# Patient Record
Sex: MALE | Race: WHITE | NOT HISPANIC OR LATINO | Employment: OTHER | URBAN - METROPOLITAN AREA
[De-identification: names, ages, dates, MRNs, and addresses within clinical notes are randomized per-mention and may not be internally consistent; named-entity substitution may affect disease eponyms.]

---

## 2019-03-27 ENCOUNTER — OFFICE VISIT CONVERTED (OUTPATIENT)
Dept: SURGERY | Facility: CLINIC | Age: 64
End: 2019-03-27
Attending: SURGERY

## 2019-05-03 ENCOUNTER — HOSPITAL ENCOUNTER (OUTPATIENT)
Dept: GASTROENTEROLOGY | Facility: HOSPITAL | Age: 64
Setting detail: HOSPITAL OUTPATIENT SURGERY
Discharge: HOME | End: 2019-05-03
Attending: SURGERY

## 2019-05-09 ENCOUNTER — OFFICE VISIT CONVERTED (OUTPATIENT)
Dept: SURGERY | Facility: CLINIC | Age: 64
End: 2019-05-09
Attending: SURGERY

## 2019-05-09 ENCOUNTER — CONVERSION ENCOUNTER (OUTPATIENT)
Dept: SURGERY | Facility: CLINIC | Age: 64
End: 2019-05-09

## 2019-05-16 ENCOUNTER — HOSPITAL ENCOUNTER (OUTPATIENT)
Dept: GENERAL RADIOLOGY | Facility: HOSPITAL | Age: 64
Discharge: HOME | End: 2019-05-16
Attending: SURGERY

## 2019-05-16 LAB
CREAT BLD-MCNC: 0.9 MG/DL (ref 0.6–1.4)
GFR SERPLBLD BASED ON 1.73 SQ M-ARVRAT: >60 ML/MIN/{1.73_M2}

## 2019-05-22 ENCOUNTER — OFFICE VISIT CONVERTED (OUTPATIENT)
Dept: SURGERY | Facility: CLINIC | Age: 64
End: 2019-05-22
Attending: SURGERY

## 2019-05-31 ENCOUNTER — HOSPITAL ENCOUNTER (OUTPATIENT)
Dept: MRI IMAGING | Facility: HOSPITAL | Age: 64
Discharge: HOME | End: 2019-05-31
Attending: SURGERY

## 2019-06-12 ENCOUNTER — OFFICE VISIT CONVERTED (OUTPATIENT)
Dept: SURGERY | Facility: CLINIC | Age: 64
End: 2019-06-12
Attending: SURGERY

## 2019-06-17 ENCOUNTER — HOSPITAL ENCOUNTER (OUTPATIENT)
Dept: ONCOLOGY | Facility: HOSPITAL | Age: 64
Setting detail: RECURRING SERIES
Discharge: HOME | End: 2019-06-30
Attending: NURSE PRACTITIONER

## 2019-06-17 ENCOUNTER — HOSPITAL ENCOUNTER (OUTPATIENT)
Dept: ONCOLOGY | Facility: HOSPITAL | Age: 64
Discharge: HOME | End: 2019-06-17
Attending: INTERNAL MEDICINE

## 2019-06-17 ENCOUNTER — OFFICE VISIT CONVERTED (OUTPATIENT)
Dept: ONCOLOGY | Facility: HOSPITAL | Age: 64
End: 2019-06-17
Attending: INTERNAL MEDICINE

## 2019-06-19 LAB
ALBUMIN SERPL-MCNC: 3.9 G/DL (ref 3.5–5)
ALBUMIN/GLOB SERPL: 1.4 {RATIO} (ref 1.4–2.6)
ALP SERPL-CCNC: 77 U/L (ref 56–155)
ALT SERPL-CCNC: 21 U/L (ref 10–40)
ANION GAP SERPL CALC-SCNC: 17 MMOL/L (ref 8–19)
AST SERPL-CCNC: 21 U/L (ref 15–50)
BASOPHILS # BLD AUTO: 0.05 10*3/UL (ref 0–0.2)
BASOPHILS NFR BLD AUTO: 0.6 % (ref 0–3)
BILIRUB SERPL-MCNC: 0.45 MG/DL (ref 0.2–1.3)
BUN SERPL-MCNC: 14 MG/DL (ref 5–25)
BUN/CREAT SERPL: 13 {RATIO} (ref 6–20)
CALCIUM SERPL-MCNC: 9.1 MG/DL (ref 8.7–10.4)
CEA SERPL-MCNC: 3 NG/ML (ref 0–5)
CHLORIDE SERPL-SCNC: 104 MMOL/L (ref 99–111)
CONV ABS IMM GRAN: 0.04 10*3/UL (ref 0–0.2)
CONV CO2: 24 MMOL/L (ref 22–32)
CONV IMMATURE GRAN: 0.5 % (ref 0–1.8)
CONV TOTAL PROTEIN: 6.7 G/DL (ref 6.3–8.2)
CREAT UR-MCNC: 1.04 MG/DL (ref 0.7–1.2)
DEPRECATED RDW RBC AUTO: 40.8 FL (ref 35.1–43.9)
EOSINOPHIL # BLD AUTO: 0.53 10*3/UL (ref 0–0.7)
EOSINOPHIL # BLD AUTO: 6.4 % (ref 0–7)
ERYTHROCYTE [DISTWIDTH] IN BLOOD BY AUTOMATED COUNT: 12.4 % (ref 11.6–14.4)
GFR SERPLBLD BASED ON 1.73 SQ M-ARVRAT: >60 ML/MIN/{1.73_M2}
GLOBULIN UR ELPH-MCNC: 2.8 G/DL (ref 2–3.5)
GLUCOSE SERPL-MCNC: 101 MG/DL (ref 70–99)
HBA1C MFR BLD: 14.2 G/DL (ref 14–18)
HCT VFR BLD AUTO: 42.2 % (ref 42–52)
LYMPHOCYTES # BLD AUTO: 2.29 10*3/UL (ref 1–5)
MCH RBC QN AUTO: 30.3 PG (ref 27–31)
MCHC RBC AUTO-ENTMCNC: 33.6 G/DL (ref 33–37)
MCV RBC AUTO: 90.2 FL (ref 80–96)
MONOCYTES # BLD AUTO: 0.64 10*3/UL (ref 0.2–1.2)
MONOCYTES NFR BLD AUTO: 7.8 % (ref 3–10)
NEUTROPHILS # BLD AUTO: 4.68 10*3/UL (ref 2–8)
NEUTROPHILS NFR BLD AUTO: 56.9 % (ref 30–85)
NRBC CBCN: 0 % (ref 0–0.7)
OSMOLALITY SERPL CALC.SUM OF ELEC: 293 MOSM/KG (ref 273–304)
PLATELET # BLD AUTO: 258 10*3/UL (ref 130–400)
PMV BLD AUTO: 9.7 FL (ref 9.4–12.4)
POTASSIUM SERPL-SCNC: 3.9 MMOL/L (ref 3.5–5.3)
RBC # BLD AUTO: 4.68 10*6/UL (ref 4.7–6.1)
SODIUM SERPL-SCNC: 141 MMOL/L (ref 135–147)
VARIANT LYMPHS NFR BLD MANUAL: 27.8 % (ref 20–45)
WBC # BLD AUTO: 8.23 10*3/UL (ref 4.8–10.8)

## 2019-06-24 ENCOUNTER — HOSPITAL ENCOUNTER (OUTPATIENT)
Dept: RADIATION ONCOLOGY | Facility: HOSPITAL | Age: 64
Setting detail: RECURRING SERIES
Discharge: HOME | End: 2019-06-30
Attending: RADIOLOGY

## 2019-06-28 ENCOUNTER — HOSPITAL ENCOUNTER (OUTPATIENT)
Dept: GASTROENTEROLOGY | Facility: HOSPITAL | Age: 64
Setting detail: HOSPITAL OUTPATIENT SURGERY
Discharge: HOME | End: 2019-06-28
Attending: SURGERY

## 2019-07-01 ENCOUNTER — HOSPITAL ENCOUNTER (OUTPATIENT)
Dept: RADIATION ONCOLOGY | Facility: HOSPITAL | Age: 64
Setting detail: RECURRING SERIES
Discharge: HOME | End: 2019-07-31
Attending: RADIOLOGY

## 2019-08-01 ENCOUNTER — HOSPITAL ENCOUNTER (OUTPATIENT)
Dept: RADIATION ONCOLOGY | Facility: HOSPITAL | Age: 64
Setting detail: RECURRING SERIES
Discharge: HOME | End: 2019-08-31
Attending: RADIOLOGY

## 2019-08-05 ENCOUNTER — HOSPITAL ENCOUNTER (OUTPATIENT)
Dept: ONCOLOGY | Facility: HOSPITAL | Age: 64
Discharge: HOME | End: 2019-08-05
Attending: INTERNAL MEDICINE

## 2019-08-05 ENCOUNTER — OFFICE VISIT CONVERTED (OUTPATIENT)
Dept: ONCOLOGY | Facility: HOSPITAL | Age: 64
End: 2019-08-05
Attending: INTERNAL MEDICINE

## 2019-08-05 LAB
ALBUMIN SERPL-MCNC: 4.1 G/DL (ref 3.5–5)
ALBUMIN/GLOB SERPL: 1.5 {RATIO} (ref 1.4–2.6)
ALP SERPL-CCNC: 99 U/L (ref 56–155)
ALT SERPL-CCNC: 40 U/L (ref 10–40)
ANION GAP SERPL CALC-SCNC: 19 MMOL/L (ref 8–19)
AST SERPL-CCNC: 29 U/L (ref 15–50)
BASOPHILS # BLD AUTO: 0.02 10*3/UL (ref 0–0.2)
BASOPHILS NFR BLD AUTO: 0.3 % (ref 0–3)
BILIRUB SERPL-MCNC: 0.61 MG/DL (ref 0.2–1.3)
BUN SERPL-MCNC: 13 MG/DL (ref 5–25)
BUN/CREAT SERPL: 12 {RATIO} (ref 6–20)
CALCIUM SERPL-MCNC: 9.2 MG/DL (ref 8.7–10.4)
CEA SERPL-MCNC: 3.4 NG/ML (ref 0–5)
CHLORIDE SERPL-SCNC: 101 MMOL/L (ref 99–111)
CONV ABS IMM GRAN: 0.09 10*3/UL (ref 0–0.2)
CONV CO2: 23 MMOL/L (ref 22–32)
CONV IMMATURE GRAN: 1.2 % (ref 0–1.8)
CONV TOTAL PROTEIN: 6.9 G/DL (ref 6.3–8.2)
CREAT UR-MCNC: 1.06 MG/DL (ref 0.7–1.2)
DEPRECATED RDW RBC AUTO: 41 FL (ref 35.1–43.9)
EOSINOPHIL # BLD AUTO: 0.32 10*3/UL (ref 0–0.7)
EOSINOPHIL # BLD AUTO: 4.4 % (ref 0–7)
ERYTHROCYTE [DISTWIDTH] IN BLOOD BY AUTOMATED COUNT: 14 % (ref 11.6–14.4)
GFR SERPLBLD BASED ON 1.73 SQ M-ARVRAT: >60 ML/MIN/{1.73_M2}
GLOBULIN UR ELPH-MCNC: 2.8 G/DL (ref 2–3.5)
GLUCOSE SERPL-MCNC: 111 MG/DL (ref 70–99)
HCT VFR BLD AUTO: 40.2 % (ref 42–52)
HGB BLD-MCNC: 13.7 G/DL (ref 14–18)
LYMPHOCYTES # BLD AUTO: 0.49 10*3/UL (ref 1–5)
LYMPHOCYTES NFR BLD AUTO: 6.7 % (ref 20–45)
MCH RBC QN AUTO: 30.8 PG (ref 27–31)
MCHC RBC AUTO-ENTMCNC: 34.1 G/DL (ref 33–37)
MCV RBC AUTO: 90.3 FL (ref 80–96)
MONOCYTES # BLD AUTO: 0.54 10*3/UL (ref 0.2–1.2)
MONOCYTES NFR BLD AUTO: 7.4 % (ref 3–10)
NEUTROPHILS # BLD AUTO: 5.85 10*3/UL (ref 2–8)
NEUTROPHILS NFR BLD AUTO: 80 % (ref 30–85)
NRBC CBCN: 0 % (ref 0–0.7)
OSMOLALITY SERPL CALC.SUM OF ELEC: 289 MOSM/KG (ref 273–304)
PLATELET # BLD AUTO: 244 10*3/UL (ref 130–400)
PMV BLD AUTO: 9 FL (ref 9.4–12.4)
POTASSIUM SERPL-SCNC: 4.3 MMOL/L (ref 3.5–5.3)
RBC # BLD AUTO: 4.45 10*6/UL (ref 4.7–6.1)
SODIUM SERPL-SCNC: 139 MMOL/L (ref 135–147)
WBC # BLD AUTO: 7.31 10*3/UL (ref 4.8–10.8)

## 2019-09-16 ENCOUNTER — HOSPITAL ENCOUNTER (OUTPATIENT)
Dept: GENERAL RADIOLOGY | Facility: HOSPITAL | Age: 64
Discharge: HOME | End: 2019-09-16
Attending: NURSE PRACTITIONER

## 2019-09-17 ENCOUNTER — OFFICE VISIT CONVERTED (OUTPATIENT)
Dept: ONCOLOGY | Facility: HOSPITAL | Age: 64
End: 2019-09-17
Attending: INTERNAL MEDICINE

## 2019-09-17 ENCOUNTER — HOSPITAL ENCOUNTER (OUTPATIENT)
Dept: ONCOLOGY | Facility: HOSPITAL | Age: 64
Discharge: HOME | End: 2019-09-17
Attending: INTERNAL MEDICINE

## 2019-09-17 LAB
ALBUMIN SERPL-MCNC: 4.1 G/DL (ref 3.5–5)
ALBUMIN/GLOB SERPL: 1.5 {RATIO} (ref 1.4–2.6)
ALP SERPL-CCNC: 79 U/L (ref 56–155)
ALT SERPL-CCNC: 21 U/L (ref 10–40)
ANION GAP SERPL CALC-SCNC: 16 MMOL/L (ref 8–19)
AST SERPL-CCNC: 20 U/L (ref 15–50)
BASOPHILS # BLD AUTO: 0.03 10*3/UL (ref 0–0.2)
BASOPHILS NFR BLD AUTO: 0.6 % (ref 0–3)
BILIRUB SERPL-MCNC: 0.59 MG/DL (ref 0.2–1.3)
BUN SERPL-MCNC: 12 MG/DL (ref 5–25)
BUN/CREAT SERPL: 13 {RATIO} (ref 6–20)
CALCIUM SERPL-MCNC: 9 MG/DL (ref 8.7–10.4)
CHLORIDE SERPL-SCNC: 102 MMOL/L (ref 99–111)
CONV ABS IMM GRAN: 0.03 10*3/UL (ref 0–0.2)
CONV CO2: 24 MMOL/L (ref 22–32)
CONV IMMATURE GRAN: 0.6 % (ref 0–1.8)
CONV TOTAL PROTEIN: 6.9 G/DL (ref 6.3–8.2)
CREAT UR-MCNC: 0.92 MG/DL (ref 0.7–1.2)
DEPRECATED RDW RBC AUTO: 49.6 FL (ref 35.1–43.9)
EOSINOPHIL # BLD AUTO: 0.48 10*3/UL (ref 0–0.7)
EOSINOPHIL # BLD AUTO: 9.2 % (ref 0–7)
ERYTHROCYTE [DISTWIDTH] IN BLOOD BY AUTOMATED COUNT: 14.6 % (ref 11.6–14.4)
GFR SERPLBLD BASED ON 1.73 SQ M-ARVRAT: >60 ML/MIN/{1.73_M2}
GLOBULIN UR ELPH-MCNC: 2.8 G/DL (ref 2–3.5)
GLUCOSE SERPL-MCNC: 103 MG/DL (ref 70–99)
HCT VFR BLD AUTO: 39.7 % (ref 42–52)
HGB BLD-MCNC: 13.5 G/DL (ref 14–18)
LYMPHOCYTES # BLD AUTO: 0.53 10*3/UL (ref 1–5)
LYMPHOCYTES NFR BLD AUTO: 10.1 % (ref 20–45)
MCH RBC QN AUTO: 31.8 PG (ref 27–31)
MCHC RBC AUTO-ENTMCNC: 34 G/DL (ref 33–37)
MCV RBC AUTO: 93.4 FL (ref 80–96)
MONOCYTES # BLD AUTO: 0.55 10*3/UL (ref 0.2–1.2)
MONOCYTES NFR BLD AUTO: 10.5 % (ref 3–10)
NEUTROPHILS # BLD AUTO: 3.62 10*3/UL (ref 2–8)
NEUTROPHILS NFR BLD AUTO: 69 % (ref 30–85)
NRBC CBCN: 0 % (ref 0–0.7)
OSMOLALITY SERPL CALC.SUM OF ELEC: 286 MOSM/KG (ref 273–304)
PLATELET # BLD AUTO: 218 10*3/UL (ref 130–400)
PMV BLD AUTO: 9.2 FL (ref 9.4–12.4)
POTASSIUM SERPL-SCNC: 3.9 MMOL/L (ref 3.5–5.3)
RBC # BLD AUTO: 4.25 10*6/UL (ref 4.7–6.1)
SODIUM SERPL-SCNC: 138 MMOL/L (ref 135–147)
WBC # BLD AUTO: 5.24 10*3/UL (ref 4.8–10.8)

## 2019-09-18 ENCOUNTER — OFFICE VISIT CONVERTED (OUTPATIENT)
Dept: SURGERY | Facility: CLINIC | Age: 64
End: 2019-09-18
Attending: SURGERY

## 2019-10-10 ENCOUNTER — OFFICE VISIT CONVERTED (OUTPATIENT)
Dept: SURGERY | Facility: CLINIC | Age: 64
End: 2019-10-10
Attending: SURGERY

## 2019-10-17 ENCOUNTER — OFFICE VISIT CONVERTED (OUTPATIENT)
Dept: SURGERY | Facility: CLINIC | Age: 64
End: 2019-10-17
Attending: SURGERY

## 2019-10-24 ENCOUNTER — OFFICE VISIT CONVERTED (OUTPATIENT)
Dept: ONCOLOGY | Facility: HOSPITAL | Age: 64
End: 2019-10-24
Attending: INTERNAL MEDICINE

## 2019-10-24 ENCOUNTER — HOSPITAL ENCOUNTER (OUTPATIENT)
Dept: ONCOLOGY | Facility: HOSPITAL | Age: 64
Discharge: HOME | End: 2019-10-24
Attending: INTERNAL MEDICINE

## 2019-10-24 LAB
ALBUMIN SERPL-MCNC: 3.9 G/DL (ref 3.5–5)
ALBUMIN/GLOB SERPL: 1.3 {RATIO} (ref 1.4–2.6)
ALP SERPL-CCNC: 124 U/L (ref 56–155)
ALT SERPL-CCNC: 22 U/L (ref 10–40)
ANION GAP SERPL CALC-SCNC: 16 MMOL/L (ref 8–19)
AST SERPL-CCNC: 19 U/L (ref 15–50)
BASOPHILS # BLD AUTO: 0.04 10*3/UL (ref 0–0.2)
BASOPHILS NFR BLD AUTO: 0.6 % (ref 0–3)
BILIRUB SERPL-MCNC: 0.48 MG/DL (ref 0.2–1.3)
BUN SERPL-MCNC: 10 MG/DL (ref 5–25)
BUN/CREAT SERPL: 13 {RATIO} (ref 6–20)
CALCIUM SERPL-MCNC: 9.6 MG/DL (ref 8.7–10.4)
CHLORIDE SERPL-SCNC: 102 MMOL/L (ref 99–111)
CONV ABS IMM GRAN: 0.04 10*3/UL (ref 0–0.2)
CONV CO2: 25 MMOL/L (ref 22–32)
CONV IMMATURE GRAN: 0.6 % (ref 0–1.8)
CONV TOTAL PROTEIN: 7 G/DL (ref 6.3–8.2)
CREAT UR-MCNC: 0.75 MG/DL (ref 0.7–1.2)
DEPRECATED RDW RBC AUTO: 43 FL (ref 35.1–43.9)
EOSINOPHIL # BLD AUTO: 0.46 10*3/UL (ref 0–0.7)
EOSINOPHIL # BLD AUTO: 6.9 % (ref 0–7)
ERYTHROCYTE [DISTWIDTH] IN BLOOD BY AUTOMATED COUNT: 12.5 % (ref 11.6–14.4)
GFR SERPLBLD BASED ON 1.73 SQ M-ARVRAT: >60 ML/MIN/{1.73_M2}
GLOBULIN UR ELPH-MCNC: 3.1 G/DL (ref 2–3.5)
GLUCOSE SERPL-MCNC: 99 MG/DL (ref 70–99)
HCT VFR BLD AUTO: 37.9 % (ref 42–52)
HGB BLD-MCNC: 12.5 G/DL (ref 14–18)
LYMPHOCYTES # BLD AUTO: 0.51 10*3/UL (ref 1–5)
LYMPHOCYTES NFR BLD AUTO: 7.7 % (ref 20–45)
MCH RBC QN AUTO: 30.6 PG (ref 27–31)
MCHC RBC AUTO-ENTMCNC: 33 G/DL (ref 33–37)
MCV RBC AUTO: 92.9 FL (ref 80–96)
MONOCYTES # BLD AUTO: 0.65 10*3/UL (ref 0.2–1.2)
MONOCYTES NFR BLD AUTO: 9.8 % (ref 3–10)
NEUTROPHILS # BLD AUTO: 4.92 10*3/UL (ref 2–8)
NEUTROPHILS NFR BLD AUTO: 74.4 % (ref 30–85)
NRBC CBCN: 0 % (ref 0–0.7)
OSMOLALITY SERPL CALC.SUM OF ELEC: 287 MOSM/KG (ref 273–304)
PLATELET # BLD AUTO: 231 10*3/UL (ref 130–400)
PMV BLD AUTO: 8.9 FL (ref 9.4–12.4)
POTASSIUM SERPL-SCNC: 4.3 MMOL/L (ref 3.5–5.3)
RBC # BLD AUTO: 4.08 10*6/UL (ref 4.7–6.1)
SODIUM SERPL-SCNC: 139 MMOL/L (ref 135–147)
WBC # BLD AUTO: 6.62 10*3/UL (ref 4.8–10.8)

## 2019-11-15 ENCOUNTER — HOSPITAL ENCOUNTER (OUTPATIENT)
Dept: ONCOLOGY | Facility: HOSPITAL | Age: 64
Discharge: HOME | End: 2019-11-15
Attending: INTERNAL MEDICINE

## 2019-11-15 LAB
ALBUMIN SERPL-MCNC: 3.6 G/DL (ref 3.5–5)
ALBUMIN/GLOB SERPL: 1.2 {RATIO} (ref 1.4–2.6)
ALP SERPL-CCNC: 98 U/L (ref 56–155)
ALT SERPL-CCNC: 26 U/L (ref 10–40)
ANION GAP SERPL CALC-SCNC: 15 MMOL/L (ref 8–19)
AST SERPL-CCNC: 23 U/L (ref 15–50)
BASOPHILS # BLD AUTO: 0.03 10*3/UL (ref 0–0.2)
BASOPHILS NFR BLD AUTO: 0.4 % (ref 0–3)
BILIRUB SERPL-MCNC: 0.37 MG/DL (ref 0.2–1.3)
BUN SERPL-MCNC: 8 MG/DL (ref 5–25)
BUN/CREAT SERPL: 9 {RATIO} (ref 6–20)
CALCIUM SERPL-MCNC: 9.3 MG/DL (ref 8.7–10.4)
CHLORIDE SERPL-SCNC: 104 MMOL/L (ref 99–111)
CONV ABS IMM GRAN: 0.03 10*3/UL (ref 0–0.2)
CONV CO2: 25 MMOL/L (ref 22–32)
CONV IMMATURE GRAN: 0.4 % (ref 0–1.8)
CONV TOTAL PROTEIN: 6.7 G/DL (ref 6.3–8.2)
CREAT UR-MCNC: 0.87 MG/DL (ref 0.7–1.2)
DEPRECATED RDW RBC AUTO: 44.6 FL (ref 35.1–43.9)
EOSINOPHIL # BLD AUTO: 0.4 10*3/UL (ref 0–0.7)
EOSINOPHIL # BLD AUTO: 5.4 % (ref 0–7)
ERYTHROCYTE [DISTWIDTH] IN BLOOD BY AUTOMATED COUNT: 14.7 % (ref 11.6–14.4)
GFR SERPLBLD BASED ON 1.73 SQ M-ARVRAT: >60 ML/MIN/{1.73_M2}
GLOBULIN UR ELPH-MCNC: 3.1 G/DL (ref 2–3.5)
GLUCOSE SERPL-MCNC: 119 MG/DL (ref 70–99)
HCT VFR BLD AUTO: 38.3 % (ref 42–52)
HGB BLD-MCNC: 12.4 G/DL (ref 14–18)
LYMPHOCYTES # BLD AUTO: 0.52 10*3/UL (ref 1–5)
LYMPHOCYTES NFR BLD AUTO: 7 % (ref 20–45)
MCH RBC QN AUTO: 30.4 PG (ref 27–31)
MCHC RBC AUTO-ENTMCNC: 32.4 G/DL (ref 33–37)
MCV RBC AUTO: 93.9 FL (ref 80–96)
MONOCYTES # BLD AUTO: 0.59 10*3/UL (ref 0.2–1.2)
MONOCYTES NFR BLD AUTO: 7.9 % (ref 3–10)
NEUTROPHILS # BLD AUTO: 5.86 10*3/UL (ref 2–8)
NEUTROPHILS NFR BLD AUTO: 78.9 % (ref 30–85)
NRBC CBCN: 0 % (ref 0–0.7)
OSMOLALITY SERPL CALC.SUM OF ELEC: 289 MOSM/KG (ref 273–304)
PLATELET # BLD AUTO: 218 10*3/UL (ref 130–400)
PMV BLD AUTO: 8.9 FL (ref 9.4–12.4)
POTASSIUM SERPL-SCNC: 3.8 MMOL/L (ref 3.5–5.3)
RBC # BLD AUTO: 4.08 10*6/UL (ref 4.7–6.1)
SODIUM SERPL-SCNC: 140 MMOL/L (ref 135–147)
WBC # BLD AUTO: 7.43 10*3/UL (ref 4.8–10.8)

## 2019-11-18 ENCOUNTER — HOSPITAL ENCOUNTER (OUTPATIENT)
Dept: ONCOLOGY | Facility: HOSPITAL | Age: 64
Discharge: HOME | End: 2019-11-18
Attending: INTERNAL MEDICINE

## 2019-11-18 ENCOUNTER — OFFICE VISIT CONVERTED (OUTPATIENT)
Dept: ONCOLOGY | Facility: HOSPITAL | Age: 64
End: 2019-11-18
Attending: INTERNAL MEDICINE

## 2019-12-12 ENCOUNTER — HOSPITAL ENCOUNTER (OUTPATIENT)
Dept: ONCOLOGY | Facility: HOSPITAL | Age: 64
Discharge: HOME | End: 2019-12-12
Attending: INTERNAL MEDICINE

## 2019-12-12 LAB
ALBUMIN SERPL-MCNC: 3.7 G/DL (ref 3.5–5)
ALBUMIN/GLOB SERPL: 1.2 {RATIO} (ref 1.4–2.6)
ALP SERPL-CCNC: 106 U/L (ref 56–155)
ALT SERPL-CCNC: 20 U/L (ref 10–40)
ANION GAP SERPL CALC-SCNC: 15 MMOL/L (ref 8–19)
AST SERPL-CCNC: 19 U/L (ref 15–50)
BASOPHILS # BLD AUTO: 0.02 10*3/UL (ref 0–0.2)
BASOPHILS NFR BLD AUTO: 0.3 % (ref 0–3)
BILIRUB SERPL-MCNC: 0.68 MG/DL (ref 0.2–1.3)
BUN SERPL-MCNC: 12 MG/DL (ref 5–25)
BUN/CREAT SERPL: 11 {RATIO} (ref 6–20)
CALCIUM SERPL-MCNC: 9.3 MG/DL (ref 8.7–10.4)
CHLORIDE SERPL-SCNC: 101 MMOL/L (ref 99–111)
CONV ABS IMM GRAN: 0.03 10*3/UL (ref 0–0.2)
CONV CO2: 25 MMOL/L (ref 22–32)
CONV IMMATURE GRAN: 0.4 % (ref 0–1.8)
CONV TOTAL PROTEIN: 6.8 G/DL (ref 6.3–8.2)
CREAT UR-MCNC: 1.05 MG/DL (ref 0.7–1.2)
DEPRECATED RDW RBC AUTO: 52.9 FL (ref 35.1–43.9)
EOSINOPHIL # BLD AUTO: 0.28 10*3/UL (ref 0–0.7)
EOSINOPHIL # BLD AUTO: 3.7 % (ref 0–7)
ERYTHROCYTE [DISTWIDTH] IN BLOOD BY AUTOMATED COUNT: 15.9 % (ref 11.6–14.4)
GFR SERPLBLD BASED ON 1.73 SQ M-ARVRAT: >60 ML/MIN/{1.73_M2}
GLOBULIN UR ELPH-MCNC: 3.1 G/DL (ref 2–3.5)
GLUCOSE SERPL-MCNC: 132 MG/DL (ref 70–99)
HCT VFR BLD AUTO: 38.3 % (ref 42–52)
HGB BLD-MCNC: 13.1 G/DL (ref 14–18)
LYMPHOCYTES # BLD AUTO: 0.38 10*3/UL (ref 1–5)
LYMPHOCYTES NFR BLD AUTO: 5.1 % (ref 20–45)
MCH RBC QN AUTO: 31.5 PG (ref 27–31)
MCHC RBC AUTO-ENTMCNC: 34.2 G/DL (ref 33–37)
MCV RBC AUTO: 92.1 FL (ref 80–96)
MONOCYTES # BLD AUTO: 0.47 10*3/UL (ref 0.2–1.2)
MONOCYTES NFR BLD AUTO: 6.3 % (ref 3–10)
NEUTROPHILS # BLD AUTO: 6.32 10*3/UL (ref 2–8)
NEUTROPHILS NFR BLD AUTO: 84.2 % (ref 30–85)
NRBC CBCN: 0 % (ref 0–0.7)
OSMOLALITY SERPL CALC.SUM OF ELEC: 286 MOSM/KG (ref 273–304)
PLATELET # BLD AUTO: 193 10*3/UL (ref 130–400)
PMV BLD AUTO: 9.1 FL (ref 9.4–12.4)
POTASSIUM SERPL-SCNC: 3.9 MMOL/L (ref 3.5–5.3)
RBC # BLD AUTO: 4.16 10*6/UL (ref 4.7–6.1)
SODIUM SERPL-SCNC: 137 MMOL/L (ref 135–147)
WBC # BLD AUTO: 7.5 10*3/UL (ref 4.8–10.8)

## 2019-12-19 ENCOUNTER — OFFICE VISIT CONVERTED (OUTPATIENT)
Dept: ONCOLOGY | Facility: HOSPITAL | Age: 64
End: 2019-12-19
Attending: INTERNAL MEDICINE

## 2019-12-19 ENCOUNTER — HOSPITAL ENCOUNTER (OUTPATIENT)
Dept: ONCOLOGY | Facility: HOSPITAL | Age: 64
Discharge: HOME | End: 2019-12-19
Attending: INTERNAL MEDICINE

## 2020-01-07 ENCOUNTER — HOSPITAL ENCOUNTER (OUTPATIENT)
Dept: ONCOLOGY | Facility: HOSPITAL | Age: 65
Discharge: HOME | End: 2020-01-07
Attending: INTERNAL MEDICINE

## 2020-01-07 LAB
ALBUMIN SERPL-MCNC: 3.7 G/DL (ref 3.5–5)
ALBUMIN/GLOB SERPL: 1.3 {RATIO} (ref 1.4–2.6)
ALP SERPL-CCNC: 113 U/L (ref 56–155)
ALT SERPL-CCNC: 73 U/L (ref 10–40)
ANION GAP SERPL CALC-SCNC: 13 MMOL/L (ref 8–19)
AST SERPL-CCNC: 131 U/L (ref 15–50)
BASOPHILS # BLD AUTO: 0.02 10*3/UL (ref 0–0.2)
BASOPHILS NFR BLD AUTO: 0.3 % (ref 0–3)
BILIRUB SERPL-MCNC: 0.74 MG/DL (ref 0.2–1.3)
BUN SERPL-MCNC: 16 MG/DL (ref 5–25)
BUN/CREAT SERPL: 21 {RATIO} (ref 6–20)
CALCIUM SERPL-MCNC: 8.6 MG/DL (ref 8.7–10.4)
CALCIUM SPEC-SCNC: 8.4 MG/DL (ref 8.7–10.4)
CHLORIDE SERPL-SCNC: 105 MMOL/L (ref 99–111)
CONV ABS IMM GRAN: 0.01 10*3/UL (ref 0–0.54)
CONV CO2: 22 MMOL/L (ref 22–32)
CONV EOSINOPHILS PERCENT BY MANUAL COUNT: 3.2 % (ref 0–7)
CONV IMMATURE GRAN: 0.1 % (ref 0–0.4)
CONV TOTAL PROTEIN: 6.5 G/DL (ref 6.3–8.2)
CREAT UR-MCNC: 0.75 MG/DL (ref 0.7–1.2)
EOSINOPHIL # BLD MANUAL: 0.22 10*3/UL (ref 0–0.7)
ERYTHROCYTE [DISTWIDTH] IN BLOOD BY AUTOMATED COUNT: 17.3 % (ref 11.5–14.5)
ERYTHROCYTE [DISTWIDTH] IN BLOOD BY AUTOMATED COUNT: 58 FL
GFR SERPLBLD BASED ON 1.73 SQ M-ARVRAT: >60 ML/MIN/{1.73_M2}
GLOBULIN UR ELPH-MCNC: 2.8 G/DL (ref 2–3.5)
GLUCOSE SERPL-MCNC: 88 MG/DL (ref 70–99)
HBA1C MFR BLD: 12.8 G/DL (ref 14–18)
HCT VFR BLD AUTO: 38.5 % (ref 42–52)
LYMPHOCYTES # BLD AUTO: 0.4 10*3/UL (ref 1–5)
LYMPHOCYTES NFR BLD AUTO: 5.9 % (ref 20–45)
MCH RBC QN AUTO: 31.4 PG (ref 27–31)
MCHC RBC AUTO-ENTMCNC: 33.2 G/DL (ref 33–37)
MCV RBC AUTO: 94.6 FL (ref 80–96)
MONOCYTES # BLD AUTO: 0.59 10*3/UL (ref 0.2–1.2)
MONOCYTES NFR BLD MANUAL: 8.7 % (ref 3–10)
NEUTROPHILS # BLD AUTO: 5.54 10*3/UL (ref 2–8)
NEUTROPHILS NFR BLD MANUAL: 81.8 % (ref 30–85)
OSMOLALITY SERPL CALC.SUM OF ELEC: 283 MOSM/KG (ref 273–304)
PLATELET # BLD AUTO: 143 10*3/UL (ref 130–400)
PMV BLD AUTO: 9 FL (ref 7.4–10.4)
POTASSIUM SERPL-SCNC: 3.9 MMOL/L (ref 3.5–5.3)
RBC MORPH BLD: 4.07 10*6/UL (ref 4.7–6.1)
SODIUM SERPL-SCNC: 136 MMOL/L (ref 135–147)
WBC # BLD AUTO: 6.78 10*3/UL (ref 4.8–10.8)

## 2020-01-09 ENCOUNTER — OFFICE VISIT CONVERTED (OUTPATIENT)
Dept: ONCOLOGY | Facility: HOSPITAL | Age: 65
End: 2020-01-09
Attending: INTERNAL MEDICINE

## 2020-01-09 ENCOUNTER — HOSPITAL ENCOUNTER (OUTPATIENT)
Dept: ONCOLOGY | Facility: HOSPITAL | Age: 65
Discharge: HOME | End: 2020-01-09
Attending: INTERNAL MEDICINE

## 2020-01-15 ENCOUNTER — HOSPITAL ENCOUNTER (OUTPATIENT)
Dept: ONCOLOGY | Facility: HOSPITAL | Age: 65
Discharge: HOME | End: 2020-01-15
Attending: INTERNAL MEDICINE

## 2020-01-15 LAB
ALBUMIN SERPL-MCNC: 3.7 G/DL (ref 3.5–5)
ALBUMIN/GLOB SERPL: 1.4 {RATIO} (ref 1.4–2.6)
ALP SERPL-CCNC: 94 U/L (ref 56–155)
ALT SERPL-CCNC: 17 U/L (ref 10–40)
ANION GAP SERPL CALC-SCNC: 11 MMOL/L (ref 8–19)
AST SERPL-CCNC: 20 U/L (ref 15–50)
BASOPHILS # BLD AUTO: 0.02 10*3/UL (ref 0–0.2)
BASOPHILS NFR BLD AUTO: 0.3 % (ref 0–3)
BILIRUB SERPL-MCNC: 0.45 MG/DL (ref 0.2–1.3)
BUN SERPL-MCNC: 12 MG/DL (ref 5–25)
BUN/CREAT SERPL: 12 {RATIO} (ref 6–20)
CALCIUM SERPL-MCNC: 9.2 MG/DL (ref 8.7–10.4)
CALCIUM SPEC-SCNC: 9 MG/DL (ref 8.7–10.4)
CHLORIDE SERPL-SCNC: 104 MMOL/L (ref 99–111)
CONV ABS IMM GRAN: 0.07 10*3/UL (ref 0–0.54)
CONV CO2: 26 MMOL/L (ref 22–32)
CONV EOSINOPHILS PERCENT BY MANUAL COUNT: 6.1 % (ref 0–7)
CONV IMMATURE GRAN: 1.2 % (ref 0–0.4)
CONV TOTAL PROTEIN: 6.4 G/DL (ref 6.3–8.2)
CREAT UR-MCNC: 0.97 MG/DL (ref 0.7–1.2)
EOSINOPHIL # BLD MANUAL: 0.37 10*3/UL (ref 0–0.7)
ERYTHROCYTE [DISTWIDTH] IN BLOOD BY AUTOMATED COUNT: 18.1 % (ref 11.5–14.5)
ERYTHROCYTE [DISTWIDTH] IN BLOOD BY AUTOMATED COUNT: 62.6 FL
GFR SERPLBLD BASED ON 1.73 SQ M-ARVRAT: >60 ML/MIN/{1.73_M2}
GLOBULIN UR ELPH-MCNC: 2.7 G/DL (ref 2–3.5)
GLUCOSE SERPL-MCNC: 131 MG/DL (ref 70–99)
HBA1C MFR BLD: 12.6 G/DL (ref 14–18)
HCT VFR BLD AUTO: 37.9 % (ref 42–52)
LYMPHOCYTES # BLD AUTO: 0.49 10*3/UL (ref 1–5)
LYMPHOCYTES NFR BLD AUTO: 8.1 % (ref 20–45)
MCH RBC QN AUTO: 31.8 PG (ref 27–31)
MCHC RBC AUTO-ENTMCNC: 33.2 G/DL (ref 33–37)
MCV RBC AUTO: 95.7 FL (ref 80–96)
MONOCYTES # BLD AUTO: 0.47 10*3/UL (ref 0.2–1.2)
MONOCYTES NFR BLD MANUAL: 7.7 % (ref 3–10)
NEUTROPHILS # BLD AUTO: 4.66 10*3/UL (ref 2–8)
NEUTROPHILS NFR BLD MANUAL: 76.6 % (ref 30–85)
OSMOLALITY SERPL CALC.SUM OF ELEC: 286 MOSM/KG (ref 273–304)
PLATELET # BLD AUTO: 213 10*3/UL (ref 130–400)
PMV BLD AUTO: 8.2 FL (ref 7.4–10.4)
POTASSIUM SERPL-SCNC: 4 MMOL/L (ref 3.5–5.3)
RBC MORPH BLD: 3.96 10*6/UL (ref 4.7–6.1)
SODIUM SERPL-SCNC: 137 MMOL/L (ref 135–147)
WBC # BLD AUTO: 6.08 10*3/UL (ref 4.8–10.8)

## 2020-01-16 ENCOUNTER — OFFICE VISIT CONVERTED (OUTPATIENT)
Dept: ONCOLOGY | Facility: HOSPITAL | Age: 65
End: 2020-01-16
Attending: INTERNAL MEDICINE

## 2020-01-16 ENCOUNTER — HOSPITAL ENCOUNTER (OUTPATIENT)
Dept: ONCOLOGY | Facility: HOSPITAL | Age: 65
Discharge: HOME | End: 2020-01-16
Attending: INTERNAL MEDICINE

## 2020-02-05 ENCOUNTER — HOSPITAL ENCOUNTER (OUTPATIENT)
Dept: ONCOLOGY | Facility: HOSPITAL | Age: 65
Discharge: HOME | End: 2020-02-05
Attending: INTERNAL MEDICINE

## 2020-02-05 LAB
ALBUMIN SERPL-MCNC: 3.8 G/DL (ref 3.5–5)
ALBUMIN/GLOB SERPL: 1.4 {RATIO} (ref 1.4–2.6)
ALP SERPL-CCNC: 86 U/L (ref 56–155)
ALT SERPL-CCNC: 25 U/L (ref 10–40)
ANION GAP SERPL CALC-SCNC: 14 MMOL/L (ref 8–19)
AST SERPL-CCNC: 23 U/L (ref 15–50)
BASOPHILS # BLD AUTO: 0.03 10*3/UL (ref 0–0.2)
BASOPHILS NFR BLD AUTO: 0.5 % (ref 0–3)
BILIRUB SERPL-MCNC: 0.47 MG/DL (ref 0.2–1.3)
BUN SERPL-MCNC: 12 MG/DL (ref 5–25)
BUN/CREAT SERPL: 14 {RATIO} (ref 6–20)
CALCIUM SERPL-MCNC: 9.5 MG/DL (ref 8.7–10.4)
CALCIUM SPEC-SCNC: 9.3 MG/DL (ref 8.7–10.4)
CHLORIDE SERPL-SCNC: 105 MMOL/L (ref 99–111)
CONV ABS IMM GRAN: 0.04 10*3/UL (ref 0–0.54)
CONV CO2: 23 MMOL/L (ref 22–32)
CONV EOSINOPHILS PERCENT BY MANUAL COUNT: 6.4 % (ref 0–7)
CONV IMMATURE GRAN: 0.7 % (ref 0–0.4)
CONV TOTAL PROTEIN: 6.5 G/DL (ref 6.3–8.2)
CREAT UR-MCNC: 0.84 MG/DL (ref 0.7–1.2)
EOSINOPHIL # BLD MANUAL: 0.36 10*3/UL (ref 0–0.7)
ERYTHROCYTE [DISTWIDTH] IN BLOOD BY AUTOMATED COUNT: 17.1 % (ref 11.5–14.5)
ERYTHROCYTE [DISTWIDTH] IN BLOOD BY AUTOMATED COUNT: 59.1 FL
GFR SERPLBLD BASED ON 1.73 SQ M-ARVRAT: >60 ML/MIN/{1.73_M2}
GLOBULIN UR ELPH-MCNC: 2.7 G/DL (ref 2–3.5)
GLUCOSE SERPL-MCNC: 116 MG/DL (ref 70–99)
HBA1C MFR BLD: 13.2 G/DL (ref 14–18)
HCT VFR BLD AUTO: 38.3 % (ref 42–52)
LYMPHOCYTES # BLD AUTO: 0.51 10*3/UL (ref 1–5)
LYMPHOCYTES NFR BLD AUTO: 9.1 % (ref 20–45)
MCH RBC QN AUTO: 32.6 PG (ref 27–31)
MCHC RBC AUTO-ENTMCNC: 34.5 G/DL (ref 33–37)
MCV RBC AUTO: 94.6 FL (ref 80–96)
MONOCYTES # BLD AUTO: 0.43 10*3/UL (ref 0.2–1.2)
MONOCYTES NFR BLD MANUAL: 7.6 % (ref 3–10)
NEUTROPHILS # BLD AUTO: 4.26 10*3/UL (ref 2–8)
NEUTROPHILS NFR BLD MANUAL: 75.7 % (ref 30–85)
OSMOLALITY SERPL CALC.SUM OF ELEC: 287 MOSM/KG (ref 273–304)
PLATELET # BLD AUTO: 171 10*3/UL (ref 130–400)
PMV BLD AUTO: 8.3 FL (ref 7.4–10.4)
POTASSIUM SERPL-SCNC: 4 MMOL/L (ref 3.5–5.3)
RBC MORPH BLD: 4.05 10*6/UL (ref 4.7–6.1)
SODIUM SERPL-SCNC: 138 MMOL/L (ref 135–147)
WBC # BLD AUTO: 5.63 10*3/UL (ref 4.8–10.8)

## 2020-02-06 ENCOUNTER — OFFICE VISIT CONVERTED (OUTPATIENT)
Dept: ONCOLOGY | Facility: HOSPITAL | Age: 65
End: 2020-02-06
Attending: INTERNAL MEDICINE

## 2020-02-06 ENCOUNTER — HOSPITAL ENCOUNTER (OUTPATIENT)
Dept: ONCOLOGY | Facility: HOSPITAL | Age: 65
Discharge: HOME | End: 2020-02-06
Attending: INTERNAL MEDICINE

## 2020-02-26 ENCOUNTER — OFFICE VISIT CONVERTED (OUTPATIENT)
Dept: SURGERY | Facility: CLINIC | Age: 65
End: 2020-02-26
Attending: SURGERY

## 2020-02-26 ENCOUNTER — CONVERSION ENCOUNTER (OUTPATIENT)
Dept: SURGERY | Facility: CLINIC | Age: 65
End: 2020-02-26

## 2020-03-02 ENCOUNTER — HOSPITAL ENCOUNTER (OUTPATIENT)
Dept: ONCOLOGY | Facility: HOSPITAL | Age: 65
Discharge: HOME | End: 2020-03-02
Attending: NURSE PRACTITIONER

## 2020-03-02 ENCOUNTER — OFFICE VISIT CONVERTED (OUTPATIENT)
Dept: ONCOLOGY | Facility: HOSPITAL | Age: 65
End: 2020-03-02
Attending: NURSE PRACTITIONER

## 2020-03-02 ENCOUNTER — HOSPITAL ENCOUNTER (OUTPATIENT)
Dept: CARDIOLOGY | Facility: HOSPITAL | Age: 65
Discharge: HOME | End: 2020-03-02
Attending: NURSE PRACTITIONER

## 2020-03-19 ENCOUNTER — HOSPITAL ENCOUNTER (OUTPATIENT)
Dept: CT IMAGING | Facility: HOSPITAL | Age: 65
Discharge: HOME | End: 2020-03-19
Attending: INTERNAL MEDICINE

## 2020-03-19 LAB
ALBUMIN SERPL-MCNC: 3.5 G/DL (ref 3.5–5)
ALBUMIN/GLOB SERPL: 1.2 {RATIO} (ref 1.4–2.6)
ALP SERPL-CCNC: 93 U/L (ref 56–155)
ALT SERPL-CCNC: 32 U/L (ref 10–40)
ANION GAP SERPL CALC-SCNC: 16 MMOL/L (ref 8–19)
AST SERPL-CCNC: 24 U/L (ref 15–50)
BASOPHILS # BLD AUTO: 0.03 10*3/UL (ref 0–0.2)
BASOPHILS NFR BLD AUTO: 0.5 % (ref 0–3)
BILIRUB SERPL-MCNC: 0.46 MG/DL (ref 0.2–1.3)
BUN SERPL-MCNC: 10 MG/DL (ref 5–25)
BUN/CREAT SERPL: 11 {RATIO} (ref 6–20)
CALCIUM SERPL-MCNC: 9.4 MG/DL (ref 8.7–10.4)
CEA SERPL-MCNC: 0.9 NG/ML (ref 0–5)
CHLORIDE SERPL-SCNC: 102 MMOL/L (ref 99–111)
CONV ABS IMM GRAN: 0.03 10*3/UL (ref 0–0.2)
CONV CO2: 23 MMOL/L (ref 22–32)
CONV IMMATURE GRAN: 0.5 % (ref 0–1.8)
CONV TOTAL PROTEIN: 6.5 G/DL (ref 6.3–8.2)
CREAT UR-MCNC: 0.88 MG/DL (ref 0.7–1.2)
DEPRECATED RDW RBC AUTO: 48.9 FL (ref 35.1–43.9)
EOSINOPHIL # BLD AUTO: 0.25 10*3/UL (ref 0–0.7)
EOSINOPHIL # BLD AUTO: 4.1 % (ref 0–7)
ERYTHROCYTE [DISTWIDTH] IN BLOOD BY AUTOMATED COUNT: 13.8 % (ref 11.6–14.4)
GFR SERPLBLD BASED ON 1.73 SQ M-ARVRAT: >60 ML/MIN/{1.73_M2}
GLOBULIN UR ELPH-MCNC: 3 G/DL (ref 2–3.5)
GLUCOSE SERPL-MCNC: 97 MG/DL (ref 70–99)
HCT VFR BLD AUTO: 42.1 % (ref 42–52)
HGB BLD-MCNC: 14 G/DL (ref 14–18)
LYMPHOCYTES # BLD AUTO: 0.55 10*3/UL (ref 1–5)
LYMPHOCYTES NFR BLD AUTO: 9 % (ref 20–45)
MCH RBC QN AUTO: 31.7 PG (ref 27–31)
MCHC RBC AUTO-ENTMCNC: 33.3 G/DL (ref 33–37)
MCV RBC AUTO: 95.5 FL (ref 80–96)
MONOCYTES # BLD AUTO: 0.37 10*3/UL (ref 0.2–1.2)
MONOCYTES NFR BLD AUTO: 6.1 % (ref 3–10)
NEUTROPHILS # BLD AUTO: 4.88 10*3/UL (ref 2–8)
NEUTROPHILS NFR BLD AUTO: 79.8 % (ref 30–85)
NRBC CBCN: 0 % (ref 0–0.7)
OSMOLALITY SERPL CALC.SUM OF ELEC: 283 MOSM/KG (ref 273–304)
PLATELET # BLD AUTO: 290 10*3/UL (ref 130–400)
PMV BLD AUTO: 8.7 FL (ref 9.4–12.4)
POTASSIUM SERPL-SCNC: 4.1 MMOL/L (ref 3.5–5.3)
RBC # BLD AUTO: 4.41 10*6/UL (ref 4.7–6.1)
SODIUM SERPL-SCNC: 137 MMOL/L (ref 135–147)
WBC # BLD AUTO: 6.11 10*3/UL (ref 4.8–10.8)

## 2020-03-23 ENCOUNTER — OFFICE VISIT CONVERTED (OUTPATIENT)
Dept: ONCOLOGY | Facility: HOSPITAL | Age: 65
End: 2020-03-23
Attending: INTERNAL MEDICINE

## 2020-03-23 ENCOUNTER — HOSPITAL ENCOUNTER (OUTPATIENT)
Dept: ONCOLOGY | Facility: HOSPITAL | Age: 65
Discharge: HOME | End: 2020-03-23
Attending: INTERNAL MEDICINE

## 2020-04-29 ENCOUNTER — OFFICE VISIT CONVERTED (OUTPATIENT)
Dept: SURGERY | Facility: CLINIC | Age: 65
End: 2020-04-29
Attending: SURGERY

## 2020-06-03 ENCOUNTER — OFFICE VISIT CONVERTED (OUTPATIENT)
Dept: SURGERY | Facility: CLINIC | Age: 65
End: 2020-06-03
Attending: SURGERY

## 2020-06-24 ENCOUNTER — HOSPITAL ENCOUNTER (OUTPATIENT)
Dept: GENERAL RADIOLOGY | Facility: HOSPITAL | Age: 65
Discharge: HOME | End: 2020-06-24
Attending: INTERNAL MEDICINE

## 2020-06-24 ENCOUNTER — HOSPITAL ENCOUNTER (OUTPATIENT)
Dept: LAB | Facility: HOSPITAL | Age: 65
Discharge: HOME | End: 2020-06-24
Attending: INTERNAL MEDICINE

## 2020-06-24 LAB
ALBUMIN SERPL-MCNC: 3.8 G/DL (ref 3.5–5)
ALBUMIN/GLOB SERPL: 1.5 {RATIO} (ref 1.4–2.6)
ALP SERPL-CCNC: 68 U/L (ref 56–155)
ALT SERPL-CCNC: 29 U/L (ref 10–40)
ANION GAP SERPL CALC-SCNC: 16 MMOL/L (ref 8–19)
AST SERPL-CCNC: 22 U/L (ref 15–50)
BASOPHILS # BLD AUTO: 0.02 10*3/UL (ref 0–0.2)
BASOPHILS NFR BLD AUTO: 0.5 % (ref 0–3)
BILIRUB SERPL-MCNC: 0.58 MG/DL (ref 0.2–1.3)
BUN SERPL-MCNC: 9 MG/DL (ref 5–25)
BUN/CREAT SERPL: 11 {RATIO} (ref 6–20)
CALCIUM SERPL-MCNC: 9.2 MG/DL (ref 8.7–10.4)
CEA SERPL-MCNC: 1.6 NG/ML (ref 0–5)
CHLORIDE SERPL-SCNC: 104 MMOL/L (ref 99–111)
CONV ABS IMM GRAN: 0.02 10*3/UL (ref 0–0.2)
CONV CO2: 22 MMOL/L (ref 22–32)
CONV IMMATURE GRAN: 0.5 % (ref 0–1.8)
CONV TOTAL PROTEIN: 6.3 G/DL (ref 6.3–8.2)
CREAT UR-MCNC: 0.8 MG/DL (ref 0.7–1.2)
DEPRECATED RDW RBC AUTO: 42.7 FL (ref 35.1–43.9)
EOSINOPHIL # BLD AUTO: 0.31 10*3/UL (ref 0–0.7)
EOSINOPHIL # BLD AUTO: 7 % (ref 0–7)
ERYTHROCYTE [DISTWIDTH] IN BLOOD BY AUTOMATED COUNT: 13 % (ref 11.6–14.4)
GFR SERPLBLD BASED ON 1.73 SQ M-ARVRAT: >60 ML/MIN/{1.73_M2}
GLOBULIN UR ELPH-MCNC: 2.5 G/DL (ref 2–3.5)
GLUCOSE SERPL-MCNC: 104 MG/DL (ref 70–99)
HCT VFR BLD AUTO: 42.1 % (ref 42–52)
HGB BLD-MCNC: 13.7 G/DL (ref 14–18)
LYMPHOCYTES # BLD AUTO: 0.65 10*3/UL (ref 1–5)
LYMPHOCYTES NFR BLD AUTO: 14.7 % (ref 20–45)
MCH RBC QN AUTO: 29.4 PG (ref 27–31)
MCHC RBC AUTO-ENTMCNC: 32.5 G/DL (ref 33–37)
MCV RBC AUTO: 90.3 FL (ref 80–96)
MONOCYTES # BLD AUTO: 0.34 10*3/UL (ref 0.2–1.2)
MONOCYTES NFR BLD AUTO: 7.7 % (ref 3–10)
NEUTROPHILS # BLD AUTO: 3.07 10*3/UL (ref 2–8)
NEUTROPHILS NFR BLD AUTO: 69.6 % (ref 30–85)
NRBC CBCN: 0 % (ref 0–0.7)
OSMOLALITY SERPL CALC.SUM OF ELEC: 285 MOSM/KG (ref 273–304)
PLATELET # BLD AUTO: 199 10*3/UL (ref 130–400)
PMV BLD AUTO: 9.4 FL (ref 9.4–12.4)
POTASSIUM SERPL-SCNC: 4.1 MMOL/L (ref 3.5–5.3)
RBC # BLD AUTO: 4.66 10*6/UL (ref 4.7–6.1)
SODIUM SERPL-SCNC: 138 MMOL/L (ref 135–147)
WBC # BLD AUTO: 4.41 10*3/UL (ref 4.8–10.8)

## 2020-07-01 ENCOUNTER — OFFICE VISIT CONVERTED (OUTPATIENT)
Dept: SURGERY | Facility: CLINIC | Age: 65
End: 2020-07-01
Attending: SURGERY

## 2020-07-02 ENCOUNTER — OFFICE VISIT CONVERTED (OUTPATIENT)
Dept: ONCOLOGY | Facility: HOSPITAL | Age: 65
End: 2020-07-02
Attending: INTERNAL MEDICINE

## 2020-07-02 ENCOUNTER — HOSPITAL ENCOUNTER (OUTPATIENT)
Dept: ONCOLOGY | Facility: HOSPITAL | Age: 65
Discharge: HOME | End: 2020-07-02
Attending: INTERNAL MEDICINE

## 2020-09-30 ENCOUNTER — HOSPITAL ENCOUNTER (OUTPATIENT)
Dept: ONCOLOGY | Facility: HOSPITAL | Age: 65
Discharge: HOME | End: 2020-09-30
Attending: INTERNAL MEDICINE

## 2020-09-30 LAB
ALBUMIN SERPL-MCNC: 4.1 G/DL (ref 3.5–5)
ALBUMIN/GLOB SERPL: 1.7 {RATIO} (ref 1.4–2.6)
ALP SERPL-CCNC: 77 U/L (ref 56–155)
ALT SERPL-CCNC: 19 U/L (ref 10–40)
ANION GAP SERPL CALC-SCNC: 12 MMOL/L (ref 8–19)
AST SERPL-CCNC: 16 U/L (ref 15–50)
BASOPHILS # BLD AUTO: 0.02 10*3/UL (ref 0–0.2)
BASOPHILS NFR BLD AUTO: 0.3 % (ref 0–3)
BILIRUB SERPL-MCNC: 0.65 MG/DL (ref 0.2–1.3)
BUN SERPL-MCNC: 11 MG/DL (ref 5–25)
BUN/CREAT SERPL: 12 {RATIO} (ref 6–20)
CALCIUM SERPL-MCNC: 9.4 MG/DL (ref 8.7–10.4)
CEA SERPL-MCNC: 3.1 NG/ML (ref 0–5)
CHLORIDE SERPL-SCNC: 104 MMOL/L (ref 99–111)
CONV ABS IMM GRAN: 0.02 10*3/UL (ref 0–0.54)
CONV CO2: 26 MMOL/L (ref 22–32)
CONV EOSINOPHILS PERCENT BY MANUAL COUNT: 4.1 % (ref 0–7)
CONV IMMATURE GRAN: 0.3 % (ref 0–0.4)
CONV TOTAL PROTEIN: 6.5 G/DL (ref 6.3–8.2)
CREAT UR-MCNC: 0.93 MG/DL (ref 0.7–1.2)
EOSINOPHIL # BLD MANUAL: 0.26 10*3/UL (ref 0–0.7)
ERYTHROCYTE [DISTWIDTH] IN BLOOD BY AUTOMATED COUNT: 12.9 % (ref 11.5–14.5)
ERYTHROCYTE [DISTWIDTH] IN BLOOD BY AUTOMATED COUNT: 42.6 FL
GFR SERPLBLD BASED ON 1.73 SQ M-ARVRAT: >60 ML/MIN/{1.73_M2}
GLOBULIN UR ELPH-MCNC: 2.4 G/DL (ref 2–3.5)
GLUCOSE SERPL-MCNC: 86 MG/DL (ref 70–99)
HBA1C MFR BLD: 14.8 G/DL (ref 14–18)
HCT VFR BLD AUTO: 44.5 % (ref 42–52)
LYMPHOCYTES # BLD AUTO: 0.88 10*3/UL (ref 1–5)
LYMPHOCYTES NFR BLD AUTO: 14 % (ref 20–45)
MCH RBC QN AUTO: 30 PG (ref 27–31)
MCHC RBC AUTO-ENTMCNC: 33.3 G/DL (ref 33–37)
MCV RBC AUTO: 90.3 FL (ref 80–96)
MONOCYTES # BLD AUTO: 0.48 10*3/UL (ref 0.2–1.2)
MONOCYTES NFR BLD MANUAL: 7.7 % (ref 3–10)
NEUTROPHILS # BLD AUTO: 4.61 10*3/UL (ref 2–8)
NEUTROPHILS NFR BLD MANUAL: 73.6 % (ref 30–85)
OSMOLALITY SERPL CALC.SUM OF ELEC: 285 MOSM/KG (ref 273–304)
PLATELET # BLD AUTO: 224 10*3/UL (ref 130–400)
PMV BLD AUTO: 8.7 FL (ref 7.4–10.4)
POTASSIUM SERPL-SCNC: 4 MMOL/L (ref 3.5–5.3)
RBC MORPH BLD: 4.93 10*6/UL (ref 4.7–6.1)
SODIUM SERPL-SCNC: 138 MMOL/L (ref 135–147)
WBC # BLD AUTO: 6.27 10*3/UL (ref 4.8–10.8)

## 2020-10-01 ENCOUNTER — OFFICE VISIT CONVERTED (OUTPATIENT)
Dept: ONCOLOGY | Facility: HOSPITAL | Age: 65
End: 2020-10-01
Attending: INTERNAL MEDICINE

## 2020-10-01 ENCOUNTER — HOSPITAL ENCOUNTER (OUTPATIENT)
Dept: ONCOLOGY | Facility: HOSPITAL | Age: 65
Discharge: HOME | End: 2020-10-01
Attending: INTERNAL MEDICINE

## 2020-11-11 ENCOUNTER — OFFICE VISIT CONVERTED (OUTPATIENT)
Dept: SURGERY | Facility: CLINIC | Age: 65
End: 2020-11-11
Attending: SURGERY

## 2021-01-06 ENCOUNTER — HOSPITAL ENCOUNTER (OUTPATIENT)
Dept: GENERAL RADIOLOGY | Facility: HOSPITAL | Age: 66
Discharge: HOME | End: 2021-01-06
Attending: INTERNAL MEDICINE

## 2021-01-12 ENCOUNTER — OFFICE VISIT CONVERTED (OUTPATIENT)
Dept: ONCOLOGY | Facility: HOSPITAL | Age: 66
End: 2021-01-12
Attending: INTERNAL MEDICINE

## 2021-01-12 ENCOUNTER — HOSPITAL ENCOUNTER (OUTPATIENT)
Dept: ONCOLOGY | Facility: HOSPITAL | Age: 66
Discharge: HOME | End: 2021-01-12
Attending: INTERNAL MEDICINE

## 2021-01-12 LAB
ALBUMIN SERPL-MCNC: 3.9 G/DL (ref 3.5–5)
ALBUMIN/GLOB SERPL: 1.4 {RATIO} (ref 1.4–2.6)
ALP SERPL-CCNC: 85 U/L (ref 56–155)
ALT SERPL-CCNC: 23 U/L (ref 10–40)
ANION GAP SERPL CALC-SCNC: 14 MMOL/L (ref 8–19)
AST SERPL-CCNC: 20 U/L (ref 15–50)
BASOPHILS # BLD AUTO: 0.03 10*3/UL (ref 0–0.2)
BASOPHILS NFR BLD AUTO: 0.4 % (ref 0–3)
BILIRUB SERPL-MCNC: 0.57 MG/DL (ref 0.2–1.3)
BUN SERPL-MCNC: 14 MG/DL (ref 5–25)
BUN/CREAT SERPL: 15 {RATIO} (ref 6–20)
CALCIUM SERPL-MCNC: 9.2 MG/DL (ref 8.7–10.4)
CHLORIDE SERPL-SCNC: 104 MMOL/L (ref 99–111)
CONV ABS IMM GRAN: 0.03 10*3/UL (ref 0–0.2)
CONV CO2: 24 MMOL/L (ref 22–32)
CONV IMMATURE GRAN: 0.4 % (ref 0–1.8)
CONV TOTAL PROTEIN: 6.7 G/DL (ref 6.3–8.2)
CREAT UR-MCNC: 0.92 MG/DL (ref 0.7–1.2)
DEPRECATED RDW RBC AUTO: 41.7 FL (ref 35.1–43.9)
EOSINOPHIL # BLD AUTO: 0.28 10*3/UL (ref 0–0.7)
EOSINOPHIL # BLD AUTO: 3.7 % (ref 0–7)
ERYTHROCYTE [DISTWIDTH] IN BLOOD BY AUTOMATED COUNT: 12.9 % (ref 11.6–14.4)
GFR SERPLBLD BASED ON 1.73 SQ M-ARVRAT: >60 ML/MIN/{1.73_M2}
GLOBULIN UR ELPH-MCNC: 2.8 G/DL (ref 2–3.5)
GLUCOSE SERPL-MCNC: 102 MG/DL (ref 70–99)
HCT VFR BLD AUTO: 43.9 % (ref 42–52)
HGB BLD-MCNC: 14.7 G/DL (ref 14–18)
LYMPHOCYTES # BLD AUTO: 0.89 10*3/UL (ref 1–5)
LYMPHOCYTES NFR BLD AUTO: 11.8 % (ref 20–45)
MCH RBC QN AUTO: 29.6 PG (ref 27–31)
MCHC RBC AUTO-ENTMCNC: 33.5 G/DL (ref 33–37)
MCV RBC AUTO: 88.3 FL (ref 80–96)
MONOCYTES # BLD AUTO: 0.51 10*3/UL (ref 0.2–1.2)
MONOCYTES NFR BLD AUTO: 6.7 % (ref 3–10)
NEUTROPHILS # BLD AUTO: 5.83 10*3/UL (ref 2–8)
NEUTROPHILS NFR BLD AUTO: 77 % (ref 30–85)
NRBC CBCN: 0 % (ref 0–0.7)
OSMOLALITY SERPL CALC.SUM OF ELEC: 287 MOSM/KG (ref 273–304)
PLATELET # BLD AUTO: 209 10*3/UL (ref 130–400)
PMV BLD AUTO: 9.3 FL (ref 9.4–12.4)
POTASSIUM SERPL-SCNC: 4 MMOL/L (ref 3.5–5.3)
RBC # BLD AUTO: 4.97 10*6/UL (ref 4.7–6.1)
SODIUM SERPL-SCNC: 138 MMOL/L (ref 135–147)
WBC # BLD AUTO: 7.57 10*3/UL (ref 4.8–10.8)

## 2021-01-15 ENCOUNTER — HOSPITAL ENCOUNTER (OUTPATIENT)
Dept: GASTROENTEROLOGY | Facility: HOSPITAL | Age: 66
Setting detail: HOSPITAL OUTPATIENT SURGERY
Discharge: HOME | End: 2021-01-15
Attending: SURGERY

## 2021-01-18 ENCOUNTER — HOSPITAL ENCOUNTER (OUTPATIENT)
Dept: LAB | Facility: HOSPITAL | Age: 66
Discharge: HOME | End: 2021-01-18
Attending: SPECIALIST

## 2021-01-18 LAB
ALBUMIN SERPL-MCNC: 4.1 G/DL (ref 3.5–5)
ALBUMIN/GLOB SERPL: 1.5 {RATIO} (ref 1.4–2.6)
ALP SERPL-CCNC: 82 U/L (ref 56–155)
ALT SERPL-CCNC: 24 U/L (ref 10–40)
ANION GAP SERPL CALC-SCNC: 14 MMOL/L (ref 8–19)
AST SERPL-CCNC: 20 U/L (ref 15–50)
BILIRUB SERPL-MCNC: 0.9 MG/DL (ref 0.2–1.3)
BUN SERPL-MCNC: 13 MG/DL (ref 5–25)
BUN/CREAT SERPL: 13 {RATIO} (ref 6–20)
CALCIUM SERPL-MCNC: 9 MG/DL (ref 8.7–10.4)
CHLORIDE SERPL-SCNC: 106 MMOL/L (ref 99–111)
CHOLEST SERPL-MCNC: 142 MG/DL (ref 107–200)
CHOLEST/HDLC SERPL: 3.2 {RATIO} (ref 3–6)
CONV CO2: 24 MMOL/L (ref 22–32)
CONV TOTAL PROTEIN: 6.9 G/DL (ref 6.3–8.2)
CREAT UR-MCNC: 1.01 MG/DL (ref 0.7–1.2)
DIGOXIN SERPL-MCNC: 0.5 NG/ML (ref 0.5–2)
GFR SERPLBLD BASED ON 1.73 SQ M-ARVRAT: >60 ML/MIN/{1.73_M2}
GLOBULIN UR ELPH-MCNC: 2.8 G/DL (ref 2–3.5)
GLUCOSE SERPL-MCNC: 118 MG/DL (ref 70–99)
HDLC SERPL-MCNC: 45 MG/DL (ref 40–60)
LDLC SERPL CALC-MCNC: 71 MG/DL (ref 70–100)
OSMOLALITY SERPL CALC.SUM OF ELEC: 291 MOSM/KG (ref 273–304)
POTASSIUM SERPL-SCNC: 4 MMOL/L (ref 3.5–5.3)
SODIUM SERPL-SCNC: 140 MMOL/L (ref 135–147)
T4 FREE SERPL-MCNC: 1.3 NG/DL (ref 0.9–1.8)
TRIGL SERPL-MCNC: 132 MG/DL (ref 40–150)
TSH SERPL-ACNC: 4.89 M[IU]/L (ref 0.27–4.2)
VLDLC SERPL-MCNC: 26 MG/DL (ref 5–37)

## 2021-01-19 ENCOUNTER — HOSPITAL ENCOUNTER (OUTPATIENT)
Dept: CARDIOLOGY | Facility: HOSPITAL | Age: 66
Discharge: HOME | End: 2021-01-19
Attending: INTERNAL MEDICINE

## 2021-02-03 ENCOUNTER — TELEPHONE CONVERTED (OUTPATIENT)
Dept: SURGERY | Facility: CLINIC | Age: 66
End: 2021-02-03
Attending: SURGERY

## 2021-03-03 ENCOUNTER — OFFICE VISIT CONVERTED (OUTPATIENT)
Dept: CARDIOLOGY | Facility: CLINIC | Age: 66
End: 2021-03-03
Attending: INTERNAL MEDICINE

## 2021-03-08 ENCOUNTER — HOSPITAL ENCOUNTER (OUTPATIENT)
Dept: LAB | Facility: HOSPITAL | Age: 66
Discharge: HOME | End: 2021-03-08
Attending: INTERNAL MEDICINE

## 2021-03-08 LAB — BNP SERPL-MCNC: 701 PG/ML (ref 0–900)

## 2021-04-02 ENCOUNTER — HOSPITAL ENCOUNTER (OUTPATIENT)
Dept: VACCINE CLINIC | Facility: HOSPITAL | Age: 66
Discharge: HOME | End: 2021-04-02
Attending: INTERNAL MEDICINE

## 2021-04-12 ENCOUNTER — HOSPITAL ENCOUNTER (OUTPATIENT)
Dept: ONCOLOGY | Facility: HOSPITAL | Age: 66
Discharge: HOME | End: 2021-04-12
Attending: INTERNAL MEDICINE

## 2021-04-12 ENCOUNTER — HOSPITAL ENCOUNTER (OUTPATIENT)
Dept: GENERAL RADIOLOGY | Facility: HOSPITAL | Age: 66
Discharge: HOME | End: 2021-04-12
Attending: INTERNAL MEDICINE

## 2021-04-12 LAB
ALBUMIN SERPL-MCNC: 4 G/DL (ref 3.5–5)
ALBUMIN/GLOB SERPL: 1.6 {RATIO} (ref 1.4–2.6)
ALP SERPL-CCNC: 85 U/L (ref 56–155)
ALT SERPL-CCNC: 24 U/L (ref 10–40)
ANION GAP SERPL CALC-SCNC: 11 MMOL/L (ref 8–19)
AST SERPL-CCNC: 21 U/L (ref 15–50)
BASOPHILS # BLD AUTO: 0.03 10*3/UL (ref 0–0.2)
BASOPHILS NFR BLD AUTO: 0.5 % (ref 0–3)
BILIRUB SERPL-MCNC: 0.85 MG/DL (ref 0.2–1.3)
BUN SERPL-MCNC: 14 MG/DL (ref 5–25)
BUN/CREAT SERPL: 15 {RATIO} (ref 6–20)
CALCIUM SERPL-MCNC: 9.4 MG/DL (ref 8.7–10.4)
CHLORIDE SERPL-SCNC: 101 MMOL/L (ref 99–111)
CONV ABS IMM GRAN: 0.02 10*3/UL (ref 0–0.54)
CONV CO2: 25 MMOL/L (ref 22–32)
CONV EOSINOPHILS PERCENT BY MANUAL COUNT: 3.1 % (ref 0–7)
CONV IMMATURE GRAN: 0.3 % (ref 0–0.4)
CONV TOTAL PROTEIN: 6.5 G/DL (ref 6.3–8.2)
CREAT BLD-MCNC: 0.9 MG/DL (ref 0.6–1.4)
CREAT UR-MCNC: 0.94 MG/DL (ref 0.7–1.2)
EOSINOPHIL # BLD MANUAL: 0.2 10*3/UL (ref 0–0.7)
ERYTHROCYTE [DISTWIDTH] IN BLOOD BY AUTOMATED COUNT: 12.8 % (ref 11.5–14.5)
ERYTHROCYTE [DISTWIDTH] IN BLOOD BY AUTOMATED COUNT: 42 FL
GFR SERPLBLD BASED ON 1.73 SQ M-ARVRAT: >60 ML/MIN/{1.73_M2}
GFR SERPLBLD BASED ON 1.73 SQ M-ARVRAT: >60 ML/MIN/{1.73_M2}
GLOBULIN UR ELPH-MCNC: 2.5 G/DL (ref 2–3.5)
GLUCOSE SERPL-MCNC: 134 MG/DL (ref 70–99)
HBA1C MFR BLD: 14.6 G/DL (ref 14–18)
HCT VFR BLD AUTO: 42.1 % (ref 42–52)
LYMPHOCYTES # BLD AUTO: 0.74 10*3/UL (ref 1–5)
LYMPHOCYTES NFR BLD AUTO: 11.3 % (ref 20–45)
MCH RBC QN AUTO: 31.1 PG (ref 27–31)
MCHC RBC AUTO-ENTMCNC: 34.7 G/DL (ref 33–37)
MCV RBC AUTO: 89.6 FL (ref 80–96)
MONOCYTES # BLD AUTO: 0.44 10*3/UL (ref 0.2–1.2)
MONOCYTES NFR BLD MANUAL: 6.7 % (ref 3–10)
NEUTROPHILS # BLD AUTO: 5.11 10*3/UL (ref 2–8)
NEUTROPHILS NFR BLD MANUAL: 78.1 % (ref 30–85)
OSMOLALITY SERPL CALC.SUM OF ELEC: 278 MOSM/KG (ref 273–304)
PLATELET # BLD AUTO: 227 10*3/UL (ref 130–400)
PMV BLD AUTO: 8.9 FL (ref 7.4–10.4)
POTASSIUM SERPL-SCNC: 4.4 MMOL/L (ref 3.5–5.3)
RBC MORPH BLD: 4.7 10*6/UL (ref 4.7–6.1)
SODIUM SERPL-SCNC: 133 MMOL/L (ref 135–147)
WBC # BLD AUTO: 6.54 10*3/UL (ref 4.8–10.8)

## 2021-04-13 ENCOUNTER — HOSPITAL ENCOUNTER (OUTPATIENT)
Dept: ONCOLOGY | Facility: HOSPITAL | Age: 66
Discharge: HOME | End: 2021-04-13
Attending: INTERNAL MEDICINE

## 2021-04-13 ENCOUNTER — OFFICE VISIT CONVERTED (OUTPATIENT)
Dept: ONCOLOGY | Facility: HOSPITAL | Age: 66
End: 2021-04-13
Attending: INTERNAL MEDICINE

## 2021-04-22 ENCOUNTER — HOSPITAL ENCOUNTER (OUTPATIENT)
Dept: PREADMISSION TESTING | Facility: HOSPITAL | Age: 66
Discharge: HOME | End: 2021-04-22
Attending: INTERNAL MEDICINE

## 2021-04-23 LAB — SARS-COV-2 RNA SPEC QL NAA+PROBE: NOT DETECTED

## 2021-04-26 ENCOUNTER — HOSPITAL ENCOUNTER (OUTPATIENT)
Dept: VACCINE CLINIC | Facility: HOSPITAL | Age: 66
Discharge: HOME | End: 2021-04-26
Attending: INTERNAL MEDICINE

## 2021-04-27 ENCOUNTER — HOSPITAL ENCOUNTER (OUTPATIENT)
Dept: CT IMAGING | Facility: HOSPITAL | Age: 66
Discharge: HOME | End: 2021-04-27
Attending: INTERNAL MEDICINE

## 2021-04-27 LAB
APTT BLD: 21.5 S (ref 22.2–34.2)
BASOPHILS # BLD AUTO: 0.04 10*3/UL (ref 0–0.2)
BASOPHILS NFR BLD AUTO: 0.5 % (ref 0–3)
CONV ABS IMM GRAN: 0.03 10*3/UL (ref 0–0.2)
CONV IMMATURE GRAN: 0.4 % (ref 0–1.8)
DEPRECATED RDW RBC AUTO: 43.8 FL (ref 35.1–43.9)
EOSINOPHIL # BLD AUTO: 0.33 10*3/UL (ref 0–0.7)
EOSINOPHIL # BLD AUTO: 4.1 % (ref 0–7)
ERYTHROCYTE [DISTWIDTH] IN BLOOD BY AUTOMATED COUNT: 13.2 % (ref 11.6–14.4)
HCT VFR BLD AUTO: 44.3 % (ref 42–52)
HGB BLD-MCNC: 15 G/DL (ref 14–18)
INR PPP: 0.99 (ref 2–3)
LYMPHOCYTES # BLD AUTO: 0.77 10*3/UL (ref 1–5)
LYMPHOCYTES NFR BLD AUTO: 9.5 % (ref 20–45)
MCH RBC QN AUTO: 30.5 PG (ref 27–31)
MCHC RBC AUTO-ENTMCNC: 33.9 G/DL (ref 33–37)
MCV RBC AUTO: 90.2 FL (ref 80–96)
MONOCYTES # BLD AUTO: 0.7 10*3/UL (ref 0.2–1.2)
MONOCYTES NFR BLD AUTO: 8.6 % (ref 3–10)
NEUTROPHILS # BLD AUTO: 6.27 10*3/UL (ref 2–8)
NEUTROPHILS NFR BLD AUTO: 76.9 % (ref 30–85)
NRBC CBCN: 0 % (ref 0–0.7)
PLATELET # BLD AUTO: 245 10*3/UL (ref 130–400)
PMV BLD AUTO: 8.9 FL (ref 9.4–12.4)
PROTHROMBIN TIME: 10.8 S (ref 9.4–12)
RBC # BLD AUTO: 4.91 10*6/UL (ref 4.7–6.1)
WBC # BLD AUTO: 8.14 10*3/UL (ref 4.8–10.8)

## 2021-05-03 ENCOUNTER — HOSPITAL ENCOUNTER (OUTPATIENT)
Dept: ONCOLOGY | Facility: HOSPITAL | Age: 66
Discharge: HOME | End: 2021-05-03
Attending: INTERNAL MEDICINE

## 2021-05-03 ENCOUNTER — OFFICE VISIT CONVERTED (OUTPATIENT)
Dept: ONCOLOGY | Facility: HOSPITAL | Age: 66
End: 2021-05-03
Attending: INTERNAL MEDICINE

## 2021-05-10 NOTE — H&P
History and Physical      Patient Name: Robbi Kennedy   Patient ID: [id number]   Sex: Male   Birthdate: Nola 3, 1955    Primary Care Provider: Lino Shaw MD   Referring Provider: Erik Garay MD    Visit Date: March 3, 2021    Provider: Aroldo Madden MD   Location: [facility]   Location Address: [address] [place] [address], [address]   [place]  [id number]   Location Phone: [phone]          Chief Complaint     Atrial fibrillation.       History Of Present Illness  Consult requested by: Erik Garay MD   Robbi Kennedy is a 65 year old /White male with a history of hypertension and atrial fibrillation diagnosed about a year ago who has had some episodes of night fluttering but no heart symptoms at night. He also reports dyspnea on exertion, chronic mild lower extremity edema. Blood pressure typically runs in the 140s/90s. He has not had any syncope or presyncopal spells. No chest pain problems.   PAST MEDICAL HISTORY: Significant for atrial fibrillation, hyperlipidemia, hypertension, history of colon cancer treated with chemo and radiation therapy, history of DVT 2 years ago.   FAMILY MEDICAL HISTORY: Positive for hypertension.   PSYCHOSOCIAL HISTORY: Moderate use of alcohol. Denies tobacco use.   CURRENT MEDICATIONS: Diltiazem 120mg daily; Metoprolol  mg daily; Synthroid 50 mg daily; Atorvastatin 40 mg daily; Eliquis 5 mg daily.   ALLERGIES: No known drug allergies.       Review of Systems  · Constitutional  o Admits  o : fatigue, good general health lately  o Denies  o : recent weight changes   · Eyes  o Denies  o : double vision  · HENT  o Denies  o : hearing loss or ringing, chronic sinus problem, swollen glands in neck  · Cardiovascular  o Admits  o : palpitations (fast, fluttering, or skipping beats), swelling (feet, ankles, hands), shortness of breath while walking or lying flat  o Denies  o : chest pain  · Respiratory  o Denies  o : asthma or wheezing,  "COPD  · Gastrointestinal  o Denies  o : ulcers, nausea or vomiting  · Neurologic  o Admits  o : lightheaded or dizzy  o Denies  o : stroke, headaches  · Musculoskeletal  o Admits  o : joint pain, back pain  · Endocrine  o Denies  o : thyroid disease, diabetes, heat or cold intolerance, excessive thirst or urination  · Heme-Lymph  o Denies  o : bleeding or bruising tendency, anemia      Vitals  Date Time BP Position Site L\R Cuff Size HR RR TEMP (F) WT  HT  BMI kg/m2 BSA m2 O2 Sat FR L/min FiO2 HC       03/03/2021 01:42 /98 Sitting    84 - R   273lbs 0oz 6'  4\" 33.23 2.58             Physical Examination  · Constitutional  o Appearance  o : Awake, alert, in no acute distress.   · Head and Face  o HEENT  o : PERRLA.  · Eyes  o Conjunctivae  o : Normal.  · Ears, Nose, Mouth and Throat  o Oral Cavity  o :   § Oral Mucosa  § : Normal.  · Neck  o Inspection/Palpation  o : No JVD.  · Respiratory  o Respiratory  o : Chest is symmetrical. Lung fields are clear. No rhonchi or wheezes heard.  · Cardiovascular  o Heart  o :   § Auscultation of Heart  § : S1, S2 are normal. Regular rate and rhythm without murmurs, gallops, or rubs.  o Peripheral Vascular System  o :   § Extremities  § : Nails normal. No clubbing or cyanosis. Femoral pulses adequate. Pedal pulses adequate. No peripheral edema.  · Gastrointestinal  o Abdominal Examination  o : Abdomen soft. No masses. No guarding or rigidity. No hepatosplenomegaly. Bowel sounds normal.  · Musculoskeletal  o General  o :   § General Musculoskeletal  § : Muscle tone and strength were normal.  · Skin and Subcutaneous Tissue  o General Inspection  o : Unremarkable.  · EKG  o EKG  o : Reviewed.  o Results  o : Shows atrial fibrillation with RSR prime, borderline prolonged QT interval.  · Labs  o Labs  o : Hgb is 14.7, creatinine is 1.01, potassium is 4.0. His NT-proBNP was 74.  · Echocardiogram  o Echocardiogram  o : From 09/17 showed ejection fraction of 60% wtih borderline left " ventricular hypertrophy.          Assessment     1. Atrial fibrillation, the patient has some increased fluttering sensations at night, heart rate in the 80s to 90s currently. We will increase his diltiazem to 240 mg.   2.  Hypertension, controlled.   3.  History of previous DVT. The patient is on anticoagulation with Eliquis.         Aroldo Madden MD  /               Electronically Signed by: Shawna You-, OT -Author on March 12, 2021 11:49:30 AM  Electronically Co-signed by: Aroldo Madden MD -Reviewer on March 18, 2021 04:36:05 PM

## 2021-05-10 NOTE — H&P
"   History and Physical      Patient Name: Robbi Kennedy   Patient ID: [id number]   Sex: Male   Birthdate: Nola 3, 1955    Primary Care Provider: Lino Shaw MD   Referring Provider: Erik Garay MD    Visit Date: April 29, 2020    Provider: Aroldo Garay MD   Location: [facility]   Location Address: [address] [place] [address]  [id number]   Location Phone: [phone]          Chief Complaint  · Outpatient History & Physical / Surgical Orders      History Of Present Illness     See previous notes-ileostomy placed for LAR- healed appropriately and planned ileostomy reversal and associated hernia repair.       Past Medical History  Allergic rhinitis, chronic; Arthritis; Asthma; Colon cancer; High blood pressure; High cholesterol         Past Surgical History  Colonoscopy; Hernia         Medication List  atorvastatin 40 mg oral tablet; Cardizem 120 mg oral tablet; doxazosin 4 mg oral tablet; Eliquis 5 mg oral tablet; metoprolol succinate 50 mg oral tablet extended release 24 hr; metronidazole 500 mg oral tablet; neomycin 500 mg oral tablet; Synthroid 25 mcg oral tablet         Allergy List  NO KNOWN DRUG ALLERGIES         Family Medical History  Prostate cancer         Social History  Alcohol (Current some day); Caffeine (Current - status unknown); Second hand smoke exposure (Never); Tobacco (Never)         Review of Systems  · Gastrointestinal  o Denies  o : nausea, vomiting, diarrhea, constipation      Vitals  Date Time BP Position Site L\R Cuff Size HR RR TEMP (F) WT  HT  BMI kg/m2 BSA m2 O2 Sat HC       04/29/2020 01:31 PM       16  267lbs 0oz 6'  4\" 32.5 2.55               Assessment  · Pre-Surgical Orders     V72.84  · Rectal cancer     154.1/C20      Plan  · Orders  o CMP (25936) - - 05/13/2020  o General Surgery Order (GENOR) - V72.84, 154.1/C20 - 05/13/2020  · Medications  o Medications have been Reconciled  o Transition of Care or Provider Policy  · Instructions  o PLAN:  o Handouts " Provided-Pre-Procedure Instructions including date and time and location of procedure.  o Surgical Facility: [facility]  o ****Surgical Orders****  o ****Patient Status****  o Admit for INPATIENT services; Anticipated length of stay greater than two midnights  o RISK AND BENEFITS:  o Consent for surgery: Given these options, the patient has verbally expressed an understanding of the risks of surgery and finds these risks acceptable. We will proceed with surgery as soon as possible.  o Consult Anesthesia for any post-operative block, or any pain management procedure deemed necessary by the anestesiologist for adequate post-operative pain control.   o O.R. PREP: Per protocol  o IV: Per Anesthesia  o IV: LR@ 75ml/hr  o PLEASE SIGN PERMIT FOR: Ileostomy takedown  o **COLORECTAL SURGERY PRE-OP ANTIBIOTICS** Metronidazole 500mg po x 6 and Neomycin 500mg po x 6 (day before surgery).   o PRE- OP MEDICATION ORDER:  o Entereg 12mg PO 30 min prior to PRE-OP.   o Heparin 5,000 units subcutaneous after placement of epidural.  o If no epidural, please administer PRE-OPERATIVELY.  o Cefazolin 2000mg IV x1 PRE-OP; Repeat Q4H during procedure AND Metronidazole 500 mg IV x 1 PRE-OP.   o *___The above History and Physical Examination has been completed within 30 days of admission.  o Electronically Identified Patient Education Materials Provided Electronically            Electronically Signed by: Pam Madrid RN -Author on May 19, 2020 12:09:33 PM  Electronically Co-signed by: Alecia Mcdowell MD -Reviewer on May 26, 2020 07:17:06 AM

## 2021-05-11 LAB
CONV BLOCK IDENTIFICATION: ABNORMAL
CONV BLOCK IDENTIFICATION: NORMAL
CONV KRAS MUTATION DETECTION: NOT DETECTED
CONV NRAS MUTATION DETECTION: DETECTED

## 2021-05-13 NOTE — PROGRESS NOTES
Progress Note      Patient Name: Robbi Kennedy   Patient ID: [id number]   Sex: Male   Birthdate: Nola 3, 1955    Primary Care Provider: Lino Shaw MD   Referring Provider: Erik Garay MD    Visit Date: July 1, 2020    Provider: Aroldo Garay MD   Location: [facility]   Location Address: [address] [place] [address]  [id number]   Location Phone: [phone]          Chief Complaint  · Follow Up Office Visit      History Of Present Illness  Robbi Kennedy is a 65 year old /White male who presents today for a postoperative visit. He follow-up status post ileostomy takedown. He is well known to me for resection of rectal cancer. He required a diverting ileostomy. We were able to finally get his ileostomy taken down after some issues with blood clots. He is doing well after his surgery. He reports he is eating and drinking normally and having regular bowel movements. No nausea or vomiting. His incisions appear to be healing appropriately. He did have a follow-up scan with Dr. Campos. He had a little bit of lymphadenopathy around the helene but doesn't seem to be of concern or it seems to be stable from previous scans.       Past Medical History  Allergic rhinitis, chronic; Arthritis; Asthma; Colon cancer; High blood pressure; High cholesterol         Past Surgical History  Colonoscopy; Hernia         Medication List  atorvastatin 40 mg oral tablet; Cardizem 120 mg oral tablet; doxazosin 4 mg oral tablet; Eliquis 5 mg oral tablet; metoprolol succinate 50 mg oral tablet extended release 24 hr; Synthroid 25 mcg oral tablet         Allergy List  NO KNOWN DRUG ALLERGIES         Family Medical History  Prostate cancer         Social History  Alcohol (Current some day); Caffeine (Current - status unknown); Second hand smoke exposure (Never); Tobacco (Never)         Review of Systems  · Cardiovascular  o Denies  o : chest pain on exertion, shortness of breath, lower extremity  "swelling  · Respiratory  o Denies  o : shortness of breath, coughing up blood  · Gastrointestinal  o Denies  o : chronic abdominal pain      Vitals  Date Time BP Position Site L\R Cuff Size HR RR TEMP (F) WT  HT  BMI kg/m2 BSA m2 O2 Sat        07/01/2020 08:55 AM       16  271lbs 0oz 6'  4\" 32.99 2.57           Physical Examination  · Constitutional  o Appearance  o : well developed, well-nourished, alert and in no acute distress  · Head and Face  o Head  o :   § Inspection  § : no deformities or lesions  · Eyes  o Conjunctivae  o : clear  o Sclerae  o : nonicteric  · Neck  o Inspection/Palpation  o : normal appearance, no masses or tenderness, trachea midline  · Respiratory  o Respiratory Effort  o : breathing unlabored  o Inspection of Chest  o : normal appearance, no retractions  · Cardiovascular  o Heart  o : regular rate and rhythm  · Gastrointestinal  o Abdominal Examination  o :   § Abdomen  § : abdomen is soft  · Lymphatic  o Neck  o : no lymphadenopathy present  o Axilla  o : no lymphadenopathy present  o Groin  o : no lymphadenopathy present  · Skin and Subcutaneous Tissue  o General Inspection  o : no rashes present, no lesions present, no areas of discoloration  · Neurologic  o Cranial Nerves  o : grossly intact  o Sensation  o : grossly intact  o Gait and Station  o :   § Gait Screening  § : normal gait, able to stand without diffculty  o Cerebellar Function  o : no obvious abnormalities  · Psychiatric  o Judgement and Insight  o : judgment and insight intact  o Mood and Affect  o : mood normal, affect appropriate          Assessment  · Follow-up surgery care     V67.00/Z09       Patient status post ileostomy takedown.     Problems Reconciled  Plan  · Medications  o Medications have been Reconciled  o Transition of Care or Provider Policy  · Referrals  o ID: [id number] Date: 04/28/2020 Type: Inbound  Specialty: General Surgery     The patient is doing well and healing as expected. He should get a repeat " colonoscopy within the year for surveillance for his rectal cancer. Otherwise, he can follow-up with me as needed. I am pleased with his overall progress. He does want to return to work and we will allow him to return to work full duty in the coming weeks.             Electronically Signed by: Annamaria Jon-, -Author on July 1, 2020 12:54:47 PM  Electronically Co-signed by: Aroldo Garay MD -Reviewer on July 1, 2020 04:28:13 PM

## 2021-05-13 NOTE — PROGRESS NOTES
Progress Note      Patient Name: Robbi Kennedy   Patient ID: [id number]   Sex: Male   Birthdate: Nola 3, 1955    Primary Care Provider: Lino Shaw MD   Referring Provider: Erik Garay MD    Visit Date: Nola 3, 2020    Provider: Aroldo Garay MD   Location: [facility]   Location Address: [address] [place] [address]  [id number]   Location Phone: [phone]          Chief Complaint  · Status Post Surgery      History Of Present Illness  Robbi Kennedy is a 65 year old /White male who presents today for a postoperative visit. He follows-up after ileostomy reversal. Mr. Kennedy is status post low anterior resection for rectal cancer. He required a diverting ileostomy due to the difficulty of that procedure. His ileostomy has been out almost a year and he was scheduled to have it taken down but developed some blood clots so we had to delay it. It was finally time for us to be able to safely reverse his ostomy. He was in the hospital a couple of days postop. He had return of bowel function. Currently, he is doing well. He reports his appetite is still decreased but he is not reporting any nausea or vomiting. He is still having continued bowel movements. He reports he has loose stools but they are starting to firm up and become more solid. He is not really having any other difficulties. He reports no fevers or chills. He reports no unexpected symptoms.       Past Medical History  Allergic rhinitis, chronic; Arthritis; Asthma; Colon cancer; High blood pressure; High cholesterol         Past Surgical History  Colonoscopy; Hernia         Medication List  atorvastatin 40 mg oral tablet; Cardizem 120 mg oral tablet; doxazosin 4 mg oral tablet; Eliquis 5 mg oral tablet; metoprolol succinate 50 mg oral tablet extended release 24 hr; Synthroid 25 mcg oral tablet         Allergy List  NO KNOWN DRUG ALLERGIES       Allergies Reconciled  Family Medical History  Prostate cancer         Social  "History  Alcohol (Current some day); Caffeine (Current - status unknown); Second hand smoke exposure (Never); Tobacco (Never)         Review of Systems  · Cardiovascular  o Denies  o : chest pain on exertion, shortness of breath, lower extremity swelling  · Respiratory  o Denies  o : shortness of breath, coughing up blood  · Gastrointestinal  o Denies  o : chronic abdominal pain      Vitals  Date Time BP Position Site L\R Cuff Size HR RR TEMP (F) WT  HT  BMI kg/m2 BSA m2 O2 Sat        06/03/2020 02:53 PM       12  264lbs 4oz 6'  4\" 32.17 2.54           Physical Examination  · Constitutional  o Appearance  o : well developed, well-nourished, alert and in no acute distress  · Head and Face  o Head  o :   § Inspection  § : no deformities or lesions  · Eyes  o Conjunctivae  o : clear  o Sclerae  o : nonicteric  · Neck  o Inspection/Palpation  o : normal appearance, no masses or tenderness, trachea midline  · Respiratory  o Respiratory Effort  o : breathing unlabored  o Inspection of Chest  o : normal appearance, no retractions  · Cardiovascular  o Heart  o : regular rate and rhythm  · Gastrointestinal  o Abdominal Examination  o :   § Abdomen  § : abdomen is soft. Incisions appear to be healing well. He still has a lot of swelling in the ostomy site. I think that induration is possibly from his biologic mesh. I don't feel any signs of hernia recurrence or other issue there in the ostomy site.  · Lymphatic  o Neck  o : no lymphadenopathy present  o Axilla  o : no lymphadenopathy present  o Groin  o : no lymphadenopathy present  · Skin and Subcutaneous Tissue  o General Inspection  o : no rashes present, no lesions present, no areas of discoloration  · Neurologic  o Cranial Nerves  o : grossly intact  o Sensation  o : grossly intact  o Gait and Station  o :   § Gait Screening  § : normal gait, able to stand without diffculty  o Cerebellar Function  o : no obvious abnormalities  · Psychiatric  o Judgement and " Insight  o : judgment and insight intact  o Mood and Affect  o : mood normal, affect appropriate          Assessment  · Encounter for examination following surgery     V67.00/Z09       Patient status post ileostomy reversal.  His pathology showed normal small bowel.     Problems Reconciled  Plan  · Medications  o Medications have been Reconciled  o Transition of Care or Provider Policy     The patient is doing well and healing as expected. I am pleased with his progress.  His staples were removed in the office today.  He can follow-up with me in the next few months. He will need a colonoscopy for surveillance of his rectal cancer in the next upcoming months but will allow to recover from his surgery. He does have surveillance PET scheduled in the near future with Dr. Campos.     I discussed all of this with the patient. All questions were answered.  He voiced understanding and agreed to proceed with the above plan.             Electronically Signed by: Annamaria Jon-, -Author on June 4, 2020 12:03:15 PM  Electronically Co-signed by: Aroldo Garay MD -Reviewer on June 4, 2020 12:11:12 PM

## 2021-05-14 VITALS
BODY MASS INDEX: 33.24 KG/M2 | DIASTOLIC BLOOD PRESSURE: 98 MMHG | HEART RATE: 84 BPM | HEIGHT: 76 IN | WEIGHT: 273 LBS | SYSTOLIC BLOOD PRESSURE: 126 MMHG

## 2021-05-14 VITALS — BODY MASS INDEX: 33.73 KG/M2 | RESPIRATION RATE: 14 BRPM | HEIGHT: 76 IN | WEIGHT: 277 LBS

## 2021-05-14 LAB — Lab: NOT DETECTED

## 2021-05-14 NOTE — PROGRESS NOTES
Progress Note      Patient Name: Robbi Kennedy   Patient ID: [id number]   Sex: Male   Birthdate: Nola 3, 1955    Primary Care Provider: Lino Shaw MD   Referring Provider: Erik Garay MD    Visit Date: February 3, 2021    Provider: Aroldo Garay MD   Location: [facility]   Location Address: [address] [place] [address]  [id number]   Location Phone: [phone]          Chief Complaint  · Follow Up Surgery      History Of Present Illness  TELEHEALTH TELEPHONE VISIT  Robbi Kennedy is a 65 year old /White male who is presenting for evaluation via telehealth telephone visit. Verbal consent obtained before beginning visit.   Provider spent 8 minutes with the patient during the telehealth visit.   The following staff were present during this visit: Chinyere Faust MA   Past Medical History/ Overview of Patient Symptoms     Mr. Kennedy follows-up via telephone status post colonoscopy for surveillance for rectal cancer. The patient has done well after his colonoscopy. He is not reporting an unexpected signs or symptoms. He is eating and drinking normally and having regular bowel movements.       Past Medical History  Allergic rhinitis, chronic; Arthritis; Asthma; Bowel Disease; Cancer; Colon cancer; High blood pressure; High cholesterol         Past Surgical History  Colon; Colonoscopy; Hernia         Medication List  atorvastatin 40 mg oral tablet; doxazosin 4 mg oral tablet; Eliquis 5 mg oral tablet; loratadine 10 mg oral tablet; metoprolol succinate 50 mg oral tablet extended release 24 hr; Synthroid 50 mcg oral tablet         Allergy List  NO KNOWN DRUG ALLERGIES         Family Medical History  Prostate cancer; Family history of colon cancer         Social History  Alcohol (Current some day); Caffeine (Current - status unknown); Second hand smoke exposure (Never); Tobacco (Never)         Review of Systems  · Cardiovascular  o Denies  o : chest pain on exertion, shortness of  breath, lower extremity swelling  · Respiratory  o Denies  o : shortness of breath, coughing up blood  · Gastrointestinal  o Denies  o : chronic abdominal pain          Assessment  · Encounter for examination following surgery     V67.00/Z09       Patient status post surveillance colonoscopy. His pathology just showed some acute and chronic inflammation around some granulation tissue. No signs of recurrence at his anastomosis.       Plan  · Medications  o Medications have been Reconciled  o Transition of Care or Provider Policy  · Instructions  o Electronically Identified Patient Education Materials Provided Electronically  · Referrals  o ID: [id number] Date: 04/28/2020 Type: Inbound  Specialty: General Surgery     The patient is doing well and I am pleased with his progress. I think his granulation tissue is just some irritation at the staple line and that should continue to improve. He is not showing any other symptoms or issues with that.    We will repeat a colonoscopy for surveillance in one year. Otherwise, he can call with any questions or concerns. I discussed all of this with the patient. All questions were answered.  He voiced understanding and agreed to proceed with the above plan.             Electronically Signed by: Annamaria Jon-, -Author on February 4, 2021 08:45:27 AM  Electronically Co-signed by: Aroldo Garay MD -Reviewer on February 4, 2021 10:29:56 PM

## 2021-05-15 VITALS — HEIGHT: 76 IN | WEIGHT: 260.19 LBS | BODY MASS INDEX: 31.68 KG/M2 | RESPIRATION RATE: 14 BRPM

## 2021-05-15 VITALS — WEIGHT: 271 LBS | HEIGHT: 76 IN | BODY MASS INDEX: 33 KG/M2 | RESPIRATION RATE: 16 BRPM

## 2021-05-15 VITALS — BODY MASS INDEX: 31.9 KG/M2 | WEIGHT: 262 LBS | HEIGHT: 76 IN | RESPIRATION RATE: 14 BRPM

## 2021-05-15 VITALS — RESPIRATION RATE: 16 BRPM | HEIGHT: 76 IN | BODY MASS INDEX: 31.66 KG/M2 | WEIGHT: 260 LBS

## 2021-05-15 VITALS — BODY MASS INDEX: 31.91 KG/M2 | HEIGHT: 76 IN | WEIGHT: 262.06 LBS | RESPIRATION RATE: 16 BRPM

## 2021-05-15 VITALS — BODY MASS INDEX: 31.66 KG/M2 | RESPIRATION RATE: 14 BRPM | HEIGHT: 76 IN | WEIGHT: 260 LBS

## 2021-05-15 VITALS — HEIGHT: 76 IN | BODY MASS INDEX: 32.51 KG/M2 | RESPIRATION RATE: 16 BRPM | WEIGHT: 267 LBS

## 2021-05-15 VITALS — WEIGHT: 269.25 LBS | HEIGHT: 76 IN | BODY MASS INDEX: 32.79 KG/M2 | RESPIRATION RATE: 12 BRPM

## 2021-05-15 VITALS — WEIGHT: 260 LBS | RESPIRATION RATE: 16 BRPM | HEIGHT: 76 IN | BODY MASS INDEX: 31.66 KG/M2

## 2021-05-15 VITALS — WEIGHT: 264.25 LBS | HEIGHT: 76 IN | BODY MASS INDEX: 32.18 KG/M2 | RESPIRATION RATE: 12 BRPM

## 2021-05-15 VITALS — WEIGHT: 260.37 LBS | HEIGHT: 76 IN | BODY MASS INDEX: 31.71 KG/M2 | RESPIRATION RATE: 15 BRPM

## 2021-05-24 ENCOUNTER — HOSPITAL ENCOUNTER (OUTPATIENT)
Dept: ONCOLOGY | Facility: HOSPITAL | Age: 66
Discharge: HOME | End: 2021-05-24
Attending: INTERNAL MEDICINE

## 2021-05-24 ENCOUNTER — OFFICE VISIT CONVERTED (OUTPATIENT)
Dept: ONCOLOGY | Facility: HOSPITAL | Age: 66
End: 2021-05-24
Attending: INTERNAL MEDICINE

## 2021-05-24 LAB
ALBUMIN SERPL-MCNC: 4.1 G/DL (ref 3.5–5)
ALBUMIN/GLOB SERPL: 1.6 {RATIO} (ref 1.4–2.6)
ALP SERPL-CCNC: 82 U/L (ref 56–155)
ALT SERPL-CCNC: 23 U/L (ref 10–40)
ANION GAP SERPL CALC-SCNC: 11 MMOL/L (ref 8–19)
APPEARANCE UR: CLEAR
AST SERPL-CCNC: 18 U/L (ref 15–50)
BASOPHILS # BLD AUTO: 0.03 10*3/UL (ref 0–0.2)
BASOPHILS NFR BLD AUTO: 0.3 % (ref 0–3)
BILIRUB SERPL-MCNC: 0.6 MG/DL (ref 0.2–1.3)
BILIRUB UR QL: NEGATIVE
BUN SERPL-MCNC: 15 MG/DL (ref 5–25)
BUN/CREAT SERPL: 17 {RATIO} (ref 6–20)
CALCIUM SERPL-MCNC: 8.9 MG/DL (ref 8.7–10.4)
CHLORIDE SERPL-SCNC: 106 MMOL/L (ref 99–111)
COLOR UR: NORMAL
CONV ABS IMM GRAN: 0.01 10*3/UL (ref 0–0.54)
CONV CO2: 24 MMOL/L (ref 22–32)
CONV COLLECTION SOURCE (UA): NORMAL
CONV EOSINOPHILS PERCENT BY MANUAL COUNT: 5.6 % (ref 0–7)
CONV IMMATURE GRAN: 0.1 % (ref 0–0.4)
CONV TOTAL PROTEIN: 6.7 G/DL (ref 6.3–8.2)
CONV UROBILINOGEN IN URINE BY AUTOMATED TEST STRIP: 1 {EHRLICHU}/DL (ref 0.1–1)
CREAT UR-MCNC: 0.89 MG/DL (ref 0.7–1.2)
EOSINOPHIL # BLD MANUAL: 0.49 10*3/UL (ref 0–0.7)
ERYTHROCYTE [DISTWIDTH] IN BLOOD BY AUTOMATED COUNT: 12.8 % (ref 11.5–14.5)
ERYTHROCYTE [DISTWIDTH] IN BLOOD BY AUTOMATED COUNT: 42.6 FL
GFR SERPLBLD BASED ON 1.73 SQ M-ARVRAT: >60 ML/MIN/{1.73_M2}
GLOBULIN UR ELPH-MCNC: 2.6 G/DL (ref 2–3.5)
GLUCOSE SERPL-MCNC: 126 MG/DL (ref 70–99)
GLUCOSE UR QL: NEGATIVE MG/DL
HBA1C MFR BLD: 14.9 G/DL (ref 14–18)
HCT VFR BLD AUTO: 43.7 % (ref 42–52)
HGB UR QL STRIP: NEGATIVE
KETONES UR QL STRIP: NEGATIVE MG/DL
LEUKOCYTE ESTERASE UR QL STRIP: NEGATIVE
LYMPHOCYTES # BLD AUTO: 0.76 10*3/UL (ref 1–5)
LYMPHOCYTES NFR BLD AUTO: 8.7 % (ref 20–45)
MAGNESIUM SERPL-MCNC: 2.21 MG/DL (ref 1.6–2.3)
MCH RBC QN AUTO: 30.8 PG (ref 27–31)
MCHC RBC AUTO-ENTMCNC: 34.1 G/DL (ref 33–37)
MCV RBC AUTO: 90.5 FL (ref 80–96)
MONOCYTES # BLD AUTO: 0.6 10*3/UL (ref 0.2–1.2)
MONOCYTES NFR BLD MANUAL: 6.9 % (ref 3–10)
NEUTROPHILS # BLD AUTO: 6.85 10*3/UL (ref 2–8)
NEUTROPHILS NFR BLD MANUAL: 78.4 % (ref 30–85)
NITRITE UR QL STRIP: NEGATIVE
OSMOLALITY SERPL CALC.SUM OF ELEC: 286 MOSM/KG (ref 273–304)
PH UR STRIP.AUTO: 5.5 [PH] (ref 5–8)
PLATELET # BLD AUTO: 235 10*3/UL (ref 130–400)
PMV BLD AUTO: 9 FL (ref 7.4–10.4)
POTASSIUM SERPL-SCNC: 4.1 MMOL/L (ref 3.5–5.3)
PROT UR QL: NEGATIVE MG/DL
RBC MORPH BLD: 4.83 10*6/UL (ref 4.7–6.1)
SODIUM SERPL-SCNC: 137 MMOL/L (ref 135–147)
SP GR UR: 1.02 (ref 1–1.03)
WBC # BLD AUTO: 8.74 10*3/UL (ref 4.8–10.8)

## 2021-05-25 ENCOUNTER — OFFICE VISIT CONVERTED (OUTPATIENT)
Dept: SURGERY | Facility: CLINIC | Age: 66
End: 2021-05-25
Attending: SURGERY

## 2021-05-25 ENCOUNTER — CONVERSION ENCOUNTER (OUTPATIENT)
Dept: SURGERY | Facility: CLINIC | Age: 66
End: 2021-05-25

## 2021-05-28 ENCOUNTER — HOSPITAL ENCOUNTER (OUTPATIENT)
Dept: PERIOP | Facility: HOSPITAL | Age: 66
Setting detail: HOSPITAL OUTPATIENT SURGERY
Discharge: HOME | End: 2021-05-28
Attending: SURGERY

## 2021-05-28 VITALS
TEMPERATURE: 97.9 F | HEART RATE: 79 BPM | HEART RATE: 111 BPM | RESPIRATION RATE: 16 BRPM | SYSTOLIC BLOOD PRESSURE: 122 MMHG | WEIGHT: 274.25 LBS | RESPIRATION RATE: 16 BRPM | DIASTOLIC BLOOD PRESSURE: 99 MMHG | BODY MASS INDEX: 32.98 KG/M2 | HEART RATE: 88 BPM | OXYGEN SATURATION: 99 % | SYSTOLIC BLOOD PRESSURE: 117 MMHG | DIASTOLIC BLOOD PRESSURE: 73 MMHG | DIASTOLIC BLOOD PRESSURE: 82 MMHG | RESPIRATION RATE: 14 BRPM | SYSTOLIC BLOOD PRESSURE: 125 MMHG | WEIGHT: 277.78 LBS | TEMPERATURE: 96.4 F | OXYGEN SATURATION: 98 % | BODY MASS INDEX: 33.62 KG/M2 | HEART RATE: 78 BPM | SYSTOLIC BLOOD PRESSURE: 129 MMHG | BODY MASS INDEX: 33.38 KG/M2 | TEMPERATURE: 97 F | BODY MASS INDEX: 33.44 KG/M2 | TEMPERATURE: 97 F | DIASTOLIC BLOOD PRESSURE: 93 MMHG | TEMPERATURE: 97.4 F | RESPIRATION RATE: 16 BRPM | SYSTOLIC BLOOD PRESSURE: 135 MMHG | OXYGEN SATURATION: 97 % | WEIGHT: 276.24 LBS | BODY MASS INDEX: 33.81 KG/M2 | RESPIRATION RATE: 16 BRPM | OXYGEN SATURATION: 97 % | DIASTOLIC BLOOD PRESSURE: 89 MMHG | OXYGEN SATURATION: 98 % | WEIGHT: 270.95 LBS | WEIGHT: 274.69 LBS | HEART RATE: 95 BPM

## 2021-05-28 VITALS
WEIGHT: 260.8 LBS | HEART RATE: 75 BPM | BODY MASS INDEX: 33.2 KG/M2 | WEIGHT: 263.23 LBS | DIASTOLIC BLOOD PRESSURE: 92 MMHG | OXYGEN SATURATION: 99 % | TEMPERATURE: 98.4 F | TEMPERATURE: 98 F | BODY MASS INDEX: 33.78 KG/M2 | BODY MASS INDEX: 30.97 KG/M2 | DIASTOLIC BLOOD PRESSURE: 75 MMHG | HEIGHT: 74 IN | DIASTOLIC BLOOD PRESSURE: 87 MMHG | HEART RATE: 92 BPM | OXYGEN SATURATION: 97 % | DIASTOLIC BLOOD PRESSURE: 90 MMHG | DIASTOLIC BLOOD PRESSURE: 80 MMHG | TEMPERATURE: 97.8 F | BODY MASS INDEX: 33.73 KG/M2 | SYSTOLIC BLOOD PRESSURE: 140 MMHG | BODY MASS INDEX: 33.81 KG/M2 | OXYGEN SATURATION: 94 % | OXYGEN SATURATION: 97 % | RESPIRATION RATE: 16 BRPM | WEIGHT: 268.96 LBS | OXYGEN SATURATION: 97 % | BODY MASS INDEX: 33.47 KG/M2 | WEIGHT: 266.76 LBS | OXYGEN SATURATION: 99 % | DIASTOLIC BLOOD PRESSURE: 82 MMHG | OXYGEN SATURATION: 96 % | DIASTOLIC BLOOD PRESSURE: 79 MMHG | BODY MASS INDEX: 34.26 KG/M2 | TEMPERATURE: 98.5 F | TEMPERATURE: 98 F | TEMPERATURE: 98.3 F | SYSTOLIC BLOOD PRESSURE: 148 MMHG | RESPIRATION RATE: 18 BRPM | WEIGHT: 254.41 LBS | WEIGHT: 262.79 LBS | HEART RATE: 73 BPM | HEART RATE: 86 BPM | HEART RATE: 70 BPM | TEMPERATURE: 97.4 F | WEIGHT: 266.98 LBS | OXYGEN SATURATION: 97 % | RESPIRATION RATE: 18 BRPM | RESPIRATION RATE: 16 BRPM | OXYGEN SATURATION: 95 % | SYSTOLIC BLOOD PRESSURE: 138 MMHG | SYSTOLIC BLOOD PRESSURE: 148 MMHG | TEMPERATURE: 97.3 F | OXYGEN SATURATION: 97 % | HEIGHT: 74 IN | SYSTOLIC BLOOD PRESSURE: 151 MMHG | OXYGEN SATURATION: 97 % | DIASTOLIC BLOOD PRESSURE: 86 MMHG | WEIGHT: 263.45 LBS | HEART RATE: 74 BPM | RESPIRATION RATE: 16 BRPM | DIASTOLIC BLOOD PRESSURE: 85 MMHG | SYSTOLIC BLOOD PRESSURE: 122 MMHG | RESPIRATION RATE: 16 BRPM | BODY MASS INDEX: 34.26 KG/M2 | TEMPERATURE: 98.4 F | HEART RATE: 87 BPM | RESPIRATION RATE: 16 BRPM | DIASTOLIC BLOOD PRESSURE: 81 MMHG | WEIGHT: 258.6 LBS | DIASTOLIC BLOOD PRESSURE: 89 MMHG | BODY MASS INDEX: 32.47 KG/M2 | RESPIRATION RATE: 16 BRPM | SYSTOLIC BLOOD PRESSURE: 145 MMHG | TEMPERATURE: 98.1 F | WEIGHT: 272.71 LBS | SYSTOLIC BLOOD PRESSURE: 136 MMHG | HEIGHT: 74 IN | RESPIRATION RATE: 12 BRPM | SYSTOLIC BLOOD PRESSURE: 144 MMHG | HEART RATE: 83 BPM | HEART RATE: 82 BPM | TEMPERATURE: 98.3 F | SYSTOLIC BLOOD PRESSURE: 136 MMHG | BODY MASS INDEX: 33.19 KG/M2 | HEART RATE: 92 BPM | HEART RATE: 87 BPM | WEIGHT: 266.98 LBS | BODY MASS INDEX: 34.52 KG/M2 | RESPIRATION RATE: 18 BRPM | SYSTOLIC BLOOD PRESSURE: 139 MMHG

## 2021-05-28 VITALS
HEIGHT: 74 IN | TEMPERATURE: 97.4 F | SYSTOLIC BLOOD PRESSURE: 113 MMHG | HEART RATE: 85 BPM | DIASTOLIC BLOOD PRESSURE: 75 MMHG | OXYGEN SATURATION: 97 % | RESPIRATION RATE: 16 BRPM | BODY MASS INDEX: 34.32 KG/M2 | WEIGHT: 267.42 LBS

## 2021-05-28 NOTE — PROGRESS NOTES
Patient: CYDNEY KENNEDY     Acct: [id number]     Report: #[id number]  UNIT #: [id number]     : 1955    Encounter Date:2021  PRIMARY CARE: AINSLEY CALABRESE  ***Signed***  --------------------------------------------------------------------------------------------------------------------  NURSE INTAKE      Visit Type      Established Patient Visit            Chief Complaint      rectal ca ct results      Intent of Therapy:  Palliative            Referring Provider/Copies To      Primary Care Provider:  Aroldo Garay      Copies To:   Aroldo Garay            History and Present Illness      Past Oncology Illness History      Mr. Kennedy is a 64yo WM who presents to the clinic for follow up of his rectal     ca. He reports that he noticed blood with defecation; however, not consistently.     This has been ongoing for at least 6 months. 5/3/19  Colonoscopy performed by     Dr. Garay found a fungating bleeding mass 15cm from the anus.  Pathology     showed moderately differentiated adenocarcinoma associated with tubular adenoma.     19 CT scans were completed and showed mild degree of coronary artery     calcification.  Evidence of prior granulomatous disease.  No evidence of metast    atic disease to the chest.  The reported rectal mass is not demonstrated on CT     there is a redundant sigmoid colon with scant sigmoid diverticulosis. 2019 low anterior resection with ileostomy. 10/2019 completed neoadjuvant     Xeloda with radiation. adjuvant xeloda. 2021 CT scans reveal recurrence;     biopsy ordered. 21 Biopsy of liver revealed metastatic adenocarcinoma,     consistent with colorectal primary.            HPI - Oncology Interim      Patient returns for follow-up of recent liver biopsy.  He states he is feeling     well.  He tolerated the procedure without difficulty.  He denies new masses or     adenopathy.  No unusual aches or pains.  He reports normal bowel  habits without     blood per rectum, pain or melena.  He notes good appetite and energy level.            Cancer Details            9/30/2019 High rectal erme-snvpxxcgvh-dlyuanbxcicpbe adenocarcinoma      associated with tubular adenoma. 4/27/21 Biopsy of liver revealed      metastatic adenocarcinoma, consistent with colorectal primary.            Clinical Staging      Stage IIa ([id number])            Treatments      Chemotherapy      neoadjuvant Xeloda with radiation. adjuvant xeloda      Radiation Therapy      7/8/19 thru 8/14/19 completed neoadjuvant 5040 cGy=28 fxs rectum      Surgeries       9/30/2019 low anterior resection with ostomy            Clinical Trial Participant      No            ECOG Performance Status      0            Most Recent Lab Findings      Laboratory Tests      4/27/21 09:40            PAST, FAMILY   Past Medical History      Past Medical History:  Hepatitis, High Cholesterol, Hypertension, Thyroid     Disease      Hematology/Oncology (M):  Colorectal, GI Cancer            Past Surgical History      Biopsy            HERNIA REPAIR, REMOVED CANCER FROM COLON.            Family History      Family History:  Colorectal Cancer, Prostate Cancer            Social History      Lives independently:  Yes      Number of Children:  2      Occupation:  [employer]            Tobacco Use      Tobacco status:  Never smoker            Substance Use      Substance use:  Denies use            REVIEW OF SYSTEMS      General:  Denies: Fatigue      Eye:  Denies Blurred Vision      ENT:  Denies Headache      Cardiovascular:  Denies Chest Pain      Respiratory:  Denies: Shortness of Air      Gastrointestinal:  Denies Diarrhea, Denies Nausea/Vomiting      Musculoskeletal:  Denies Muscle Pain, Denies Painful Joints      Neurologic:  Denies Seizures            VITAL SIGNS AND SCORES      Vitals      Weight 274 lbs 4.036 oz / 124.4 kg      Temperature 96.4 F / 35.78 C - Temporal      Pulse 79      Respirations 16       Blood Pressure 125/73 Sitting, Left Arm      Pulse Oximetry 99%, rm air            Pain Score      Pain Scale Used:  Numerical      Pain Intensity:  0            Fatigue Score      Fatigue (0-10 scale):  0 (none)            EXAM      General Appearance:  Positive for: Alert, Cooperative;          Negative for: Acute Distress      Eye:  Positive for: Anicteric Sclerae, Moist Conjunctiva      Neck:  Positive for: Supple;          Negative for: JVD, Masses      Respiratory:  Positive for: CTAB, Normal Respiratory Effort      Abdomen/Gastro:  Positive for: Normal Active Bowel Sounds, Soft;          Negative for: Hepatosplenomegaly, Tenderness      Cardiovascular:  Positive for: RRR;          Negative for: Gallop, Murmur, Peripheral Edema, Rub      Psychiatric:  Positive for: Appropriate Affect, Intact Judgement      Lymphatic:  Negative for: Cervical, Infraclavicular, Supraclavicular            PREVENTION      Hx Influenza Vaccination:  Yes      Date Influenza Vaccine Given:  Sep 1, 2020      Influenza Vaccine Declined:  No      Hx Pneumococcal Vaccination:  No      Encouraged to follow-up with:  PCP regarding preventative exams.            ALLERGY/MEDS      Allergies      Coded Allergies:             NO KNOWN ALLERGIES (Unverified , 4/13/21)            Medications      Last Reconciled on 5/3/21 15:20 by DENISE ESPINOSA      Vitamin E (Vitamin E*) 400 Units Capsule      400 UNITS PO QDAY, CAP         Reported         1/14/21       Doxazosin Mesylate (Doxazosin Mesylate) 8 Mg Tablet      8 MG PO QDAY, TAB         Reported         1/14/21       Apixaban (Eliquis) 5 Mg Tablet      5 MG PO BID for 30 Days, #60 TAB         Reported         5/4/20       Psyllium Seed (With Sugar) (Metamucil) 575 Gm Powder      1 TBSP PO QDAY, #1 BOTTLE         Reported         9/26/19       Ascorbic Acid (Vitamin C*) 500 Mg Tablet      500 MG PO QDAY, #60 TAB 0 Refills         Reported         9/26/19       Folic Acid/Vit Bcomp,C (Super  B-Complex Folic-Vit C Tb) 400 Mcg Tablet      1 TAB PO QDAY, TAB         Reported         9/26/19       Loratadine (Loratadine) 10 Mg Tablet      10 MG PO QDAY PRN for ALLERGIES, #30 TAB 0 Refills         Reported         6/27/19       Levothyroxine (Levothyroxine) 0.025 Mg Tablet      0.025 MG PO QDAY@07, #30 TAB 0 Refills         Reported         6/27/19       Atorvastatin (Atorvastatin) 40 Mg Tablet      40 MG PO HS, #30 TAB 0 Refills         Reported         6/27/19       dilTIAZem CD (Cardizem CD) 120 Mg Capcr      120 MG PO QDAY, #30 CAP.ER 3 Refills         Prov: Gabriel Leong         9/6/17       Metoprolol Succinate (Metoprolol Succinate) 50 Mg Tab.er.24h      50 MG PO QDAY, #30 TAB.ER 3 Refills         Prov: Gabriel Leong         9/6/17       Multivitamins (Multi-Vitamin) 1 Tab Tablet      1 TAB PO QDAY         Reported         11/3/11            IMPRESSION/PLAN      Diagnosis      Rectal cancer - C20      Patient has biopsy-proven metastatic disease involving the liver.  There are     also several small nodules and lymph nodes in the chest and thoracic vincenzo     stations.  We discussed that metastatic disease is generally not considered     curable although for individuals with oligometastatic disease (meaning only a     few lesions), directed therapies may be able to render him disease-free.  I     suspect that there are too many especially in the chest for that purpose at this    time.  We discussed chemotherapy such as the FOLFOX regimen as initial treatment    moving onto directed therapy based on response and tolerance.  I will request     NexGen ration sequencing on his liver biopsy specimen which may provide     additional treatment options.  We discussed clinical trials which are not     available here locally with may be available at [facility].  Directed therapy     would also require a trip to the [facility].      He is interested in speaking to surgical oncology before making any other      decisions.  I will refer him to [facility] for evaluation including     for clinical trials as well as a second opinion.  If he does move forward with     chemotherapy here locally, he would need a Port-A-Cath for infusion purposes     which can be placed by Dr. Garay his local surgeon.      I will see him back after his [facility] visit to finalize treatment    planning            Notes      New Referrals      * Surgery, As Soon As Possible         DANIEL SAINZ         Reason for Referral:  Dr. Sainz for mets to liver          Dx: Rectal cancer - C20      * Surgery, Routine         Aroldo Garay         Reason for Referral: port placement. patient is established         Dx: Rectal cancer - C20            Pain      Pain Zero Today            Advanced Care Plan Discussion      Declines Discussion [id number]            Patient Education            Coping With Diarrhea Related to Chemotherapy      Coping With Fatigue From Chemotherapy      Coping With Nerve and Muscle Effects Related to Chemotherapy      Coping With Pain Related to Cancer and Chemotherapy      Fluorouracil Injection      Leucovorin      Oxaliplatin Injection            Electronically signed by DENISE ESPINOSA  05/03/2021 15:20       Disclaimer: Converted document may not contain table formatting or lab diagrams. Please see [employer] for the authenticated document.

## 2021-05-28 NOTE — PROGRESS NOTES
Patient: CYDNEY KENNEDY     Acct: [id number]     Report: #[id number]  UNIT #: [id number]     : 1955    Encounter Date:2020  PRIMARY CARE: AINSLEY CALABRESE  ***Signed***  --------------------------------------------------------------------------------------------------------------------  NURSE INTAKE      Visit Type      Established Patient Visit            Chief Complaint      RECTAL CA FOLLOW UP            Referring Provider/Copies To      Referring Provider:  Aroldo Garay            History and Present Illness      Past Oncology Illness History      Mr. Kennedy is a 63yo WM who was recently dx with rectal ca. He reports that he     noticed blood with defecation; however, not consistently.  This has been ongoing    for at least 6 months.  He has had a colonoscopy; however, >10yrs ago.      Colonoscopy performed on 5/3/19 per Dr. Garay found a fungating bleeding     mass 15cm from the anus.  Pathology showed moderately differentiated     adenocarcinoma associated with tubular adenoma.  19 CT scans were completed    and showed mild degree of coronary artery calcification.  Evidence of prior     granulomatous disease.  No evidence of metastatic disease to the chest.  The     reported rectal mass is not demonstrated on CT there is a redundant sigmoid     colon with scant sigmoid diverticulosis.  No evidence of metastatic disease in     the abdomen pelvis or visualized bony structures.  19 pelvic MRI showed     high rectal mass, difficult to fully characterize as described above.  The mass     is suspected to be circumferential, and there is suspicion for involvement or     extension beyond the serosal layer.  No evidence for involvement of adjacent     structures or definitive evidence for metastatic disease elsewhere in the pelvi    s.  5 mm nonspecific lymph node in the right mesial rectal fat below the level     of the tumor.      PMH: htn, hyperlipidemia--no smoking; however, reports  drinking 6pack at least     2-3xs wk.  Family hx: father with prostate ca, maternal uncle with colon ca.       Pt is employed in a warehouse doing highly physical job description.            HPI - Oncology Interim      Patient returns today for his sixth and final cycle of adjuvant Xeloda.  With     his last cycle, he notes that he had some increased fatigue and mild skin     irritation.  He has been using lotion more consistently such that it has not     been as bad.  His fatigue is manageable.  He states he is still able to perform     all his normal daily activities.  He denies nausea or vomiting.  He reports some    loose bowels with Xeloda but his colostomy is functioning well.  He denies any     masses or lymphadenopathy.            Cancer Details            High rectal izlz-ejhlhrlajj-hmvnaepuedaxmd adenocarcinoma associated with      tubular adenoma            Clinical Staging      Stage IIa ([id number])            Treatments      Chemotherapy      neoadjuvant Xeloda with radiation. adjuvant xeloda      Radiation Therapy      7/8/19 thru 8/14/19 completed neoadjuvant 5040 cGy=28 fxs rectum            Clinical Trial Participant      No            ECOG Performance Status      0            Most Recent Lab Findings      Laboratory Tests      2/5/20 09:18            2/5/20 10:28            PAST, FAMILY   Past Medical History      Past Medical History:  Hepatitis, High Cholesterol, Hypertension, Thyroid     Disease      Hematology/Oncology (M):  Colorectal, GI Cancer            Past Surgical History      Biopsy (COLONOSCOPY)            HERNIA REPAIR, REMOVED CANCER FROM COLON.            Family History      Family History:  Colorectal Cancer (UNCLE MAT), Prostate Cancer (FATHER)            Social History      Marital Status:        Lives independently:  Yes      Number of Children:  2      Occupation:  [employer]            Tobacco Use      Tobacco status:  Never smoker            Alcohol Use      Alcohol  intake:  0-2 drinks per day            Substance Use      Substance use:  Denies use            REVIEW OF SYSTEMS      General:  Admits: Fatigue      Eye:  Denies Blurred Vision      ENT:  Denies Hearing Loss      Cardiovascular:  Denies Chest Pain      Respiratory:  Admits: Shortness of Air;          Denies: Productive Cough      Gastrointestinal:  Denies Constipation, Denies Diarrhea      Musculoskeletal:  Denies Muscle Pain, Denies Painful Joints      Integumentary:  Denies Rash            VITAL SIGNS AND SCORES      Vitals      Weight 266 lbs 12.106 oz / 121 kg      Temperature 98.4 F / 36.89 C - Temporal      Pulse 82      Respirations 18      Blood Pressure 138/85 Sitting, Left Arm      Pulse Oximetry 97%, RM AIR            Pain Score      Pain Scale Used:  Numerical      Pain Intensity:  0            Fatigue Score      Fatigue (0-10 scale):  5            EXAM      General Appearance:  Positive for: Alert, Oriented x3, Cooperative;          Negative for: Acute Distress      Eye:  Positive for: Anicteric Sclerae, Moist Conjunctiva      Neck:  Positive for: Supple;          Negative for: JVD, Masses      Respiratory:  Positive for: CTAB, Normal Respiratory Effort      Abdomen/Gastro:  Positive for: Normal Active Bowel Sounds, Soft;          Negative for: Distention, Hepatosplenomegaly, Tenderness      Other      Well-healed midline incision.  Right lower quadrant ostomy.      Cardiovascular:  Positive for: RRR;          Negative for: Gallop, Murmur, Peripheral Edema, Rub      Psychiatric:  Positive for: Appropriate Affect, Intact Judgement      Lymphatic:  Negative for: Cervical, Infraclavicular, Supraclavicular            PREVENTION      Hx Influenza Vaccination:  Yes      Date Influenza Vaccine Given:  Oct 1, 2019      Influenza Vaccine Declined:  No      2 or More Falls Past Year?:  No      Fall Past Year with Injury?:  No      Hx Pneumococcal Vaccination:  No      Encouraged to follow-up with:  PCP regarding  preventative exams.      Chart initiated by      DAVI TATE MA            ALLERGY/MEDS      Allergies      Coded Allergies:             NO KNOWN ALLERGIES (Unverified , 2/6/20)            Medications      Last Reconciled on 2/6/20 16:31 by DENISE ESPINOSA      Capecitabine (Xeloda) 500 Mg Tab      2000 MG PO BID for 14 Days, #112 TAB 2 Refills         Prov: DENISE ESPINOSA         1/9/20       Ondansetron Hcl (ONDANSETRON HCL) 8 Mg Tablet      8 MG PO Q8H PRN for NAUSEA for 14 Days, #42 TAB 3 Refills         Prov: DENISE ESPINOSA         12/19/19       Doxazosin Mesylate (Doxazosin Mesylate) 4 Mg Tablet      4 MG PO QPM, TAB         Reported         9/30/19       Ibuprofen (Ibuprofen) 400 Mg Tablet      400 MG PO Q4H PRN for PAIN, #100 TAB 0 Refills         Reported         9/26/19       Psyllium Seed (With Sugar) (Metamucil) 575 Gm Powder      1 TBSP PO QDAY, #1 BOTTLE         Reported         9/26/19       (Vitamin E)   No Conflict Check      1 TAB PO QDAY         Reported         9/26/19       Ascorbic Acid (Vitamin C*) 500 Mg Tablet      500 MG PO QDAY, #60 TAB 0 Refills         Reported         9/26/19       Folic Acid/Vit Bcomp,C (Super B-Complex Folic-Vit C Tb) 400 Mcg Tablet      1 TAB PO QDAY, TAB         Reported         9/26/19       Loratadine (Loratadine) 10 Mg Tablet      10 MG PO QDAY, #30 TAB 0 Refills         Reported         6/27/19       Levothyroxine (Levothyroxine) 0.025 Mg Tablet      0.025 MG PO QDAY@07, #30 TAB 0 Refills         Reported         6/27/19       Atorvastatin (Atorvastatin) 40 Mg Tablet      40 MG PO HS, #30 TAB 0 Refills         Reported         6/27/19       dilTIAZem CD (Cardizem CD) 120 Mg Capcr      120 MG PO QDAY, #30 CAP.ER 3 Refills         Prov: Gabriel Leong         9/6/17       Metoprolol Succinate (Metoprolol Succinate) 50 Mg Tab.er.24h      50 MG PO QDAY, #30 TAB.ER 3 Refills         Prov: Gabriel Leong         9/6/17       Multivitamins (Multi-Vitamin) 1 Tab Tablet       1 TAB PO QDAY         Reported         11/3/11      Medications Reviewed:  No Changes made to meds            IMPRESSION/PLAN      Diagnosis      Rectal cancer - C20      Patient is ready for his sixth and final cycle of single agent Xeloda.  This     will complete 6 months of perioperative chemotherapy.  Tolerating well.  Lab     work is adequate for treatment.  Proceed with cycle 6 as planned.  I will see     him back in 6 weeks to ensure that all acute toxicities are resolving with labs     and restaging scans prior.  He can follow-up with his surgeon to begin     discussion regarding colostomy reversal.            Notes      New Diagnostics      * CT Abd/Pelvis/Chest W/Contrast, 6 WEEKS         Dx: Rectal cancer - C20      * CBC With Auto Diff, 6 WEEKS         Dx: Rectal cancer - C20      * CMP Comp Metabolic Panel, 6 WEEKS         Dx: Rectal cancer - C20      * Cea/Carcinoembryonic, 6 WEEKS         Dx: Rectal cancer - C20            Patient Education            Rectal Cancer      Patient Education Provided:  Yes            Electronically signed by DENISE ESPINOSA  02/06/2020 16:31       Disclaimer: Converted document may not contain table formatting or lab diagrams. Please see [employer] for the authenticated document.

## 2021-05-28 NOTE — PROGRESS NOTES
Patient: CYDNEY KENNEDY     Acct: [id number]     Report: #[id number]  UNIT #: [id number]     : 1955    Encounter Date:2020  PRIMARY CARE: AINSLEY CALABRESE  ***Signed***  --------------------------------------------------------------------------------------------------------------------  NURSE INTAKE      Visit Type      Established Patient Visit            Chief Complaint      RECTAL CA F/U      Intent of Therapy:  Curative            Referring Provider/Copies To      Referring Provider:  Aroldo Garay      Primary Care Provider:  AINSLEY CALABRESE      Copies To:   AINSLEY CALABRESE            History and Present Illness      Past Oncology Illness History      Mr. Kennedy is a 64yo WM who was recently dx with rectal ca. He reports that he     noticed blood with defecation; however, not consistently.  This has been ongoing    for at least 6 months.  He has had a colonoscopy; however, >10yrs ago.      Colonoscopy performed on 5/3/19 per Dr. Garay found a fungating bleeding     mass 15cm from the anus.  Pathology showed moderately differentiated     adenocarcinoma associated with tubular adenoma.  19 CT scans were completed    and showed mild degree of coronary artery calcification.  Evidence of prior     granulomatous disease.  No evidence of metastatic disease to the chest.  The     reported rectal mass is not demonstrated on CT there is a redundant sigmoid     colon with scant sigmoid diverticulosis.  No evidence of metastatic disease in     the abdomen pelvis or visualized bony structures.  19 pelvic MRI showed     high rectal mass, difficult to fully characterize as described above.  The mass     is suspected to be circumferential, and there is suspicion for involvement or e    xtension beyond the serosal layer.  No evidence for involvement of adjacent     structures or definitive evidence for metastatic disease elsewhere in the     pelvis.  5 mm nonspecific lymph node in the right mesial  rectal fat below the     level of the tumor.            Pt is employed in a warehouse doing highly physical job description.            HPI - Oncology Interim      Patient returns for follow-up of his rectal cancer and pulmonary embolism/DVT.      He is status post treatments as outlined.  He recently underwent reversal of his    colostomy.  He states the surgery went well.  He denies abdominal pain.  His     bowels are working normally.  He is eating and drinking well and his weight is     up.  He notes that his energy level is getting back to normal.  He denies any     masses lymphadenopathy.  No unusual aches or pains.  He is taking his Eliquis     without difficulty-he denies excessive bruising or bleeding.            Cancer Details            High rectal cjpe-ysxnmjelhq-wetfwgaqjtegug adenocarcinoma associated with      tubular adenoma            Clinical Staging      Stage IIa ([id number])            Treatments      Chemotherapy      neoadjuvant Xeloda with radiation. adjuvant xeloda      Radiation Therapy      19 thru 19 completed neoadjuvant 5040 cGy=28 fxs rectum      Surgeries      2020 colostomy reversed.            Clinical Trial Participant      No            ECOG Performance Status      0            Most Recent Lab Findings            Item Value  Date Time             Carcinoembryonic Antigen 1.6 ng/mL 20 0950            Laboratory Tests      20 09:50            Most Recent Imaging Findings      Patient: CYDNEY LEONARD   Acct: #[id number]   Report: #[id number]            UNIT #: [id number]    DOS: 20 0900      INSURANCE:[employer]   ORDER #:[id number]      LOCATION:[facility]     : 1955            PROVIDERS      ADMITTING:     ATTENDING: DENISE ESPINOSA      FAMILY:  Aroldo Garay   ORDERING:  DENISE ESPINOSA         OTHER:    DICTATING:  Hardik Forbes MD            REQ #:[id number]   EXAM:CTACPWC - CT ABD CHEST PEL w CONTR      REASON FOR EXAM:         REASON FOR VISIT:  RECTAL CA            *******Signed******         PROCEDURE:   CT ABDOMEN; CT CHEST; CT PELVIS WITH CONTRAST             COMPARISON:   [facility], CT, CT CHEST ABD PEL W CONTRAST,    9/16/2019, 9:29.        [facility], CT, CT CHEST ABD PEL W CONTRAST, 3/19/2020, 12:17.             INDICATIONS:   RECTAL CANCER DX 2018, RECENT REVERSAL OF COLOSTOMY, F/U, NO     COMPLAINTS, NON SMOKER,       CREAT 0.83  5/28/20, GFR >60             TECHNIQUE:   After obtaining the patient's consent, CT images were obtained with    intravenous contrast       material.             FINDINGS:         Chest:  Mildly prominent subcarinal lymph node measuring 1.2 cm in short axis     appears similar to       the previous exam.  No hilar or axillary adenopathy is seen.  No pericardial or     pleural effusion is       present.  Moderate coronary artery atherosclerotic calcification is present.  No    pulmonary embolism       is identified on today's examination.  Dedicated CTA protocol was not performed.             In the right upper lobe on image 24 is a 0.4 cm nodular density, unchanged.      Mild scarring or       atelectasis is noted in the lower lung fields bilaterally.  The lungs are     otherwise clear       bilaterally.             Abdomen:  There is diffuse fatty infiltration of the liver.  Areas of fatty     sparing are noted,       similar to the previous exam.  The gallbladder, spleen, pancreas and adrenal     glands appear       unremarkable.               Pelvis:  No adenopathy or free fluid is seen in the abdomen or pelvis.  A     rectosigmoid anastomosis       is noted.  Postoperative changes of the small bowel loop are noted in the mid     right abdomen.  No       acute bowel abnormality is seen.  Diastasis recti is noted.  Postoperative     changes are noted in the       subcutaneous fat of the right pelvis related to a previous ileostomy.             No focal osseous  lesion is seen in the chest, abdomen or pelvis.             CONCLUSION:         1. Mild sub carinal adenopathy, unchanged      2. Stable right upper lobe pulmonary nodule      3. Diffuse fatty infiltration of the liver      4. Postoperative bowel changes, as above              Hardik Forbes M.D.             Electronically Signed and Approved By: Hardik Forbes M.D. on 6/24/2020 at 10:11                           Until signed, this is an unconfirmed preliminary report that may contain      errors and is subject to change.                                              VITALYRRO:      D:06/24/20 1012            PAST, FAMILY   Past Medical History      Past Medical History:  Hepatitis, High Cholesterol, Hypertension, Thyroid     Disease      Hematology/Oncology (M):  Colorectal, GI Cancer            Past Surgical History      Biopsy            HERNIA REPAIR, REMOVED CANCER FROM COLON.            Family History      Family History:  Colorectal Cancer, Prostate Cancer            Social History      Marital Status:        Lives independently:  Yes      Number of Children:  2      Occupation:  [employer]            Tobacco Use      Tobacco status:  Never smoker            Alcohol Use      Alcohol intake:  0-2 drinks per day            Substance Use      Substance use:  Denies use            REVIEW OF SYSTEMS      General:  Denies: Fatigue, Fever, Weight Loss      ENT:  Denies Hearing Loss      Cardiovascular:  Denies Chest Pain, Denies Palpitations      Respiratory:  Denies: Shortness of Air, Wheezing      Gastrointestinal:  Denies Bloody Stools, Denies Diarrhea, Denies Problem     Swallowing      Genitourinary:  Denies Painful Urination      Musculoskeletal:  Denies Painful Joints      Neurologic:  Denies Dizziness, Denies Numbness\Tingling      Hematologic/Lymphatic:  Denies Bruising, Denies Bleeding            VITAL SIGNS AND SCORES      Vitals      Weight 272 lbs 11.344 oz / 123.7 kg      Temperature 97.4 F / 36.33 C -  Temporal      Pulse 75      Respirations 16      Blood Pressure 151/92 Sitting, Left Arm      Pulse Oximetry 97%, RM AIR            Pain Score      Pain Scale Used:  Numerical      Pain Intensity:  0            Fatigue Score      Fatigue (0-10 scale):  0 (none)            EXAM      General Appearance:  Positive for: Alert, Cooperative;          Negative for: Acute Distress      Eye:  Positive for: Anicteric Sclerae, Moist Conjunctiva      Neck:  Positive for: Supple;          Negative for: JVD, Masses      Respiratory:  Positive for: CTAB, Normal Respiratory Effort      Abdomen/Gastro:  Positive for: Normal Active Bowel Sounds, Soft;          Negative for: Distention, Hepatosplenomegaly, Tenderness      Other      Well-healed surgical incisions      Cardiovascular:  Positive for: RRR;          Negative for: Gallop, Murmur, Peripheral Edema, Rub      Psychiatric:  Positive for: Appropriate Affect, Intact Judgement      Lymphatic:  Negative for: Cervical, Infraclavicular, Supraclavicular            PREVENTION      Hx Influenza Vaccination:  Yes      Date Influenza Vaccine Given:  Oct 1, 2019      Influenza Vaccine Declined:  No      2 or More Falls Past Year?:  No      Fall Past Year with Injury?:  No      Hx Pneumococcal Vaccination:  No      Encouraged to follow-up with:  PCP regarding preventative exams.      Chart initiated by      DAVI TATE MA            ALLERGY/MEDS      Allergies      Coded Allergies:             NO KNOWN ALLERGIES (Unverified , 7/2/20)            Medications      Last Reconciled on 7/2/20 11:50 by DENISE ESPINOSA      HYDROcodone-Acetaminophen 5-325 Mg (HYDROcodone-Acetaminophen 5-325 Mg) 1 Each     Tablet      1 TAB PO Q4H PRN for PAIN, #18 TAB         Prov: JONHEATHESTEPHANIE         5/29/20       Apixaban (Eliquis) 5 Mg Tablet      5 MG PO BID for 30 Days, #60 TAB         Reported         5/4/20       Doxazosin Mesylate (Doxazosin Mesylate) 8 Mg Tablet      4 MG PO QDAY, TAB          Reported         5/4/20       Psyllium Seed (With Sugar) (Metamucil) 575 Gm Powder      1 TBSP PO QDAY, #1 BOTTLE         Reported         9/26/19       (Vitamin E)   No Conflict Check      1 TAB PO QDAY         Reported         9/26/19       Ascorbic Acid (Vitamin C*) 500 Mg Tablet      500 MG PO QDAY, #60 TAB 0 Refills         Reported         9/26/19       Folic Acid/Vit Bcomp,C (Super B-Complex Folic-Vit C Tb) 400 Mcg Tablet      1 TAB PO QDAY, TAB         Reported         9/26/19       Loratadine (Loratadine) 10 Mg Tablet      10 MG PO QDAY, #30 TAB 0 Refills         Reported         6/27/19       Levothyroxine (Levothyroxine) 0.025 Mg Tablet      0.025 MG PO QDAY@07, #30 TAB 0 Refills         Reported         6/27/19       Atorvastatin (Atorvastatin) 40 Mg Tablet      40 MG PO HS, #30 TAB 0 Refills         Reported         6/27/19       dilTIAZem CD (Cardizem CD) 120 Mg Capcr      120 MG PO QDAY, #30 CAP.ER 3 Refills         Prov: Gabriel Leong         9/6/17       Metoprolol Succinate (Metoprolol Succinate) 50 Mg Tab.er.24h      50 MG PO QDAY, #30 TAB.ER 3 Refills         Prov: Gabriel Leong         9/6/17       Multivitamins (Multi-Vitamin) 1 Tab Tablet      1 TAB PO QDAY         Reported         11/3/11      Medications Reviewed:  No Changes made to meds            IMPRESSION/PLAN      Diagnosis      Rectal cancer - C20      Patient is doing well.  I see no evidence of disease recurrence by his history,     physical examination, lab work or recent scans.  He has undergone colostomy     takedown and his bowels are functioning well.  RTC 3 months for ongoing     surveillance with labs prior.  I would plan repeat scans at the 6-month     interval.            Acute deep vein thrombosis (DVT) of right lower extremity         Acute deep vein thrombosis (DVT) of right lower extremity, unspecified vein         Affected thrombotic vein of extremity: unspecified vein of extremity      Patient is on anticoagulation with  Eliquis.  I would continue for 6 months which    would be September of this year.  Tolerating well.  Continue Eliquis.            Notes      New Diagnostics      * CCC CBC With Auto Diff, 3 Months         Dx: Rectal cancer - C20      * CCC Comp Metabolic Panel, 3 Months         Dx: Rectal cancer - C20      * Cea/Carcinoembryonic, 3 Months         Dx: Rectal cancer - C20            Patient Education      Patient Education Provided:  Yes            Electronically signed by DENISE ESPINOSA  07/02/2020 11:51       Disclaimer: Converted document may not contain table formatting or lab diagrams. Please see [employer] for the authenticated document.

## 2021-05-28 NOTE — PROGRESS NOTES
Patient: CYDNEY KENNEDY     Acct: [id number]     Report: #[id number]  UNIT #: [id number]     : 1955    Encounter Date:2019  PRIMARY CARE: AINSLEY CALABRESE  ***Signed***  --------------------------------------------------------------------------------------------------------------------  NURSE INTAKE      Visit Type      Established Patient Visit            Chief Complaint      RECTAL CANCER            Referring Provider/Copies To      Referring Provider:  Aroldo Garay            History and Present Illness      Past Oncology Illness History      Mr. Kennedy is a 63yo WM who was recently dx with rectal ca. He reports that he     noticed blood with defecation; however, not consistently.  This has been ongoing    for at least 6 months.  He has had a colonoscopy; however, >10yrs ago.      Colonoscopy performed on 5/3/19 per Dr. Garay found a fungating bleeding     mass 15cm from the anus.  Pathology showed moderately differentiated     adenocarcinoma associated with tubular adenoma.  19 CT scans were completed    and showed mild degree of coronary artery calcification.  Evidence of prior     granulomatous disease.  No evidence of metastatic disease to the chest.  The     reported rectal mass is not demonstrated on CT there is a redundant sigmoid     colon with scant sigmoid diverticulosis.  No evidence of metastatic disease in     the abdomen pelvis or visualized bony structures.  19 pelvic MRI showed     high rectal mass, difficult to fully characterize as described above.  The mass     is suspected to be circumferential, and there is suspicion for involvement or     extension beyond the serosal layer.  No evidence for involvement of adjacent     structures or definitive evidence for metastatic disease elsewhere in the     pelvis.  5 mm nonspecific lymph node in the right mesial rectal fat below the     level of the tumor.      PMH: htn, hyperlipidemia--no smoking; however, reports  drinking 6pack at least     2-3xs wk.  Family hx: father with prostate ca, maternal uncle with colon ca.       Pt is employed in a warehouse doing highly physical job description.            HPI - Oncology Interim      64-year-old white male who returns today for ongoing adjuvant therapy for his     rectal cancer.  He completed chemo RT followed by surgical resection.  He is now    on adjuvant Xeloda.  He is completed 1 cycle in the adjuvant setting.  He states    he tolerated well.  He does have some increased fatigue particularly late in the    afternoon but is still able to perform all of his ADLs.  He notes that he has     had a little bit of dry skin and peeling on his feet but they are not painful or    red, no blisters.  He is eating drinking well, his weight is maintained.  His     colostomy is functioning normally.  He denies nausea or vomiting.            Cancer Details            High rectal ijvo-jdmumwcyyg-mldvbthmjmfffb adenocarcinoma associated with      tubular adenoma            Clinical Staging      Stage IIa ([id number])            Treatments      Chemotherapy      neoadjuvant Xeloda      Radiation Therapy      7/8/19 thru 8/14/19 completed neoadjuvant 5040 cGy=28 fxs rectum            Clinical Trial Participant      No            ECOG Performance Status      0            Most Recent Lab Findings      Laboratory Tests      11/15/19 09:02            PAST, FAMILY   Past Medical History      Past Medical History:  Hepatitis, High Cholesterol, Hypertension, Thyroid     Disease      Hematology/Oncology (M):  Colorectal, GI Cancer            Past Surgical History      Biopsy (COLONOSCOPY)            HERNIA REPAIR, REMOVED CANCER FROM COLON.            Family History      Family History:  Colorectal Cancer (UNCLE MAT), Prostate Cancer (FATHER)            Social History      Marital Status:        Lives independently:  Yes      Number of Children:  2      Occupation:  [employer]            Tobacco Use       Tobacco status:  Never smoker            Alcohol Use      Alcohol intake:  0-2 drinks per day            Substance Use      Substance use:  Denies use            REVIEW OF SYSTEMS      General:  Admits: Fatigue      Eye:  Denies Blurred Vision      ENT:  Admits Headache      Cardiovascular:  Denies Chest Pain      Respiratory:  Denies: Coughing Blood      Gastrointestinal:  Denies Bloody Stools      Genitourinary:  Denies Blood in Urine      Musculoskeletal:  Denies Back Pain      Integumentary:  Denies Itching      Neurologic:  Denies Dizziness      Psychiatric:  Denies Anxiety      Endocrine:  Denies Cold Intolerance            VITAL SIGNS AND SCORES      Vitals      Height 6 ft 2.02 in / 188 cm      Weight 258 lbs 9.593 oz / 117.3 kg      BSA 2.42 m2      BMI 33.2 kg/m2      Temperature 98.3 F / 36.83 C - Temporal      Pulse 92      Blood Pressure 136/79 Sitting, Left Arm      Pulse Oximetry 97%, ROOM AIR            Pain Score      Experiencing any pain?:  No      Pain Scale Used:  Numerical      Pain Intensity:  0            Fatigue Score      Experiencing any fatigue?:  Yes      Fatigue (0-10 scale):  5            EXAM      General Appearance:  Positive for: Alert, Oriented x3, Cooperative;          Negative for: Acute Distress      Eye:  Positive for: Anicteric Sclerae, Moist Conjunctiva      Neck:  Positive for: Supple;          Negative for: JVD, Masses      Respiratory:  Positive for: CTAB, Normal Respiratory Effort      Abdomen/Gastro:  Positive for: Normal Active Bowel Sounds, Soft;          Negative for: Distention, Hepatosplenomegaly, Tenderness      Other      Well-healed surgical incision in the midline.  Right lower quadrant ostomy.      Cardiovascular:  Positive for: RRR;          Negative for: Gallop, Murmur, Peripheral Edema, Rub      Psychiatric:  Positive for: Appropriate Affect, Intact Judgement      Lymphatic:  Negative for: Cervical, Infraclavicular, Supraclavicular            PREVENTION       Hx Influenza Vaccination:  Yes      Date Influenza Vaccine Given:  Oct 1, 2019      Influenza Vaccine Declined:  No      2 or More Falls Past Year?:  No      Fall Past Year with Injury?:  No      Hx Pneumococcal Vaccination:  No      Encouraged to follow-up with:  PCP regarding preventative exams.      Chart initiated by      YONIS ALEGRE [facility]            ALLERGY/MEDS      Allergies      Coded Allergies:             NO KNOWN ALLERGIES (Unverified , 11/18/19)            Medications      Last Reconciled on 11/18/19 13:41 by DENISE ESPINOSA      Capecitabine (Xeloda) 500 Mg Tab      2500 MG PO BID for 14 Days, #140 TAB 5 Refills         Prov: DENISE ESPINOSA         10/24/19       Ondansetron Hcl (ONDANSETRON HCL) 8 Mg Tablet      8 MG PO Q8H PRN for NAUSEA for 14 Days, #42 TAB 3 Refills         Prov: DENISE ESPINOSA         10/24/19       Doxazosin Mesylate (Doxazosin Mesylate) 4 Mg Tablet      4 MG PO QPM, TAB         Reported         9/30/19       Ibuprofen (Ibuprofen) 400 Mg Tablet      400 MG PO Q4H PRN for PAIN, #100 TAB 0 Refills         Reported         9/26/19       Psyllium Seed (With Sugar) (Metamucil) 575 Gm Powder      1 TBSP PO QDAY, #1 BOTTLE         Reported         9/26/19       (Vitamin E)   No Conflict Check      1 TAB PO QDAY         Reported         9/26/19       Ascorbic Acid (Vitamin C*) 500 Mg Tablet      500 MG PO QDAY, #60 TAB 0 Refills         Reported         9/26/19       Folic Acid/Vit Bcomp,C (Super B-Complex Folic-Vit C Tb) 400 Mcg Tablet      1 TAB PO QDAY, TAB         Reported         9/26/19       Loratadine (Loratadine) 10 Mg Tablet      10 MG PO QDAY, #30 TAB 0 Refills         Reported         6/27/19       Levothyroxine (Levothyroxine) 0.025 Mg Tablet      0.025 MG PO QDAY@07, #30 TAB 0 Refills         Reported         6/27/19       Atorvastatin (Atorvastatin) 40 Mg Tablet      40 MG PO HS, #30 TAB 0 Refills         Reported         6/27/19       Diltiazem CD (Cardizem CD) 120 Mg  Capcr      120 MG PO QDAY, #30 CAP.ER 3 Refills         Prov: Gabriel Leong         9/6/17       Metoprolol Succinate (Metoprolol Succinate) 50 Mg Tab.er.24h      50 MG PO QDAY, #30 TAB.ER 3 Refills         Prov: Gabriel Leong         9/6/17       Multivitamins (Multi-Vitamin) 1 Tab Tablet      1 TAB PO QDAY         Reported         11/3/11      Medications Reviewed:  No Changes made to meds            IMPRESSION/PLAN      Diagnosis      Rectal cancer - C20      Patient is status post neoadjuvant chemotherapy followed by resection.  He is     now on adjuvant Xeloda.  He is due for cycle 2.  Tolerated his initial cycle     well.  Lab work is adequate for treatment.  Proceed with cycle 2 as planned.  We    discussed lotion to the hands and feet to help mitigate the mild hand-foot     syndrome.  RTC 3 weeks for OV, cycle 3 of adjuvant Xeloda with labs prior            Anemia         Anemia, unspecified type         Anemia type: unspecified type      Secondary to chemotherapy.  Hemoglobin is mildly decreased but reasonable.  No     need for transfusion or dose adjustment.  Recheck next visit            Notes      New Diagnostics      * CBC With Auto Diff, Month         Dx: Rectal cancer - C20      * CMP Comp Metabolic Panel, Month         Dx: Rectal cancer - C20            Patient Education            Aerobic Exercise      Patient Education Provided:  Yes            Electronically signed by DENISE ESPINOSA  11/18/2019 13:42       Disclaimer: Converted document may not contain table formatting or lab diagrams. Please see [employer] for the authenticated document.

## 2021-05-28 NOTE — PROGRESS NOTES
Patient: CYDNEY KENNEDY     Acct: [id number]     Report: #[id number]  UNIT #: [id number]     : 1955    Encounter Date:2019  PRIMARY CARE: AINSLEY CALABRESE  ***Signed***  --------------------------------------------------------------------------------------------------------------------  NURSE INTAKE      Visit Type      Established Patient Visit            Chief Complaint      RECTAL CA      Intent of Therapy:  Curative            Referring Provider/Copies To      Referring Provider:  Aroldo Garay      PCP Not Found in Lookup:  [facility] PRIMARY CARE DR HERNANDEZ            History and Present Illness      Past Oncology Illness History      Mr. Kennedy is a 65yo WM who was recently dx with rectal ca. He reports that he     noticed blood with defecation; however, not consistently.  This has been ongoing    for at least 6 months.  He has had a colonoscopy; however, >10yrs ago.      Colonoscopy performed on 5/3/19 per Dr. aGray found a fungating bleeding     mass 15cm from the anus.  Pathology showed moderately differentiated     adenocarcinoma associated with tubular adenoma.  19 CT scans were completed    and showed mild degree of coronary artery calcification.  Evidence of prior     granulomatous disease.  No evidence of metastatic disease to the chest.  The     reported rectal mass is not demonstrated on CT there is a redundant sigmoid c    olon with scant sigmoid diverticulosis.  No evidence of metastatic disease in     the abdomen pelvis or visualized bony structures.  19 pelvic MRI showed     high rectal mass, difficult to fully characterize as described above.  The mass     is suspected to be circumferential, and there is suspicion for involvement or     extension beyond the serosal layer.  No evidence for involvement of adjacent     structures or definitive evidence for metastatic disease elsewhere in the     pelvis.  5 mm nonspecific lymph node in the right mesial rectal fat below  the     level of the tumor.      PMH: htn, hyperlipidemia--no smoking; however, reports drinking 6pack at least     2-3xs wk.  Family hx: father with prostate ca, maternal uncle with colon ca.       Pt is employed in a warehouse doing highly physical job description.            HPI - Oncology Interim      F/u rectal ca--reports tolerating PO Xeloda with concurrent rectal XRT.  He does    report fatigue; however, he is on short-term disability so tries to rest often.     Dr. Garay is his surgeon.  Reports minimal n/v-uses prescribed med.  Wt is     stable.  Denies rectal bleeding or pain with defecating.  States skin feels like    he is sunburned all over-encouraged use of good lotion with several applications    per day.            Cancer Details            High rectal [id number] adenocarcinoma associated with      tubular adenoma            Clinical Staging      Stage IIa (T3N0M0)            Treatments      Chemotherapy      neoadjuvant Xeloda with concurrent XRT      Radiation Therapy      neoadjuvant concurrent XRT            Clinical Trial Participant      No            ECOG Performance Status      0            PAST, FAMILY   Past Medical History      Past Medical History:  Hepatitis, High Cholesterol, Hypertension, Thyroid     Disease      Hematology/Oncology (M):  GI Cancer            Past Surgical History      Biopsy (COLONOSCOPY)            HERNIA REPAIR            Family History      Family History:  Colorectal Cancer (UNCLE MAT), Prostate Cancer (FATHER)            Social History      Marital Status:        Lives independently:  Yes      Number of Children:  2      Occupation:  [employer]            Tobacco Use      Tobacco status:  Never smoker            Alcohol Use      Alcohol intake:  0-2 drinks per day            Substance Use      Substance use:  Denies use            REVIEW OF SYSTEMS      General:  Admits: Fatigue;          Denies: Night Sweats      Eye:  Denies: Vision  Loss      Respiratory:  Denies: Wheezing      Gastrointestinal:  Admits: Constipation, Nausea      Genitourinary (male):  Denies: Incontinence      Musculoskeletal:  Denies: Swollen Joints      Integumentary:  Denies: Jaundice      Neurologic:  Denies: Paralysis      Hematologic/Lymphatic:  Denies: Transfusions            VITAL SIGNS AND SCORES      Vitals      Height 6 ft 2.02 in / 188 cm      Weight 260 lbs 12.867 oz / 118.3 kg      BSA 2.43 m2      BMI 33.5 kg/m2      Temperature 98.3 F / 36.83 C - Temporal      Pulse 73      Respirations 16      Blood Pressure 122/75 Sitting, Left Arm      Pulse Oximetry 97%, RM AIR            Pain Score      Pain Scale Used:  Numerical      Pain Intensity:  0            Fatigue Score      Fatigue (0-10 scale):  7            EXAM      General Appearance:  Positive for: Alert, Oriented x3, Cooperative;          Negative for: Acute Distress      Eye:  Positive for: Anicteric Sclerae, Moist Conjunctiva      HEENT:  Positive for: Oropharynx clear      Neck:  Positive for: Supple;          Negative for: JVD, Masses      Respiratory:  Positive for: CTAB, Normal Respiratory Effort      Abdomen/Gastro:  Positive for: Normal Active Bowel Sounds, Soft;          Negative for: Distention, Hepatosplenomegaly, Tenderness      Cardiovascular:  Positive for: RRR;          Negative for: Gallop, Murmur, Peripheral Edema, Rub      Psychiatric:  Positive for: Appropriate Affect, Intact Judgement      Lymphatic:  Negative for: Cervical, Infraclavicular, Supraclavicular            PREVENTION      Hx Influenza Vaccination:  Yes      Date Influenza Vaccine Given:  Nov 1, 2018      2 or More Falls Past Year?:  No      Fall Past Year with Injury?:  No      Hx Pneumococcal Vaccination:  No      Encouraged to follow-up with:  PCP regarding preventative exams.      Chart initiated by      DAVI TATE MA            ALLERGY/MEDS      Allergies      Coded Allergies:             *No Known Allergies (Verified   Allergy, Unknown, 8/5/19)            Medications      Last Reconciled on 8/5/19 16:52 by REGINALDO BRANNON      Ondansetron Hcl (ONDANSETRON HCL) 8 Mg Tablet      8 MG PO Q8H PRN for NAUSEA, #60 TAB 3 Refills         Prov: DEVANG MILLS         8/5/19       Loratadine (Loratadine) 10 Mg Tablet      10 MG PO QDAY, #30 TAB 0 Refills         Reported         6/27/19       Levothyroxine (Levothyroxine) 0.025 Mg Tablet      0.025 MG PO QDAY@07, #30 TAB 0 Refills         Reported         6/27/19       Capecitabine (Xeloda) 500 Mg Tab      2000 MG PO BID, TAB         Reported         6/27/19       Atorvastatin (Atorvastatin) 40 Mg Tablet      40 MG PO HS, #30 TAB 0 Refills         Reported         6/27/19       Prochlorperazine Maleate (Prochlorperazine Maleate) 10 Mg Tab      10 MG PO Q6H PRN for NAUSEA AND/OR VOMITING, #60 TAB 3 Refills         Prov: DEVANG MILLS         6/17/19       Doxazosin Mesylate (Cardura) 4 Mg Tablet      4 MG PO QDAY, #60 TAB         Reported         6/17/19       Diltiazem CD (Cardizem CD) 120 Mg Capcr      120 MG PO QDAY, #30 CAP.ER 3 Refills         Prov: Gabriel Leong         9/6/17       Metoprolol Succinate (Metoprolol Succinate) 50 Mg Tab.er.24h      50 MG PO QDAY, #30 TAB.ER 3 Refills         Prov: Gabriel Leong         9/6/17       Multivitamins (Multi-Vitamin) 1 Tab Tablet      1 TAB PO QDAY         Reported         11/3/11      Medications Reviewed:  No Changes made to meds            IMPRESSION/PLAN      Diagnosis      Rectal cancer - C20      Patient is on neoadjuvant concurrent chemo RT with radiation and oral Xeloda.      Tolerating well.  He has mild nausea but easily controlled with Zofran which     will be refilled.  Check lab work today.  He will finish his concurrent therapy     next week.  He will need restaging scans and labs with consideration of surgery     based on results.  We discussed adjuvant chemotherapy as a single modality after    completion of resection.       New Diagnostics      * CMP Comp Metabolic Panel, Routine      * Cea/Carcinoembryonic, Routine      * CBC With Auto Diff, Routine      * CT Abd/Pelvis/Chest W/Contrast, 6 WEEKS      * CMP Comp Metabolic Panel, 6 WEEKS      * CBC With Auto Diff, 6 WEEKS            Notes      Renewed Medications      * ONDANSETRON HCL 8 MG TABLET:         From: 8 MG PO Q8H PRN NAUSEA #30         To: 8 MG PO Q8H PRN NAUSEA #60            Patient Education      Patient Education Provided:  Yes            Topics Patient Counseled on      Adjuvant chemotherapy                 Disclaimer: Converted document may not contain table formatting or lab diagrams. Please see [employer] for the authenticated document.

## 2021-05-28 NOTE — PROGRESS NOTES
Patient: CYDNEY KENNEDY     Acct: [id number]     Report: #[id number]  UNIT #: [id number]     : 1955    Encounter Date:2020  PRIMARY CARE: AINSLEY CALABRESE  ***Signed***  --------------------------------------------------------------------------------------------------------------------  NURSE INTAKE      Visit Type      Established Patient Visit            Chief Complaint      RECTAL CA/CT RESULTS            Referring Provider/Copies To      Referring Provider:  Aroldo Garay      Primary Care Provider:  AINSLEY CALABRESE      Copies To:   AINSLEY CALABRESE            History and Present Illness      Past Oncology Illness History      Mr. Kennedy is a 65yo WM who was recently dx with rectal ca. He reports that he     noticed blood with defecation; however, not consistently.  This has been ongoing    for at least 6 months.  He has had a colonoscopy; however, >10yrs ago.      Colonoscopy performed on 5/3/19 per Dr. Garay found a fungating bleeding     mass 15cm from the anus.  Pathology showed moderately differentiated     adenocarcinoma associated with tubular adenoma.  19 CT scans were completed    and showed mild degree of coronary artery calcification.  Evidence of prior     granulomatous disease.  No evidence of metastatic disease to the chest.  The     reported rectal mass is not demonstrated on CT there is a redundant sigmoid     colon with scant sigmoid diverticulosis.  No evidence of metastatic disease in     the abdomen pelvis or visualized bony structures.  19 pelvic MRI showed hi    gh rectal mass, difficult to fully characterize as described above.  The mass is    suspected to be circumferential, and there is suspicion for involvement or     extension beyond the serosal layer.  No evidence for involvement of adjacent     structures or definitive evidence for metastatic disease elsewhere in the     pelvis.  5 mm nonspecific lymph node in the right mesial rectal fat below the      level of the tumor.      PMH: htn, hyperlipidemia--no smoking; however, reports drinking 6pack at least     2-3xs wk.  Family hx: father with prostate ca, maternal uncle with colon ca.       Pt is employed in a warehouse doing highly physical job description.            [facility] - Oncology Interim      Patient returns for follow-up of his rectal cancer.  Since his last visit, he     was diagnosed with a DVT and was hospitalized earlier this month for     anticoagulation.  He is now on Eliquis.  He is compliant with his anticoagula    tion regimen.  He denies excessive bruising or bleeding.  He notes that the     right leg is still slightly swollen but no longer tender.  He denies chest pain     or shortness of breath.  He is eating drinking well, his weight is maintained.      He notes good energy level.  His colostomy is functioning normally and he is     anxious to proceed with colostomy reversal.            Cancer Details            High rectal hvrk-ckcheuibls-ogqcwlslpuzvpa adenocarcinoma associated with      tubular adenoma            Clinical Staging      Stage IIa ([id number])            Treatments      Chemotherapy      neoadjuvant Xeloda with radiation. adjuvant xeloda      Radiation Therapy      19 thru 19 completed neoadjuvant 5040 cGy=28 fxs rectum            Clinical Trial Participant      No            ECOG Performance Status      0            Most Recent Lab Findings      Laboratory Tests      3/19/20 12:40            Most Recent Imaging Findings      Patient: CYDNEY LEONARD   Acct: #[id number]   Report: #[id number]            UNIT #: [id number]    DOS: 20 1129      INSURANCE:[employer]   ORDER #:[id number]      LOCATION:CT     : 1955            PROVIDERS      ADMITTING:     ATTENDING: DENISE ESPINOSA      FAMILY:  AINSLEY CALABRESE   ORDERING:  DENISE ESPINOSA         OTHER:    DICTATING:  Lino Canela MD            REQ #:[id number]   EXAM:CTACPWC - CT ABD  CHEST PEL w CONTR      REASON FOR EXAM:  RECTUM CA      REASON FOR VISIT:  RECTUM CA            *******Signed with Addenda******                  ADDENDUM            This report includes an Addendum and supersedes previous reports for this exam.             PROCEDURE:   CT ABDOMEN; CT CHEST; CT PELVIS WITH CONTRAST             COMPARISON:   [facility], CT, ABDOMEN/PELVIS WO/W, 10/27/2011,     15:43.  [facility], CT, CT CHEST ABD PEL W CONTRAST, 9/16/2019, 9:29[facility], CT,       ABDOMEN/PELVIS WITH CONTRAST, 10/04/2019, 19:11.             INDICATIONS:   RECTUM CA             TECHNIQUE:   After obtaining the patient's consent, CT images were obtained with    intravenous contrast       material.      PROTOCOL:     Standard imaging protocol performed                RADIATION:     DLP: 2759mGy*cm          Automated exposure control was utilized to minimize radiation dose.       CONTRAST:   100cc Isovue 370 I.V.      LABS:     eGFR: >60ml/min/1.73m2             FINDINGS:         There may be a small hiatal hernia.  There is no mediastinal adenopathy.  A few     small lymph nodes       appear stable.  No hilar adenopathy.  Coronary calcifications are noted.  No     pericardial or pleural       effusion.  Elevation of left hemidiaphragm is stable.  Mild atherosclerosis in     the thoracic aorta.        Pulmonary artery is normal in size.      There appears to be a filling defect in the in the right lower lobe pulmonary     artery near the       bifurcation into segmental branches.  The timing was not performed to evaluate     for pulmonary       embolism.             There is some dependent likely atelectasis in the left lower lobe.  Small     subpleural nodule in the       right upper lobe measures about 5 millimeter stable compared to 9/16/2019.  On     image 56 is a stable       tiny 2 millimeter right middle lobe lung nodule.             Degenerative changes  seen in the shoulders.  No acute lesion is evident.             There are postoperative changes seen in anterior abdominal wall.  There is     diastasis of the rectus       muscles with extension of the abdominal contents anterior and possible mass     along anterior central       abdominal wall.  There is a right lower quadrant ileostomy.  There is some     herniation of bowel and       fat into the peristomal defect.  There is differing perfusion in the liver with     hypodense left lobe       and much of the right lobe.  There is normal density seen to segment 5.  This     could reflect fatty       infiltration or differing perfusion.  Gallbladder is normal.  No pancreatic     lesion.  There is fatty       infiltration of the pancreas.  No adrenal lesion is evident.  There is no     obstructive uropathy.        The colon is narrowed.  There is some oral contrast in the colon.  The small     bowel is not abnormal.        No adenopathy.  Moderate atherosclerosis.  There is a lesion in the left iliac     bone measuring 1       centimeter similar to prior study.  This is also similar to prior study from     2011 could reflect       degenerative changes at the SI joint.  Moderate degenerative changes lower     lumbar spine greatest at       L4-5.             CONCLUSION:         1. There appears to be a filling defect proximal right lower lobe pulmonary     artery near the       bifurcation compatible with a pulmonary embolus.  More distal branches are not     well visualized.      2. Several small pulmonary nodules appear stable compared to 9/16/2019.  No     clearly suspicious       nodules.      3. Postoperative changes anterior abdominal wall.  Diastasis of the rectus     muscles with extension       of bowel anteriorly.  There is a right lower quadrant ileostomy.  There is     increased fat and bowel       extending into the a peristomal hernia compared with 10/4/2019.      4. Colorectal anastomosis.       5.  There is oral contrast in the colon.  Given the right lower quadrant     ileostomy, it is uncertain       the origin of this oral contrast.  The colon is relatively narrow.  Correlate     with symptoms.             6. There is differing density in the liver with hypodensity involving most of     the liver and segment       5 is more hyperdense.  This could reflect geographic fatty infiltration or     differing perfusion.      7. There appears to be a thickened inflamed bladder ; correlate with urinalysis     to exclude       cystitis.              NATHANIEL CANELA MD             Electronically Signed and Approved By: NATHANIEL CANELA MD on 3/19/2020 at     13:27                ADDENDUM:              There also may be a pulmonary embolus in the left lower lobe pulmonary artery     near the bifurcation       compatible with bilateral pulmonary emboli.  Discussed with referring provider.                   NATHANIEL CANELA MD             Electronically Signed and Approved By: NATHANIEL CANELA MD on 3/19/2020 at     13:41                              03/19/20 1344            Nathaniel Canela MD            SCHJO:      D:03/19/20 1341      T:              PROCEDURE:   CT ABDOMEN; CT CHEST; CT PELVIS WITH CONTRAST             COMPARISON:   [facility], CT, ABDOMEN/PELVIS WO/W, 10/27/2011,     15:43.  [facility], CT, CT CHEST ABD PEL W CONTRAST, 9/16/2019, 9:29[facility], CT,       ABDOMEN/PELVIS WITH CONTRAST, 10/04/2019, 19:11.             INDICATIONS:   RECTUM CA             TECHNIQUE:   After obtaining the patient's consent, CT images were obtained with    intravenous contrast       material.      PROTOCOL:     Standard imaging protocol performed                RADIATION:     DLP: 2759mGy*cm          Automated exposure control was utilized to minimize radiation dose.       CONTRAST:   100cc Isovue 370 I.V.      LABS:     eGFR: >60ml/min/1.73m2              FINDINGS:         There may be a small hiatal hernia.  There is no mediastinal adenopathy.  A few     small lymph nodes       appear stable.  No hilar adenopathy.  Coronary calcifications are noted.  No     pericardial or pleural       effusion.  Elevation of left hemidiaphragm is stable.  Mild atherosclerosis in     the thoracic aorta.        Pulmonary artery is normal in size.      There appears to be a filling defect in the in the right lower lobe pulmonary     artery near the       bifurcation into segmental branches.  The timing was not performed to evaluate     for pulmonary       embolism.             There is some dependent likely atelectasis in the left lower lobe.  Small     subpleural nodule in the       right upper lobe measures about 5 millimeter stable compared to 9/16/2019.  On     image 56 is a stable       tiny 2 millimeter right middle lobe lung nodule.             Degenerative changes seen in the shoulders.  No acute lesion is evident.             There are postoperative changes seen in anterior abdominal wall.  There is rowell    tasis of the rectus       muscles with extension of the abdominal contents anterior and possible mass michael    ng anterior central       abdominal wall.  There is a right lower quadrant ileostomy.  There is some mckayla    iation of bowel and       fat into the peristomal defect.  There is differing perfusion in the liver with     hypodense left lobe       and much of the right lobe.  There is normal density seen to segment 5.  This     could reflect fatty       infiltration or differing perfusion.  Gallbladder is normal.  No pancreatic les    ion.  There is fatty       infiltration of the pancreas.  No adrenal lesion is evident.  There is no obst    ructive uropathy.        The colon is narrowed.  There is some oral contrast in the colon.  The small bow    el is not abnormal.        No adenopathy.  Moderate atherosclerosis.  There is a lesion in the left iliac     bone  measuring 1       centimeter similar to prior study.  This is also similar to prior study from     2011 could reflect       degenerative changes at the SI joint.  Moderate degenerative changes lower     lumbar spine greatest at       L4-5.             CONCLUSION:         1. There appears to be a filling defect proximal right lower lobe pulmonary     artery near the       bifurcation compatible with a pulmonary embolus.  More distal branches are not     well visualized.      2. Several small pulmonary nodules appear stable compared to 9/16/2019.  No     clearly suspicious       nodules.      3. Postoperative changes anterior abdominal wall.  Diastasis of the rectus     muscles with extension       of bowel anteriorly.  There is a right lower quadrant ileostomy.  There is     increased fat and bowel       extending into the a peristomal hernia compared with 10/4/2019.      4. Colorectal anastomosis.       5. There is oral contrast in the colon.  Given the right lower quadrant ileos    garfield, it is uncertain       the origin of this oral contrast.  The colon is relatively narrow.  Correlate     with symptoms.             6. There is differing density in the liver with hypodensity involving most of     the liver and segment       5 is more hyperdense.  This could reflect geographic fatty infiltration or     differing perfusion.      7. There appears to be a thickened inflamed bladder ; correlate with urinalysis     to exclude       cystitis.              NATHANIEL MCCAULEY MD             Electronically Signed and Approved By: NATHANIEL MCCAULEY MD on 3/19/2020 at     13:27                        Until signed, this is an unconfirmed preliminary report that may contain      errors and is subject to change.                                              NARINDER:      D:03/19/20 1303            PAST, FAMILY   Past Medical History      Past Medical History:  Hepatitis, High Cholesterol, Hypertension, Thyroid     Disease       Hematology/Oncology (M):  Colorectal, GI Cancer            Past Surgical History      Biopsy (COLONOSCOPY)            HERNIA REPAIR, REMOVED CANCER FROM COLON.            Family History      Family History:  Colorectal Cancer (UNCLE MAT), Prostate Cancer (FATHER)            Social History      Marital Status:        Lives independently:  Yes      Number of Children:  2      Occupation:  [employer]            Tobacco Use      Tobacco status:  Never smoker            Alcohol Use      Alcohol intake:  0-2 drinks per day            Substance Use      Substance use:  Denies use            REVIEW OF SYSTEMS      General:  Admits: Fatigue      Eye:  Denies Blurred Vision      ENT:  Denies Headache      Cardiovascular:  Denies Chest Pain      Respiratory:  Denies: Shortness of Air      Gastrointestinal:  Denies Constipation, Denies Diarrhea      Musculoskeletal:  Denies Back Pain, Denies Muscle Pain      Neurologic:  Denies Numbness\Tingling            VITAL SIGNS AND SCORES      Vitals      Weight 268 lbs 15.379 oz / 122 kg      Temperature 98 F / 36.67 C - Temporal      Pulse 86      Respirations 16      Blood Pressure 145/87 Sitting, Left Arm      Pulse Oximetry 99%, RM AIR            Pain Score      Pain Scale Used:  Numerical      Pain Intensity:  0            Fatigue Score      Fatigue (0-10 scale):  3            EXAM      General Appearance:  Positive for: Alert, Cooperative;          Negative for: Acute Distress      Neck:  Positive for: Supple;          Negative for: JVD, Masses      Respiratory:  Positive for: CTAB, Normal Respiratory Effort      Abdomen/Gastro:  Positive for: Normal Active Bowel Sounds, Soft;          Negative for: Distention, Hepatosplenomegaly, Tenderness      Other      Well-healed surgical incision.  Colostomy intact.      Cardiovascular:  Positive for: Peripheral Edema (2+ pitting edema right lower     extremity, no edema left lower extremity), RRR;          Negative for: Gallop,  Murmur, Rub      Psychiatric:  Positive for: Appropriate Affect, Intact Judgement      Lymphatic:  Negative for: Cervical, Infraclavicular, Supraclavicular            PREVENTION      Hx Influenza Vaccination:  Yes      Date Influenza Vaccine Given:  Oct 1, 2019      Influenza Vaccine Declined:  No      2 or More Falls Past Year?:  No      Fall Past Year with Injury?:  No      Hx Pneumococcal Vaccination:  No      Encouraged to follow-up with:  PCP regarding preventative exams.      Chart initiated by      DAVI TATE MA            ALLERGY/MEDS      Allergies      Coded Allergies:             NO KNOWN ALLERGIES (Unverified , 3/23/20)            Medications      Last Reconciled on 3/23/20 15:44 by DENISE ESPINOSA      Loratadine (Claritin) 5 Mg/5 Ml Solution      10 MG PO QDAY, ML         Reported         3/23/20       Apixaban (Eliquis) 5 Mg Tablet      5 MG PO BID for 90 Days, #180 TAB         Prov: Zoya Dave         3/7/20       Doxazosin Mesylate (Doxazosin Mesylate) 4 Mg Tablet      4 MG PO QPM, TAB         Reported         9/30/19       Ibuprofen (Ibuprofen) 400 Mg Tablet      400 MG PO Q4H PRN for PAIN, #100 TAB 0 Refills         Reported         9/26/19       Psyllium Seed (With Sugar) (Metamucil) 575 Gm Powder      1 TBSP PO QDAY, #1 BOTTLE         Reported         9/26/19       (Vitamin E)   No Conflict Check      1 TAB PO QDAY         Reported         9/26/19       Ascorbic Acid (Vitamin C*) 500 Mg Tablet      500 MG PO QDAY, #60 TAB 0 Refills         Reported         9/26/19       Folic Acid/Vit Bcomp,C (Super B-Complex Folic-Vit C Tb) 400 Mcg Tablet      1 TAB PO QDAY, TAB         Reported         9/26/19       Loratadine (Loratadine) 10 Mg Tablet      10 MG PO QDAY, #30 TAB 0 Refills         Reported         6/27/19       Levothyroxine (Levothyroxine) 0.025 Mg Tablet      0.025 MG PO QDAY@07, #30 TAB 0 Refills         Reported         6/27/19       Atorvastatin (Atorvastatin) 40 Mg Tablet      40  MG PO HS, #30 TAB 0 Refills         Reported         6/27/19       dilTIAZem CD (Cardizem CD) 120 Mg Capcr      120 MG PO QDAY, #30 CAP.ER 3 Refills         Prov: Gabriel Leong         9/6/17       Metoprolol Succinate (Metoprolol Succinate) 50 Mg Tab.er.24h      50 MG PO QDAY, #30 TAB.ER 3 Refills         Prov: Gabriel Leong         9/6/17       Multivitamins (Multi-Vitamin) 1 Tab Tablet      1 TAB PO QDAY         Reported         11/3/11      Medications Reviewed:  Changes made to meds            IMPRESSION/PLAN      Diagnosis      Rectal cancer - C20      Status post treatment as outlined.  I see no evidence of disease recurrence by     his history, physical examination, lab work or recent CT scans.  He will     coordinate with Dr. Garay, surgery regarding colostomy reversal in the     setting of anticoagulation.  I will plan to see him back in 3 months time for     ongoing surveillance with labs and scans prior.            Acute deep vein thrombosis (DVT) of right lower extremity         Acute deep vein thrombosis (DVT) of right lower extremity, unspecified vein         Affected thrombotic vein of extremity: unspecified vein of extremity      Diagnosed earlier this month.  Recent CT of the chest also demonstrates     pulmonary embolism which is new.  This is likely all the same clotting event.      He is now on Eliquis which is appropriate.  I would recommend 6 months of a    nticoagulation given his recent surgery and cancer diagnosis.  He is tolerating     the Eliquis without problem.            Notes      New Medications      * LORATADINE (Claritin) 5 MG/5 ML SOLUTION: 10 MG PO QDAY      New Diagnostics      * CT Abd/Pelvis/Chest W/Contrast, 3 Months         Dx: Rectal cancer - C20      * CBC With Auto Diff, 3 Months         Dx: Rectal cancer - C20      * CMP Comp Metabolic Panel, 3 Months         Dx: Rectal cancer - C20      * Cea/Carcinoembryonic, 3 Months         Dx: Rectal cancer - C20            Patient  Education            DI for Deep Vein Thrombosis      Patient Education Provided:  Yes            Electronically signed by DENISE ESPINOSA  03/23/2020 15:45       Disclaimer: Converted document may not contain table formatting or lab diagrams. Please see [employer] for the authenticated document.

## 2021-05-28 NOTE — PROGRESS NOTES
Patient: CYDNEY KENNEDY     Acct: [id number]     Report: #[id number]  UNIT #: [id number]     : 1955    Encounter Date:10/01/2020  PRIMARY CARE: AINSLEY CALABRESE  ***Signed***  --------------------------------------------------------------------------------------------------------------------  NURSE INTAKE      Visit Type      Established Patient Visit            Chief Complaint      RECTAL CA F/U      Intent of Therapy:  Curative            History and Present Illness      Past Oncology Illness History      Mr. Kennedy is a 66yo WM who was recently dx with rectal ca. He reports that he     noticed blood with defecation; however, not consistently.  This has been ongoing    for at least 6 months.  He has had a colonoscopy; however, >10yrs ago.      Colonoscopy performed on 5/3/19 per Dr. Garay found a fungating bleeding     mass 15cm from the anus.  Pathology showed moderately differentiated     adenocarcinoma associated with tubular adenoma.  19 CT scans were completed    and showed mild degree of coronary artery calcification.  Evidence of prior     granulomatous disease.  No evidence of metastatic disease to the chest.  The     reported rectal mass is not demonstrated on CT there is a redundant sigmoid     colon with scant sigmoid diverticulosis.  No evidence of metastatic disease in     the abdomen pelvis or visualized bony structures.  19 pelvic MRI showed     high rectal mass, difficult to fully characterize as described above.  The mass     is suspected to be circumferential, and there is suspicion for involvement or     extension beyond the serosal layer.  No evidence for involvement of adjacent     structures or definitive evidence for metastatic disease elsewhere in the     pelvis.  5 mm nonspecific lymph node in the right mesial rectal fat below the     level of the tumor.            HPI - Oncology Interim      Patient returns for follow-up of rectal cancer.  He status post treatments  as     outlined.  He has had colostomy reversal.  He notes that the bowels are working     well.  He denies blood, melena or pain with bowel movement.  He denies masses     lymphadenopathy.  He is recently returned to work.  He has good energy level.      He is on Eliquis for prior thromboembolic event.  He is now completed 6 months     of therapy.            Cancer Details            High rectal wlru-pkynjomhix-tkcndcotdwymcj adenocarcinoma associated with      tubular adenoma            Clinical Staging      Stage IIa ([id number])            Treatments      Chemotherapy      neoadjuvant Xeloda with radiation. adjuvant xeloda      Radiation Therapy      7/8/19 thru 8/14/19 completed neoadjuvant 5040 cGy=28 fxs rectum            Clinical Trial Participant      No            ECOG Performance Status      0            Most Recent Lab Findings      Laboratory Tests      9/30/20 10:15            9/30/20 10:28            PAST, FAMILY   Past Medical History      Past Medical History:  Hepatitis, High Cholesterol, Hypertension, Thyroid     Disease      Hematology/Oncology (M):  Colorectal, GI Cancer            Past Surgical History      Biopsy            HERNIA REPAIR, REMOVED CANCER FROM COLON.            Family History      Family History:  Colorectal Cancer, Prostate Cancer            Social History      Lives independently:  Yes      Number of Children:  2      Occupation:  [employer]            Tobacco Use      Tobacco status:  Never smoker            Substance Use      Substance use:  Denies use            REVIEW OF SYSTEMS      General:  Denies: Fatigue, Weight Gain      Eye:  Denies Corrective Lenses, Denies Vision Changes      ENT:  Denies Headache, Denies Sore Throat      Cardiovascular:  Denies Chest Pain      Respiratory:  Denies: Wheezing      Musculoskeletal:  Denies Painful Joints      Hematologic/Lymphatic:  Denies Enlarged Lymph Nodes            VITAL SIGNS AND SCORES      Vitals      Weight 270 lbs 15.125 oz  / 122.9 kg      Temperature 97 F / 36.11 C - Temporal      Pulse 78      Respirations 16      Blood Pressure 122/82 Sitting, Left Arm      Pulse Oximetry 98%, RM AIR            Pain Score      Pain Scale Used:  Numerical      Pain Intensity:  0            Fatigue Score      Fatigue (0-10 scale):  0 (none)            EXAM      General Appearance:  Positive for: Alert, Cooperative;          Negative for: Acute Distress      Eye:  Positive for: Anicteric Sclerae, Moist Conjunctiva      Neck:  Positive for: Supple;          Negative for: JVD, Masses      Respiratory:  Positive for: CTAB, Normal Respiratory Effort      Abdomen/Gastro:  Positive for: Normal Active Bowel Sounds, Soft;          Negative for: Distention, Hepatosplenomegaly, Tenderness      Other      Well-healed surgical incisions.  Ventral hernia.  No incarceration      Cardiovascular:  Positive for: RRR;          Negative for: Gallop, Murmur, Peripheral Edema, Rub      Psychiatric:  Positive for: Appropriate Affect, Intact Judgement      Lymphatic:  Negative for: Cervical, Infraclavicular, Supraclavicular            PREVENTION      Hx Influenza Vaccination:  Yes      Date Influenza Vaccine Given:  Sep 1, 2020      Influenza Vaccine Declined:  No      2 or More Falls in Past Year?:  No      Fall Past Year with Injury?:  No      Hx Pneumococcal Vaccination:  No      Encouraged to follow-up with:  PCP regarding preventative exams.      Chart initiated by      DAVI TATE MA            ALLERGY/MEDS      Allergies      Coded Allergies:             NO KNOWN ALLERGIES (Unverified , 10/1/20)            Medications      Last Reconciled on 10/1/20 17:00 by DENISE ESPINOSA      HYDROcodone-Acetaminophen 5-325 Mg (HYDROcodone-Acetaminophen 5-325 Mg) 1 Each     Tablet      1 TAB PO Q4H PRN for PAIN, #18 TAB         Prov: ESTEPHANIE DAMIAN         5/29/20       Apixaban (Eliquis) 5 Mg Tablet      5 MG PO BID for 30 Days, #60 TAB         Reported         5/4/20        Doxazosin Mesylate (Doxazosin Mesylate) 8 Mg Tablet      4 MG PO QDAY, TAB         Reported         5/4/20       Psyllium Seed (With Sugar) (Metamucil) 575 Gm Powder      1 TBSP PO QDAY, #1 BOTTLE         Reported         9/26/19       (Vitamin E)   No Conflict Check      1 TAB PO QDAY         Reported         9/26/19       Ascorbic Acid (Vitamin C*) 500 Mg Tablet      500 MG PO QDAY, #60 TAB 0 Refills         Reported         9/26/19       Folic Acid/Vit Bcomp,C (Super B-Complex Folic-Vit C Tb) 400 Mcg Tablet      1 TAB PO QDAY, TAB         Reported         9/26/19       Loratadine (Loratadine) 10 Mg Tablet      10 MG PO QDAY, #30 TAB 0 Refills         Reported         6/27/19       Levothyroxine (Levothyroxine) 0.025 Mg Tablet      0.025 MG PO QDAY@07, #30 TAB 0 Refills         Reported         6/27/19       Atorvastatin (Atorvastatin) 40 Mg Tablet      40 MG PO HS, #30 TAB 0 Refills         Reported         6/27/19       dilTIAZem CD (Cardizem CD) 120 Mg Capcr      120 MG PO QDAY, #30 CAP.ER 3 Refills         Prov: Gabriel Leong         9/6/17       Metoprolol Succinate (Metoprolol Succinate) 50 Mg Tab.er.24h      50 MG PO QDAY, #30 TAB.ER 3 Refills         Prov: Gabriel Leong         9/6/17       Multivitamins (Multi-Vitamin) 1 Tab Tablet      1 TAB PO QDAY         Reported         11/3/11      Medications Reviewed:  No Changes made to meds            IMPRESSION/PLAN      Diagnosis      Rectal cancer - C20      Status post treatment as outlined.  I see no evidence of disease recurrence by     history, physical examination or lab work.  RTC 3 months for ongoing     surveillance with labs and CT scans prior.  He is up-to-date on colonoscopy.            Acute deep vein thrombosis (DVT) of right lower extremity         Acute deep vein thrombosis (DVT) of right lower extremity, unspecified vein         Affected thrombotic vein of extremity: unspecified vein of extremity      Patient has completed an adequate course of  anticoagulation.  He can stop     Eliquis.            Notes      New Diagnostics      * CT Abd/Pelvis/Chest W/Contrast, 3 Months         Dx: Rectal cancer - C20      * CBC With Auto Diff, 3 Months         Dx: Rectal cancer - C20      * CMP Comp Metabolic Panel, 3 Months         Dx: Rectal cancer - C20            Patient Education      Patient Education Provided:  Yes            Electronically signed by DENISE ESPINOSA  10/01/2020 17:00       Disclaimer: Converted document may not contain table formatting or lab diagrams. Please see [employer] for the authenticated document.

## 2021-05-28 NOTE — PROGRESS NOTES
Patient: CYDNEY KENNEDY     Acct: [id number]     Report: #[id number]  UNIT #: [id number]     : 1955    Encounter Date:2021  PRIMARY CARE: AINSLEY CALABRESE  ***Signed***  --------------------------------------------------------------------------------------------------------------------  NURSE INTAKE      Visit Type      Established Patient Visit            Chief Complaint      RECTAL CA F/U      Intent of Therapy:  Curative            History and Present Illness      Past Oncology Illness History      Mr. Kennedy is a 64yo WM who presents to the clinic for follow up of his rectal     ca. He reports that he noticed blood with defecation; however, not consistently.     This has been ongoing for at least 6 months.  He has had a colonoscopy; h    reta, >10yrs ago.  Colonoscopy performed on 5/3/19 per Dr. Garay found a     fungating bleeding mass 15cm from the anus.  Pathology showed moderately     differentiated adenocarcinoma associated with tubular adenoma.  19 CT scans    were completed and showed mild degree of coronary artery calcification.      Evidence of prior granulomatous disease.  No evidence of metastatic disease to     the chest.  The reported rectal mass is not demonstrated on CT there is a     redundant sigmoid colon with scant sigmoid diverticulosis.  No evidence of     metastatic disease in the abdomen pelvis or visualized bony structures.  19    pelvic MRI showed high rectal mass, difficult to fully characterize as described    above.  The mass is suspected to be circumferential, and there is suspicion for     involvement or extension beyond the serosal layer.  No evidence for involvement     of adjacent structures or definitive evidence for metastatic disease elsewhere     in the pelvis.  5 mm nonspecific lymph node in the right mesial rectal fat below    the level of the tumor.            HPI - Oncology Interim      Patient returns for ongoing follow-up of his rectal  cancer.  He status post     treatments as outlined.  He also has history of right lower extremity DVT for     which he completed 6 months of anticoagulation with Eliquis.  He states that the    right leg has been swelling off and on.  He uses a compression garment.  His     primary care provider put him back on Eliquis.  He denies excessive bruising or     bleeding.  He reports his bowels are working normally without blood per rectum,     melena or pain.  He denies any masses or lymphadenopathy.  He notes good energy     and appetite.            Cancer Details            High rectal zkaq-ejlboqpita-vrkfmjizkigbyx adenocarcinoma associated with      tubular adenoma            Clinical Staging      Stage IIa ([id number])            Treatments      Chemotherapy      neoadjuvant Xeloda with radiation. adjuvant xeloda      Radiation Therapy      19 thru 19 completed neoadjuvant 5040 cGy=28 fxs rectum            Clinical Trial Participant      No            ECOG Performance Status      0            Most Recent Imaging Findings      Patient: CYDNEY LEONARD   Acct: #[id number]   Report: #[id number]            UNIT #: [id number]    DOS: 21 1045      INSURANCE:[employer]   ORDER #:[id number]      LOCATION:[facility]     : 1955            PROVIDERS      ADMITTING:     ATTENDING: DENISE ESPINOSA      FAMILY:  NONE,MD   ORDERING:  DENISE ESPINOSA         OTHER: Aroldo Garay   DICTATING:  ADAN CARDENAS MD            REQ #:[id number]   EXAM:CTACPWC - CT ABD CHEST PEL w CONTR      REASON FOR EXAM:        REASON FOR VISIT:  MAL RECTUM            *******Signed******         PROCEDURE:   CT ABDOMEN; CT CHEST; CT PELVIS WITH CONTRAST             COMPARISON:   [facility], CT, CT CHEST ABD PEL W CONTRAST,    2020, 8:59.             INDICATIONS:   HX OF RECTAL CANCER, NO COMPLAINTS, CREAT 0.9  21, GFR >60             TECHNIQUE:   After obtaining the patient's consent, CT  images were obtained with    intravenous contrast       material.             FINDINGS:         Chest:  4 mm left lower lobe nodule (series 204, image 57) is not clearly seen     on the prior.  5 mm       left lower lobe nodule (image 53) not clearly seen on the prior.  6-7 mm nodule     right lung base       (image 52) not seen on the prior.  6 mm right middle lobe nodule (image 56).  No    aggressive       appearing bone change.             Abdomen:  No liver or splenic lesion.  Kidneys, adrenal glands, pancreas,     gallbladder unremarkable.        Bowel loops nondilated.  Moderate colonic stool burden.  Density in the     subcutaneous fat in the       mid right abdomen is slightly smaller, and may be related to previous ostomy.      No abdominal       adenopathy.             Pelvis:  No pelvic mass, fluid or adenopathy.  No aggressive appearing bone     change.             CONCLUSION:   No definitive evidence for active malignancy in the abdomen or     pelvis.             New bilateral lower lobe pulmonary nodules.  Recommend attention on a 3-6 month     follow-up chest CT.              ADAN CARDENAS MD             Electronically Signed and Approved By: ADAN CARDENAS MD on 1/06/2021 at 11:30            PAST, FAMILY   Past Medical History      Past Medical History:  Hepatitis, High Cholesterol, Hypertension, Thyroid     Disease      Hematology/Oncology (M):  Colorectal, GI Cancer            Past Surgical History      Biopsy            HERNIA REPAIR, REMOVED CANCER FROM COLON.            Family History      Family History:  Colorectal Cancer, Prostate Cancer            Social History      Lives independently:  Yes      Number of Children:  2      Occupation:  [employer]            Tobacco Use      Tobacco status:  Never smoker            Substance Use      Substance use:  Denies use            REVIEW OF SYSTEMS      General:  Admits: Weight Gain;          Denies: Fatigue      Eye:  Denies Vision Changes      ENT:   Denies Sore Throat      Respiratory:  Denies: Productive Cough, Wheezing      Neurologic:  Denies Numbness\Tingling, Denies Seizures      Psychiatric:  Denies Anxiety      Hematologic/Lymphatic:  Denies Enlarged Lymph Nodes            VITAL SIGNS AND SCORES      Vitals      Weight 277 lbs 12.474 oz / 126 kg      Temperature 97.4 F / 36.33 C - Temporal      Pulse 111      Respirations 16      Blood Pressure 135/99 Sitting, Left Arm      Pulse Oximetry 98%, RM AIR            Pain Score      Pain Scale Used:  Numerical      Pain Intensity:  0            Fatigue Score      Fatigue (0-10 scale):  0 (none)            EXAM      General Appearance:  Positive for: Alert, Cooperative;          Negative for: Acute Distress      Eye:  Positive for: Anicteric Sclerae, Moist Conjunctiva      Neck:  Positive for: Supple;          Negative for: JVD, Masses      Respiratory:  Positive for: CTAB, Normal Respiratory Effort      Abdomen/Gastro:  Positive for: Normal Active Bowel Sounds, Soft;          Negative for: Distention, Hepatosplenomegaly, Tenderness      Cardiovascular:  Positive for: RRR;          Negative for: Gallop, Murmur, Peripheral Edema, Rub      Psychiatric:  Positive for: Appropriate Affect, Intact Judgement      Lymphatic:  Negative for: Cervical, Infraclavicular, Supraclavicular            PREVENTION      Hx Influenza Vaccination:  Yes      Date Influenza Vaccine Given:  Sep 1, 2020      Influenza Vaccine Declined:  No      2 or More Falls in Past Year?:  No      Fall Past Year with Injury?:  No      Hx Pneumococcal Vaccination:  No      Encouraged to follow-up with:  PCP regarding preventative exams.      Chart initiated by      DAVI TATE MA            ALLERGY/MEDS      Allergies      Coded Allergies:             NO KNOWN ALLERGIES (Unverified , 1/12/21)            Medications      Last Reconciled on 1/12/21 15:15 by DENISE ESPINOSA      Apixaban (Eliquis) 5 Mg Tablet      5 MG PO BID for 30 Days, #60 TAB          Reported         5/4/20       Doxazosin Mesylate (Doxazosin Mesylate) 8 Mg Tablet      4 MG PO QDAY, TAB         Reported         5/4/20       Psyllium Seed (With Sugar) (Metamucil) 575 Gm Powder      1 TBSP PO QDAY, #1 BOTTLE         Reported         9/26/19       (Vitamin E)   No Conflict Check      1 TAB PO QDAY         Reported         9/26/19       Ascorbic Acid (Vitamin C*) 500 Mg Tablet      500 MG PO QDAY, #60 TAB 0 Refills         Reported         9/26/19       Folic Acid/Vit Bcomp,C (Super B-Complex Folic-Vit C Tb) 400 Mcg Tablet      1 TAB PO QDAY, TAB         Reported         9/26/19       Loratadine (Loratadine) 10 Mg Tablet      10 MG PO QDAY, #30 TAB 0 Refills         Reported         6/27/19       Levothyroxine (Levothyroxine) 0.025 Mg Tablet      0.025 MG PO QDAY@07, #30 TAB 0 Refills         Reported         6/27/19       Atorvastatin (Atorvastatin) 40 Mg Tablet      40 MG PO HS, #30 TAB 0 Refills         Reported         6/27/19       dilTIAZem CD (Cardizem CD) 120 Mg Capcr      120 MG PO QDAY, #30 CAP.ER 3 Refills         Prov: Gabriel Leong         9/6/17       Metoprolol Succinate (Metoprolol Succinate) 50 Mg Tab.er.24h      50 MG PO QDAY, #30 TAB.ER 3 Refills         Prov: Gabriel Leong         9/6/17       Multivitamins (Multi-Vitamin) 1 Tab Tablet      1 TAB PO QDAY         Reported         11/3/11      Medications Reviewed:  Changes made to meds            IMPRESSION/PLAN      Diagnosis      Rectal cancer - C20      Status post treatments as outlined.  Patient is doing well.  I see no evidence     of disease recurrence by history or physical examination.  He will have lab work    today.  I reviewed his recent scans with him.  The abdomen pelvis look good with    postoperative changes noted.  In the chest, he does have a few small     subcentimeter pulmonary nodules which are of uncertain significance.  I will     plan repeat CT scans and labs in 3 months            Acute deep vein  thrombosis (DVT) of right lower extremity - I82.401      Status post 6 months of anticoagulation.  Patient reports intermittent swelling     in the right leg.  This can be postphlebitic syndrome or could potentially     represent recurrent clot.  Venous Doppler ultrasound of the right lower     extremity will be obtained.            Iron deficiency anemia - D50.9      Repeat CBC today            Notes      Discontinued Medications      * HYDROcodone-Acetaminophen 5-325 Mg 1 EACH TABLET: 1 TAB PO Q4H PRN PAIN #18      New Diagnostics      * CT Abd/Pelvis/Chest W/Contrast, 3 Months         Dx: Rectal cancer - C20      * CBC With Auto Diff, 01/12/21         Dx: Rectal cancer - C20      * CMP Comp Metabolic Panel, 01/12/21         Dx: Rectal cancer - C20      * CBC With Auto Diff, 3 Months         Dx: Rectal cancer - C20      * CMP Comp Metabolic Panel, 3 Months         Dx: Rectal cancer - C20      * Venous Eval-Lower, As Soon As Possible         Dx: Right leg swelling - M79.89            Pain      Pain Zero Today            Advanced Care Plan Discussion      Declines Discussion [id number]            Patient Education            Eat Well, Exercise Well, Be Well: Dietary and Fitness Guidelines      Patient Education Provided:  Yes            Electronically signed by DENISE ESPINOSA  01/12/2021 15:15       Disclaimer: Converted document may not contain table formatting or lab diagrams. Please see [employer] for the authenticated document.

## 2021-05-28 NOTE — PROGRESS NOTES
Patient: CYDNEY KENNEDY     Acct: [id number]     Report: #[id number]  UNIT #: [id number]     : 1955    Encounter Date:2020  PRIMARY CARE: AINSLEY CALABRESE  ***Signed***  --------------------------------------------------------------------------------------------------------------------  NURSE INTAKE      Visit Type      Established Patient Visit            Chief Complaint      RECTAL CA FOLLOW UP            Referring Provider/Copies To      Referring Provider:  Aroldo Garay            History and Present Illness      Past Oncology Illness History      Mr. Kennedy is a 63yo WM who was recently dx with rectal ca. He reports that he     noticed blood with defecation; however, not consistently.  This has been ongoing    for at least 6 months.  He has had a colonoscopy; however, >10yrs ago.      Colonoscopy performed on 5/3/19 per Dr. Garay found a fungating bleeding     mass 15cm from the anus.  Pathology showed moderately differentiated     adenocarcinoma associated with tubular adenoma.  19 CT scans were completed    and showed mild degree of coronary artery calcification.  Evidence of prior     granulomatous disease.  No evidence of metastatic disease to the chest.  The     reported rectal mass is not demonstrated on CT there is a redundant sigmoid     colon with scant sigmoid diverticulosis.  No evidence of metastatic disease in     the abdomen pelvis or visualized bony structures.  19 pelvic MRI showed     high rectal mass, difficult to fully characterize as described above.  The mass     is suspected to be circumferential, and there is suspicion for involvement or     extension beyond the serosal layer.  No evidence for involvement of adjacent     structures or definitive evidence for metastatic disease elsewhere in the pel    vis.  5 mm nonspecific lymph node in the right mesial rectal fat below the level    of the tumor.      PMH: htn, hyperlipidemia--no smoking; however, reports  drinking 6pack at least     2-3xs wk.  Family hx: father with prostate ca, maternal uncle with colon ca.       Pt is employed in a warehouse doing highly physical job description.            HPI - Oncology Interim      Patient returns today for ongoing chemotherapy for his rectal cancer.  He is on     adjuvant Xeloda.  Due for cycle 4.  He states he is tolerating the medication     fairly well.  He reports a little bit of nausea but no vomiting or diarrhea.  He    has had increasing difficulty with irritation of the hands and feet.  He has not    been using lotion.  He has little trouble standing on his feet but is still able    to perform all of his normal daily activities.  He reports good appetite and his    weight is maintained.  His energy level is adequate for his daily needs.  He     denies any masses lymphadenopathy.  His colostomy is functioning normally.            Cancer Details            High rectal bksj-rjzsunlqek-blhwihjlhteumg adenocarcinoma associated with      tubular adenoma            Clinical Staging      Stage IIa ([id number])            Treatments      Chemotherapy      neoadjuvant Xeloda with radiation. adjuvant xeloda      Radiation Therapy      7/8/19 thru 8/14/19 completed neoadjuvant 5040 cGy=28 fxs rectum            Clinical Trial Participant      No            ECOG Performance Status      0            Most Recent Lab Findings      Laboratory Tests      1/7/20 10:33            1/7/20 12:42            PAST, FAMILY   Past Medical History      Past Medical History:  Hepatitis, High Cholesterol, Hypertension, Thyroid     Disease      Hematology/Oncology (M):  Colorectal, GI Cancer            Past Surgical History      Biopsy (COLONOSCOPY)            HERNIA REPAIR, REMOVED CANCER FROM COLON.            Family History      Family History:  Colorectal Cancer (UNCLE MAT), Prostate Cancer (FATHER)            Social History      Marital Status:        Lives independently:  Yes      Number  of Children:  2      Occupation:  [employer]            Tobacco Use      Tobacco status:  Never smoker            Alcohol Use      Alcohol intake:  0-2 drinks per day            Substance Use      Substance use:  Denies use            REVIEW OF SYSTEMS      General:  Admits: Fatigue, Weight Loss      Eye:  Denies Blurred Vision      Cardiovascular:  Admits Chest Pain      Respiratory:  Admits: Shortness of Air      Gastrointestinal:  Admits Nausea/Vomiting; Denies Diarrhea      Musculoskeletal:  Admits Muscle Pain (FEET), Admits Painful Joints      Neurologic:  Admits Numbness\Tingling (FEET)            VITAL SIGNS AND SCORES      Vitals      Weight 266 lbs 15.633 oz / 121.1 kg      Temperature 97.8 F / 36.56 C - Temporal      Pulse 87      Respirations 16      Blood Pressure 139/80 Sitting, Left Arm      Pulse Oximetry 95%, RM AIR            Pain Score      Pain Scale Used:  Numerical      Pain Intensity:  7 (FEET)            Fatigue Score      Fatigue (0-10 scale):  7            EXAM      General Appearance:  Positive for: Alert, Oriented x3, Cooperative;          Negative for: Acute Distress      Eye:  Positive for: Anicteric Sclerae, Moist Conjunctiva      Neck:  Positive for: Supple;          Negative for: JVD, Masses      Respiratory:  Positive for: CTAB, Normal Respiratory Effort      Abdomen/Gastro:  Positive for: Normal Active Bowel Sounds, Soft;          Negative for: Distention, Hepatosplenomegaly, Tenderness      Other      Well-healed midline incision.  Colostomy intact      Cardiovascular:  Positive for: RRR;          Negative for: Gallop, Murmur, Peripheral Edema, Rub      Psychiatric:  Positive for: Appropriate Affect, Intact Judgement      Lymphatic:  Negative for: Cervical, Infraclavicular, Supraclavicular            PREVENTION      Hx Influenza Vaccination:  Yes      Date Influenza Vaccine Given:  Oct 1, 2019      Influenza Vaccine Declined:  No      2 or More Falls Past Year?:  No      Fall Past  Year with Injury?:  No      Hx Pneumococcal Vaccination:  No      Encouraged to follow-up with:  PCP regarding preventative exams.      Chart initiated by      DAVI TATE MA            ALLERGY/MEDS      Allergies      Coded Allergies:             NO KNOWN ALLERGIES (Unverified , 1/9/20)            Medications      Last Reconciled on 11/18/19 13:41 by DENISE ESPINOSA      Capecitabine (Xeloda) 500 Mg Tab      2000 MG PO BID for 14 Days, #112 TAB 2 Refills         Prov: DENISE ESPINOSA         1/9/20       Ondansetron Hcl (ONDANSETRON HCL) 8 Mg Tablet      8 MG PO Q8H PRN for NAUSEA for 14 Days, #42 TAB 3 Refills         Prov: DENISE ESPINOSA         12/19/19       Doxazosin Mesylate (Doxazosin Mesylate) 4 Mg Tablet      4 MG PO QPM, TAB         Reported         9/30/19       Ibuprofen (Ibuprofen) 400 Mg Tablet      400 MG PO Q4H PRN for PAIN, #100 TAB 0 Refills         Reported         9/26/19       Psyllium Seed (With Sugar) (Metamucil) 575 Gm Powder      1 TBSP PO QDAY, #1 BOTTLE         Reported         9/26/19       (Vitamin E)   No Conflict Check      1 TAB PO QDAY         Reported         9/26/19       Ascorbic Acid (Vitamin C*) 500 Mg Tablet      500 MG PO QDAY, #60 TAB 0 Refills         Reported         9/26/19       Folic Acid/Vit Bcomp,C (Super B-Complex Folic-Vit C Tb) 400 Mcg Tablet      1 TAB PO QDAY, TAB         Reported         9/26/19       Loratadine (Loratadine) 10 Mg Tablet      10 MG PO QDAY, #30 TAB 0 Refills         Reported         6/27/19       Levothyroxine (Levothyroxine) 0.025 Mg Tablet      0.025 MG PO QDAY@07, #30 TAB 0 Refills         Reported         6/27/19       Atorvastatin (Atorvastatin) 40 Mg Tablet      40 MG PO HS, #30 TAB 0 Refills         Reported         6/27/19       dilTIAZem CD (Cardizem CD) 120 Mg Capcr      120 MG PO QDAY, #30 CAP.ER 3 Refills         Prov: Gabriel Leong         9/6/17       Metoprolol Succinate (Metoprolol Succinate) 50 Mg Tab.er.24h      50 MG PO QDAY,  #30 TAB.ER 3 Refills         Prov: Gabriel Leong         9/6/17       Multivitamins (Multi-Vitamin) 1 Tab Tablet      1 TAB PO QDAY         Reported         11/3/11      Medications Reviewed:  No Changes made to meds            IMPRESSION/PLAN      Diagnosis      Rectal cancer - C20            Notes      Patient is on adjuvant therapy with single agent Xeloda.  Due for cycle 4.      Given the worsening hand-foot syndrome, I will decrease his dose to 2000 mg     twice daily days 1-14 out of 21 for the remaining cycles.  I encouraged him to     use urea-based moisturizing lotion for the hands and feet frequently.  His liver    functions are also slightly more elevated on today's lab work.  I will plan to     see him back next week as he begins his next cycle with repeat lab work.      Changed Medications      * Capecitabine (Xeloda) 500 MG TAB:         From: 2,500 MG PO BID 14 Days #140         To: 2,000 MG PO BID 14 Days #112         Instructions: 2,00mg twice a day for 14 days out of 21 day cycle x 2 cycles.        start on 1/9/19      New Diagnostics      * CCC Comp Metabolic Panel, 01/15/20         Dx: Rectal cancer - C20      * CCC CBC With Auto Diff, 01/15/20         Dx: Rectal cancer - C20            Patient Education      Patient Education Provided:  Yes            Electronically signed by DENISE ESPINOSA  01/09/2020 16:09       Disclaimer: Converted document may not contain table formatting or lab diagrams. Please see [employer] for the authenticated document.

## 2021-05-28 NOTE — PROGRESS NOTES
Patient: CYDNEY KENNEDY     Acct: [id number]     Report: #[id number]  UNIT #: [id number]     : 1955    Encounter Date:2019  PRIMARY CARE: AINSLEY CALABRESE  ***Signed***  --------------------------------------------------------------------------------------------------------------------  NURSE INTAKE      Visit Type      New Patient Visit            Chief Complaint      RECTAL CA      Intent of Therapy:  Curative            Referring Provider/Copies To      Referring Provider:  Aroldo Garay      PCP Not Found in Lookup:  [facility] PRIMARY CARE DR HERNANDEZ            History and Present Illness      Past Oncology Illness History      Mr. Kennedy is a 65yo WM who was recently dx with rectal ca. He reports that he     noticed blood with defecation; however, not consistently.  This has been ongoing    for at least 6 months.  He has had a colonoscopy; however, >10yrs ago.      Colonoscopy performed on 5/3/19 per Dr. Garay found a fungating bleeding     mass 15cm from the anus.  Pathology showed moderately differentiated     adenocarcinoma associated with tubular adenoma.  19 CT scans were completed    and showed mild degree of coronary artery calcification.  Evidence of prior     granulomatous disease.  No evidence of metastatic disease to the chest.  The     reported rectal mass is not demonstrated on CT there is a redundant sigmoid     colon with scant sigmoid diverticulosis.  No evidence of metastatic disease in     the abdomen pelvis or visualized bony structures.  19 pelvic MRI showed     high rectal mass, difficult to fully characterize as described above.  The mass     is suspected to be circumferential, and there is suspicion for involvement or     extension beyond the serosal layer.  No evidence for involvement of adjacent st    ructures or definitive evidence for metastatic disease elsewhere in the pelvis.     5 mm nonspecific lymph node in the right mesial rectal fat below the level  of     the tumor.      PMH: htn, hyperlipidemia--no smoking; however, reports drinking 6pack at least     2-3xs wk.  Family hx: father with prostate ca, maternal uncle with colon ca.       Pt is employed in a warehouse doing highly physical job description.            HPI - Oncology Interim      Initial visit-rectal ca--pt denies wt loss, reports good appetite.  He is a     drinker.  He denies rectal bleeding at this time.  Denies pain or discomfort at     this time. No fever, SOA or distress noted.            Cancer Details            High rectal fklx-zlukxvokwz-fhlfzeknwcyxcq adenocarcinoma associated with      tubular adenoma            Clinical Staging      Stage IIa (T3, N0, M0)            Clinical Trial Participant      No            ECOG Performance Status      0            PAST, FAMILY   Past Medical History      Past Medical History:  Hepatitis, High Cholesterol, Hypertension, Thyroid     Disease      Hematology/Oncology (M):  GI Cancer            Past Surgical History      HERNIA REPAIR, EAR SURGERY            Family History      Family History:  Colorectal Cancer (UNCLE MAT), Prostate Cancer (FATHER)            Social History      Marital Status:        Lives independently:  Yes      Number of Children:  2      Occupation:  [employer]            Tobacco Use      Tobacco status:  Never smoker            Alcohol Use      Alcohol intake:  0-2 drinks per day            Substance Use      Substance use:  Denies use            REVIEW OF SYSTEMS      General:  Denies: Appetite Change, Fatigue, Fever, Night Sweats, Weight Gain,     Weight Loss      Eye:  Admits: Corrective Lenses;          Denies: Blurred Vision, Diplopia, Eye Irritation, Eye Pain, Eye Redness,     Spots in Vision, Vision Loss      ENT:  Denies: Headache, Hearing Loss, Hoarseness, Seizures, Sinus Congestion,     Sore Throat      Cardiovascular:  Denies: Chest Pain, Edema Ankles, Edema Legs, Irregular Hea    rtbeat, Palpitations       Respiratory:  Denies: Coughing Blood      Gastrointestinal:  Admits: Bloody Stools;          Denies: Constipation, Diarrhea, Frequent Heartburn, Nausea, Problem     Swallowing, Tarry Stools, Unable to Control Bowels, Vomiting      Genitourinary (male):  Denies: Blood in Urine, Decrease Urine Stream, Frequent     Urination, Incontinence, Painful Urination      Musculoskeletal:  Denies: Back Pain, Leg Cramps, Muscle Pain, Muscle Weakness,     Painful Joints, Swollen Joints      Integumentary:  Denies: Bleeds Easily, Bruises Easily, Hair Changes, Jaundice,     Lesions, Mole Changes, Nail Changes, Pigment Changes, Rash, Skin Discoloration      Neurologic:  Denies: Dizziness, Fainting, Numbness\Tingling, Paralysis, Seizures      Psychiatric:  Denies: Anxiety, Confused, Depression, Disoriented, Memory Loss      Endocrine:  Denies: Cold Intolerance, Diabetes, Excessive Sweating, Excessive     Thirst, Excessive Urination, Heat Intolerance, Flushing, Hyperthyroidism,     Hypothyroidism      Hematologic/Lymphatic:  Denies: Bruising, Bleeding, Enlarged Lymph Nodes,     Recurrent Infections, Transfusions            VITAL SIGNS AND SCORES      Vitals      Height 6 ft 2.02 in / 188 cm      Weight 263 lbs 3.668 oz / 119.4 kg      BSA 2.44 m2      BMI 33.8 kg/m2      Temperature 97.3 F / 36.28 C - Temporal      Pulse 74      Respirations 16      Blood Pressure 136/89 Sitting, Left Arm      Pulse Oximetry 97%, RM AIR            Pain Score      Pain Scale Used:  Numerical      Pain Intensity:  0            Fatigue Score      Fatigue (0-10 scale):  0 (none)            EXAM      General Appearance:  Positive for: Alert, Oriented x3, Cooperative;          Negative for: Acute Distress      Eye:  Positive for: Anicteric Sclerae, Moist Conjunctiva      HEENT:  Positive for: Oropharynx clear      Neck:  Positive for: Supple;          Negative for: JVD, Masses      Respiratory:  Positive for: CTAB, Normal Respiratory Effort       Abdomen/Gastro:  Positive for: Normal Active Bowel Sounds, Soft;          Negative for: Distention, Hepatosplenomegaly, Tenderness      Cardiovascular:  Positive for: RRR;          Negative for: Gallop, Murmur, Peripheral Edema, Rub      Psychiatric:  Positive for: Appropriate Affect, Intact Judgement      Upper Extremities:  Negative for: Clubbing, Digital Cyanosis, Digital Ischemia      Lymphatic:  Negative for: Axillary, Cervical, Infraclavicular, Supraclavicular            PREVENTION      Hx Influenza Vaccination:  Yes      Date Influenza Vaccine Given:  Nov 1, 2018      2 or More Falls Past Year?:  No      Fall Past Year with Injury?:  No      Hx Pneumococcal Vaccination:  No      Encouraged to follow-up with:  PCP regarding preventative exams.      Chart initiated by      DAVI TATE MA            ALLERGY/MEDS      Allergies      Coded Allergies:             *No Known Allergies (Verified  Allergy, Unknown, 6/17/19)            Medications      Last Reconciled on 6/17/19 16:16 by REGINALDO BRANNON      Capecitabine (Xeloda) 500 Mg Tab      1000 MG PO BID for 35 Days, #140 TAB         Prov: DEVANG MILLS         6/17/19       Prochlorperazine Maleate (Prochlorperazine Maleate) 10 Mg Tab      10 MG PO Q6H PRN for NAUSEA AND/OR VOMITING, #60 TAB 3 Refills         Prov: DEVANG MILLS         6/17/19       Ondansetron Hcl (ONDANSETRON HCL) 8 Mg Tablet      8 MG PO Q8H PRN for NAUSEA, #30 TAB 3 Refills         Prov: DEVANG MILLS         6/17/19       Levothyroxine (Synthroid) 0.075 Mg      0.075 MG PO QDAY@07, #30 TAB 0 Refills         Reported         6/17/19       Doxazosin Mesylate (Cardura) 4 Mg Tablet      8 MG PO QDAY, #60 TAB         Reported         6/17/19       (Levothyroxine) 0.025 MG TAB No Conflict Check      0.025 MG PO QDAY@07         Prov: Gabriel Leong         9/6/17       Diltiazem CD (Cardizem CD) 120 Mg Capcr      120 MG PO QDAY, #30 CAP.ER 3 Refills         Prov: Gabriel Leong          17       Metoprolol Succinate (Metoprolol Succinate) 50 Mg Tab.er.24h      50 MG PO QDAY, #30 TAB.ER 3 Refills         Prov: Gabriel Leong         17       Atorvastatin Calcium (Lipitor*) 20 Mg Tablet      20 MG PO HS, #30 TAB 0 Refills         Reported         17       Multivitamins (Multi-Vitamin) 1 Tab Tablet      1 TAB PO QDAY         Reported         11/3/11      Medications Reviewed:  Changes made to meds            IMPRESSION/PLAN      Impression      Rectal cancer, stage IIa (T3, N0, M0)            Diagnosis      Rectal cancer - C20            Notes      Biopsy-proven adenocarcinoma the rectum.  MRI demonstrates near circumferential     involvement but no clear lymphadenopathy.  He has an excellent performance     status, ECOG PS 0.  I would recommend treatment with curative intent with     combination therapy including radiation and oral Xeloda, 825 mg/m ² twice daily     throughout the course of radiation.  This would then be followed by resection     with consideration of adjuvant therapy based upon the pathologic results.  Side     effects, risk and benefits of the Xeloda therapy discussed in detail.  Written     teaching information provided.  Patient and wife are agreeable to treatment.  He     was counseled to avoid alcohol throughout the course of his treatment.  He will     be referred to radiation oncology for that portion of his combined therapy.  He     will have lab work to ensure adequate endorgan functions and blood counts.      Prescriptions for Zofran and Compazine will be sent to his local pharmacy.  The     prescription for Xeloda will be forwarded to specialty pharmacy.  This office     will coordinate with radiation oncology regarding start date.  I will see him     back for the second week of therapy with labs prior.      New Medications      * Doxazosin Mesylate (Cardura) 4 MG TABLET: 8 MG PO QDAY #60      * Levothyroxine (Synthroid) 0.075 M.075 MG PO QDAY@07 #30      *  ONDANSETRON HCL 8 MG TABLET: 8 MG PO Q8H PRN NAUSEA #30      * Prochlorperazine Maleate 10 MG TAB: 10 MG PO Q6H PRN NAUSEA AND/OR VOMITING       #60      * Capecitabine (Xeloda) 500 MG TAB: 1,000 MG PO BID 35 Days #140      New Diagnostics      * CMP Comp Metabolic Panel, Routine         Dx: Rectal cancer - C20      * Cea/Carcinoembryonic, Routine         Dx: Rectal cancer - C20      * CBC With Auto Diff, Routine         Dx: Rectal cancer - C20      New Referrals      * Radiation Therapy, As Soon As Possible         Denys Vasquez         Dx: Rectal cancer - C20            Patient Education            Capecitabine      Ondansetron      Prochlorperazine      Radiation Therapy -- External      Rectal Cancer      Patient Education Provided:  Yes                 Disclaimer: Converted document may not contain table formatting or lab diagrams. Please see [email or web address] for the authenticated document.

## 2021-05-28 NOTE — PROGRESS NOTES
Patient: CYDNEY KENNEDY     Acct: [id number]     Report: #[id number]  UNIT #: [id number]     : 1955    Encounter Date:2019  PRIMARY CARE: AINSLEY CALABRESE  ***Signed***  --------------------------------------------------------------------------------------------------------------------  NURSE INTAKE      Visit Type      Established Patient Visit            Chief Complaint      rectal ca f/u            Referring Provider/Copies To      Referring Provider:  Aroldo Garay            History and Present Illness      Past Oncology Illness History      Mr. Kennedy is a 63yo WM who was recently dx with rectal ca. He reports that he     noticed blood with defecation; however, not consistently.  This has been ongoing    for at least 6 months.  He has had a colonoscopy; however, >10yrs ago.      Colonoscopy performed on 5/3/19 per Dr. Garay found a fungating bleeding     mass 15cm from the anus.  Pathology showed moderately differentiated     adenocarcinoma associated with tubular adenoma.  19 CT scans were completed    and showed mild degree of coronary artery calcification.  Evidence of prior     granulomatous disease.  No evidence of metastatic disease to the chest.  The     reported rectal mass is not demonstrated on CT there is a redundant sigmoid     colon with scant sigmoid diverticulosis.  No evidence of metastatic disease in     the abdomen pelvis or visualized bony structures.  19 pelvic MRI showed     high rectal mass, difficult to fully characterize as described above.  The mass     is suspected to be circumferential, and there is suspicion for involvement or     extension beyond the serosal layer.  No evidence for involvement of adjacent     structures or definitive evidence for metastatic disease elsewhere in the     pelvis.  5 mm nonspecific lymph node in the right mesial rectal fat below the     level of the tumor.      PMH: htn, hyperlipidemia--no smoking; however, reports  drinking 6pack at least     2-3xs wk.  Family hx: father with prostate ca, maternal uncle with colon ca.       Pt is employed in a warehouse doing highly physical job description.            HPI - Oncology Interim      Patient returns for ongoing treatment of his rectal cancer.  He is on adjuvant     Xeloda.  He is due for cycle 3.  With his last cycle, he had more pain and     redness on his feet.  This was prickly bothersome while he was on the medication    but did improve on his off week.  He denies any blistering or peeling.  They are    more bothersome to him when he is trying to stand on them.  They do get a little    better when he is off of his feet.  He is taking Tylenol which helps some.  He     has not been trying to keep his feet lotion.  He notes that his colostomy is     functioning well.  He denies bladder pain with bowel movement.  He is eating     drinking well, his weight is maintained.  He denies vomiting but does     occasionally have nausea which is well controlled with Zofran            Cancer Details            High rectal qzhb-prhmvodrbu-ulbslnvmccnrof adenocarcinoma associated with      tubular adenoma            Clinical Staging      Stage IIa ([id number])            Treatments      Chemotherapy      neoadjuvant Xeloda with radiation. adjuvant xeloda      Radiation Therapy      7/8/19 thru 8/14/19 completed neoadjuvant 5040 cGy=28 fxs rectum            Clinical Trial Participant      No            ECOG Performance Status      0            Most Recent Lab Findings      Laboratory Tests      12/12/19 09:26            PAST, FAMILY   Past Medical History      Past Medical History:  Hepatitis, High Cholesterol, Hypertension, Thyroid     Disease      Hematology/Oncology (M):  Colorectal, GI Cancer            Past Surgical History      Biopsy (COLONOSCOPY)            HERNIA REPAIR, REMOVED CANCER FROM COLON.            Family History      Family History:  Colorectal Cancer (UNCLE MAT), Prostate  Cancer (FATHER)            Social History      Marital Status:        Lives independently:  Yes      Number of Children:  2      Occupation:  [employer]            Tobacco Use      Tobacco status:  Never smoker            Alcohol Use      Alcohol intake:  0-2 drinks per day            Substance Use      Substance use:  Denies use            REVIEW OF SYSTEMS      General:  Admits: Fatigue      Eye:  Denies Corrective Lenses      Cardiovascular:  Denies Chest Pain      Respiratory:  Denies: Productive Cough      Gastrointestinal:  Denies Constipation, Denies Diarrhea      Musculoskeletal:  Admits Muscle Pain; Denies Painful Joints      Integumentary:  Denies Itching, Denies Rash            VITAL SIGNS AND SCORES      Vitals      Weight 263 lbs 7.195 oz / 119.5 kg      Temperature 98.4 F / 36.89 C - Temporal      Pulse 87      Respirations 16      Blood Pressure 140/81 Sitting, Left Arm      Pulse Oximetry 99%, RM AIR            Pain Score      Pain Scale Used:  Numerical      Pain Intensity:  7 (BOTTOM OF FEET)            Fatigue Score      Fatigue (0-10 scale):  5            EXAM      General Appearance:  Positive for: Alert, Oriented x3, Cooperative;          Negative for: Acute Distress      Eye:  Positive for: Anicteric Sclerae, Moist Conjunctiva      Neck:  Positive for: Supple;          Negative for: JVD, Masses      Respiratory:  Positive for: CTAB, Normal Respiratory Effort      Abdomen/Gastro:  Positive for: Normal Active Bowel Sounds, Soft;          Negative for: Distention, Hepatosplenomegaly, Tenderness      Other      Well-healed midline surgical incision.  Right lower quadrant ostomy.      Cardiovascular:  Positive for: RRR;          Negative for: Gallop, Murmur, Peripheral Edema, Rub      Psychiatric:  Positive for: Appropriate Affect, Intact Judgement      Lymphatic:  Negative for: Cervical, Infraclavicular, Supraclavicular            PREVENTION      Hx Influenza Vaccination:  Yes      Date  Influenza Vaccine Given:  Oct 1, 2019      Influenza Vaccine Declined:  No      2 or More Falls Past Year?:  No      Fall Past Year with Injury?:  No      Hx Pneumococcal Vaccination:  No      Encouraged to follow-up with:  PCP regarding preventative exams.      Chart initiated by      DAVI TATE MA            ALLERGY/MEDS      Allergies      Coded Allergies:             NO KNOWN ALLERGIES (Unverified , 12/19/19)            Medications      Last Reconciled on 11/18/19 13:41 by DENISE ESPINOSA      Ondansetron Hcl (ONDANSETRON HCL) 8 Mg Tablet      8 MG PO Q8H PRN for NAUSEA for 14 Days, #42 TAB 3 Refills         Prov: DENISE ESPINOSA         12/19/19       Capecitabine (Xeloda) 500 Mg Tab      2500 MG PO BID for 14 Days, #140 TAB 5 Refills         Prov: DENISE ESPINOSA         10/24/19       Doxazosin Mesylate (Doxazosin Mesylate) 4 Mg Tablet      4 MG PO QPM, TAB         Reported         9/30/19       Ibuprofen (Ibuprofen) 400 Mg Tablet      400 MG PO Q4H PRN for PAIN, #100 TAB 0 Refills         Reported         9/26/19       Psyllium Seed (With Sugar) (Metamucil) 575 Gm Powder      1 TBSP PO QDAY, #1 BOTTLE         Reported         9/26/19       (Vitamin E)   No Conflict Check      1 TAB PO QDAY         Reported         9/26/19       Ascorbic Acid (Vitamin C*) 500 Mg Tablet      500 MG PO QDAY, #60 TAB 0 Refills         Reported         9/26/19       Folic Acid/Vit Bcomp,C (Super B-Complex Folic-Vit C Tb) 400 Mcg Tablet      1 TAB PO QDAY, TAB         Reported         9/26/19       Loratadine (Loratadine) 10 Mg Tablet      10 MG PO QDAY, #30 TAB 0 Refills         Reported         6/27/19       Levothyroxine (Levothyroxine) 0.025 Mg Tablet      0.025 MG PO QDAY@07, #30 TAB 0 Refills         Reported         6/27/19       Atorvastatin (Atorvastatin) 40 Mg Tablet      40 MG PO HS, #30 TAB 0 Refills         Reported         6/27/19       dilTIAZem CD (Cardizem CD) 120 Mg Capcr      120 MG PO QDAY, #30 CAP.ER 3 Refills          Prov: Gabriel Leong         9/6/17       Metoprolol Succinate (Metoprolol Succinate) 50 Mg Tab.er.24h      50 MG PO QDAY, #30 TAB.ER 3 Refills         Prov: Gabriel Leong         9/6/17       Multivitamins (Multi-Vitamin) 1 Tab Tablet      1 TAB PO QDAY         Reported         11/3/11      Medications Reviewed:  No Changes made to meds            IMPRESSION/PLAN      Diagnosis      Rectal cancer - C20      Patient is on adjuvant Xeloda.  He is due for cycle 3.  Lab work is adequate for    treatment.  Proceed with cycle 3 as planned.  RTC 3 weeks for OV, cycle 4 with     labs prior.  Zofran refill provided today.            Palmar plantar erythrodysaesthesia - L27.1      Secondary to Xeloda.  We discussed the need to keep the feet well lotion at all     times.  He can try ice pack or Epsom salt baths.  Keep the feet elevated while     not active.  He will let us know if there is any blistering or further worsening    of his symptoms.            Notes      Renewed Medications      * ONDANSETRON HCL 8 MG TABLET: 8 MG PO Q8H PRN NAUSEA 14 Days #42         Dx: Rectal cancer - C20      New Diagnostics      * CBC, Month         Dx: Rectal cancer - C20      * CMP Comp Metabolic Panel, Month         Dx: Rectal cancer - C20            Patient Education            Capecitabine      Hand, Foot, and Mouth Disease      Patient Education Provided:  Yes            Electronically signed by DENISE ESPINOSA  12/19/2019 13:35       Disclaimer: Converted document may not contain table formatting or lab diagrams. Please see [employer] for the authenticated document.

## 2021-05-28 NOTE — PROGRESS NOTES
Patient: CYDNEY KENNEDY     Acct: [id number]     Report: #[id number]  UNIT #: [id number]     : 1955    Encounter Date:2019  PRIMARY CARE: AINSLEY CALABRESE  ***Signed***  --------------------------------------------------------------------------------------------------------------------  NURSE INTAKE      Visit Type      Established Patient Visit            Chief Complaint      F/U FOR CHEMO AND RADIATION AND SCAN YESTERDAY      Intent of Therapy:  Curative            Referring Provider/Copies To      Referring Provider:  Aroldo Garay      PCP Not Found in Lookup:  [facility] PRIMARY CARE DR HERNANDEZ            History and Present Illness      Past Oncology Illness History      Mr. Kennedy is a 63yo WM who was recently dx with rectal ca. He reports that he     noticed blood with defecation; however, not consistently.  This has been ongoing    for at least 6 months.  He has had a colonoscopy; however, >10yrs ago.      Colonoscopy performed on 5/3/19 per Dr. Garay found a fungating bleeding     mass 15cm from the anus.  Pathology showed moderately differentiated     adenocarcinoma associated with tubular adenoma.  19 CT scans were completed    and showed mild degree of coronary artery calcification.  Evidence of prior     granulomatous disease.  No evidence of metastatic disease to the chest.  The     reported rectal mass is not demonstrated on CT there is a redundant sigmoid     colon with scant sigmoid diverticulosis.  No evidence of metastatic disease in     the abdomen pelvis or visualized bony structures.  19 pelvic MRI showed     high rectal mass, difficult to fully characterize as described above.  The mass     is suspected to be circumferential, and there is suspicion for involvement or     extension beyond the serosal layer.  No evidence for involvement of adjacent     structures or definitive evidence for metastatic disease elsewhere in the     pelvis.  5 mm nonspecific lymph node  in the right mesial rectal fat below the     level of the tumor.      PMH: htn, hyperlipidemia--no smoking; however, reports drinking 6pack at least     2-3xs wk.  Family hx: father with prostate ca, maternal uncle with colon ca.       Pt is employed in a warehouse doing highly physical job description.            HPI - Oncology Interim      F/u rectal ca-completion of neoadjuvant therapy--he has appt with Dr. Garay/alia tomorrow.  He denies rectal pain or bleeding.  Denies issues with     defecating at this time. Appetite is good; wt is up.  He reports fatigue;     however, he needs to motivate himself more and get off couch.  No distress     noted.            Cancer Details            High rectal fzxg-fdtphonrkb-pjnhcrrjfrdfpn adenocarcinoma associated with      tubular adenoma            Clinical Staging      Stage IIa (T3N0M0)            Treatments      Chemotherapy      neoadjuvant Xeloda      Radiation Therapy      7/8/19 thru 8/14/19 completed neoadjuvant 5040 cGy=28 fxs rectum            Clinical Trial Participant      No            ECOG Performance Status      0            Most Recent Imaging Findings      9/16/19            PROCEDURE:   CT ABDOMEN; CT CHEST; CT PELVIS WITH CONTRAST             COMPARISON:   [facility], CT, CT ABDWWO PEL CHESTW,     5/16/2019, 8:54.             INDICATIONS:   FOLLOW UP COLON CA. FINISHED TX'S 8-14-19. NO COMPLAINTS. GFR     GREATER THAN 60       CREATININE 0.9  9-16-9.  History of rectal carcinoma.             TECHNIQUE:   After obtaining the patient's consent, CT images were obtained with    intravenous contrast       material.      PROTOCOL:     Standard imaging protocol performed                RADIATION:     DLP: 2294.4mGy*cm          Automated exposure control was utilized to minimize radiation dose.       CONTRAST:   100cc Isovue 370 I.V.      LABS:     eGFR: >60ml/min/1.73m2             FINDINGS:   Chest:  No suspicious pulmonary nodules  or consolidations.  No     interstitial thickening or       pleural or pericardial effusions.  No pneumothorax.  The tracheobronchial tree     is widely patent.        Thyroid gland is normal in size.  The thoracic aorta is normal in course and     caliber.  No thoracic       or axillary adenopathy.  No supraclavicular adenopathy.  Pulmonary arteries are     normal in caliber.             Abdomen and pelvis:  Liver is diffusely fatty infiltrated no suspicious liver     lesions.  The spleen,       adrenal glands and kidneys have a normal CT appearance.  The pancreas is mildly     fatty replaced.  No       focal pancreatic mass.  No biliary dilatation.  The gallbladder is present.      Prostate gland is       normal in size.  The bladder is poorly distended without focal abnormality.             The appendix is normal.  There is scant sigmoid diverticulosis without     diverticulitis.  The sigmoid       colon is redundant.  A discrete rectal mass is not demonstrated.  No focal bowel    wall thickening or       dilation.  Fluid collections inflammatory changes or adenopathy.  No free     intraperitoneal air.  The       abdominal aorta is normal course and caliber with a moderate amount of vascular     calcification       infrarenal abdominal aorta common iliac             Skeletal findings:   There are degenerative changes in the lumbar spine but no     suspicious focal       osseous lesions or acute bony abnormality.               CONCLUSION:   1. No CT evidence of metastatic disease in the chest abdomen and     pelvis.  No CT signs       of malignancy.      2. Incidental ancillary findings are described above.            PAST, FAMILY   Past Medical History      Past Medical History:  Hepatitis, High Cholesterol, Hypertension, Thyroid     Disease      Hematology/Oncology (M):  GI Cancer            Past Surgical History      Biopsy (COLONOSCOPY)            HERNIA REPAIR            Family History      Family History:   Colorectal Cancer (UNCLE MAT), Prostate Cancer (FATHER)            Social History      Marital Status:        Lives independently:  Yes      Number of Children:  2      Occupation:  [employer]            Tobacco Use      Tobacco status:  Never smoker            Alcohol Use      Alcohol intake:  0-2 drinks per day            Substance Use      Substance use:  Denies use            REVIEW OF SYSTEMS      General:  Denies: Fatigue      Eye:  Admits Corrective Lenses; Denies Vision Changes      ENT:  Denies Hearing Loss      Cardiovascular:  Denies Chest Pain      Respiratory:  Denies: Productive Cough, Shortness of Air      Gastrointestinal:  Denies Problem Swallowing      Musculoskeletal:  Denies Muscle Pain      Neurologic:  Denies Numbness\Tingling            VITAL SIGNS AND SCORES      Vitals      Weight 266 lbs 15.633 oz / 121.1 kg      Temperature 98.5 F / 36.94 C - Temporal      Pulse 70      Respirations 12      Blood Pressure 148/82 Sitting, Left Arm      Pulse Oximetry 96%, RM AIR            Pain Score      Pain Scale Used:  Numerical      Pain Intensity:  0            Fatigue Score      Fatigue (0-10 scale):  0 (none)            EXAM      General Appearance:  Positive for: Alert, Oriented x3, Cooperative;          Negative for: Acute Distress      Neck:  Positive for: Supple;          Negative for: JVD, Masses      Respiratory:  Positive for: CTAB, Normal Respiratory Effort      Abdomen/Gastro:  Positive for: Normal Active Bowel Sounds, Soft;          Negative for: Distention, Hepatosplenomegaly, Tenderness      Cardiovascular:  Positive for: RRR;          Negative for: Gallop, Murmur, Peripheral Edema, Rub      Psychiatric:  Positive for: Appropriate Affect, Intact Judgement            PREVENTION      Hx Influenza Vaccination:  Yes      Date Influenza Vaccine Given:  Nov 1, 2018      2 or More Falls Past Year?:  No      Fall Past Year with Injury?:  No      Hx Pneumococcal Vaccination:  No       Encouraged to follow-up with:  PCP regarding preventative exams.      Chart initiated by      DAVI TATE MA            ALLERGY/MEDS      Allergies      Coded Allergies:             *No Known Allergies (Verified  Allergy, Unknown, 9/17/19)            Medications      Last Reconciled on 9/17/19 11:52 by REGINALDO BRANNON      Ondansetron Hcl (ONDANSETRON HCL) 8 Mg Tablet      8 MG PO Q8H PRN for NAUSEA, #60 TAB 3 Refills         Prov: DEVANG MILLS         8/5/19       Loratadine (Loratadine) 10 Mg Tablet      10 MG PO QDAY, #30 TAB 0 Refills         Reported         6/27/19       Levothyroxine (Levothyroxine) 0.025 Mg Tablet      0.025 MG PO QDAY@07, #30 TAB 0 Refills         Reported         6/27/19       Atorvastatin (Atorvastatin) 40 Mg Tablet      40 MG PO HS, #30 TAB 0 Refills         Reported         6/27/19       Prochlorperazine Maleate (Prochlorperazine Maleate) 10 Mg Tab      10 MG PO Q6H PRN for NAUSEA AND/OR VOMITING, #60 TAB 3 Refills         Prov: DEVANG MILLS         6/17/19       Doxazosin Mesylate (Cardura) 4 Mg Tablet      4 MG PO QDAY, #60 TAB         Reported         6/17/19       Diltiazem CD (Cardizem CD) 120 Mg Capcr      120 MG PO QDAY, #30 CAP.ER 3 Refills         Prov: Gabriel Leong         9/6/17       Metoprolol Succinate (Metoprolol Succinate) 50 Mg Tab.er.24h      50 MG PO QDAY, #30 TAB.ER 3 Refills         Prov: Gabriel Leong         9/6/17       Multivitamins (Multi-Vitamin) 1 Tab Tablet      1 TAB PO QDAY         Reported         11/3/11      Medications Reviewed:  No Changes made to meds            IMPRESSION/PLAN      Diagnosis      Rectal cancer - C20      Status post neoadjuvant chemo RT which he tolerated very well.  Lab work today     to ensure all acute toxicities are resolving.  Restaging CT scan does not     demonstrate any evidence of progressive or recurrent disease.  He will follow-up    with surgery as scheduled to discuss resection.  I will need to see him back  in     the postoperative setting to review his pathology and discuss adjuvant therapy      New Diagnostics      * CMP Comp Metabolic Panel, Routine      * CBC With Auto Diff, Routine            Patient Education      Patient Education Provided:  Yes            Topics Patient Counseled on      Ct scan results; adjuvant treatment                 Disclaimer: Converted document may not contain table formatting or lab diagrams. Please see [employer] for the authenticated document.

## 2021-05-28 NOTE — PROGRESS NOTES
Patient: CYDNEY KENNEDY     Acct: [id number]     Report: #[id number]  UNIT #: [id number]     : 1955    Encounter Date:2020  PRIMARY CARE: AINSLEY CALABRESE  ***Signed***  --------------------------------------------------------------------------------------------------------------------  NURSE INTAKE      Visit Type      Established Patient Visit            Chief Complaint      RECTAL CA FOLLOW UP            Referring Provider/Copies To      Referring Provider:  Aroldo Garay            History and Present Illness      Past Oncology Illness History      Mr. Kennedy is a 63yo WM who was recently dx with rectal ca. He reports that he     noticed blood with defecation; however, not consistently.  This has been ongoing    for at least 6 months.  He has had a colonoscopy; however, >10yrs ago.      Colonoscopy performed on 5/3/19 per Dr. Garay found a fungating bleeding     mass 15cm from the anus.  Pathology showed moderately differentiated     adenocarcinoma associated with tubular adenoma.  19 CT scans were completed    and showed mild degree of coronary artery calcification.  Evidence of prior     granulomatous disease.  No evidence of metastatic disease to the chest.  The     reported rectal mass is not demonstrated on CT there is a redundant sigmoid     colon with scant sigmoid diverticulosis.  No evidence of metastatic disease in     the abdomen pelvis or visualized bony structures.  19 pelvic MRI showed     high rectal mass, difficult to fully characterize as described above.  The mass     is suspected to be circumferential, and there is suspicion for involvement or     extension beyond the serosal layer.  No evidence for involvement of adjacent     structures or definitive evidence for metastatic disease elsewhere in the pel    vis.  5 mm nonspecific lymph node in the right mesial rectal fat below the level    of the tumor.      PMH: htn, hyperlipidemia--no smoking; however, reports  drinking 6pack at least     2-3xs wk.  Family hx: father with prostate ca, maternal uncle with colon ca.       Pt is employed in a warehouse doing highly physical job description.            HPI - Oncology Interim      Patient returns today for consideration of cycle 5 of adjuvant Xeloda.  His last    cycle he had increasing hand-foot syndrome as well as worsened LFTs.  For this     reason his dose was decreased.  He states he has been doing better with the     decreased dose.  His hands and feet are not nearly as irritated.  He is still     keeping them lotion frequently.  He reports good appetite and his weight is     maintained.  His energy level is not as good as he would like but adequate for     his daily needs.  He does get winded with heavier exertion.  He denies any     masses lymphadenopathy.            Cancer Details            High rectal ohkh-howdiwoytb-bwoljvucbsloax adenocarcinoma associated with      tubular adenoma            Clinical Staging      Stage IIa ([id number])            Treatments      Chemotherapy      neoadjuvant Xeloda with radiation. adjuvant xeloda      Radiation Therapy      7/8/19 thru 8/14/19 completed neoadjuvant 5040 cGy=28 fxs rectum            Clinical Trial Participant      No            ECOG Performance Status      0            Most Recent Lab Findings      Laboratory Tests      1/15/20 09:41            1/15/20 10:36            PAST, FAMILY   Past Medical History      Past Medical History:  Hepatitis, High Cholesterol, Hypertension, Thyroid     Disease      Hematology/Oncology (M):  Colorectal, GI Cancer            Past Surgical History      Biopsy (COLONOSCOPY)            HERNIA REPAIR, REMOVED CANCER FROM COLON.            Family History      Family History:  Colorectal Cancer (UNCLE MAT), Prostate Cancer (FATHER)            Social History      Marital Status:        Lives independently:  Yes      Number of Children:  2      Occupation:  [employer]            Tobacco  Use      Tobacco status:  Never smoker            Alcohol Use      Alcohol intake:  0-2 drinks per day            Substance Use      Substance use:  Denies use            REVIEW OF SYSTEMS      General:  Denies: Fatigue      Eye:  Denies Corrective Lenses      ENT:  Denies Headache      Respiratory:  Admits: Shortness of Air;          Denies: Productive Cough      Gastrointestinal:  Denies Constipation, Denies Diarrhea      Musculoskeletal:  Denies Muscle Pain      Neurologic:  Admits Numbness\Tingling      Hematologic/Lymphatic:  Denies Bruising            VITAL SIGNS AND SCORES      Vitals      Weight 262 lbs 12.613 oz / 119.2 kg      Temperature 98.1 F / 36.72 C - Temporal      Pulse 92      Respirations 18      Blood Pressure 148/86 Sitting, Left Arm      Pulse Oximetry 94%, RM AIR            Pain Score      Pain Scale Used:  Numerical            Fatigue Score      Fatigue (0-10 scale):  0 (none)            EXAM      General Appearance:  Positive for: Alert, Oriented x3, Cooperative;          Negative for: Acute Distress      Eye:  Positive for: Anicteric Sclerae, Moist Conjunctiva      Neck:  Positive for: Supple;          Negative for: JVD, Masses      Respiratory:  Positive for: CTAB, Normal Respiratory Effort      Abdomen/Gastro:  Positive for: Normal Active Bowel Sounds, Soft;          Negative for: Distention, Hepatosplenomegaly, Tenderness      Other      well healed incision with colostomy      Cardiovascular:  Positive for: RRR;          Negative for: Gallop, Murmur, Peripheral Edema, Rub      Psychiatric:  Positive for: Appropriate Affect, Intact Judgement      Lymphatic:  Negative for: Cervical, Infraclavicular, Supraclavicular            PREVENTION      Hx Influenza Vaccination:  Yes      Date Influenza Vaccine Given:  Oct 1, 2019      Influenza Vaccine Declined:  No      2 or More Falls Past Year?:  No      Fall Past Year with Injury?:  No      Hx Pneumococcal Vaccination:  No      Encouraged to  follow-up with:  PCP regarding preventative exams.      Chart initiated by      DAVI TATE MA            ALLERGY/MEDS      Allergies      Coded Allergies:             NO KNOWN ALLERGIES (Unverified , 1/16/20)            Medications      Last Reconciled on 1/16/20 15:45 by DENISE ESPINOSA      Capecitabine (Xeloda) 500 Mg Tab      2000 MG PO BID for 14 Days, #112 TAB 2 Refills         Prov: DENISE ESPINOSA         1/9/20       Ondansetron Hcl (ONDANSETRON HCL) 8 Mg Tablet      8 MG PO Q8H PRN for NAUSEA for 14 Days, #42 TAB 3 Refills         Prov: DENISE ESPINOSA         12/19/19       Doxazosin Mesylate (Doxazosin Mesylate) 4 Mg Tablet      4 MG PO QPM, TAB         Reported         9/30/19       Ibuprofen (Ibuprofen) 400 Mg Tablet      400 MG PO Q4H PRN for PAIN, #100 TAB 0 Refills         Reported         9/26/19       Psyllium Seed (With Sugar) (Metamucil) 575 Gm Powder      1 TBSP PO QDAY, #1 BOTTLE         Reported         9/26/19       (Vitamin E)   No Conflict Check      1 TAB PO QDAY         Reported         9/26/19       Ascorbic Acid (Vitamin C*) 500 Mg Tablet      500 MG PO QDAY, #60 TAB 0 Refills         Reported         9/26/19       Folic Acid/Vit Bcomp,C (Super B-Complex Folic-Vit C Tb) 400 Mcg Tablet      1 TAB PO QDAY, TAB         Reported         9/26/19       Loratadine (Loratadine) 10 Mg Tablet      10 MG PO QDAY, #30 TAB 0 Refills         Reported         6/27/19       Levothyroxine (Levothyroxine) 0.025 Mg Tablet      0.025 MG PO QDAY@07, #30 TAB 0 Refills         Reported         6/27/19       Atorvastatin (Atorvastatin) 40 Mg Tablet      40 MG PO HS, #30 TAB 0 Refills         Reported         6/27/19       dilTIAZem CD (Cardizem CD) 120 Mg Capcr      120 MG PO QDAY, #30 CAP.ER 3 Refills         Prov: Gabriel Leong         9/6/17       Metoprolol Succinate (Metoprolol Succinate) 50 Mg Tab.er.24h      50 MG PO QDAY, #30 TAB.ER 3 Refills         Prov: Gabriel Leong         9/6/17       Multivitamins  (Multi-Vitamin) 1 Tab Tablet      1 TAB PO QDAY         Reported         11/3/11      Medications Reviewed:  No Changes made to meds            IMPRESSION/PLAN      Diagnosis      Rectal cancer - C20      Patient is due for cycle 5 of adjuvant Xeloda.  He had dose reduction for hand-    foot syndrome and elevated LFTs both of which are now improved.  Proceed with     cycle 5 as planned.  RTC 3 weeks for OV, cycle 6 with labs prior.  This will     complete his adjuvant therapy.            Notes      New Diagnostics      * CCC CBC With Auto Diff, 3 Week         Dx: Rectal cancer - C20      * CMP Comp Metabolic Panel, 3 Week         Dx: Rectal cancer - C20            Patient Education      Patient Education Provided:  Yes            Electronically signed by DENISE ESPINOSA  01/16/2020 15:45       Disclaimer: Converted document may not contain table formatting or lab diagrams. Please see [employer] [facility] for the authenticated document.

## 2021-05-28 NOTE — PROGRESS NOTES
Patient: CYDNEY KENNEDY     Acct: [id number]     Report: #[id number]  UNIT #: [id number]     : 1955    Encounter Date:2021  PRIMARY CARE: AINSLEY CALABRESE  ***Signed***  --------------------------------------------------------------------------------------------------------------------  NURSE INTAKE      Visit Type      Established Patient Visit            Chief Complaint      rectal ca f/u      Intent of Therapy:  Palliative            Referring Provider/Copies To      Primary Care Provider:  Aroldo Garay      Copies To:   Aroldo Garay            History and Present Illness      Past Oncology Illness History      Mr. Kennedy is a 64yo WM who presents to the clinic for follow up of his rectal     ca. He reports that he noticed blood with defecation; however, not consistently.     This has been ongoing for at least 6 months. 5/3/19  Colonoscopy performed by     Dr. Garay found a fungating bleeding mass 15cm from the anus.  Pathology     showed moderately differentiated adenocarcinoma associated with tubular adenoma.     19 CT scans were completed and showed mild degree of coronary artery     calcification.  Evidence of prior granulomatous disease.  No evidence of     metastatic disease to the chest.  The reported rectal mass is not demonstrated     on CT there is a redundant sigmoid colon with scant sigmoid diverticulosis.     2019 low anterior resection with ileostomy. 10/2019 completed     neoadjuvant Xeloda with radiation. adjuvant xeloda. 2021 CT scans reveal     recurrence; biopsy ordered. 21 Biopsy of liver revealed metastatic adenoc    arcinoma, consistent with colorectal primary.            HPI - Oncology Interim      Patient returns for follow-up of his recently diagnosed metastatic rectal     cancer.  Since his last visit, he was evaluated here first to [place] by     surgical oncology.  They did not feel he was resectable at this point.  They      recommended neoadjuvant chemotherapy with consideration of resection based on     response.  He returns today for that discussion.  He states that he is feeling     well.  He denies new masses or adenopathy.  No unusual aches or pains.  He     reports good appetite and energy level.  He has an upcoming appointment with his    local surgeon for Port-A-Cath placement.            Cancer Details            9/30/2019 High rectal yuoe-ydmzsvohlh-umxatefveeafic adenocarcinoma      associated with tubular adenoma. 4/27/21 Biopsy of liver revealed      metastatic adenocarcinoma, consistent with colorectal primary.            Clinical Staging      Stage IIa ([id number])            Treatments      Chemotherapy      neoadjuvant Xeloda with radiation. adjuvant xeloda      Radiation Therapy      7/8/19 thru 8/14/19 completed neoadjuvant 5040 cGy=28 fxs rectum      Surgeries       9/30/2019 low anterior resection with ostomy            Clinical Trial Participant      No            ECOG Performance Status      0            PAST, FAMILY   Past Medical History      Past Medical History:  Hepatitis, High Cholesterol, Hypertension, Thyroid     Disease      Hematology/Oncology (M):  Colorectal, GI Cancer            Past Surgical History      Biopsy            HERNIA REPAIR, REMOVED CANCER FROM COLON.            Family History      Family History:  Colorectal Cancer, Prostate Cancer            Social History      Lives independently:  Yes      Number of Children:  2      Occupation:  [employer]            Tobacco Use      Tobacco status:  Never smoker            Substance Use      Substance use:  Denies use            REVIEW OF SYSTEMS      General:  Admits: Fatigue;          Denies: Fever      ENT:  Denies Sore Throat      Respiratory:  Admits: Shortness of Air      Gastrointestinal:  Denies Diarrhea, Denies Nausea/Vomiting      Musculoskeletal:  Denies Muscle Pain, Denies Painful Joints      Integumentary:  Denies Rash      Neurologic:   Denies Seizures            VITAL SIGNS AND SCORES      Vitals      Weight 274 lbs 11.091 oz / 124.6 kg      Temperature 97 F / 36.11 C - Temporal      Pulse 95      Respirations 16      Blood Pressure 129/89 Sitting, Left Arm      Pulse Oximetry 97%, rm air            Pain Score      Pain Scale Used:  Numerical      Pain Intensity:  0            Fatigue Score      Fatigue (0-10 scale):  0 (none)            EXAM      General Appearance:  Positive for: Alert, Cooperative;          Negative for: Acute Distress      Eye:  Positive for: Anicteric Sclerae, Moist Conjunctiva      Neck:  Positive for: Supple;          Negative for: JVD, Masses      Respiratory:  Positive for: CTAB, Normal Respiratory Effort      Abdomen/Gastro:  Positive for: Normal Active Bowel Sounds, Soft;          Negative for: Hepatosplenomegaly, Tenderness      Cardiovascular:  Positive for: RRR;          Negative for: Gallop, Murmur, Peripheral Edema, Rub      Psychiatric:  Positive for: Appropriate Affect, Intact Judgement      Lymphatic:  Negative for: Cervical, Infraclavicular, Supraclavicular            PREVENTION      Hx Influenza Vaccination:  Yes      Date Influenza Vaccine Given:  Sep 1, 2020      Influenza Vaccine Declined:  No      2 or More Falls in Past Year?:  No      Fall Past Year with Injury?:  No      Hx Pneumococcal Vaccination:  No      Encouraged to follow-up with:  PCP regarding preventative exams.      Chart initiated by      jose angel nathan ma            ALLERGY/MEDS      Allergies      Coded Allergies:             NO KNOWN ALLERGIES (Unverified , 5/24/21)            Medications      Last Reconciled on 5/24/21 12:50 by DENISE ESPINOSA      Prochlorperazine Maleate (Prochlorperazine Maleate) 10 Mg Tab      10 MG PO Q6H PRN for NAUSEA AND/OR VOMITING for 7 Days, #28 TAB 3 Refills         Prov: DENISE ESPINOSA         5/24/21       Ondansetron Hcl (ONDANSETRON HCL) 8 Mg Tablet      8 MG PO Q8H PRN for NAUSEA, #30 TAB 3 Refills          Prov: DENISE ESPINOSA         5/24/21       Vitamin E (Vitamin E*) 400 Units Capsule      400 UNITS PO QDAY, CAP         Reported         1/14/21       Doxazosin Mesylate (Doxazosin Mesylate) 8 Mg Tablet      8 MG PO QDAY, TAB         Reported         1/14/21       Apixaban (Eliquis) 5 Mg Tablet      5 MG PO BID for 30 Days, #60 TAB         Reported         5/4/20       Psyllium Seed (With Sugar) (Metamucil) 575 Gm Powder      1 TBSP PO QDAY, #1 BOTTLE         Reported         9/26/19       Ascorbic Acid (Vitamin C*) 500 Mg Tablet      500 MG PO QDAY, #60 TAB 0 Refills         Reported         9/26/19       Folic Acid/Vit Bcomp,C (Super B-Complex Folic-Vit C Tb) 400 Mcg Tablet      1 TAB PO QDAY, TAB         Reported         9/26/19       Loratadine (Loratadine) 10 Mg Tablet      10 MG PO QDAY PRN for ALLERGIES, #30 TAB 0 Refills         Reported         6/27/19       Levothyroxine (Levothyroxine) 0.025 Mg Tablet      0.025 MG PO QDAY@07, #30 TAB 0 Refills         Reported         6/27/19       Atorvastatin (Atorvastatin) 40 Mg Tablet      40 MG PO HS, #30 TAB 0 Refills         Reported         6/27/19       dilTIAZem CD (Cardizem CD) 120 Mg Capcr      120 MG PO QDAY, #30 CAP.ER 3 Refills         Prov: Gabriel Leong         9/6/17       Metoprolol Succinate (Metoprolol Succinate) 50 Mg Tab.er.24h      50 MG PO QDAY, #30 TAB.ER 3 Refills         Prov: Gabriel Leong         9/6/17       Multivitamins (Multi-Vitamin) 1 Tab Tablet      1 TAB PO QDAY         Reported         11/3/11      Medications Reviewed:  No Changes made to meds            IMPRESSION/PLAN      Diagnosis      Notes      Biopsy-proven metastatic disease.  Recently evaluated at  but not felt to be a    candidate for upfront resection.  Those records reviewed and summarized.  Marva     testing does not demonstrate an actionable mutation but does suggest favorable     response to FOLFOX plus Avastin in the first-line setting.  He has had a     previous  blood clot but completed anticoagulation therapy and is now on chronic     anticoagulation for atrial fibrillation.  We discussed the regimen of FOLFOX     plus Avastin consisting of 5-fluorouracil, leucovorin, oxaliplatin, Avastin     given via Port-A-Cath is a 3-day regimen every 2 weeks until progression or     intolerance.  I will plan restaging scans after every 4 cycles with     consideration of surgery based upon response.  If he does look like he would be     a surgical candidate the Avastin would be omitted from the regimen prior to     proceeding with definitive treatment.  Side effects, risk and benefits discussed    in detail.  Patient is agreeable to treatment.  Written teach information     provided.  He already has an appointment for Port-A-Cath placement in the near     future.  He will have lab work today to ensure adequate endorgan functions and     blood counts.  Prescriptions for Zofran and Compazine will be provided as needed    for nausea.  I will see him back for cycle 2, day 1 with labs prior.      New Medications      * ONDANSETRON HCL 8 MG TABLET: 8 MG PO Q8H PRN NAUSEA #30         Dx: Rectal cancer - C20      * Prochlorperazine Maleate 10 MG TAB: 10 MG PO Q6H PRN NAUSEA AND/OR VOMITING 7       Days #28         Dx: Rectal cancer - C20      New Diagnostics      * CCC CBC With Auto Diff, 05/24/21         Dx: Rectal cancer - C20      * CCC Comp Metabolic Panel, 05/24/21         Dx: Rectal cancer - C20      * CCC Magnesium Serum, 05/24/21         Dx: Rectal cancer - C20      * Urinalyis W/Micro, 05/24/21         Dx: Rectal cancer - C20            Pain      Pain Zero Today            Advanced Care Plan Discussion      Declines Discussion [id number]            Patient Education            Bevacizumab Injection      Coping With Fatigue From Chemotherapy      Coping With Nerve and Muscle Effects Related to Chemotherapy      Coping With the Side Effects of Chemotherapy      Coping with Nausea and  Vomiting From Chemotherapy      Fluorouracil Injection      Leucovorin      Oxaliplatin Injection      Patient Education Provided:  Yes            Electronically signed by DENISE ESPINOSA  05/24/2021 12:50       Disclaimer: Converted document may not contain table formatting or lab diagrams. Please see [employer] [facility] for the authenticated document.

## 2021-05-28 NOTE — PROGRESS NOTES
Patient: CYDNEY KENNEDY     Acct: [id number]     Report: #[id number]  UNIT #: [id number]     : 1955    Encounter Date:2021  PRIMARY CARE: AINSLEY CALABRESE  ***Signed***  --------------------------------------------------------------------------------------------------------------------  NURSE INTAKE      Visit Type      Established Patient Visit            Chief Complaint      RECTAL CA/CT RESULTS      Intent of Therapy:  Curative            Referring Provider/Copies To      Primary Care Provider:  Aroldo Garay      Copies To:   AINSLEY CALABRESE            History and Present Illness      Past Oncology Illness History      Mr. Kennedy is a 66yo WM who presents to the clinic for follow up of his rectal     ca. He reports that he noticed blood with defecation; however, not consistently.     This has been ongoing for at least 6 months. 5/3/10  Colonoscopy performed by     Dr. Garay found a fungating bleeding mass 15cm from the anus.  Pathology     showed moderately differentiated adenocarcinoma associated with tubular adenoma.     19 CT scans were completed and showed mild degree of coronary artery     calcification.  Evidence of prior granulomatous disease.  No evidence of     metastatic disease to the chest.  The reported rectal mass is not demonstrated     on CT there is a redundant sigmoid colon with scant sigmoid diverticulosis.     2019 low anterior resection with ileostomy. 10/2019 completed     neoadjuvant Xeloda with radiation. adjuvant xeloda. 2021 CT scans reveal     recurrence; biopsy of lungs ordered.            HPI - Oncology Interim      Patient returns for follow-up of his rectal cancer.  He status post treatments     as outlined.  On return today, he states he is feeling well.  He has a ventral     hernia in his abdomen but not causing any pain or problems.  He denies abdominal    pain.  He reports good appetite.  His bowels are working normally without  blood     per rectum, pain or melena.  He denies any masses or adenopathy.  No unusual     aches or pains.  He notes good energy level.            Cancer Details            2019 High rectal bnma-gzawfsjppn-sujplzmwrstkit adenocarcinoma      associated with      tubular adenoma            Clinical Staging      Stage IIa ([id number])            Treatments      Chemotherapy      neoadjuvant Xeloda with radiation. adjuvant xeloda      Radiation Therapy      19 thru 19 completed neoadjuvant 5040 cGy=28 fxs rectum      Surgeries       2019 low anterior resection with ostomy            Clinical Trial Participant      No            ECOG Performance Status      0            Most Recent Lab Findings      Laboratory Tests      21 13:09            21 13:14            Most Recent Imaging Findings      Patient: CYDNEY LEONARD   Acct: #[id number]   Report: #[id number]            UNIT #: [id number]    DOS: 21 1200      INSURANCE:[employer]   ORDER #:[id number]      LOCATION:[facility]     : 1955            PROVIDERS      ADMITTING:     ATTENDING: DENISE ESPINOSA      FAMILY:  DARRELL WILSON   ORDERING:  DENISE ESPINOSA         OTHER:    DICTATING:  YASHIRA HORTON MD            REQ #:[id number]   EXAM:CTACPWC - CT ABD CHEST PEL w CONTR      REASON FOR EXAM:        REASON FOR VISIT:  RECTAL CA            *******Signed******         PROCEDURE:   CT ABDOMEN; CT CHEST; CT PELVIS WITH CONTRAST             COMPARISON:   [facility], CT, CT CHEST ABD PEL W CONTRAST,    2021, 10:55.             INDICATIONS:   FOLLOW UP RECTAL CA. NO COMPLAINTS. GFR GREATER THAN 60     CREATININE 0.9 21.             TECHNIQUE:   After obtaining the patient's consent, CT images were obtained with    intravenous contrast       material.             FINDINGS:         Chest:  There is an enlarging 9 mm right middle lobe nodule, previously 7 mm.      There is an       enlarging  5 mm left lower lobe nodule axial image 56. There is a stable 4 mm     nodule left lower lobe       axial image 52.  There is an enlarging 8 mm right basilar nodule axial image 52.    There is a new 2       mm right upper lobe nodule axial image 31. No pneumothorax, pleural effusion or     focal airspace       consolidation.  Central and segmental airways appear patent.             Thyroid, trachea and esophagus appear within normal limits.  The heart size is     normal.  No       mediastinal adenopathy or pericardial effusion.  No evidence of central     pulmonary embolism.  There       are moderate coronary artery calcifications.             Subcutaneous fat and underlying musculature appear within normal limits.  There     are no acute       osseous abnormalities or destructive bone lesions.  There are mild multilevel     thoracolumbar       degenerative changes.             Abdomen and pelvis:  There is a new hypodense ill-defined lesion in in the right    hepatic lobe axial       image 37 measuring 29 mm diameter.  There are geographic regions of fatty     infiltration of the       liver.  Liver parenchyma appears heterogeneous.  The spleen, stomach, adrenal     glands, gallbladder,       bile ducts and small bowel appear within normal limits.  There is a colorectal     anastomosis, which       appears unremarkable.  There is evidence of prior ventral laparotomy.  There is     a moderate fatty       atrophy of the pancreas.  The remainder of the colon is unremarkable.  There is     no ascites,       pneumoperitoneum or lymphadenopathy.             Subcutaneous fat and underlying musculature appear within normal limits.  There     is stable diastasis       rectus.  There are no acute osseous abnormalities or destructive bone lesions.      There are moderate       lumbar degenerative changes with neural foraminal and spinal canal narrowing.               CONCLUSION:         1. New 2 mm right upper lobe nodule and  slight interval increase in the size of     previously       identified bilateral lung nodules, which would suggest progression of metastatic    disease.      2. New likely metastatic lesion in the right hepatic lobe measuring 29 mm.       3. Stable appearance of colorectal anastomosis without local recurrence of     malignancy.      4. Hepatic steatosis and coronary artery disease.  Thoracolumbar degenerative     changes.              YASHIRA HORTON MD             Electronically Signed and Approved By: YASHIRA HORTON MD on 4/12/2021 at 12:42                                  Until signed, this is an unconfirmed preliminary report that may contain      errors and is subject to change.                                              ALTD1:      D:04/12/21 1242            PAST, FAMILY   Past Medical History      Past Medical History:  Hepatitis, High Cholesterol, Hypertension, Thyroid     Disease      Hematology/Oncology (M):  Colorectal, GI Cancer            Past Surgical History      Biopsy            HERNIA REPAIR, REMOVED CANCER FROM COLON.            Family History      Family History:  Colorectal Cancer, Prostate Cancer            Social History      Lives independently:  Yes      Number of Children:  2      Occupation:  [employer]            Tobacco Use      Tobacco status:  Never smoker            Substance Use      Substance use:  Denies use            REVIEW OF SYSTEMS      General:  Denies: Fever      Eye:  Denies Diplopia      ENT:  Denies Hearing Loss      Gastrointestinal:  Denies Constipation, Denies Problem Swallowing      Musculoskeletal:  Denies Back Pain, Denies Painful Joints      Integumentary:  Denies Lesions      Psychiatric:  Denies Anxiety      Hematologic/Lymphatic:  Denies Enlarged Lymph Nodes            VITAL SIGNS AND SCORES      Vitals      Weight 276 lbs 3.782 oz / 125.3 kg      Temperature 97.9 F / 36.61 C - Temporal      Pulse 88      Respirations 14      Blood Pressure 117/93 Sitting, Left  Arm      Pulse Oximetry 97%, RM AIR            Pain Score      Pain Scale Used:  Numerical      Pain Intensity:  0            Fatigue Score      Fatigue (0-10 scale):  0 (none)            EXAM      General Appearance:  Positive for: Alert, Cooperative;          Negative for: Acute Distress      Eye:  Positive for: Anicteric Sclerae, Moist Conjunctiva      Neck:  Positive for: Supple;          Negative for: JVD, Masses      Respiratory:  Positive for: CTAB, Normal Respiratory Effort      Abdomen/Gastro:  Positive for: Normal Active Bowel Sounds, Hernias (Ventral     hernia without incarceration), Soft;          Negative for: Distention, Hepatosplenomegaly, Tenderness      Other      Well-healed surgical incisions      Psychiatric:  Positive for: Appropriate Affect, Intact Judgement      Lymphatic:  Negative for: Cervical, Infraclavicular, Supraclavicular            PREVENTION      Hx Influenza Vaccination:  Yes      Date Influenza Vaccine Given:  Sep 1, 2020      Influenza Vaccine Declined:  No      2 or More Falls in Past Year?:  No      Fall Past Year with Injury?:  No      Hx Pneumococcal Vaccination:  No      Encouraged to follow-up with:  PCP regarding preventative exams.      Chart initiated by      DAVI TATE MA            ALLERGY/MEDS      Allergies      Coded Allergies:             NO KNOWN ALLERGIES (Unverified , 4/13/21)            Medications      Last Reconciled on 4/13/21 13:40 by DENISE ESPINOSA      Vitamin E (Vitamin E*) 400 Units Capsule      400 UNITS PO QDAY, CAP         Reported         1/14/21       Doxazosin Mesylate (Doxazosin Mesylate) 8 Mg Tablet      8 MG PO QDAY, TAB         Reported         1/14/21       Apixaban (Eliquis) 5 Mg Tablet      5 MG PO BID for 30 Days, #60 TAB         Reported         5/4/20       Psyllium Seed (With Sugar) (Metamucil) 575 Gm Powder      1 TBSP PO QDAY, #1 BOTTLE         Reported         9/26/19       Ascorbic Acid (Vitamin C*) 500 Mg Tablet      500 MG PO  QDAY, #60 TAB 0 Refills         Reported         9/26/19       Folic Acid/Vit Bcomp,C (Super B-Complex Folic-Vit C Tb) 400 Mcg Tablet      1 TAB PO QDAY, TAB         Reported         9/26/19       Loratadine (Loratadine) 10 Mg Tablet      10 MG PO QDAY PRN for ALLERGIES, #30 TAB 0 Refills         Reported         6/27/19       Levothyroxine (Levothyroxine) 0.025 Mg Tablet      0.025 MG PO QDAY@07, #30 TAB 0 Refills         Reported         6/27/19       Atorvastatin (Atorvastatin) 40 Mg Tablet      40 MG PO HS, #30 TAB 0 Refills         Reported         6/27/19       dilTIAZem CD (Cardizem CD) 120 Mg Capcr      120 MG PO QDAY, #30 CAP.ER 3 Refills         Prov: Gabriel Leong         9/6/17       Metoprolol Succinate (Metoprolol Succinate) 50 Mg Tab.er.24h      50 MG PO QDAY, #30 TAB.ER 3 Refills         Prov: Gabriel Leong         9/6/17       Multivitamins (Multi-Vitamin) 1 Tab Tablet      1 TAB PO QDAY         Reported         11/3/11      Medications Reviewed:  No Changes made to meds            IMPRESSION/PLAN      Diagnosis      Rectal cancer - C20      Status post treatments as outlined.  Clinically he is feeling well,     unfortunately I reviewed his CT scans and this demonstrates a new lesion in the     right lobe of the liver.  There is also several small subcentimeter pulmonary     nodules some of which are slightly larger and suggestive of pulmonary metastatic    foci.  I discussed the scan with Dr. Canela, interventional radiology.      Patient will have a biopsy of the right lobe of the liver.  I will send the     biopsy specimen for next generation sequencing.  We discussed that this is     likely recurrence of his rectal cancer and he would need to consider resumption     of chemotherapy.  He has a relatively small burden of disease such that it may     be possible to cytoreduce him with chemotherapy followed by subsequent directed     therapies to the residual liver and lung lesions.  Further  discussion based upon    results.            Notes      New Diagnostics      * BIOPSY CT, As Soon As Possible         Dx: Rectal cancer - C20      * [facility] Pre-Op Covid Screening, Routine         Dx: Preop testing - Z01.818      * CBC With Auto Diff, Routine         Dx: Rectal cancer - C20      * PT/PTT, Routine         Dx: Rectal cancer - C20            Advanced Care Plan Discussion      Declines Discussion [id number]            Patient Education            Biopsy      Patient Education Provided:  Yes            Electronically signed by DENISE ESPINOSA  04/13/2021 13:41       Disclaimer: Converted document may not contain table formatting or lab diagrams. Please see [employer] for the authenticated document.

## 2021-05-28 NOTE — PROGRESS NOTES
Patient: CYDNEY KENNEDY     Acct: [id number]     Report: #[id number]  UNIT #: [id number]     : 1955    Encounter Date:2020  PRIMARY CARE: AINSLEY CALABRESE  ***Signed***  --------------------------------------------------------------------------------------------------------------------  NURSE INTAKE      Visit Type      Established Patient Visit            Chief Complaint      RECTAL CANCER (ACUTE VISIT - RIGHT LOWER LEG SWELLING)            Referring Provider/Copies To      Referring Provider:  Aroldo Garay      Primary Care Provider:  AINSLEY CALABRESE      Copies To:   AINSLEY CALABRESE            History and Present Illness      Past Oncology Illness History      Mr. Kennedy is a 63yo WM who was recently dx with rectal ca. He reports that he     noticed blood with defecation; however, not consistently.  This has been ongoing    for at least 6 months.  He has had a colonoscopy; however, >10yrs ago.      Colonoscopy performed on 5/3/19 per Dr. Garay found a fungating bleeding     mass 15cm from the anus.  Pathology showed moderately differentiated     adenocarcinoma associated with tubular adenoma.  19 CT scans were completed    and showed mild degree of coronary artery calcification.  Evidence of prior g    ranulomatous disease.  No evidence of metastatic disease to the chest.  The     reported rectal mass is not demonstrated on CT there is a redundant sigmoid     colon with scant sigmoid diverticulosis.  No evidence of metastatic disease in     the abdomen pelvis or visualized bony structures.  19 pelvic MRI showed     high rectal mass, difficult to fully characterize as described above.  The mass     is suspected to be circumferential, and there is suspicion for involvement or     extension beyond the serosal layer.  No evidence for involvement of adjacent     structures or definitive evidence for metastatic disease elsewhere in the     pelvis.  5 mm nonspecific lymph node in the right  mesial rectal fat below the     level of the tumor.      PMH: htn, hyperlipidemia--no smoking; however, reports drinking 6pack at least     2-3xs wk.  Family hx: father with prostate ca, maternal uncle with colon ca.       Pt is employed in a warehouse doing highly physical job description.            HPI - Oncology Interim      Acute care visit for Right lower extremity leg swelling x 1 day.             1) Right lower extremity leg swelling:             Mr. Robbi Kennedy presents for acute care visit for right lower extremity  leg     swelling and leg pain. He reports started with right ankle swelling 2 weeks ago     and started having right leg swelling from knee down to ankle swelling, redness     and pain for the last one day. Mr. Kennedy reports he was outside yesterday and     enjoyed the pretty day and was quite active.  Reports right leg pain feels like     an aching sensation. This is of moderate intensity with no other exacerbating     factors. He denies any cough, SOA or CP. Denies any hemoptysis. Vitals are     stable.             Denies any prior DVT or PE or abnormal clotting in the past.             2) Colon Cancer: History of Stage IIA colon cancer. Recently finished last cycle    of oral chemotherapy with Xeloda. He is due to have ileostomy reversal on 3/23     with follow up scans on 3/19.            Cancer Details            High rectal ckzl-ioyzflzkfb-vwwsroyrntbhnx adenocarcinoma associated with      tubular adenoma            Clinical Staging      Stage IIa ([id number])            Treatments      Chemotherapy      neoadjuvant Xeloda with radiation. adjuvant xeloda      Radiation Therapy      7/8/19 thru 8/14/19 completed neoadjuvant 5040 cGy=28 fxs rectum            Clinical Trial Participant      No            ECOG Performance Status      0            PAST, FAMILY   Past Medical History      Past Medical History:  Hepatitis, High Cholesterol, Hypertension, Thyroid     Disease       Hematology/Oncology (M):  Colorectal, GI Cancer            Past Surgical History      Biopsy (COLONOSCOPY)            HERNIA REPAIR, REMOVED CANCER FROM COLON.            Family History      Family History:  Colorectal Cancer (UNCLE MAT), Prostate Cancer (FATHER)            Social History      Marital Status:        Lives independently:  Yes      Number of Children:  2      Occupation:  [employer]            Tobacco Use      Tobacco status:  Never smoker            Alcohol Use      Alcohol intake:  0-2 drinks per day            Substance Use      Substance use:  Denies use            REVIEW OF SYSTEMS      General:  Admits: Fatigue;          Denies: Appetite Change, Fever, Night Sweats, Weight Gain, Weight Loss      Eye:  Denies Blurred Vision, Denies Corrective Lenses, Denies Diplopia, Denies     Vision Changes      ENT:  Denies Headache, Denies Hearing Loss, Denies Hoarseness, Denies Sore     Throat      Cardiovascular:  Denies Chest Pain, Denies Palpitations      Other      RIGHT LOWER LEG SWELLING      Respiratory:  Denies: Coughing Blood, Productive Cough, Shortness of Air,     Wheezing      Gastrointestinal:  Denies Bloody Stools, Denies Constipation, Denies Diarrhea,     Denies Nausea/Vomiting, Denies Problem Swallowing, Denies Unable to Control     Bowels      Genitourinary:  Denies Blood in Urine, Denies Incontinence, Denies Painful     Urination      Musculoskeletal:  Denies Back Pain; Admits Muscle Pain (RIGHT LOWER LEG SWELLING    - PAIN FROM KNEE DOWN), Admits Painful Joints (RIGHT LOWER LEG SWELLING - PAIN     FROM KNEE DOWN)      Other      RIGHT LOWER LEG SWELLING - PAIN FROM KNEE DOWN      Integumentary:  Denies Itching, Denies Lesions, Denies Rash      Neurologic:  Denies Dizziness, Denies Numbness\Tingling, Denies Seizures      Psychiatric:  Denies Anxiety, Denies Depression      Endocrine:  Denies Cold Intolerance, Denies Heat Intolerance      Hematologic/Lymphatic:  Denies Bruising, Denies  Bleeding, Denies Enlarged Lymph     Nodes            VITAL SIGNS AND SCORES      Vitals      Height 6 ft 2.02 in / 188 cm      Weight 267 lbs 6.688 oz / 121.3 kg      BSA 2.46 m2      BMI 34.3 kg/m2      Temperature 97.4 F / 36.33 C - Temporal      Pulse 85      Respirations 16      Blood Pressure 113/75 Sitting, Left Arm      Pulse Oximetry 97%, ROOM AIR            Pain Score      Experiencing any pain?:  Yes (RIGHT LOWER LEG SWELLING - PAIN FROM KNEE DOWN)      Pain Scale Used:  Numerical      Pain Intensity:  7            Fatigue Score      Experiencing any fatigue?:  Yes      Fatigue (0-10 scale):  4            EXAM      General appearance:  in no apparent distress, cooperative, appears stated age.      HEENT: No pallor, no icterus, oral mucosa moist      Neck: Supple, trachea central-not deviated      Lymph nodes: none palpable peripherally      Cardiovascular: S1-S2 heard, no murmurs, no rubs, no gallops.      Breast exam:      Respiratory: Clear to auscultation bilaterally, no adventitious sounds      Abdomen/gastro: Soft, nontender, no palpable hepatosplenomegaly, bowel sounds     heard      Skin: No lesions, no rashes, no petechiae.      Extremities: Right lower extremity from knee down to ankle with swelling,     redness to leg with leg pain noted.  Left leg with no swelling noted. Pulses     palpable to bilateral extremities.       Musculoskeletal: Normal tone, no rigidity of muscles; range of motion intact      Spine: No deformities      Psychiatric: Alert and oriented x3, appropriate affect, intact judgment      Neurologic: Cranial nerves II through XII grossly intact, no gross neurological     deficits noted.            PREVENTION      Hx Influenza Vaccination:  Yes      Date Influenza Vaccine Given:  Oct 1, 2019      Influenza Vaccine Declined:  No      2 or More Falls Past Year?:  No      Fall Past Year with Injury?:  No      Hx Pneumococcal Vaccination:  No      Encouraged to follow-up with:  PCP  regarding preventative exams.      Chart initiated by      ABDIRASHID ESPINO MA            ALLERGY/MEDS      Allergies      Coded Allergies:             NO KNOWN ALLERGIES (Unverified , 3/2/20)            Medications      Last Reconciled on 3/2/20 12:46 by CIARAN CARBALLO      Capecitabine (Xeloda) 500 Mg Tab      2000 MG PO BID for 14 Days, #112 TAB 2 Refills         Prov: DENISE ESPINOSA         1/9/20       Ondansetron Hcl (ONDANSETRON HCL) 8 Mg Tablet      8 MG PO Q8H PRN for NAUSEA for 14 Days, #42 TAB 3 Refills         Prov: DENISE ESPINOSA         12/19/19       Doxazosin Mesylate (Doxazosin Mesylate) 4 Mg Tablet      4 MG PO QPM, TAB         Reported         9/30/19       Ibuprofen (Ibuprofen) 400 Mg Tablet      400 MG PO Q4H PRN for PAIN, #100 TAB 0 Refills         Reported         9/26/19       Psyllium Seed (With Sugar) (Metamucil) 575 Gm Powder      1 TBSP PO QDAY, #1 BOTTLE         Reported         9/26/19       (Vitamin E)   No Conflict Check      1 TAB PO QDAY         Reported         9/26/19       Ascorbic Acid (Vitamin C*) 500 Mg Tablet      500 MG PO QDAY, #60 TAB 0 Refills         Reported         9/26/19       Folic Acid/Vit Bcomp,C (Super B-Complex Folic-Vit C Tb) 400 Mcg Tablet      1 TAB PO QDAY, TAB         Reported         9/26/19       Loratadine (Loratadine) 10 Mg Tablet      10 MG PO QDAY, #30 TAB 0 Refills         Reported         6/27/19       Levothyroxine (Levothyroxine) 0.025 Mg Tablet      0.025 MG PO QDAY@07, #30 TAB 0 Refills         Reported         6/27/19       Atorvastatin (Atorvastatin) 40 Mg Tablet      40 MG PO HS, #30 TAB 0 Refills         Reported         6/27/19       dilTIAZem CD (Cardizem CD) 120 Mg Capcr      120 MG PO QDAY, #30 CAP.ER 3 Refills         Prov: Gabriel Leong         9/6/17       Metoprolol Succinate (Metoprolol Succinate) 50 Mg Tab.er.24h      50 MG PO QDAY, #30 TAB.ER 3 Refills         Prov: Gabriel Leong         9/6/17       Multivitamins (Multi-Vitamin) 1  Tab Tablet      1 TAB PO QDAY         Reported         11/3/11      Medications Reviewed:  No Changes made to meds            IMPRESSION/PLAN      Impression      1) Right lower extremity leg swelling:             Mr. Robbi Kennedy presents for acute care visit for right lower extremity  leg     swelling and leg pain. He reports started with right ankle swelling 2 weeks ago     and started having right leg swelling from knee down to ankle swelling, redness     and pain for the last one day. Reports right leg pain feels like an aching     sensation. This is of moderate intensity with no other exacerbating factors. He     denies any cough, SOA or CP. Denies any hemoptysis. Vitals are stable.             2) Colon Cancer: History of Stage IIA colon cancer. Recently finished last cycle    of oral chemotherapy with Xeloda x 6 months of adjuvant therapy. He is due to     have ileostomy reversal on 3/23 with follow up  CT CAP scans on 3/19. Follow up     on 3/23 with Dr. Campos to discuss scan results.            Diagnosis      Swelling of right lower extremity - M79.89            Notes      New Diagnostics      * Duplex Scan LE, Stat         Dx: Swelling of right lower extremity - M79.89            Plan      1) Right lower extremity Doppler study to rule out DVT stat today. Scheduled for    3:45 PM on 3/2/20.             Will call with results.             2) Continue with CT CAP on 3/19 with follow up with Dr. Campos for results.             May need to delay ileostomy reversal  with Dr. Garay depending on results of    the doppler study today.             If positive for DVT, then will initiate Xarelto, 15 mg BID x 21 days, then 20 mg    PO QD for at least 3-6 months of a anti-coagulation.            Pain      Pain Zero Today            Advanced Care Plan Discussion      Declines Discussion [id number]            Patient Education      Patient Education Provided:  Yes            Topics Patient Counseled on      Topics Pt.  Counseled On:  Other (right lower extremity leg swelling)            Electronically signed by CIARAN CARBALLO onc  03/02/2020 12:47       Disclaimer: Converted document may not contain table formatting or lab diagrams. Please see [employer] for the authenticated document.

## 2021-05-28 NOTE — PROGRESS NOTES
Patient: CYDNEY KENNEDY     Acct: [id number]     Report: #[id number]  UNIT #: [id number]     : 1955    Encounter Date:10/24/2019  PRIMARY CARE: AINSLEY CALABRESE  ***Signed***  --------------------------------------------------------------------------------------------------------------------  NURSE INTAKE      Visit Type      Established Patient Visit            Chief Complaint      START ON CHEMO            Referring Provider/Copies To      Referring Provider:  Aroldo Garay      PCP Not Found in Lookup:  [facility] PRIMARY CARE DR HERNANDEZ            History and Present Illness      Past Oncology Illness History      Mr. Kennedy is a 65yo WM who was recently dx with rectal ca. He reports that he     noticed blood with defecation; however, not consistently.  This has been ongoing    for at least 6 months.  He has had a colonoscopy; however, >10yrs ago.  Co    lonoscopy performed on 5/3/19 per Dr. Garay found a fungating bleeding mass     15cm from the anus.  Pathology showed moderately differentiated adenocarcinoma     associated with tubular adenoma.  19 CT scans were completed and showed     mild degree of coronary artery calcification.  Evidence of prior granulomatous     disease.  No evidence of metastatic disease to the chest.  The reported rectal     mass is not demonstrated on CT there is a redundant sigmoid colon with scant     sigmoid diverticulosis.  No evidence of metastatic disease in the abdomen pelvis    or visualized bony structures.  19 pelvic MRI showed high rectal mass,     difficult to fully characterize as described above.  The mass is suspected to be    circumferential, and there is suspicion for involvement or extension beyond the     serosal layer.  No evidence for involvement of adjacent structures or definitive    evidence for metastatic disease elsewhere in the pelvis.  5 mm nonspecific lymph    node in the right mesial rectal fat below the level of the tumor.      PMH:  htn, hyperlipidemia--no smoking; however, reports drinking 6pack at least     2-3xs wk.  Family hx: father with prostate ca, maternal uncle with colon ca.       Pt is employed in a warehouse doing highly physical job description.            HPI - Oncology Interim      64-year-old white male who returns today for follow-up of rectal cancer.  He     completed neoadjuvant chemo RT and then underwent low anterior resection with     ostomy 9/30/2019.  Patient states that he is healing well from the incision.      The ostomy is functioning normally without blood or pain with bowel movement.      He notes that he is eating and drinking well, his weight is maintained.  His     energy level is improving although not back to normal.  He denies any masses     lymphadenopathy.  No unusual aches or pains.  He presents for discussion of     adjuvant treatment.            Cancer Details            High rectal onlf-ojhnhhpizg-loociodjrqceby adenocarcinoma associated with      tubular adenoma            Clinical Staging      Stage IIa ([id number])            Treatments      Chemotherapy      neoadjuvant Xeloda      Radiation Therapy      7/8/19 thru 8/14/19 completed neoadjuvant 5040 cGy=28 fxs rectum            Clinical Trial Participant      No            ECOG Performance Status      0            PAST, FAMILY   Past Medical History      Past Medical History:  Hepatitis, High Cholesterol, Hypertension, Thyroid     Disease      Hematology/Oncology (M):  GI Cancer            Past Surgical History      Biopsy (COLONOSCOPY)            HERNIA REPAIR, REMOVED CANCER FROM COLON.            Family History      Family History:  Colorectal Cancer (UNCLE MAT), Prostate Cancer (FATHER)            Social History      Marital Status:        Lives independently:  Yes      Number of Children:  2      Occupation:  [employer]            Tobacco Use      Tobacco status:  Never smoker            Alcohol Use      Alcohol intake:  0-2 drinks per day             Substance Use      Substance use:  Denies use            REVIEW OF SYSTEMS      General:  Admits: Appetite Change, Weight Loss;          Denies: Fatigue      Eye:  Admits Blurred Vision, Admits Corrective Lenses      ENT:  Denies Headache, Denies Hearing Loss      Cardiovascular:  Denies Chest Pain      Respiratory:  Denies: Shortness of Air      Gastrointestinal:  Denies Diarrhea, Denies Nausea/Vomiting      Musculoskeletal:  Denies Muscle Pain, Denies Painful Joints      Neurologic:  Denies Dizziness, Denies Numbness\Tingling            VITAL SIGNS AND SCORES      Vitals      Weight 254 lbs 6.573 oz / 115.4 kg      Temperature 98 F / 36.67 C - Temporal      Pulse 83      Respirations 18      Blood Pressure 144/90 Sitting, Right Arm      Pulse Oximetry 97%, RM AIR            Pain Score      Pain Scale Used:  Numerical      Pain Intensity:  0            Fatigue Score      Fatigue (0-10 scale):  0 (none)            EXAM      General Appearance:  Positive for: Alert, Oriented x3, Cooperative;          Negative for: Acute Distress      Eye:  Positive for: Anicteric Sclerae, Moist Conjunctiva      Neck:  Positive for: Supple;          Negative for: JVD, Masses      Respiratory:  Positive for: CTAB, Normal Respiratory Effort      Abdomen/Gastro:  Positive for: Normal Active Bowel Sounds, Soft;          Negative for: Distention, Hepatosplenomegaly, Tenderness      Other      Well-healed midline incision.  Right lower quadrant ostomy.      Cardiovascular:  Positive for: RRR;          Negative for: Gallop, Murmur, Peripheral Edema, Rub      Psychiatric:  Positive for: Appropriate Affect, Intact Judgement      Lymphatic:  Negative for: Cervical, Infraclavicular, Supraclavicular            PREVENTION      Hx Influenza Vaccination:  Yes      Date Influenza Vaccine Given:  Oct 1, 2019      2 or More Falls Past Year?:  No      Fall Past Year with Injury?:  No      Hx Pneumococcal Vaccination:  No      Encouraged to  follow-up with:  PCP regarding preventative exams.      Chart initiated by      DAVI TATE MA            ALLERGY/MEDS      Allergies      Coded Allergies:             NO KNOWN ALLERGIES (Unverified , 10/24/19)            Medications      Last Reconciled on 9/17/19 11:52 by REGINALDO BRANNON      Doxazosin Mesylate (Doxazosin Mesylate) 4 Mg Tablet      4 MG PO QPM, TAB         Reported         9/30/19       Ibuprofen (Ibuprofen) 400 Mg Tablet      400 MG PO Q4H PRN for PAIN, #100 TAB 0 Refills         Reported         9/26/19       Psyllium Seed (With Sugar) (Metamucil) 575 Gm Powder      1 TBSP PO QDAY, #1 BOTTLE         Reported         9/26/19       (Vitamin E)   No Conflict Check      1 TAB PO QDAY         Reported         9/26/19       Ascorbic Acid (Vitamin C*) 500 Mg Tablet      500 MG PO QDAY, #60 TAB 0 Refills         Reported         9/26/19       Folic Acid/Vit Bcomp,C (Super B-Complex Folic-Vit C Tb) 400 Mcg Tablet      1 TAB PO QDAY, TAB         Reported         9/26/19       Loratadine (Loratadine) 10 Mg Tablet      10 MG PO QDAY, #30 TAB 0 Refills         Reported         6/27/19       Levothyroxine (Levothyroxine) 0.025 Mg Tablet      0.025 MG PO QDAY@07, #30 TAB 0 Refills         Reported         6/27/19       Atorvastatin (Atorvastatin) 40 Mg Tablet      40 MG PO HS, #30 TAB 0 Refills         Reported         6/27/19       Diltiazem CD (Cardizem CD) 120 Mg Capcr      120 MG PO QDAY, #30 CAP.ER 3 Refills         Prov: Gabriel Leong         9/6/17       Metoprolol Succinate (Metoprolol Succinate) 50 Mg Tab.er.24h      50 MG PO QDAY, #30 TAB.ER 3 Refills         Prov: Gabriel Leong         9/6/17       Multivitamins (Multi-Vitamin) 1 Tab Tablet      1 TAB PO QDAY         Reported         11/3/11      Medications Reviewed:  Changes made to meds            IMPRESSION/PLAN      Diagnosis      Rectal cancer - C20      Status post neoadjuvant chemotherapy followed by low anterior resection with      ostomy formation.  He is recuperating well from his surgery.  We discussed the     NCCN guidelines which would recommend additional adjuvant chemotherapy to     complete 6 months of perioperative treatment.  I would recommend Xeloda 1000     mg/m ² twice daily on days 1-14 out of 21 repeated for 6 cycles which would     complete the 6 months of perioperative treatment.  Patient is agreeable to     treatment.  I will plan for him to start this upcoming Monday.  Prescription for    Zofran provided as needed for nausea.  I recommended having Imodium on hand in     case of diarrhea.  He will have routine lab work today including CBC and CMP.      RTC for cycle 2, day 1 with labs prior            Notes      New Medications      * ONDANSETRON HCL 8 MG TABLET: 8 MG PO Q8H PRN NAUSEA 14 Days #42         Dx: Rectal cancer - C20      * Capecitabine (Xeloda) 500 MG TAB: 2,500 MG PO BID 14 Days #140         Instructions: 2,500mg twice a day for 14 days out of 21 day cycle x 6 cycles.       start on 10/28/19      Discontinued Medications      * AMOXICILLIN/CLAVULANATE K (Augmentin 875/125 Mg) 1 EACH TABLET: 875 MG PO BID       3 Days #6      * Hydrocodone/Acetaminophen 7.5/325 1 TAB TABLET: 1 TAB PO Q4H PRN PAIN #18         Dx: S/P ileostomy - Z93.2      New Diagnostics      * CBC With Auto Diff, Routine         Dx: Rectal cancer - C20      * CMP Comp Metabolic Panel, Routine         Dx: Rectal cancer - C20      * CBC With Auto Diff, 11/15/19         Dx: Rectal cancer - C20      * CMP Comp Metabolic Panel, 11/15/19         Dx: Rectal cancer - C20            Patient Education            Capecitabine      Patient Education Provided:  Yes            Electronically signed by DENISE ESPINOSA  10/24/2019 12:31       Disclaimer: Converted document may not contain table formatting or lab diagrams. Please see [employer] for the authenticated document.

## 2021-06-04 ENCOUNTER — HOSPITAL ENCOUNTER (OUTPATIENT)
Dept: ONCOLOGY | Facility: HOSPITAL | Age: 66
Setting detail: RECURRING SERIES
End: 2021-06-04
Attending: INTERNAL MEDICINE

## 2021-06-05 NOTE — H&P
History and Physical      Patient Name: Robbi Kennedy   Patient ID: [id number]   Sex: Male   Birthdate: Nola 3, 1955    Primary Care Provider: Lino Shaw MD   Referring Provider: Erik Garay MD    Visit Date: May 25, 2021    Provider: Aroldo Garay MD   Location: [facility]   Location Address: [address] [place] [address]  [id number]   Location Phone: [phone]          Chief Complaint  · Outpatient History & Physical / Surgical Orders  · Port Placement `      History Of Present Illness  Robbi Kennedy is a 65 year old /White male who presents to the office today as a consult from Erik Garay MD. He presents today for evaluation for a port. Mr. Kennedy is very well known to me for a previous low rectal cancer. He had undergone adjuvant treatment after his colon resection and recently a colonoscopy, which showed no evidence of recurrent disease. Unfortunately he had a recent CT scan, which showed an increase in size of a liver metastasis and possible lung metastasis. He will now need some more adjuvant chemotherapy. He presents today for port placement for the chemotherapy.       Past Medical History  Allergic rhinitis, chronic; Arthritis; Asthma; Bowel Disease; Cancer; Colon cancer; High blood pressure; High cholesterol         Past Surgical History  Colon; Colonoscopy; Hernia         Medication List  atorvastatin 40 mg oral tablet; doxazosin 4 mg oral tablet; Eliquis 5 mg oral tablet; loratadine 10 mg oral tablet; metoprolol succinate 50 mg oral tablet extended release 24 hr; Synthroid 50 mcg oral tablet         Allergy List  NO KNOWN DRUG ALLERGIES       Allergies Reconciled  Family Medical History  Prostate cancer; Family history of colon cancer         Social History  Alcohol (Current some day); Caffeine (Current - status unknown); Second hand smoke exposure (Never); Tobacco (Never)         Review of Systems  · Gastrointestinal  o Denies  o : nausea, vomiting,  "diarrhea, constipation      Vitals  Date Time BP Position Site L\R Cuff Size HR RR TEMP (F) WT  HT  BMI kg/m2 BSA m2 O2 Sat FR L/min FiO2 HC       05/25/2021 01:10 PM         273lbs 6oz 6'  4\" 33.28 2.58             Physical Examination  · Constitutional  o Appearance  o : well developed, well-nourished, alert and in no acute distress  · Head and Face  o Head  o :   § Inspection  § : no deformities or lesions  · Eyes  o Conjunctivae  o : clear  o Sclerae  o : clear  · Neck  o Inspection/Palpation  o : normal appearance, no masses or tenderness, trachea midline  · Respiratory  o Respiratory Effort  o : breathing unlabored  o Inspection of Chest  o : normal appearance, no retractions  · Cardiovascular  o Heart  o : regular rate and rhythm  · Gastrointestinal  o Abdominal Examination  o : abdomen is soft   · Lymphatic  o Neck  o : no lymphadenopathy present  o Axilla  o : no lymphadenopathy present  o Groin  o : no lymphadenopathy present  · Skin and Subcutaneous Tissue  o General Inspection  o : no rashes present, no lesions present, no areas of discoloration  · Neurologic  o Cranial Nerves  o : grossly intact  o Sensation  o : grossly intact  o Gait and Station  o :   § Gait Screening  § : normal gait, able to stand without diffculty  o Cerebellar Function  o : no obvious abnormalities  · Psychiatric  o Judgement and Insight  o : judgment and insight intact  o Mood and Affect  o : mood normal, affect appropriate          Assessment  · Pre-Surgical Orders     V72.84  · Rectal cancer     154.1/C20       Patient with metastatic rectal cancer.       Plan  · Orders  o General Surgery Order (GENOR) - 154.1/C20 - 05/28/2021  · Medications  o Medications have been Reconciled  o Transition of Care or Provider Policy  · Instructions  o PLAN:   o Handouts Provided-Pre-Procedure Instructions including date and time and location of procedure.  o [facility]  o ****Patient " Status****  o Outpatient  o ********************  o RISK AND BENEFITS:  o Consent for surgery: Given these options, the patient has verbally expressed an understanding of the risks of surgery and finds these risks acceptable. We will proceed with surgery as soon as possible.  o Consult Anesthesia for any post-operative block, or any pain management procedure deemed necessary by the anestesiologist for adequate post-operative pain control.   o O.R. PREP: Per protocol  o IV: Per Anesthesia  o IV: LR@ 75ml/hr  o PLEASE SIGN PERMIT FOR: Port a cath placement  o *__Kefzol 2 gram IV on call to OR.  o *___The above History and Physical Examination has been completed within 30 days of admission.  o Pre-Admission Testing Date: PAT PHONE: 5/26/21 at 9AM  o PT TO HOLD ELIQUIS 2 DAYS PRIOR  o Electronically Identified Patient Education Materials Provided Electronically     This is unfortunate for Mr. Kennedy. We will go ahead and get him a port as soon as possible. Risks, benefits, and alternatives were discussed with the patient extensively. All questions were answered. The patient voiced understanding and agrees to proceed with the above plan.             Electronically Signed by: Annamaria Jon-, -Author on May 26, 2021 10:18:33 AM  Electronically Co-signed by: Aroldo Garay MD -Reviewer on May 26, 2021 10:48:57 PM

## 2021-06-05 NOTE — PROCEDURES
Patient: CYDNEY KENNEDY     Acct: [id number]     Report: #[id number]  MR #:  [id number]     DOS: 2021     : 1955  DICTATING: Rl Garay  ***Signed***  --------------------------------------------------------------------------------------------------------------------                              [employer] [facility]                          [place], [place]  [address]           __________________________________________________________________________         Patient Name:                   Attending Physician:    Cydney Kennedy M.D.         Patient Visit # MR #            Admit Date  Disch Date     Location    [id number]    [id number]      2021                 SURG- -         Date of Birth    1955    __________________________________________________________________________    0820 - OPERATIVE / PROCEDURE NOTE         DATE OF OPERATION/PROCEDURE:   2021         SURGEON/PHYSICIAN:             RL GARAY M.D.         PREOPERATIVE DIAGNOSIS:    Metastatic rectal cancer, need for venous access.         POSTOPERATIVE DIAGNOSIS:    Metastatic rectal cancer, need for venous access.         ASSISTANT:    Calli Beltrán, Surgical Tech.         ANESTHESIA:    General.         PROCEDURE PERFORMED:    PowerPort insertion.         SPECIMENS:    None.         FINDINGS:    Intact port.         COMPLICATIONS:    None.         ESTIMATED BLOOD LOSS:    7.         INDICATIONS:    Mr. Kennedy is a 65-year-old male who presents with metastatic rectal cancer    and he needs a port for venous access for chemotherapy.         DESCRIPTION OF PROCEDURE:    The patient was identified in the preoperative holding area. Risks, benefits,    and alternatives were again discussed. The patient voiced understanding and    agreed to proceed. The patient was brought back to the operating room, placed     supine on the operating room table. Monitoring devices and Cameron stockings    were placed. After induction of anesthesia the patient was successfully    sedated. After sedation he was prepped and draped in standard surgical    fashion. After prepping and draping timeout was performed to verify both the    correct patient and correct procedure. We began by identifying the right    internal jugular vein using ultrasound. We injected local anesthesia over the    area of the internal jugular vein. Then under ultrasound guidance we entered    the right internal jugular vein using an 18 gauge spinal needle. We had    return of dark nonpulsatile blood. After a good venous return we inserted a    guidewire through the spinal needle with fluoroscopy. We were able to verify    that the guidewire went into the right side of the heart. We initially had it    in the right subclavian vein but we were able to pullback and under    fluoroscopic guidance put the guidewire at the cavoatrial junction. We then    clipped the guidewire to the drapes. We then injected local anesthesia over    the area of the right chest. A 1.5 cm incision was made over the right chest    with an 11 blade and then we opened up a pocket in the subcutaneous tissue of    the right chest using a combination of blunt dissection and electrocautery.    Once the pocket was made we then used a tunneler with the attached port    tubing and tunneled up from the chest towards the neck. We made a nick in the    skin at the level of the guidewire and brought the tunneler through the    guidewire insertion point. Once the tubing was up to the neck we then removed    the clamp from the guidewire and placed the dilator and peel-away sheath over    the guidewire. Under fluoroscopic guidance we guided the peel-away sheath and    the dilator towards the cavoatrial junction. We then removed the guidewire    and the dilator. The port tubing was placed through the peel-away  sheath and    the peel-away sheath was then removed. Under fluoroscopic guidance we placed    the tubing at the cavoatrial junction at about 30 cm. We cut the tubing at    the level of the chest and attacked the plastic attachment and the port hub.    We access the port and it was able to aspirate and flush without difficulty.    We then placed the port in the soft tissue cavity that was made and secured    the port in place with 3 interrupted Prolene sutures. We then access the port    through the skin and it aspirated and flushed again without difficulty. We    made sure that the port was flushed at the end of the case with heparinized    saline. We then closed the chest incision with deep interrupted 3-0 Vicryls    and the neck incision and chest incision were closed with Skin Affix skin    glue. The patient was then aroused from anesthesia and taken off the    operating room table and taken to PACU in stable condition where he will    receive a chest x-ray.         Sponge, needle, and instrument counts were correct x2.  The patient tolerated    the procedure well.  I was present for the entirety of the crucial portions    of the procedure.  My first assistant, Calli Beltrán, was present and    participated in all parts of the procedure.  This operative report was    dictated and subsequently transcribed.  There is the possibility of    undetected errors in the dictation or transcription.         To be electronically signed in [employer]    [id number] RL FLOYD M.D.         BRANDON:vh    D:  05/28/2021 16:41    T:  05/29/2021 07:55    #[id number]         Until signed, this is an unconfirmed preliminary report that may contain    errors and is subject to change.         Electronically signed by Rl Floyd  05/29/2021 11:53     Disclaimer: Converted hospital document may not contain table formatting or lab diagrams. Please see [employer] for authenticated document.

## 2021-06-05 NOTE — PROCEDURES
Patient: CYDNEY LEONARD     Acct: [id number]     Report: #[id number]  MR #:  [id number]     DOS: 2021 1645     : 1955  DICTATING: Aroldo Garay  ***Signed***  --------------------------------------------------------------------------------------------------------------------  Gen Surg Post Op/Proc Note      Date       21            Pre-Operative Diagnosis:      metastatic rectal CA, need for venous access            Post-Operative Diagnosis:      Same as pre-op diagnosis            Surgeon/Assistants      Surgeon:  ANGELLA Garay      Assistants      Calli Beltrán            Anesthesia      MAC            Procedure Performed/Technique      powerport insertion            Specimen/Tissue Removed:      None            Findings:            intact port            Complications:      No            Estimated Blood Loss:      7            Procedure:      [id number]            Aroldo Garay                 May 28, 2021 16:37      Electronically signed by Aroldo Garay  2021 16:45     Disclaimer: Converted hospital document may not contain table formatting or lab diagrams. Please see [employer] for authenticated document.

## 2021-06-18 PROBLEM — C80.1 CANCER: Status: ACTIVE | Noted: 2021-06-18

## 2021-06-18 PROBLEM — J45.909 ASTHMA: Status: ACTIVE | Noted: 2021-06-18

## 2021-06-18 PROBLEM — E78.00 HIGH CHOLESTEROL: Status: ACTIVE | Noted: 2021-06-18

## 2021-06-18 PROBLEM — K63.9 BOWEL DISEASE: Status: ACTIVE | Noted: 2021-06-18

## 2021-06-18 PROBLEM — C20 RECTAL CANCER (HCC): Status: ACTIVE | Noted: 2021-06-18

## 2021-06-18 PROBLEM — Z45.2 ENCOUNTER FOR ADJUSTMENT OR MANAGEMENT OF VASCULAR ACCESS DEVICE: Status: ACTIVE | Noted: 2021-06-18

## 2021-06-18 PROBLEM — J30.9 ALLERGIC RHINITIS: Status: ACTIVE | Noted: 2021-06-18

## 2021-06-18 PROBLEM — M19.90 ARTHRITIS: Status: ACTIVE | Noted: 2021-06-18

## 2021-06-18 PROBLEM — I10 HIGH BLOOD PRESSURE: Status: ACTIVE | Noted: 2021-06-18

## 2021-06-18 PROBLEM — C18.9 COLON CANCER (HCC): Status: ACTIVE | Noted: 2021-06-18

## 2021-06-18 RX ORDER — DILTIAZEM HYDROCHLORIDE 120 MG/1
TABLET, FILM COATED ORAL
COMMUNITY
End: 2021-07-19 | Stop reason: SDUPTHER

## 2021-06-18 RX ORDER — SODIUM CHLORIDE 0.9 % (FLUSH) 0.9 %
20 SYRINGE (ML) INJECTION AS NEEDED
Status: CANCELLED | OUTPATIENT
Start: 2021-06-21

## 2021-06-18 RX ORDER — DOXAZOSIN MESYLATE 4 MG/1
TABLET ORAL
COMMUNITY
End: 2021-09-10

## 2021-06-18 RX ORDER — LEVOTHYROXINE SODIUM 0.05 MG/1
TABLET ORAL
COMMUNITY
End: 2022-06-15

## 2021-06-18 RX ORDER — ATORVASTATIN CALCIUM 40 MG/1
TABLET, FILM COATED ORAL
COMMUNITY

## 2021-06-18 RX ORDER — METOPROLOL SUCCINATE 50 MG/1
TABLET, EXTENDED RELEASE ORAL
COMMUNITY
End: 2021-09-10 | Stop reason: SDUPTHER

## 2021-06-18 RX ORDER — LORATADINE 10 MG/1
TABLET ORAL
COMMUNITY

## 2021-06-18 RX ORDER — HEPARIN SODIUM (PORCINE) LOCK FLUSH IV SOLN 100 UNIT/ML 100 UNIT/ML
500 SOLUTION INTRAVENOUS AS NEEDED
Status: CANCELLED | OUTPATIENT
Start: 2021-06-21

## 2021-06-21 ENCOUNTER — HOSPITAL ENCOUNTER (OUTPATIENT)
Dept: ONCOLOGY | Facility: HOSPITAL | Age: 66
Setting detail: INFUSION SERIES
Discharge: HOME | End: 2021-06-21

## 2021-06-21 ENCOUNTER — OFFICE VISIT (OUTPATIENT)
Dept: ONCOLOGY | Facility: HOSPITAL | Age: 66
End: 2021-06-21

## 2021-06-21 VITALS
HEART RATE: 69 BPM | WEIGHT: 273.37 LBS | TEMPERATURE: 98.6 F | SYSTOLIC BLOOD PRESSURE: 122 MMHG | BODY MASS INDEX: 33.29 KG/M2 | OXYGEN SATURATION: 97 % | DIASTOLIC BLOOD PRESSURE: 89 MMHG | RESPIRATION RATE: 18 BRPM

## 2021-06-21 VITALS
SYSTOLIC BLOOD PRESSURE: 122 MMHG | TEMPERATURE: 98.6 F | OXYGEN SATURATION: 97 % | WEIGHT: 274.03 LBS | HEIGHT: 76 IN | DIASTOLIC BLOOD PRESSURE: 89 MMHG | HEART RATE: 69 BPM | RESPIRATION RATE: 18 BRPM | BODY MASS INDEX: 33.37 KG/M2

## 2021-06-21 DIAGNOSIS — C20 RECTAL CANCER (HCC): Primary | ICD-10-CM

## 2021-06-21 DIAGNOSIS — Z45.2 ENCOUNTER FOR ADJUSTMENT OR MANAGEMENT OF VASCULAR ACCESS DEVICE: ICD-10-CM

## 2021-06-21 PROBLEM — E78.1 ESSENTIAL HYPERTRIGLYCERIDEMIA: Status: ACTIVE | Noted: 2020-12-31

## 2021-06-21 PROBLEM — E78.5 HYPERLIPIDEMIA: Status: ACTIVE | Noted: 2020-12-31

## 2021-06-21 PROBLEM — F52.8 PSYCHOSEXUAL DYSFUNCTION WITH INHIBITED SEXUAL EXCITEMENT: Status: ACTIVE | Noted: 2020-12-31

## 2021-06-21 PROBLEM — K14.6 GLOSSODYNIA: Status: ACTIVE | Noted: 2020-12-31

## 2021-06-21 PROBLEM — J30.9 ALLERGIC RHINITIS: Status: ACTIVE | Noted: 2020-12-31

## 2021-06-21 PROBLEM — I48.91 ATRIAL FIBRILLATION: Status: ACTIVE | Noted: 2021-02-26

## 2021-06-21 PROBLEM — I86.1 VARICOCELE: Status: ACTIVE | Noted: 2020-12-31

## 2021-06-21 PROBLEM — I49.9 IRREGULAR HEART BEAT: Status: ACTIVE | Noted: 2020-12-31

## 2021-06-21 PROBLEM — D12.6 ADENOMATOSIS: Status: ACTIVE | Noted: 2020-12-31

## 2021-06-21 PROBLEM — I10 ESSENTIAL HYPERTENSION: Status: ACTIVE | Noted: 2020-12-31

## 2021-06-21 PROBLEM — N40.0 BENIGN PROSTATIC HYPERPLASIA: Status: ACTIVE | Noted: 2020-12-31

## 2021-06-21 LAB
ALBUMIN SERPL-MCNC: 3.81 G/DL (ref 3.5–5.2)
ALBUMIN/GLOB SERPL: 1.6 G/DL
ALP SERPL-CCNC: 146 U/L (ref 39–117)
ALT SERPL W P-5'-P-CCNC: 24 U/L (ref 1–41)
ANION GAP SERPL CALCULATED.3IONS-SCNC: 6.9 MMOL/L (ref 5–15)
AST SERPL-CCNC: 20 U/L (ref 1–40)
BASOPHILS # BLD AUTO: 0.04 10*3/MM3 (ref 0–0.2)
BASOPHILS NFR BLD AUTO: 0.3 % (ref 0–1.5)
BILIRUB SERPL-MCNC: 0.4 MG/DL (ref 0–1.2)
BILIRUB UR QL STRIP: NEGATIVE
BUN SERPL-MCNC: 14 MG/DL (ref 8–23)
BUN/CREAT SERPL: 13.5 (ref 7–25)
CALCIUM SPEC-SCNC: 9.1 MG/DL (ref 8.6–10.5)
CHLORIDE SERPL-SCNC: 105 MMOL/L (ref 98–107)
CLARITY UR: CLEAR
CO2 SERPL-SCNC: 26.1 MMOL/L (ref 22–29)
COLOR UR: ABNORMAL
CREAT SERPL-MCNC: 1.04 MG/DL (ref 0.76–1.27)
DEPRECATED RDW RBC AUTO: 42.6 FL (ref 37–54)
EOSINOPHIL # BLD AUTO: 0.21 10*3/MM3 (ref 0–0.4)
EOSINOPHIL NFR BLD AUTO: 1.6 % (ref 0.3–6.2)
ERYTHROCYTE [DISTWIDTH] IN BLOOD BY AUTOMATED COUNT: 12.9 % (ref 12.3–15.4)
GFR SERPL CREATININE-BSD FRML MDRD: 72 ML/MIN/1.73
GLOBULIN UR ELPH-MCNC: 2.4 GM/DL
GLUCOSE SERPL-MCNC: 113 MG/DL (ref 65–99)
GLUCOSE UR STRIP-MCNC: NEGATIVE MG/DL
HCT VFR BLD AUTO: 43 % (ref 37.5–51)
HGB BLD-MCNC: 14.4 G/DL (ref 13–17.7)
HGB UR QL STRIP.AUTO: NEGATIVE
IMM GRANULOCYTES # BLD AUTO: 0.54 10*3/MM3 (ref 0–0.05)
IMM GRANULOCYTES NFR BLD AUTO: 4.1 % (ref 0–0.5)
KETONES UR QL STRIP: ABNORMAL
LEUKOCYTE ESTERASE UR QL STRIP.AUTO: NEGATIVE
LYMPHOCYTES # BLD AUTO: 1.05 10*3/MM3 (ref 0.7–3.1)
LYMPHOCYTES NFR BLD AUTO: 8 % (ref 19.6–45.3)
MCH RBC QN AUTO: 30.6 PG (ref 26.6–33)
MCHC RBC AUTO-ENTMCNC: 33.5 G/DL (ref 31.5–35.7)
MCV RBC AUTO: 91.3 FL (ref 79–97)
MONOCYTES # BLD AUTO: 0.6 10*3/MM3 (ref 0.1–0.9)
MONOCYTES NFR BLD AUTO: 4.6 % (ref 5–12)
NEUTROPHILS NFR BLD AUTO: 10.73 10*3/MM3 (ref 1.7–7)
NEUTROPHILS NFR BLD AUTO: 81.4 % (ref 42.7–76)
NITRITE UR QL STRIP: NEGATIVE
PH UR STRIP.AUTO: 5.5 [PH] (ref 5–8)
PLATELET # BLD AUTO: 210 10*3/MM3 (ref 140–450)
PMV BLD AUTO: 9.3 FL (ref 6–12)
POTASSIUM SERPL-SCNC: 4.2 MMOL/L (ref 3.5–5.2)
PROT SERPL-MCNC: 6.2 G/DL (ref 6–8.5)
PROT UR QL STRIP: NEGATIVE
RBC # BLD AUTO: 4.71 10*6/MM3 (ref 4.14–5.8)
SODIUM SERPL-SCNC: 138 MMOL/L (ref 136–145)
SP GR UR STRIP: 1.02 (ref 1–1.03)
UROBILINOGEN UR QL STRIP: ABNORMAL
WBC # BLD AUTO: 13.17 10*3/MM3 (ref 3.4–10.8)

## 2021-06-21 PROCEDURE — 96368 THER/DIAG CONCURRENT INF: CPT

## 2021-06-21 PROCEDURE — 96413 CHEMO IV INFUSION 1 HR: CPT

## 2021-06-21 PROCEDURE — 85025 COMPLETE CBC W/AUTO DIFF WBC: CPT | Performed by: INTERNAL MEDICINE

## 2021-06-21 PROCEDURE — 25010000002 BEVACIZUMAB 100 MG/4ML SOLUTION 4 ML VIAL: Performed by: INTERNAL MEDICINE

## 2021-06-21 PROCEDURE — 25010000002 PALONOSETRON PER 25 MCG: Performed by: INTERNAL MEDICINE

## 2021-06-21 PROCEDURE — 96375 TX/PRO/DX INJ NEW DRUG ADDON: CPT

## 2021-06-21 PROCEDURE — 25010000002 LEUCOVORIN CALCIUM PER 50 MG: Performed by: INTERNAL MEDICINE

## 2021-06-21 PROCEDURE — 81003 URINALYSIS AUTO W/O SCOPE: CPT | Performed by: INTERNAL MEDICINE

## 2021-06-21 PROCEDURE — G0498 CHEMO EXTEND IV INFUS W/PUMP: HCPCS

## 2021-06-21 PROCEDURE — 25010000002 DEXAMETHASONE SODIUM PHOSPHATE 120 MG/30ML SOLUTION: Performed by: INTERNAL MEDICINE

## 2021-06-21 PROCEDURE — 25010000002 FLUOROURACIL PER 500 MG: Performed by: INTERNAL MEDICINE

## 2021-06-21 PROCEDURE — 80053 COMPREHEN METABOLIC PANEL: CPT | Performed by: INTERNAL MEDICINE

## 2021-06-21 PROCEDURE — 96417 CHEMO IV INFUS EACH ADDL SEQ: CPT

## 2021-06-21 PROCEDURE — 25010000002 OXALIPLATIN PER 0.5 MG: Performed by: INTERNAL MEDICINE

## 2021-06-21 PROCEDURE — 25010000002 BEVACIZUMAB PER 10 MG: Performed by: INTERNAL MEDICINE

## 2021-06-21 PROCEDURE — 96415 CHEMO IV INFUSION ADDL HR: CPT

## 2021-06-21 PROCEDURE — 99213 OFFICE O/P EST LOW 20 MIN: CPT | Performed by: INTERNAL MEDICINE

## 2021-06-21 RX ORDER — METOPROLOL TARTRATE 100 MG/1
100 TABLET ORAL DAILY
COMMUNITY
Start: 2021-05-21 | End: 2021-07-19 | Stop reason: DRUGHIGH

## 2021-06-21 RX ORDER — DIPHENHYDRAMINE HYDROCHLORIDE 50 MG/ML
50 INJECTION INTRAMUSCULAR; INTRAVENOUS AS NEEDED
Status: DISCONTINUED | OUTPATIENT
Start: 2021-06-21 | End: 2021-06-21

## 2021-06-21 RX ORDER — LEVOTHYROXINE SODIUM 0.03 MG/1
50 TABLET ORAL DAILY
COMMUNITY
Start: 2020-12-31 | End: 2021-07-19 | Stop reason: DRUGHIGH

## 2021-06-21 RX ORDER — HEPARIN SODIUM (PORCINE) LOCK FLUSH IV SOLN 100 UNIT/ML 100 UNIT/ML
500 SOLUTION INTRAVENOUS AS NEEDED
Status: DISCONTINUED | OUTPATIENT
Start: 2021-06-21 | End: 2021-06-22 | Stop reason: HOSPADM

## 2021-06-21 RX ORDER — FLUOROURACIL 50 MG/ML
1000 INJECTION, SOLUTION INTRAVENOUS ONCE
Status: COMPLETED | OUTPATIENT
Start: 2021-06-21 | End: 2021-06-21

## 2021-06-21 RX ORDER — SODIUM CHLORIDE 0.9 % (FLUSH) 0.9 %
20 SYRINGE (ML) INJECTION AS NEEDED
Status: DISCONTINUED | OUTPATIENT
Start: 2021-06-21 | End: 2021-06-22 | Stop reason: HOSPADM

## 2021-06-21 RX ORDER — PALONOSETRON 0.05 MG/ML
0.25 INJECTION, SOLUTION INTRAVENOUS ONCE
Status: COMPLETED | OUTPATIENT
Start: 2021-06-21 | End: 2021-06-21

## 2021-06-21 RX ORDER — ONDANSETRON HYDROCHLORIDE 8 MG/1
8 TABLET, FILM COATED ORAL EVERY 8 HOURS PRN
COMMUNITY
Start: 2021-06-02 | End: 2022-01-25 | Stop reason: SDUPTHER

## 2021-06-21 RX ORDER — DEXTROSE MONOHYDRATE 50 MG/ML
250 INJECTION, SOLUTION INTRAVENOUS ONCE
Status: CANCELLED | OUTPATIENT
Start: 2021-06-21

## 2021-06-21 RX ORDER — DILTIAZEM HYDROCHLORIDE 240 MG/1
240 CAPSULE, COATED, EXTENDED RELEASE ORAL DAILY
COMMUNITY
Start: 2021-05-21 | End: 2021-09-10

## 2021-06-21 RX ORDER — PALONOSETRON 0.05 MG/ML
0.25 INJECTION, SOLUTION INTRAVENOUS ONCE
Status: CANCELLED | OUTPATIENT
Start: 2021-06-21

## 2021-06-21 RX ORDER — LORATADINE 10 MG/1
10 TABLET ORAL DAILY
COMMUNITY
Start: 2020-12-31 | End: 2021-07-19 | Stop reason: SDUPTHER

## 2021-06-21 RX ORDER — SODIUM CHLORIDE 9 MG/ML
250 INJECTION, SOLUTION INTRAVENOUS ONCE
Status: COMPLETED | OUTPATIENT
Start: 2021-06-21 | End: 2021-06-21

## 2021-06-21 RX ORDER — FAMOTIDINE 10 MG/ML
20 INJECTION, SOLUTION INTRAVENOUS AS NEEDED
Status: DISCONTINUED | OUTPATIENT
Start: 2021-06-21 | End: 2021-06-22 | Stop reason: HOSPADM

## 2021-06-21 RX ORDER — HEPARIN SODIUM (PORCINE) LOCK FLUSH IV SOLN 100 UNIT/ML 100 UNIT/ML
500 SOLUTION INTRAVENOUS AS NEEDED
Status: CANCELLED | OUTPATIENT
Start: 2021-06-21

## 2021-06-21 RX ORDER — DEXTROSE MONOHYDRATE 50 MG/ML
250 INJECTION, SOLUTION INTRAVENOUS ONCE
Status: COMPLETED | OUTPATIENT
Start: 2021-06-21 | End: 2021-06-21

## 2021-06-21 RX ORDER — FLUOROURACIL 50 MG/ML
400 INJECTION, SOLUTION INTRAVENOUS ONCE
Status: CANCELLED | OUTPATIENT
Start: 2021-06-21

## 2021-06-21 RX ORDER — PROCHLORPERAZINE MALEATE 10 MG
10 TABLET ORAL EVERY 6 HOURS PRN
COMMUNITY
Start: 2021-06-02 | End: 2022-01-05

## 2021-06-21 RX ORDER — DIPHENHYDRAMINE HYDROCHLORIDE 50 MG/ML
50 INJECTION INTRAMUSCULAR; INTRAVENOUS AS NEEDED
Status: CANCELLED | OUTPATIENT
Start: 2021-06-21

## 2021-06-21 RX ORDER — SODIUM CHLORIDE 0.9 % (FLUSH) 0.9 %
20 SYRINGE (ML) INJECTION AS NEEDED
Status: CANCELLED | OUTPATIENT
Start: 2021-06-21

## 2021-06-21 RX ORDER — FAMOTIDINE 10 MG/ML
20 INJECTION, SOLUTION INTRAVENOUS AS NEEDED
Status: CANCELLED | OUTPATIENT
Start: 2021-06-21

## 2021-06-21 RX ORDER — SODIUM CHLORIDE 9 MG/ML
250 INJECTION, SOLUTION INTRAVENOUS ONCE
Status: CANCELLED | OUTPATIENT
Start: 2021-06-21

## 2021-06-21 RX ORDER — ATORVASTATIN CALCIUM 40 MG/1
40 TABLET, FILM COATED ORAL DAILY
COMMUNITY
Start: 2020-12-31 | End: 2021-06-29

## 2021-06-21 RX ADMIN — BEVACIZUMAB 600 MG: 400 INJECTION, SOLUTION INTRAVENOUS at 10:44

## 2021-06-21 RX ADMIN — LEUCOVORIN CALCIUM 1000 MG: 350 INJECTION, POWDER, LYOPHILIZED, FOR SUSPENSION INTRAMUSCULAR; INTRAVENOUS at 11:56

## 2021-06-21 RX ADMIN — PALONOSETRON 0.25 MG: 0.05 INJECTION, SOLUTION INTRAVENOUS at 10:01

## 2021-06-21 RX ADMIN — FLUOROURACIL 1000 MG: 50 INJECTION, SOLUTION INTRAVENOUS at 14:14

## 2021-06-21 RX ADMIN — OXALIPLATIN 200 MG: 100 INJECTION, SOLUTION, CONCENTRATE INTRAVENOUS at 11:56

## 2021-06-21 RX ADMIN — DEXAMETHASONE SODIUM PHOSPHATE 12 MG: 4 INJECTION, SOLUTION INTRA-ARTICULAR; INTRALESIONAL; INTRAMUSCULAR; INTRAVENOUS; SOFT TISSUE at 10:08

## 2021-06-21 RX ADMIN — DEXTROSE MONOHYDRATE 250 ML: 5 INJECTION, SOLUTION INTRAVENOUS at 11:56

## 2021-06-21 RX ADMIN — SODIUM CHLORIDE 250 ML: 9 INJECTION, SOLUTION INTRAVENOUS at 10:02

## 2021-06-21 RX ADMIN — FLUOROURACIL 6100 MG: 50 INJECTION, SOLUTION INTRAVENOUS at 14:20

## 2021-06-21 NOTE — PROGRESS NOTES
Chief Complaint  Chemotherapy and Follow-up    Maurilio Campos MD Blount, Elisa Dias, REGINALDO    Subjective          Robbi Kennedy presents to [facility] for ongoing treatment for his metastatic rectal cancer.  He is on FOLFOX plus Avastin.  He is due for cycle 2.  He reports some mild nausea with cycle 1.  He also reports low energy lasting for about a week after his last treatment.  He states he still eating and drinking fairly well.  He denies issues from his Port-A-Cath.  No new aches or pains.  He denies diarrhea, in fact he notes a little constipation.  He denies any masses or adenopathy.  He reports some slight tingling in the fingertips.    Oncology/Hematology History Overview Note   9/30/2019 High rectal vies-oadbgisbqk-lkvyjqvszfzllf adenocarcinoma  associated with tubular adenoma.   4/27/21 Biopsy of liver revealed  metastatic adenocarcinoma, consistent with colorectal primary.    Clinical Staging  Stage IIa ([id number])       Rectal cancer (CMS/HCC)   6/2/2021 -  Chemotherapy    OP COLON mFOLFOX6 + Bevacizumab-awwb (OXALIplatin / Leucovorin / Fluorouracil / Bevacizumab-awwb)     6/18/2021 Initial Diagnosis    Rectal cancer (CMS/HCC)     6/21/2021 Cancer Staged    Staging form: Colon And Rectum, AJCC 8th Edition  - Clinical: Stage IIA (cT3, cN0, cM0) - Signed by Maurilio Campos MD on 6/21/2021         Review of Systems   Constitutional: Negative for appetite change, diaphoresis, fatigue, fever, unexpected weight gain and unexpected weight loss.   HENT: Negative for hearing loss, mouth sores, sore throat, swollen glands, trouble swallowing and voice change.    Eyes: Negative for blurred vision.   Respiratory: Negative for cough, shortness of breath and wheezing.    Cardiovascular: Negative for chest pain and palpitations.   Gastrointestinal: Negative for abdominal pain, blood in stool, constipation, diarrhea, nausea and vomiting.   Endocrine: Negative for cold  intolerance and heat intolerance.   Genitourinary: Negative for difficulty urinating, dysuria, frequency, hematuria and urinary incontinence.   Musculoskeletal: Negative for arthralgias, back pain and myalgias.   Skin: Negative for rash, skin lesions and bruise.   Neurological: Negative for dizziness, seizures, weakness, numbness and headache.   Hematological: Does not bruise/bleed easily.   Psychiatric/Behavioral: Negative for depressed mood. The patient is not nervous/anxious.      Current Outpatient Medications on File Prior to Visit   Medication Sig Dispense Refill   • apixaban (Eliquis) 5 MG tablet tablet Eliquis 5 mg oral tablet take 1 tablet (5 mg) by oral route 2 times per day   Active     • apixaban (ELIQUIS) 5 MG tablet tablet Take 5 mg by mouth 2 (Two) Times a Day.     • atorvastatin (LIPITOR) 40 MG tablet atorvastatin 40 mg oral tablet take 1 tablet (40 mg) by oral route once daily   Active     • atorvastatin (LIPITOR) 40 MG tablet Take 40 mg by mouth Daily.     • Cardizem  MG 24 hr capsule Take 240 mg by mouth Daily.     • dilTIAZem (Cardizem) 120 MG tablet Cardizem 120 mg oral tablet take 1 tablet (120 mg) by oral route 3 times per day   Suspended     • doxazosin (CARDURA) 4 MG tablet doxazosin 4 mg oral tablet take 1 tablet (4 mg) by oral route once daily   Active     • levothyroxine (Synthroid) 50 MCG tablet Synthroid 50 mcg oral tablet take 1 tablet (50 mcg) by oral route once daily   Active     • levothyroxine (SYNTHROID, LEVOTHROID) 25 MCG tablet Take 50 mcg by mouth Daily.     • loratadine (CLARITIN) 10 MG tablet loratadine 10 mg oral tablet take 1 tablet (10 mg) by oral route once daily   Active     • loratadine (CLARITIN) 10 MG tablet Take 10 mg by mouth Daily.     • metoprolol succinate XL (TOPROL-XL) 50 MG 24 hr tablet metoprolol succinate 50 mg oral tablet extended release 24 hr take 1 tablet (50 mg) by oral route once daily   Active     • metoprolol tartrate (LOPRESSOR) 100 MG tablet  Take 100 mg by mouth Daily.     • ondansetron (ZOFRAN) 8 MG tablet Take 8 mg by mouth Every 8 (Eight) Hours As Needed. for nausea     • prochlorperazine (COMPAZINE) 10 MG tablet Take 10 mg by mouth Every 6 (Six) Hours As Needed.       Current Facility-Administered Medications on File Prior to Visit   Medication Dose Route Frequency Provider Last Rate Last Admin   • [COMPLETED] bevacizumab (AVASTIN) 600 mg in sodium chloride 0.9 % 134 mL chemo IVPB  600 mg Intravenous Once Maurilio Campos MD   Stopped at 06/21/21 1144   • [COMPLETED] dexamethasone (DECADRON) IVPB 12 mg  12 mg Intravenous Once Maurilio Campos MD   Stopped at 06/21/21 1023   • [COMPLETED] dextrose (D5W) 5 % infusion 250 mL  250 mL Intravenous Once Maurilio Campos MD 20 mL/hr at 06/21/21 1156 250 mL at 06/21/21 1156   • famotidine (PEPCID) injection 20 mg  20 mg Intravenous PRN Maurilio Campos MD       • fluorouracil (ADRUCIL) 6,100 mg, sodium chloride 0.9 % 138 mL chemo infusion - FOR HOME USE  6,100 mg Intravenous Once Maurilio Campos MD       • fluorouracil (ADRUCIL) chemo injection 1,000 mg  1,000 mg Intravenous Once Maurilio Campos MD       • heparin injection 500 Units  500 Units Intravenous PRN Maurilio Campos MD       • leucovorin 1,000 mg in dextrose (D5W) 5 % 325 mL IVPB  1,000 mg Intravenous Once Maurilio Campos MD   1,000 mg at 06/21/21 1156   • OXALIplatin (ELOXATIN) 200 mg in dextrose (D5W) 5 % 315 mL chemo IVPB  200 mg Intravenous Once Maurilio Campos MD   200 mg at 06/21/21 1156   • [COMPLETED] palonosetron (ALOXI) injection 0.25 mg  0.25 mg Intravenous Once Maurilio Campos MD   0.25 mg at 06/21/21 1001   • sodium chloride 0.9 % flush 20 mL  20 mL Intravenous PRN Maurilio Campos MD       • [COMPLETED] sodium chloride 0.9 % infusion 250 mL  250 mL Intravenous Once Maurilio Campos MD   Stopped at 06/21/21 1144   • [DISCONTINUED] diphenhydrAMINE (BENADRYL) injection 50 mg  50 mg Intravenous PRN Maurilio Campos MD        • [DISCONTINUED] hydrocortisone sodium succinate (Solu-CORTEF) injection 100 mg  100 mg Intravenous PRN Maurilio Campos MD           No Known Allergies  Past Medical History:   Diagnosis Date   • Colorectal cancer (CMS/HCC)    • GI cancer (CMS/HCC)    • Hepatitis    • High cholesterol    • Hypertension    • Rectal cancer (CMS/HCC) 6/18/2021   • Thyroid disease      Past Surgical History:   Procedure Laterality Date   • HERNIA REPAIR     • LIVER BIOPSY      4/27/21 Biopsy of liver revealed metastatic adenocarcinoma, consistent with colorectal primary   • OTHER SURGICAL HISTORY      REMOVED CANCER FROM COLON     Social History     Socioeconomic History   • Marital status:      Spouse name: Not on file   • Number of children: 2   • Years of education: Not on file   • Highest education level: Not on file   Tobacco Use   • Smoking status: Never Smoker   Substance and Sexual Activity   • Drug use: Not Currently     Family History   Problem Relation Age of Onset   • Colon cancer Other    • Prostate cancer Other        Objective   Physical Exam  Vitals reviewed.   Constitutional:       General: He is not in acute distress.     Appearance: Normal appearance.   HENT:      Head: Normocephalic and atraumatic.   Eyes:      Conjunctiva/sclera: Conjunctivae normal.      Pupils: Pupils are equal, round, and reactive to light.   Cardiovascular:      Rate and Rhythm: Normal rate and regular rhythm.      Heart sounds: Normal heart sounds. No murmur heard.   No gallop.    Pulmonary:      Effort: Pulmonary effort is normal.      Breath sounds: Normal breath sounds.   Chest:      Comments: Port in place  Musculoskeletal:      Cervical back: Neck supple. No tenderness.   Skin:     General: Skin is warm and dry.   Neurological:      Mental Status: He is alert and oriented to person, place, and time.   Psychiatric:         Mood and Affect: Mood normal.         Behavior: Behavior normal.         Vitals:    06/21/21 0840   BP:  122/89   Pulse: 69   Resp: 18   Temp: 98.6 °F (37 °C)   SpO2: 97%   Weight: 124 kg (273 lb 5.9 oz)   PainSc: 0-No pain     ECOG score: 0         PHQ-9 Total Score:                    Result Review :   The following data was reviewed by: Maurilio Campos MD on 06/21/2021:  Lab Results   Component Value Date    HGB 14.4 06/21/2021    HCT 43.0 06/21/2021    MCV 91.3 06/21/2021     06/21/2021    WBC 13.17 (H) 06/21/2021    NEUTROABS 10.73 (H) 06/21/2021    LYMPHSABS 1.05 06/21/2021    MONOSABS 0.60 06/21/2021    EOSABS 0.21 06/21/2021    BASOSABS 0.04 06/21/2021     Lab Results   Component Value Date    GLUCOSE 113 (H) 06/21/2021    BUN 14 06/21/2021    CREATININE 1.04 06/21/2021     06/21/2021    K 4.2 06/21/2021     06/21/2021    CO2 26.1 06/21/2021    CALCIUM 9.1 06/21/2021    PROTEINTOT 6.2 06/21/2021    ALBUMIN 3.81 06/21/2021    BILITOT 0.4 06/21/2021    ALKPHOS 146 (H) 06/21/2021    AST 20 06/21/2021    ALT 24 06/21/2021           Assessment and Plan    Diagnoses and all orders for this visit:    1. Rectal cancer (CMS/HCC) (Primary)  Assessment & Plan:  Patient is on palliative treatment with FOLFOX plus Avastin.  He is due for cycle 2.  Tolerating well.  He did have some nausea and fatigue but still able to eat well and perform his ADLs.  CBC today looks good.  Proceed with cycle 2 as planned.  RTC 2 weeks for OV, cycle 3 with labs prior.    Orders:  -     Cancel: sodium chloride 0.9 % infusion 250 mL  -     Cancel: palonosetron (ALOXI) injection 0.25 mg  -     Cancel: dexamethasone (DECADRON) 12 mg in sodium chloride 0.9 % IVPB  -     Cancel: bevacizumab (AVASTIN) 620 mg in sodium chloride 0.9 % 124.8 mL chemo IVPB  -     Cancel: dextrose (D5W) 5 % infusion 250 mL  -     Cancel: OXALIplatin (ELOXATIN) 215 mg in dextrose (D5W) 5 % 293 mL chemo IVPB  -     Cancel: leucovorin 1,010 mg in dextrose (D5W) 5 % 351 mL IVPB  -     Cancel: fluorouracil (ADRUCIL) chemo injection 1,010 mg  -     Cancel:  fluorouracil (ADRUCIL) 6,070 mg, sodium chloride 0.9 % 121.4 mL chemo infusion - FOR HOME USE  -     Cancel: hydrocortisone sodium succinate (Solu-CORTEF) injection 100 mg  -     Cancel: diphenhydrAMINE (BENADRYL) injection 50 mg  -     Cancel: famotidine (PEPCID) injection 20 mg  -     Infusion Appointment Request 10; Future          Patient Follow Up: 2 weeks for OV, cycle 3 with labs prior      Patient was given instructions and counseling regarding his condition or for health maintenance advice. Please see specific information pulled into the AVS if appropriate.     Maurilio Campos MD    6/21/2021

## 2021-06-21 NOTE — ASSESSMENT & PLAN NOTE
Patient is on palliative treatment with FOLFOX plus Avastin.  He is due for cycle 2.  Tolerating well.  He did have some nausea and fatigue but still able to eat well and perform his ADLs.  CBC today looks good.  Proceed with cycle 2 as planned.  RTC 2 weeks for OV, cycle 3 with labs prior.

## 2021-06-21 NOTE — ADDENDUM NOTE
Encounter addended by: Olga Lidia Akins RN on: 6/21/2021 2:26 PM   Actions taken: Flowsheet accepted, MAR administration accepted

## 2021-06-23 ENCOUNTER — HOSPITAL ENCOUNTER (OUTPATIENT)
Dept: ONCOLOGY | Facility: HOSPITAL | Age: 66
Setting detail: INFUSION SERIES
Discharge: HOME | End: 2021-06-23

## 2021-06-23 DIAGNOSIS — C20 RECTAL CANCER (HCC): ICD-10-CM

## 2021-06-23 DIAGNOSIS — Z45.2 ENCOUNTER FOR ADJUSTMENT OR MANAGEMENT OF VASCULAR ACCESS DEVICE: Primary | ICD-10-CM

## 2021-06-23 PROCEDURE — 25010000002 HEPARIN LOCK FLUSH PER 10 UNITS: Performed by: INTERNAL MEDICINE

## 2021-06-23 PROCEDURE — 25010000002 PEGFILGRASTIM 6 MG/0.6ML PREFILLED SYRINGE KIT: Performed by: INTERNAL MEDICINE

## 2021-06-23 PROCEDURE — 96377 APPLICATON ON-BODY INJECTOR: CPT

## 2021-06-23 RX ORDER — HEPARIN SODIUM (PORCINE) LOCK FLUSH IV SOLN 100 UNIT/ML 100 UNIT/ML
500 SOLUTION INTRAVENOUS AS NEEDED
Status: DISCONTINUED | OUTPATIENT
Start: 2021-06-23 | End: 2021-06-24 | Stop reason: HOSPADM

## 2021-06-23 RX ORDER — HEPARIN SODIUM (PORCINE) LOCK FLUSH IV SOLN 100 UNIT/ML 100 UNIT/ML
500 SOLUTION INTRAVENOUS AS NEEDED
Status: CANCELLED | OUTPATIENT
Start: 2021-06-23

## 2021-06-23 RX ORDER — SODIUM CHLORIDE 0.9 % (FLUSH) 0.9 %
20 SYRINGE (ML) INJECTION AS NEEDED
Status: CANCELLED | OUTPATIENT
Start: 2021-06-23

## 2021-06-23 RX ORDER — SODIUM CHLORIDE 0.9 % (FLUSH) 0.9 %
20 SYRINGE (ML) INJECTION AS NEEDED
Status: DISCONTINUED | OUTPATIENT
Start: 2021-06-23 | End: 2021-06-24 | Stop reason: HOSPADM

## 2021-06-23 RX ADMIN — PEGFILGRASTIM 6 MG: KIT SUBCUTANEOUS at 13:35

## 2021-06-23 RX ADMIN — HEPARIN SODIUM (PORCINE) LOCK FLUSH IV SOLN 100 UNIT/ML 500 UNITS: 100 SOLUTION at 13:33

## 2021-06-23 RX ADMIN — SODIUM CHLORIDE, PRESERVATIVE FREE 20 ML: 5 INJECTION INTRAVENOUS at 13:29

## 2021-07-06 ENCOUNTER — HOSPITAL ENCOUNTER (OUTPATIENT)
Dept: ONCOLOGY | Facility: HOSPITAL | Age: 66
Setting detail: INFUSION SERIES
Discharge: HOME | End: 2021-07-06

## 2021-07-06 ENCOUNTER — OFFICE VISIT (OUTPATIENT)
Dept: ONCOLOGY | Facility: HOSPITAL | Age: 66
End: 2021-07-06

## 2021-07-06 VITALS
RESPIRATION RATE: 18 BRPM | HEART RATE: 68 BPM | DIASTOLIC BLOOD PRESSURE: 77 MMHG | OXYGEN SATURATION: 97 % | WEIGHT: 273.15 LBS | HEIGHT: 76 IN | SYSTOLIC BLOOD PRESSURE: 105 MMHG | TEMPERATURE: 97.3 F | BODY MASS INDEX: 33.26 KG/M2

## 2021-07-06 VITALS
BODY MASS INDEX: 33.29 KG/M2 | TEMPERATURE: 97.3 F | RESPIRATION RATE: 20 BRPM | HEART RATE: 72 BPM | OXYGEN SATURATION: 97 % | DIASTOLIC BLOOD PRESSURE: 93 MMHG | SYSTOLIC BLOOD PRESSURE: 128 MMHG | WEIGHT: 273.37 LBS

## 2021-07-06 DIAGNOSIS — C20 RECTAL CANCER (HCC): Primary | ICD-10-CM

## 2021-07-06 DIAGNOSIS — I10 ESSENTIAL HYPERTENSION: ICD-10-CM

## 2021-07-06 DIAGNOSIS — Z45.2 ENCOUNTER FOR ADJUSTMENT OR MANAGEMENT OF VASCULAR ACCESS DEVICE: ICD-10-CM

## 2021-07-06 PROBLEM — E66.09 OTHER OBESITY DUE TO EXCESS CALORIES: Status: ACTIVE | Noted: 2021-06-25

## 2021-07-06 LAB
ALBUMIN SERPL-MCNC: 3.73 G/DL (ref 3.5–5.2)
ALBUMIN/GLOB SERPL: 1.6 G/DL
ALP SERPL-CCNC: 158 U/L (ref 39–117)
ALT SERPL W P-5'-P-CCNC: 25 U/L (ref 1–41)
ANION GAP SERPL CALCULATED.3IONS-SCNC: 9.6 MMOL/L (ref 5–15)
AST SERPL-CCNC: 19 U/L (ref 1–40)
BASOPHILS # BLD AUTO: 0.05 10*3/MM3 (ref 0–0.2)
BASOPHILS NFR BLD AUTO: 0.3 % (ref 0–1.5)
BILIRUB SERPL-MCNC: 0.5 MG/DL (ref 0–1.2)
BILIRUB UR QL STRIP: NEGATIVE
BUN SERPL-MCNC: 10 MG/DL (ref 8–23)
BUN/CREAT SERPL: 11.2 (ref 7–25)
CALCIUM SPEC-SCNC: 8.7 MG/DL (ref 8.6–10.5)
CHLORIDE SERPL-SCNC: 105 MMOL/L (ref 98–107)
CLARITY UR: ABNORMAL
CO2 SERPL-SCNC: 26.4 MMOL/L (ref 22–29)
COLOR UR: ABNORMAL
CREAT SERPL-MCNC: 0.89 MG/DL (ref 0.76–1.27)
DEPRECATED RDW RBC AUTO: 46.4 FL (ref 37–54)
EOSINOPHIL # BLD AUTO: 0.29 10*3/MM3 (ref 0–0.4)
EOSINOPHIL NFR BLD AUTO: 2 % (ref 0.3–6.2)
ERYTHROCYTE [DISTWIDTH] IN BLOOD BY AUTOMATED COUNT: 14.1 % (ref 12.3–15.4)
GFR SERPL CREATININE-BSD FRML MDRD: 86 ML/MIN/1.73
GLOBULIN UR ELPH-MCNC: 2.4 GM/DL
GLUCOSE SERPL-MCNC: 144 MG/DL (ref 65–99)
GLUCOSE UR STRIP-MCNC: NEGATIVE MG/DL
HCT VFR BLD AUTO: 40 % (ref 37.5–51)
HGB BLD-MCNC: 13.5 G/DL (ref 13–17.7)
HGB UR QL STRIP.AUTO: ABNORMAL
IMM GRANULOCYTES # BLD AUTO: 0.35 10*3/MM3 (ref 0–0.05)
IMM GRANULOCYTES NFR BLD AUTO: 2.4 % (ref 0–0.5)
KETONES UR QL STRIP: NEGATIVE
LEUKOCYTE ESTERASE UR QL STRIP.AUTO: NEGATIVE
LYMPHOCYTES # BLD AUTO: 0.95 10*3/MM3 (ref 0.7–3.1)
LYMPHOCYTES NFR BLD AUTO: 6.5 % (ref 19.6–45.3)
MCH RBC QN AUTO: 31.4 PG (ref 26.6–33)
MCHC RBC AUTO-ENTMCNC: 33.8 G/DL (ref 31.5–35.7)
MCV RBC AUTO: 93 FL (ref 79–97)
MONOCYTES # BLD AUTO: 0.53 10*3/MM3 (ref 0.1–0.9)
MONOCYTES NFR BLD AUTO: 3.6 % (ref 5–12)
NEUTROPHILS NFR BLD AUTO: 12.39 10*3/MM3 (ref 1.7–7)
NEUTROPHILS NFR BLD AUTO: 85.2 % (ref 42.7–76)
NITRITE UR QL STRIP: NEGATIVE
PH UR STRIP.AUTO: 5.5 [PH] (ref 5–8)
PLATELET # BLD AUTO: 158 10*3/MM3 (ref 140–450)
PMV BLD AUTO: 9.2 FL (ref 6–12)
POTASSIUM SERPL-SCNC: 3.7 MMOL/L (ref 3.5–5.2)
PROT SERPL-MCNC: 6.1 G/DL (ref 6–8.5)
PROT UR QL STRIP: NEGATIVE
RBC # BLD AUTO: 4.3 10*6/MM3 (ref 4.14–5.8)
SODIUM SERPL-SCNC: 141 MMOL/L (ref 136–145)
SP GR UR STRIP: 1.02 (ref 1–1.03)
UROBILINOGEN UR QL STRIP: ABNORMAL
WBC # BLD AUTO: 14.56 10*3/MM3 (ref 3.4–10.8)

## 2021-07-06 PROCEDURE — 25010000002 LEUCOVORIN CALCIUM PER 50 MG: Performed by: INTERNAL MEDICINE

## 2021-07-06 PROCEDURE — 96417 CHEMO IV INFUS EACH ADDL SEQ: CPT

## 2021-07-06 PROCEDURE — 96415 CHEMO IV INFUSION ADDL HR: CPT

## 2021-07-06 PROCEDURE — 25010000002 OXALIPLATIN PER 0.5 MG: Performed by: INTERNAL MEDICINE

## 2021-07-06 PROCEDURE — 96413 CHEMO IV INFUSION 1 HR: CPT

## 2021-07-06 PROCEDURE — 96368 THER/DIAG CONCURRENT INF: CPT

## 2021-07-06 PROCEDURE — 96411 CHEMO IV PUSH ADDL DRUG: CPT

## 2021-07-06 PROCEDURE — 25010000002 PALONOSETRON PER 25 MCG: Performed by: INTERNAL MEDICINE

## 2021-07-06 PROCEDURE — 25010000002 BEVACIZUMAB PER 10 MG: Performed by: INTERNAL MEDICINE

## 2021-07-06 PROCEDURE — 99214 OFFICE O/P EST MOD 30 MIN: CPT | Performed by: INTERNAL MEDICINE

## 2021-07-06 PROCEDURE — 80053 COMPREHEN METABOLIC PANEL: CPT | Performed by: INTERNAL MEDICINE

## 2021-07-06 PROCEDURE — 25010000002 DEXAMETHASONE SODIUM PHOSPHATE 120 MG/30ML SOLUTION: Performed by: INTERNAL MEDICINE

## 2021-07-06 PROCEDURE — 85025 COMPLETE CBC W/AUTO DIFF WBC: CPT | Performed by: INTERNAL MEDICINE

## 2021-07-06 PROCEDURE — G0498 CHEMO EXTEND IV INFUS W/PUMP: HCPCS

## 2021-07-06 PROCEDURE — 96375 TX/PRO/DX INJ NEW DRUG ADDON: CPT

## 2021-07-06 PROCEDURE — 25010000002 FLUOROURACIL PER 500 MG: Performed by: INTERNAL MEDICINE

## 2021-07-06 PROCEDURE — 25010000002 BEVACIZUMAB 100 MG/4ML SOLUTION 4 ML VIAL: Performed by: INTERNAL MEDICINE

## 2021-07-06 PROCEDURE — 81003 URINALYSIS AUTO W/O SCOPE: CPT | Performed by: INTERNAL MEDICINE

## 2021-07-06 RX ORDER — SODIUM CHLORIDE 0.9 % (FLUSH) 0.9 %
20 SYRINGE (ML) INJECTION AS NEEDED
Status: CANCELLED | OUTPATIENT
Start: 2021-07-06

## 2021-07-06 RX ORDER — PALONOSETRON 0.05 MG/ML
0.25 INJECTION, SOLUTION INTRAVENOUS ONCE
Status: CANCELLED | OUTPATIENT
Start: 2021-07-06

## 2021-07-06 RX ORDER — HEPARIN SODIUM (PORCINE) LOCK FLUSH IV SOLN 100 UNIT/ML 100 UNIT/ML
500 SOLUTION INTRAVENOUS AS NEEDED
Status: DISCONTINUED | OUTPATIENT
Start: 2021-07-06 | End: 2021-07-07 | Stop reason: HOSPADM

## 2021-07-06 RX ORDER — FLUOROURACIL 50 MG/ML
400 INJECTION, SOLUTION INTRAVENOUS ONCE
Status: CANCELLED | OUTPATIENT
Start: 2021-07-06

## 2021-07-06 RX ORDER — SODIUM CHLORIDE 0.9 % (FLUSH) 0.9 %
20 SYRINGE (ML) INJECTION AS NEEDED
Status: DISCONTINUED | OUTPATIENT
Start: 2021-07-06 | End: 2021-07-07 | Stop reason: HOSPADM

## 2021-07-06 RX ORDER — PALONOSETRON 0.05 MG/ML
0.25 INJECTION, SOLUTION INTRAVENOUS ONCE
Status: COMPLETED | OUTPATIENT
Start: 2021-07-06 | End: 2021-07-06

## 2021-07-06 RX ORDER — SODIUM CHLORIDE 9 MG/ML
250 INJECTION, SOLUTION INTRAVENOUS ONCE
Status: CANCELLED | OUTPATIENT
Start: 2021-07-06

## 2021-07-06 RX ORDER — HEPARIN SODIUM (PORCINE) LOCK FLUSH IV SOLN 100 UNIT/ML 100 UNIT/ML
500 SOLUTION INTRAVENOUS AS NEEDED
Status: CANCELLED | OUTPATIENT
Start: 2021-07-06

## 2021-07-06 RX ORDER — DIPHENHYDRAMINE HYDROCHLORIDE 50 MG/ML
50 INJECTION INTRAMUSCULAR; INTRAVENOUS AS NEEDED
Status: CANCELLED | OUTPATIENT
Start: 2021-07-06

## 2021-07-06 RX ORDER — DEXTROSE MONOHYDRATE 50 MG/ML
250 INJECTION, SOLUTION INTRAVENOUS ONCE
Status: COMPLETED | OUTPATIENT
Start: 2021-07-06 | End: 2021-07-06

## 2021-07-06 RX ORDER — SODIUM CHLORIDE 9 MG/ML
250 INJECTION, SOLUTION INTRAVENOUS ONCE
Status: COMPLETED | OUTPATIENT
Start: 2021-07-06 | End: 2021-07-06

## 2021-07-06 RX ORDER — FAMOTIDINE 10 MG/ML
20 INJECTION, SOLUTION INTRAVENOUS AS NEEDED
Status: CANCELLED | OUTPATIENT
Start: 2021-07-06

## 2021-07-06 RX ORDER — FLUOROURACIL 50 MG/ML
1000 INJECTION, SOLUTION INTRAVENOUS ONCE
Status: COMPLETED | OUTPATIENT
Start: 2021-07-06 | End: 2021-07-06

## 2021-07-06 RX ORDER — DEXTROSE MONOHYDRATE 50 MG/ML
250 INJECTION, SOLUTION INTRAVENOUS ONCE
Status: CANCELLED | OUTPATIENT
Start: 2021-07-06

## 2021-07-06 RX ADMIN — FLUOROURACIL 1000 MG: 50 INJECTION, SOLUTION INTRAVENOUS at 14:24

## 2021-07-06 RX ADMIN — BEVACIZUMAB 600 MG: 400 INJECTION, SOLUTION INTRAVENOUS at 11:28

## 2021-07-06 RX ADMIN — Medication 20 ML: at 14:29

## 2021-07-06 RX ADMIN — DEXTROSE MONOHYDRATE 250 ML: 50 INJECTION, SOLUTION INTRAVENOUS at 12:20

## 2021-07-06 RX ADMIN — PALONOSETRON HYDROCHLORIDE 0.25 MG: 0.25 INJECTION, SOLUTION INTRAVENOUS at 10:54

## 2021-07-06 RX ADMIN — OXALIPLATIN 200 MG: 5 INJECTION, SOLUTION INTRAVENOUS at 12:20

## 2021-07-06 RX ADMIN — SODIUM CHLORIDE 250 ML: 9 INJECTION, SOLUTION INTRAVENOUS at 10:53

## 2021-07-06 RX ADMIN — LEUCOVORIN CALCIUM 1000 MG: 350 INJECTION, POWDER, LYOPHILIZED, FOR SUSPENSION INTRAMUSCULAR; INTRAVENOUS at 12:20

## 2021-07-06 RX ADMIN — FLUOROURACIL 6100 MG: 50 INJECTION, SOLUTION INTRAVENOUS at 14:30

## 2021-07-06 RX ADMIN — DEXAMETHASONE SODIUM PHOSPHATE 12 MG: 4 INJECTION, SOLUTION INTRA-ARTICULAR; INTRALESIONAL; INTRAMUSCULAR; INTRAVENOUS; SOFT TISSUE at 10:57

## 2021-07-06 NOTE — ASSESSMENT & PLAN NOTE
BP elevated when he first arrived, but better with recheck. He is taking his medications as prescribed. Recheck next visit

## 2021-07-06 NOTE — PROGRESS NOTES
Chief Complaint  Rectal Cancer and Chemotherapy    Maurilio Campos MD Reinberg, Emily, REGINALDO    Subjective          Robbi Kennedy presents to [facility] for ongoing treatment of his metastatic rectal cancer.  He is on FOLFOX plus Avastin.  Tolerating well.  He does note some fatigue but he is able to perform his ADLs.  He is not exercising much.  No issues from his Port-A-Cath.  He reports adequate appetite and his weight is maintained.  He denies nausea, vomiting or diarrhea.  He does report some constipation.  He has been using fiber with incomplete relief.  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  He denies excessive bruising or bleeding.    Oncology/Hematology History Overview Note   9/30/2019 High rectal xdat-xulaqkismk-zffzhxpeyowylz adenocarcinoma  associated with tubular adenoma.   4/27/21 Biopsy of liver revealed  metastatic adenocarcinoma, consistent with colorectal primary.    Clinical Staging  Stage IIa ([id number])       Rectal cancer (CMS/HCC)   6/2/2021 -  Chemotherapy    OP COLON mFOLFOX6 + Bevacizumab (OXALIplatin / Leucovorin / Fluorouracil / Bevacizumab)     6/18/2021 Initial Diagnosis    Rectal cancer (CMS/HCC)     6/21/2021 Cancer Staged    Staging form: Colon And Rectum, AJCC 8th Edition  - Clinical: Stage IIA (cT3, cN0, cM0) - Signed by Maurilio Campos MD on 6/21/2021         Review of Systems   Constitutional: Negative for appetite change, diaphoresis, fatigue, fever, unexpected weight gain and unexpected weight loss.   HENT: Negative for hearing loss, mouth sores, sore throat, swollen glands, trouble swallowing and voice change.    Eyes: Negative for blurred vision.   Respiratory: Negative for cough, shortness of breath and wheezing.    Cardiovascular: Negative for chest pain and palpitations.   Gastrointestinal: Negative for abdominal pain, blood in stool, constipation, diarrhea, nausea and vomiting.   Endocrine: Negative for cold  intolerance and heat intolerance.   Genitourinary: Negative for difficulty urinating, dysuria, frequency, hematuria and urinary incontinence.   Musculoskeletal: Negative for arthralgias, back pain and myalgias.   Skin: Negative for rash, skin lesions and bruise.   Neurological: Negative for dizziness, seizures, weakness, numbness and headache.   Hematological: Does not bruise/bleed easily.   Psychiatric/Behavioral: Negative for depressed mood. The patient is not nervous/anxious.      Current Outpatient Medications on File Prior to Visit   Medication Sig Dispense Refill   • apixaban (Eliquis) 5 MG tablet tablet Eliquis 5 mg oral tablet take 1 tablet (5 mg) by oral route 2 times per day   Active     • apixaban (ELIQUIS) 5 MG tablet tablet Take 5 mg by mouth 2 (Two) Times a Day.     • atorvastatin (LIPITOR) 40 MG tablet atorvastatin 40 mg oral tablet take 1 tablet (40 mg) by oral route once daily   Active     • Cardizem  MG 24 hr capsule Take 240 mg by mouth Daily.     • dilTIAZem (Cardizem) 120 MG tablet Cardizem 120 mg oral tablet take 1 tablet (120 mg) by oral route 3 times per day   Suspended     • doxazosin (CARDURA) 4 MG tablet doxazosin 4 mg oral tablet take 1 tablet (4 mg) by oral route once daily   Active     • levothyroxine (Synthroid) 50 MCG tablet Synthroid 50 mcg oral tablet take 1 tablet (50 mcg) by oral route once daily   Active     • levothyroxine (SYNTHROID, LEVOTHROID) 25 MCG tablet Take 50 mcg by mouth Daily.     • loratadine (CLARITIN) 10 MG tablet loratadine 10 mg oral tablet take 1 tablet (10 mg) by oral route once daily   Active     • loratadine (CLARITIN) 10 MG tablet Take 10 mg by mouth Daily.     • metoprolol succinate XL (TOPROL-XL) 50 MG 24 hr tablet metoprolol succinate 50 mg oral tablet extended release 24 hr take 1 tablet (50 mg) by oral route once daily   Active     • metoprolol tartrate (LOPRESSOR) 100 MG tablet Take 100 mg by mouth Daily.     • ondansetron (ZOFRAN) 8 MG tablet  Take 8 mg by mouth Every 8 (Eight) Hours As Needed. for nausea     • prochlorperazine (COMPAZINE) 10 MG tablet Take 10 mg by mouth Every 6 (Six) Hours As Needed.       Current Facility-Administered Medications on File Prior to Visit   Medication Dose Route Frequency Provider Last Rate Last Admin   • [COMPLETED] bevacizumab (AVASTIN) 600 mg in sodium chloride 0.9 % 134 mL chemo IVPB  600 mg Intravenous Once Maurilio Campos MD   Stopped at 07/06/21 1158   • [COMPLETED] dexamethasone (DECADRON) IVPB 12 mg  12 mg Intravenous Once Maurilio Campos MD   Stopped at 07/06/21 1112   • dextrose (D5W) 5 % infusion 250 mL  250 mL Intravenous Once Maurilio Campos MD       • fluorouracil (ADRUCIL) 6,100 mg, sodium chloride 0.9 % 138 mL chemo infusion - FOR HOME USE  6,100 mg Intravenous Once Maurilio Campos MD       • fluorouracil (ADRUCIL) chemo injection 1,000 mg  1,000 mg Intravenous Once Maurilio Campos MD       • heparin injection 500 Units  500 Units Intravenous PRN Maurilio Campos MD       • leucovorin 1,000 mg in dextrose (D5W) 5 % 325 mL IVPB  1,000 mg Intravenous Once Maurilio Campos MD   1,000 mg at 07/06/21 1220   • oxaliplatin (ELOXATIN) 200 mg in dextrose (D5W) 5 % 315 mL chemo IVPB  200 mg Intravenous Once Maurilio Campos MD   200 mg at 07/06/21 1220   • [COMPLETED] palonosetron (ALOXI) injection 0.25 mg  0.25 mg Intravenous Once Maurilio Campos MD   0.25 mg at 07/06/21 1054   • sodium chloride 0.9 % flush 20 mL  20 mL Intravenous PRN Maurilio Campos MD       • [COMPLETED] sodium chloride 0.9 % infusion 250 mL  250 mL Intravenous Once Maurilio Campos MD 20 mL/hr at 07/06/21 1053 250 mL at 07/06/21 1053       No Known Allergies  Past Medical History:   Diagnosis Date   • Colorectal cancer (CMS/HCC)    • GI cancer (CMS/HCC)    • Hepatitis    • High cholesterol    • Hypertension    • Rectal cancer (CMS/HCC) 6/18/2021   • Thyroid disease      Past Surgical History:   Procedure Laterality Date   •  HERNIA REPAIR     • LIVER BIOPSY      4/27/21 Biopsy of liver revealed metastatic adenocarcinoma, consistent with colorectal primary   • OTHER SURGICAL HISTORY      REMOVED CANCER FROM COLON     Social History     Socioeconomic History   • Marital status:      Spouse name: Not on file   • Number of children: 2   • Years of education: Not on file   • Highest education level: Not on file   Tobacco Use   • Smoking status: Never Smoker   Substance and Sexual Activity   • Drug use: Not Currently     Family History   Problem Relation Age of Onset   • Colon cancer Other    • Prostate cancer Other        Objective   Physical Exam  Vitals reviewed. Exam conducted with a chaperone present.   Constitutional:       Appearance: Normal appearance.   HENT:      Head: Normocephalic and atraumatic.   Eyes:      Conjunctiva/sclera: Conjunctivae normal.      Pupils: Pupils are equal, round, and reactive to light.   Cardiovascular:      Rate and Rhythm: Normal rate and regular rhythm.      Heart sounds: Normal heart sounds. No murmur heard.   No gallop.    Pulmonary:      Effort: Pulmonary effort is normal.      Breath sounds: Normal breath sounds.      Comments: Port-A-Cath in place  Abdominal:      General: Abdomen is flat. Bowel sounds are normal. There is no distension.      Palpations: Abdomen is soft.      Tenderness: There is no abdominal tenderness.   Musculoskeletal:      Cervical back: Neck supple.      Right lower leg: No edema.      Left lower leg: No edema.   Neurological:      Mental Status: He is alert and oriented to person, place, and time.   Psychiatric:         Mood and Affect: Mood normal.         Behavior: Behavior normal.         Vitals:    07/06/21 0940   BP: 128/93   Pulse: 72   Resp: 20   Temp: 97.3 °F (36.3 °C)   SpO2: 97%   Weight: 124 kg (273 lb 5.9 oz)   PainSc: 0-No pain     ECOG score: 0         PHQ-9 Total Score: 0                  Result Review :   The following data was reviewed by: Maurilio DUMONT  MD Andres on 07/06/2021:  Lab Results   Component Value Date    HGB 13.5 07/06/2021    HCT 40.0 07/06/2021    MCV 93.0 07/06/2021     07/06/2021    WBC 14.56 (H) 07/06/2021    NEUTROABS 12.39 (H) 07/06/2021    LYMPHSABS 0.95 07/06/2021    MONOSABS 0.53 07/06/2021    EOSABS 0.29 07/06/2021    BASOSABS 0.05 07/06/2021     Lab Results   Component Value Date    GLUCOSE 144 (H) 07/06/2021    BUN 10 07/06/2021    CREATININE 0.89 07/06/2021     07/06/2021    K 3.7 07/06/2021     07/06/2021    CO2 26.4 07/06/2021    CALCIUM 8.7 07/06/2021    PROTEINTOT 6.1 07/06/2021    ALBUMIN 3.73 07/06/2021    BILITOT 0.5 07/06/2021    ALKPHOS 158 (H) 07/06/2021    AST 19 07/06/2021    ALT 25 07/06/2021           Assessment and Plan    Diagnoses and all orders for this visit:    1. Rectal cancer (CMS/HCC) (Primary)  Assessment & Plan:  Patient is on palliative treatment with FOLFOX plus Avastin.  He is due for cycle 3.  Laboratory is adequate for treatment.  His blood pressure when he came in was a little bit high but after allowing him to sit for a few moments, it improved.  He is on antihypertensive therapy.  Proceed with cycle 3 as planned.  RTC 2 weeks for OV, cycle 4 with labs prior.  Plan restaging scans after cycle 4.    Orders:  -     Cancel: sodium chloride 0.9 % infusion 250 mL  -     Cancel: palonosetron (ALOXI) injection 0.25 mg  -     Cancel: dexamethasone (DECADRON) 12 mg in sodium chloride 0.9 % IVPB  -     Cancel: bevacizumab (AVASTIN) 620 mg in sodium chloride 0.9 % 124.8 mL chemo IVPB  -     Cancel: dextrose (D5W) 5 % infusion 250 mL  -     Cancel: OXALIplatin (ELOXATIN) 215 mg in dextrose (D5W) 5 % 293 mL chemo IVPB  -     Cancel: leucovorin 1,010 mg in dextrose (D5W) 5 % 351 mL IVPB  -     Cancel: fluorouracil (ADRUCIL) chemo injection 1,010 mg  -     Cancel: fluorouracil (ADRUCIL) 6,070 mg, sodium chloride 0.9 % 121.4 mL chemo infusion - FOR HOME USE  -     Infusion Appointment Request 10;  Future    2. Essential hypertension  Assessment & Plan:  BP elevated when he first arrived, but better with recheck. He is taking his medications as prescribed. Recheck next visit        Other orders  -     hydrocortisone sodium succinate (Solu-CORTEF) injection 100 mg  -     diphenhydrAMINE (BENADRYL) injection 50 mg  -     famotidine (PEPCID) injection 20 mg  -     pegfilgrastim (NEULASTA ONPRO) on-body injector 6 mg          Patient Follow Up: 2 weeks for OV, cycle 4 with labs prior      Patient was given instructions and counseling regarding his condition or for health maintenance advice. Please see specific information pulled into the AVS if appropriate.     Maurilio Campos MD    7/6/2021

## 2021-07-06 NOTE — ASSESSMENT & PLAN NOTE
Patient is on palliative treatment with FOLFOX plus Avastin.  He is due for cycle 3.  Laboratory is adequate for treatment.  His blood pressure when he came in was a little bit high but after allowing him to sit for a few moments, it improved.  He is on antihypertensive therapy.  Proceed with cycle 3 as planned.  RTC 2 weeks for OV, cycle 4 with labs prior.  Plan restaging scans after cycle 4.

## 2021-07-08 ENCOUNTER — HOSPITAL ENCOUNTER (OUTPATIENT)
Dept: ONCOLOGY | Facility: HOSPITAL | Age: 66
Setting detail: INFUSION SERIES
Discharge: HOME | End: 2021-07-08

## 2021-07-08 VITALS — DIASTOLIC BLOOD PRESSURE: 86 MMHG | RESPIRATION RATE: 18 BRPM | HEART RATE: 87 BPM | SYSTOLIC BLOOD PRESSURE: 126 MMHG

## 2021-07-08 DIAGNOSIS — C20 RECTAL CANCER (HCC): Primary | ICD-10-CM

## 2021-07-08 PROCEDURE — 25010000002 PEGFILGRASTIM 6 MG/0.6ML PREFILLED SYRINGE KIT: Performed by: INTERNAL MEDICINE

## 2021-07-08 PROCEDURE — 96372 THER/PROPH/DIAG INJ SC/IM: CPT

## 2021-07-08 RX ADMIN — PEGFILGRASTIM 6 MG: KIT SUBCUTANEOUS at 13:25

## 2021-07-12 ENCOUNTER — OFFICE VISIT (OUTPATIENT)
Dept: CARDIOLOGY | Facility: CLINIC | Age: 66
End: 2021-07-12

## 2021-07-12 VITALS
HEART RATE: 89 BPM | HEIGHT: 76 IN | WEIGHT: 269 LBS | BODY MASS INDEX: 32.76 KG/M2 | SYSTOLIC BLOOD PRESSURE: 111 MMHG | DIASTOLIC BLOOD PRESSURE: 89 MMHG

## 2021-07-12 DIAGNOSIS — C20 RECTAL CANCER (HCC): ICD-10-CM

## 2021-07-12 DIAGNOSIS — I48.19 ATRIAL FIBRILLATION, PERSISTENT (HCC): Primary | Chronic | ICD-10-CM

## 2021-07-12 DIAGNOSIS — E78.5 HYPERLIPIDEMIA, UNSPECIFIED HYPERLIPIDEMIA TYPE: ICD-10-CM

## 2021-07-12 DIAGNOSIS — I10 ESSENTIAL HYPERTENSION: ICD-10-CM

## 2021-07-12 PROBLEM — J30.9 ALLERGIC RHINITIS: Status: RESOLVED | Noted: 2020-12-31 | Resolved: 2021-07-12

## 2021-07-12 PROCEDURE — 99213 OFFICE O/P EST LOW 20 MIN: CPT | Performed by: INTERNAL MEDICINE

## 2021-07-12 NOTE — PROGRESS NOTES
Chief Complaint  Atrial Fibrillation and NO REFILLS NEEDED    Subjective    Patient with stable intermittent heart palpitations and fluttering as well as shortness of breath with exertion  History of Present Illness  Robbi Kennedy presents to [facility]      Past Medical History:   Diagnosis Date   • Abnormal ECG    • Arrhythmia    • Asthma    • Atrial fibrillation, persistent (CMS/HCC) 2/26/2021   • Colorectal cancer (CMS/HCC)    • GI cancer (CMS/HCC)    • Hepatitis    • High cholesterol    • Hypertension    • Rectal cancer (CMS/HCC) 6/18/2021   • Thyroid disease          Current Outpatient Medications:   •  apixaban (Eliquis) 5 MG tablet tablet, Eliquis 5 mg oral tablet take 1 tablet (5 mg) by oral route 2 times per day   Active, Disp: , Rfl:   •  atorvastatin (LIPITOR) 40 MG tablet, atorvastatin 40 mg oral tablet take 1 tablet (40 mg) by oral route once daily   Active, Disp: , Rfl:   •  Cardizem  MG 24 hr capsule, Take 240 mg by mouth Daily., Disp: , Rfl:   •  levothyroxine (Synthroid) 50 MCG tablet, Synthroid 50 mcg oral tablet take 1 tablet (50 mcg) by oral route once daily   Active, Disp: , Rfl:   •  metoprolol tartrate (LOPRESSOR) 100 MG tablet, Take 100 mg by mouth Daily., Disp: , Rfl:   •  ondansetron (ZOFRAN) 8 MG tablet, Take 8 mg by mouth Every 8 (Eight) Hours As Needed. for nausea, Disp: , Rfl:   •  apixaban (ELIQUIS) 5 MG tablet tablet, Take 5 mg by mouth 2 (Two) Times a Day., Disp: , Rfl:   •  dilTIAZem (Cardizem) 120 MG tablet, Cardizem 120 mg oral tablet take 1 tablet (120 mg) by oral route 3 times per day   Suspended, Disp: , Rfl:   •  doxazosin (CARDURA) 4 MG tablet, doxazosin 4 mg oral tablet take 1 tablet (4 mg) by oral route once daily   Active, Disp: , Rfl:   •  levothyroxine (SYNTHROID, LEVOTHROID) 25 MCG tablet, Take 50 mcg by mouth Daily., Disp: , Rfl:   •  loratadine (CLARITIN) 10 MG tablet, loratadine 10 mg oral tablet take 1 tablet (10 mg) by oral  "route once daily   Active, Disp: , Rfl:   •  loratadine (CLARITIN) 10 MG tablet, Take 10 mg by mouth Daily., Disp: , Rfl:   •  metoprolol succinate XL (TOPROL-XL) 50 MG 24 hr tablet, metoprolol succinate 50 mg oral tablet extended release 24 hr take 1 tablet (50 mg) by oral route once daily   Active, Disp: , Rfl:   •  prochlorperazine (COMPAZINE) 10 MG tablet, Take 10 mg by mouth Every 6 (Six) Hours As Needed., Disp: , Rfl:     There are no discontinued medications.  No Known Allergies     Social History     Tobacco Use   • Smoking status: Never Smoker   • Smokeless tobacco: Never Used   Vaping Use   • Vaping Use: Never used   Substance Use Topics   • Alcohol use: Not Currently   • Drug use: Not Currently       Family History   Problem Relation Age of Onset   • Colon cancer Other    • Prostate cancer Other    • Prostate cancer Father         Objective     /89   Pulse 89   Ht 193 cm (75.98\")   Wt 122 kg (269 lb)   BMI 32.76 kg/m²       Physical Exam  Constitutional:       General: He is awake. He is not in acute distress.     Appearance: Normal appearance.   Neck:      Vascular: No carotid bruit, hepatojugular reflux or JVD.   Cardiovascular:      Rate and Rhythm: Irregularly irregular     Chest Wall: PMI is not displaced.      Heart sounds: Normal heart sounds, S1 normal and S2 normal. No murmur heard.   No friction rub. No gallop. No S3 or S4 sounds.    Pulmonary:      Effort: Pulmonary effort is normal.      Breath sounds: Normal breath sounds. No wheezing, rhonchi or rales.   Ext.      Bilateral +1  Skin:     General: Skin is warm and dry.      Coloration: Skin is not cyanotic.      Findings: No petechiae or rash.   Neurological:      Mental Status: He is alert.   Psychiatric:         Behavior: Behavior is cooperative.         Result Review :     No results found for: PROBNP  CMP    CMP 5/24/21 6/21/21 7/6/21   Glucose  113 (A) 144 (A)   Glucose 126 (A)     BUN 15 14 10   Creatinine 0.89 1.04 0.89 "   eGFR Non African Am  72 86   Sodium 137 138 141   Potassium 4.1 4.2 3.7   Chloride 106 105 105   Calcium 8.9 9.1 8.7   Albumin 4.1 3.81 3.73   Total Bilirubin 0.60 0.4 0.5   Alkaline Phosphatase 82 146 (A) 158 (A)   AST (SGOT) 18 20 19   ALT (SGPT) 23 24 25   (A) Abnormal value       Comments are available for some flowsheets but are not being displayed.           CBC w/diff    CBC w/Diff 5/24/21 5/24/21 6/21/21 7/6/21    0914 0914     WBC 8.74  13.17 (A) 14.56 (A)   RBC   4.71 4.30   Hemoglobin 14.9  14.4 13.5   Hematocrit 43.7  43.0 40.0   MCV 90.5  91.3 93.0   MCH 30.8  30.6 31.4   MCHC 34.1  33.5 33.8   RDW 42.6 12.8 12.9 14.1   Platelets   210 158   Neutrophil Rel %   81.4 (A) 85.2 (A)   Immature Granulocyte Rel %   4.1 (A) 2.4 (A)   Lymphocyte Rel % 8.7 (A)  8.0 (A) 6.5 (A)   Monocyte Rel %   4.6 (A) 3.6 (A)   Eosinophil Rel %   1.6 2.0   Basophil Rel % 0.3  0.3 0.3   (A) Abnormal value             Lab Results   Component Value Date    TSH 3.250 02/24/2021      Lab Results   Component Value Date    FREET4 1.33 02/24/2021      No results found for: DDIMERQUANT  Magnesium   Date Value Ref Range Status   05/24/2021 2.21 1.60 - 2.30 mg/dL Final     Comment:     Testing performed by:  [facility]  CLIA#[id number]  [address] [place] [address]. [address]        Digoxin   Date Value Ref Range Status   01/18/2021 0.5 0.5 - 2.0 ng/mL Final      No results found for: TROPONINT        Lipid Panel    Lipid Panel 1/18/21   Total Cholesterol 142   Triglycerides 132   HDL Cholesterol 45   VLDL Cholesterol 26   LDL Cholesterol  71      Comments are available for some flowsheets but are not being displayed.                            Diagnoses and all orders for this visit:    1. Atrial fibrillation, persistent (CMS/HCC) (Primary)  Assessment & Plan:  Rate controlled continue with Eliquis for CVA prevention.  Repeat EKG on next visit      2. Essential hypertension  Assessment & Plan:  Stable and  controlled      3. Hyperlipidemia, unspecified hyperlipidemia type    4. Rectal cancer (CMS/HCC)          Follow Up     Return in about 6 months (around 1/12/2022) for EKG with F/U, Follow with Nyla Ramírez.    Patient was given instructions and counseling regarding his condition or for health maintenance advice. Please see specific information pulled into the AVS if appropriate.

## 2021-07-15 VITALS — HEIGHT: 76 IN | BODY MASS INDEX: 33.29 KG/M2 | WEIGHT: 273.37 LBS

## 2021-07-19 ENCOUNTER — APPOINTMENT (OUTPATIENT)
Dept: ONCOLOGY | Facility: HOSPITAL | Age: 66
End: 2021-07-19

## 2021-07-19 ENCOUNTER — HOSPITAL ENCOUNTER (OUTPATIENT)
Dept: ONCOLOGY | Facility: HOSPITAL | Age: 66
Setting detail: INFUSION SERIES
Discharge: HOME | End: 2021-07-19

## 2021-07-19 ENCOUNTER — OFFICE VISIT (OUTPATIENT)
Dept: ONCOLOGY | Facility: HOSPITAL | Age: 66
End: 2021-07-19

## 2021-07-19 VITALS
OXYGEN SATURATION: 98 % | WEIGHT: 273.37 LBS | HEIGHT: 76 IN | SYSTOLIC BLOOD PRESSURE: 132 MMHG | RESPIRATION RATE: 18 BRPM | BODY MASS INDEX: 33.29 KG/M2 | DIASTOLIC BLOOD PRESSURE: 89 MMHG | HEART RATE: 66 BPM | TEMPERATURE: 98.5 F

## 2021-07-19 VITALS
TEMPERATURE: 98.5 F | WEIGHT: 272.27 LBS | HEIGHT: 76 IN | HEART RATE: 66 BPM | RESPIRATION RATE: 18 BRPM | SYSTOLIC BLOOD PRESSURE: 132 MMHG | DIASTOLIC BLOOD PRESSURE: 89 MMHG | OXYGEN SATURATION: 98 % | BODY MASS INDEX: 33.16 KG/M2

## 2021-07-19 DIAGNOSIS — E87.6 HYPOKALEMIA: ICD-10-CM

## 2021-07-19 DIAGNOSIS — Z45.2 ENCOUNTER FOR ADJUSTMENT OR MANAGEMENT OF VASCULAR ACCESS DEVICE: ICD-10-CM

## 2021-07-19 DIAGNOSIS — C20 RECTAL CANCER (HCC): Primary | ICD-10-CM

## 2021-07-19 DIAGNOSIS — C20 RECTAL CANCER (HCC): ICD-10-CM

## 2021-07-19 DIAGNOSIS — G89.3 CANCER RELATED PAIN: ICD-10-CM

## 2021-07-19 LAB
ALBUMIN SERPL-MCNC: 3.77 G/DL (ref 3.5–5.2)
ALBUMIN/GLOB SERPL: 1.8 G/DL
ALP SERPL-CCNC: 161 U/L (ref 39–117)
ALT SERPL W P-5'-P-CCNC: 26 U/L (ref 1–41)
ANION GAP SERPL CALCULATED.3IONS-SCNC: 7.2 MMOL/L (ref 5–15)
AST SERPL-CCNC: 22 U/L (ref 1–40)
BASOPHILS # BLD AUTO: 0.06 10*3/MM3 (ref 0–0.2)
BASOPHILS NFR BLD AUTO: 0.6 % (ref 0–1.5)
BILIRUB SERPL-MCNC: 0.4 MG/DL (ref 0–1.2)
BILIRUB UR QL STRIP: NEGATIVE
BUN SERPL-MCNC: 10 MG/DL (ref 8–23)
BUN/CREAT SERPL: 11 (ref 7–25)
CALCIUM SPEC-SCNC: 8.5 MG/DL (ref 8.6–10.5)
CHLORIDE SERPL-SCNC: 107 MMOL/L (ref 98–107)
CLARITY UR: CLEAR
CO2 SERPL-SCNC: 23.8 MMOL/L (ref 22–29)
COLOR UR: ABNORMAL
CREAT SERPL-MCNC: 0.91 MG/DL (ref 0.76–1.27)
DEPRECATED RDW RBC AUTO: 49.9 FL (ref 37–54)
EOSINOPHIL # BLD AUTO: 0.15 10*3/MM3 (ref 0–0.4)
EOSINOPHIL NFR BLD AUTO: 1.4 % (ref 0.3–6.2)
ERYTHROCYTE [DISTWIDTH] IN BLOOD BY AUTOMATED COUNT: 14.8 % (ref 12.3–15.4)
GFR SERPL CREATININE-BSD FRML MDRD: 84 ML/MIN/1.73
GLOBULIN UR ELPH-MCNC: 2.1 GM/DL
GLUCOSE SERPL-MCNC: 143 MG/DL (ref 65–99)
GLUCOSE UR STRIP-MCNC: NEGATIVE MG/DL
HCT VFR BLD AUTO: 38.9 % (ref 37.5–51)
HGB BLD-MCNC: 13.1 G/DL (ref 13–17.7)
HGB UR QL STRIP.AUTO: ABNORMAL
IMM GRANULOCYTES # BLD AUTO: 0.38 10*3/MM3 (ref 0–0.05)
IMM GRANULOCYTES NFR BLD AUTO: 3.6 % (ref 0–0.5)
KETONES UR QL STRIP: NEGATIVE
LEUKOCYTE ESTERASE UR QL STRIP.AUTO: NEGATIVE
LYMPHOCYTES # BLD AUTO: 0.98 10*3/MM3 (ref 0.7–3.1)
LYMPHOCYTES NFR BLD AUTO: 9.3 % (ref 19.6–45.3)
MCH RBC QN AUTO: 31.4 PG (ref 26.6–33)
MCHC RBC AUTO-ENTMCNC: 33.7 G/DL (ref 31.5–35.7)
MCV RBC AUTO: 93.3 FL (ref 79–97)
MONOCYTES # BLD AUTO: 0.65 10*3/MM3 (ref 0.1–0.9)
MONOCYTES NFR BLD AUTO: 6.1 % (ref 5–12)
NEUTROPHILS NFR BLD AUTO: 79 % (ref 42.7–76)
NEUTROPHILS NFR BLD AUTO: 8.35 10*3/MM3 (ref 1.7–7)
NITRITE UR QL STRIP: NEGATIVE
PH UR STRIP.AUTO: 5.5 [PH] (ref 5–8)
PLATELET # BLD AUTO: 186 10*3/MM3 (ref 140–450)
PMV BLD AUTO: 8.9 FL (ref 6–12)
POTASSIUM SERPL-SCNC: 3.4 MMOL/L (ref 3.5–5.2)
PROT SERPL-MCNC: 5.9 G/DL (ref 6–8.5)
PROT UR QL STRIP: NEGATIVE
RBC # BLD AUTO: 4.17 10*6/MM3 (ref 4.14–5.8)
SODIUM SERPL-SCNC: 138 MMOL/L (ref 136–145)
SP GR UR STRIP: 1.02 (ref 1–1.03)
UROBILINOGEN UR QL STRIP: ABNORMAL
WBC # BLD AUTO: 10.57 10*3/MM3 (ref 3.4–10.8)

## 2021-07-19 PROCEDURE — 25010000002 LEUCOVORIN CALCIUM PER 50 MG: Performed by: INTERNAL MEDICINE

## 2021-07-19 PROCEDURE — 25010000002 PALONOSETRON PER 25 MCG: Performed by: INTERNAL MEDICINE

## 2021-07-19 PROCEDURE — 96415 CHEMO IV INFUSION ADDL HR: CPT

## 2021-07-19 PROCEDURE — 25010000002 FLUOROURACIL PER 500 MG: Performed by: INTERNAL MEDICINE

## 2021-07-19 PROCEDURE — 96411 CHEMO IV PUSH ADDL DRUG: CPT

## 2021-07-19 PROCEDURE — G0498 CHEMO EXTEND IV INFUS W/PUMP: HCPCS

## 2021-07-19 PROCEDURE — 25010000002 BEVACIZUMAB PER 10 MG: Performed by: INTERNAL MEDICINE

## 2021-07-19 PROCEDURE — 25010000002 OXALIPLATIN PER 0.5 MG: Performed by: INTERNAL MEDICINE

## 2021-07-19 PROCEDURE — 25010000002 DEXAMETHASONE SODIUM PHOSPHATE 120 MG/30ML SOLUTION: Performed by: INTERNAL MEDICINE

## 2021-07-19 PROCEDURE — 96368 THER/DIAG CONCURRENT INF: CPT

## 2021-07-19 PROCEDURE — 25010000002 BEVACIZUMAB 100 MG/4ML SOLUTION 4 ML VIAL: Performed by: INTERNAL MEDICINE

## 2021-07-19 PROCEDURE — 99214 OFFICE O/P EST MOD 30 MIN: CPT | Performed by: INTERNAL MEDICINE

## 2021-07-19 PROCEDURE — 96375 TX/PRO/DX INJ NEW DRUG ADDON: CPT

## 2021-07-19 PROCEDURE — 85025 COMPLETE CBC W/AUTO DIFF WBC: CPT | Performed by: INTERNAL MEDICINE

## 2021-07-19 PROCEDURE — 80053 COMPREHEN METABOLIC PANEL: CPT | Performed by: INTERNAL MEDICINE

## 2021-07-19 PROCEDURE — 96417 CHEMO IV INFUS EACH ADDL SEQ: CPT

## 2021-07-19 PROCEDURE — 96413 CHEMO IV INFUSION 1 HR: CPT

## 2021-07-19 PROCEDURE — 81003 URINALYSIS AUTO W/O SCOPE: CPT | Performed by: INTERNAL MEDICINE

## 2021-07-19 RX ORDER — POTASSIUM CHLORIDE 20 MEQ/1
20 TABLET, EXTENDED RELEASE ORAL DAILY
Qty: 30 TABLET | Refills: 5 | Status: SHIPPED | OUTPATIENT
Start: 2021-07-19 | End: 2022-01-25 | Stop reason: SDUPTHER

## 2021-07-19 RX ORDER — FLUOROURACIL 50 MG/ML
400 INJECTION, SOLUTION INTRAVENOUS ONCE
Status: CANCELLED | OUTPATIENT
Start: 2021-07-19

## 2021-07-19 RX ORDER — PALONOSETRON 0.05 MG/ML
0.25 INJECTION, SOLUTION INTRAVENOUS ONCE
Status: CANCELLED | OUTPATIENT
Start: 2021-07-19

## 2021-07-19 RX ORDER — HEPARIN SODIUM (PORCINE) LOCK FLUSH IV SOLN 100 UNIT/ML 100 UNIT/ML
500 SOLUTION INTRAVENOUS AS NEEDED
Status: DISCONTINUED | OUTPATIENT
Start: 2021-07-19 | End: 2021-07-20 | Stop reason: HOSPADM

## 2021-07-19 RX ORDER — SODIUM CHLORIDE 9 MG/ML
250 INJECTION, SOLUTION INTRAVENOUS ONCE
Status: CANCELLED | OUTPATIENT
Start: 2021-07-19

## 2021-07-19 RX ORDER — SODIUM CHLORIDE 0.9 % (FLUSH) 0.9 %
20 SYRINGE (ML) INJECTION AS NEEDED
Status: CANCELLED | OUTPATIENT
Start: 2021-07-19

## 2021-07-19 RX ORDER — DEXTROSE MONOHYDRATE 50 MG/ML
250 INJECTION, SOLUTION INTRAVENOUS ONCE
Status: COMPLETED | OUTPATIENT
Start: 2021-07-19 | End: 2021-07-19

## 2021-07-19 RX ORDER — PALONOSETRON 0.05 MG/ML
0.25 INJECTION, SOLUTION INTRAVENOUS ONCE
Status: COMPLETED | OUTPATIENT
Start: 2021-07-19 | End: 2021-07-19

## 2021-07-19 RX ORDER — FAMOTIDINE 10 MG/ML
20 INJECTION, SOLUTION INTRAVENOUS AS NEEDED
Status: CANCELLED | OUTPATIENT
Start: 2021-07-19

## 2021-07-19 RX ORDER — SODIUM CHLORIDE 0.9 % (FLUSH) 0.9 %
20 SYRINGE (ML) INJECTION AS NEEDED
Status: DISCONTINUED | OUTPATIENT
Start: 2021-07-19 | End: 2021-07-20 | Stop reason: HOSPADM

## 2021-07-19 RX ORDER — HEPARIN SODIUM (PORCINE) LOCK FLUSH IV SOLN 100 UNIT/ML 100 UNIT/ML
500 SOLUTION INTRAVENOUS AS NEEDED
Status: CANCELLED | OUTPATIENT
Start: 2021-07-19

## 2021-07-19 RX ORDER — SODIUM CHLORIDE 9 MG/ML
250 INJECTION, SOLUTION INTRAVENOUS ONCE
Status: COMPLETED | OUTPATIENT
Start: 2021-07-19 | End: 2021-07-19

## 2021-07-19 RX ORDER — FLUOROURACIL 50 MG/ML
1000 INJECTION, SOLUTION INTRAVENOUS ONCE
Status: COMPLETED | OUTPATIENT
Start: 2021-07-19 | End: 2021-07-19

## 2021-07-19 RX ORDER — DEXTROSE MONOHYDRATE 50 MG/ML
250 INJECTION, SOLUTION INTRAVENOUS ONCE
Status: CANCELLED | OUTPATIENT
Start: 2021-07-19

## 2021-07-19 RX ORDER — DIPHENHYDRAMINE HYDROCHLORIDE 50 MG/ML
50 INJECTION INTRAMUSCULAR; INTRAVENOUS AS NEEDED
Status: CANCELLED | OUTPATIENT
Start: 2021-07-19

## 2021-07-19 RX ORDER — POTASSIUM CHLORIDE 20 MEQ/1
20 TABLET, EXTENDED RELEASE ORAL DAILY
Qty: 30 TABLET | Refills: 5 | Status: SHIPPED | OUTPATIENT
Start: 2021-07-19 | End: 2021-07-19 | Stop reason: SDUPTHER

## 2021-07-19 RX ADMIN — DEXTROSE MONOHYDRATE 250 ML: 5 INJECTION, SOLUTION INTRAVENOUS at 11:25

## 2021-07-19 RX ADMIN — SODIUM CHLORIDE 250 ML: 9 INJECTION, SOLUTION INTRAVENOUS at 09:35

## 2021-07-19 RX ADMIN — OXALIPLATIN 200 MG: 5 INJECTION, SOLUTION INTRAVENOUS at 11:29

## 2021-07-19 RX ADMIN — LEUCOVORIN CALCIUM 1000 MG: 350 INJECTION, POWDER, LYOPHILIZED, FOR SUSPENSION INTRAMUSCULAR; INTRAVENOUS at 11:29

## 2021-07-19 RX ADMIN — BEVACIZUMAB 600 MG: 400 INJECTION, SOLUTION INTRAVENOUS at 10:46

## 2021-07-19 RX ADMIN — DEXAMETHASONE SODIUM PHOSPHATE 12 MG: 4 INJECTION, SOLUTION INTRA-ARTICULAR; INTRALESIONAL; INTRAMUSCULAR; INTRAVENOUS; SOFT TISSUE at 09:42

## 2021-07-19 RX ADMIN — FLUOROURACIL 6100 MG: 50 INJECTION, SOLUTION INTRAVENOUS at 13:49

## 2021-07-19 RX ADMIN — FLUOROURACIL 1000 MG: 50 INJECTION, SOLUTION INTRAVENOUS at 13:44

## 2021-07-19 RX ADMIN — PALONOSETRON HYDROCHLORIDE 0.25 MG: 0.25 INJECTION, SOLUTION INTRAVENOUS at 09:40

## 2021-07-19 NOTE — PROGRESS NOTES
Chief Complaint  Rectal Cancer and Chemotherapy    Maurilio Campos MD Reinberg, Emily, REGINALDO    Subjective          Robbi Kennedy presents to [facility] for continued treatment of his metastatic rectal cancer.  He is on FOLFOX plus Avastin.  He is tolerating the treatment well.  He denies nausea, vomiting or diarrhea.  He reports a little neuropathy but not interfering with his normal daily activities.  He denies issues from his Port-A-Cath.  He is having pains in his bones after his Neulasta injection.  Over-the-counter Tylenol and Motrin have not helped.  He notes good appetite.  Oncology/Hematology History Overview Note   9/30/2019 High rectal obeu-hxhhmmrqkz-sbnabpupasydgv adenocarcinoma  associated with tubular adenoma.   4/27/21 Biopsy of liver revealed  metastatic adenocarcinoma, consistent with colorectal primary.    Clinical Staging  Stage IIa ([id number])       Rectal cancer (CMS/HCC)   6/2/2021 -  Chemotherapy    OP COLON mFOLFOX6 + Bevacizumab (OXALIplatin / Leucovorin / Fluorouracil / Bevacizumab)     6/18/2021 Initial Diagnosis    Rectal cancer (CMS/HCC)     6/21/2021 Cancer Staged    Staging form: Colon And Rectum, AJCC 8th Edition  - Clinical: Stage IIA (cT3, cN0, cM0) - Signed by Maurilio Campos MD on 6/21/2021         Review of Systems   Constitutional: Negative for appetite change, diaphoresis, fatigue, fever, unexpected weight gain and unexpected weight loss.   HENT: Negative for hearing loss, mouth sores, sore throat, swollen glands, trouble swallowing and voice change.    Eyes: Negative for blurred vision.   Respiratory: Negative for cough, shortness of breath and wheezing.    Cardiovascular: Negative for chest pain and palpitations.   Gastrointestinal: Negative for abdominal pain, blood in stool, constipation, diarrhea, nausea and vomiting.   Endocrine: Negative for cold intolerance and heat intolerance.   Genitourinary: Negative for difficulty  urinating, dysuria, frequency, hematuria and urinary incontinence.   Musculoskeletal: Negative for arthralgias, back pain and myalgias.   Skin: Negative for rash, skin lesions and bruise.   Neurological: Negative for dizziness, seizures, weakness, numbness and headache.   Hematological: Does not bruise/bleed easily.   Psychiatric/Behavioral: Negative for depressed mood. The patient is not nervous/anxious.      Current Outpatient Medications on File Prior to Visit   Medication Sig Dispense Refill   • apixaban (ELIQUIS) 5 MG tablet tablet Take 5 mg by mouth 2 (Two) Times a Day.     • atorvastatin (LIPITOR) 40 MG tablet atorvastatin 40 mg oral tablet take 1 tablet (40 mg) by oral route once daily   Active     • Cardizem  MG 24 hr capsule Take 240 mg by mouth Daily.     • doxazosin (CARDURA) 4 MG tablet doxazosin 4 mg oral tablet take 1 tablet (4 mg) by oral route once daily   Active     • levothyroxine (Synthroid) 50 MCG tablet Synthroid 50 mcg oral tablet take 1 tablet (50 mcg) by oral route once daily   Active     • loratadine (CLARITIN) 10 MG tablet loratadine 10 mg oral tablet take 1 tablet (10 mg) by oral route once daily   Active     • metoprolol succinate XL (TOPROL-XL) 50 MG 24 hr tablet metoprolol succinate 50 mg oral tablet extended release 24 hr take 1 tablet (50 mg) by oral route once daily   Active     • ondansetron (ZOFRAN) 8 MG tablet Take 8 mg by mouth Every 8 (Eight) Hours As Needed. for nausea     • prochlorperazine (COMPAZINE) 10 MG tablet Take 10 mg by mouth Every 6 (Six) Hours As Needed.       Current Facility-Administered Medications on File Prior to Visit   Medication Dose Route Frequency Provider Last Rate Last Admin   • [COMPLETED] bevacizumab (AVASTIN) 600 mg in sodium chloride 0.9 % 134 mL chemo IVPB  600 mg Intravenous Once Maurilio Campos MD   Stopped at 07/19/21 1106   • [COMPLETED] dexamethasone (DECADRON) IVPB 12 mg  12 mg Intravenous Once Maurilio Campos MD   Stopped at  07/19/21 0957   • [COMPLETED] dextrose (D5W) 5 % infusion 250 mL  250 mL Intravenous Once Maurilio Campos MD   Stopped at 07/19/21 1341   • [COMPLETED] fluorouracil (ADRUCIL) chemo injection 1,000 mg  1,000 mg Intravenous Once Maurilio Campos MD   Stopped at 07/19/21 1349   • [COMPLETED] leucovorin 1,000 mg in dextrose (D5W) 5 % 325 mL IVPB  1,000 mg Intravenous Once Maurilio Campos MD   Stopped at 07/19/21 1329   • [COMPLETED] OXALIplatin (ELOXATIN) 200 mg in dextrose (D5W) 5 % 315 mL chemo IVPB  200 mg Intravenous Once Maurilio Campos MD   Stopped at 07/19/21 1329   • [COMPLETED] palonosetron (ALOXI) injection 0.25 mg  0.25 mg Intravenous Once Maurilio Campos MD   0.25 mg at 07/19/21 0940   • [COMPLETED] sodium chloride 0.9 % infusion 250 mL  250 mL Intravenous Once Maurilio Campos MD   Stopped at 07/19/21 1124   • [DISCONTINUED] fluorouracil (ADRUCIL) 6,100 mg, sodium chloride 0.9 % 138 mL chemo infusion - FOR HOME USE  6,100 mg Intravenous Once Maurilio Campos MD   6,100 mg at 07/19/21 1349   • [DISCONTINUED] heparin injection 500 Units  500 Units Intravenous PRN Maurilio Campos MD       • [DISCONTINUED] sodium chloride 0.9 % flush 20 mL  20 mL Intravenous PRN Maurilio Campos MD           No Known Allergies  Past Medical History:   Diagnosis Date   • Abnormal ECG    • Arrhythmia    • Asthma    • Atrial fibrillation, persistent (CMS/HCC) 2/26/2021   • Colorectal cancer (CMS/HCC)    • GI cancer (CMS/HCC)    • Hepatitis    • High cholesterol    • Hypertension    • Rectal cancer (CMS/HCC) 6/18/2021   • Thyroid disease      Past Surgical History:   Procedure Laterality Date   • HERNIA REPAIR     • LIVER BIOPSY      4/27/21 Biopsy of liver revealed metastatic adenocarcinoma, consistent with colorectal primary   • OTHER SURGICAL HISTORY      REMOVED CANCER FROM COLON     Social History     Socioeconomic History   • Marital status:      Spouse name: Not on file   • Number of children: 2   •  "Years of education: Not on file   • Highest education level: Not on file   Tobacco Use   • Smoking status: Never Smoker   • Smokeless tobacco: Never Used   Vaping Use   • Vaping Use: Never used   Substance and Sexual Activity   • Alcohol use: Not Currently   • Drug use: Not Currently   • Sexual activity: Defer     Family History   Problem Relation Age of Onset   • Colon cancer Other    • Prostate cancer Other    • Prostate cancer Father        Objective   Physical Exam  Vitals reviewed.   Constitutional:       General: He is not in acute distress.     Appearance: Normal appearance.   HENT:      Head: Normocephalic and atraumatic.   Cardiovascular:      Rate and Rhythm: Normal rate and regular rhythm.      Heart sounds: Normal heart sounds. No murmur heard.   No gallop.    Pulmonary:      Effort: Pulmonary effort is normal.      Breath sounds: Normal breath sounds.      Comments: Port-A-Cath in place  Abdominal:      General: Abdomen is flat. Bowel sounds are normal. There is no distension.      Palpations: Abdomen is soft.      Tenderness: There is no abdominal tenderness.   Musculoskeletal:      Cervical back: Neck supple. No tenderness.      Right lower leg: No edema.      Left lower leg: No edema.   Neurological:      Mental Status: He is alert and oriented to person, place, and time.   Psychiatric:         Mood and Affect: Mood normal.         Behavior: Behavior normal.         Vitals:    07/19/21 0824   BP: 132/89   Pulse: 66   Resp: 18   Temp: 98.5 °F (36.9 °C)   TempSrc: Temporal   SpO2: 98%   Weight: 124 kg (273 lb 5.9 oz)   Height: 193 cm (75.98\")   PainSc: 0-No pain     ECOG score: 0         PHQ-9 Total Score:                    Result Review :   The following data was reviewed by: Maurilio Campos MD on 07/19/2021:  Lab Results   Component Value Date    HGB 13.1 07/19/2021    HCT 38.9 07/19/2021    MCV 93.3 07/19/2021     07/19/2021    WBC 10.57 07/19/2021    NEUTROABS 8.35 (H) 07/19/2021    " LYMPHSABS 0.98 07/19/2021    MONOSABS 0.65 07/19/2021    EOSABS 0.15 07/19/2021    BASOSABS 0.06 07/19/2021     Lab Results   Component Value Date    GLUCOSE 143 (H) 07/19/2021    BUN 10 07/19/2021    CREATININE 0.91 07/19/2021     07/19/2021    K 3.4 (L) 07/19/2021     07/19/2021    CO2 23.8 07/19/2021    CALCIUM 8.5 (L) 07/19/2021    PROTEINTOT 5.9 (L) 07/19/2021    ALBUMIN 3.77 07/19/2021    BILITOT 0.4 07/19/2021    ALKPHOS 161 (H) 07/19/2021    AST 22 07/19/2021    ALT 26 07/19/2021           Assessment and Plan    Diagnoses and all orders for this visit:    1. Rectal cancer (CMS/HCC) (Primary)  Assessment & Plan:  Metastatic.  Patient is on palliative treatment with FOLFOX plus Avastin.  Tolerating well.  He is due for cycle 3.  Lab work is adequate for treatment.  Proceed with cycle 3 as planned.  I will see him back in 2 weeks time for OV, cycle 4 with labs prior.  Restaging scans after cycle 4 with consideration of directed therapy and he has excellent response.    Orders:  -     CBC & Differential; Future  -     Comprehensive Metabolic Panel; Future  -     Magnesium; Future  -     CT chest w contrast; Future  -     CT abdomen pelvis w contrast; Future  -     Discontinue: potassium chloride (K-DUR,KLOR-CON) 20 MEQ CR tablet; Take 1 tablet by mouth Daily.  Dispense: 30 tablet; Refill: 5  -     Cancel: sodium chloride 0.9 % infusion 250 mL  -     Cancel: palonosetron (ALOXI) injection 0.25 mg  -     Cancel: dexamethasone (DECADRON) 12 mg in sodium chloride 0.9 % IVPB  -     Cancel: bevacizumab (AVASTIN) 620 mg in sodium chloride 0.9 % 124.8 mL chemo IVPB  -     Cancel: dextrose (D5W) 5 % infusion 250 mL  -     Cancel: OXALIplatin (ELOXATIN) 215 mg in dextrose (D5W) 5 % 293 mL chemo IVPB  -     Cancel: leucovorin 1,010 mg in dextrose (D5W) 5 % 351 mL IVPB  -     Cancel: fluorouracil (ADRUCIL) chemo injection 1,010 mg  -     Cancel: fluorouracil (ADRUCIL) 6,070 mg, sodium chloride 0.9 % 121.4 mL  chemo infusion - FOR HOME USE  -     Infusion Appointment Request 10; Future    2. Hypokalemia  Assessment & Plan:  Begin potassium supplement, 20 mEq daily.  Recheck next visit.    Orders:  -     Discontinue: potassium chloride (K-DUR,KLOR-CON) 20 MEQ CR tablet; Take 1 tablet by mouth Daily.  Dispense: 30 tablet; Refill: 5    3. Cancer related pain  Assessment & Plan:  Secondary to treatment with Neulasta.  Small number of hydrocodone provided as needed for symptoms.  Reassess next visit.    Orders:  -     HYDROcodone-acetaminophen (NORCO) 5-325 MG per tablet; Take 1 tablet by mouth Every 6 (Six) Hours As Needed (cancer pain).  Dispense: 60 tablet; Refill: 0    Other orders  -     Cancel: hydrocortisone sodium succinate (Solu-CORTEF) injection 100 mg  -     Cancel: diphenhydrAMINE (BENADRYL) injection 50 mg  -     Cancel: famotidine (PEPCID) injection 20 mg  -     pegfilgrastim (NEULASTA ONPRO) on-body injector 6 mg          Patient Follow Up: 2 weeks for OV, cycle 4 with labs prior    Patient was given instructions and counseling regarding his condition or for health maintenance advice. Please see specific information pulled into the AVS if appropriate.     Maurilio Campos MD    7/20/2021

## 2021-07-19 NOTE — PROGRESS NOTES
Chief Complaint  Rectal Cancer    Maurilio Campos MD Reinberg, Emily, APRN    Subjective     {Problem List  Visit Diagnosis   Encounters  Notes  Medications  Labs  Result Review Imaging  Media :23}     Robbi Kennedy presents to [facility]EMATOLOG[facility] ON[facility]OGY for ***    Oncology/Hematology History Overview Note   9/30/2019 High rectal bycf-cjzlitknqg-blfgzpaojrlqxv adenocarcinoma  associated with tubular adenoma.   4/27/21 Biopsy of liver revealed  metastatic adenocarcinoma, consistent with colorectal primary.    Clinical Staging  Stage IIa ([id number])       Rectal cancer (CMS/HCC)   6/2/2021 -  Chemotherapy    OP COLON mFOLFOX6 + Bevacizumab (OXALIplatin / Leucovorin / Fluorouracil / Bevacizumab)     6/18/2021 Initial Diagnosis    Rectal cancer (CMS/HCC)     6/21/2021 Cancer Staged    Staging form: Colon And Rectum, AJCC 8th Edition  - Clinical: Stage IIA (cT3, cN0, cM0) - Signed by Maurilio Campos MD on 6/21/2021         Review of Systems   Constitutional: Negative for appetite change, diaphoresis, fatigue, fever, unexpected weight gain and unexpected weight loss.   HENT: Negative for hearing loss, mouth sores, sore throat, swollen glands, trouble swallowing and voice change.    Eyes: Negative for blurred vision.   Respiratory: Negative for cough, shortness of breath and wheezing.    Cardiovascular: Negative for chest pain and palpitations.   Gastrointestinal: Negative for abdominal pain, blood in stool, constipation, diarrhea, nausea and vomiting.   Endocrine: Negative for cold intolerance and heat intolerance.   Genitourinary: Negative for difficulty urinating, dysuria, frequency, hematuria and urinary incontinence.   Musculoskeletal: Negative for arthralgias, back pain and myalgias.   Skin: Negative for rash, skin lesions and bruise.   Neurological: Negative for dizziness, seizures, weakness, numbness and headache.   Hematological: Does not bruise/bleed easily.    Psychiatric/Behavioral: Negative for depressed mood. The patient is not nervous/anxious.      Current Outpatient Medications on File Prior to Visit   Medication Sig Dispense Refill   • apixaban (ELIQUIS) 5 MG tablet tablet Take 5 mg by mouth 2 (Two) Times a Day.     • atorvastatin (LIPITOR) 40 MG tablet atorvastatin 40 mg oral tablet take 1 tablet (40 mg) by oral route once daily   Active     • Cardizem  MG 24 hr capsule Take 240 mg by mouth Daily.     • doxazosin (CARDURA) 4 MG tablet doxazosin 4 mg oral tablet take 1 tablet (4 mg) by oral route once daily   Active     • levothyroxine (Synthroid) 50 MCG tablet Synthroid 50 mcg oral tablet take 1 tablet (50 mcg) by oral route once daily   Active     • loratadine (CLARITIN) 10 MG tablet loratadine 10 mg oral tablet take 1 tablet (10 mg) by oral route once daily   Active     • metoprolol succinate XL (TOPROL-XL) 50 MG 24 hr tablet metoprolol succinate 50 mg oral tablet extended release 24 hr take 1 tablet (50 mg) by oral route once daily   Active     • ondansetron (ZOFRAN) 8 MG tablet Take 8 mg by mouth Every 8 (Eight) Hours As Needed. for nausea     • prochlorperazine (COMPAZINE) 10 MG tablet Take 10 mg by mouth Every 6 (Six) Hours As Needed.     • [DISCONTINUED] apixaban (Eliquis) 5 MG tablet tablet Eliquis 5 mg oral tablet take 1 tablet (5 mg) by oral route 2 times per day   Active     • [DISCONTINUED] dilTIAZem (Cardizem) 120 MG tablet Cardizem 120 mg oral tablet take 1 tablet (120 mg) by oral route 3 times per day   Suspended     • [DISCONTINUED] levothyroxine (SYNTHROID, LEVOTHROID) 25 MCG tablet Take 50 mcg by mouth Daily.     • [DISCONTINUED] loratadine (CLARITIN) 10 MG tablet Take 10 mg by mouth Daily.     • [DISCONTINUED] metoprolol tartrate (LOPRESSOR) 100 MG tablet Take 100 mg by mouth Daily.       Current Facility-Administered Medications on File Prior to Visit   Medication Dose Route Frequency Provider Last Rate Last Admin   • heparin injection  "500 Units  500 Units Intravenous PRN Maurilio Campos MD       • sodium chloride 0.9 % flush 20 mL  20 mL Intravenous PRN Maurilio Campos MD           No Known Allergies  Past Medical History:   Diagnosis Date   • Abnormal ECG    • Arrhythmia    • Asthma    • Atrial fibrillation, persistent (CMS/HCC) 2/26/2021   • Colorectal cancer (CMS/HCC)    • GI cancer (CMS/HCC)    • Hepatitis    • High cholesterol    • Hypertension    • Rectal cancer (CMS/HCC) 6/18/2021   • Thyroid disease      Past Surgical History:   Procedure Laterality Date   • HERNIA REPAIR     • LIVER BIOPSY      4/27/21 Biopsy of liver revealed metastatic adenocarcinoma, consistent with colorectal primary   • OTHER SURGICAL HISTORY      REMOVED CANCER FROM COLON     Social History     Socioeconomic History   • Marital status:      Spouse name: Not on file   • Number of children: 2   • Years of education: Not on file   • Highest education level: Not on file   Tobacco Use   • Smoking status: Never Smoker   • Smokeless tobacco: Never Used   Vaping Use   • Vaping Use: Never used   Substance and Sexual Activity   • Alcohol use: Not Currently   • Drug use: Not Currently   • Sexual activity: Defer     Family History   Problem Relation Age of Onset   • Colon cancer Other    • Prostate cancer Other    • Prostate cancer Father        Objective   Physical Exam    Vitals:    07/19/21 0824   BP: 132/89   Pulse: 66   Resp: 18   Temp: 98.5 °F (36.9 °C)   TempSrc: Temporal   SpO2: 98%   Weight: 124 kg (273 lb 5.9 oz)   Height: 193 cm (75.98\")   PainSc: 0-No pain     ECOG score: 0         PHQ-9 Total Score:         {The patient is/is not experiencing fatigue. (Optional):74351}   {PT fx screen 1 (Optional):95318}  {Speech Functional Screening (Optional):74103}  {Rehab to be ordered (Optional):71564}    Result Review :{Labs  Result Review  Imaging  Med Tab  Media  Procedures :23}   The following data was reviewed by: Delilah Hall MA on 07/19/2021:  Lab " Results   Component Value Date    HGB 13.1 07/19/2021    HCT 38.9 07/19/2021    MCV 93.3 07/19/2021     07/19/2021    WBC 10.57 07/19/2021    NEUTROABS 8.35 (H) 07/19/2021    LYMPHSABS 0.98 07/19/2021    MONOSABS 0.65 07/19/2021    EOSABS 0.15 07/19/2021    BASOSABS 0.06 07/19/2021     Lab Results   Component Value Date    GLUCOSE 144 (H) 07/06/2021    BUN 10 07/06/2021    CREATININE 0.89 07/06/2021     07/06/2021    K 3.7 07/06/2021     07/06/2021    CO2 26.4 07/06/2021    CALCIUM 8.7 07/06/2021    PROTEINTOT 6.1 07/06/2021    ALBUMIN 3.73 07/06/2021    BILITOT 0.5 07/06/2021    ALKPHOS 158 (H) 07/06/2021    AST 19 07/06/2021    ALT 25 07/06/2021     {Data reviewed (Optional):08134:::1}      Assessment and Plan {CC Problem List  Visit Diagnosis   ROS  Review (Popup)  Health Maintenance  Quality  BestPractice  Medications  SmartSets  SnapShot Encounters  Media :23}   There are no diagnoses linked to this encounter.    {Time Spent (Optional):64254}    Patient Follow Up: ***  Patient was given instructions and counseling regarding his condition or for health maintenance advice. Please see specific information pulled into the AVS if appropriate.     Delilah Hall MA    7/19/2021

## 2021-07-20 PROBLEM — G89.3 CANCER RELATED PAIN: Status: ACTIVE | Noted: 2021-07-20

## 2021-07-20 RX ORDER — HYDROCODONE BITARTRATE AND ACETAMINOPHEN 5; 325 MG/1; MG/1
1 TABLET ORAL EVERY 6 HOURS PRN
Qty: 60 TABLET | Refills: 0
Start: 2021-07-20 | End: 2021-09-10

## 2021-07-20 NOTE — ASSESSMENT & PLAN NOTE
Secondary to treatment with Neulasta.  Small number of hydrocodone provided as needed for symptoms.  Reassess next visit.

## 2021-07-20 NOTE — ASSESSMENT & PLAN NOTE
Metastatic.  Patient is on palliative treatment with FOLFOX plus Avastin.  Tolerating well.  He is due for cycle 3.  Lab work is adequate for treatment.  Proceed with cycle 3 as planned.  I will see him back in 2 weeks time for OV, cycle 4 with labs prior.  Restaging scans after cycle 4 with consideration of directed therapy and he has excellent response.

## 2021-07-21 ENCOUNTER — HOSPITAL ENCOUNTER (OUTPATIENT)
Dept: ONCOLOGY | Facility: HOSPITAL | Age: 66
Setting detail: INFUSION SERIES
Discharge: HOME | End: 2021-07-21

## 2021-07-21 VITALS — RESPIRATION RATE: 18 BRPM | HEART RATE: 72 BPM | SYSTOLIC BLOOD PRESSURE: 133 MMHG | DIASTOLIC BLOOD PRESSURE: 88 MMHG

## 2021-07-21 DIAGNOSIS — C20 RECTAL CANCER (HCC): Primary | ICD-10-CM

## 2021-07-21 PROCEDURE — 25010000002 PEGFILGRASTIM 6 MG/0.6ML PREFILLED SYRINGE KIT: Performed by: INTERNAL MEDICINE

## 2021-07-21 PROCEDURE — 96377 APPLICATON ON-BODY INJECTOR: CPT

## 2021-07-21 RX ADMIN — PEGFILGRASTIM 6 MG: KIT SUBCUTANEOUS at 14:40

## 2021-07-29 ENCOUNTER — HOSPITAL ENCOUNTER (OUTPATIENT)
Dept: CT IMAGING | Facility: HOSPITAL | Age: 66
Discharge: HOME | End: 2021-07-29

## 2021-07-29 DIAGNOSIS — C20 RECTAL CANCER (HCC): ICD-10-CM

## 2021-07-29 PROCEDURE — 74177 CT ABD & PELVIS W/CONTRAST: CPT

## 2021-07-29 PROCEDURE — 71260 CT THORAX DX C+: CPT

## 2021-07-29 PROCEDURE — 0 IOPAMIDOL PER 1 ML: Performed by: INTERNAL MEDICINE

## 2021-07-29 RX ADMIN — IOPAMIDOL 100 ML: 755 INJECTION, SOLUTION INTRAVENOUS at 13:41

## 2021-08-02 ENCOUNTER — OFFICE VISIT (OUTPATIENT)
Dept: ONCOLOGY | Facility: HOSPITAL | Age: 66
End: 2021-08-02

## 2021-08-02 ENCOUNTER — HOSPITAL ENCOUNTER (OUTPATIENT)
Dept: ONCOLOGY | Facility: HOSPITAL | Age: 66
Setting detail: INFUSION SERIES
Discharge: HOME | End: 2021-08-02

## 2021-08-02 VITALS
BODY MASS INDEX: 32.75 KG/M2 | OXYGEN SATURATION: 96 % | RESPIRATION RATE: 18 BRPM | HEART RATE: 69 BPM | WEIGHT: 268.96 LBS | SYSTOLIC BLOOD PRESSURE: 125 MMHG | TEMPERATURE: 97.3 F | DIASTOLIC BLOOD PRESSURE: 76 MMHG

## 2021-08-02 VITALS
RESPIRATION RATE: 18 BRPM | TEMPERATURE: 97.3 F | OXYGEN SATURATION: 96 % | BODY MASS INDEX: 32.67 KG/M2 | DIASTOLIC BLOOD PRESSURE: 76 MMHG | HEIGHT: 76 IN | HEART RATE: 69 BPM | WEIGHT: 268.3 LBS | SYSTOLIC BLOOD PRESSURE: 125 MMHG

## 2021-08-02 DIAGNOSIS — C20 RECTAL CANCER (HCC): Primary | ICD-10-CM

## 2021-08-02 DIAGNOSIS — Z45.2 ENCOUNTER FOR ADJUSTMENT OR MANAGEMENT OF VASCULAR ACCESS DEVICE: ICD-10-CM

## 2021-08-02 LAB
ALBUMIN SERPL-MCNC: 3.79 G/DL (ref 3.5–5.2)
ALBUMIN/GLOB SERPL: 1.8 G/DL
ALP SERPL-CCNC: 158 U/L (ref 39–117)
ALT SERPL W P-5'-P-CCNC: 19 U/L (ref 1–41)
ANION GAP SERPL CALCULATED.3IONS-SCNC: 10.8 MMOL/L (ref 5–15)
AST SERPL-CCNC: 17 U/L (ref 1–40)
BASOPHILS # BLD AUTO: 0.05 10*3/MM3 (ref 0–0.2)
BASOPHILS NFR BLD AUTO: 0.4 % (ref 0–1.5)
BILIRUB SERPL-MCNC: 0.8 MG/DL (ref 0–1.2)
BUN SERPL-MCNC: 13 MG/DL (ref 8–23)
BUN/CREAT SERPL: 12.5 (ref 7–25)
CALCIUM SPEC-SCNC: 8.9 MG/DL (ref 8.6–10.5)
CHLORIDE SERPL-SCNC: 103 MMOL/L (ref 98–107)
CO2 SERPL-SCNC: 24.2 MMOL/L (ref 22–29)
CREAT SERPL-MCNC: 1.04 MG/DL (ref 0.76–1.27)
DEPRECATED RDW RBC AUTO: 53.5 FL (ref 37–54)
EOSINOPHIL # BLD AUTO: 0.26 10*3/MM3 (ref 0–0.4)
EOSINOPHIL NFR BLD AUTO: 1.9 % (ref 0.3–6.2)
ERYTHROCYTE [DISTWIDTH] IN BLOOD BY AUTOMATED COUNT: 15.8 % (ref 12.3–15.4)
GFR SERPL CREATININE-BSD FRML MDRD: 72 ML/MIN/1.73
GLOBULIN UR ELPH-MCNC: 2.1 GM/DL
GLUCOSE SERPL-MCNC: 138 MG/DL (ref 65–99)
HCT VFR BLD AUTO: 40.3 % (ref 37.5–51)
HGB BLD-MCNC: 13.3 G/DL (ref 13–17.7)
IMM GRANULOCYTES # BLD AUTO: 0.22 10*3/MM3 (ref 0–0.05)
IMM GRANULOCYTES NFR BLD AUTO: 1.6 % (ref 0–0.5)
LYMPHOCYTES # BLD AUTO: 1.07 10*3/MM3 (ref 0.7–3.1)
LYMPHOCYTES NFR BLD AUTO: 7.7 % (ref 19.6–45.3)
MAGNESIUM SERPL-MCNC: 1.9 MG/DL (ref 1.6–2.4)
MCH RBC QN AUTO: 31.2 PG (ref 26.6–33)
MCHC RBC AUTO-ENTMCNC: 33 G/DL (ref 31.5–35.7)
MCV RBC AUTO: 94.6 FL (ref 79–97)
MONOCYTES # BLD AUTO: 0.79 10*3/MM3 (ref 0.1–0.9)
MONOCYTES NFR BLD AUTO: 5.7 % (ref 5–12)
NEUTROPHILS NFR BLD AUTO: 11.56 10*3/MM3 (ref 1.7–7)
NEUTROPHILS NFR BLD AUTO: 82.7 % (ref 42.7–76)
PLATELET # BLD AUTO: 202 10*3/MM3 (ref 140–450)
PMV BLD AUTO: 9.5 FL (ref 6–12)
POTASSIUM SERPL-SCNC: 3.9 MMOL/L (ref 3.5–5.2)
PROT SERPL-MCNC: 5.9 G/DL (ref 6–8.5)
RBC # BLD AUTO: 4.26 10*6/MM3 (ref 4.14–5.8)
SODIUM SERPL-SCNC: 138 MMOL/L (ref 136–145)
WBC # BLD AUTO: 13.95 10*3/MM3 (ref 3.4–10.8)

## 2021-08-02 PROCEDURE — 83735 ASSAY OF MAGNESIUM: CPT | Performed by: INTERNAL MEDICINE

## 2021-08-02 PROCEDURE — G0498 CHEMO EXTEND IV INFUS W/PUMP: HCPCS

## 2021-08-02 PROCEDURE — 80053 COMPREHEN METABOLIC PANEL: CPT | Performed by: INTERNAL MEDICINE

## 2021-08-02 PROCEDURE — 96368 THER/DIAG CONCURRENT INF: CPT

## 2021-08-02 PROCEDURE — 99214 OFFICE O/P EST MOD 30 MIN: CPT | Performed by: INTERNAL MEDICINE

## 2021-08-02 PROCEDURE — 25010000002 DEXAMETHASONE SODIUM PHOSPHATE 120 MG/30ML SOLUTION: Performed by: INTERNAL MEDICINE

## 2021-08-02 PROCEDURE — 96375 TX/PRO/DX INJ NEW DRUG ADDON: CPT

## 2021-08-02 PROCEDURE — 25010000002 OXALIPLATIN PER 0.5 MG: Performed by: INTERNAL MEDICINE

## 2021-08-02 PROCEDURE — 25010000002 PALONOSETRON PER 25 MCG: Performed by: INTERNAL MEDICINE

## 2021-08-02 PROCEDURE — 96411 CHEMO IV PUSH ADDL DRUG: CPT

## 2021-08-02 PROCEDURE — 96413 CHEMO IV INFUSION 1 HR: CPT

## 2021-08-02 PROCEDURE — 85025 COMPLETE CBC W/AUTO DIFF WBC: CPT | Performed by: INTERNAL MEDICINE

## 2021-08-02 PROCEDURE — 25010000002 LEUCOVORIN CALCIUM PER 50 MG: Performed by: INTERNAL MEDICINE

## 2021-08-02 PROCEDURE — 25010000002 FLUOROURACIL PER 500 MG: Performed by: INTERNAL MEDICINE

## 2021-08-02 PROCEDURE — 96415 CHEMO IV INFUSION ADDL HR: CPT

## 2021-08-02 RX ORDER — FAMOTIDINE 10 MG/ML
20 INJECTION, SOLUTION INTRAVENOUS AS NEEDED
Status: CANCELLED | OUTPATIENT
Start: 2021-08-02

## 2021-08-02 RX ORDER — HEPARIN SODIUM (PORCINE) LOCK FLUSH IV SOLN 100 UNIT/ML 100 UNIT/ML
500 SOLUTION INTRAVENOUS AS NEEDED
Status: DISCONTINUED | OUTPATIENT
Start: 2021-08-02 | End: 2021-08-03 | Stop reason: HOSPADM

## 2021-08-02 RX ORDER — SODIUM CHLORIDE 0.9 % (FLUSH) 0.9 %
20 SYRINGE (ML) INJECTION AS NEEDED
Status: CANCELLED | OUTPATIENT
Start: 2021-08-02

## 2021-08-02 RX ORDER — SODIUM CHLORIDE 9 MG/ML
250 INJECTION, SOLUTION INTRAVENOUS ONCE
Status: CANCELLED | OUTPATIENT
Start: 2021-08-02

## 2021-08-02 RX ORDER — DEXTROSE MONOHYDRATE 50 MG/ML
250 INJECTION, SOLUTION INTRAVENOUS ONCE
Status: COMPLETED | OUTPATIENT
Start: 2021-08-02 | End: 2021-08-02

## 2021-08-02 RX ORDER — SODIUM CHLORIDE 0.9 % (FLUSH) 0.9 %
20 SYRINGE (ML) INJECTION AS NEEDED
Status: DISCONTINUED | OUTPATIENT
Start: 2021-08-02 | End: 2021-08-03 | Stop reason: HOSPADM

## 2021-08-02 RX ORDER — FLUOROURACIL 50 MG/ML
1000 INJECTION, SOLUTION INTRAVENOUS ONCE
Status: COMPLETED | OUTPATIENT
Start: 2021-08-02 | End: 2021-08-02

## 2021-08-02 RX ORDER — DIPHENHYDRAMINE HYDROCHLORIDE 50 MG/ML
50 INJECTION INTRAMUSCULAR; INTRAVENOUS AS NEEDED
Status: CANCELLED | OUTPATIENT
Start: 2021-08-02

## 2021-08-02 RX ORDER — HEPARIN SODIUM (PORCINE) LOCK FLUSH IV SOLN 100 UNIT/ML 100 UNIT/ML
500 SOLUTION INTRAVENOUS AS NEEDED
Status: CANCELLED | OUTPATIENT
Start: 2021-08-02

## 2021-08-02 RX ORDER — DEXTROSE MONOHYDRATE 50 MG/ML
250 INJECTION, SOLUTION INTRAVENOUS ONCE
Status: CANCELLED | OUTPATIENT
Start: 2021-08-02

## 2021-08-02 RX ORDER — PALONOSETRON 0.05 MG/ML
0.25 INJECTION, SOLUTION INTRAVENOUS ONCE
Status: COMPLETED | OUTPATIENT
Start: 2021-08-02 | End: 2021-08-02

## 2021-08-02 RX ORDER — SODIUM CHLORIDE 9 MG/ML
250 INJECTION, SOLUTION INTRAVENOUS ONCE
Status: COMPLETED | OUTPATIENT
Start: 2021-08-02 | End: 2021-08-02

## 2021-08-02 RX ORDER — FLUOROURACIL 50 MG/ML
400 INJECTION, SOLUTION INTRAVENOUS ONCE
Status: CANCELLED | OUTPATIENT
Start: 2021-08-02

## 2021-08-02 RX ORDER — PALONOSETRON 0.05 MG/ML
0.25 INJECTION, SOLUTION INTRAVENOUS ONCE
Status: CANCELLED | OUTPATIENT
Start: 2021-08-02

## 2021-08-02 RX ADMIN — PALONOSETRON 0.25 MG: 0.05 INJECTION, SOLUTION INTRAVENOUS at 09:47

## 2021-08-02 RX ADMIN — LEUCOVORIN CALCIUM 1000 MG: 350 INJECTION, POWDER, LYOPHILIZED, FOR SUSPENSION INTRAMUSCULAR; INTRAVENOUS at 10:36

## 2021-08-02 RX ADMIN — FLUOROURACIL 1000 MG: 50 INJECTION, SOLUTION INTRAVENOUS at 12:46

## 2021-08-02 RX ADMIN — DEXAMETHASONE SODIUM PHOSPHATE 12 MG: 4 INJECTION, SOLUTION INTRA-ARTICULAR; INTRALESIONAL; INTRAMUSCULAR; INTRAVENOUS; SOFT TISSUE at 09:48

## 2021-08-02 RX ADMIN — DEXTROSE MONOHYDRATE 250 ML: 5 INJECTION, SOLUTION INTRAVENOUS at 10:17

## 2021-08-02 RX ADMIN — OXALIPLATIN 200 MG: 5 INJECTION, SOLUTION INTRAVENOUS at 10:33

## 2021-08-02 RX ADMIN — SODIUM CHLORIDE 250 ML: 9 INJECTION, SOLUTION INTRAVENOUS at 09:43

## 2021-08-02 RX ADMIN — FLUOROURACIL 6100 MG: 50 INJECTION, SOLUTION INTRAVENOUS at 12:54

## 2021-08-02 NOTE — ASSESSMENT & PLAN NOTE
Metastatic.  Patient is on FOLFOX plus Avastin.  He is due for cycle 5.  Tolerating well with exception of increased fatigue.  Lab work today looks good.  Restaging CT scans of the chest, abdomen, pelvis from 7/29/2021 demonstrate good partial response thus far with decrease in the liver lesion from 37 mm down to 24 mm.  Pulmonary nodule decreased from 9 mm to 5 mm.  Several other small 5 mm or less pulmonary nodules are stable.  Patient is very interested and definitive treatment such as surgery or radiation to the areas.  Given the good partial response and stability of disease thus far, I think that is not unreasonable to investigate.  He will be referred to [facility] for evaluation.  Given the possibility of surgery, I will drop Avastin from his regimen today.  Proceed with cycle 5 as planned.  Plan for cycle 6 in 2 weeks unless surgery is felt to be an option.

## 2021-08-02 NOTE — PROGRESS NOTES
Chief Complaint  Rectal Cancer and Follow-up    Maurilio Campos MD Reinberg, Emily, APRN    Subjective          Robbi Kennedy presents to [facility] for cycle 5 of chemotherapy for his metastatic rectal cancer.  He is on FOLFOX plus Avastin.  He reports tolerating the chemotherapy reasonably well.  He does feel very tired and washed out with each treatment.  He is able to perform his ADLs but he notes that his energy level is much better than that.  He continues to eat and drink well.  He reports normal bladder and bowel habits.  He has some slight numbness in the fingertips and toes but not interfering with normal activities.  He denies any masses.  No issues from his Port-A-Cath.    Oncology/Hematology History Overview Note   9/30/2019 High rectal ipdf-mnfchknkav-lqfgkgrzaodeoh adenocarcinoma  associated with tubular adenoma.   4/27/21 Biopsy of liver revealed  metastatic adenocarcinoma, consistent with colorectal primary.    Clinical Staging  Stage IIa ([id number])    Chemotherapy      neoadjuvant Xeloda with radiation. adjuvant xeloda        Radiation Therapy      7/8/19 thru 8/14/19 completed neoadjuvant 5040 cGy=28 fxs rectum        Surgeries       9/30/2019 low anterior resection with ostomy           Rectal cancer (CMS/HCC)   6/2/2021 -  Chemotherapy    OP COLON mFOLFOX6 + Bevacizumab (OXALIplatin / Leucovorin / Fluorouracil / Bevacizumab)     6/18/2021 Initial Diagnosis    Rectal cancer (CMS/HCC)     6/21/2021 Cancer Staged    Staging form: Colon And Rectum, AJCC 8th Edition  - Clinical: Stage IIA (cT3, cN0, cM0) - Signed by Maurilio Campos MD on 6/21/2021         Review of Systems   Constitutional: Positive for fatigue. Negative for appetite change, diaphoresis, fever, unexpected weight gain and unexpected weight loss.   HENT: Negative for hearing loss, mouth sores, sore throat, swollen glands, trouble swallowing and voice change.    Eyes: Negative for blurred  vision.   Respiratory: Negative for cough, shortness of breath and wheezing.    Cardiovascular: Negative for chest pain and palpitations.   Gastrointestinal: Negative for abdominal pain, blood in stool, constipation, diarrhea, nausea and vomiting.   Endocrine: Negative for cold intolerance and heat intolerance.   Genitourinary: Negative for difficulty urinating, dysuria, frequency, hematuria and urinary incontinence.   Musculoskeletal: Negative for arthralgias, back pain and myalgias.   Skin: Negative for rash, skin lesions and bruise.   Neurological: Negative for dizziness, seizures, weakness, numbness and headache.   Hematological: Does not bruise/bleed easily.   Psychiatric/Behavioral: Negative for depressed mood. The patient is not nervous/anxious.      Current Outpatient Medications on File Prior to Visit   Medication Sig Dispense Refill   • apixaban (ELIQUIS) 5 MG tablet tablet Take 5 mg by mouth 2 (Two) Times a Day.     • atorvastatin (LIPITOR) 40 MG tablet atorvastatin 40 mg oral tablet take 1 tablet (40 mg) by oral route once daily   Active     • Cardizem  MG 24 hr capsule Take 240 mg by mouth Daily.     • doxazosin (CARDURA) 4 MG tablet doxazosin 4 mg oral tablet take 1 tablet (4 mg) by oral route once daily   Active     • HYDROcodone-acetaminophen (NORCO) 5-325 MG per tablet Take 1 tablet by mouth Every 6 (Six) Hours As Needed (cancer pain). 60 tablet 0   • levothyroxine (Synthroid) 50 MCG tablet Synthroid 50 mcg oral tablet take 1 tablet (50 mcg) by oral route once daily   Active     • loratadine (CLARITIN) 10 MG tablet loratadine 10 mg oral tablet take 1 tablet (10 mg) by oral route once daily   Active     • metoprolol succinate XL (TOPROL-XL) 50 MG 24 hr tablet metoprolol succinate 50 mg oral tablet extended release 24 hr take 1 tablet (50 mg) by oral route once daily   Active     • ondansetron (ZOFRAN) 8 MG tablet Take 8 mg by mouth Every 8 (Eight) Hours As Needed. for nausea     • potassium  chloride (K-DUR,KLOR-CON) 20 MEQ CR tablet Take 1 tablet by mouth Daily. 30 tablet 5   • prochlorperazine (COMPAZINE) 10 MG tablet Take 10 mg by mouth Every 6 (Six) Hours As Needed.       Current Facility-Administered Medications on File Prior to Visit   Medication Dose Route Frequency Provider Last Rate Last Admin   • heparin injection 500 Units  500 Units Intravenous PRN Maurilio Campos MD       • sodium chloride 0.9 % flush 20 mL  20 mL Intravenous PRN Maurilio Campos MD           No Known Allergies  Past Medical History:   Diagnosis Date   • Abnormal ECG    • Arrhythmia    • Asthma    • Atrial fibrillation, persistent (CMS/HCC) 2/26/2021   • Colorectal cancer (CMS/HCC)    • GI cancer (CMS/HCC)    • Hepatitis    • High cholesterol    • Hypertension    • Rectal cancer (CMS/HCC) 6/18/2021   • Thyroid disease      Past Surgical History:   Procedure Laterality Date   • HERNIA REPAIR     • LIVER BIOPSY      4/27/21 Biopsy of liver revealed metastatic adenocarcinoma, consistent with colorectal primary   • OTHER SURGICAL HISTORY      REMOVED CANCER FROM COLON     Social History     Socioeconomic History   • Marital status:      Spouse name: Not on file   • Number of children: 2   • Years of education: Not on file   • Highest education level: Not on file   Tobacco Use   • Smoking status: Never Smoker   • Smokeless tobacco: Never Used   Vaping Use   • Vaping Use: Never used   Substance and Sexual Activity   • Alcohol use: Not Currently   • Drug use: Not Currently   • Sexual activity: Defer     Family History   Problem Relation Age of Onset   • Colon cancer Other    • Prostate cancer Other    • Prostate cancer Father        Objective   Physical Exam  Vitals reviewed. Exam conducted with a chaperone present.   Constitutional:       General: He is not in acute distress.     Appearance: Normal appearance.   HENT:      Head: Normocephalic and atraumatic.   Cardiovascular:      Rate and Rhythm: Normal rate and  regular rhythm.      Heart sounds: Normal heart sounds. No murmur heard.   No gallop.    Pulmonary:      Effort: Pulmonary effort is normal.      Breath sounds: Normal breath sounds.      Comments: Port-A-Cath  Abdominal:      General: Abdomen is flat. Bowel sounds are normal. There is no distension.      Palpations: Abdomen is soft.      Tenderness: There is no abdominal tenderness.      Comments: No HSM   Musculoskeletal:      Cervical back: Neck supple. No tenderness.      Right lower leg: No edema.      Left lower leg: No edema.   Neurological:      Mental Status: He is alert and oriented to person, place, and time.   Psychiatric:         Mood and Affect: Mood normal.         Behavior: Behavior normal.         Vitals:    08/02/21 0855   BP: 125/76   Pulse: 69   Resp: 18   Temp: 97.3 °F (36.3 °C)   SpO2: 96%   Weight: 122 kg (268 lb 15.4 oz)   PainSc: 0-No pain     ECOG score: 1         PHQ-9 Total Score: 0                  Result Review :   The following data was reviewed by: Maurilio Campos MD on 08/02/2021:  Lab Results   Component Value Date    HGB 13.3 08/02/2021    HCT 40.3 08/02/2021    MCV 94.6 08/02/2021     08/02/2021    WBC 13.95 (H) 08/02/2021    NEUTROABS 11.56 (H) 08/02/2021    LYMPHSABS 1.07 08/02/2021    MONOSABS 0.79 08/02/2021    EOSABS 0.26 08/02/2021    BASOSABS 0.05 08/02/2021     Lab Results   Component Value Date    GLUCOSE 138 (H) 08/02/2021    BUN 13 08/02/2021    CREATININE 1.04 08/02/2021     08/02/2021    K 3.9 08/02/2021     08/02/2021    CO2 24.2 08/02/2021    CALCIUM 8.9 08/02/2021    PROTEINTOT 5.9 (L) 08/02/2021    ALBUMIN 3.79 08/02/2021    BILITOT 0.8 08/02/2021    ALKPHOS 158 (H) 08/02/2021    AST 17 08/02/2021    ALT 19 08/02/2021           Assessment and Plan    Diagnoses and all orders for this visit:    1. Rectal cancer (CMS/HCC) (Primary)  Assessment & Plan:  Metastatic.  Patient is on FOLFOX plus Avastin.  He is due for cycle 5.  Tolerating well with  exception of increased fatigue.  Lab work today looks good.  Restaging CT scans of the chest, abdomen, pelvis from 7/29/2021 demonstrate good partial response thus far with decrease in the liver lesion from 37 mm down to 24 mm.  Pulmonary nodule decreased from 9 mm to 5 mm.  Several other small 5 mm or less pulmonary nodules are stable.  Patient is very interested and definitive treatment such as surgery or radiation to the areas.  Given the good partial response and stability of disease thus far, I think that is not unreasonable to investigate.  He will be referred to [facility] for evaluation.  Given the possibility of surgery, I will drop Avastin from his regimen today.  Proceed with cycle 5 as planned.  Plan for cycle 6 in 2 weeks unless surgery is felt to be an option.    Orders:  -     Infusion Appointment Request 10; Future    Other orders  -     sodium chloride 0.9 % infusion 250 mL  -     palonosetron (ALOXI) injection 0.25 mg  -     dexamethasone (DECADRON) 12 mg in sodium chloride 0.9 % IVPB  -     bevacizumab (AVASTIN) 620 mg in sodium chloride 0.9 % 124.8 mL chemo IVPB  -     dextrose (D5W) 5 % infusion 250 mL  -     OXALIplatin (ELOXATIN) 215 mg in dextrose (D5W) 5 % 293 mL chemo IVPB  -     leucovorin 1,010 mg in dextrose (D5W) 5 % 351 mL IVPB  -     fluorouracil (ADRUCIL) chemo injection 1,010 mg  -     fluorouracil (ADRUCIL) 6,070 mg, sodium chloride 0.9 % 121.4 mL chemo infusion - FOR HOME USE  -     hydrocortisone sodium succinate (Solu-CORTEF) injection 100 mg  -     diphenhydrAMINE (BENADRYL) injection 50 mg  -     famotidine (PEPCID) injection 20 mg  -     pegfilgrastim (NEULASTA ONPRO) on-body injector 6 mg          Patient Follow Up: 2 weeks    Patient was given instructions and counseling regarding his condition or for health maintenance advice. Please see specific information pulled into the AVS if appropriate.     Maurilio Campos MD    8/2/2021

## 2021-08-04 ENCOUNTER — HOSPITAL ENCOUNTER (OUTPATIENT)
Dept: ONCOLOGY | Facility: HOSPITAL | Age: 66
Setting detail: INFUSION SERIES
Discharge: HOME | End: 2021-08-04

## 2021-08-04 DIAGNOSIS — Z45.2 ENCOUNTER FOR ADJUSTMENT OR MANAGEMENT OF VASCULAR ACCESS DEVICE: ICD-10-CM

## 2021-08-04 DIAGNOSIS — C20 RECTAL CANCER (HCC): Primary | ICD-10-CM

## 2021-08-04 PROCEDURE — 96377 APPLICATON ON-BODY INJECTOR: CPT

## 2021-08-04 PROCEDURE — 25010000002 HEPARIN LOCK FLUSH PER 10 UNITS: Performed by: INTERNAL MEDICINE

## 2021-08-04 PROCEDURE — 25010000002 PEGFILGRASTIM 6 MG/0.6ML PREFILLED SYRINGE KIT: Performed by: INTERNAL MEDICINE

## 2021-08-04 RX ORDER — HEPARIN SODIUM (PORCINE) LOCK FLUSH IV SOLN 100 UNIT/ML 100 UNIT/ML
500 SOLUTION INTRAVENOUS AS NEEDED
Status: DISCONTINUED | OUTPATIENT
Start: 2021-08-04 | End: 2021-08-05 | Stop reason: HOSPADM

## 2021-08-04 RX ORDER — SODIUM CHLORIDE 0.9 % (FLUSH) 0.9 %
20 SYRINGE (ML) INJECTION AS NEEDED
Status: DISCONTINUED | OUTPATIENT
Start: 2021-08-04 | End: 2021-08-05 | Stop reason: HOSPADM

## 2021-08-04 RX ORDER — HEPARIN SODIUM (PORCINE) LOCK FLUSH IV SOLN 100 UNIT/ML 100 UNIT/ML
500 SOLUTION INTRAVENOUS AS NEEDED
Status: CANCELLED | OUTPATIENT
Start: 2021-08-04

## 2021-08-04 RX ORDER — SODIUM CHLORIDE 0.9 % (FLUSH) 0.9 %
20 SYRINGE (ML) INJECTION AS NEEDED
Status: CANCELLED | OUTPATIENT
Start: 2021-08-04

## 2021-08-04 RX ADMIN — HEPARIN SODIUM (PORCINE) LOCK FLUSH IV SOLN 100 UNIT/ML 500 UNITS: 100 SOLUTION at 13:58

## 2021-08-04 RX ADMIN — SODIUM CHLORIDE, PRESERVATIVE FREE 20 ML: 5 INJECTION INTRAVENOUS at 13:58

## 2021-08-04 RX ADMIN — PEGFILGRASTIM 6 MG: KIT SUBCUTANEOUS at 13:59

## 2021-08-16 ENCOUNTER — TELEPHONE (OUTPATIENT)
Dept: ONCOLOGY | Facility: HOSPITAL | Age: 66
End: 2021-08-16

## 2021-08-16 ENCOUNTER — HOSPITAL ENCOUNTER (OUTPATIENT)
Dept: ONCOLOGY | Facility: HOSPITAL | Age: 66
Setting detail: INFUSION SERIES
End: 2021-08-16

## 2021-08-16 ENCOUNTER — APPOINTMENT (OUTPATIENT)
Dept: ONCOLOGY | Facility: HOSPITAL | Age: 66
End: 2021-08-16

## 2021-08-16 DIAGNOSIS — C20 RECTAL CANCER (HCC): Primary | ICD-10-CM

## 2021-08-17 ENCOUNTER — TRANSCRIBE ORDERS (OUTPATIENT)
Dept: ADMINISTRATIVE | Facility: HOSPITAL | Age: 66
End: 2021-08-17

## 2021-08-17 DIAGNOSIS — R06.00 DYSPNEA, UNSPECIFIED TYPE: ICD-10-CM

## 2021-08-17 DIAGNOSIS — I51.9 HEART DISEASE: Primary | ICD-10-CM

## 2021-08-30 ENCOUNTER — HOSPITAL ENCOUNTER (OUTPATIENT)
Dept: ONCOLOGY | Facility: HOSPITAL | Age: 66
End: 2021-08-30

## 2021-08-31 ENCOUNTER — APPOINTMENT (OUTPATIENT)
Dept: NUCLEAR MEDICINE | Facility: HOSPITAL | Age: 66
End: 2021-08-31

## 2021-09-08 ENCOUNTER — TELEPHONE (OUTPATIENT)
Dept: ONCOLOGY | Facility: HOSPITAL | Age: 66
End: 2021-09-08

## 2021-09-08 DIAGNOSIS — C20 RECTAL CANCER (HCC): Primary | ICD-10-CM

## 2021-09-08 NOTE — TELEPHONE ENCOUNTER
Anna called and states that the pt is currently receiving Rad TX at the [facility] but would like to get TX closer to home. Anna asking if Dr Campos or someone from his office could set the pt up at a closer location.

## 2021-09-08 NOTE — TELEPHONE ENCOUNTER
Caller: ANA LEONARD    Relationship to patient: Emergency Contact    Best call back number:[phone]    Chief complaint: PT REQUESTING RADIOLOGY CLOSER TO [facility]    Type of visit: RAD ONC REFERRAL    Requested date: NA    If rescheduling, when is the original appointment: NA    Additional notes:PT REQUESTING LOCATION CLOSER THAN [place]

## 2021-09-10 ENCOUNTER — OFFICE VISIT (OUTPATIENT)
Dept: CARDIOLOGY | Facility: CLINIC | Age: 66
End: 2021-09-10

## 2021-09-10 VITALS
SYSTOLIC BLOOD PRESSURE: 133 MMHG | DIASTOLIC BLOOD PRESSURE: 85 MMHG | WEIGHT: 261 LBS | BODY MASS INDEX: 31.78 KG/M2 | HEART RATE: 104 BPM | HEIGHT: 76 IN

## 2021-09-10 DIAGNOSIS — E78.5 HYPERLIPIDEMIA, UNSPECIFIED HYPERLIPIDEMIA TYPE: ICD-10-CM

## 2021-09-10 DIAGNOSIS — I48.19 ATRIAL FIBRILLATION, PERSISTENT (HCC): Primary | Chronic | ICD-10-CM

## 2021-09-10 DIAGNOSIS — I10 ESSENTIAL HYPERTENSION: ICD-10-CM

## 2021-09-10 PROCEDURE — 99214 OFFICE O/P EST MOD 30 MIN: CPT | Performed by: INTERNAL MEDICINE

## 2021-09-10 RX ORDER — ASCORBIC ACID 500 MG
500 TABLET ORAL DAILY
COMMUNITY

## 2021-09-10 RX ORDER — METOPROLOL TARTRATE 100 MG/1
100 TABLET ORAL
COMMUNITY
Start: 2021-09-07 | End: 2022-01-05 | Stop reason: SDUPTHER

## 2021-09-10 RX ORDER — PANTOPRAZOLE SODIUM 40 MG/1
TABLET, DELAYED RELEASE ORAL DAILY
COMMUNITY
Start: 2021-09-07 | End: 2022-01-05

## 2021-09-10 RX ORDER — ACETAMINOPHEN 500 MG
500 TABLET ORAL EVERY 6 HOURS PRN
COMMUNITY
Start: 2021-09-07

## 2021-09-10 RX ORDER — MULTIVIT WITH MINERALS/LUTEIN
1000 TABLET ORAL DAILY
COMMUNITY

## 2021-09-10 RX ORDER — OXYCODONE HYDROCHLORIDE 5 MG/1
TABLET ORAL
COMMUNITY
Start: 2021-09-07 | End: 2022-01-05

## 2021-09-10 RX ORDER — DIPHENOXYLATE HYDROCHLORIDE AND ATROPINE SULFATE 2.5; .025 MG/1; MG/1
1 TABLET ORAL DAILY
COMMUNITY

## 2021-09-10 RX ORDER — AMOXICILLIN 250 MG
1 CAPSULE ORAL
COMMUNITY
Start: 2021-09-07 | End: 2022-01-05

## 2021-09-10 NOTE — PROGRESS NOTES
Chief Complaint  Atrial Fibrillation, Hyperlipidemia, Fatigue (recent liver surgery), and no refills needed    Subjective    Patient has recently been seen at [facility] and was having some problems with elevated ventricular rates after surgery.  He states his heart rate at home has been running about in the 100 range he denies any worsening shortness of    Past Medical History:   Diagnosis Date   • Abnormal ECG    • Arrhythmia    • Asthma    • Atrial fibrillation, persistent (CMS/HCC) 2/26/2021   • Colorectal cancer (CMS/HCC)    • Deep vein thrombosis (CMS/HCC)    • GI cancer (CMS/HCC)    • Hepatitis    • High cholesterol    • Hypertension    • Rectal cancer (CMS/HCC) 6/18/2021   • Thyroid disease          Current Outpatient Medications:   •  acetaminophen (TYLENOL) 500 MG tablet, Take 500 mg by mouth Every 6 (Six) Hours As Needed., Disp: , Rfl:   •  apixaban (ELIQUIS) 5 MG tablet tablet, Take 5 mg by mouth 2 (Two) Times a Day., Disp: , Rfl:   •  ascorbic acid (VITAMIN C) 500 MG tablet, Take 500 mg by mouth Daily., Disp: , Rfl:   •  atorvastatin (LIPITOR) 40 MG tablet, atorvastatin 40 mg oral tablet take 1 tablet (40 mg) by oral route once daily   Active, Disp: , Rfl:   •  cyanocobalamin (VITAMIN B-12) 1000 MCG tablet, Take 1,000 mcg by mouth Daily., Disp: , Rfl:   •  levothyroxine (Synthroid) 50 MCG tablet, Synthroid 50 mcg oral tablet take 1 tablet (50 mcg) by oral route once daily   Active, Disp: , Rfl:   •  loratadine (CLARITIN) 10 MG tablet, loratadine 10 mg oral tablet take 1 tablet (10 mg) by oral route once daily   Active, Disp: , Rfl:   •  metoprolol tartrate (LOPRESSOR) 100 MG tablet, Take 100 mg by mouth 4 (Four) Times a Day., Disp: , Rfl:   •  multivitamin (multivitamin) tablet tablet, Take 1 tablet by mouth Daily., Disp: , Rfl:   •  ondansetron (ZOFRAN) 8 MG tablet, Take 8 mg by mouth Every 8 (Eight) Hours As Needed. for nausea, Disp: , Rfl:   •  oxyCODONE (ROXICODONE) 5 MG immediate release tablet, ,  "Disp: , Rfl:   •  pantoprazole (PROTONIX) 40 MG EC tablet, Daily., Disp: , Rfl:   •  potassium chloride (K-DUR,KLOR-CON) 20 MEQ CR tablet, Take 1 tablet by mouth Daily., Disp: 30 tablet, Rfl: 5  •  prochlorperazine (COMPAZINE) 10 MG tablet, Take 10 mg by mouth Every 6 (Six) Hours As Needed., Disp: , Rfl:   •  sennosides-docusate (PERICOLACE) 8.6-50 MG per tablet, Take 1 tablet by mouth., Disp: , Rfl:   •  vitamin E 1000 UNIT capsule, Take 1,000 Units by mouth Daily., Disp: , Rfl:   •  verapamil SR (CALAN-SR) 120 MG CR tablet, Take 1 tablet by mouth Daily., Disp: 90 tablet, Rfl: 3    Medications Discontinued During This Encounter   Medication Reason   • metoprolol succinate XL (TOPROL-XL) 50 MG 24 hr tablet Duplicate order   • Cardizem  MG 24 hr capsule    • doxazosin (CARDURA) 4 MG tablet    • HYDROcodone-acetaminophen (NORCO) 5-325 MG per tablet      No Known Allergies     Social History     Tobacco Use   • Smoking status: Never Smoker   • Smokeless tobacco: Never Used   Vaping Use   • Vaping Use: Never used   Substance Use Topics   • Alcohol use: Yes     Comment: here and there   • Drug use: Never       Family History   Problem Relation Age of Onset   • Prostate cancer Father    • Heart murmur Mother    • Diabetes Mother    • Colon cancer Maternal Uncle         Objective     /85   Pulse 104   Ht 193 cm (76\")   Wt 118 kg (261 lb)   BMI 31.77 kg/m²       Physical Exam    General Appearance:   · no acute distress  · Alert and oriented x3  HENT:   · lips not cyanotic  · Atraumatic  Neck:  · No jvd   · supple  Respiratory:  · no respiratory distress  · normal breath sounds  · no rales   Cardiovascular:  · Irregularly irregular  · no S3, no S4   · no murmur  · no rub  Extremities  · No cyanosis  · lower extremity edema: +1-2  Skin:   · warm, dry  · No rashes      Result Review :     No results found for: PROBNP  CMP    CMP 7/6/21 7/19/21 8/2/21   Glucose 144 (A) 143 (A) 138 (A)   BUN 10 10 13 "   Creatinine 0.89 0.91 1.04   eGFR Non  Am 86 84 72   Sodium 141 138 138   Potassium 3.7 3.4 (A) 3.9   Chloride 105 107 103   Calcium 8.7 8.5 (A) 8.9   Albumin 3.73 3.77 3.79   Total Bilirubin 0.5 0.4 0.8   Alkaline Phosphatase 158 (A) 161 (A) 158 (A)   AST (SGOT) 19 22 17   ALT (SGPT) 25 26 19   (A) Abnormal value            CBC w/diff    CBC w/Diff 7/6/21 7/19/21 8/2/21   WBC 14.56 (A) 10.57 13.95 (A)   RBC 4.30 4.17 4.26   Hemoglobin 13.5 13.1 13.3   Hematocrit 40.0 38.9 40.3   MCV 93.0 93.3 94.6   MCH 31.4 31.4 31.2   MCHC 33.8 33.7 33.0   RDW 14.1 14.8 15.8 (A)   Platelets 158 186 202   Neutrophil Rel % 85.2 (A) 79.0 (A) 82.7 (A)   Immature Granulocyte Rel % 2.4 (A) 3.6 (A) 1.6 (A)   Lymphocyte Rel % 6.5 (A) 9.3 (A) 7.7 (A)   Monocyte Rel % 3.6 (A) 6.1 5.7   Eosinophil Rel % 2.0 1.4 1.9   Basophil Rel % 0.3 0.6 0.4   (A) Abnormal value             Lab Results   Component Value Date    TSH 3.250 02/24/2021      Lab Results   Component Value Date    FREET4 1.33 02/24/2021      No results found for: DDIMERQUANT  Magnesium   Date Value Ref Range Status   08/02/2021 1.9 1.6 - 2.4 mg/dL Final      Digoxin   Date Value Ref Range Status   01/18/2021 0.5 0.5 - 2.0 ng/mL Final      No results found for: TROPONINT        Lipid Panel    Lipid Panel 1/18/21   Total Cholesterol 142   Triglycerides 132   HDL Cholesterol 45   VLDL Cholesterol 26   LDL Cholesterol  71      Comments are available for some flowsheets but are not being displayed.           No results found for: POCTROP  Echo 8/21      Left Ventricle: The left ventricle is normal size. The LVEF is variable   due to arrhythmia but is visually estimated at 35 - 50%  •  Right Ventricle: Right ventricle size is grossly normal. The right   ventricular systolic function is mildly reduced.  •  Mitral Valve: There is mild mitral regurgitation.  •  Pericardium: No pericardial effusion.               Diagnoses and all orders for this visit:    1. Atrial fibrillation,  persistent (CMS/HCC) (Primary)  Assessment & Plan:  Patient with mildly elevated ventricular rate we will add verapamil       2. Essential hypertension  Assessment & Plan:  Blood pressures controlled to discharge patient redirects metoprolol to see if dosing regimen can be simplified from 4 times a day      3. Hyperlipidemia, unspecified hyperlipidemia type    Other orders  -     verapamil SR (CALAN-SR) 120 MG CR tablet; Take 1 tablet by mouth Daily.  Dispense: 90 tablet; Refill: 3          Follow Up     No follow-ups on file.          Patient was given instructions and counseling regarding his condition or for health maintenance advice. Please see specific information pulled into the AVS if appropriate.

## 2021-09-10 NOTE — ASSESSMENT & PLAN NOTE
Blood pressures controlled to discharge patient redirects metoprolol to see if dosing regimen can be simplified from 4 times a day

## 2021-09-13 NOTE — TELEPHONE ENCOUNTER
Caller: ANA LEONARD    Relationship: Emergency Contact    Best call back number: [phone]    What is the best time to reach you:   ANYTIME     Who are you requesting to speak with (clinical staff, provider,  specific staff member):   DR ESPINOSA    Do you know the name of the person who called: ANA PTS (WIFE)    What was the call regarding:    HAD SURGERY IN [place] RECENTLY AND THEY WERE WANTING TO SCHEDULE PT FOR RADIATION THERE BUT WOULD LIKE TO HAVE DONE CLOSER TO HOME IF POSSIBLE, HAD CALLED LAST WEEK AND STILL HAS NOT HEARD ANYTHING BACK WOULD LIKE TO FOLLOW UP, THERE WAS APPT SCHEDULED ON 9/15 FOR RADIATION TREATMENT IN [place]  BUT WOULD LIKE TO CANCEL IT    Do you require a callback: YES

## 2021-09-14 ENCOUNTER — CONSULT (OUTPATIENT)
Dept: RADIATION ONCOLOGY | Facility: HOSPITAL | Age: 66
End: 2021-09-14

## 2021-09-14 ENCOUNTER — HOSPITAL ENCOUNTER (OUTPATIENT)
Dept: RADIATION ONCOLOGY | Facility: HOSPITAL | Age: 66
Setting detail: RADIATION/ONCOLOGY SERIES
End: 2021-09-14

## 2021-09-14 VITALS
BODY MASS INDEX: 32.55 KG/M2 | HEART RATE: 86 BPM | DIASTOLIC BLOOD PRESSURE: 78 MMHG | TEMPERATURE: 97.4 F | OXYGEN SATURATION: 95 % | SYSTOLIC BLOOD PRESSURE: 126 MMHG | RESPIRATION RATE: 16 BRPM | WEIGHT: 267.42 LBS

## 2021-09-14 DIAGNOSIS — C78.02 SECONDARY MALIGNANT NEOPLASM OF LEFT LUNG (HCC): ICD-10-CM

## 2021-09-14 DIAGNOSIS — C80.1 CANCER (HCC): ICD-10-CM

## 2021-09-14 DIAGNOSIS — C78.01 SECONDARY MALIGNANCY OF RIGHT LUNG (HCC): Primary | ICD-10-CM

## 2021-09-14 DIAGNOSIS — C78.7 SECONDARY MALIGNANCY OF LIVER (HCC): ICD-10-CM

## 2021-09-14 PROCEDURE — 77470 SPECIAL RADIATION TREATMENT: CPT | Performed by: RADIOLOGY

## 2021-09-14 PROCEDURE — G0463 HOSPITAL OUTPT CLINIC VISIT: HCPCS

## 2021-09-14 PROCEDURE — 99205 OFFICE O/P NEW HI 60 MIN: CPT | Performed by: RADIOLOGY

## 2021-09-14 PROCEDURE — 77263 THER RADIOLOGY TX PLNG CPLX: CPT | Performed by: RADIOLOGY

## 2021-09-14 NOTE — PROGRESS NOTES
New Patient Office Visit      Encounter Date: 09/14/2021   Patient Name: Robbi Kennedy  YOB: 1955   Medical Record Number: [id number]   Primary Diagnosis: Secondary malignancy of right lung (CMS/HCC) [C78.01]   Cancer Staging: now stage 4  Rectal cancer (CMS/HCC)  Staging form: Colon And Rectum, AJCC 8th Edition  - Clinical: Stage IIA (cT3, cN0, cM0) - Signed by Maurilio Campos MD on 6/21/2021  - progression of disease with metastases in liver and lung (2021)      Chief Complaint:    Chief Complaint   Patient presents with   • Consult   • Rectal Cancer     lung metastases       History of Present Illness: Robbi Kennedy is a 66 y.o. male who is here today to be seen in Radiation Oncology for discussion of radiation treatment to sites of metastatic disease a primary rectal cancer. Mr. Quigley was originally diagnosed with a [id number] moderately differentiated adenocarcinoma of the rectum in 2019. He received neoadjuvant Xeloda with radiation under the care of Dr. Vasquez here at [place]. This was followed by an LAR with ostomy in 9/20/2019. He was staged as having [id number] disease. He did well for some time. In 2021, he developed metastatic disease. Biopsy of a liver lesion in April 2021 returned as a metastatic adenocarcinoma consistent with a colorectal primary. Metastatic lesions in the chest were noted on CT imaging at that time. He received mFOLFOX with avastin under the care of Dr. Campos. Avastin was held towards the end in anticipation of upcoming abdominal surgery. Restaging CT demonstrated a treatment response in the liver and the chest.  He had a partial hepatectomy, removal of gallbladder, removal of regional lymph nodes, and MWA of a segment 6 liver metastases at the [facility] on 8/31/2021. He is now here to discuss SBRT to metastatic disease in both lungs.     14 point ROS obtained and negative- pertinent positives include fatigue, SOA with activity, stable leg swelling,  intermittent abdominal pain 2/2 constipation, abdominal pain 2/2 surgery (improving), generalized weakness, and occasional sleep disturbances.     Subjective      Pacemaker / ICD: no   Prior Radiation: yes - 50.4 Gy/28 fractions to pelvis for rectal cancer, performed by Dr. Vasquez    Past Medical History:   Past Medical History:   Diagnosis Date   • Abnormal ECG    • Arrhythmia    • Asthma    • Atrial fibrillation, persistent (CMS/HCC) 2/26/2021   • Colorectal cancer (CMS/HCC)    • Deep vein thrombosis (CMS/HCC)    • GI cancer (CMS/HCC)    • Hepatitis    • High cholesterol    • Hypertension    • Rectal cancer (CMS/HCC) 6/18/2021   • Thyroid disease        Past Surgical History:   Past Surgical History:   Procedure Laterality Date   • COLON SURGERY     • HERNIA REPAIR     • LIVER BIOPSY      4/27/21 Biopsy of liver revealed metastatic adenocarcinoma, consistent with colorectal primary   • OTHER SURGICAL HISTORY      REMOVED CANCER FROM COLON       Family History:   Family History   Problem Relation Age of Onset   • Prostate cancer Father    • Heart murmur Mother    • Diabetes Mother    • Colon cancer Maternal Uncle        Social History:   Social History     Socioeconomic History   • Marital status:      Spouse name: Not on file   • Number of children: 2   • Years of education: Not on file   • Highest education level: Not on file   Tobacco Use   • Smoking status: Never Smoker   • Smokeless tobacco: Never Used   Vaping Use   • Vaping Use: Never used   Substance and Sexual Activity   • Alcohol use: Yes     Comment: occasionally, no recent use   • Drug use: Never   • Sexual activity: Defer       Medications:     Current Outpatient Medications:   •  acetaminophen (TYLENOL) 500 MG tablet, Take 500 mg by mouth Every 6 (Six) Hours As Needed., Disp: , Rfl:   •  verapamil SR (CALAN-SR) 120 MG CR tablet, Take 1 tablet by mouth Daily., Disp: 90 tablet, Rfl: 3  •  apixaban (ELIQUIS) 5 MG tablet tablet, Take 5 mg by mouth  2 (Two) Times a Day., Disp: , Rfl:   •  ascorbic acid (VITAMIN C) 500 MG tablet, Take 500 mg by mouth Daily., Disp: , Rfl:   •  atorvastatin (LIPITOR) 40 MG tablet, atorvastatin 40 mg oral tablet take 1 tablet (40 mg) by oral route once daily   Active, Disp: , Rfl:   •  cyanocobalamin (VITAMIN B-12) 1000 MCG tablet, Take 1,000 mcg by mouth Daily., Disp: , Rfl:   •  levothyroxine (Synthroid) 50 MCG tablet, Synthroid 50 mcg oral tablet take 1 tablet (50 mcg) by oral route once daily   Active, Disp: , Rfl:   •  loratadine (CLARITIN) 10 MG tablet, loratadine 10 mg oral tablet take 1 tablet (10 mg) by oral route once daily   Active, Disp: , Rfl:   •  metoprolol tartrate (LOPRESSOR) 100 MG tablet, Take 100 mg by mouth 4 (Four) Times a Day., Disp: , Rfl:   •  multivitamin (multivitamin) tablet tablet, Take 1 tablet by mouth Daily., Disp: , Rfl:   •  ondansetron (ZOFRAN) 8 MG tablet, Take 8 mg by mouth Every 8 (Eight) Hours As Needed. for nausea, Disp: , Rfl:   •  oxyCODONE (ROXICODONE) 5 MG immediate release tablet, , Disp: , Rfl:   •  pantoprazole (PROTONIX) 40 MG EC tablet, Daily., Disp: , Rfl:   •  potassium chloride (K-DUR,KLOR-CON) 20 MEQ CR tablet, Take 1 tablet by mouth Daily., Disp: 30 tablet, Rfl: 5  •  prochlorperazine (COMPAZINE) 10 MG tablet, Take 10 mg by mouth Every 6 (Six) Hours As Needed., Disp: , Rfl:   •  sennosides-docusate (PERICOLACE) 8.6-50 MG per tablet, Take 1 tablet by mouth., Disp: , Rfl:   •  vitamin E 1000 UNIT capsule, Take 1,000 Units by mouth Daily., Disp: , Rfl:     Allergies:   No Known Allergies    Measures:   Pain: (on a scale of 0-10)   Pain Score    21 1048   PainSc:   3       Advanced Care Plan: N   KPS/Quality of Life: 80 - Restricted Physical Activity  Depression Screenin -we have placed a referral to our social work team to discuss options for therapy/evaluation with the patient    Objective     Physical Exam:   Vital Signs:   Vitals:    21 1048   BP: 126/78    Pulse: 86   Resp: 16   Temp: 97.4 °F (36.3 °C)   TempSrc: Temporal   SpO2: 95%   Weight: 121 kg (267 lb 6.7 oz)   PainSc:   3     Body mass index is 32.55 kg/m².     Physical exam:  General: NAD, sitting comfortably  Eye: EOMI, anicteric sclerae  HENT: NC/AT, MMM  Neck no JVD or cervical adenopathy  Respiratory: nonlabored respirations symmetric expansion  Cardiovascular RRR no MRG  Abdomen: surgical incisions are clean dry and intact with staples in place  Neuro: alert, oriented x3 cranial nerves II through XII intact  Musculoskeletal: range of motion intact in all 4 extremities, no obvious joint deformities  Psych: mood and affect are appropriate    Results:   Imaging: Images were reviewed personally and pertinent findings are detailed below.     CT Chest, Abdomen, Pelvis with Contrast 7/19/21-   Right middle lobe pulmonary nodule measuring 5 mm, previously 9 mm. Left lower lobe lesion stable, 5 mm. Left lower lobe lesion stable, 5 mm. Right upper lobe lesion 2 mm  2.4 cm noted in inferior right portion of the liver. No suspicious mesenteric, retroperitoneal, or pelvic lymphadenopathy. No evidence of recurrent disease in the pelvis     Pathology:   4/27/2021 biopsy of liver lesion showed metastatic adenocarcinoma consistent with a colorectal primary  Surgical pathology from 8/31/2021 (partial hepatectomy, removal of gallbladder, removal of regional lymph nodes, MWA of segment 6 metastases) has been requested from the [facility].    Labs:   Labs 9/7/2021 outside facility AST, ALT, Alk phos, total bili-within normal limits  Comprehensive metabolic panel (09/07/2021 02:10)  CEA 9/2020 - 3.1 -within normal limits    Assessment / Plan      Impressions: Robbi Kennedy is a pleasant 66 y.o. male with an original diagnosis of a stage IIa adenocarcinoma of the rectum. This was managed with neoadjuvant chemoradiation in 2019 followed by an LAR with ostomy. In 2021, he unfortunately developed metastatic disease  in the lungs and in the liver. He received systemic therapy under the care of Dr. Campos and had a good partial response to treatment. He underwent a partial hepatectomy with removal of the gallbladder, removal of regional lymph nodes, and MWA of a segment 6 metastases at the [facility] on August 31, 2021 to address his metastatic disease in the liver.   He is here to discuss SBRT to sites of metastatic disease in the chest.    Assessment/Plan:   Diagnoses and all orders for this visit:    1. Secondary malignancy of right lung (CMS/HCC) (Primary)  2. Secondary malignant neoplasm of left lung (CMS/HCC)  We discussed indications risks and benefits of SBRT therapy to lesions in the lungs today. He understands and is willing to proceed. Consent was reviewed and signed. . He currently has metal staples in place following his abdominal surgery. As these lesions are small and he recently had abdominal surgery, we discussed 4D CT imaging at the time of simulation to help manage respiratory motion during treatment. He has an appointment for his staples to be removed on October 1. We will see him shortly after for a CT simulation, as the staples may cause imaging artifacts on our simulation scan.     3. Secondary malignancy of liver (CMS/HCC)  We have requested the surgical pathology report for review.     4. Cancer (CMS/HCC)  -     Ambulatory Referral to ONC Social Work due to score on depression screen         Follow Up:   Return in 3 weeks (on 10/5/2021) for CT simulation .    Time:   I spent 70 minutes on this encounter on today's date, 9/14/21. Activities that took place during this time include: preparing to see the patient, obtaining history and reviewing separately obtained history, performing a medically appropriate examination and evaluation, counseling and educating the patient, communicating with other healthcare providers, documenting clinical information in the health record, and coordinating care  for this patient.     Sincerely,        Chapis Luna MD  Radiation Oncology  [facility]    This document has been signed by Chapis Luna MD on September 14, 2021 12:33 EDT

## 2021-09-15 ENCOUNTER — TELEPHONE (OUTPATIENT)
Dept: RADIATION ONCOLOGY | Facility: HOSPITAL | Age: 66
End: 2021-09-15

## 2021-09-15 NOTE — TELEPHONE ENCOUNTER
We received the surgical pathology report from Dr. Charles at  and reviewed it. Left message to inform the patient that no radiation to the abdomen is needed at this time. We will see him back for simulation to plan treatment to his lung lesions.

## 2021-09-22 ENCOUNTER — TELEPHONE (OUTPATIENT)
Dept: RADIATION ONCOLOGY | Facility: HOSPITAL | Age: 66
End: 2021-09-22

## 2021-09-22 NOTE — TELEPHONE ENCOUNTER
Distress Screening Follow-Up    Diagnosis: Metastatic rectal cancer    Date of Distress Screenin21  Location of Distress Screening: Rad onc  Distress Level: 4  Problems Indicated: Depression, fear, worry, loss of interest/activities, physical problems    Comments: OSW contacted pt via telephone to f/u in regards to identified distress. PHQ-9 completed during consult - score 13. Denies SI. Reintroduced myself and discussed OSW role/psychosocial services available, as OSW previously met with pt back in 2019. Pt to begin radiation therapy to lung lesions in near future. Provided emotional support throughout and discussed opportunity for mental health services (counseling,psych) and/or support services (pwop-jm-vepd support, support groups). Pt did not express interest at this time, indicating he feels he is doing well at this time. Pt has good support from spouse and adult children. Pt indicates he has retired. Pt has original Medicare and  insurances. Pt resides in [facility] [place] and has no concerns with transportation. Encouraged OSW support remains available. Pt expressed gratitude.    Services/Referrals Provided: Emotional support

## 2021-09-24 ENCOUNTER — TELEPHONE (OUTPATIENT)
Dept: ONCOLOGY | Facility: HOSPITAL | Age: 66
End: 2021-09-24

## 2021-09-24 NOTE — TELEPHONE ENCOUNTER
Caller: ANA LEONARD    Relationship to patient: Emergency Contact    Best call back number: [phone]    Chief complaint: SPOUSE CALLED STATING THAT PATIENT NEEDS TO BE SCHEDULED FOR A PORT FLUSH. THIS WAS LAST DONE IN THE HOSPITAL IN EARLY September     Type of visit: PORT FLUSH    Requested date: BEGINNING OF OCTOBER    Additional notes: PLEASE CONTACT SPOUSE TO ADVISE OF APPT. DATE/TIME. LEAVE VM IF NO ANSWER.

## 2021-10-01 ENCOUNTER — HOSPITAL ENCOUNTER (OUTPATIENT)
Dept: RADIATION ONCOLOGY | Facility: HOSPITAL | Age: 66
Setting detail: RADIATION/ONCOLOGY SERIES
End: 2021-10-01

## 2021-10-04 ENCOUNTER — TELEPHONE (OUTPATIENT)
Dept: ONCOLOGY | Facility: HOSPITAL | Age: 66
End: 2021-10-04

## 2021-10-04 DIAGNOSIS — C20 RECTAL CANCER (HCC): Primary | ICD-10-CM

## 2021-10-04 PROBLEM — E03.9 HYPOTHYROIDISM: Status: ACTIVE | Noted: 2021-08-27

## 2021-10-04 PROBLEM — Z86.718 HISTORY OF DVT OF LOWER EXTREMITY: Status: ACTIVE | Noted: 2021-08-27

## 2021-10-05 ENCOUNTER — APPOINTMENT (OUTPATIENT)
Dept: ONCOLOGY | Facility: HOSPITAL | Age: 66
End: 2021-10-05

## 2021-10-05 ENCOUNTER — HOSPITAL ENCOUNTER (OUTPATIENT)
Dept: RADIATION ONCOLOGY | Facility: HOSPITAL | Age: 66
Setting detail: RADIATION/ONCOLOGY SERIES
Discharge: HOME | End: 2021-10-05

## 2021-10-05 ENCOUNTER — OFFICE VISIT (OUTPATIENT)
Dept: ONCOLOGY | Facility: HOSPITAL | Age: 66
End: 2021-10-05

## 2021-10-05 ENCOUNTER — HOSPITAL ENCOUNTER (OUTPATIENT)
Dept: ONCOLOGY | Facility: HOSPITAL | Age: 66
Setting detail: INFUSION SERIES
Discharge: HOME | End: 2021-10-05

## 2021-10-05 VITALS
WEIGHT: 268.96 LBS | OXYGEN SATURATION: 97 % | RESPIRATION RATE: 18 BRPM | DIASTOLIC BLOOD PRESSURE: 95 MMHG | SYSTOLIC BLOOD PRESSURE: 128 MMHG | HEART RATE: 89 BPM | BODY MASS INDEX: 32.74 KG/M2 | TEMPERATURE: 97.4 F

## 2021-10-05 DIAGNOSIS — C20 RECTAL CANCER (HCC): Primary | ICD-10-CM

## 2021-10-05 DIAGNOSIS — C78.02 SECONDARY MALIGNANT NEOPLASM OF LEFT LUNG (HCC): ICD-10-CM

## 2021-10-05 DIAGNOSIS — C78.01 SECONDARY MALIGNANT NEOPLASM OF RIGHT LUNG (HCC): ICD-10-CM

## 2021-10-05 DIAGNOSIS — C20 RECTAL CANCER (HCC): ICD-10-CM

## 2021-10-05 DIAGNOSIS — Z45.2 ENCOUNTER FOR ADJUSTMENT OR MANAGEMENT OF VASCULAR ACCESS DEVICE: Primary | ICD-10-CM

## 2021-10-05 LAB
ALBUMIN SERPL-MCNC: 3.74 G/DL (ref 3.5–5.2)
ALBUMIN/GLOB SERPL: 1.6 G/DL
ALP SERPL-CCNC: 78 U/L (ref 39–117)
ALT SERPL W P-5'-P-CCNC: 17 U/L (ref 1–41)
ANION GAP SERPL CALCULATED.3IONS-SCNC: 7.5 MMOL/L (ref 5–15)
AST SERPL-CCNC: 19 U/L (ref 1–40)
BASOPHILS # BLD AUTO: 0.03 10*3/MM3 (ref 0–0.2)
BASOPHILS NFR BLD AUTO: 0.6 % (ref 0–1.5)
BILIRUB SERPL-MCNC: 0.7 MG/DL (ref 0–1.2)
BUN SERPL-MCNC: 11 MG/DL (ref 8–23)
BUN/CREAT SERPL: 13.8 (ref 7–25)
CALCIUM SPEC-SCNC: 9 MG/DL (ref 8.6–10.5)
CHLORIDE SERPL-SCNC: 104 MMOL/L (ref 98–107)
CO2 SERPL-SCNC: 24.5 MMOL/L (ref 22–29)
CREAT SERPL-MCNC: 0.8 MG/DL (ref 0.76–1.27)
DEPRECATED RDW RBC AUTO: 47.6 FL (ref 37–54)
EOSINOPHIL # BLD AUTO: 0.27 10*3/MM3 (ref 0–0.4)
EOSINOPHIL NFR BLD AUTO: 5.8 % (ref 0.3–6.2)
ERYTHROCYTE [DISTWIDTH] IN BLOOD BY AUTOMATED COUNT: 13.4 % (ref 12.3–15.4)
GFR SERPL CREATININE-BSD FRML MDRD: 97 ML/MIN/1.73
GLOBULIN UR ELPH-MCNC: 2.4 GM/DL
GLUCOSE SERPL-MCNC: 168 MG/DL (ref 65–99)
HCT VFR BLD AUTO: 39.4 % (ref 37.5–51)
HGB BLD-MCNC: 12.8 G/DL (ref 13–17.7)
IMM GRANULOCYTES # BLD AUTO: 0.01 10*3/MM3 (ref 0–0.05)
IMM GRANULOCYTES NFR BLD AUTO: 0.2 % (ref 0–0.5)
LYMPHOCYTES # BLD AUTO: 0.72 10*3/MM3 (ref 0.7–3.1)
LYMPHOCYTES NFR BLD AUTO: 15.6 % (ref 19.6–45.3)
MCH RBC QN AUTO: 31 PG (ref 26.6–33)
MCHC RBC AUTO-ENTMCNC: 32.5 G/DL (ref 31.5–35.7)
MCV RBC AUTO: 95.4 FL (ref 79–97)
MONOCYTES # BLD AUTO: 0.37 10*3/MM3 (ref 0.1–0.9)
MONOCYTES NFR BLD AUTO: 8 % (ref 5–12)
NEUTROPHILS NFR BLD AUTO: 3.23 10*3/MM3 (ref 1.7–7)
NEUTROPHILS NFR BLD AUTO: 69.8 % (ref 42.7–76)
PLATELET # BLD AUTO: 234 10*3/MM3 (ref 140–450)
PMV BLD AUTO: 8.8 FL (ref 6–12)
POTASSIUM SERPL-SCNC: 3.7 MMOL/L (ref 3.5–5.2)
PROT SERPL-MCNC: 6.1 G/DL (ref 6–8.5)
RBC # BLD AUTO: 4.13 10*6/MM3 (ref 4.14–5.8)
SODIUM SERPL-SCNC: 136 MMOL/L (ref 136–145)
WBC # BLD AUTO: 4.63 10*3/MM3 (ref 3.4–10.8)

## 2021-10-05 PROCEDURE — 36591 DRAW BLOOD OFF VENOUS DEVICE: CPT

## 2021-10-05 PROCEDURE — 85025 COMPLETE CBC W/AUTO DIFF WBC: CPT | Performed by: INTERNAL MEDICINE

## 2021-10-05 PROCEDURE — 80053 COMPREHEN METABOLIC PANEL: CPT | Performed by: INTERNAL MEDICINE

## 2021-10-05 PROCEDURE — G0463 HOSPITAL OUTPT CLINIC VISIT: HCPCS

## 2021-10-05 PROCEDURE — 77334 RADIATION TREATMENT AID(S): CPT | Performed by: RADIOLOGY

## 2021-10-05 PROCEDURE — 99214 OFFICE O/P EST MOD 30 MIN: CPT | Performed by: INTERNAL MEDICINE

## 2021-10-05 PROCEDURE — 25010000002 HEPARIN LOCK FLUSH PER 10 UNITS: Performed by: INTERNAL MEDICINE

## 2021-10-05 RX ORDER — SODIUM CHLORIDE 0.9 % (FLUSH) 0.9 %
20 SYRINGE (ML) INJECTION AS NEEDED
Status: CANCELLED | OUTPATIENT
Start: 2021-10-05

## 2021-10-05 RX ORDER — HEPARIN SODIUM (PORCINE) LOCK FLUSH IV SOLN 100 UNIT/ML 100 UNIT/ML
500 SOLUTION INTRAVENOUS AS NEEDED
Status: CANCELLED | OUTPATIENT
Start: 2021-10-05

## 2021-10-05 RX ORDER — HEPARIN SODIUM (PORCINE) LOCK FLUSH IV SOLN 100 UNIT/ML 100 UNIT/ML
500 SOLUTION INTRAVENOUS AS NEEDED
Status: DISCONTINUED | OUTPATIENT
Start: 2021-10-05 | End: 2021-10-06 | Stop reason: HOSPADM

## 2021-10-05 RX ORDER — SODIUM CHLORIDE 0.9 % (FLUSH) 0.9 %
20 SYRINGE (ML) INJECTION AS NEEDED
Status: DISCONTINUED | OUTPATIENT
Start: 2021-10-05 | End: 2021-10-06 | Stop reason: HOSPADM

## 2021-10-05 RX ADMIN — SODIUM CHLORIDE, PRESERVATIVE FREE 20 ML: 5 INJECTION INTRAVENOUS at 08:42

## 2021-10-05 RX ADMIN — HEPARIN SODIUM (PORCINE) LOCK FLUSH IV SOLN 100 UNIT/ML 500 UNITS: 100 SOLUTION at 08:42

## 2021-10-05 NOTE — ASSESSMENT & PLAN NOTE
"Metastatic.  Patient good partial response to palliative treatment and has undergone liver resection which he tolerated well.  He has several small pulmonary nodules and has been evaluated by Dr. Luna with radiation oncology here locally who will treat the stereotactically in an effort to render him disease-free.  I reviewed her records and discussed the case with Dr. Luna.  Patient is at high risk for recurrence and I would recommend completing 6 full months of therapy in a perioperative fashion.  He received 5 cycles of FOLFOX prior to his liver resection.  I would recommend 7 additional cycles after he has completed radiation to the lung nodules.  We discussed other options which could include close surveillance with consideration of resuming treatment if/when he has progression.  He is agreeable to \"adjuvant\" treatment.  I will see him back after radiation to finalize that planning.  "

## 2021-10-05 NOTE — PROGRESS NOTES
Chief Complaint  Rectal Cancer and Follow-up    Eri Moran, Eri Engel APRN Subjective          Robbi Kennedy presents to [facility] for follow-up of rectal cancer.  Since his last visit, he has undergone surgery at [facility] for isolated metastatic lesions in the liver.  He states that he is healing well from the surgery.  He has also been evaluated locally by Dr. Luna with radiation oncology and will soon begin radiation to pulmonary lesions in an effort to render him disease-free.  He states that he is feeling well.  He reports normal bowel habits.  He denies any masses or adenopathy.  He reports adequate appetite and energy level.    Oncology/Hematology History Overview Note   9/30/2019 High rectal wvdd-mjgqcjpoir-fhczulylzyseqy adenocarcinoma  associated with tubular adenoma.   4/27/21 Biopsy of liver revealed  metastatic adenocarcinoma, consistent with colorectal primary.    Clinical Staging  Stage IIa ([id number])    Chemotherapy      neoadjuvant Xeloda with radiation. adjuvant xeloda        Radiation Therapy      7/8/19 thru 8/14/19 completed neoadjuvant 5040 cGy=28 fxs rectum        Surgeries       9/30/2019 low anterior resection with ostomy        8/31/21 partial hepatectomy, cholecystectomy and MWA for segment 6 medical margin performed at        Rectal cancer (HCC)   6/2/2021 - 8/15/2021 Chemotherapy    OP COLON mFOLFOX6 + Bevacizumab (OXALIplatin / Leucovorin / Fluorouracil / Bevacizumab)     6/18/2021 Initial Diagnosis    Rectal cancer (CMS/HCC)     6/21/2021 Cancer Staged    Staging form: Colon And Rectum, AJCC 8th Edition  - Clinical: Stage IIA (cT3, cN0, cM0) - Signed by Maurilio Campos MD on 6/21/2021     Secondary malignant neoplasm of left lung (HCC)   9/14/2021 Initial Diagnosis    Secondary malignant neoplasm of left lung (HCC)     10/20/2021 -  Radiation    RADIATION THERAPY Treatment Details (Noted on  10/5/2021)  Site: Bilateral Lung  Technique: SBRT  Goal: No goal specified  Planned Treatment Start Date: 10/20/2021     Secondary malignant neoplasm of right lung (HCC)   10/5/2021 Initial Diagnosis    Secondary malignant neoplasm of right lung (HCC)     10/20/2021 -  Radiation    RADIATION THERAPY Treatment Details (Noted on 10/5/2021)  Site: Bilateral Lung  Technique: SBRT  Goal: No goal specified  Planned Treatment Start Date: 10/20/2021         Review of Systems   Constitutional: Positive for fatigue. Negative for appetite change, diaphoresis, fever, unexpected weight gain and unexpected weight loss.   HENT: Negative for hearing loss, mouth sores, sore throat, swollen glands, trouble swallowing and voice change.    Eyes: Negative for blurred vision.   Respiratory: Negative for cough, shortness of breath and wheezing.    Cardiovascular: Negative for chest pain and palpitations.   Gastrointestinal: Negative for abdominal pain, blood in stool, constipation, diarrhea, nausea and vomiting.   Endocrine: Negative for cold intolerance and heat intolerance.   Genitourinary: Negative for difficulty urinating, dysuria, frequency, hematuria and urinary incontinence.   Musculoskeletal: Negative for arthralgias, back pain and myalgias.   Skin: Negative for rash, skin lesions and bruise.   Neurological: Negative for dizziness, seizures, weakness, numbness and headache.   Hematological: Does not bruise/bleed easily.   Psychiatric/Behavioral: Negative for depressed mood. The patient is not nervous/anxious.      Current Outpatient Medications on File Prior to Visit   Medication Sig Dispense Refill   • acetaminophen (TYLENOL) 500 MG tablet Take 500 mg by mouth Every 6 (Six) Hours As Needed.     • apixaban (ELIQUIS) 5 MG tablet tablet Take 5 mg by mouth 2 (Two) Times a Day.     • ascorbic acid (VITAMIN C) 500 MG tablet Take 500 mg by mouth Daily.     • atorvastatin (LIPITOR) 40 MG tablet atorvastatin 40 mg oral tablet take 1  tablet (40 mg) by oral route once daily   Active     • cyanocobalamin (VITAMIN B-12) 1000 MCG tablet Take 1,000 mcg by mouth Daily.     • levothyroxine (Synthroid) 50 MCG tablet Synthroid 50 mcg oral tablet take 1 tablet (50 mcg) by oral route once daily   Active     • loratadine (CLARITIN) 10 MG tablet loratadine 10 mg oral tablet take 1 tablet (10 mg) by oral route once daily   Active     • metoprolol tartrate (LOPRESSOR) 100 MG tablet Take 100 mg by mouth 4 (Four) Times a Day.     • multivitamin (multivitamin) tablet tablet Take 1 tablet by mouth Daily.     • ondansetron (ZOFRAN) 8 MG tablet Take 8 mg by mouth Every 8 (Eight) Hours As Needed. for nausea     • oxyCODONE (ROXICODONE) 5 MG immediate release tablet      • pantoprazole (PROTONIX) 40 MG EC tablet Daily.     • potassium chloride (K-DUR,KLOR-CON) 20 MEQ CR tablet Take 1 tablet by mouth Daily. 30 tablet 5   • prochlorperazine (COMPAZINE) 10 MG tablet Take 10 mg by mouth Every 6 (Six) Hours As Needed.     • sennosides-docusate (PERICOLACE) 8.6-50 MG per tablet Take 1 tablet by mouth.     • verapamil SR (CALAN-SR) 120 MG CR tablet Take 1 tablet by mouth Daily. 90 tablet 3   • vitamin E 1000 UNIT capsule Take 1,000 Units by mouth Daily.       No current facility-administered medications on file prior to visit.       No Known Allergies  Past Medical History:   Diagnosis Date   • Abnormal ECG    • Arrhythmia    • Asthma    • Atrial fibrillation, persistent (HCC) 2/26/2021   • Colorectal cancer (HCC)    • Deep vein thrombosis (HCC)    • GI cancer (HCC)    • Hepatitis    • High cholesterol    • Hypertension    • Rectal cancer (HCC) 6/18/2021   • Thyroid disease      Past Surgical History:   Procedure Laterality Date   • COLON SURGERY     • HERNIA REPAIR     • LIVER BIOPSY      4/27/21 Biopsy of liver revealed metastatic adenocarcinoma, consistent with colorectal primary   • OTHER SURGICAL HISTORY      REMOVED CANCER FROM COLON     Social History      Socioeconomic History   • Marital status:      Spouse name: Not on file   • Number of children: 2   • Years of education: Not on file   • Highest education level: Not on file   Tobacco Use   • Smoking status: Never Smoker   • Smokeless tobacco: Never Used   Vaping Use   • Vaping Use: Never used   Substance and Sexual Activity   • Alcohol use: Yes     Comment: occasionally, no recent use   • Drug use: Never   • Sexual activity: Defer     Family History   Problem Relation Age of Onset   • Prostate cancer Father    • Heart murmur Mother    • Diabetes Mother    • Colon cancer Maternal Uncle        Objective   Physical Exam  Vitals reviewed. Exam conducted with a chaperone present.   Constitutional:       General: He is not in acute distress.     Appearance: Normal appearance.   Cardiovascular:      Rate and Rhythm: Normal rate and regular rhythm.      Heart sounds: Normal heart sounds. No murmur heard.   No gallop.    Pulmonary:      Effort: Pulmonary effort is normal.      Breath sounds: Normal breath sounds.      Comments: Port-A-Cath  Abdominal:      General: Abdomen is flat. Bowel sounds are normal. There is no distension.      Palpations: Abdomen is soft.      Tenderness: There is no abdominal tenderness.      Comments: Well-healed midline and right upper quadrant incisions   Musculoskeletal:      Cervical back: Neck supple.      Right lower leg: No edema.      Left lower leg: No edema.   Lymphadenopathy:      Cervical: No cervical adenopathy.   Neurological:      Mental Status: He is alert and oriented to person, place, and time.   Psychiatric:         Mood and Affect: Mood normal.         Behavior: Behavior normal.         Vitals:    10/05/21 0911   BP: 128/95   Pulse: 89   Resp: 18   Temp: 97.4 °F (36.3 °C)   SpO2: 97%   Weight: 122 kg (268 lb 15.4 oz)   PainSc: 0-No pain     ECOG score: 0         PHQ-9 Total Score:                    Result Review :   The following data was reviewed by: Maurilio DUMONT  "MD Andres on 10/05/2021:  Lab Results   Component Value Date    HGB 12.8 (L) 10/05/2021    HCT 39.4 10/05/2021    MCV 95.4 10/05/2021     10/05/2021    WBC 4.63 10/05/2021    NEUTROABS 3.23 10/05/2021    LYMPHSABS 0.72 10/05/2021    MONOSABS 0.37 10/05/2021    EOSABS 0.27 10/05/2021    BASOSABS 0.03 10/05/2021     Lab Results   Component Value Date    GLUCOSE 168 (H) 10/05/2021    BUN 11 10/05/2021    CREATININE 0.80 10/05/2021     10/05/2021    K 3.7 10/05/2021     10/05/2021    CO2 24.5 10/05/2021    CALCIUM 9.0 10/05/2021    PROTEINTOT 6.1 10/05/2021    ALBUMIN 3.74 10/05/2021    BILITOT 0.7 10/05/2021    ALKPHOS 78 10/05/2021    AST 19 10/05/2021    ALT 17 10/05/2021           Assessment and Plan    Diagnoses and all orders for this visit:    1. Rectal cancer (HCC) (Primary)  Assessment & Plan:  Metastatic.  Patient good partial response to palliative treatment and has undergone liver resection which he tolerated well.  He has several small pulmonary nodules and has been evaluated by Dr. Luna with radiation oncology here locally who will treat the stereotactically in an effort to render him disease-free.  I reviewed her records and discussed the case with Dr. Luna.  Patient is at high risk for recurrence and I would recommend completing 6 full months of therapy in a perioperative fashion.  He received 5 cycles of FOLFOX prior to his liver resection.  I would recommend 7 additional cycles after he has completed radiation to the lung nodules.  We discussed other options which could include close surveillance with consideration of resuming treatment if/when he has progression.  He is agreeable to \"adjuvant\" treatment.  I will see him back after radiation to finalize that planning.    Orders:  -     CBC & Differential; Future  -     CEA; Future  -     Comprehensive Metabolic Panel; Future              Patient was given instructions and counseling regarding his condition or for health " maintenance advice. Please see specific information pulled into the AVS if appropriate.     Maurilio Campos MD    10/5/2021

## 2021-10-14 ENCOUNTER — HOSPITAL ENCOUNTER (OUTPATIENT)
Dept: RADIATION ONCOLOGY | Facility: HOSPITAL | Age: 66
Discharge: HOME | End: 2021-10-14

## 2021-10-14 PROCEDURE — 77300 RADIATION THERAPY DOSE PLAN: CPT | Performed by: RADIOLOGY

## 2021-10-14 PROCEDURE — 77293 RESPIRATOR MOTION MGMT SIMUL: CPT | Performed by: RADIOLOGY

## 2021-10-14 PROCEDURE — 77301 RADIOTHERAPY DOSE PLAN IMRT: CPT | Performed by: RADIOLOGY

## 2021-10-14 PROCEDURE — 77338 DESIGN MLC DEVICE FOR IMRT: CPT | Performed by: RADIOLOGY

## 2021-10-20 ENCOUNTER — HOSPITAL ENCOUNTER (OUTPATIENT)
Dept: RADIATION ONCOLOGY | Facility: HOSPITAL | Age: 66
Discharge: HOME | End: 2021-10-20

## 2021-10-20 VITALS
WEIGHT: 273.37 LBS | TEMPERATURE: 98.1 F | RESPIRATION RATE: 16 BRPM | BODY MASS INDEX: 33.28 KG/M2 | SYSTOLIC BLOOD PRESSURE: 132 MMHG | OXYGEN SATURATION: 97 % | DIASTOLIC BLOOD PRESSURE: 88 MMHG | HEART RATE: 86 BPM

## 2021-10-20 DIAGNOSIS — C78.01 SECONDARY MALIGNANCY OF RIGHT LUNG (HCC): Primary | ICD-10-CM

## 2021-10-20 PROCEDURE — 77435 SBRT MANAGEMENT: CPT | Performed by: RADIOLOGY

## 2021-10-20 PROCEDURE — 77373 STRTCTC BDY RAD THER TX DLVR: CPT | Performed by: RADIOLOGY

## 2021-10-20 NOTE — PROGRESS NOTES
On Treatment Note      Encounter Date: 10/20/2021   Patient Name: Robbi Kennedy  YOB: 1955   Medical Record Number: [id number]     Primary Diagnosis:    Diagnosis Plan   1. Secondary malignancy of right lung (HCC)       Cancer Staging: now stage 4     Treatment Site: Right Middle Lobe  Prescribed dose: 5000 cGy in 5 fractions delivered every other day  Completed dose: 1000 cGy/5000 cGy  Date of First Treatment: 10/20/2021    Tolerance: Tolerating treatment well, completed first fraction without incident.    Radiation related toxicities and management plan: None    Issues raised by patient or treatment team: None.      Subjective      Review of Systems: Review of Systems   Constitutional: Positive for fatigue.   Respiratory: Positive for shortness of breath. Negative for cough.    Gastrointestinal: Negative for constipation, diarrhea and nausea.   Genitourinary: Negative for dysuria, frequency and urgency.   Neurological: Negative for dizziness and headaches.       The following portions of the patient's history were reviewed and updated as appropriate: allergies, current medications, past family history, past medical history, past social history, past surgical history and problem list.    Measures:   Pain: (on a scale of 0-10)   Pain Score    10/20/21 0946   PainSc: 0-No pain     No intervention required    Advanced Care Plan: N Advance Care Planning   ACP discussion was declined by the patient. Patient does not have an advance directive, declines further assistance.    KPS/Quality of Life: 80 - Restricted Physical Activity  ECOG: (1) Restricted in physically strenuous activity, ambulatory and able to do work of light nature  Depression Screening:   Patient screened positive for depression based on a PHQ-9 score of 13 on 9/14/2021. Follow-up recommendations include: Referral to Social Work.        Objective     Physical Exam:   Vital Signs:   Vitals:    10/20/21  0946   BP: 132/88   Pulse: 86   Resp: 16   Temp: 98.1 °F (36.7 °C)   SpO2: 97%      Body mass index is 33.28 kg/m².   Wt Readings from Last 3 Encounters:   10/20/21 124 kg (273 lb 5.9 oz)   10/05/21 122 kg (268 lb 15.4 oz)   09/14/21 121 kg (267 lb 6.7 oz)       General: NAD, sitting comfortably  Eye: EOMI, anicteric sclerae  Respiratory: Symmetric expansion, nonlabored respiration  Neuro: Alert oriented x3, cranial nerves III through XII are grossly intact.  Psych: Mood and affect appropriate      Assessment / Plan      Plan:   I reviewed technical aspects of the radiation therapy treatment administration including dose delivery and daily treatment parameters to verify that these meet the specifications from my clinical treatment planning note. I reviewed the treatment setup notes. I reviewed and approved images obtained for “image guidance” with setup and treatment.     We will continue radiation therapy as prescribed.        Chapis Luna MD  Radiation Oncology  [facility]    This document has been signed by Chapis Luna MD on October 20, 2021 11:25 EDT

## 2021-10-20 NOTE — PROGRESS NOTES
Stereotactic Body Radiotherapy Note    10/20/2021    Treatment Site: Right Middle Lobe (Lung)  Energy: 6X  Dose: 1000 cGy of planned 5000 cGy  #Fx: 1 of 5  Start Date: 10/20/21    Cancer Stage: Metastatic colorectal cancer, stage 4      Vitals:    10/20/21 0946   BP: 132/88   Pulse: 86   Resp: 16   Temp: 98.1 °F (36.7 °C)   SpO2: 97%     Pain Score    10/20/21 0946   PainSc: 0-No pain           Review of Systems     EXAM:  Gen: Well appearing, NAD  HENT: NC/AT, MMM  Resp: Nonlabored respiration, symmetric expansion  MSK: ROM intact in all 4 extremities, no obvious joint deformities  Neuro: Alert, Ox3. CN 3-12 grossly intact.   Psych: Appropriate mood and affect.    Comments:   A stereotactic ablative radiation plan was created to deliver the dose as indicated above.   The patient was positioned on the treatment couch in a Vac-Fix immobilization device.   Abdominal compression was not utilized. 4D CT imaging was utilized in the planning process.   Pre-treatment image guidance using cone beam CT imaging was performed to ensure proper setup and alignment.   I personally evaluated and approved the patient's pre-treatment imaging.   I was present for delivery of the patient's treatment. The prescription dose was delivered as intended.   A Medical Physicist was also in attendance during patient set-up and treatment delivery.    The patient tolerated treatment well and completed without incident.     Chapis Luna MD  10/20/2021

## 2021-10-20 NOTE — PATIENT INSTRUCTIONS
External Radiation Therapy  Describe external radiation therapy including how it works as a cancer treatment along with common side effects and how they can be managed.  To view the content, go to this web address:  [email or web address]    This video will  on: 2023. If you need access to this video following this date, please reach out to the healthcare provider who assigned it to you.  This information is not intended to replace advice given to you by your health care provider. Make sure you discuss any questions you have with your health care provider.  [employer] Patient Education ©  [employer]  Managing Side Effects of Radiation Therapy  You will learn how to identify the most common side effects that occur from radiation therapy, including how they occur and ways to manage them.  To view the content, go to this web address:  [email or web address]    This video will  on: 2023. If you need access to this video following this date, please reach out to the healthcare provider who assigned it to you.  This information is not intended to replace advice given to you by your health care provider. Make sure you discuss any questions you have with your health care provider.  [employer] Patient Education ©  [employer]  External Beam Radiation Therapy  External beam radiation therapy is a type of radiation treatment. This type of radiation therapy can deliver radiation to a fairly large area. This is the most common type of radiation therapy for cancer. It may be done for several purposes:  1. Treating cancer. This is done by:  ? Destroying cancer cells. Radiation delivered during the treatment damages cancer cells. It may also damage normal cells, but normal cells have the DNA to repair themselves and cancer cells do not.  ? Helping with symptoms of a person's cancer.  ? Stopping the growth of any remaining cancer cells after surgery.  ? Preventing cancer cells from  growing in areas that do not have cancer (prophylactic radiation therapy).  2. Treating or shrinking a tumor that is not cancerous (is benign).  3. Reducing pain (palliative therapy).  The amount of radiation a person receives and the length of therapy depend on the person's medical condition. The person should not feel the radiation being delivered or any pain during the therapy.  Let your health care provider know about:  · Any allergies you have.  · All medicines you are taking, including vitamins, herbs, eye drops, creams, and over-the-counter medicines.  · Any blood disorders you have.  · Any surgeries you have had.  · Any medical conditions you have.  · Whether you are pregnant or may be pregnant.  What are the risks?  Generally, this is a safe procedure. However, radiation therapy can place a person at a higher risk for a second cancer later in life.  Most people will have side effects from the therapy. Side effects depend on the amount of radiation and the part of the body that was exposed to radiation. The most common side effects include:  · Skin changes.  · Hair loss.  · Tiredness (fatigue).  · Nausea and vomiting.  What happens before the procedure?  Medicines  1. Ask your health care provider about:  ? Changing or stopping your regular medicines.  ? Taking over-the-counter medicines, vitamins, herbs, and supplements.  General instructions  1. You will have a planning session (simulation). During the session:  ? Your health care provider will plan exactly where the radiation will be delivered. This area is called the treatment field.  ? You will be positioned for your therapy. The goal is to have a position that can be reproduced for each therapy session.  ? Temporary marks may be drawn on your body. Permanent marks may also be drawn on your body in order for you to be positioned the same way for each therapy session.  ? A tool that holds a body part in place (immobilization device) may be used to keep  the area of treatment in the correct position.  2. Follow instructions from your health care provider about eating or drinking restrictions.  3. You may want to plan to have someone take you home from the hospital or clinic.  What happens during the procedure?    · You will either lie on a table or sit in a chair in the position determined for your therapy.  · You may have a heavy shield placed on you to protect tissues and organs that are not being treated.  · The radiation machine (linear accelerator) will move around you to deliver the radiation in exact doses from different angles. The machine will not touch you.  · If you had a heavy shield placed on you, it will be removed when the machine is finished delivering radiation.  The procedure may vary among health care providers and hospitals.  What happens after the procedure?  · You may return to your normal routine--including diet, activities, and medicines--as told by your health care provider.  · You may feel very tired.  Summary  · External beam radiation therapy is the most common type of radiation treatment for cancer.  · The amount of radiation a person will receive and the length of therapy depend on the person's medical condition.  · Most people have side effects from the therapy. Side effects depend on the amount of radiation and the part of the body that was exposed to radiation.  This information is not intended to replace advice given to you by your health care provider. Make sure you discuss any questions you have with your health care provider.  Document Revised: 08/31/2020 Document Reviewed: 08/31/2020  [email or web address] Patient Education © 2021 [employer]  External Beam Radiation Therapy, Care After  This sheet gives you information about how to care for yourself after your procedure. Your health care provider may also give you more specific instructions. If you have problems or questions, contact your health care provider.  What can I expect after the  procedure?  After the procedure, it is common to have:  · Tiredness (fatigue).  · Red, flaking, dry skin in the treated area.  · A sunburn-like rash on the skin in the treated area.  · Itching in the treated area.  Other side effects may occur, depending on which part of the body was exposed to radiation and how much radiation was used. These side effects may include:  · Hair loss if the radiation therapy was directed to the head.  · Coughing or difficulty swallowing if the radiation therapy was directed to the head, neck, or chest.  · A type of swelling called lymphedema if the radiation therapy was directed to the head, neck, or chest.  · Nausea, vomiting, or diarrhea if the radiation therapy was directed to the abdomen or pelvis.  · Bladder problems, urinating often, or a lowered ability or desire to have sex (sexual dysfunction). These problems may happen if the radiation therapy was directed to the bladder, kidney, or prostate.  · Memory loss and thinking problems (cognitive changes) if the radiation therapy was directed to the brain.  Some side effects may show up months to years later. However, most side effects are usually temporary and get better over time. It can take up to 3-4 weeks for you to regain your energy or for side effects to get better.  Follow these instructions at home:  Radiation therapy affects everyone differently. Some people do not have side effects. You can take steps at home to help prevent or manage side effects.  Skin care    · Wash your skin with a mild soap as told by your health care provider. Do not scrub or rub your skin. Pat yourself dry.  · Use a mild shampoo and be gentle when washing your hair.  · Apply gentle lotion or cream to the treated area as told by your health care provider.  · Keep the treated area covered when you are outside. Do not expose treated skin to the sun.  · Avoid scratching the treated area.  Eating and drinking  · Eat small nutritious meals and snacks  regularly during the day.  · Choose bland and soft foods that are easy to eat.  · Follow your health care provider's advice on the type and amount of liquids to drink each day.  · If you have diarrhea, drink plenty of clear fluids.  General instructions  · Do not use a heating pad or a warm cloth to relieve pain in the treated area.  · Take over-the-counter and prescription medicines only as told by your health care provider.  · Try to maintain your weight during treatment. Ask your health care team for tips.  · Keep all follow-up visits as told by your health care provider. This is important. The visits are usually scheduled 6 weeks to 6 months after radiation therapy. They are needed to determine if the radiation therapy worked as expected.  Contact a health care provider if:  1. You have any of the following in the treated area:  ? Pain.  ? More redness.  ? Open skin or blisters.  2. You have a fever.  3. You have nausea or vomiting that lasts more than 2 days.  4. You have diarrhea that lasts longer than 2 days.  5. You lose weight without trying to.  Get help right away if you:  · Are unable to swallow.  · Have difficulty breathing.  · Have severe vomiting or diarrhea.  Summary  · After this procedure, it is common to have tiredness (fatigue), skin changes, and other side effects, depending on where the radiation therapy was given.  · Some side effects may show up months to years later. However, most side effects are usually temporary and get better over time. It can take up to 3-4 weeks for you to regain your energy or for side effects to get better.  · Keep all follow-up visits as told by your health care provider. This is important. The visits are usually scheduled 6 weeks to 6 months after radiation therapy.  This information is not intended to replace advice given to you by your health care provider. Make sure you discuss any questions you have with your health care provider.  Document Revised: 08/31/2020  Document Reviewed: 08/31/2020  [employer] Patient Education © 2021 [employer].  Lung Cancer  Lung cancer is an abnormal growth of cancerous cells that forms a mass (malignant tumor) in a lung. There are several types of lung cancer. The types are based on the appearance of the tumor cells. The two most common types are:  · Non-small cell lung cancer. This type of lung cancer is the most common type. Non-small cell lung cancers include squamous cell carcinoma, adenocarcinoma, and large cell carcinoma.  · Small cell lung cancer. In this type of lung cancer, abnormal cells are smaller than those of non-small cell lung cancer. Small cell lung cancer gets worse (progresses) faster than non-small cell lung cancer.  What are the causes?  The most common cause of lung cancer is smoking tobacco. The second most common cause is exposure to a chemical called radon.  What increases the risk?  You are more likely to develop this condition if:  1. You smoke tobacco.  2. You have been exposed to:  ? Secondhand tobacco smoke.  ? Radon gas.  ? Uranium.  ? Asbestos.  ? Arsenic in drinking water.  ? Air pollution.  3. You have a family or personal history of lung cancer.  4. You have had lung radiation therapy in the past.  5. You are older than age 65.  What are the signs or symptoms?  In the early stages, you may not have any symptoms. As the cancer progresses, symptoms may include:  · A lasting cough, possibly with blood.  · Fatigue.  · Unexplained weight loss.  · Shortness of breath.  · Loud breathing (wheezing).  · Chest pain.  · Loss of appetite.  Symptoms of advanced lung cancer include:  · Hoarseness.  · Bone or joint pain.  · Weakness.  · Change in the structure of the fingernails (clubbing), so that the nail looks like an upside-down spoon.  · Swelling of the face or arms.  · Inability to move the face (paralysis).  · Drooping eyelids.  How is this diagnosed?  This condition may be diagnosed based on:  · Your symptoms and  medical history.  · A physical exam.  · A chest X-ray.  · A CT scan.  · Blood tests.  · Sputum tests.  · Removal of a sample of lung tissue (lung biopsy) for testing.  Your cancer will be assessed (staged) to determine how severe it is and how much it has spread (metastasized).  How is this treated?  Treatment depends on the type and stage of your cancer. Treatment may include one or more of the following:  · Surgery to remove as much of the cancer as possible. Lymph nodes in the area may be removed and tested for cancer as well.  · Medicines that kill cancer cells (chemotherapy).  · High-energy rays that kill cancer cells (radiation therapy).  · Chemotherapy. This treatment uses medicines to destroy cancer cells.  · Targeted therapy. This targets specific parts of cancer cells and the area around them to block the growth and spread of the cancer. Targeted therapy can help limit the damage to healthy cells.  Follow these instructions at home:  Eating and drinking  1. Some of your treatments might affect your appetite. If you are having problems eating, or if you do not have an appetite, meet with a dietitian.  2. If you have side effects that affect your appetite, it may help to:  ? Eat smaller meals and snacks often.  ? Drink high-nutrition and high-calorie shakes or supplements.  ? Eat bland and soft foods that are easy to eat.  ? Avoid eating foods that are hot, spicy, or hard to swallow.  General instructions    · Do not use any products that contain nicotine or tobacco, such as cigarettes and e-cigarettes. If you need help quitting, ask your health care provider.  · Do not drink alcohol.  · If you are admitted to the hospital, make sure your cancer specialist (oncologist) is aware. Your cancer may affect your treatment for other conditions.  · Take over-the-counter and prescription medicines only as told by your health care provider.  · Consider joining a support group for people who have been diagnosed with  lung cancer.  · Work with your health care provider to manage any side effects of treatment.  · Keep all follow-up visits as told by your health care provider. This is important.  Where to find more information  · American Cancer Society: https://www.cancer.org  · National Cancer [place] (NCI): https://www.cancer.gov  Contact a health care provider if you:  · Lose weight without trying.  · Have a persistent cough and wheezing.  · Feel short of breath.  · Get tired easily.  · Have bone or joint pain.  · Have difficulty swallowing.  · Notice that your voice is changing or getting hoarse.  · Have pain that does not get better with medicine.  Get help right away if you:  · Cough up blood.  · Have new breathing problems.  · Have chest pain.  · Have a fever.  · Have swelling in an ankle, leg, or arm, or the face or neck.  · Have paralysis in your face.  · Are very confused.  · Have a drooping eyelid.  Summary  · Lung cancer is an abnormal growth of cancerous cells that forms a mass (malignant tumor) in a lung.  · There are several types of lung cancer. The types are based on the appearance of the tumor cells. The two most common types are non-small cell and small cell.  · The most common cause of lung cancer is smoking tobacco.  · Early symptoms include a lasting cough, possibly with blood, and fatigue, unexplained weight loss, and shortness of breath.  · After diagnosis, treatment depends on the type and stage of your cancer.  This information is not intended to replace advice given to you by your health care provider. Make sure you discuss any questions you have with your health care provider.  Document Revised: 11/30/2018 Document Reviewed: 10/25/2018  [employer] Patient Education © 2021 [employer]

## 2021-10-22 ENCOUNTER — HOSPITAL ENCOUNTER (OUTPATIENT)
Dept: RADIATION ONCOLOGY | Facility: HOSPITAL | Age: 66
Discharge: HOME | End: 2021-10-22

## 2021-10-22 VITALS
BODY MASS INDEX: 33.62 KG/M2 | OXYGEN SATURATION: 95 % | RESPIRATION RATE: 16 BRPM | SYSTOLIC BLOOD PRESSURE: 138 MMHG | DIASTOLIC BLOOD PRESSURE: 92 MMHG | TEMPERATURE: 97.4 F | HEART RATE: 83 BPM | WEIGHT: 276.24 LBS

## 2021-10-22 DIAGNOSIS — C78.01 SECONDARY MALIGNANCY OF RIGHT LUNG (HCC): Primary | ICD-10-CM

## 2021-10-22 PROCEDURE — 77373 STRTCTC BDY RAD THER TX DLVR: CPT | Performed by: RADIOLOGY

## 2021-10-22 NOTE — PROGRESS NOTES
Stereotactic Body Radiotherapy Note    10/22/2021        Treatment Site: right middle lobe  Energy: 6X  Dose: 2000  #Fx: 2  Start Date: 10/22/2021  Elapsed Days: 2      [Associated problem not found]  Cancer Staging  Stage IIA (cT3, cN0, cM0)  No stage assigned     Current Outpatient Medications on File Prior to Encounter   Medication Sig   • acetaminophen (TYLENOL) 500 MG tablet Take 500 mg by mouth Every 6 (Six) Hours As Needed.   • apixaban (ELIQUIS) 5 MG tablet tablet Take 5 mg by mouth 2 (Two) Times a Day.   • ascorbic acid (VITAMIN C) 500 MG tablet Take 500 mg by mouth Daily.   • atorvastatin (LIPITOR) 40 MG tablet atorvastatin 40 mg oral tablet take 1 tablet (40 mg) by oral route once daily   Active   • cyanocobalamin (VITAMIN B-12) 1000 MCG tablet Take 1,000 mcg by mouth Daily.   • levothyroxine (Synthroid) 50 MCG tablet Synthroid 50 mcg oral tablet take 1 tablet (50 mcg) by oral route once daily   Active   • loratadine (CLARITIN) 10 MG tablet loratadine 10 mg oral tablet take 1 tablet (10 mg) by oral route once daily   Active   • metoprolol tartrate (LOPRESSOR) 100 MG tablet Take 100 mg by mouth 4 (Four) Times a Day.   • multivitamin (multivitamin) tablet tablet Take 1 tablet by mouth Daily.   • ondansetron (ZOFRAN) 8 MG tablet Take 8 mg by mouth Every 8 (Eight) Hours As Needed. for nausea   • oxyCODONE (ROXICODONE) 5 MG immediate release tablet    • pantoprazole (PROTONIX) 40 MG EC tablet Daily.   • potassium chloride (K-DUR,KLOR-CON) 20 MEQ CR tablet Take 1 tablet by mouth Daily.   • prochlorperazine (COMPAZINE) 10 MG tablet Take 10 mg by mouth Every 6 (Six) Hours As Needed.   • sennosides-docusate (PERICOLACE) 8.6-50 MG per tablet Take 1 tablet by mouth.   • verapamil SR (CALAN-SR) 120 MG CR tablet Take 1 tablet by mouth Daily.   • vitamin E 1000 UNIT capsule Take 1,000 Units by mouth Daily.     No current facility-administered medications on file prior to encounter.     Vitals:    10/22/21 0923   BP:  138/92   Pulse: 83   Resp: 16   Temp: 97.4 °F (36.3 °C)   SpO2: 95%     Pain Score    10/22/21 0923   PainSc: 0-No pain       Oncology/Hematology History Overview Note   9/30/2019 High rectal ksuh-czmkvrpxej-eexnwazkjawbml adenocarcinoma  associated with tubular adenoma.   4/27/21 Biopsy of liver revealed  metastatic adenocarcinoma, consistent with colorectal primary.    Clinical Staging  Stage IIa ([id number])    Chemotherapy      neoadjuvant Xeloda with radiation. adjuvant xeloda        Radiation Therapy      7/8/19 thru 8/14/19 completed neoadjuvant 5040 cGy=28 fxs rectum        Surgeries       9/30/2019 low anterior resection with ostomy        8/31/21 partial hepatectomy, cholecystectomy and MWA for segment 6 medical margin performed at        Rectal cancer (HCC)   6/2/2021 - 8/15/2021 Chemotherapy    OP COLON mFOLFOX6 + Bevacizumab (OXALIplatin / Leucovorin / Fluorouracil / Bevacizumab)     6/18/2021 Initial Diagnosis    Rectal cancer (CMS/HCC)     6/21/2021 Cancer Staged    Staging form: Colon And Rectum, AJCC 8th Edition  - Clinical: Stage IIA (cT3, cN0, cM0) - Signed by Maurilio Campos MD on 6/21/2021     Secondary malignant neoplasm of left lung (HCC)   9/14/2021 Initial Diagnosis    Secondary malignant neoplasm of left lung (HCC)     10/20/2021 -  Radiation    RADIATION THERAPY Treatment Details (Noted on 10/5/2021)  Site: Bilateral Lung  Technique: SBRT  Goal: No goal specified  Planned Treatment Start Date: 10/20/2021     Secondary malignant neoplasm of right lung (HCC)   10/5/2021 Initial Diagnosis    Secondary malignant neoplasm of right lung (HCC)     10/20/2021 -  Radiation    RADIATION THERAPY Treatment Details (Noted on 10/5/2021)  Site: Bilateral Lung  Technique: SBRT  Goal: No goal specified  Planned Treatment Start Date: 10/20/2021         Review of Systems   Constitutional: Positive for fatigue.   Eyes: Negative for visual disturbance.   Respiratory: Positive for shortness of breath.  Negative for cough.    Gastrointestinal: Positive for constipation (taking stool softeners with results). Negative for diarrhea and nausea.   Genitourinary: Negative for dysuria, frequency and urgency.   Musculoskeletal: Negative for gait problem.   Neurological: Positive for dizziness (with position changes) and headache (nearly everyday in AM -mild). Negative for weakness.            Physical Exam  Constitutional:       Appearance: Normal appearance.   Cardiovascular:      Rate and Rhythm: Rhythm irregular.      Heart sounds: No murmur heard.      Pulmonary:      Effort: Pulmonary effort is normal. No respiratory distress.      Breath sounds: Normal breath sounds. No stridor. No wheezing, rhonchi or rales.   Skin:     General: Skin is warm and dry.   Neurological:      General: No focal deficit present.      Mental Status: He is alert and oriented to person, place, and time.      Cranial Nerves: No cranial nerve deficit.      Motor: No weakness.      Coordination: Coordination normal.      Gait: Gait normal.   Psychiatric:         Mood and Affect: Mood normal.         Behavior: Behavior normal.         Comments: A stereotactic ablative radiation plan was devised to deliver the dose as indicated above. The patient was positioned on the treatment couch in a Vac-Fix immobilization device.  We then aligned with CBCT image guidance, which I personally checked. Once alignment was verified, we delivered the prescription dose using dynamic respiratory motion compensation under my guidance.    The Medical Physicist was also in attendance during patient set-up and treatment delivery.    The patient tolerated the procedure without incident.    Christiano Kelley MD  10/22/2021

## 2021-10-25 ENCOUNTER — HOSPITAL ENCOUNTER (OUTPATIENT)
Dept: RADIATION ONCOLOGY | Facility: HOSPITAL | Age: 66
Discharge: HOME | End: 2021-10-25

## 2021-10-25 VITALS
BODY MASS INDEX: 33.89 KG/M2 | WEIGHT: 278.44 LBS | RESPIRATION RATE: 16 BRPM | DIASTOLIC BLOOD PRESSURE: 86 MMHG | TEMPERATURE: 98.7 F | HEART RATE: 75 BPM | OXYGEN SATURATION: 98 % | SYSTOLIC BLOOD PRESSURE: 118 MMHG

## 2021-10-25 DIAGNOSIS — C78.01 SECONDARY MALIGNANCY OF RIGHT LUNG (HCC): Primary | ICD-10-CM

## 2021-10-25 PROCEDURE — 77373 STRTCTC BDY RAD THER TX DLVR: CPT | Performed by: RADIOLOGY

## 2021-10-25 NOTE — PROGRESS NOTES
Stereotactic Body Radiotherapy Note    10/25/2021        Treatment Site: right middle lobe  Energy: 6X  Dose: 3000  #Fx: 3  Start Date: 10/22/2021  Elapsed Days: 5      [Associated problem not found]  Cancer Staging  Stage IIA (cT3, cN0, cM0)  No stage assigned     Current Outpatient Medications on File Prior to Encounter   Medication Sig   • acetaminophen (TYLENOL) 500 MG tablet Take 500 mg by mouth Every 6 (Six) Hours As Needed.   • apixaban (ELIQUIS) 5 MG tablet tablet Take 5 mg by mouth 2 (Two) Times a Day.   • ascorbic acid (VITAMIN C) 500 MG tablet Take 500 mg by mouth Daily.   • atorvastatin (LIPITOR) 40 MG tablet atorvastatin 40 mg oral tablet take 1 tablet (40 mg) by oral route once daily   Active   • cyanocobalamin (VITAMIN B-12) 1000 MCG tablet Take 1,000 mcg by mouth Daily.   • levothyroxine (Synthroid) 50 MCG tablet Synthroid 50 mcg oral tablet take 1 tablet (50 mcg) by oral route once daily   Active   • loratadine (CLARITIN) 10 MG tablet loratadine 10 mg oral tablet take 1 tablet (10 mg) by oral route once daily   Active   • metoprolol tartrate (LOPRESSOR) 100 MG tablet Take 100 mg by mouth 4 (Four) Times a Day.   • multivitamin (multivitamin) tablet tablet Take 1 tablet by mouth Daily.   • ondansetron (ZOFRAN) 8 MG tablet Take 8 mg by mouth Every 8 (Eight) Hours As Needed. for nausea   • oxyCODONE (ROXICODONE) 5 MG immediate release tablet    • pantoprazole (PROTONIX) 40 MG EC tablet Daily.   • potassium chloride (K-DUR,KLOR-CON) 20 MEQ CR tablet Take 1 tablet by mouth Daily.   • prochlorperazine (COMPAZINE) 10 MG tablet Take 10 mg by mouth Every 6 (Six) Hours As Needed.   • sennosides-docusate (PERICOLACE) 8.6-50 MG per tablet Take 1 tablet by mouth.   • verapamil SR (CALAN-SR) 120 MG CR tablet Take 1 tablet by mouth Daily.   • vitamin E 1000 UNIT capsule Take 1,000 Units by mouth Daily.     No current facility-administered medications on file prior to encounter.     Vitals:    10/25/21 0924   BP:  118/86   Pulse: 75   Resp: 16   Temp: 98.7 °F (37.1 °C)   SpO2: 98%     Pain Score    10/25/21 0924   PainSc: 0-No pain       Oncology/Hematology History Overview Note   9/30/2019 High rectal rzrz-drvexjxtlh-nugsgxapwfwhyg adenocarcinoma  associated with tubular adenoma.   4/27/21 Biopsy of liver revealed  metastatic adenocarcinoma, consistent with colorectal primary.    Clinical Staging  Stage IIa ([id number])    Chemotherapy      neoadjuvant Xeloda with radiation. adjuvant xeloda        Radiation Therapy      7/8/19 thru 8/14/19 completed neoadjuvant 5040 cGy=28 fxs rectum        Surgeries       9/30/2019 low anterior resection with ostomy        8/31/21 partial hepatectomy, cholecystectomy and MWA for segment 6 medical margin performed at        Rectal cancer (HCC)   6/2/2021 - 8/15/2021 Chemotherapy    OP COLON mFOLFOX6 + Bevacizumab (OXALIplatin / Leucovorin / Fluorouracil / Bevacizumab)     6/18/2021 Initial Diagnosis    Rectal cancer (CMS/HCC)     6/21/2021 Cancer Staged    Staging form: Colon And Rectum, AJCC 8th Edition  - Clinical: Stage IIA (cT3, cN0, cM0) - Signed by Maurilio Campos MD on 6/21/2021     Secondary malignant neoplasm of left lung (HCC)   9/14/2021 Initial Diagnosis    Secondary malignant neoplasm of left lung (HCC)     10/20/2021 -  Radiation    RADIATION THERAPY Treatment Details (Noted on 10/5/2021)  Site: Bilateral Lung  Technique: SBRT  Goal: No goal specified  Planned Treatment Start Date: 10/20/2021     Secondary malignant neoplasm of right lung (HCC)   10/5/2021 Initial Diagnosis    Secondary malignant neoplasm of right lung (HCC)     10/20/2021 -  Radiation    RADIATION THERAPY Treatment Details (Noted on 10/5/2021)  Site: Bilateral Lung  Technique: SBRT  Goal: No goal specified  Planned Treatment Start Date: 10/20/2021         Review of Systems   Constitutional: Positive for fatigue. Negative for appetite change.   HENT: Negative for sore throat.    Eyes: Negative for visual  disturbance.   Respiratory: Positive for shortness of breath. Negative for cough.    Cardiovascular: Positive for leg swelling.   Gastrointestinal: Positive for constipation (taking stool softeners with results). Negative for diarrhea and nausea.   Genitourinary: Negative for dysuria, frequency and urgency.   Musculoskeletal: Negative for gait problem.   Skin: Positive for rash (started ~ 3-4 days ago on scalp and has since spread to trunk, legs, arms. mildly relieved with Benadryl 25mg and otc hydrocortisone cream. no known exposure.).   Neurological: Positive for dizziness (with position changes) and headache (nearly everyday in AM -mild). Negative for weakness.   Psychiatric/Behavioral: Positive for sleep disturbance (r/t discomfort from rash ).            Physical Exam  Constitutional:       Appearance: Normal appearance.   Cardiovascular:      Rate and Rhythm: Rhythm irregular.      Heart sounds: No murmur heard.      Pulmonary:      Effort: Pulmonary effort is normal. No respiratory distress.      Breath sounds: Normal breath sounds. No stridor. No wheezing, rhonchi or rales.   Skin:     General: Skin is warm and dry.   Neurological:      General: No focal deficit present.      Mental Status: He is alert and oriented to person, place, and time.      Cranial Nerves: No cranial nerve deficit.      Motor: No weakness.      Coordination: Coordination normal.      Gait: Gait normal.   Psychiatric:         Mood and Affect: Mood normal.         Behavior: Behavior normal.         Comments: A stereotactic ablative radiation plan was devised to deliver the dose as indicated above. The patient was positioned on the treatment couch in a Vac-Fix immobilization device.  We then aligned with CBCT image guidance, which I personally checked. Once alignment was verified, we delivered the prescription dose using dynamic respiratory motion compensation under my guidance.    The Medical Physicist was also in attendance during  patient set-up and treatment delivery.    The patient tolerated the procedure without incident.    Christiano Kelley MD  10/25/2021

## 2021-10-27 ENCOUNTER — HOSPITAL ENCOUNTER (OUTPATIENT)
Dept: RADIATION ONCOLOGY | Facility: HOSPITAL | Age: 66
Discharge: HOME | End: 2021-10-27

## 2021-10-27 ENCOUNTER — DOCUMENTATION (OUTPATIENT)
Dept: RADIATION ONCOLOGY | Facility: HOSPITAL | Age: 66
End: 2021-10-27

## 2021-10-27 VITALS
RESPIRATION RATE: 16 BRPM | HEART RATE: 84 BPM | TEMPERATURE: 98.7 F | DIASTOLIC BLOOD PRESSURE: 90 MMHG | BODY MASS INDEX: 34.13 KG/M2 | SYSTOLIC BLOOD PRESSURE: 152 MMHG | OXYGEN SATURATION: 98 % | WEIGHT: 280.43 LBS

## 2021-10-27 NOTE — PROGRESS NOTES
Stereotactic Body Radiotherapy Note    10/27/2021    Treatment Site: Right Middle Lobe (Lung)  Energy: 6X  Dose: 1000 cGy of planned 5000 cGy  #Fx: 1 of 5  Start Date: 10/20/21    Cancer Stage: Metastatic colorectal cancer, stage 4      Vitals:    10/27/21 1348   BP: 152/90   Pulse: 84   Resp: 16   Temp: 98.7 °F (37.1 °C)   SpO2: 98%     Pain Score    10/27/21 1348   PainSc: 0-No pain           Review of Systems   Constitutional: Positive for fatigue.   HENT: Negative for sore throat and trouble swallowing.    Respiratory: Positive for cough (occasional) and shortness of breath.    Gastrointestinal: Negative for constipation, diarrhea and nausea.   Genitourinary: Negative for dysuria, frequency and urgency.   Skin: Positive for rash.   Neurological: Positive for dizziness (position changes). Negative for headache.        EXAM:  Gen: Well appearing, NAD  HENT: NC/AT, MMM  Resp: Nonlabored respiration, symmetric expansion  MSK: ROM intact in all 4 extremities, no obvious joint deformities  Neuro: Alert, Ox3. CN 3-12 grossly intact.   Psych: Appropriate mood and affect.    Comments:   A stereotactic ablative radiation plan was created to deliver the dose as indicated above.   The patient was positioned on the treatment couch in a Vac-Fix immobilization device.   Abdominal compression was not utilized. 4D CT imaging was utilized in the planning process.   Pre-treatment image guidance using cone beam CT imaging was performed to ensure proper setup and alignment.   I personally evaluated and approved the patient's pre-treatment imaging.   I was present for delivery of the patient's treatment. The prescription dose was delivered as intended.   A Medical Physicist was also in attendance during patient set-up and treatment delivery.    The patient tolerated treatment well and completed without incident.     Bouchra Locke, MICHELLE  10/27/2021

## 2021-10-28 NOTE — PROGRESS NOTES
.Stereotactic Body Radiotherapy Note    10/27/2021        Treatment Site: Right Middle Lobe (Lung)  Energy: 6X  Dose: 4000 cGy of planned 5000 cGy  #Fx: 4 of 5  Start Date: 10/20/21     Cancer Stage: Metastatic colorectal cancer, stage 4     EXAM:  Gen: Well appearing, NAD  HENT: NC/AT, MMM  Resp: Nonlabored respiration, symmetric expansion  Abd: NT/ND +BS  MSK: ROM intact in all 4 extremities, no obvious joint deformities  Neuro: Alert, Ox3. CN 3-12 grossly intact.   Psych: Appropriate mood and affect.    Comments:   A stereotactic ablative radiation plan was created to deliver the dose as indicated above.   The patient was positioned on the treatment couch in a Vac-Fix immobilization device.   Abdominal compression was not utilized.   4D CT was  utilized during simulation to account for respiratory motion during treatment.  Pre-treatment image guidance using cone beam CT imaging was performed to ensure proper setup and alignment.   I personally evaluated and approved the patient's pre-treatment imaging.   I was present for delivery of the patient's treatment. The prescription dose was delivered as intended.   A Medical Physicist was also in attendance during patient set-up and treatment delivery.    The patient tolerated treatment well and completed without incident.     Chapis Luna MD  10/27/2021

## 2021-10-29 ENCOUNTER — HOSPITAL ENCOUNTER (OUTPATIENT)
Dept: RADIATION ONCOLOGY | Facility: HOSPITAL | Age: 66
Discharge: HOME | End: 2021-10-29

## 2021-10-29 VITALS
DIASTOLIC BLOOD PRESSURE: 108 MMHG | OXYGEN SATURATION: 99 % | BODY MASS INDEX: 33.2 KG/M2 | SYSTOLIC BLOOD PRESSURE: 158 MMHG | HEART RATE: 100 BPM | RESPIRATION RATE: 16 BRPM | TEMPERATURE: 97.5 F | WEIGHT: 272.71 LBS

## 2021-10-29 DIAGNOSIS — C78.01 SECONDARY MALIGNANCY OF RIGHT LUNG (HCC): Primary | ICD-10-CM

## 2021-10-29 PROCEDURE — 77373 STRTCTC BDY RAD THER TX DLVR: CPT | Performed by: RADIOLOGY

## 2021-10-29 PROCEDURE — 77336 RADIATION PHYSICS CONSULT: CPT | Performed by: RADIOLOGY

## 2021-10-29 RX ORDER — LEVOTHYROXINE SODIUM 25 MCG
TABLET ORAL
COMMUNITY
Start: 2021-09-24 | End: 2022-01-05 | Stop reason: SDUPTHER

## 2021-10-29 RX ORDER — HYDROXYZINE HYDROCHLORIDE 25 MG/1
TABLET, FILM COATED ORAL
COMMUNITY
Start: 2021-10-26 | End: 2022-01-05

## 2021-10-29 NOTE — PROGRESS NOTES
Stereotactic Body Radiotherapy Note    10/29/2021        Treatment Site: right middle lobe  Energy: 6X  Dose: 3000  #Fx: 5  Start Date: 10/22/2021  Elapsed Days: 7      [Associated problem not found]  Cancer Staging    No stage assigned     Current Outpatient Medications on File Prior to Encounter   Medication Sig   • acetaminophen (TYLENOL) 500 MG tablet Take 500 mg by mouth Every 6 (Six) Hours As Needed.   • apixaban (ELIQUIS) 5 MG tablet tablet Take 5 mg by mouth 2 (Two) Times a Day.   • ascorbic acid (VITAMIN C) 500 MG tablet Take 500 mg by mouth Daily.   • atorvastatin (LIPITOR) 40 MG tablet atorvastatin 40 mg oral tablet take 1 tablet (40 mg) by oral route once daily   Active   • cyanocobalamin (VITAMIN B-12) 1000 MCG tablet Take 1,000 mcg by mouth Daily.   • hydrOXYzine (ATARAX) 25 MG tablet    • levothyroxine (Synthroid) 50 MCG tablet Synthroid 50 mcg oral tablet take 1 tablet (50 mcg) by oral route once daily   Active   • loratadine (CLARITIN) 10 MG tablet loratadine 10 mg oral tablet take 1 tablet (10 mg) by oral route once daily   Active   • metoprolol tartrate (LOPRESSOR) 100 MG tablet Take 100 mg by mouth 4 (Four) Times a Day.   • multivitamin (multivitamin) tablet tablet Take 1 tablet by mouth Daily.   • ondansetron (ZOFRAN) 8 MG tablet Take 8 mg by mouth Every 8 (Eight) Hours As Needed. for nausea   • oxyCODONE (ROXICODONE) 5 MG immediate release tablet    • pantoprazole (PROTONIX) 40 MG EC tablet Daily.   • potassium chloride (K-DUR,KLOR-CON) 20 MEQ CR tablet Take 1 tablet by mouth Daily.   • prochlorperazine (COMPAZINE) 10 MG tablet Take 10 mg by mouth Every 6 (Six) Hours As Needed.   • sennosides-docusate (PERICOLACE) 8.6-50 MG per tablet Take 1 tablet by mouth.   • Synthroid 25 MCG tablet    • verapamil SR (CALAN-SR) 120 MG CR tablet Take 1 tablet by mouth Daily.   • vitamin E 1000 UNIT capsule Take 1,000 Units by mouth Daily.     No current facility-administered medications on file prior to  encounter.     Vitals:    10/29/21 0923   BP: (!) 158/108   Pulse: 100   Resp: 16   Temp: 97.5 °F (36.4 °C)   SpO2: 99%     Pain Score    10/29/21 0923   PainSc: 0-No pain       Oncology/Hematology History Overview Note   9/30/2019 High rectal yaee-ootwyodwqs-cfqpiymgykxhjy adenocarcinoma  associated with tubular adenoma.   4/27/21 Biopsy of liver revealed  metastatic adenocarcinoma, consistent with colorectal primary.    Clinical Staging  Stage IIa ([id number])    Chemotherapy      neoadjuvant Xeloda with radiation. adjuvant xeloda        Radiation Therapy      7/8/19 thru 8/14/19 completed neoadjuvant 5040 cGy=28 fxs rectum        Surgeries       9/30/2019 low anterior resection with ostomy        8/31/21 partial hepatectomy, cholecystectomy and MWA for segment 6 medical margin performed at        Rectal cancer (HCC)   6/2/2021 - 8/15/2021 Chemotherapy    OP COLON mFOLFOX6 + Bevacizumab (OXALIplatin / Leucovorin / Fluorouracil / Bevacizumab)     6/18/2021 Initial Diagnosis    Rectal cancer (CMS/HCC)     6/21/2021 Cancer Staged    Staging form: Colon And Rectum, AJCC 8th Edition  - Clinical: Stage IIA (cT3, cN0, cM0) - Signed by Maurilio Campos MD on 6/21/2021     Secondary malignant neoplasm of left lung (HCC)   9/14/2021 Initial Diagnosis    Secondary malignant neoplasm of left lung (HCC)     10/20/2021 -  Radiation    RADIATION THERAPY Treatment Details (Noted on 10/5/2021)  Site: Bilateral Lung  Technique: SBRT  Goal: No goal specified  Planned Treatment Start Date: 10/20/2021     Secondary malignant neoplasm of right lung (HCC)   10/5/2021 Initial Diagnosis    Secondary malignant neoplasm of right lung (HCC)     10/20/2021 -  Radiation    RADIATION THERAPY Treatment Details (Noted on 10/5/2021)  Site: Bilateral Lung  Technique: SBRT  Goal: No goal specified  Planned Treatment Start Date: 10/20/2021         Review of Systems   Constitutional: Positive for fatigue. Negative for appetite change.   HENT:  Negative for sore throat.    Eyes: Negative for visual disturbance.   Respiratory: Positive for shortness of breath. Negative for cough.    Cardiovascular: Positive for leg swelling.   Gastrointestinal: Positive for constipation (taking stool softeners with results). Negative for diarrhea and nausea.   Genitourinary: Negative for dysuria, frequency and urgency.   Musculoskeletal: Negative for gait problem.   Skin: Positive for rash (resolving, received steroid injection at [facility] 3 days ago.).   Neurological: Positive for dizziness (with position changes). Negative for weakness. Headache: described as mild per pt, occurring less frequently.   Psychiatric/Behavioral: Positive for sleep disturbance (r/t discomfort from rash ).            Gen: WD/WN; NAD  Pulm: normal respiratory effort  Skin: resolution of rash    Comments: A stereotactic ablative radiation plan was devised to deliver the dose as indicated above. The patient was positioned on the treatment couch in a Vac-Fix immobilization device.  We then aligned with CBCT image guidance, which I personally checked. Once alignment was verified, we delivered the prescription dose using dynamic respiratory motion compensation under my guidance.    The Medical Physicist was also in attendance during patient set-up and treatment delivery.    The patient tolerated the procedure without incident.    Christiano Kelley MD  10/29/2021

## 2021-11-16 ENCOUNTER — OFFICE VISIT (OUTPATIENT)
Dept: ONCOLOGY | Facility: HOSPITAL | Age: 66
End: 2021-11-16

## 2021-11-16 ENCOUNTER — HOSPITAL ENCOUNTER (OUTPATIENT)
Dept: ONCOLOGY | Facility: HOSPITAL | Age: 66
Setting detail: INFUSION SERIES
Discharge: HOME | End: 2021-11-16

## 2021-11-16 VITALS
BODY MASS INDEX: 33.28 KG/M2 | RESPIRATION RATE: 18 BRPM | OXYGEN SATURATION: 97 % | WEIGHT: 273.37 LBS | SYSTOLIC BLOOD PRESSURE: 134 MMHG | HEART RATE: 111 BPM | TEMPERATURE: 97.1 F | DIASTOLIC BLOOD PRESSURE: 82 MMHG

## 2021-11-16 DIAGNOSIS — C78.7 SECONDARY MALIGNANCY OF LIVER (HCC): ICD-10-CM

## 2021-11-16 DIAGNOSIS — C78.01 SECONDARY MALIGNANCY OF RIGHT LUNG (HCC): ICD-10-CM

## 2021-11-16 DIAGNOSIS — C20 RECTAL CANCER (HCC): Primary | ICD-10-CM

## 2021-11-16 DIAGNOSIS — Z45.2 ENCOUNTER FOR ADJUSTMENT OR MANAGEMENT OF VASCULAR ACCESS DEVICE: Primary | ICD-10-CM

## 2021-11-16 DIAGNOSIS — C78.02 SECONDARY MALIGNANT NEOPLASM OF LEFT LUNG (HCC): ICD-10-CM

## 2021-11-16 PROCEDURE — 25010000002 HEPARIN LOCK FLUSH PER 10 UNITS: Performed by: INTERNAL MEDICINE

## 2021-11-16 PROCEDURE — 99214 OFFICE O/P EST MOD 30 MIN: CPT | Performed by: INTERNAL MEDICINE

## 2021-11-16 PROCEDURE — G0463 HOSPITAL OUTPT CLINIC VISIT: HCPCS

## 2021-11-16 PROCEDURE — 96523 IRRIG DRUG DELIVERY DEVICE: CPT

## 2021-11-16 RX ORDER — SODIUM CHLORIDE 0.9 % (FLUSH) 0.9 %
20 SYRINGE (ML) INJECTION AS NEEDED
Status: DISCONTINUED | OUTPATIENT
Start: 2021-11-16 | End: 2021-11-17 | Stop reason: HOSPADM

## 2021-11-16 RX ORDER — SODIUM CHLORIDE 0.9 % (FLUSH) 0.9 %
20 SYRINGE (ML) INJECTION AS NEEDED
Status: CANCELLED | OUTPATIENT
Start: 2021-12-01

## 2021-11-16 RX ORDER — HEPARIN SODIUM (PORCINE) LOCK FLUSH IV SOLN 100 UNIT/ML 100 UNIT/ML
500 SOLUTION INTRAVENOUS AS NEEDED
Status: DISCONTINUED | OUTPATIENT
Start: 2021-11-16 | End: 2021-11-17 | Stop reason: HOSPADM

## 2021-11-16 RX ORDER — HEPARIN SODIUM (PORCINE) LOCK FLUSH IV SOLN 100 UNIT/ML 100 UNIT/ML
500 SOLUTION INTRAVENOUS AS NEEDED
Status: CANCELLED | OUTPATIENT
Start: 2021-12-01

## 2021-11-16 RX ADMIN — HEPARIN SODIUM (PORCINE) LOCK FLUSH IV SOLN 100 UNIT/ML 500 UNITS: 100 SOLUTION at 08:44

## 2021-11-16 RX ADMIN — SODIUM CHLORIDE, PRESERVATIVE FREE 20 ML: 5 INJECTION INTRAVENOUS at 08:44

## 2021-11-16 NOTE — PROGRESS NOTES
Chief Complaint  Rectal Cancer and Follow-up    Eri Moran APRN Reinberg, Emily, APRN    Subjective          Robbi Kennedy presents to [facility] for consideration of additional chemotherapy. He has recurrent/metastatic rectal cancer. He received 5 cycles of chemotherapy with FOLFOX plus Avastin. He had good decrease in his disease to the extent that definitive therapy was felt to be an option. He underwent resection of his liver as well as stereotactic radiation to bilateral pulmonary nodules. He tolerated his radiation well. He denies shortness of breath, chest pain, cough or hemoptysis. No new masses or adenopathy. No unusual aches or pains. He denies issues from his Port-A-Cath. He reports good appetite. His energy level is adequate for his ADLs.    Oncology/Hematology History Overview Note   9/30/2019 High rectal hzwf-tpgoczymiv-cyhotvcopyyzpt adenocarcinoma  associated with tubular adenoma.   4/27/21 Biopsy of liver revealed  metastatic adenocarcinoma, consistent with colorectal primary.    Clinical Staging  Stage IIa ([id number])    Chemotherapy      neoadjuvant Xeloda with radiation. adjuvant xeloda        Radiation Therapy      7/8/19 thru 8/14/19 completed neoadjuvant 5040 cGy=28 fxs rectum     10/2021 XRT due to reoccurance     Surgeries       9/30/2019 low anterior resection with ostomy        8/31/21 partial hepatectomy, cholecystectomy and MWA for segment 6 medical margin performed at        Rectal cancer (HCC)   6/2/2021 - 8/15/2021 Chemotherapy    OP COLON mFOLFOX6 + Bevacizumab (OXALIplatin / Leucovorin / Fluorouracil / Bevacizumab)     6/18/2021 Initial Diagnosis    Rectal cancer (CMS/HCC)     6/21/2021 Cancer Staged    Staging form: Colon And Rectum, AJCC 8th Edition  - Clinical: Stage IIA (cT3, cN0, cM0) - Signed by Maurilio Campos MD on 6/21/2021 11/16/2021 -  Chemotherapy    OP COLON mFOLFOX6 OXALIplatin / Leucovorin / Fluorouracil      Secondary malignant neoplasm of left lung (HCC)   9/14/2021 Initial Diagnosis    Secondary malignant neoplasm of left lung (HCC)     10/20/2021 -  Radiation    RADIATION THERAPY Treatment Details (Noted on 10/5/2021)  Site: Bilateral Lung  Technique: SBRT  Goal: No goal specified  Planned Treatment Start Date: 10/20/2021     11/16/2021 -  Chemotherapy    OP COLON mFOLFOX6 OXALIplatin / Leucovorin / Fluorouracil     Secondary malignancy of liver (HCC)   9/14/2021 Initial Diagnosis    Secondary malignancy of liver (HCC)     11/16/2021 -  Chemotherapy    OP COLON mFOLFOX6 OXALIplatin / Leucovorin / Fluorouracil     Secondary malignant neoplasm of right lung (HCC)   10/5/2021 Initial Diagnosis    Secondary malignant neoplasm of right lung (HCC)     10/20/2021 -  Radiation    RADIATION THERAPY Treatment Details (Noted on 10/5/2021)  Site: Bilateral Lung  Technique: SBRT  Goal: No goal specified  Planned Treatment Start Date: 10/20/2021     11/16/2021 -  Chemotherapy    OP COLON mFOLFOX6 OXALIplatin / Leucovorin / Fluorouracil         Review of Systems   Constitutional: Negative for appetite change, diaphoresis, fatigue, fever, unexpected weight gain and unexpected weight loss.   HENT: Negative for hearing loss, mouth sores, sore throat, swollen glands, trouble swallowing and voice change.    Eyes: Negative for blurred vision.   Respiratory: Negative for cough, shortness of breath and wheezing.    Cardiovascular: Negative for chest pain and palpitations.   Gastrointestinal: Negative for abdominal pain, blood in stool, constipation, diarrhea, nausea and vomiting.   Endocrine: Negative for cold intolerance and heat intolerance.   Genitourinary: Negative for difficulty urinating, dysuria, frequency, hematuria and urinary incontinence.   Musculoskeletal: Negative for arthralgias, back pain and myalgias.   Skin: Negative for rash, skin lesions and bruise.   Neurological: Negative for dizziness, seizures, weakness, numbness  and headache.   Hematological: Does not bruise/bleed easily.   Psychiatric/Behavioral: Negative for depressed mood. The patient is not nervous/anxious.      Current Outpatient Medications on File Prior to Visit   Medication Sig Dispense Refill   • acetaminophen (TYLENOL) 500 MG tablet Take 500 mg by mouth Every 6 (Six) Hours As Needed.     • apixaban (ELIQUIS) 5 MG tablet tablet Take 5 mg by mouth 2 (Two) Times a Day.     • ascorbic acid (VITAMIN C) 500 MG tablet Take 500 mg by mouth Daily.     • atorvastatin (LIPITOR) 40 MG tablet atorvastatin 40 mg oral tablet take 1 tablet (40 mg) by oral route once daily   Active     • cyanocobalamin (VITAMIN B-12) 1000 MCG tablet Take 1,000 mcg by mouth Daily.     • hydrOXYzine (ATARAX) 25 MG tablet      • levothyroxine (Synthroid) 50 MCG tablet Synthroid 50 mcg oral tablet take 1 tablet (50 mcg) by oral route once daily   Active     • loratadine (CLARITIN) 10 MG tablet loratadine 10 mg oral tablet take 1 tablet (10 mg) by oral route once daily   Active     • metoprolol tartrate (LOPRESSOR) 100 MG tablet Take 100 mg by mouth 4 (Four) Times a Day.     • multivitamin (multivitamin) tablet tablet Take 1 tablet by mouth Daily.     • ondansetron (ZOFRAN) 8 MG tablet Take 8 mg by mouth Every 8 (Eight) Hours As Needed. for nausea     • oxyCODONE (ROXICODONE) 5 MG immediate release tablet      • pantoprazole (PROTONIX) 40 MG EC tablet Daily.     • potassium chloride (K-DUR,KLOR-CON) 20 MEQ CR tablet Take 1 tablet by mouth Daily. 30 tablet 5   • prochlorperazine (COMPAZINE) 10 MG tablet Take 10 mg by mouth Every 6 (Six) Hours As Needed.     • sennosides-docusate (PERICOLACE) 8.6-50 MG per tablet Take 1 tablet by mouth.     • Synthroid 25 MCG tablet      • verapamil SR (CALAN-SR) 120 MG CR tablet Take 1 tablet by mouth Daily. 90 tablet 3   • vitamin E 1000 UNIT capsule Take 1,000 Units by mouth Daily.       Current Facility-Administered Medications on File Prior to Visit   Medication  Dose Route Frequency Provider Last Rate Last Admin   • [DISCONTINUED] heparin injection 500 Units  500 Units Intravenous PRN Maurilio Campos MD   500 Units at 11/16/21 0844   • [DISCONTINUED] sodium chloride 0.9 % flush 20 mL  20 mL Intravenous PRN Maurilio Campos MD   20 mL at 11/16/21 0844       No Known Allergies  Past Medical History:   Diagnosis Date   • Abnormal ECG    • Arrhythmia    • Asthma    • Atrial fibrillation, persistent (HCC) 2/26/2021   • Colorectal cancer (HCC)    • Deep vein thrombosis (HCC)    • GI cancer (HCC)    • Hepatitis    • High cholesterol    • Hypertension    • Rectal cancer (HCC) 6/18/2021   • Thyroid disease      Past Surgical History:   Procedure Laterality Date   • COLON SURGERY     • HERNIA REPAIR     • LIVER BIOPSY      4/27/21 Biopsy of liver revealed metastatic adenocarcinoma, consistent with colorectal primary   • OTHER SURGICAL HISTORY      REMOVED CANCER FROM COLON     Social History     Socioeconomic History   • Marital status:    • Number of children: 2   Tobacco Use   • Smoking status: Never Smoker   • Smokeless tobacco: Never Used   Vaping Use   • Vaping Use: Never used   Substance and Sexual Activity   • Alcohol use: Yes     Comment: occasionally, no recent use   • Drug use: Never   • Sexual activity: Defer     Family History   Problem Relation Age of Onset   • Prostate cancer Father    • Heart murmur Mother    • Diabetes Mother    • Colon cancer Maternal Uncle        Objective   Physical Exam  Vitals reviewed. Exam conducted with a chaperone present.   Constitutional:       General: He is not in acute distress.     Appearance: Normal appearance.   Cardiovascular:      Rate and Rhythm: Normal rate and regular rhythm.      Heart sounds: Normal heart sounds. No murmur heard.  No gallop.    Pulmonary:      Effort: Pulmonary effort is normal.      Breath sounds: Normal breath sounds.      Comments: Port-A-Cath  Abdominal:      General: Abdomen is flat. Bowel sounds  "are normal. There is no distension.      Palpations: Abdomen is soft.      Tenderness: There is no abdominal tenderness.   Musculoskeletal:      Cervical back: Neck supple.      Right lower leg: No edema.      Left lower leg: No edema.   Lymphadenopathy:      Cervical: No cervical adenopathy.   Neurological:      Mental Status: He is alert and oriented to person, place, and time.   Psychiatric:         Mood and Affect: Mood normal.         Behavior: Behavior normal.         Vitals:    11/16/21 0823   BP: 134/82   Pulse: 111   Resp: 18   Temp: 97.1 °F (36.2 °C)   SpO2: 97%   Weight: 124 kg (273 lb 5.9 oz)   PainSc: 0-No pain     ECOG score: 0         PHQ-9 Total Score:                    Result Review :   The following data was reviewed by: Maurilio Campos MD on 11/16/2021:  Lab Results   Component Value Date    HGB 12.8 (L) 10/05/2021    HCT 39.4 10/05/2021    MCV 95.4 10/05/2021     10/05/2021    WBC 4.63 10/05/2021    NEUTROABS 3.23 10/05/2021    LYMPHSABS 0.72 10/05/2021    MONOSABS 0.37 10/05/2021    EOSABS 0.27 10/05/2021    BASOSABS 0.03 10/05/2021     Lab Results   Component Value Date    GLUCOSE 168 (H) 10/05/2021    BUN 11 10/05/2021    CREATININE 0.80 10/05/2021     10/05/2021    K 3.7 10/05/2021     10/05/2021    CO2 24.5 10/05/2021    CALCIUM 9.0 10/05/2021    PROTEINTOT 6.1 10/05/2021    ALBUMIN 3.74 10/05/2021    BILITOT 0.7 10/05/2021    ALKPHOS 78 10/05/2021    AST 19 10/05/2021    ALT 17 10/05/2021           Assessment and Plan    Diagnoses and all orders for this visit:    1. Rectal cancer (HCC) (Primary)    2. Secondary malignancy of liver (HCC)    3. Secondary malignancy of right lung (HCC)    4. Secondary malignant neoplasm of left lung (HCC)        Patient with metastatic/recurrent rectal cancer status post \"neoadjuvant\" chemotherapy with FOLFOX plus Avastin with subsequent reduction of his disease to the extent that he can have liver resection and SBRT to all known " remaining lesions. He tolerated the treatment well. I would recommend additional chemotherapy to complete 6 months of perioperative treatment which would be 7 additional cycles. Given his good response to treatment, I would not add the Avastin back-category 2B under NCCN guidelines for use of Avastin in the setting.  He is agreeable to the chemotherapy as discussed and has no additional questions today. He would like to get through the Thanksgiving holiday before initiating treatment which is reasonable. I will plan his initial cycle in early December and will see him back for the next cycle with lab work prior to monitor for toxicities.              Patient was given instructions and counseling regarding his condition or for health maintenance advice. Please see specific information pulled into the AVS if appropriate.     Maurilio Campos MD    11/17/2021

## 2021-12-01 ENCOUNTER — HOSPITAL ENCOUNTER (OUTPATIENT)
Dept: ONCOLOGY | Facility: HOSPITAL | Age: 66
Discharge: HOME | End: 2021-12-01
Admitting: INTERNAL MEDICINE

## 2021-12-01 VITALS
HEIGHT: 76 IN | TEMPERATURE: 97 F | HEART RATE: 69 BPM | DIASTOLIC BLOOD PRESSURE: 91 MMHG | RESPIRATION RATE: 18 BRPM | WEIGHT: 279.1 LBS | BODY MASS INDEX: 33.99 KG/M2 | SYSTOLIC BLOOD PRESSURE: 125 MMHG | OXYGEN SATURATION: 97 %

## 2021-12-01 DIAGNOSIS — Z45.2 ENCOUNTER FOR ADJUSTMENT OR MANAGEMENT OF VASCULAR ACCESS DEVICE: ICD-10-CM

## 2021-12-01 DIAGNOSIS — C20 RECTAL CANCER (HCC): ICD-10-CM

## 2021-12-01 DIAGNOSIS — C78.7 SECONDARY MALIGNANCY OF LIVER (HCC): ICD-10-CM

## 2021-12-01 DIAGNOSIS — C78.01 SECONDARY MALIGNANT NEOPLASM OF RIGHT LUNG (HCC): Primary | ICD-10-CM

## 2021-12-01 DIAGNOSIS — G89.3 CANCER RELATED PAIN: Primary | ICD-10-CM

## 2021-12-01 DIAGNOSIS — C78.02 SECONDARY MALIGNANT NEOPLASM OF LEFT LUNG (HCC): ICD-10-CM

## 2021-12-01 LAB
ALBUMIN SERPL-MCNC: 3.91 G/DL (ref 3.5–5.2)
ALBUMIN/GLOB SERPL: 1.6 G/DL
ALP SERPL-CCNC: 64 U/L (ref 39–117)
ALT SERPL W P-5'-P-CCNC: 20 U/L (ref 1–41)
ANION GAP SERPL CALCULATED.3IONS-SCNC: 7.9 MMOL/L (ref 5–15)
AST SERPL-CCNC: 17 U/L (ref 1–40)
BASOPHILS # BLD AUTO: 0.01 10*3/MM3 (ref 0–0.2)
BASOPHILS NFR BLD AUTO: 0.2 % (ref 0–1.5)
BILIRUB SERPL-MCNC: 0.6 MG/DL (ref 0–1.2)
BUN SERPL-MCNC: 11 MG/DL (ref 8–23)
BUN/CREAT SERPL: 14.7 (ref 7–25)
CALCIUM SPEC-SCNC: 9.1 MG/DL (ref 8.6–10.5)
CHLORIDE SERPL-SCNC: 107 MMOL/L (ref 98–107)
CO2 SERPL-SCNC: 25.1 MMOL/L (ref 22–29)
CREAT SERPL-MCNC: 0.75 MG/DL (ref 0.76–1.27)
DEPRECATED RDW RBC AUTO: 48 FL (ref 37–54)
EOSINOPHIL # BLD AUTO: 0.12 10*3/MM3 (ref 0–0.4)
EOSINOPHIL NFR BLD AUTO: 2.1 % (ref 0.3–6.2)
ERYTHROCYTE [DISTWIDTH] IN BLOOD BY AUTOMATED COUNT: 14.3 % (ref 12.3–15.4)
GFR SERPL CREATININE-BSD FRML MDRD: 105 ML/MIN/1.73
GLOBULIN UR ELPH-MCNC: 2.5 GM/DL
GLUCOSE SERPL-MCNC: 111 MG/DL (ref 65–99)
HCT VFR BLD AUTO: 41.9 % (ref 37.5–51)
HGB BLD-MCNC: 13.7 G/DL (ref 13–17.7)
IMM GRANULOCYTES # BLD AUTO: 0.01 10*3/MM3 (ref 0–0.05)
IMM GRANULOCYTES NFR BLD AUTO: 0.2 % (ref 0–0.5)
LYMPHOCYTES # BLD AUTO: 0.69 10*3/MM3 (ref 0.7–3.1)
LYMPHOCYTES NFR BLD AUTO: 12.3 % (ref 19.6–45.3)
MCH RBC QN AUTO: 30 PG (ref 26.6–33)
MCHC RBC AUTO-ENTMCNC: 32.7 G/DL (ref 31.5–35.7)
MCV RBC AUTO: 91.7 FL (ref 79–97)
MONOCYTES # BLD AUTO: 0.45 10*3/MM3 (ref 0.1–0.9)
MONOCYTES NFR BLD AUTO: 8 % (ref 5–12)
NEUTROPHILS NFR BLD AUTO: 4.35 10*3/MM3 (ref 1.7–7)
NEUTROPHILS NFR BLD AUTO: 77.2 % (ref 42.7–76)
PLATELET # BLD AUTO: 219 10*3/MM3 (ref 140–450)
PMV BLD AUTO: 8.3 FL (ref 6–12)
POTASSIUM SERPL-SCNC: 4 MMOL/L (ref 3.5–5.2)
PROT SERPL-MCNC: 6.4 G/DL (ref 6–8.5)
RBC # BLD AUTO: 4.57 10*6/MM3 (ref 4.14–5.8)
SODIUM SERPL-SCNC: 140 MMOL/L (ref 136–145)
WBC NRBC COR # BLD: 5.63 10*3/MM3 (ref 3.4–10.8)

## 2021-12-01 PROCEDURE — 96375 TX/PRO/DX INJ NEW DRUG ADDON: CPT

## 2021-12-01 PROCEDURE — 25010000002 OXALIPLATIN PER 0.5 MG: Performed by: INTERNAL MEDICINE

## 2021-12-01 PROCEDURE — 96368 THER/DIAG CONCURRENT INF: CPT

## 2021-12-01 PROCEDURE — 25010000002 PALONOSETRON PER 25 MCG: Performed by: INTERNAL MEDICINE

## 2021-12-01 PROCEDURE — 96413 CHEMO IV INFUSION 1 HR: CPT

## 2021-12-01 PROCEDURE — 25010000002 FLUOROURACIL PER 500 MG: Performed by: INTERNAL MEDICINE

## 2021-12-01 PROCEDURE — G0498 CHEMO EXTEND IV INFUS W/PUMP: HCPCS

## 2021-12-01 PROCEDURE — 96411 CHEMO IV PUSH ADDL DRUG: CPT

## 2021-12-01 PROCEDURE — 80053 COMPREHEN METABOLIC PANEL: CPT | Performed by: INTERNAL MEDICINE

## 2021-12-01 PROCEDURE — 25010000002 LEUCOVORIN CALCIUM PER 50 MG: Performed by: INTERNAL MEDICINE

## 2021-12-01 PROCEDURE — 85025 COMPLETE CBC W/AUTO DIFF WBC: CPT | Performed by: INTERNAL MEDICINE

## 2021-12-01 PROCEDURE — 25010000002 DEXAMETHASONE SODIUM PHOSPHATE 120 MG/30ML SOLUTION: Performed by: INTERNAL MEDICINE

## 2021-12-01 PROCEDURE — 96415 CHEMO IV INFUSION ADDL HR: CPT

## 2021-12-01 RX ORDER — SODIUM CHLORIDE 0.9 % (FLUSH) 0.9 %
20 SYRINGE (ML) INJECTION AS NEEDED
Status: CANCELLED | OUTPATIENT
Start: 2021-12-03

## 2021-12-01 RX ORDER — DEXTROSE MONOHYDRATE 50 MG/ML
250 INJECTION, SOLUTION INTRAVENOUS ONCE
Status: CANCELLED | OUTPATIENT
Start: 2022-02-07

## 2021-12-01 RX ORDER — DIPHENHYDRAMINE HYDROCHLORIDE 50 MG/ML
50 INJECTION INTRAMUSCULAR; INTRAVENOUS AS NEEDED
Status: CANCELLED | OUTPATIENT
Start: 2022-02-07

## 2021-12-01 RX ORDER — SODIUM CHLORIDE 0.9 % (FLUSH) 0.9 %
20 SYRINGE (ML) INJECTION AS NEEDED
Status: DISCONTINUED | OUTPATIENT
Start: 2021-12-01 | End: 2021-12-02 | Stop reason: HOSPADM

## 2021-12-01 RX ORDER — FAMOTIDINE 10 MG/ML
20 INJECTION, SOLUTION INTRAVENOUS AS NEEDED
Status: CANCELLED | OUTPATIENT
Start: 2022-02-07

## 2021-12-01 RX ORDER — DEXTROSE MONOHYDRATE 50 MG/ML
250 INJECTION, SOLUTION INTRAVENOUS ONCE
Status: COMPLETED | OUTPATIENT
Start: 2021-12-01 | End: 2021-12-01

## 2021-12-01 RX ORDER — PALONOSETRON 0.05 MG/ML
0.25 INJECTION, SOLUTION INTRAVENOUS ONCE
Status: CANCELLED | OUTPATIENT
Start: 2022-02-07

## 2021-12-01 RX ORDER — HEPARIN SODIUM (PORCINE) LOCK FLUSH IV SOLN 100 UNIT/ML 100 UNIT/ML
500 SOLUTION INTRAVENOUS AS NEEDED
Status: CANCELLED | OUTPATIENT
Start: 2021-12-03

## 2021-12-01 RX ORDER — PALONOSETRON 0.05 MG/ML
0.25 INJECTION, SOLUTION INTRAVENOUS ONCE
Status: COMPLETED | OUTPATIENT
Start: 2021-12-01 | End: 2021-12-01

## 2021-12-01 RX ORDER — FLUOROURACIL 50 MG/ML
400 INJECTION, SOLUTION INTRAVENOUS ONCE
Status: CANCELLED | OUTPATIENT
Start: 2022-02-07

## 2021-12-01 RX ORDER — ONDANSETRON HYDROCHLORIDE 8 MG/1
8 TABLET, FILM COATED ORAL EVERY 8 HOURS PRN
Status: CANCELLED | OUTPATIENT
Start: 2021-12-01

## 2021-12-01 RX ORDER — HEPARIN SODIUM (PORCINE) LOCK FLUSH IV SOLN 100 UNIT/ML 100 UNIT/ML
500 SOLUTION INTRAVENOUS AS NEEDED
Status: DISCONTINUED | OUTPATIENT
Start: 2021-12-01 | End: 2021-12-02 | Stop reason: HOSPADM

## 2021-12-01 RX ORDER — HYDROCODONE BITARTRATE AND ACETAMINOPHEN 5; 325 MG/1; MG/1
1 TABLET ORAL EVERY 6 HOURS PRN
Qty: 60 TABLET | Refills: 0 | Status: SHIPPED | OUTPATIENT
Start: 2021-12-01 | End: 2022-06-15

## 2021-12-01 RX ORDER — FLUOROURACIL 50 MG/ML
1000 INJECTION, SOLUTION INTRAVENOUS ONCE
Status: COMPLETED | OUTPATIENT
Start: 2021-12-01 | End: 2021-12-01

## 2021-12-01 RX ADMIN — OXALIPLATIN 200 MG: 5 INJECTION, SOLUTION INTRAVENOUS at 11:01

## 2021-12-01 RX ADMIN — FLUOROURACIL 1000 MG: 50 INJECTION, SOLUTION INTRAVENOUS at 13:15

## 2021-12-01 RX ADMIN — DEXAMETHASONE SODIUM PHOSPHATE 12 MG: 4 INJECTION, SOLUTION INTRA-ARTICULAR; INTRALESIONAL; INTRAMUSCULAR; INTRAVENOUS; SOFT TISSUE at 10:33

## 2021-12-01 RX ADMIN — PALONOSETRON HYDROCHLORIDE 0.25 MG: 0.25 INJECTION, SOLUTION INTRAVENOUS at 10:32

## 2021-12-01 RX ADMIN — FLUOROURACIL 6100 MG: 50 INJECTION, SOLUTION INTRAVENOUS at 13:19

## 2021-12-01 RX ADMIN — LEUCOVORIN CALCIUM 1000 MG: 350 INJECTION, POWDER, LYOPHILIZED, FOR SUSPENSION INTRAMUSCULAR; INTRAVENOUS at 11:01

## 2021-12-01 RX ADMIN — DEXTROSE MONOHYDRATE 250 ML: 5 INJECTION, SOLUTION INTRAVENOUS at 10:32

## 2021-12-02 ENCOUNTER — APPOINTMENT (OUTPATIENT)
Dept: RADIATION ONCOLOGY | Facility: HOSPITAL | Age: 66
End: 2021-12-02

## 2021-12-03 ENCOUNTER — HOSPITAL ENCOUNTER (OUTPATIENT)
Dept: ONCOLOGY | Facility: HOSPITAL | Age: 66
Setting detail: INFUSION SERIES
Discharge: HOME | End: 2021-12-03

## 2021-12-03 DIAGNOSIS — C78.7 SECONDARY MALIGNANCY OF LIVER (HCC): ICD-10-CM

## 2021-12-03 DIAGNOSIS — Z45.2 ENCOUNTER FOR ADJUSTMENT OR MANAGEMENT OF VASCULAR ACCESS DEVICE: Primary | ICD-10-CM

## 2021-12-03 DIAGNOSIS — C78.02 SECONDARY MALIGNANT NEOPLASM OF LEFT LUNG (HCC): ICD-10-CM

## 2021-12-03 DIAGNOSIS — C20 RECTAL CANCER (HCC): ICD-10-CM

## 2021-12-03 DIAGNOSIS — C78.01 SECONDARY MALIGNANT NEOPLASM OF RIGHT LUNG (HCC): ICD-10-CM

## 2021-12-03 PROCEDURE — 25010000002 HEPARIN LOCK FLUSH PER 10 UNITS: Performed by: INTERNAL MEDICINE

## 2021-12-03 RX ORDER — HEPARIN SODIUM (PORCINE) LOCK FLUSH IV SOLN 100 UNIT/ML 100 UNIT/ML
500 SOLUTION INTRAVENOUS AS NEEDED
Status: CANCELLED | OUTPATIENT
Start: 2021-12-14

## 2021-12-03 RX ORDER — SODIUM CHLORIDE 0.9 % (FLUSH) 0.9 %
20 SYRINGE (ML) INJECTION AS NEEDED
Status: CANCELLED | OUTPATIENT
Start: 2021-12-14

## 2021-12-03 RX ORDER — SODIUM CHLORIDE 0.9 % (FLUSH) 0.9 %
20 SYRINGE (ML) INJECTION AS NEEDED
Status: DISCONTINUED | OUTPATIENT
Start: 2021-12-03 | End: 2021-12-04 | Stop reason: HOSPADM

## 2021-12-03 RX ORDER — HEPARIN SODIUM (PORCINE) LOCK FLUSH IV SOLN 100 UNIT/ML 100 UNIT/ML
500 SOLUTION INTRAVENOUS AS NEEDED
Status: DISCONTINUED | OUTPATIENT
Start: 2021-12-03 | End: 2021-12-04 | Stop reason: HOSPADM

## 2021-12-03 RX ADMIN — SODIUM CHLORIDE, PRESERVATIVE FREE 20 ML: 5 INJECTION INTRAVENOUS at 13:55

## 2021-12-03 RX ADMIN — HEPARIN SODIUM (PORCINE) LOCK FLUSH IV SOLN 100 UNIT/ML 500 UNITS: 100 SOLUTION at 13:55

## 2021-12-08 ENCOUNTER — OFFICE VISIT (OUTPATIENT)
Dept: RADIATION ONCOLOGY | Facility: HOSPITAL | Age: 66
End: 2021-12-08

## 2021-12-08 VITALS
HEART RATE: 83 BPM | BODY MASS INDEX: 33.83 KG/M2 | TEMPERATURE: 97.9 F | RESPIRATION RATE: 16 BRPM | SYSTOLIC BLOOD PRESSURE: 116 MMHG | OXYGEN SATURATION: 95 % | WEIGHT: 277.78 LBS | DIASTOLIC BLOOD PRESSURE: 60 MMHG

## 2021-12-08 DIAGNOSIS — C20 RECTAL CANCER (HCC): Primary | ICD-10-CM

## 2021-12-08 PROCEDURE — G0463 HOSPITAL OUTPT CLINIC VISIT: HCPCS

## 2021-12-08 PROCEDURE — 99214 OFFICE O/P EST MOD 30 MIN: CPT | Performed by: RADIOLOGY

## 2021-12-08 NOTE — PROGRESS NOTES
Follow Up Office Visit      Encounter Date: 12/08/2021   Patient Name: Robbi Kennedy  YOB: 1955   Medical Record Number: [id number]   Primary Diagnosis: Rectal cancer (HCC) [C20]   Cancer Staging: Cancer Staging  Rectal cancer (HCC)    Staging form: Colon And Rectum, AJCC 8th Edition  - Clinical: Stage IIA (cT3, cN0, cM0) - Signed by Maurilio Campos MD on 6/21/2021  - progression of disease with metastases in liver and lung (2021) - now stage 4         Chief Complaint:    Chief Complaint   Patient presents with   • Follow-up     Rectal cancer w/metastasis       Oncologic History: Robbi Kennedy is a 66 y.o. male here for follow-up regarding his metastatic rectal cancer.  He was originally diagnosed with a [id number] moderately differentiated adenocarcinoma of the rectum in 2019. He received neoadjuvant Xeloda with radiation under the care of Dr. Vasquez here at [place]. This was followed by an LAR with ostomy in 9/20/2019. He was staged as having [id number] disease. He did well for some time. In 2021, he developed metastatic disease. Biopsy of a liver lesion in April 2021 returned as a metastatic adenocarcinoma consistent with a colorectal primary. Metastatic lesions in the chest were noted on CT imaging at that time. He received mFOLFOX with avastin under the care of Dr. Campos. Avastin was held towards the end in anticipation of upcoming abdominal surgery. Restaging CT demonstrated a treatment response in the liver and the chest.  He had a partial hepatectomy, removal of gallbladder, removal of regional lymph nodes, and [facility]    He had several lung lesions.. There is a 5 mm lesion in the right middle lobe (shrinking).  There was a 2 mm lesion in the right upper lobe (stable). There were two 5 mm lesions in the left lower lobe (stable).    Interval History: He completed SBRT to the shrinking  right middle lobe lesion.  This was delivered to a dose of 50 Gy in 5 fractions.  Completed on 10/29/2021.  He has started chemo with Dr. Campos and is feeling a little fatigued this week.  He has stable shortness of air with exertion.  He denies hemoptysis.    Prior Radiation:   Right middle lobe lesion: SBRT, 50 Sotelo in 5 fractions - 10/29/2021  50.4 Gy/28 fractions to pelvis for rectal cancer, performed by Dr. Vasquez - 2019          Subjective      Review of Systems:     The following portions of the patient's history were reviewed and updated as appropriate: allergies, current medications, past family history, past medical history, past social history, past surgical history and problem list.    Measures:   Pain: (on a scale of 0-10)   Pain Score    12/08/21 0856   PainSc: 0-No pain       Advanced Care Plan: N Advance Care Planning   ACP discussion was declined by the patient. Patient does not have an advance directive, declines further assistance.  KPS/Quality of Life: 80 - Restricted Physical Activity  ECOG: (1) Restricted in physically strenuous activity, ambulatory and able to do work of light nature  Depression Screening:   Patient screened positive for depression based on a PHQ-9 score of 13 on 9/14/2021. Follow-up recommendations include: Referral to Social Work.      Objective     Physical Exam:   Vital Signs:   Vitals:    12/08/21 0856   BP: 116/60   Pulse: 83   Resp: 16   Temp: 97.9 °F (36.6 °C)   TempSrc: Temporal   SpO2: 95%   Weight: 126 kg (277 lb 12.5 oz)   PainSc: 0-No pain     Body mass index is 33.83 kg/m².     Constitutional: No acute distress, sitting comfortably  Eye: EOMI, anicteric sclerae  HENT: NC/AT, MMM   Respiratory: Symmetric expansion, nonlabored respiration  MSK: ROM intact in all four extremities, no obvious deformities  Neuro: Alert, oriented x3, CN3-12 grossly intact.   Psych: Appropriate mood and affect.    Results:   No new images for review        Assessment / Plan         Assessment/Plan:     Diagnoses and all orders for this visit:    1. Rectal cancer (HCC) (Primary)        Robbi Kennedy is a pleasant 66 y.o. male with an original diagnosis of a stage IIa adenocarcinoma of the rectum. This was managed with neoadjuvant chemoradiation in 2019 followed by an LAR with ostomy. In 2021, he unfortunately developed metastatic disease in the lungs and in the liver. He received systemic therapy under the care of Dr. Campos and had a good partial response to treatment. He underwent a partial hepatectomy with removal of the gallbladder, removal of regional lymph nodes, and MWA of a segment 6 metastases at the [facility] on August 31, 2021 to address his metastatic disease in the liver.    He received SBRT to a right middle lobe lesion 1 month ago.  He has recovered well and has no acute radiation related toxicities at this time.  He has 3 other known lesions in the lungs which had been stable on surveillance imaging.  He is on systemic therapy under the care of Dr. Campos.  I will repeat CT imaging of the chest abdomen and pelvis in 3 months and see him back at that time.  If any of the known lung lesions are growing over time, they be amenable to SBRT.      Follow Up:   Return in about 3 months (around 3/8/2022) for Imaging before next visit- please schedule clinic visit after CT scans (Chest, Abd, Pelvis) 3/2022.        Time:   I spent 35 minutes on this encounter today, 12/08/21. Activities that took place during this time include:   - preparing to see the patient  - obtaining and reviewing separately obtained history  - performing a medically appropriate examination and evaluation  - counseling and educating the patient  - ordering medications/tests/procedures  - communicating with other healthcare providers  - documenting clinical information in the health record  - coordinating care for this patient.     Sincerely,        Chapis Luna MD  Radiation Oncology  [facility]  [facility]    This document has been signed by Chapis Luna MD on December 8, 2021 10:55 EST           NOTICE TO PATIENTS-HEALTH RESULTS RELEASE    We believe in information transparency, and we believe you deserve to see your information as soon as it is available.    Lab Results    • We release ALL notes and results to you promptly.    • Therefore, you may see some results even before we do. Please give us 2 business days to review and let you know our thoughts.  • We look at every result. We will contact you with any results that concern us.  • Some results may show abnormal, but are not clinically important. An example is “MCHC” and “MCV”.   Other results (like “MONICA”) are really challenging to interpret, and require reviewing other results and other information from your chart. Sometimes these are best discussed in person.  Imaging    CT scan, MRI, and PET reports can show new or re-occurring cancer  • These reports can contain words that are hard to understand  • These reports can also show unexpected results.  • We ALWAYS plan to review these results with you and decide on a plan together. We prefer to do this in person, by video visit, or phone.  • When possible, we will discuss the possible results with you BEFORE getting the test, and the next steps we would take with each result.  • Some patients prefer to see their results online as soon as possible. Because of possible “bad news,” other patients may feel more comfortable waiting to discuss results when their provider is available at their next video visit or in-person visit or by phone. As the patient, you can choose when to view your result

## 2021-12-14 ENCOUNTER — HOSPITAL ENCOUNTER (OUTPATIENT)
Dept: ONCOLOGY | Facility: HOSPITAL | Age: 66
Setting detail: INFUSION SERIES
Discharge: HOME | End: 2021-12-14

## 2021-12-14 DIAGNOSIS — C20 RECTAL CANCER (HCC): ICD-10-CM

## 2021-12-14 DIAGNOSIS — C78.7 SECONDARY MALIGNANCY OF LIVER (HCC): ICD-10-CM

## 2021-12-14 DIAGNOSIS — Z45.2 ENCOUNTER FOR ADJUSTMENT OR MANAGEMENT OF VASCULAR ACCESS DEVICE: ICD-10-CM

## 2021-12-14 DIAGNOSIS — C78.02 SECONDARY MALIGNANT NEOPLASM OF LEFT LUNG (HCC): ICD-10-CM

## 2021-12-14 DIAGNOSIS — C78.01 SECONDARY MALIGNANT NEOPLASM OF RIGHT LUNG (HCC): Primary | ICD-10-CM

## 2021-12-14 LAB
ALBUMIN SERPL-MCNC: 3.73 G/DL (ref 3.5–5.2)
ALBUMIN/GLOB SERPL: 1.6 G/DL
ALP SERPL-CCNC: 71 U/L (ref 39–117)
ALT SERPL W P-5'-P-CCNC: 23 U/L (ref 1–41)
ANION GAP SERPL CALCULATED.3IONS-SCNC: 4.7 MMOL/L (ref 5–15)
AST SERPL-CCNC: 19 U/L (ref 1–40)
BASOPHILS # BLD AUTO: 0.02 10*3/MM3 (ref 0–0.2)
BASOPHILS NFR BLD AUTO: 0.4 % (ref 0–1.5)
BILIRUB SERPL-MCNC: 0.9 MG/DL (ref 0–1.2)
BUN SERPL-MCNC: 12 MG/DL (ref 8–23)
BUN/CREAT SERPL: 14 (ref 7–25)
CALCIUM SPEC-SCNC: 9 MG/DL (ref 8.6–10.5)
CEA SERPL-MCNC: 2.56 NG/ML
CHLORIDE SERPL-SCNC: 106 MMOL/L (ref 98–107)
CO2 SERPL-SCNC: 26.3 MMOL/L (ref 22–29)
CREAT SERPL-MCNC: 0.86 MG/DL (ref 0.76–1.27)
DEPRECATED RDW RBC AUTO: 48 FL (ref 37–54)
EOSINOPHIL # BLD AUTO: 0.08 10*3/MM3 (ref 0–0.4)
EOSINOPHIL NFR BLD AUTO: 1.7 % (ref 0.3–6.2)
ERYTHROCYTE [DISTWIDTH] IN BLOOD BY AUTOMATED COUNT: 14.9 % (ref 12.3–15.4)
GFR SERPL CREATININE-BSD FRML MDRD: 89 ML/MIN/1.73
GLOBULIN UR ELPH-MCNC: 2.3 GM/DL
GLUCOSE SERPL-MCNC: 121 MG/DL (ref 65–99)
HCT VFR BLD AUTO: 40.6 % (ref 37.5–51)
HGB BLD-MCNC: 13.3 G/DL (ref 13–17.7)
IMM GRANULOCYTES # BLD AUTO: 0.01 10*3/MM3 (ref 0–0.05)
IMM GRANULOCYTES NFR BLD AUTO: 0.2 % (ref 0–0.5)
LYMPHOCYTES # BLD AUTO: 0.63 10*3/MM3 (ref 0.7–3.1)
LYMPHOCYTES NFR BLD AUTO: 13.7 % (ref 19.6–45.3)
MCH RBC QN AUTO: 29.6 PG (ref 26.6–33)
MCHC RBC AUTO-ENTMCNC: 32.8 G/DL (ref 31.5–35.7)
MCV RBC AUTO: 90.4 FL (ref 79–97)
MONOCYTES # BLD AUTO: 0.34 10*3/MM3 (ref 0.1–0.9)
MONOCYTES NFR BLD AUTO: 7.4 % (ref 5–12)
NEUTROPHILS NFR BLD AUTO: 3.51 10*3/MM3 (ref 1.7–7)
NEUTROPHILS NFR BLD AUTO: 76.6 % (ref 42.7–76)
PLATELET # BLD AUTO: 175 10*3/MM3 (ref 140–450)
PMV BLD AUTO: 8.6 FL (ref 6–12)
POTASSIUM SERPL-SCNC: 3.9 MMOL/L (ref 3.5–5.2)
PROT SERPL-MCNC: 6 G/DL (ref 6–8.5)
RBC # BLD AUTO: 4.49 10*6/MM3 (ref 4.14–5.8)
SODIUM SERPL-SCNC: 137 MMOL/L (ref 136–145)
WBC NRBC COR # BLD: 4.59 10*3/MM3 (ref 3.4–10.8)

## 2021-12-14 PROCEDURE — 80053 COMPREHEN METABOLIC PANEL: CPT | Performed by: INTERNAL MEDICINE

## 2021-12-14 PROCEDURE — 82378 CARCINOEMBRYONIC ANTIGEN: CPT | Performed by: INTERNAL MEDICINE

## 2021-12-14 PROCEDURE — 85025 COMPLETE CBC W/AUTO DIFF WBC: CPT | Performed by: INTERNAL MEDICINE

## 2021-12-14 PROCEDURE — 25010000002 HEPARIN LOCK FLUSH PER 10 UNITS: Performed by: INTERNAL MEDICINE

## 2021-12-14 PROCEDURE — 36591 DRAW BLOOD OFF VENOUS DEVICE: CPT

## 2021-12-14 RX ORDER — SODIUM CHLORIDE 0.9 % (FLUSH) 0.9 %
20 SYRINGE (ML) INJECTION AS NEEDED
Status: DISCONTINUED | OUTPATIENT
Start: 2021-12-14 | End: 2021-12-16 | Stop reason: HOSPADM

## 2021-12-14 RX ORDER — SODIUM CHLORIDE 0.9 % (FLUSH) 0.9 %
20 SYRINGE (ML) INJECTION AS NEEDED
Status: CANCELLED | OUTPATIENT
Start: 2021-12-14

## 2021-12-14 RX ORDER — HEPARIN SODIUM (PORCINE) LOCK FLUSH IV SOLN 100 UNIT/ML 100 UNIT/ML
500 SOLUTION INTRAVENOUS AS NEEDED
Status: DISCONTINUED | OUTPATIENT
Start: 2021-12-14 | End: 2021-12-16 | Stop reason: HOSPADM

## 2021-12-14 RX ORDER — HEPARIN SODIUM (PORCINE) LOCK FLUSH IV SOLN 100 UNIT/ML 100 UNIT/ML
500 SOLUTION INTRAVENOUS AS NEEDED
Status: CANCELLED | OUTPATIENT
Start: 2021-12-14

## 2021-12-14 RX ADMIN — HEPARIN SODIUM (PORCINE) LOCK FLUSH IV SOLN 100 UNIT/ML 500 UNITS: 100 SOLUTION at 09:51

## 2021-12-14 RX ADMIN — SODIUM CHLORIDE, PRESERVATIVE FREE 20 ML: 5 INJECTION INTRAVENOUS at 09:52

## 2021-12-15 ENCOUNTER — OFFICE VISIT (OUTPATIENT)
Dept: ONCOLOGY | Facility: HOSPITAL | Age: 66
End: 2021-12-15

## 2021-12-15 ENCOUNTER — HOSPITAL ENCOUNTER (OUTPATIENT)
Dept: ONCOLOGY | Facility: HOSPITAL | Age: 66
Setting detail: INFUSION SERIES
Discharge: HOME | End: 2021-12-15

## 2021-12-15 VITALS
DIASTOLIC BLOOD PRESSURE: 88 MMHG | BODY MASS INDEX: 33.85 KG/M2 | RESPIRATION RATE: 16 BRPM | HEART RATE: 80 BPM | SYSTOLIC BLOOD PRESSURE: 128 MMHG | WEIGHT: 278 LBS | TEMPERATURE: 97.7 F | OXYGEN SATURATION: 96 %

## 2021-12-15 DIAGNOSIS — C20 RECTAL CANCER (HCC): Primary | ICD-10-CM

## 2021-12-15 DIAGNOSIS — C78.7 SECONDARY MALIGNANCY OF LIVER (HCC): ICD-10-CM

## 2021-12-15 DIAGNOSIS — C20 RECTAL CANCER (HCC): ICD-10-CM

## 2021-12-15 DIAGNOSIS — C78.01 SECONDARY MALIGNANT NEOPLASM OF RIGHT LUNG (HCC): Primary | ICD-10-CM

## 2021-12-15 DIAGNOSIS — C78.02 SECONDARY MALIGNANT NEOPLASM OF LEFT LUNG (HCC): ICD-10-CM

## 2021-12-15 DIAGNOSIS — Z45.2 ENCOUNTER FOR ADJUSTMENT OR MANAGEMENT OF VASCULAR ACCESS DEVICE: ICD-10-CM

## 2021-12-15 PROCEDURE — 96368 THER/DIAG CONCURRENT INF: CPT

## 2021-12-15 PROCEDURE — 25010000002 DEXAMETHASONE SODIUM PHOSPHATE 120 MG/30ML SOLUTION: Performed by: INTERNAL MEDICINE

## 2021-12-15 PROCEDURE — 96413 CHEMO IV INFUSION 1 HR: CPT

## 2021-12-15 PROCEDURE — 25010000002 PALONOSETRON PER 25 MCG: Performed by: INTERNAL MEDICINE

## 2021-12-15 PROCEDURE — 96411 CHEMO IV PUSH ADDL DRUG: CPT

## 2021-12-15 PROCEDURE — 96375 TX/PRO/DX INJ NEW DRUG ADDON: CPT

## 2021-12-15 PROCEDURE — G0498 CHEMO EXTEND IV INFUS W/PUMP: HCPCS

## 2021-12-15 PROCEDURE — 96415 CHEMO IV INFUSION ADDL HR: CPT

## 2021-12-15 PROCEDURE — 25010000002 FLUOROURACIL PER 500 MG: Performed by: INTERNAL MEDICINE

## 2021-12-15 PROCEDURE — 99214 OFFICE O/P EST MOD 30 MIN: CPT | Performed by: INTERNAL MEDICINE

## 2021-12-15 PROCEDURE — 25010000002 OXALIPLATIN PER 0.5 MG: Performed by: INTERNAL MEDICINE

## 2021-12-15 PROCEDURE — 25010000002 LEUCOVORIN CALCIUM PER 50 MG: Performed by: INTERNAL MEDICINE

## 2021-12-15 RX ORDER — SODIUM CHLORIDE 0.9 % (FLUSH) 0.9 %
20 SYRINGE (ML) INJECTION AS NEEDED
Status: DISCONTINUED | OUTPATIENT
Start: 2021-12-15 | End: 2021-12-16 | Stop reason: HOSPADM

## 2021-12-15 RX ORDER — SODIUM CHLORIDE 0.9 % (FLUSH) 0.9 %
20 SYRINGE (ML) INJECTION AS NEEDED
Status: CANCELLED | OUTPATIENT
Start: 2021-12-17

## 2021-12-15 RX ORDER — PALONOSETRON 0.05 MG/ML
0.25 INJECTION, SOLUTION INTRAVENOUS ONCE
Status: CANCELLED | OUTPATIENT
Start: 2021-12-15

## 2021-12-15 RX ORDER — PALONOSETRON 0.05 MG/ML
0.25 INJECTION, SOLUTION INTRAVENOUS ONCE
Status: COMPLETED | OUTPATIENT
Start: 2021-12-15 | End: 2021-12-15

## 2021-12-15 RX ORDER — DIPHENHYDRAMINE HYDROCHLORIDE 50 MG/ML
50 INJECTION INTRAMUSCULAR; INTRAVENOUS AS NEEDED
Status: CANCELLED | OUTPATIENT
Start: 2021-12-15

## 2021-12-15 RX ORDER — DEXTROSE MONOHYDRATE 50 MG/ML
250 INJECTION, SOLUTION INTRAVENOUS ONCE
Status: COMPLETED | OUTPATIENT
Start: 2021-12-15 | End: 2021-12-15

## 2021-12-15 RX ORDER — HEPARIN SODIUM (PORCINE) LOCK FLUSH IV SOLN 100 UNIT/ML 100 UNIT/ML
500 SOLUTION INTRAVENOUS AS NEEDED
Status: CANCELLED | OUTPATIENT
Start: 2021-12-17

## 2021-12-15 RX ORDER — FLUOROURACIL 50 MG/ML
1000 INJECTION, SOLUTION INTRAVENOUS ONCE
Status: COMPLETED | OUTPATIENT
Start: 2021-12-15 | End: 2021-12-15

## 2021-12-15 RX ORDER — FLUOROURACIL 50 MG/ML
400 INJECTION, SOLUTION INTRAVENOUS ONCE
Status: CANCELLED | OUTPATIENT
Start: 2021-12-15

## 2021-12-15 RX ORDER — FAMOTIDINE 10 MG/ML
20 INJECTION, SOLUTION INTRAVENOUS AS NEEDED
Status: CANCELLED | OUTPATIENT
Start: 2021-12-15

## 2021-12-15 RX ORDER — HEPARIN SODIUM (PORCINE) LOCK FLUSH IV SOLN 100 UNIT/ML 100 UNIT/ML
500 SOLUTION INTRAVENOUS AS NEEDED
Status: DISCONTINUED | OUTPATIENT
Start: 2021-12-15 | End: 2021-12-16 | Stop reason: HOSPADM

## 2021-12-15 RX ORDER — DEXTROSE MONOHYDRATE 50 MG/ML
250 INJECTION, SOLUTION INTRAVENOUS ONCE
Status: CANCELLED | OUTPATIENT
Start: 2021-12-15

## 2021-12-15 RX ADMIN — DEXTROSE MONOHYDRATE 250 ML: 5 INJECTION, SOLUTION INTRAVENOUS at 10:42

## 2021-12-15 RX ADMIN — FLUOROURACIL 6100 MG: 50 INJECTION, SOLUTION INTRAVENOUS at 13:22

## 2021-12-15 RX ADMIN — DEXAMETHASONE SODIUM PHOSPHATE 12 MG: 4 INJECTION, SOLUTION INTRA-ARTICULAR; INTRALESIONAL; INTRAMUSCULAR; INTRAVENOUS; SOFT TISSUE at 10:44

## 2021-12-15 RX ADMIN — LEUCOVORIN CALCIUM 1000 MG: 350 INJECTION, POWDER, LYOPHILIZED, FOR SUSPENSION INTRAMUSCULAR; INTRAVENOUS at 11:15

## 2021-12-15 RX ADMIN — OXALIPLATIN 170 MG: 5 INJECTION, SOLUTION INTRAVENOUS at 11:15

## 2021-12-15 RX ADMIN — FLUOROURACIL 1000 MG: 50 INJECTION, SOLUTION INTRAVENOUS at 13:18

## 2021-12-15 RX ADMIN — PALONOSETRON HYDROCHLORIDE 0.25 MG: 0.25 INJECTION, SOLUTION INTRAVENOUS at 10:43

## 2021-12-15 NOTE — PROGRESS NOTES
Chief Complaint  Rectal Cancer and Chemotherapy    Eri Moran APRN Reinberg, Emily, APRN Subjective          Robbi Kennedy presents to [facility] for ongoing treatment of his metastatic rectal cancer.  He status post treatments as outlined.  He is now on FOLFOX.  He is due for cycle 7.  He reports increased numbness especially in his feet.  It is not interfering with his gait but is persistent.  He denies nausea, vomiting, diarrhea.  He reports good appetite and his weight is stable.  His energy level is adequate for his ADLs.  No issues from his port.  He denies new masses or adenopathy.    Oncology/Hematology History Overview Note   9/30/2019 High rectal bkzb-apixpqkfcl-cvtkxooicnsycq adenocarcinoma  associated with tubular adenoma.   4/27/21 Biopsy of liver revealed  metastatic adenocarcinoma, consistent with colorectal primary.    Clinical Staging  Stage IIa ([id number])    Chemotherapy      neoadjuvant Xeloda with radiation. adjuvant xeloda        Radiation Therapy      7/8/19 thru 8/14/19 completed neoadjuvant 5040 cGy=28 fxs rectum     10/2021 XRT due to reoccurance     Surgeries       9/30/2019 low anterior resection with ostomy        8/31/21 partial hepatectomy, cholecystectomy and MWA for segment 6 medical margin performed at        Rectal cancer (HCC)   6/2/2021 - 8/15/2021 Chemotherapy    OP COLON mFOLFOX6 + Bevacizumab (OXALIplatin / Leucovorin / Fluorouracil / Bevacizumab)     6/18/2021 Initial Diagnosis    Rectal cancer (CMS/HCC)     6/21/2021 Cancer Staged    Staging form: Colon And Rectum, AJCC 8th Edition  - Clinical: Stage IIA (cT3, cN0, cM0) - Signed by Maurilio Campos MD on 6/21/2021 11/16/2021 -  Chemotherapy    OP COLON mFOLFOX6 OXALIplatin / Leucovorin / Fluorouracil     Secondary malignant neoplasm of left lung (HCC)   9/14/2021 Initial Diagnosis    Secondary malignant neoplasm of left lung (HCC)     10/20/2021 -  Radiation     RADIATION THERAPY Treatment Details (Noted on 10/5/2021)  Site: Bilateral Lung  Technique: SBRT  Goal: No goal specified  Planned Treatment Start Date: 10/20/2021     11/16/2021 -  Chemotherapy    OP COLON mFOLFOX6 OXALIplatin / Leucovorin / Fluorouracil     Secondary malignancy of liver (HCC)   9/14/2021 Initial Diagnosis    Secondary malignancy of liver (HCC)     11/16/2021 -  Chemotherapy    OP COLON mFOLFOX6 OXALIplatin / Leucovorin / Fluorouracil     Secondary malignant neoplasm of right lung (HCC)   10/5/2021 Initial Diagnosis    Secondary malignant neoplasm of right lung (HCC)     10/20/2021 -  Radiation    RADIATION THERAPY Treatment Details (Noted on 10/5/2021)  Site: Bilateral Lung  Technique: SBRT  Goal: No goal specified  Planned Treatment Start Date: 10/20/2021     11/16/2021 -  Chemotherapy    OP COLON mFOLFOX6 OXALIplatin / Leucovorin / Fluorouracil         Review of Systems   Constitutional: Negative for appetite change, diaphoresis, fatigue, fever, unexpected weight gain and unexpected weight loss.   HENT: Negative for hearing loss, mouth sores, sore throat, swollen glands, trouble swallowing and voice change.    Eyes: Negative for blurred vision.   Respiratory: Negative for cough, shortness of breath and wheezing.    Cardiovascular: Negative for chest pain and palpitations.   Gastrointestinal: Negative for abdominal pain, blood in stool, constipation, diarrhea, nausea and vomiting.   Endocrine: Negative for cold intolerance and heat intolerance.   Genitourinary: Negative for difficulty urinating, dysuria, frequency, hematuria and urinary incontinence.   Musculoskeletal: Negative for arthralgias, back pain and myalgias.   Skin: Negative for rash, skin lesions and bruise.   Neurological: Positive for numbness. Negative for dizziness, seizures, weakness and headache.   Hematological: Does not bruise/bleed easily.   Psychiatric/Behavioral: Negative for depressed mood. The patient is not  nervous/anxious.      Current Outpatient Medications on File Prior to Visit   Medication Sig Dispense Refill   • acetaminophen (TYLENOL) 500 MG tablet Take 500 mg by mouth Every 6 (Six) Hours As Needed.     • apixaban (ELIQUIS) 5 MG tablet tablet Take 5 mg by mouth 2 (Two) Times a Day.     • ascorbic acid (VITAMIN C) 500 MG tablet Take 500 mg by mouth Daily.     • atorvastatin (LIPITOR) 40 MG tablet atorvastatin 40 mg oral tablet take 1 tablet (40 mg) by oral route once daily   Active     • cyanocobalamin (VITAMIN B-12) 1000 MCG tablet Take 1,000 mcg by mouth Daily.     • HYDROcodone-acetaminophen (NORCO) 5-325 MG per tablet Take 1 tablet by mouth Every 6 (Six) Hours As Needed (cancer pain). 60 tablet 0   • hydrOXYzine (ATARAX) 25 MG tablet      • levothyroxine (Synthroid) 50 MCG tablet Synthroid 50 mcg oral tablet take 1 tablet (50 mcg) by oral route once daily   Active     • loratadine (CLARITIN) 10 MG tablet loratadine 10 mg oral tablet take 1 tablet (10 mg) by oral route once daily   Active     • metoprolol tartrate (LOPRESSOR) 100 MG tablet Take 100 mg by mouth 4 (Four) Times a Day.     • multivitamin (multivitamin) tablet tablet Take 1 tablet by mouth Daily.     • ondansetron (ZOFRAN) 8 MG tablet Take 8 mg by mouth Every 8 (Eight) Hours As Needed. for nausea     • oxyCODONE (ROXICODONE) 5 MG immediate release tablet      • pantoprazole (PROTONIX) 40 MG EC tablet Daily.     • potassium chloride (K-DUR,KLOR-CON) 20 MEQ CR tablet Take 1 tablet by mouth Daily. 30 tablet 5   • prochlorperazine (COMPAZINE) 10 MG tablet Take 10 mg by mouth Every 6 (Six) Hours As Needed.     • sennosides-docusate (PERICOLACE) 8.6-50 MG per tablet Take 1 tablet by mouth.     • Synthroid 25 MCG tablet      • verapamil SR (CALAN-SR) 120 MG CR tablet Take 1 tablet by mouth Daily. 90 tablet 3   • vitamin E 1000 UNIT capsule Take 1,000 Units by mouth Daily.       Current Facility-Administered Medications on File Prior to Visit   Medication  Dose Route Frequency Provider Last Rate Last Admin   • [COMPLETED] dexamethasone (DECADRON) IVPB 12 mg  12 mg Intravenous Once Maurilio Campos MD   Stopped at 12/15/21 1055   • [COMPLETED] dextrose (D5W) 5 % infusion 250 mL  250 mL Intravenous Once Maurilio Campos MD   Stopped at 12/15/21 1316   • fluorouracil (ADRUCIL) 6,100 mg, sodium chloride 0.9 % 138 mL chemo infusion - FOR HOME USE  6,100 mg Intravenous Once Maurilio Campos MD   6,100 mg at 12/15/21 1322   • [COMPLETED] fluorouracil (ADRUCIL) chemo injection 1,000 mg (20ml)  1,000 mg Intravenous Once Maurilio Campos MD   Stopped at 12/15/21 1321   • heparin injection 500 Units  500 Units Intravenous PRN Maurilio Campos MD   500 Units at 12/14/21 0951   • heparin injection 500 Units  500 Units Intravenous PRN Maurilio Campos MD       • [COMPLETED] leucovorin 1,000 mg in dextrose (D5W) 5 % 325 mL IVPB  1,000 mg Intravenous Once Maurilio Campos MD   Stopped at 12/15/21 1313   • [COMPLETED] OXALIplatin (ELOXATIN) 170 mg in dextrose (D5W) 5 % 309 mL chemo IVPB  68 mg/m2 (Treatment Plan Recorded) Intravenous Once Maurilio Campos MD   Stopped at 12/15/21 1313   • [COMPLETED] palonosetron (ALOXI) injection 0.25 mg  0.25 mg Intravenous Once Maurilio Campos MD   0.25 mg at 12/15/21 1043   • sodium chloride 0.9 % flush 20 mL  20 mL Intravenous PRN Maurilio Campos MD   20 mL at 12/14/21 0952   • sodium chloride 0.9 % flush 20 mL  20 mL Intravenous PRN Maurilio Campos MD           No Known Allergies  Past Medical History:   Diagnosis Date   • Abnormal ECG    • Arrhythmia    • Asthma    • Atrial fibrillation, persistent (HCC) 2/26/2021   • Colorectal cancer (HCC)    • Deep vein thrombosis (HCC)    • GI cancer (HCC)    • Hepatitis    • High cholesterol    • Hypertension    • Rectal cancer (HCC) 6/18/2021   • Thyroid disease      Past Surgical History:   Procedure Laterality Date   • COLON SURGERY     • HERNIA REPAIR     • LIVER BIOPSY      4/27/21  Biopsy of liver revealed metastatic adenocarcinoma, consistent with colorectal primary   • OTHER SURGICAL HISTORY      REMOVED CANCER FROM COLON     Social History     Socioeconomic History   • Marital status:    • Number of children: 2   Tobacco Use   • Smoking status: Never Smoker   • Smokeless tobacco: Never Used   Vaping Use   • Vaping Use: Never used   Substance and Sexual Activity   • Alcohol use: Yes     Comment: occasionally, no recent use   • Drug use: Never   • Sexual activity: Defer     Family History   Problem Relation Age of Onset   • Prostate cancer Father    • Heart murmur Mother    • Diabetes Mother    • Colon cancer Maternal Uncle        Objective   Physical Exam  Vitals reviewed. Exam conducted with a chaperone present.   Constitutional:       General: He is not in acute distress.     Appearance: Normal appearance.   Cardiovascular:      Rate and Rhythm: Normal rate and regular rhythm.      Heart sounds: Normal heart sounds. No murmur heard.  No gallop.    Pulmonary:      Effort: Pulmonary effort is normal.      Breath sounds: Normal breath sounds.      Comments: Port-A-Cath  Abdominal:      General: Abdomen is flat. Bowel sounds are normal.      Palpations: Abdomen is soft.   Musculoskeletal:      Cervical back: Neck supple.      Right lower leg: No edema.      Left lower leg: No edema.   Lymphadenopathy:      Cervical: No cervical adenopathy.   Neurological:      Mental Status: He is alert and oriented to person, place, and time.   Psychiatric:         Mood and Affect: Mood normal.         Behavior: Behavior normal.         There were no vitals filed for this visit.  ECOG score: 0         PHQ-9 Total Score:                    Result Review :   The following data was reviewed by: Maurilio Campos MD on 12/15/2021:  Lab Results   Component Value Date    HGB 13.3 12/14/2021    HCT 40.6 12/14/2021    MCV 90.4 12/14/2021     12/14/2021    WBC 4.59 12/14/2021    NEUTROABS 3.51 12/14/2021     LYMPHSABS 0.63 (L) 12/14/2021    MONOSABS 0.34 12/14/2021    EOSABS 0.08 12/14/2021    BASOSABS 0.02 12/14/2021     Lab Results   Component Value Date    GLUCOSE 121 (H) 12/14/2021    BUN 12 12/14/2021    CREATININE 0.86 12/14/2021     12/14/2021    K 3.9 12/14/2021     12/14/2021    CO2 26.3 12/14/2021    CALCIUM 9.0 12/14/2021    PROTEINTOT 6.0 12/14/2021    ALBUMIN 3.73 12/14/2021    BILITOT 0.9 12/14/2021    ALKPHOS 71 12/14/2021    AST 19 12/14/2021    ALT 23 12/14/2021           Assessment and Plan    Diagnoses and all orders for this visit:    1. Rectal cancer (HCC) (Primary)  Assessment & Plan:  Metastatic.  Patient is status post treatments as outlined including recent chemotherapy, surgery and stereotactic radiation to pulmonary nodules.  I reviewed his radiation oncology note.  They will watch the other small pulmonary nodules and if there is any change these lesions would be amenable to stereotactic radiation as well.  I will continue his chemotherapy.  His lab work today looks good.  He is having increased neuropathy and I will drop his oxaliplatin dose by 20%.  Proceed with cycle 7 as planned.  He will return in 2 weeks time for cycle 8 with lab work prior to monitor for toxicities.            Patient Follow Up: 2 weeks    Patient was given instructions and counseling regarding his condition or for health maintenance advice. Please see specific information pulled into the AVS if appropriate.     Maurilio Campos MD    12/15/2021

## 2021-12-15 NOTE — PROGRESS NOTES
Outpatient Nutrition Oncology Follow Up    Patient Name: Robbi Kennedy  YOB: 1955  MRN: [id number]  Assessment Date: 12/15/2021    CLINICAL NUTRITION ASSESSMENT      Type of Cancer Treatment  FOLFOX       CLINICAL NUTRITION ASSESSMENT      Reason for Assessment  Follow-up protocol     H&P:    Past Medical History:   Diagnosis Date   • Abnormal ECG    • Arrhythmia    • Asthma    • Atrial fibrillation, persistent (HCC) 2/26/2021   • Colorectal cancer (HCC)    • Deep vein thrombosis (HCC)    • GI cancer (HCC)    • Hepatitis    • High cholesterol    • Hypertension    • Rectal cancer (HCC) 6/18/2021   • Thyroid disease         Current Problems:   Patient Active Problem List   Diagnosis Code   • Allergic rhinitis J30.9   • Arthritis M19.90   • Asthma J45.909   • Bowel disease K63.9   • Rectal cancer (HCC) C20   • Encounter for adjustment or management of vascular access device Z45.2   • Atrial fibrillation, persistent (HCC) I48.19   • Benign prostatic hyperplasia N40.0   • Adenomatosis D12.6   • Essential hypertriglyceridemia E78.1   • Glossodynia K14.6   • Psychosexual dysfunction with inhibited sexual excitement F52.8   • Varicocele I86.1   • Essential hypertension I10   • Hyperlipidemia E78.5   • Other obesity due to excess calories E66.09   • Hypokalemia E87.6   • Cancer related pain G89.3   • Secondary malignant neoplasm of left lung (HCC) C78.02   • Secondary malignancy of right lung (HCC) C78.01   • Secondary malignancy of liver (HCC) C78.7   • Hypothyroidism E03.9   • History of DVT of lower extremity Z86.718   • Secondary malignant neoplasm of right lung (HCC) C78.01         Anthropometrics     Row Name 12/15/21 0928          Anthropometrics    Weight 126 kg (278 lb)                 BMI kg/m2   Body mass index is 33.85 kg/m².    Weight Hx  Wt Readings from Last 30 Encounters:   12/15/21 0928 126 kg (278 lb)   12/08/21 0856 126 kg (277 lb 12.5 oz)   12/01/21 0930 127 kg (279 lb 1.6 oz)   11/16/21  0823 124 kg (273 lb 5.9 oz)   10/29/21 0923 124 kg (272 lb 11.3 oz)   10/27/21 1348 127 kg (280 lb 6.8 oz)   10/25/21 0924 126 kg (278 lb 7.1 oz)   10/22/21 0923 125 kg (276 lb 3.8 oz)   10/20/21 0946 124 kg (273 lb 5.9 oz)   10/05/21 0911 122 kg (268 lb 15.4 oz)   09/14/21 1048 121 kg (267 lb 6.7 oz)   09/10/21 1320 118 kg (261 lb)   08/02/21 0805 122 kg (268 lb 4.8 oz)   08/02/21 0855 122 kg (268 lb 15.4 oz)   07/19/21 0753 124 kg (272 lb 4.3 oz)   07/19/21 0824 124 kg (273 lb 5.9 oz)   07/12/21 0909 122 kg (269 lb)   07/06/21 0833 124 kg (273 lb 2.4 oz)   07/06/21 0940 124 kg (273 lb 5.9 oz)   06/21/21 0819 124 kg (274 lb 0.5 oz)   06/21/21 0840 124 kg (273 lb 5.9 oz)   05/25/21 0000 124 kg (273 lb 6 oz)   05/24/21 0820 125 kg (274 lb 11.1 oz)   05/03/21 0812 124 kg (274 lb 4 oz)   04/13/21 1252 125 kg (276 lb 3.8 oz)   03/03/21 0000 124 kg (273 lb)   01/12/21 1436 126 kg (277 lb 12.5 oz)   11/11/20 0000 126 kg (277 lb)   10/01/20 1318 123 kg (270 lb 15.1 oz)   07/02/20 1117 124 kg (272 lb 11.3 oz)        Labs/Medications        Pertinent Labs Reviewed.   Results from last 7 days   Lab Units 12/14/21  0943   SODIUM mmol/L 137   POTASSIUM mmol/L 3.9   CHLORIDE mmol/L 106   CO2 mmol/L 26.3   BUN mg/dL 12   CREATININE mg/dL 0.86   CALCIUM mg/dL 9.0   BILIRUBIN mg/dL 0.9   ALK PHOS U/L 71   ALT (SGPT) U/L 23   AST (SGOT) U/L 19   GLUCOSE mg/dL 121*     Results from last 7 days   Lab Units 12/14/21  0943   HEMOGLOBIN g/dL 13.3   HEMATOCRIT % 40.6     Coronavirus (COVID-19)   Date Value Ref Range Status   04/22/2021 NOT DETECTED NA Final     Comment:     The SARS-CoV-2 assay is a real-time, RT-PCR test intended  for the qualitative detection of nucleic acid from the  SARS-CoV-2 in respiratory specimens from individuals,  testing performed at [facility] reference lab.       No results found for: HGBA1C      Pertinent Medications HYDROcodone-acetaminophen, acetaminophen, apixaban, ascorbic acid, atorvastatin,  cyanocobalamin, hydrOXYzine, levothyroxine, loratadine, metoprolol tartrate, multivitamin, ondansetron, oxyCODONE, pantoprazole, potassium chloride, prochlorperazine, sennosides-docusate, verapamil SR, and vitamin E     Physical Findings            Current Nutrition Orders & Evaluation of Intake       Oral Nutrition     Current PO Diet 3 meals/day, good variety, good protein, good fluid intake   Supplement      Enteral Nutrition     Current Formula    Schedule      Parenteral Nutrition     Current Prescription    Schedule      Nutrition Diagnosis        Nutrition Dx Problem 1 Increased nutrient needs related to increased nutrient needs due to catabolic disease as evidenced by physiological causes increasing nutrient needs.       Nutrition Intervention       RD Action Brief Nutritional Counseling  Nutrition Handouts (Oral Care)  Nutrition F/U      Monitor/Evaluation       Monitor Per oncology nutrition protocol.     Comments:  Spoke with pt during infusion. Last oncology RD assessment, RD was unable to contact pt, so followed up to see if pt had any questions or concerns regarding MNT handouts for possible side effects. He says he did read through the handouts and denies any questions. He reports good appetite, consumes 3 meals/day, good protein intake, good fluid intake. No significant weight loss since starting treatment. He denies significant nausea - occasionally will have this and takes recommended medications to help correct this. He denies using oral mouth rinse (occasionally will have some mild mouth pain). Provided additional handout with homemade mouthrinse recipe and encouraged pt to use this daily as recommended. Pt agreed to try. Encouraged pt to reach out with any questions or concerns. Pt verbalized understanding.     Electronically signed by:  Sarah Torrez RD  12/15/21 12:25 EST

## 2021-12-15 NOTE — ASSESSMENT & PLAN NOTE
Metastatic.  Patient is status post treatments as outlined including recent chemotherapy, surgery and stereotactic radiation to pulmonary nodules.  I reviewed his radiation oncology note.  They will watch the other small pulmonary nodules and if there is any change these lesions would be amenable to stereotactic radiation as well.  I will continue his chemotherapy.  His lab work today looks good.  He is having increased neuropathy and I will drop his oxaliplatin dose by 20%.  Proceed with cycle 7 as planned.  He will return in 2 weeks time for cycle 8 with lab work prior to monitor for toxicities.

## 2021-12-17 ENCOUNTER — HOSPITAL ENCOUNTER (OUTPATIENT)
Dept: ONCOLOGY | Facility: HOSPITAL | Age: 66
Setting detail: INFUSION SERIES
Discharge: HOME | End: 2021-12-17

## 2021-12-17 DIAGNOSIS — C78.02 SECONDARY MALIGNANT NEOPLASM OF LEFT LUNG (HCC): ICD-10-CM

## 2021-12-17 DIAGNOSIS — C20 RECTAL CANCER (HCC): ICD-10-CM

## 2021-12-17 DIAGNOSIS — Z45.2 ENCOUNTER FOR ADJUSTMENT OR MANAGEMENT OF VASCULAR ACCESS DEVICE: Primary | ICD-10-CM

## 2021-12-17 DIAGNOSIS — C78.7 SECONDARY MALIGNANCY OF LIVER (HCC): ICD-10-CM

## 2021-12-17 DIAGNOSIS — C78.01 SECONDARY MALIGNANT NEOPLASM OF RIGHT LUNG (HCC): ICD-10-CM

## 2021-12-17 PROCEDURE — 25010000002 HEPARIN LOCK FLUSH PER 10 UNITS: Performed by: INTERNAL MEDICINE

## 2021-12-17 RX ORDER — SODIUM CHLORIDE 0.9 % (FLUSH) 0.9 %
20 SYRINGE (ML) INJECTION AS NEEDED
Status: CANCELLED | OUTPATIENT
Start: 2021-12-27

## 2021-12-17 RX ORDER — HEPARIN SODIUM (PORCINE) LOCK FLUSH IV SOLN 100 UNIT/ML 100 UNIT/ML
500 SOLUTION INTRAVENOUS AS NEEDED
Status: CANCELLED | OUTPATIENT
Start: 2021-12-27

## 2021-12-17 RX ORDER — SODIUM CHLORIDE 0.9 % (FLUSH) 0.9 %
20 SYRINGE (ML) INJECTION AS NEEDED
Status: DISCONTINUED | OUTPATIENT
Start: 2021-12-17 | End: 2021-12-18 | Stop reason: HOSPADM

## 2021-12-17 RX ORDER — HEPARIN SODIUM (PORCINE) LOCK FLUSH IV SOLN 100 UNIT/ML 100 UNIT/ML
500 SOLUTION INTRAVENOUS AS NEEDED
Status: DISCONTINUED | OUTPATIENT
Start: 2021-12-17 | End: 2021-12-18 | Stop reason: HOSPADM

## 2021-12-17 RX ADMIN — HEPARIN SODIUM (PORCINE) LOCK FLUSH IV SOLN 100 UNIT/ML 500 UNITS: 100 SOLUTION at 13:51

## 2021-12-17 RX ADMIN — SODIUM CHLORIDE, PRESERVATIVE FREE 20 ML: 5 INJECTION INTRAVENOUS at 13:51

## 2021-12-24 RX ORDER — FLUOROURACIL 50 MG/ML
400 INJECTION, SOLUTION INTRAVENOUS ONCE
Status: CANCELLED | OUTPATIENT
Start: 2021-12-28

## 2021-12-24 RX ORDER — PALONOSETRON 0.05 MG/ML
0.25 INJECTION, SOLUTION INTRAVENOUS ONCE
Status: CANCELLED | OUTPATIENT
Start: 2021-12-28

## 2021-12-24 RX ORDER — DEXTROSE MONOHYDRATE 50 MG/ML
250 INJECTION, SOLUTION INTRAVENOUS ONCE
Status: CANCELLED | OUTPATIENT
Start: 2021-12-28

## 2021-12-24 RX ORDER — DIPHENHYDRAMINE HYDROCHLORIDE 50 MG/ML
50 INJECTION INTRAMUSCULAR; INTRAVENOUS AS NEEDED
Status: CANCELLED | OUTPATIENT
Start: 2021-12-28

## 2021-12-24 RX ORDER — FAMOTIDINE 10 MG/ML
20 INJECTION, SOLUTION INTRAVENOUS AS NEEDED
Status: CANCELLED | OUTPATIENT
Start: 2021-12-28

## 2021-12-27 ENCOUNTER — HOSPITAL ENCOUNTER (OUTPATIENT)
Dept: ONCOLOGY | Facility: HOSPITAL | Age: 66
Setting detail: INFUSION SERIES
Discharge: HOME | End: 2021-12-27

## 2021-12-27 DIAGNOSIS — C78.02 SECONDARY MALIGNANT NEOPLASM OF LEFT LUNG (HCC): ICD-10-CM

## 2021-12-27 DIAGNOSIS — C78.01 SECONDARY MALIGNANT NEOPLASM OF RIGHT LUNG (HCC): Primary | ICD-10-CM

## 2021-12-27 DIAGNOSIS — C20 RECTAL CANCER (HCC): ICD-10-CM

## 2021-12-27 DIAGNOSIS — C78.7 SECONDARY MALIGNANCY OF LIVER (HCC): ICD-10-CM

## 2021-12-27 DIAGNOSIS — Z45.2 ENCOUNTER FOR ADJUSTMENT OR MANAGEMENT OF VASCULAR ACCESS DEVICE: ICD-10-CM

## 2021-12-27 LAB
ALBUMIN SERPL-MCNC: 3.83 G/DL (ref 3.5–5.2)
ALBUMIN/GLOB SERPL: 1.9 G/DL
ALP SERPL-CCNC: 78 U/L (ref 39–117)
ALT SERPL W P-5'-P-CCNC: 24 U/L (ref 1–41)
ANION GAP SERPL CALCULATED.3IONS-SCNC: 8.4 MMOL/L (ref 5–15)
AST SERPL-CCNC: 23 U/L (ref 1–40)
BASOPHILS # BLD AUTO: 0.02 10*3/MM3 (ref 0–0.2)
BASOPHILS NFR BLD AUTO: 0.5 % (ref 0–1.5)
BILIRUB SERPL-MCNC: 0.8 MG/DL (ref 0–1.2)
BUN SERPL-MCNC: 14 MG/DL (ref 8–23)
BUN/CREAT SERPL: 15.9 (ref 7–25)
CALCIUM SPEC-SCNC: 9.2 MG/DL (ref 8.6–10.5)
CHLORIDE SERPL-SCNC: 103 MMOL/L (ref 98–107)
CO2 SERPL-SCNC: 25.6 MMOL/L (ref 22–29)
CREAT SERPL-MCNC: 0.88 MG/DL (ref 0.76–1.27)
DEPRECATED RDW RBC AUTO: 50.5 FL (ref 37–54)
EOSINOPHIL # BLD AUTO: 0.09 10*3/MM3 (ref 0–0.4)
EOSINOPHIL NFR BLD AUTO: 2.4 % (ref 0.3–6.2)
ERYTHROCYTE [DISTWIDTH] IN BLOOD BY AUTOMATED COUNT: 16.2 % (ref 12.3–15.4)
GFR SERPL CREATININE-BSD FRML MDRD: 87 ML/MIN/1.73
GLOBULIN UR ELPH-MCNC: 2.1 GM/DL
GLUCOSE SERPL-MCNC: 137 MG/DL (ref 65–99)
HCT VFR BLD AUTO: 39.9 % (ref 37.5–51)
HGB BLD-MCNC: 13.2 G/DL (ref 13–17.7)
IMM GRANULOCYTES # BLD AUTO: 0.01 10*3/MM3 (ref 0–0.05)
IMM GRANULOCYTES NFR BLD AUTO: 0.3 % (ref 0–0.5)
LYMPHOCYTES # BLD AUTO: 0.73 10*3/MM3 (ref 0.7–3.1)
LYMPHOCYTES NFR BLD AUTO: 19.2 % (ref 19.6–45.3)
MCH RBC QN AUTO: 29.9 PG (ref 26.6–33)
MCHC RBC AUTO-ENTMCNC: 33.1 G/DL (ref 31.5–35.7)
MCV RBC AUTO: 90.3 FL (ref 79–97)
MONOCYTES # BLD AUTO: 0.34 10*3/MM3 (ref 0.1–0.9)
MONOCYTES NFR BLD AUTO: 8.9 % (ref 5–12)
NEUTROPHILS NFR BLD AUTO: 2.62 10*3/MM3 (ref 1.7–7)
NEUTROPHILS NFR BLD AUTO: 68.7 % (ref 42.7–76)
PLATELET # BLD AUTO: 154 10*3/MM3 (ref 140–450)
PMV BLD AUTO: 8.9 FL (ref 6–12)
POTASSIUM SERPL-SCNC: 3.7 MMOL/L (ref 3.5–5.2)
PROT SERPL-MCNC: 5.9 G/DL (ref 6–8.5)
RBC # BLD AUTO: 4.42 10*6/MM3 (ref 4.14–5.8)
SODIUM SERPL-SCNC: 137 MMOL/L (ref 136–145)
WBC NRBC COR # BLD: 3.81 10*3/MM3 (ref 3.4–10.8)

## 2021-12-27 PROCEDURE — 36591 DRAW BLOOD OFF VENOUS DEVICE: CPT

## 2021-12-27 PROCEDURE — 80053 COMPREHEN METABOLIC PANEL: CPT | Performed by: INTERNAL MEDICINE

## 2021-12-27 PROCEDURE — 85025 COMPLETE CBC W/AUTO DIFF WBC: CPT | Performed by: INTERNAL MEDICINE

## 2021-12-27 PROCEDURE — 25010000002 HEPARIN LOCK FLUSH PER 10 UNITS: Performed by: INTERNAL MEDICINE

## 2021-12-27 RX ORDER — HEPARIN SODIUM (PORCINE) LOCK FLUSH IV SOLN 100 UNIT/ML 100 UNIT/ML
500 SOLUTION INTRAVENOUS AS NEEDED
Status: DISCONTINUED | OUTPATIENT
Start: 2021-12-27 | End: 2021-12-28 | Stop reason: HOSPADM

## 2021-12-27 RX ORDER — SODIUM CHLORIDE 0.9 % (FLUSH) 0.9 %
20 SYRINGE (ML) INJECTION AS NEEDED
Status: CANCELLED | OUTPATIENT
Start: 2021-12-27

## 2021-12-27 RX ORDER — SODIUM CHLORIDE 0.9 % (FLUSH) 0.9 %
20 SYRINGE (ML) INJECTION AS NEEDED
Status: DISCONTINUED | OUTPATIENT
Start: 2021-12-27 | End: 2021-12-28 | Stop reason: HOSPADM

## 2021-12-27 RX ORDER — HEPARIN SODIUM (PORCINE) LOCK FLUSH IV SOLN 100 UNIT/ML 100 UNIT/ML
500 SOLUTION INTRAVENOUS AS NEEDED
Status: CANCELLED | OUTPATIENT
Start: 2021-12-27

## 2021-12-27 RX ADMIN — HEPARIN SODIUM (PORCINE) LOCK FLUSH IV SOLN 100 UNIT/ML 500 UNITS: 100 SOLUTION at 08:59

## 2021-12-27 RX ADMIN — SODIUM CHLORIDE, PRESERVATIVE FREE 20 ML: 5 INJECTION INTRAVENOUS at 08:58

## 2021-12-28 ENCOUNTER — HOSPITAL ENCOUNTER (OUTPATIENT)
Dept: ONCOLOGY | Facility: HOSPITAL | Age: 66
Setting detail: INFUSION SERIES
Discharge: HOME | End: 2021-12-28

## 2021-12-28 VITALS
DIASTOLIC BLOOD PRESSURE: 83 MMHG | SYSTOLIC BLOOD PRESSURE: 129 MMHG | WEIGHT: 278.66 LBS | HEART RATE: 105 BPM | HEIGHT: 76 IN | RESPIRATION RATE: 18 BRPM | OXYGEN SATURATION: 98 % | BODY MASS INDEX: 33.93 KG/M2 | TEMPERATURE: 97 F

## 2021-12-28 DIAGNOSIS — C20 RECTAL CANCER (HCC): ICD-10-CM

## 2021-12-28 DIAGNOSIS — C78.01 SECONDARY MALIGNANT NEOPLASM OF RIGHT LUNG (HCC): Primary | ICD-10-CM

## 2021-12-28 DIAGNOSIS — C78.02 SECONDARY MALIGNANT NEOPLASM OF LEFT LUNG (HCC): ICD-10-CM

## 2021-12-28 DIAGNOSIS — C78.7 SECONDARY MALIGNANCY OF LIVER (HCC): ICD-10-CM

## 2021-12-28 PROCEDURE — 25010000002 LEUCOVORIN CALCIUM PER 50 MG: Performed by: INTERNAL MEDICINE

## 2021-12-28 PROCEDURE — 96415 CHEMO IV INFUSION ADDL HR: CPT

## 2021-12-28 PROCEDURE — 96411 CHEMO IV PUSH ADDL DRUG: CPT

## 2021-12-28 PROCEDURE — 25010000002 DEXAMETHASONE SODIUM PHOSPHATE 120 MG/30ML SOLUTION: Performed by: INTERNAL MEDICINE

## 2021-12-28 PROCEDURE — 96375 TX/PRO/DX INJ NEW DRUG ADDON: CPT

## 2021-12-28 PROCEDURE — G0498 CHEMO EXTEND IV INFUS W/PUMP: HCPCS

## 2021-12-28 PROCEDURE — 96409 CHEMO IV PUSH SNGL DRUG: CPT

## 2021-12-28 PROCEDURE — 25010000002 PALONOSETRON PER 25 MCG: Performed by: INTERNAL MEDICINE

## 2021-12-28 PROCEDURE — 96413 CHEMO IV INFUSION 1 HR: CPT

## 2021-12-28 PROCEDURE — 96368 THER/DIAG CONCURRENT INF: CPT

## 2021-12-28 PROCEDURE — 25010000002 FLUOROURACIL PER 500 MG: Performed by: INTERNAL MEDICINE

## 2021-12-28 PROCEDURE — 25010000002 OXALIPLATIN PER 0.5 MG: Performed by: INTERNAL MEDICINE

## 2021-12-28 RX ORDER — HEPARIN SODIUM (PORCINE) LOCK FLUSH IV SOLN 100 UNIT/ML 100 UNIT/ML
500 SOLUTION INTRAVENOUS AS NEEDED
Status: CANCELLED | OUTPATIENT
Start: 2021-12-28

## 2021-12-28 RX ORDER — FLUOROURACIL 50 MG/ML
1000 INJECTION, SOLUTION INTRAVENOUS ONCE
Status: COMPLETED | OUTPATIENT
Start: 2021-12-28 | End: 2021-12-28

## 2021-12-28 RX ORDER — DEXTROSE MONOHYDRATE 50 MG/ML
250 INJECTION, SOLUTION INTRAVENOUS ONCE
Status: COMPLETED | OUTPATIENT
Start: 2021-12-28 | End: 2021-12-28

## 2021-12-28 RX ORDER — SODIUM CHLORIDE 0.9 % (FLUSH) 0.9 %
20 SYRINGE (ML) INJECTION AS NEEDED
Status: CANCELLED | OUTPATIENT
Start: 2021-12-28

## 2021-12-28 RX ORDER — PALONOSETRON 0.05 MG/ML
0.25 INJECTION, SOLUTION INTRAVENOUS ONCE
Status: COMPLETED | OUTPATIENT
Start: 2021-12-28 | End: 2021-12-28

## 2021-12-28 RX ADMIN — FLUOROURACIL 1000 MG: 50 INJECTION, SOLUTION INTRAVENOUS at 11:40

## 2021-12-28 RX ADMIN — PALONOSETRON HYDROCHLORIDE 0.25 MG: 0.25 INJECTION, SOLUTION INTRAVENOUS at 09:04

## 2021-12-28 RX ADMIN — FLUOROURACIL 6100 MG: 50 INJECTION, SOLUTION INTRAVENOUS at 11:45

## 2021-12-28 RX ADMIN — LEUCOVORIN CALCIUM 1000 MG: 350 INJECTION, POWDER, LYOPHILIZED, FOR SUSPENSION INTRAMUSCULAR; INTRAVENOUS at 09:35

## 2021-12-28 RX ADMIN — DEXAMETHASONE SODIUM PHOSPHATE 12 MG: 4 INJECTION, SOLUTION INTRA-ARTICULAR; INTRALESIONAL; INTRAMUSCULAR; INTRAVENOUS; SOFT TISSUE at 09:07

## 2021-12-28 RX ADMIN — OXALIPLATIN 170 MG: 5 INJECTION, SOLUTION INTRAVENOUS at 09:35

## 2021-12-28 RX ADMIN — DEXTROSE MONOHYDRATE 250 ML: 5 INJECTION, SOLUTION INTRAVENOUS at 09:01

## 2021-12-30 ENCOUNTER — HOSPITAL ENCOUNTER (OUTPATIENT)
Dept: ONCOLOGY | Facility: HOSPITAL | Age: 66
Setting detail: INFUSION SERIES
Discharge: HOME | End: 2021-12-30

## 2021-12-30 DIAGNOSIS — C78.02 SECONDARY MALIGNANT NEOPLASM OF LEFT LUNG (HCC): ICD-10-CM

## 2021-12-30 DIAGNOSIS — Z45.2 ENCOUNTER FOR ADJUSTMENT OR MANAGEMENT OF VASCULAR ACCESS DEVICE: Primary | ICD-10-CM

## 2021-12-30 DIAGNOSIS — C20 RECTAL CANCER (HCC): ICD-10-CM

## 2021-12-30 DIAGNOSIS — C78.01 SECONDARY MALIGNANT NEOPLASM OF RIGHT LUNG (HCC): ICD-10-CM

## 2021-12-30 DIAGNOSIS — C78.7 SECONDARY MALIGNANCY OF LIVER (HCC): ICD-10-CM

## 2021-12-30 PROCEDURE — 25010000002 HEPARIN LOCK FLUSH PER 10 UNITS: Performed by: INTERNAL MEDICINE

## 2021-12-30 RX ORDER — SODIUM CHLORIDE 0.9 % (FLUSH) 0.9 %
20 SYRINGE (ML) INJECTION AS NEEDED
Status: DISCONTINUED | OUTPATIENT
Start: 2021-12-30 | End: 2021-12-31 | Stop reason: HOSPADM

## 2021-12-30 RX ORDER — HEPARIN SODIUM (PORCINE) LOCK FLUSH IV SOLN 100 UNIT/ML 100 UNIT/ML
500 SOLUTION INTRAVENOUS AS NEEDED
Status: CANCELLED | OUTPATIENT
Start: 2021-12-30

## 2021-12-30 RX ORDER — SODIUM CHLORIDE 0.9 % (FLUSH) 0.9 %
20 SYRINGE (ML) INJECTION AS NEEDED
Status: CANCELLED | OUTPATIENT
Start: 2021-12-30

## 2021-12-30 RX ORDER — HEPARIN SODIUM (PORCINE) LOCK FLUSH IV SOLN 100 UNIT/ML 100 UNIT/ML
500 SOLUTION INTRAVENOUS AS NEEDED
Status: DISCONTINUED | OUTPATIENT
Start: 2021-12-30 | End: 2021-12-31 | Stop reason: HOSPADM

## 2021-12-30 RX ADMIN — SODIUM CHLORIDE, PRESERVATIVE FREE 20 ML: 5 INJECTION INTRAVENOUS at 10:41

## 2021-12-30 RX ADMIN — HEPARIN SODIUM (PORCINE) LOCK FLUSH IV SOLN 100 UNIT/ML 500 UNITS: 100 SOLUTION at 10:42

## 2022-01-04 PROBLEM — K63.9 BOWEL DISEASE: Status: RESOLVED | Noted: 2021-06-18 | Resolved: 2022-01-04

## 2022-01-04 PROBLEM — C78.01 SECONDARY MALIGNANCY OF RIGHT LUNG: Status: RESOLVED | Noted: 2021-09-14 | Resolved: 2022-01-04

## 2022-01-04 PROBLEM — E78.1 ESSENTIAL HYPERTRIGLYCERIDEMIA: Status: RESOLVED | Noted: 2020-12-31 | Resolved: 2022-01-04

## 2022-01-04 PROBLEM — E87.6 HYPOKALEMIA: Status: RESOLVED | Noted: 2021-07-19 | Resolved: 2022-01-04

## 2022-01-04 PROBLEM — C78.01 SECONDARY MALIGNANT NEOPLASM OF RIGHT LUNG: Status: ACTIVE | Noted: 2021-10-05

## 2022-01-04 PROBLEM — Z86.718 HISTORY OF DVT OF LOWER EXTREMITY: Status: RESOLVED | Noted: 2021-08-27 | Resolved: 2022-01-04

## 2022-01-04 NOTE — PATIENT INSTRUCTIONS
"Low-Sodium Eating Plan  Sodium, which is an element that makes up salt, helps you maintain a healthy balance of fluids in your body. Too much sodium can increase your blood pressure and cause fluid and waste to be held in your body.  Your health care provider or dietitian may recommend following this plan if you have high blood pressure (hypertension), kidney disease, liver disease, or heart failure. Eating less sodium can help lower your blood pressure, reduce swelling, and protect your heart, liver, and kidneys.  What are tips for following this plan?  Reading food labels  · The Nutrition Facts label lists the amount of sodium in one serving of the food. If you eat more than one serving, you must multiply the listed amount of sodium by the number of servings.  · Choose foods with less than 140 mg of sodium per serving.  · Avoid foods with 300 mg of sodium or more per serving.  Shopping    · Look for lower-sodium products, often labeled as \"low-sodium\" or \"no salt added.\"  · Always check the sodium content, even if foods are labeled as \"unsalted\" or \"no salt added.\"  · Buy fresh foods.  ? Avoid canned foods and pre-made or frozen meals.  ? Avoid canned, cured, or processed meats.  · Buy breads that have less than 80 mg of sodium per slice.    Cooking    · Eat more home-cooked food and less restaurant, buffet, and fast food.  · Avoid adding salt when cooking. Use salt-free seasonings or herbs instead of table salt or sea salt. Check with your health care provider or pharmacist before using salt substitutes.  · Cook with plant-based oils, such as canola, sunflower, or olive oil.    Meal planning  · When eating at a restaurant, ask that your food be prepared with less salt or no salt, if possible. Avoid dishes labeled as brined, pickled, cured, smoked, or made with soy sauce, miso, or teriyaki sauce.  · Avoid foods that contain MSG (monosodium glutamate). MSG is sometimes added to Chinese food, bouillon, and some " "canned foods.  · Make meals that can be grilled, baked, poached, roasted, or steamed. These are generally made with less sodium.  General information  Most people on this plan should limit their sodium intake to 1,500-2,000 mg (milligrams) of sodium each day.  What foods should I eat?  Fruits  Fresh, frozen, or canned fruit. Fruit juice.  Vegetables  Fresh or frozen vegetables. \"No salt added\" canned vegetables. \"No salt added\" tomato sauce and paste. Low-sodium or reduced-sodium tomato and vegetable juice.  Grains  Low-sodium cereals, including oats, puffed wheat and rice, and shredded wheat. Low-sodium crackers. Unsalted rice. Unsalted pasta. Low-sodium bread. Whole-grain breads and whole-grain pasta.  Meats and other proteins  Fresh or frozen (no salt added) meat, poultry, seafood, and fish. Low-sodium canned tuna and salmon. Unsalted nuts. Dried peas, beans, and lentils without added salt. Unsalted canned beans. Eggs. Unsalted nut butters.  Dairy  Milk. Soy milk. Cheese that is naturally low in sodium, such as ricotta cheese, fresh mozzarella, or Swiss cheese. Low-sodium or reduced-sodium cheese. Cream cheese. Yogurt.  Seasonings and condiments  Fresh and dried herbs and spices. Salt-free seasonings. Low-sodium mustard and ketchup. Sodium-free salad dressing. Sodium-free light mayonnaise. Fresh or refrigerated horseradish. Lemon juice. Vinegar.  Other foods  Homemade, reduced-sodium, or low-sodium soups. Unsalted popcorn and pretzels. Low-salt or salt-free chips.  The items listed above may not be a complete list of foods and beverages you can eat. Contact a dietitian for more information.  What foods should I avoid?  Vegetables  Sauerkraut, pickled vegetables, and relishes. Olives. French fries. Onion rings. Regular canned vegetables (not low-sodium or reduced-sodium). Regular canned tomato sauce and paste (not low-sodium or reduced-sodium). Regular tomato and vegetable juice (not low-sodium or reduced-sodium). " Frozen vegetables in sauces.  Grains  Instant hot cereals. Bread stuffing, pancake, and biscuit mixes. Croutons. Seasoned rice or pasta mixes. Noodle soup cups. Boxed or frozen macaroni and cheese. Regular salted crackers. Self-rising flour.  Meats and other proteins  Meat or fish that is salted, canned, smoked, spiced, or pickled. Precooked or cured meat, such as sausages or meat loaves. Sauceda. Ham. Pepperoni. Hot dogs. Corned beef. Chipped beef. Salt pork. Jerky. Pickled herring. Anchovies and sardines. Regular canned tuna. Salted nuts.  Dairy  Processed cheese and cheese spreads. Hard cheeses. Cheese curds. Blue cheese. Feta cheese. String cheese. Regular cottage cheese. Buttermilk. Canned milk.  Fats and oils  Salted butter. Regular margarine. Ghee. Sauceda fat.  Seasonings and condiments  Onion salt, garlic salt, seasoned salt, table salt, and sea salt. Canned and packaged gravies. Worcestershire sauce. Tartar sauce. Barbecue sauce. Teriyaki sauce. Soy sauce, including reduced-sodium. Steak sauce. Fish sauce. Oyster sauce. Cocktail sauce. Horseradish that you find on the shelf. Regular ketchup and mustard. Meat flavorings and tenderizers. Bouillon cubes. Hot sauce. Pre-made or packaged marinades. Pre-made or packaged taco seasonings. Relishes. Regular salad dressings. Salsa.  Other foods  Salted popcorn and pretzels. Corn chips and puffs. Potato and tortilla chips. Canned or dried soups. Pizza. Frozen entrees and pot pies.  The items listed above may not be a complete list of foods and beverages you should avoid. Contact a dietitian for more information.  Summary  · Eating less sodium can help lower your blood pressure, reduce swelling, and protect your heart, liver, and kidneys.  · Most people on this plan should limit their sodium intake to 1,500-2,000 mg (milligrams) of sodium each day.  · Canned, boxed, and frozen foods are high in sodium. Restaurant foods, fast foods, and pizza are also very high in sodium.  You also get sodium by adding salt to food.  · Try to cook at home, eat more fresh fruits and vegetables, and eat less fast food and canned, processed, or prepared foods.  This information is not intended to replace advice given to you by your health care provider. Make sure you discuss any questions you have with your health care provider.  Document Revised: 01/22/2021 Document Reviewed: 11/18/2020  [employer] Patient Education © 2021 [employer]      Cholesterol Content in Foods  Cholesterol is a waxy, fat-like substance that helps to carry fat in the blood. The body needs cholesterol in small amounts, but too much cholesterol can cause damage to the arteries and heart.  Most people should eat less than 200 milligrams (mg) of cholesterol a day.  Foods with cholesterol    Cholesterol is found in animal-based foods, such as meat, seafood, and dairy. Generally, low-fat dairy and lean meats have less cholesterol than full-fat dairy and fatty meats. The milligrams of cholesterol per serving (mg per serving) of common cholesterol-containing foods are listed below.  Meat and other proteins  · Egg -- one large whole egg has 186 mg.  · Veal shank -- 4 oz has 141 mg.  · Lean ground turkey (93% lean) -- 4 oz has 118 mg.  · Fat-trimmed lamb loin -- 4 oz has 106 mg.  · Lean ground beef (90% lean) -- 4 oz has 100 mg.  · Lobster -- 3.5 oz has 90 mg.  · Pork loin chops -- 4 oz has 86 mg.  · Canned salmon -- 3.5 oz has 83 mg.  · Fat-trimmed beef top loin -- 4 oz has 78 mg.  · Frankfurter -- 1 robi (3.5 oz) has 77 mg.  · Crab -- 3.5 oz has 71 mg.  · Roasted chicken without skin, white meat -- 4 oz has 66 mg.  · Light bologna -- 2 oz has 45 mg.  · Deli-cut turkey -- 2 oz has 31 mg.  · Canned tuna -- 3.5 oz has 31 mg.  · Sauceda -- 1 oz has 29 mg.  · Oysters and mussels (raw) -- 3.5 oz has 25 mg.  · Mackerel -- 1 oz has 22 mg.  · Trout -- 1 oz has 20 mg.  · Pork sausage -- 1 link (1 oz) has 17 mg.  · Sharples -- 1 oz has 16  mg.  · Tilapia -- 1 oz has 14 mg.  Dairy  · Soft-serve ice cream -- ½ cup (4 oz) has 103 mg.  · Whole-milk yogurt -- 1 cup (8 oz) has 29 mg.  · Cheddar cheese -- 1 oz has 28 mg.  · American cheese -- 1 oz has 28 mg.  · Whole milk -- 1 cup (8 oz) has 23 mg.  · 2% milk -- 1 cup (8 oz) has 18 mg.  · Cream cheese -- 1 tablespoon (Tbsp) has 15 mg.  · Cottage cheese -- ½ cup (4 oz) has 14 mg.  · Low-fat (1%) milk -- 1 cup (8 oz) has 10 mg.  · Sour cream -- 1 Tbsp has 8.5 mg.  · Low-fat yogurt -- 1 cup (8 oz) has 8 mg.  · Nonfat Greek yogurt -- 1 cup (8 oz) has 7 mg.  · Half-and-half cream -- 1 Tbsp has 5 mg.  Fats and oils  · Cod liver oil -- 1 tablespoon (Tbsp) has 82 mg.  · Butter -- 1 Tbsp has 15 mg.  · Lard -- 1 Tbsp has 14 mg.  · [place]ueda grease -- 1 Tbsp has 14 mg.  · Mayonnaise -- 1 Tbsp has 5-10 mg.  · Margarine -- 1 Tbsp has 3-10 mg.  Exact amounts of cholesterol in these foods may vary depending on specific ingredients and brands.  Foods without cholesterol  Most plant-based foods do not have cholesterol unless you combine them with a food that has cholesterol. Foods without cholesterol include:  · Grains and cereals.  · Vegetables.  · Fruits.  · Vegetable oils, such as olive, canola, and sunflower oil.  · Legumes, such as peas, beans, and lentils.  · Nuts and seeds.  · Egg whites.  Summary  · The body needs cholesterol in small amounts, but too much cholesterol can cause damage to the arteries and heart.  · Most people should eat less than 200 milligrams (mg) of cholesterol a day.  This information is not intended to replace advice given to you by your health care provider. Make sure you discuss any questions you have with your health care provider.  Document Revised: 05/10/2021 Document Reviewed: 05/10/2021  [employer] Patient Education © 2021 [employer]

## 2022-01-05 ENCOUNTER — OFFICE VISIT (OUTPATIENT)
Dept: CARDIOLOGY | Facility: CLINIC | Age: 67
End: 2022-01-05

## 2022-01-05 VITALS
SYSTOLIC BLOOD PRESSURE: 118 MMHG | WEIGHT: 275 LBS | HEIGHT: 76 IN | BODY MASS INDEX: 33.49 KG/M2 | DIASTOLIC BLOOD PRESSURE: 81 MMHG | HEART RATE: 78 BPM

## 2022-01-05 DIAGNOSIS — E78.5 HYPERLIPIDEMIA LDL GOAL <100: ICD-10-CM

## 2022-01-05 DIAGNOSIS — I10 ESSENTIAL HYPERTENSION: ICD-10-CM

## 2022-01-05 DIAGNOSIS — I48.19 ATRIAL FIBRILLATION, PERSISTENT: Primary | Chronic | ICD-10-CM

## 2022-01-05 PROCEDURE — 99214 OFFICE O/P EST MOD 30 MIN: CPT | Performed by: NURSE PRACTITIONER

## 2022-01-05 RX ORDER — METOPROLOL TARTRATE 100 MG/1
100 TABLET ORAL 2 TIMES DAILY
Qty: 180 TABLET | Refills: 3 | Status: SHIPPED | OUTPATIENT
Start: 2022-01-05 | End: 2022-12-20 | Stop reason: SDUPTHER

## 2022-01-11 ENCOUNTER — OFFICE VISIT (OUTPATIENT)
Dept: ONCOLOGY | Facility: HOSPITAL | Age: 67
End: 2022-01-11

## 2022-01-11 ENCOUNTER — HOSPITAL ENCOUNTER (OUTPATIENT)
Dept: ONCOLOGY | Facility: HOSPITAL | Age: 67
Setting detail: INFUSION SERIES
Discharge: HOME | End: 2022-01-11

## 2022-01-11 VITALS
HEART RATE: 102 BPM | TEMPERATURE: 97.5 F | OXYGEN SATURATION: 98 % | BODY MASS INDEX: 33.68 KG/M2 | DIASTOLIC BLOOD PRESSURE: 91 MMHG | SYSTOLIC BLOOD PRESSURE: 123 MMHG | RESPIRATION RATE: 18 BRPM | WEIGHT: 276.68 LBS

## 2022-01-11 DIAGNOSIS — C78.01 SECONDARY MALIGNANT NEOPLASM OF RIGHT LUNG: Primary | ICD-10-CM

## 2022-01-11 DIAGNOSIS — C20 RECTAL CANCER: Primary | ICD-10-CM

## 2022-01-11 DIAGNOSIS — Z45.2 ENCOUNTER FOR ADJUSTMENT OR MANAGEMENT OF VASCULAR ACCESS DEVICE: ICD-10-CM

## 2022-01-11 DIAGNOSIS — C78.02 SECONDARY MALIGNANT NEOPLASM OF LEFT LUNG: ICD-10-CM

## 2022-01-11 DIAGNOSIS — C78.7 SECONDARY MALIGNANCY OF LIVER: ICD-10-CM

## 2022-01-11 DIAGNOSIS — C20 RECTAL CANCER: ICD-10-CM

## 2022-01-11 DIAGNOSIS — D69.6 THROMBOCYTOPENIA: ICD-10-CM

## 2022-01-11 LAB
ALBUMIN SERPL-MCNC: 3.74 G/DL (ref 3.5–5.2)
ALBUMIN/GLOB SERPL: 1.8 G/DL
ALP SERPL-CCNC: 80 U/L (ref 39–117)
ALT SERPL W P-5'-P-CCNC: 22 U/L (ref 1–41)
ANION GAP SERPL CALCULATED.3IONS-SCNC: 8.4 MMOL/L (ref 5–15)
AST SERPL-CCNC: 21 U/L (ref 1–40)
BASOPHILS # BLD AUTO: 0.03 10*3/MM3 (ref 0–0.2)
BASOPHILS NFR BLD AUTO: 0.7 % (ref 0–1.5)
BILIRUB SERPL-MCNC: 0.8 MG/DL (ref 0–1.2)
BUN SERPL-MCNC: 12 MG/DL (ref 8–23)
BUN/CREAT SERPL: 12.6 (ref 7–25)
CALCIUM SPEC-SCNC: 9 MG/DL (ref 8.6–10.5)
CHLORIDE SERPL-SCNC: 105 MMOL/L (ref 98–107)
CO2 SERPL-SCNC: 25.6 MMOL/L (ref 22–29)
CREAT SERPL-MCNC: 0.95 MG/DL (ref 0.76–1.27)
DEPRECATED RDW RBC AUTO: 59.2 FL (ref 37–54)
EOSINOPHIL # BLD AUTO: 0.11 10*3/MM3 (ref 0–0.4)
EOSINOPHIL NFR BLD AUTO: 2.7 % (ref 0.3–6.2)
ERYTHROCYTE [DISTWIDTH] IN BLOOD BY AUTOMATED COUNT: 18.5 % (ref 12.3–15.4)
GFR SERPL CREATININE-BSD FRML MDRD: 80 ML/MIN/1.73
GLOBULIN UR ELPH-MCNC: 2.1 GM/DL
GLUCOSE SERPL-MCNC: 104 MG/DL (ref 65–99)
HCT VFR BLD AUTO: 38.3 % (ref 37.5–51)
HGB BLD-MCNC: 12.7 G/DL (ref 13–17.7)
IMM GRANULOCYTES # BLD AUTO: 0 10*3/MM3 (ref 0–0.05)
IMM GRANULOCYTES NFR BLD AUTO: 0 % (ref 0–0.5)
LYMPHOCYTES # BLD AUTO: 0.71 10*3/MM3 (ref 0.7–3.1)
LYMPHOCYTES NFR BLD AUTO: 17.4 % (ref 19.6–45.3)
MCH RBC QN AUTO: 30.9 PG (ref 26.6–33)
MCHC RBC AUTO-ENTMCNC: 33.2 G/DL (ref 31.5–35.7)
MCV RBC AUTO: 93.2 FL (ref 79–97)
MONOCYTES # BLD AUTO: 0.48 10*3/MM3 (ref 0.1–0.9)
MONOCYTES NFR BLD AUTO: 11.8 % (ref 5–12)
NEUTROPHILS NFR BLD AUTO: 2.75 10*3/MM3 (ref 1.7–7)
NEUTROPHILS NFR BLD AUTO: 67.4 % (ref 42.7–76)
PLATELET # BLD AUTO: 136 10*3/MM3 (ref 140–450)
PMV BLD AUTO: 8.8 FL (ref 6–12)
POTASSIUM SERPL-SCNC: 4.1 MMOL/L (ref 3.5–5.2)
PROT SERPL-MCNC: 5.8 G/DL (ref 6–8.5)
RBC # BLD AUTO: 4.11 10*6/MM3 (ref 4.14–5.8)
SODIUM SERPL-SCNC: 139 MMOL/L (ref 136–145)
WBC NRBC COR # BLD: 4.08 10*3/MM3 (ref 3.4–10.8)

## 2022-01-11 PROCEDURE — 36591 DRAW BLOOD OFF VENOUS DEVICE: CPT

## 2022-01-11 PROCEDURE — 99214 OFFICE O/P EST MOD 30 MIN: CPT | Performed by: INTERNAL MEDICINE

## 2022-01-11 PROCEDURE — G0463 HOSPITAL OUTPT CLINIC VISIT: HCPCS

## 2022-01-11 PROCEDURE — 80053 COMPREHEN METABOLIC PANEL: CPT | Performed by: INTERNAL MEDICINE

## 2022-01-11 PROCEDURE — 85025 COMPLETE CBC W/AUTO DIFF WBC: CPT | Performed by: INTERNAL MEDICINE

## 2022-01-11 PROCEDURE — 25010000002 HEPARIN LOCK FLUSH PER 10 UNITS: Performed by: INTERNAL MEDICINE

## 2022-01-11 RX ORDER — DEXTROSE MONOHYDRATE 50 MG/ML
250 INJECTION, SOLUTION INTRAVENOUS ONCE
Status: CANCELLED | OUTPATIENT
Start: 2022-01-12

## 2022-01-11 RX ORDER — LEVOTHYROXINE SODIUM 25 MCG
25 TABLET ORAL DAILY
COMMUNITY
Start: 2022-01-05

## 2022-01-11 RX ORDER — FAMOTIDINE 10 MG/ML
20 INJECTION, SOLUTION INTRAVENOUS AS NEEDED
Status: CANCELLED | OUTPATIENT
Start: 2022-01-12

## 2022-01-11 RX ORDER — DIPHENHYDRAMINE HYDROCHLORIDE 50 MG/ML
50 INJECTION INTRAMUSCULAR; INTRAVENOUS AS NEEDED
Status: CANCELLED | OUTPATIENT
Start: 2022-01-12

## 2022-01-11 RX ORDER — FLUOROURACIL 50 MG/ML
400 INJECTION, SOLUTION INTRAVENOUS ONCE
Status: CANCELLED | OUTPATIENT
Start: 2022-01-12

## 2022-01-11 RX ORDER — HEPARIN SODIUM (PORCINE) LOCK FLUSH IV SOLN 100 UNIT/ML 100 UNIT/ML
500 SOLUTION INTRAVENOUS AS NEEDED
Status: DISCONTINUED | OUTPATIENT
Start: 2022-01-11 | End: 2022-01-12 | Stop reason: HOSPADM

## 2022-01-11 RX ORDER — SODIUM CHLORIDE 0.9 % (FLUSH) 0.9 %
20 SYRINGE (ML) INJECTION AS NEEDED
Status: CANCELLED | OUTPATIENT
Start: 2022-01-11

## 2022-01-11 RX ORDER — SODIUM CHLORIDE 0.9 % (FLUSH) 0.9 %
20 SYRINGE (ML) INJECTION AS NEEDED
Status: DISCONTINUED | OUTPATIENT
Start: 2022-01-11 | End: 2022-01-12 | Stop reason: HOSPADM

## 2022-01-11 RX ORDER — PALONOSETRON 0.05 MG/ML
0.25 INJECTION, SOLUTION INTRAVENOUS ONCE
Status: CANCELLED | OUTPATIENT
Start: 2022-01-12

## 2022-01-11 RX ORDER — HEPARIN SODIUM (PORCINE) LOCK FLUSH IV SOLN 100 UNIT/ML 100 UNIT/ML
500 SOLUTION INTRAVENOUS AS NEEDED
Status: CANCELLED | OUTPATIENT
Start: 2022-01-11

## 2022-01-11 RX ORDER — FLUOROURACIL 50 MG/ML
280 INJECTION, SOLUTION INTRAVENOUS ONCE
Status: CANCELLED | OUTPATIENT
Start: 2022-01-12

## 2022-01-11 RX ADMIN — HEPARIN SODIUM (PORCINE) LOCK FLUSH IV SOLN 100 UNIT/ML 500 UNITS: 100 SOLUTION at 10:08

## 2022-01-11 RX ADMIN — SODIUM CHLORIDE, PRESERVATIVE FREE 20 ML: 5 INJECTION INTRAVENOUS at 10:08

## 2022-01-11 NOTE — PROGRESS NOTES
Chief Complaint  Rectal Cancer and Follow-up    Maurilio Campos MD Reinberg, Emily, REGINALDO Fonseca          Robbi Kennedy presents to [facility] for ongoing treatment of his rectal cancer.  He is receiving chemotherapy with FOLFOX.  He states that he is tolerating the treatment well with exception of neuropathy.  This is especially bad in his feet.  He has to pay attention where he is walking.  He gets a little bit of numbness in his hands if exposed to cold.  Denies issues from his Port-A-Cath.  He denies nausea, vomiting, diarrhea.  He notes good appetite and his energy level is adequate for his daily needs.    Oncology/Hematology History Overview Note   9/30/2019 High rectal [id number]-[id number] adenocarcinoma  associated with tubular adenoma.   4/27/21 Biopsy of liver revealed  metastatic adenocarcinoma, consistent with colorectal primary.    Clinical Staging  Stage IIa ([id number])    Chemotherapy      neoadjuvant Xeloda with radiation. adjuvant xeloda        Radiation Therapy      7/8/19 thru 8/14/19 completed neoadjuvant 5040 cGy=28 fxs rectum     10/2021 XRT due to reoccurance     Surgeries       9/30/2019 low anterior resection with ostomy        8/31/21 partial hepatectomy, cholecystectomy and MWA for segment 6 medical margin performed at        Rectal cancer   6/2/2021 - 8/15/2021 Chemotherapy    OP COLON mFOLFOX6 + Bevacizumab (OXALIplatin / Leucovorin / Fluorouracil / Bevacizumab)     6/18/2021 Initial Diagnosis    Rectal cancer (CMS/HCC)     6/21/2021 Cancer Staged    Staging form: Colon And Rectum, AJCC 8th Edition  - Clinical: Stage IIA (cT3, cN0, cM0) - Signed by Maurilio Campos MD on 6/21/2021 11/16/2021 -  Chemotherapy    OP COLON mFOLFOX6 OXALIplatin / Leucovorin / Fluorouracil     Secondary malignant neoplasm of left lung   9/14/2021 Initial Diagnosis    Secondary malignant neoplasm of left lung (HCC)     10/20/2021 -  Radiation     RADIATION THERAPY Treatment Details (Noted on 10/5/2021)  Site: Bilateral Lung  Technique: SBRT  Goal: No goal specified  Planned Treatment Start Date: 10/20/2021     11/16/2021 -  Chemotherapy    OP COLON mFOLFOX6 OXALIplatin / Leucovorin / Fluorouracil     Secondary malignancy of liver   9/14/2021 Initial Diagnosis    Secondary malignancy of liver (HCC)     11/16/2021 -  Chemotherapy    OP COLON mFOLFOX6 OXALIplatin / Leucovorin / Fluorouracil     Secondary malignant neoplasm of right lung   10/5/2021 Initial Diagnosis    Secondary malignant neoplasm of right lung (HCC)     10/20/2021 -  Radiation    RADIATION THERAPY Treatment Details (Noted on 10/5/2021)  Site: Bilateral Lung  Technique: SBRT  Goal: No goal specified  Planned Treatment Start Date: 10/20/2021     11/16/2021 -  Chemotherapy    OP COLON mFOLFOX6 OXALIplatin / Leucovorin / Fluorouracil         Review of Systems   Constitutional: Negative for appetite change, diaphoresis, fatigue, fever, unexpected weight gain and unexpected weight loss.   HENT: Negative for hearing loss, mouth sores, sore throat, swollen glands, trouble swallowing and voice change.    Eyes: Negative for blurred vision.   Respiratory: Negative for cough, shortness of breath and wheezing.    Cardiovascular: Negative for chest pain and palpitations.   Gastrointestinal: Negative for abdominal pain, blood in stool, constipation, diarrhea, nausea and vomiting.   Endocrine: Negative for cold intolerance and heat intolerance.   Genitourinary: Negative for difficulty urinating, dysuria, frequency, hematuria and urinary incontinence.   Musculoskeletal: Negative for arthralgias, back pain and myalgias.   Skin: Negative for rash, skin lesions and wound.   Neurological: Positive for numbness (FEET). Negative for dizziness, seizures, weakness and headache.   Hematological: Does not bruise/bleed easily.   Psychiatric/Behavioral: Negative for depressed mood. The patient is not nervous/anxious.       Current Outpatient Medications on File Prior to Visit   Medication Sig Dispense Refill   • acetaminophen (TYLENOL) 500 MG tablet Take 500 mg by mouth Every 6 (Six) Hours As Needed.     • apixaban (ELIQUIS) 5 MG tablet tablet Take 5 mg by mouth 2 (Two) Times a Day.     • ascorbic acid (VITAMIN C) 500 MG tablet Take 500 mg by mouth Daily.     • atorvastatin (LIPITOR) 40 MG tablet atorvastatin 40 mg oral tablet take 1 tablet (40 mg) by oral route once daily   Active     • cyanocobalamin (VITAMIN B-12) 1000 MCG tablet Take 1,000 mcg by mouth Daily.     • HYDROcodone-acetaminophen (NORCO) 5-325 MG per tablet Take 1 tablet by mouth Every 6 (Six) Hours As Needed (cancer pain). 60 tablet 0   • levothyroxine (Synthroid) 50 MCG tablet Synthroid 50 mcg oral tablet take 1 tablet (50 mcg) by oral route once daily   Active     • loratadine (CLARITIN) 10 MG tablet loratadine 10 mg oral tablet take 1 tablet (10 mg) by oral route once daily   Active     • metoprolol tartrate (LOPRESSOR) 100 MG tablet Take 1 tablet by mouth 2 (Two) Times a Day. 180 tablet 3   • multivitamin (multivitamin) tablet tablet Take 1 tablet by mouth Daily.     • ondansetron (ZOFRAN) 8 MG tablet Take 8 mg by mouth Every 8 (Eight) Hours As Needed. for nausea     • potassium chloride (K-DUR,KLOR-CON) 20 MEQ CR tablet Take 1 tablet by mouth Daily. 30 tablet 5   • Synthroid 25 MCG tablet      • verapamil SR (CALAN-SR) 120 MG CR tablet Take 1 tablet by mouth Daily. 90 tablet 3   • vitamin E 1000 UNIT capsule Take 1,000 Units by mouth Daily.       No current facility-administered medications on file prior to visit.       No Known Allergies  Past Medical History:   Diagnosis Date   • Asthma    • Atrial fibrillation, persistent 2/26/2021   • Colorectal cancer    • Essential hypertension    • GI cancer    • Hepatitis    • History of DVT of lower extremity 8/27/2021   • Hyperlipidemia LDL goal <100 12/31/2020   • Rectal cancer 6/18/2021   • Secondary malignancy of  liver 9/14/2021   • Secondary malignant neoplasm of left lung 9/14/2021   • Secondary malignant neoplasm of right lung 10/5/2021   • Thrombocytopenia (HCC) 1/11/2022   • Thyroid disease      Past Surgical History:   Procedure Laterality Date   • COLON SURGERY     • HERNIA REPAIR     • LIVER BIOPSY      4/27/21 Biopsy of liver revealed metastatic adenocarcinoma, consistent with colorectal primary   • OTHER SURGICAL HISTORY      REMOVED CANCER FROM COLON     Social History     Socioeconomic History   • Marital status:    • Number of children: 2   Tobacco Use   • Smoking status: Never Smoker   • Smokeless tobacco: Never Used   Vaping Use   • Vaping Use: Never used   Substance and Sexual Activity   • Alcohol use: Yes     Comment: occasionally, no recent use   • Drug use: Never   • Sexual activity: Defer     Family History   Problem Relation Age of Onset   • Prostate cancer Father    • Heart murmur Mother    • Diabetes Mother    • Colon cancer Maternal Uncle        Objective   Physical Exam  Vitals reviewed. Exam conducted with a chaperone present.   Constitutional:       General: He is not in acute distress.     Appearance: Normal appearance.   Cardiovascular:      Rate and Rhythm: Normal rate and regular rhythm.      Heart sounds: Normal heart sounds. No murmur heard.  No gallop.    Pulmonary:      Effort: Pulmonary effort is normal.      Breath sounds: Normal breath sounds.      Comments: Port    Abdominal:      General: Abdomen is flat. Bowel sounds are normal. There is no distension.      Palpations: Abdomen is soft.      Tenderness: There is no abdominal tenderness.   Musculoskeletal:      Cervical back: Neck supple.      Right lower leg: No edema.      Left lower leg: No edema.   Lymphadenopathy:      Cervical: No cervical adenopathy.   Neurological:      Mental Status: He is alert and oriented to person, place, and time.   Psychiatric:         Mood and Affect: Mood normal.         Behavior: Behavior  "normal.         Vitals:    01/11/22 1014   BP: 123/91   Pulse: 102   Resp: 18   Temp: 97.5 °F (36.4 °C)   SpO2: 98%   Weight: 126 kg (276 lb 10.8 oz)   PainSc: 0-No pain     ECOG score: 0         PHQ-9 Total Score:                    Result Review :   The following data was reviewed by: Maurilio Campos MD on 01/11/2022:  Lab Results   Component Value Date    HGB 12.7 (L) 01/11/2022    HCT 38.3 01/11/2022    MCV 93.2 01/11/2022     (L) 01/11/2022    WBC 4.08 01/11/2022    NEUTROABS 2.75 01/11/2022    LYMPHSABS 0.71 01/11/2022    MONOSABS 0.48 01/11/2022    EOSABS 0.11 01/11/2022    BASOSABS 0.03 01/11/2022     Lab Results   Component Value Date    GLUCOSE 104 (H) 01/11/2022    BUN 12 01/11/2022    CREATININE 0.95 01/11/2022     01/11/2022    K 4.1 01/11/2022     01/11/2022    CO2 25.6 01/11/2022    CALCIUM 9.0 01/11/2022    PROTEINTOT 5.8 (L) 01/11/2022    ALBUMIN 3.74 01/11/2022    BILITOT 0.8 01/11/2022    ALKPHOS 80 01/11/2022    AST 21 01/11/2022    ALT 22 01/11/2022           Assessment and Plan    Diagnoses and all orders for this visit:    1. Rectal cancer (Primary)  Assessment & Plan:  Metastatic/recurrent.  Status post treatments as outlined, most recently stereotactic radiation to a pulmonary nodule.  He is currently on \"adjuvant\" treatment after definitive therapies.  He is receiving FOLFOX.  He is due for cycle 9.  He continues to have neuropathy especially in the feet which he feels is a little worse despite dose reduction.  I will decrease his dose by an additional 10% because of the neuropathy.  Proceed with cycle 9.  I will see him back in 2 weeks time for OV, cycle 10 with lab work prior to monitor for toxicities.      2. Thrombocytopenia (HCC)  Assessment & Plan:  Secondary to chemotherapy.  Mild and asymptomatic.  Repeat CBC with next visit.            Patient Follow Up: 2 w    Patient was given instructions and counseling regarding his condition or for health maintenance " advice. Please see specific information pulled into the AVS if appropriate.     Maurilio Campos MD    1/11/2022

## 2022-01-11 NOTE — ASSESSMENT & PLAN NOTE
"Metastatic/recurrent.  Status post treatments as outlined, most recently stereotactic radiation to a pulmonary nodule.  He is currently on \"adjuvant\" treatment after definitive therapies.  He is receiving FOLFOX.  He is due for cycle 9.  He continues to have neuropathy especially in the feet which he feels is a little worse despite dose reduction.  I will decrease his dose by an additional 10% because of the neuropathy.  Proceed with cycle 9.  I will see him back in 2 weeks time for OV, cycle 10 with lab work prior to monitor for toxicities.  "

## 2022-01-12 ENCOUNTER — HOSPITAL ENCOUNTER (OUTPATIENT)
Dept: ONCOLOGY | Facility: HOSPITAL | Age: 67
Setting detail: INFUSION SERIES
Discharge: HOME | End: 2022-01-12

## 2022-01-12 VITALS
WEIGHT: 279.54 LBS | DIASTOLIC BLOOD PRESSURE: 92 MMHG | HEIGHT: 76 IN | TEMPERATURE: 97.8 F | SYSTOLIC BLOOD PRESSURE: 127 MMHG | RESPIRATION RATE: 20 BRPM | HEART RATE: 91 BPM | BODY MASS INDEX: 34.04 KG/M2 | OXYGEN SATURATION: 96 %

## 2022-01-12 DIAGNOSIS — C78.7 SECONDARY MALIGNANCY OF LIVER: ICD-10-CM

## 2022-01-12 DIAGNOSIS — C78.01 SECONDARY MALIGNANT NEOPLASM OF RIGHT LUNG: Primary | ICD-10-CM

## 2022-01-12 DIAGNOSIS — C20 RECTAL CANCER: ICD-10-CM

## 2022-01-12 DIAGNOSIS — C78.02 SECONDARY MALIGNANT NEOPLASM OF LEFT LUNG: ICD-10-CM

## 2022-01-12 PROCEDURE — 96415 CHEMO IV INFUSION ADDL HR: CPT

## 2022-01-12 PROCEDURE — 96375 TX/PRO/DX INJ NEW DRUG ADDON: CPT

## 2022-01-12 PROCEDURE — 25010000002 FLUOROURACIL PER 500 MG: Performed by: INTERNAL MEDICINE

## 2022-01-12 PROCEDURE — 25010000002 LEUCOVORIN CALCIUM PER 50 MG: Performed by: INTERNAL MEDICINE

## 2022-01-12 PROCEDURE — 96368 THER/DIAG CONCURRENT INF: CPT

## 2022-01-12 PROCEDURE — 96413 CHEMO IV INFUSION 1 HR: CPT

## 2022-01-12 PROCEDURE — G0498 CHEMO EXTEND IV INFUS W/PUMP: HCPCS

## 2022-01-12 PROCEDURE — 25010000002 DEXAMETHASONE SODIUM PHOSPHATE 120 MG/30ML SOLUTION: Performed by: INTERNAL MEDICINE

## 2022-01-12 PROCEDURE — 96411 CHEMO IV PUSH ADDL DRUG: CPT

## 2022-01-12 PROCEDURE — 25010000002 PALONOSETRON PER 25 MCG: Performed by: INTERNAL MEDICINE

## 2022-01-12 PROCEDURE — 25010000002 OXALIPLATIN PER 0.5 MG: Performed by: INTERNAL MEDICINE

## 2022-01-12 RX ORDER — HEPARIN SODIUM (PORCINE) LOCK FLUSH IV SOLN 100 UNIT/ML 100 UNIT/ML
500 SOLUTION INTRAVENOUS AS NEEDED
Status: CANCELLED | OUTPATIENT
Start: 2022-01-14

## 2022-01-12 RX ORDER — DEXTROSE MONOHYDRATE 50 MG/ML
250 INJECTION, SOLUTION INTRAVENOUS ONCE
Status: COMPLETED | OUTPATIENT
Start: 2022-01-12 | End: 2022-01-12

## 2022-01-12 RX ORDER — FLUOROURACIL 50 MG/ML
700 INJECTION, SOLUTION INTRAVENOUS ONCE
Status: COMPLETED | OUTPATIENT
Start: 2022-01-12 | End: 2022-01-12

## 2022-01-12 RX ORDER — PALONOSETRON 0.05 MG/ML
0.25 INJECTION, SOLUTION INTRAVENOUS ONCE
Status: COMPLETED | OUTPATIENT
Start: 2022-01-12 | End: 2022-01-12

## 2022-01-12 RX ORDER — SODIUM CHLORIDE 0.9 % (FLUSH) 0.9 %
20 SYRINGE (ML) INJECTION AS NEEDED
Status: CANCELLED | OUTPATIENT
Start: 2022-01-14

## 2022-01-12 RX ADMIN — DEXTROSE MONOHYDRATE 250 ML: 5 INJECTION, SOLUTION INTRAVENOUS at 08:03

## 2022-01-12 RX ADMIN — OXALIPLATIN 150 MG: 5 INJECTION, SOLUTION INTRAVENOUS at 08:40

## 2022-01-12 RX ADMIN — LEUCOVORIN CALCIUM 700 MG: 350 INJECTION, POWDER, LYOPHILIZED, FOR SUSPENSION INTRAMUSCULAR; INTRAVENOUS at 08:40

## 2022-01-12 RX ADMIN — FLUOROURACIL 700 MG: 50 INJECTION, SOLUTION INTRAVENOUS at 10:55

## 2022-01-12 RX ADMIN — DEXAMETHASONE SODIUM PHOSPHATE 12 MG: 4 INJECTION, SOLUTION INTRA-ARTICULAR; INTRALESIONAL; INTRAMUSCULAR; INTRAVENOUS; SOFT TISSUE at 08:12

## 2022-01-12 RX ADMIN — PALONOSETRON HYDROCHLORIDE 0.25 MG: 0.25 INJECTION, SOLUTION INTRAVENOUS at 08:05

## 2022-01-12 RX ADMIN — FLUOROURACIL 4250 MG: 50 INJECTION, SOLUTION INTRAVENOUS at 10:58

## 2022-01-14 ENCOUNTER — HOSPITAL ENCOUNTER (OUTPATIENT)
Dept: ONCOLOGY | Facility: HOSPITAL | Age: 67
Setting detail: INFUSION SERIES
Discharge: HOME | End: 2022-01-14

## 2022-01-14 DIAGNOSIS — C78.02 SECONDARY MALIGNANT NEOPLASM OF LEFT LUNG: ICD-10-CM

## 2022-01-14 DIAGNOSIS — C78.01 SECONDARY MALIGNANT NEOPLASM OF RIGHT LUNG: ICD-10-CM

## 2022-01-14 DIAGNOSIS — C20 RECTAL CANCER: ICD-10-CM

## 2022-01-14 DIAGNOSIS — C78.7 SECONDARY MALIGNANCY OF LIVER: ICD-10-CM

## 2022-01-14 DIAGNOSIS — Z45.2 ENCOUNTER FOR ADJUSTMENT OR MANAGEMENT OF VASCULAR ACCESS DEVICE: Primary | ICD-10-CM

## 2022-01-14 PROCEDURE — 25010000002 HEPARIN LOCK FLUSH PER 10 UNITS: Performed by: INTERNAL MEDICINE

## 2022-01-14 RX ORDER — SODIUM CHLORIDE 0.9 % (FLUSH) 0.9 %
20 SYRINGE (ML) INJECTION AS NEEDED
Status: CANCELLED | OUTPATIENT
Start: 2022-01-25

## 2022-01-14 RX ORDER — HEPARIN SODIUM (PORCINE) LOCK FLUSH IV SOLN 100 UNIT/ML 100 UNIT/ML
500 SOLUTION INTRAVENOUS AS NEEDED
Status: DISCONTINUED | OUTPATIENT
Start: 2022-01-14 | End: 2022-01-15 | Stop reason: HOSPADM

## 2022-01-14 RX ORDER — HEPARIN SODIUM (PORCINE) LOCK FLUSH IV SOLN 100 UNIT/ML 100 UNIT/ML
500 SOLUTION INTRAVENOUS AS NEEDED
Status: CANCELLED | OUTPATIENT
Start: 2022-01-25

## 2022-01-14 RX ORDER — SODIUM CHLORIDE 0.9 % (FLUSH) 0.9 %
20 SYRINGE (ML) INJECTION AS NEEDED
Status: DISCONTINUED | OUTPATIENT
Start: 2022-01-14 | End: 2022-01-15 | Stop reason: HOSPADM

## 2022-01-14 RX ADMIN — HEPARIN SODIUM (PORCINE) LOCK FLUSH IV SOLN 100 UNIT/ML 500 UNITS: 100 SOLUTION at 09:33

## 2022-01-14 RX ADMIN — SODIUM CHLORIDE, PRESERVATIVE FREE 20 ML: 5 INJECTION INTRAVENOUS at 09:32

## 2022-01-25 ENCOUNTER — HOSPITAL ENCOUNTER (OUTPATIENT)
Dept: ONCOLOGY | Facility: HOSPITAL | Age: 67
Setting detail: INFUSION SERIES
Discharge: HOME | End: 2022-01-25

## 2022-01-25 ENCOUNTER — OFFICE VISIT (OUTPATIENT)
Dept: ONCOLOGY | Facility: HOSPITAL | Age: 67
End: 2022-01-25

## 2022-01-25 VITALS
SYSTOLIC BLOOD PRESSURE: 113 MMHG | OXYGEN SATURATION: 95 % | WEIGHT: 279.98 LBS | DIASTOLIC BLOOD PRESSURE: 79 MMHG | RESPIRATION RATE: 16 BRPM | TEMPERATURE: 96.7 F | HEART RATE: 106 BPM | BODY MASS INDEX: 34.1 KG/M2

## 2022-01-25 DIAGNOSIS — R11.0 NAUSEA: ICD-10-CM

## 2022-01-25 DIAGNOSIS — C78.02 SECONDARY MALIGNANT NEOPLASM OF LEFT LUNG: ICD-10-CM

## 2022-01-25 DIAGNOSIS — Z45.2 ENCOUNTER FOR ADJUSTMENT OR MANAGEMENT OF VASCULAR ACCESS DEVICE: ICD-10-CM

## 2022-01-25 DIAGNOSIS — C78.01 SECONDARY MALIGNANT NEOPLASM OF RIGHT LUNG: Primary | ICD-10-CM

## 2022-01-25 DIAGNOSIS — C20 RECTAL CANCER: Primary | ICD-10-CM

## 2022-01-25 DIAGNOSIS — C78.7 SECONDARY MALIGNANCY OF LIVER: ICD-10-CM

## 2022-01-25 DIAGNOSIS — E87.6 HYPOKALEMIA: ICD-10-CM

## 2022-01-25 DIAGNOSIS — C20 RECTAL CANCER: ICD-10-CM

## 2022-01-25 LAB
ALBUMIN SERPL-MCNC: 3.69 G/DL (ref 3.5–5.2)
ALBUMIN/GLOB SERPL: 1.7 G/DL
ALP SERPL-CCNC: 86 U/L (ref 39–117)
ALT SERPL W P-5'-P-CCNC: 24 U/L (ref 1–41)
ANION GAP SERPL CALCULATED.3IONS-SCNC: 9.8 MMOL/L (ref 5–15)
AST SERPL-CCNC: 25 U/L (ref 1–40)
BASOPHILS # BLD AUTO: 0.03 10*3/MM3 (ref 0–0.2)
BASOPHILS NFR BLD AUTO: 0.7 % (ref 0–1.5)
BILIRUB SERPL-MCNC: 0.6 MG/DL (ref 0–1.2)
BUN SERPL-MCNC: 12 MG/DL (ref 8–23)
BUN/CREAT SERPL: 12.6 (ref 7–25)
CALCIUM SPEC-SCNC: 8.8 MG/DL (ref 8.6–10.5)
CHLORIDE SERPL-SCNC: 105 MMOL/L (ref 98–107)
CO2 SERPL-SCNC: 23.2 MMOL/L (ref 22–29)
CREAT SERPL-MCNC: 0.95 MG/DL (ref 0.76–1.27)
DEPRECATED RDW RBC AUTO: 67.5 FL (ref 37–54)
EOSINOPHIL # BLD AUTO: 0.11 10*3/MM3 (ref 0–0.4)
EOSINOPHIL NFR BLD AUTO: 2.4 % (ref 0.3–6.2)
ERYTHROCYTE [DISTWIDTH] IN BLOOD BY AUTOMATED COUNT: 19.8 % (ref 12.3–15.4)
GFR SERPL CREATININE-BSD FRML MDRD: 80 ML/MIN/1.73
GLOBULIN UR ELPH-MCNC: 2.2 GM/DL
GLUCOSE SERPL-MCNC: 132 MG/DL (ref 65–99)
HCT VFR BLD AUTO: 37.6 % (ref 37.5–51)
HGB BLD-MCNC: 12.5 G/DL (ref 13–17.7)
IMM GRANULOCYTES # BLD AUTO: 0.02 10*3/MM3 (ref 0–0.05)
IMM GRANULOCYTES NFR BLD AUTO: 0.4 % (ref 0–0.5)
LYMPHOCYTES # BLD AUTO: 0.81 10*3/MM3 (ref 0.7–3.1)
LYMPHOCYTES NFR BLD AUTO: 17.8 % (ref 19.6–45.3)
MCH RBC QN AUTO: 31.8 PG (ref 26.6–33)
MCHC RBC AUTO-ENTMCNC: 33.2 G/DL (ref 31.5–35.7)
MCV RBC AUTO: 95.7 FL (ref 79–97)
MONOCYTES # BLD AUTO: 0.54 10*3/MM3 (ref 0.1–0.9)
MONOCYTES NFR BLD AUTO: 11.8 % (ref 5–12)
NEUTROPHILS NFR BLD AUTO: 3.05 10*3/MM3 (ref 1.7–7)
NEUTROPHILS NFR BLD AUTO: 66.9 % (ref 42.7–76)
PLATELET # BLD AUTO: 142 10*3/MM3 (ref 140–450)
PMV BLD AUTO: 8.9 FL (ref 6–12)
POTASSIUM SERPL-SCNC: 4 MMOL/L (ref 3.5–5.2)
PROT SERPL-MCNC: 5.9 G/DL (ref 6–8.5)
RBC # BLD AUTO: 3.93 10*6/MM3 (ref 4.14–5.8)
SODIUM SERPL-SCNC: 138 MMOL/L (ref 136–145)
WBC NRBC COR # BLD: 4.56 10*3/MM3 (ref 3.4–10.8)

## 2022-01-25 PROCEDURE — 85025 COMPLETE CBC W/AUTO DIFF WBC: CPT | Performed by: INTERNAL MEDICINE

## 2022-01-25 PROCEDURE — 80053 COMPREHEN METABOLIC PANEL: CPT | Performed by: INTERNAL MEDICINE

## 2022-01-25 PROCEDURE — 36591 DRAW BLOOD OFF VENOUS DEVICE: CPT

## 2022-01-25 PROCEDURE — G0463 HOSPITAL OUTPT CLINIC VISIT: HCPCS

## 2022-01-25 PROCEDURE — 25010000002 HEPARIN LOCK FLUSH PER 10 UNITS: Performed by: INTERNAL MEDICINE

## 2022-01-25 PROCEDURE — 99214 OFFICE O/P EST MOD 30 MIN: CPT | Performed by: INTERNAL MEDICINE

## 2022-01-25 RX ORDER — DEXTROSE MONOHYDRATE 50 MG/ML
250 INJECTION, SOLUTION INTRAVENOUS ONCE
Status: CANCELLED | OUTPATIENT
Start: 2022-01-26

## 2022-01-25 RX ORDER — DIPHENHYDRAMINE HYDROCHLORIDE 50 MG/ML
50 INJECTION INTRAMUSCULAR; INTRAVENOUS AS NEEDED
Status: CANCELLED | OUTPATIENT
Start: 2022-01-26

## 2022-01-25 RX ORDER — PALONOSETRON 0.05 MG/ML
0.25 INJECTION, SOLUTION INTRAVENOUS ONCE
Status: CANCELLED | OUTPATIENT
Start: 2022-01-26

## 2022-01-25 RX ORDER — SODIUM CHLORIDE 0.9 % (FLUSH) 0.9 %
20 SYRINGE (ML) INJECTION AS NEEDED
Status: CANCELLED | OUTPATIENT
Start: 2022-01-25

## 2022-01-25 RX ORDER — HEPARIN SODIUM (PORCINE) LOCK FLUSH IV SOLN 100 UNIT/ML 100 UNIT/ML
500 SOLUTION INTRAVENOUS AS NEEDED
Status: CANCELLED | OUTPATIENT
Start: 2022-01-25

## 2022-01-25 RX ORDER — SODIUM CHLORIDE 0.9 % (FLUSH) 0.9 %
20 SYRINGE (ML) INJECTION AS NEEDED
Status: DISCONTINUED | OUTPATIENT
Start: 2022-01-25 | End: 2022-01-26 | Stop reason: HOSPADM

## 2022-01-25 RX ORDER — FAMOTIDINE 10 MG/ML
20 INJECTION, SOLUTION INTRAVENOUS AS NEEDED
Status: CANCELLED | OUTPATIENT
Start: 2022-01-26

## 2022-01-25 RX ORDER — HEPARIN SODIUM (PORCINE) LOCK FLUSH IV SOLN 100 UNIT/ML 100 UNIT/ML
500 SOLUTION INTRAVENOUS AS NEEDED
Status: DISCONTINUED | OUTPATIENT
Start: 2022-01-25 | End: 2022-01-26 | Stop reason: HOSPADM

## 2022-01-25 RX ORDER — FLUOROURACIL 50 MG/ML
280 INJECTION, SOLUTION INTRAVENOUS ONCE
Status: CANCELLED | OUTPATIENT
Start: 2022-01-26

## 2022-01-25 RX ORDER — POTASSIUM CHLORIDE 20 MEQ/1
20 TABLET, EXTENDED RELEASE ORAL DAILY
Qty: 30 TABLET | Refills: 5 | Status: SHIPPED | OUTPATIENT
Start: 2022-01-25 | End: 2022-09-14 | Stop reason: SDUPTHER

## 2022-01-25 RX ORDER — ONDANSETRON HYDROCHLORIDE 8 MG/1
8 TABLET, FILM COATED ORAL EVERY 8 HOURS PRN
Qty: 30 TABLET | Refills: 3 | Status: SHIPPED | OUTPATIENT
Start: 2022-01-25 | End: 2023-02-21 | Stop reason: SDUPTHER

## 2022-01-25 RX ADMIN — HEPARIN SODIUM (PORCINE) LOCK FLUSH IV SOLN 100 UNIT/ML 500 UNITS: 100 SOLUTION at 08:08

## 2022-01-25 RX ADMIN — SODIUM CHLORIDE, PRESERVATIVE FREE 20 ML: 5 INJECTION INTRAVENOUS at 08:08

## 2022-01-25 NOTE — ASSESSMENT & PLAN NOTE
Status post treatments as outlined.  He is currently on adjuvant FOLFOX.  He is due for cycle ten.  He is having some neuropathy from his treatment but not enough to require dose adjustment at this time.  Proceed with cycle ten.  RTC 2 weeks for OV, cycle eleven with lab work prior to monitor for toxicities.

## 2022-01-25 NOTE — PROGRESS NOTES
Chief Complaint  Rectal Cancer and Follow-up    Maurilio Campos MD Reinberg, Emily, REGINALDO    Subjective          Robbi Kennedy presents to [facility] for ongoing treatment of his rectal cancer.  He is on adjuvant FOLFOX.  He is due for cycle ten.  He continues to have some neuropathy in his hands and feet but unchanged compared to previous.  He denies diarrhea.  He reports occasional nausea and takes around one Zofran per day.  He still is eating and drinking well.  He denies any issues from his Port-A-Cath.    Oncology/Hematology History Overview Note   9/30/2019 High rectal krio-raghkjvucf-tewatkeifgasgd adenocarcinoma  associated with tubular adenoma.   4/27/21 Biopsy of liver revealed  metastatic adenocarcinoma, consistent with colorectal primary.    Clinical Staging  Stage IIa ([id number])    Chemotherapy      neoadjuvant Xeloda with radiation. adjuvant xeloda        Radiation Therapy      7/8/19 thru 8/14/19 completed neoadjuvant 5040 cGy=28 fxs rectum     10/2021 XRT due to reoccurance     Surgeries       9/30/2019 low anterior resection with ostomy        8/31/21 partial hepatectomy, cholecystectomy and MWA for segment 6 medical margin performed at        Rectal cancer   6/2/2021 - 8/15/2021 Chemotherapy    OP COLON mFOLFOX6 + Bevacizumab (OXALIplatin / Leucovorin / Fluorouracil / Bevacizumab)     6/18/2021 Initial Diagnosis    Rectal cancer (CMS/HCC)     6/21/2021 Cancer Staged    Staging form: Colon And Rectum, AJCC 8th Edition  - Clinical: Stage IIA (cT3, cN0, cM0) - Signed by Maurilio Campos MD on 6/21/2021 11/16/2021 -  Chemotherapy    OP COLON mFOLFOX6 OXALIplatin / Leucovorin / Fluorouracil     Secondary malignant neoplasm of left lung   9/14/2021 Initial Diagnosis    Secondary malignant neoplasm of left lung (HCC)     10/20/2021 -  Radiation    RADIATION THERAPY Treatment Details (Noted on 10/5/2021)  Site: Bilateral Lung  Technique: SBRT  Goal: No  goal specified  Planned Treatment Start Date: 10/20/2021     11/16/2021 -  Chemotherapy    OP COLON mFOLFOX6 OXALIplatin / Leucovorin / Fluorouracil     Secondary malignancy of liver   9/14/2021 Initial Diagnosis    Secondary malignancy of liver (HCC)     11/16/2021 -  Chemotherapy    OP COLON mFOLFOX6 OXALIplatin / Leucovorin / Fluorouracil     Secondary malignant neoplasm of right lung   10/5/2021 Initial Diagnosis    Secondary malignant neoplasm of right lung (HCC)     10/20/2021 -  Radiation    RADIATION THERAPY Treatment Details (Noted on 10/5/2021)  Site: Bilateral Lung  Technique: SBRT  Goal: No goal specified  Planned Treatment Start Date: 10/20/2021     11/16/2021 -  Chemotherapy    OP COLON mFOLFOX6 OXALIplatin / Leucovorin / Fluorouracil         Review of Systems   Constitutional: Negative for appetite change, diaphoresis, fatigue, fever, unexpected weight gain and unexpected weight loss.   HENT: Negative for hearing loss, mouth sores, sore throat, swollen glands, trouble swallowing and voice change.    Eyes: Negative for blurred vision.   Respiratory: Negative for cough, shortness of breath and wheezing.    Cardiovascular: Negative for chest pain and palpitations.   Gastrointestinal: Negative for abdominal pain, blood in stool, constipation, diarrhea, nausea and vomiting.   Endocrine: Negative for cold intolerance and heat intolerance.   Genitourinary: Negative for difficulty urinating, dysuria, frequency, hematuria and urinary incontinence.   Musculoskeletal: Negative for arthralgias, back pain and myalgias.   Skin: Negative for rash, skin lesions and wound.   Neurological: Negative for dizziness, seizures, weakness, numbness and headache.   Hematological: Does not bruise/bleed easily.   Psychiatric/Behavioral: Negative for depressed mood. The patient is not nervous/anxious.      Current Outpatient Medications on File Prior to Visit   Medication Sig Dispense Refill   • acetaminophen (TYLENOL) 500 MG  tablet Take 500 mg by mouth Every 6 (Six) Hours As Needed.     • apixaban (ELIQUIS) 5 MG tablet tablet Take 5 mg by mouth 2 (Two) Times a Day.     • ascorbic acid (VITAMIN C) 500 MG tablet Take 500 mg by mouth Daily.     • atorvastatin (LIPITOR) 40 MG tablet atorvastatin 40 mg oral tablet take 1 tablet (40 mg) by oral route once daily   Active     • cyanocobalamin (VITAMIN B-12) 1000 MCG tablet Take 1,000 mcg by mouth Daily.     • HYDROcodone-acetaminophen (NORCO) 5-325 MG per tablet Take 1 tablet by mouth Every 6 (Six) Hours As Needed (cancer pain). 60 tablet 0   • levothyroxine (Synthroid) 50 MCG tablet Synthroid 50 mcg oral tablet take 1 tablet (50 mcg) by oral route once daily   Active     • loratadine (CLARITIN) 10 MG tablet loratadine 10 mg oral tablet take 1 tablet (10 mg) by oral route once daily   Active     • metoprolol tartrate (LOPRESSOR) 100 MG tablet Take 1 tablet by mouth 2 (Two) Times a Day. 180 tablet 3   • multivitamin (multivitamin) tablet tablet Take 1 tablet by mouth Daily.     • Synthroid 25 MCG tablet      • verapamil SR (CALAN-SR) 120 MG CR tablet Take 1 tablet by mouth Daily. 90 tablet 3   • vitamin E 1000 UNIT capsule Take 1,000 Units by mouth Daily.     • [DISCONTINUED] ondansetron (ZOFRAN) 8 MG tablet Take 8 mg by mouth Every 8 (Eight) Hours As Needed. for nausea     • [DISCONTINUED] potassium chloride (K-DUR,KLOR-CON) 20 MEQ CR tablet Take 1 tablet by mouth Daily. 30 tablet 5     Current Facility-Administered Medications on File Prior to Visit   Medication Dose Route Frequency Provider Last Rate Last Admin   • heparin injection 500 Units  500 Units Intravenous PRN Maurilio Campos MD   500 Units at 01/25/22 0808   • sodium chloride 0.9 % flush 20 mL  20 mL Intravenous PRN Maurilio Campos MD   20 mL at 01/25/22 0808       No Known Allergies  Past Medical History:   Diagnosis Date   • Asthma    • Atrial fibrillation, persistent 2/26/2021   • Colorectal cancer    • Essential hypertension     • GI cancer    • Hepatitis    • History of DVT of lower extremity 8/27/2021   • Hyperlipidemia LDL goal <100 12/31/2020   • Rectal cancer 6/18/2021   • Secondary malignancy of liver 9/14/2021   • Secondary malignant neoplasm of left lung 9/14/2021   • Secondary malignant neoplasm of right lung 10/5/2021   • Thrombocytopenia (HCC) 1/11/2022   • Thyroid disease      Past Surgical History:   Procedure Laterality Date   • COLON SURGERY     • HERNIA REPAIR     • LIVER BIOPSY      4/27/21 Biopsy of liver revealed metastatic adenocarcinoma, consistent with colorectal primary   • OTHER SURGICAL HISTORY      REMOVED CANCER FROM COLON     Social History     Socioeconomic History   • Marital status:    • Number of children: 2   Tobacco Use   • Smoking status: Never Smoker   • Smokeless tobacco: Never Used   Vaping Use   • Vaping Use: Never used   Substance and Sexual Activity   • Alcohol use: Yes     Comment: occasionally, no recent use   • Drug use: Never   • Sexual activity: Defer     Family History   Problem Relation Age of Onset   • Prostate cancer Father    • Heart murmur Mother    • Diabetes Mother    • Colon cancer Maternal Uncle        Objective   Physical Exam  Vitals reviewed. Exam conducted with a chaperone present.   Constitutional:       General: He is not in acute distress.     Appearance: Normal appearance.   Cardiovascular:      Rate and Rhythm: Normal rate and regular rhythm.      Heart sounds: Normal heart sounds. No murmur heard.  No gallop.    Pulmonary:      Effort: Pulmonary effort is normal.      Breath sounds: Normal breath sounds.      Comments: Port  Abdominal:      General: Abdomen is flat. Bowel sounds are normal. There is no distension.      Palpations: Abdomen is soft.      Tenderness: There is no abdominal tenderness.   Musculoskeletal:      Cervical back: Neck supple.      Right lower leg: No edema.      Left lower leg: No edema.   Lymphadenopathy:      Cervical: No cervical  adenopathy.   Neurological:      Mental Status: He is alert and oriented to person, place, and time.   Psychiatric:         Mood and Affect: Mood normal.         Behavior: Behavior normal.         Vitals:    01/25/22 0824   BP: 113/79   Pulse: 106   Resp: 16   Temp: 96.7 °F (35.9 °C)   SpO2: 95%   Weight: 127 kg (279 lb 15.8 oz)   PainSc: 0-No pain     ECOG score: 0         PHQ-9 Total Score:                    Result Review :   The following data was reviewed by: Maurilio Campos MD on 01/25/2022:  Lab Results   Component Value Date    HGB 12.5 (L) 01/25/2022    HCT 37.6 01/25/2022    MCV 95.7 01/25/2022     01/25/2022    WBC 4.56 01/25/2022    NEUTROABS 3.05 01/25/2022    LYMPHSABS 0.81 01/25/2022    MONOSABS 0.54 01/25/2022    EOSABS 0.11 01/25/2022    BASOSABS 0.03 01/25/2022     Lab Results   Component Value Date    GLUCOSE 132 (H) 01/25/2022    BUN 12 01/25/2022    CREATININE 0.95 01/25/2022     01/25/2022    K 4.0 01/25/2022     01/25/2022    CO2 23.2 01/25/2022    CALCIUM 8.8 01/25/2022    PROTEINTOT 5.9 (L) 01/25/2022    ALBUMIN 3.69 01/25/2022    BILITOT 0.6 01/25/2022    ALKPHOS 86 01/25/2022    AST 25 01/25/2022    ALT 24 01/25/2022           Assessment and Plan    Diagnoses and all orders for this visit:    1. Rectal cancer (HCC) (Primary)  Assessment & Plan:  Status post treatments as outlined.  He is currently on adjuvant FOLFOX.  He is due for cycle ten.  He is having some neuropathy from his treatment but not enough to require dose adjustment at this time.  Proceed with cycle ten.  RTC 2 weeks for OV, cycle eleven with lab work prior to monitor for toxicities.    Orders:  -     potassium chloride (K-DUR,KLOR-CON) 20 MEQ CR tablet; Take 1 tablet by mouth Daily.  Dispense: 30 tablet; Refill: 5    2. Hypokalemia  Assessment & Plan:  Patient is on oral potassium.  His potassium level is normal at four.  Refill provided today.    Orders:  -     potassium chloride (K-DUR,KLOR-CON) 20  MEQ CR tablet; Take 1 tablet by mouth Daily.  Dispense: 30 tablet; Refill: 5    3. Nausea  Assessment & Plan:  Zofran refilled today.    Orders:  -     ondansetron (ZOFRAN) 8 MG tablet; Take 1 tablet by mouth Every 8 (Eight) Hours As Needed for Nausea. for nausea  Dispense: 30 tablet; Refill: 3          Patient Follow Up: 2 weeks    Patient was given instructions and counseling regarding his condition or for health maintenance advice. Please see specific information pulled into the AVS if appropriate.     Maurilio Campos MD    1/25/2022

## 2022-01-26 ENCOUNTER — HOSPITAL ENCOUNTER (OUTPATIENT)
Dept: ONCOLOGY | Facility: HOSPITAL | Age: 67
Setting detail: INFUSION SERIES
Discharge: HOME | End: 2022-01-26

## 2022-01-26 VITALS
OXYGEN SATURATION: 96 % | SYSTOLIC BLOOD PRESSURE: 118 MMHG | HEART RATE: 85 BPM | WEIGHT: 281.31 LBS | DIASTOLIC BLOOD PRESSURE: 76 MMHG | RESPIRATION RATE: 18 BRPM | TEMPERATURE: 96.9 F | BODY MASS INDEX: 34.26 KG/M2

## 2022-01-26 DIAGNOSIS — C20 RECTAL CANCER: ICD-10-CM

## 2022-01-26 DIAGNOSIS — C78.01 SECONDARY MALIGNANT NEOPLASM OF RIGHT LUNG: Primary | ICD-10-CM

## 2022-01-26 DIAGNOSIS — C78.7 SECONDARY MALIGNANCY OF LIVER: ICD-10-CM

## 2022-01-26 DIAGNOSIS — C78.02 SECONDARY MALIGNANT NEOPLASM OF LEFT LUNG: ICD-10-CM

## 2022-01-26 PROCEDURE — 96368 THER/DIAG CONCURRENT INF: CPT

## 2022-01-26 PROCEDURE — 96375 TX/PRO/DX INJ NEW DRUG ADDON: CPT

## 2022-01-26 PROCEDURE — 25010000002 OXALIPLATIN PER 0.5 MG: Performed by: INTERNAL MEDICINE

## 2022-01-26 PROCEDURE — 96411 CHEMO IV PUSH ADDL DRUG: CPT

## 2022-01-26 PROCEDURE — 25010000002 DEXAMETHASONE SODIUM PHOSPHATE 120 MG/30ML SOLUTION: Performed by: INTERNAL MEDICINE

## 2022-01-26 PROCEDURE — 25010000002 FLUOROURACIL PER 500 MG: Performed by: INTERNAL MEDICINE

## 2022-01-26 PROCEDURE — G0498 CHEMO EXTEND IV INFUS W/PUMP: HCPCS

## 2022-01-26 PROCEDURE — 96413 CHEMO IV INFUSION 1 HR: CPT

## 2022-01-26 PROCEDURE — 96415 CHEMO IV INFUSION ADDL HR: CPT

## 2022-01-26 PROCEDURE — 25010000002 PALONOSETRON PER 25 MCG: Performed by: INTERNAL MEDICINE

## 2022-01-26 PROCEDURE — 25010000002 LEUCOVORIN CALCIUM PER 50 MG: Performed by: INTERNAL MEDICINE

## 2022-01-26 RX ORDER — HEPARIN SODIUM (PORCINE) LOCK FLUSH IV SOLN 100 UNIT/ML 100 UNIT/ML
500 SOLUTION INTRAVENOUS AS NEEDED
Status: CANCELLED | OUTPATIENT
Start: 2022-01-28

## 2022-01-26 RX ORDER — SODIUM CHLORIDE 0.9 % (FLUSH) 0.9 %
20 SYRINGE (ML) INJECTION AS NEEDED
Status: CANCELLED | OUTPATIENT
Start: 2022-01-28

## 2022-01-26 RX ORDER — DEXTROSE MONOHYDRATE 50 MG/ML
250 INJECTION, SOLUTION INTRAVENOUS ONCE
Status: COMPLETED | OUTPATIENT
Start: 2022-01-26 | End: 2022-01-26

## 2022-01-26 RX ORDER — PALONOSETRON 0.05 MG/ML
0.25 INJECTION, SOLUTION INTRAVENOUS ONCE
Status: COMPLETED | OUTPATIENT
Start: 2022-01-26 | End: 2022-01-26

## 2022-01-26 RX ORDER — FLUOROURACIL 50 MG/ML
700 INJECTION, SOLUTION INTRAVENOUS ONCE
Status: COMPLETED | OUTPATIENT
Start: 2022-01-26 | End: 2022-01-26

## 2022-01-26 RX ADMIN — LEUCOVORIN CALCIUM 700 MG: 350 INJECTION, POWDER, LYOPHILIZED, FOR SUSPENSION INTRAMUSCULAR; INTRAVENOUS at 09:25

## 2022-01-26 RX ADMIN — DEXAMETHASONE SODIUM PHOSPHATE 12 MG: 4 INJECTION, SOLUTION INTRA-ARTICULAR; INTRALESIONAL; INTRAMUSCULAR; INTRAVENOUS; SOFT TISSUE at 08:55

## 2022-01-26 RX ADMIN — OXALIPLATIN 150 MG: 5 INJECTION, SOLUTION INTRAVENOUS at 09:25

## 2022-01-26 RX ADMIN — FLUOROURACIL 700 MG: 50 INJECTION, SOLUTION INTRAVENOUS at 11:32

## 2022-01-26 RX ADMIN — DEXTROSE MONOHYDRATE 250 ML: 5 INJECTION, SOLUTION INTRAVENOUS at 08:50

## 2022-01-26 RX ADMIN — PALONOSETRON HYDROCHLORIDE 0.25 MG: 0.25 INJECTION, SOLUTION INTRAVENOUS at 08:52

## 2022-01-26 RX ADMIN — FLUOROURACIL 4250 MG: 50 INJECTION, SOLUTION INTRAVENOUS at 11:37

## 2022-01-27 ENCOUNTER — TELEPHONE (OUTPATIENT)
Dept: ONCOLOGY | Facility: OTHER | Age: 67
End: 2022-01-27

## 2022-01-27 ENCOUNTER — HOSPITAL ENCOUNTER (OUTPATIENT)
Dept: ONCOLOGY | Facility: HOSPITAL | Age: 67
Setting detail: INFUSION SERIES
End: 2022-01-27

## 2022-01-27 NOTE — TELEPHONE ENCOUNTER
SPOUSE CALLED STATING THAT PATIENT'S CHEMO PUMP WAS GOING OFF AND THEY DID NOT KNOW HOW TO TURN IT OFFICE.  SPOKE WITH JOSE WHO SUGGESTED W/T TO FURTHER ASSIST PATIENT

## 2022-01-28 ENCOUNTER — HOSPITAL ENCOUNTER (OUTPATIENT)
Dept: ONCOLOGY | Facility: HOSPITAL | Age: 67
Setting detail: INFUSION SERIES
Discharge: HOME | End: 2022-01-28

## 2022-01-28 DIAGNOSIS — C78.01 SECONDARY MALIGNANT NEOPLASM OF RIGHT LUNG: Primary | ICD-10-CM

## 2022-01-28 DIAGNOSIS — Z45.2 ENCOUNTER FOR ADJUSTMENT OR MANAGEMENT OF VASCULAR ACCESS DEVICE: ICD-10-CM

## 2022-01-28 DIAGNOSIS — C78.02 SECONDARY MALIGNANT NEOPLASM OF LEFT LUNG: ICD-10-CM

## 2022-01-28 DIAGNOSIS — C20 RECTAL CANCER: ICD-10-CM

## 2022-01-28 DIAGNOSIS — C78.7 SECONDARY MALIGNANCY OF LIVER: ICD-10-CM

## 2022-01-28 PROCEDURE — 25010000002 HEPARIN LOCK FLUSH PER 10 UNITS: Performed by: INTERNAL MEDICINE

## 2022-01-28 RX ORDER — HEPARIN SODIUM (PORCINE) LOCK FLUSH IV SOLN 100 UNIT/ML 100 UNIT/ML
500 SOLUTION INTRAVENOUS AS NEEDED
Status: CANCELLED | OUTPATIENT
Start: 2022-01-28

## 2022-01-28 RX ORDER — SODIUM CHLORIDE 0.9 % (FLUSH) 0.9 %
20 SYRINGE (ML) INJECTION AS NEEDED
Status: CANCELLED | OUTPATIENT
Start: 2022-01-28

## 2022-01-28 RX ORDER — HEPARIN SODIUM (PORCINE) LOCK FLUSH IV SOLN 100 UNIT/ML 100 UNIT/ML
500 SOLUTION INTRAVENOUS AS NEEDED
Status: DISCONTINUED | OUTPATIENT
Start: 2022-01-28 | End: 2022-01-29 | Stop reason: HOSPADM

## 2022-01-28 RX ORDER — SODIUM CHLORIDE 0.9 % (FLUSH) 0.9 %
20 SYRINGE (ML) INJECTION AS NEEDED
Status: DISCONTINUED | OUTPATIENT
Start: 2022-01-28 | End: 2022-01-29 | Stop reason: HOSPADM

## 2022-01-28 RX ADMIN — HEPARIN SODIUM (PORCINE) LOCK FLUSH IV SOLN 100 UNIT/ML 500 UNITS: 100 SOLUTION at 12:53

## 2022-01-28 RX ADMIN — SODIUM CHLORIDE, PRESERVATIVE FREE 20 ML: 5 INJECTION INTRAVENOUS at 12:53

## 2022-02-08 ENCOUNTER — HOSPITAL ENCOUNTER (OUTPATIENT)
Dept: ONCOLOGY | Facility: HOSPITAL | Age: 67
Setting detail: INFUSION SERIES
Discharge: HOME | End: 2022-02-08

## 2022-02-08 ENCOUNTER — OFFICE VISIT (OUTPATIENT)
Dept: ONCOLOGY | Facility: HOSPITAL | Age: 67
End: 2022-02-08

## 2022-02-08 VITALS
RESPIRATION RATE: 16 BRPM | BODY MASS INDEX: 34.36 KG/M2 | WEIGHT: 282.19 LBS | OXYGEN SATURATION: 97 % | TEMPERATURE: 97 F | HEART RATE: 100 BPM | DIASTOLIC BLOOD PRESSURE: 80 MMHG | SYSTOLIC BLOOD PRESSURE: 124 MMHG

## 2022-02-08 DIAGNOSIS — Z45.2 ENCOUNTER FOR ADJUSTMENT OR MANAGEMENT OF VASCULAR ACCESS DEVICE: ICD-10-CM

## 2022-02-08 DIAGNOSIS — C78.7 SECONDARY MALIGNANCY OF LIVER: ICD-10-CM

## 2022-02-08 DIAGNOSIS — C78.01 SECONDARY MALIGNANT NEOPLASM OF RIGHT LUNG: Primary | ICD-10-CM

## 2022-02-08 DIAGNOSIS — C20 RECTAL CANCER: Primary | ICD-10-CM

## 2022-02-08 DIAGNOSIS — C78.02 SECONDARY MALIGNANT NEOPLASM OF LEFT LUNG: ICD-10-CM

## 2022-02-08 DIAGNOSIS — C20 RECTAL CANCER: ICD-10-CM

## 2022-02-08 LAB
ALBUMIN SERPL-MCNC: 3.71 G/DL (ref 3.5–5.2)
ALBUMIN/GLOB SERPL: 1.9 G/DL
ALP SERPL-CCNC: 83 U/L (ref 39–117)
ALT SERPL W P-5'-P-CCNC: 30 U/L (ref 1–41)
ANION GAP SERPL CALCULATED.3IONS-SCNC: 10.5 MMOL/L (ref 5–15)
AST SERPL-CCNC: 30 U/L (ref 1–40)
BASOPHILS # BLD AUTO: 0.03 10*3/MM3 (ref 0–0.2)
BASOPHILS NFR BLD AUTO: 0.7 % (ref 0–1.5)
BILIRUB SERPL-MCNC: 0.7 MG/DL (ref 0–1.2)
BUN SERPL-MCNC: 10 MG/DL (ref 8–23)
BUN/CREAT SERPL: 10.9 (ref 7–25)
CALCIUM SPEC-SCNC: 8.8 MG/DL (ref 8.6–10.5)
CHLORIDE SERPL-SCNC: 105 MMOL/L (ref 98–107)
CO2 SERPL-SCNC: 24.5 MMOL/L (ref 22–29)
CREAT SERPL-MCNC: 0.92 MG/DL (ref 0.76–1.27)
DEPRECATED RDW RBC AUTO: 69.9 FL (ref 37–54)
EOSINOPHIL # BLD AUTO: 0.13 10*3/MM3 (ref 0–0.4)
EOSINOPHIL NFR BLD AUTO: 2.9 % (ref 0.3–6.2)
ERYTHROCYTE [DISTWIDTH] IN BLOOD BY AUTOMATED COUNT: 19.6 % (ref 12.3–15.4)
GFR SERPL CREATININE-BSD FRML MDRD: 83 ML/MIN/1.73
GLOBULIN UR ELPH-MCNC: 2 GM/DL
GLUCOSE SERPL-MCNC: 179 MG/DL (ref 65–99)
HCT VFR BLD AUTO: 37.3 % (ref 37.5–51)
HGB BLD-MCNC: 12.3 G/DL (ref 13–17.7)
IMM GRANULOCYTES # BLD AUTO: 0.03 10*3/MM3 (ref 0–0.05)
IMM GRANULOCYTES NFR BLD AUTO: 0.7 % (ref 0–0.5)
LYMPHOCYTES # BLD AUTO: 0.6 10*3/MM3 (ref 0.7–3.1)
LYMPHOCYTES NFR BLD AUTO: 13.5 % (ref 19.6–45.3)
MCH RBC QN AUTO: 32.5 PG (ref 26.6–33)
MCHC RBC AUTO-ENTMCNC: 33 G/DL (ref 31.5–35.7)
MCV RBC AUTO: 98.4 FL (ref 79–97)
MONOCYTES # BLD AUTO: 0.43 10*3/MM3 (ref 0.1–0.9)
MONOCYTES NFR BLD AUTO: 9.6 % (ref 5–12)
NEUTROPHILS NFR BLD AUTO: 3.24 10*3/MM3 (ref 1.7–7)
NEUTROPHILS NFR BLD AUTO: 72.6 % (ref 42.7–76)
PLATELET # BLD AUTO: 163 10*3/MM3 (ref 140–450)
PMV BLD AUTO: 8.8 FL (ref 6–12)
POTASSIUM SERPL-SCNC: 3.8 MMOL/L (ref 3.5–5.2)
PROT SERPL-MCNC: 5.7 G/DL (ref 6–8.5)
RBC # BLD AUTO: 3.79 10*6/MM3 (ref 4.14–5.8)
SODIUM SERPL-SCNC: 140 MMOL/L (ref 136–145)
WBC NRBC COR # BLD: 4.46 10*3/MM3 (ref 3.4–10.8)

## 2022-02-08 PROCEDURE — 36591 DRAW BLOOD OFF VENOUS DEVICE: CPT

## 2022-02-08 PROCEDURE — 25010000002 HEPARIN LOCK FLUSH PER 10 UNITS: Performed by: INTERNAL MEDICINE

## 2022-02-08 PROCEDURE — 99214 OFFICE O/P EST MOD 30 MIN: CPT | Performed by: INTERNAL MEDICINE

## 2022-02-08 PROCEDURE — 80053 COMPREHEN METABOLIC PANEL: CPT | Performed by: INTERNAL MEDICINE

## 2022-02-08 PROCEDURE — 85025 COMPLETE CBC W/AUTO DIFF WBC: CPT | Performed by: INTERNAL MEDICINE

## 2022-02-08 PROCEDURE — G0463 HOSPITAL OUTPT CLINIC VISIT: HCPCS

## 2022-02-08 RX ORDER — SODIUM CHLORIDE 0.9 % (FLUSH) 0.9 %
20 SYRINGE (ML) INJECTION AS NEEDED
Status: CANCELLED | OUTPATIENT
Start: 2022-02-08

## 2022-02-08 RX ORDER — FAMOTIDINE 10 MG/ML
20 INJECTION, SOLUTION INTRAVENOUS AS NEEDED
Status: CANCELLED | OUTPATIENT
Start: 2022-02-09

## 2022-02-08 RX ORDER — DEXTROSE MONOHYDRATE 50 MG/ML
250 INJECTION, SOLUTION INTRAVENOUS ONCE
Status: CANCELLED | OUTPATIENT
Start: 2022-02-09

## 2022-02-08 RX ORDER — FLUOROURACIL 50 MG/ML
280 INJECTION, SOLUTION INTRAVENOUS ONCE
Status: CANCELLED | OUTPATIENT
Start: 2022-02-09

## 2022-02-08 RX ORDER — HEPARIN SODIUM (PORCINE) LOCK FLUSH IV SOLN 100 UNIT/ML 100 UNIT/ML
500 SOLUTION INTRAVENOUS AS NEEDED
Status: CANCELLED | OUTPATIENT
Start: 2022-02-08

## 2022-02-08 RX ORDER — DIPHENHYDRAMINE HYDROCHLORIDE 50 MG/ML
50 INJECTION INTRAMUSCULAR; INTRAVENOUS AS NEEDED
Status: CANCELLED | OUTPATIENT
Start: 2022-02-09

## 2022-02-08 RX ORDER — SODIUM CHLORIDE 0.9 % (FLUSH) 0.9 %
20 SYRINGE (ML) INJECTION AS NEEDED
Status: DISCONTINUED | OUTPATIENT
Start: 2022-02-08 | End: 2022-02-09 | Stop reason: HOSPADM

## 2022-02-08 RX ORDER — HEPARIN SODIUM (PORCINE) LOCK FLUSH IV SOLN 100 UNIT/ML 100 UNIT/ML
500 SOLUTION INTRAVENOUS AS NEEDED
Status: DISCONTINUED | OUTPATIENT
Start: 2022-02-08 | End: 2022-02-09 | Stop reason: HOSPADM

## 2022-02-08 RX ORDER — PALONOSETRON 0.05 MG/ML
0.25 INJECTION, SOLUTION INTRAVENOUS ONCE
Status: CANCELLED | OUTPATIENT
Start: 2022-02-09

## 2022-02-08 RX ADMIN — SODIUM CHLORIDE, PRESERVATIVE FREE 20 ML: 5 INJECTION INTRAVENOUS at 07:58

## 2022-02-08 RX ADMIN — HEPARIN SODIUM (PORCINE) LOCK FLUSH IV SOLN 100 UNIT/ML 500 UNITS: 100 SOLUTION at 07:58

## 2022-02-08 NOTE — ASSESSMENT & PLAN NOTE
Status post treatment as outlined.  He is now on FOLFOX chemotherapy.  He is due for cycle 11.  Tolerating well with exception of neuropathy-not interfering with normal daily activities.  His lab work today looks good.  Proceed with cycle 11 as planned.  I will see him back in 2 weeks time for cycle 12 with lab work prior to monitor for toxicities.  This will complete his chemotherapy.

## 2022-02-08 NOTE — PROGRESS NOTES
"Chief Complaint  Rectal Cancer and Follow-up    Maurilio Campos MD Reinberg, Emily, REGINALDO    Subjective          Robbi Kennedy presents to [facility] for ongoing treatment of his metastatic rectal cancer.  He is status post treatments as outlined and is now on \"adjuvant\" chemotherapy with the FOLFOX regimen.  He is due for cycle 11.  Tolerating well with exception of neuropathy in his hands and feet.  It is worse with the cold weather recently.  It is not interfering with normal daily activities.  He denies issues from his Port-A-Cath.  He reports normal bowel habits.  He denies any masses or adenopathy.    Oncology/Hematology History Overview Note   9/30/2019 High rectal cgoi-vowkkwzbrh-hjsywmdwvzbdiw adenocarcinoma  associated with tubular adenoma.   4/27/21 Biopsy of liver revealed  metastatic adenocarcinoma, consistent with colorectal primary.    Clinical Staging  Stage IIa ([id number])    Chemotherapy      neoadjuvant Xeloda with radiation. adjuvant xeloda        Radiation Therapy      7/8/19 thru 8/14/19 completed neoadjuvant 5040 cGy=28 fxs rectum     10/2021 XRT due to recurrence right middle lung 5 Fx's, 5000cGy  11/26/21 adjuvant FOLFOX Initiated.     Surgeries       9/30/2019 low anterior resection with ostomy        8/31/21 partial hepatectomy, cholecystectomy and MWA for segment 6 medical margin performed at        Rectal cancer   6/2/2021 - 8/15/2021 Chemotherapy    OP COLON mFOLFOX6 + Bevacizumab (OXALIplatin / Leucovorin / Fluorouracil / Bevacizumab)     6/18/2021 Initial Diagnosis    Rectal cancer (CMS/HCC)     6/21/2021 Cancer Staged    Staging form: Colon And Rectum, AJCC 8th Edition  - Clinical: Stage IIA (cT3, cN0, cM0) - Signed by Maurilio Campos MD on 6/21/2021 11/16/2021 -  Chemotherapy    OP COLON mFOLFOX6 OXALIplatin / Leucovorin / Fluorouracil     Secondary malignant neoplasm of left lung   9/14/2021 Initial Diagnosis    Secondary " malignant neoplasm of left lung (HCC)     10/20/2021 -  Radiation    RADIATION THERAPY Treatment Details (Noted on 10/5/2021)  Site: Bilateral Lung  Technique: SBRT  Goal: No goal specified  Planned Treatment Start Date: 10/20/2021     11/16/2021 -  Chemotherapy    OP COLON mFOLFOX6 OXALIplatin / Leucovorin / Fluorouracil     Secondary malignancy of liver   9/14/2021 Initial Diagnosis    Secondary malignancy of liver (HCC)     11/16/2021 -  Chemotherapy    OP COLON mFOLFOX6 OXALIplatin / Leucovorin / Fluorouracil     Secondary malignant neoplasm of right lung   10/5/2021 Initial Diagnosis    Secondary malignant neoplasm of right lung (HCC)     10/20/2021 -  Radiation    RADIATION THERAPY Treatment Details (Noted on 10/5/2021)  Site: Bilateral Lung  Technique: SBRT  Goal: No goal specified  Planned Treatment Start Date: 10/20/2021     11/16/2021 -  Chemotherapy    OP COLON mFOLFOX6 OXALIplatin / Leucovorin / Fluorouracil         Review of Systems   Constitutional: Negative for appetite change, diaphoresis, fatigue, fever, unexpected weight gain and unexpected weight loss.   HENT: Negative for hearing loss, mouth sores, sore throat, swollen glands, trouble swallowing and voice change.    Eyes: Negative for blurred vision.   Respiratory: Negative for cough, shortness of breath and wheezing.    Cardiovascular: Negative for chest pain and palpitations.   Gastrointestinal: Negative for abdominal pain, blood in stool, constipation, diarrhea, nausea and vomiting.   Endocrine: Negative for cold intolerance and heat intolerance.   Genitourinary: Negative for difficulty urinating, dysuria, frequency, hematuria and urinary incontinence.   Musculoskeletal: Negative for arthralgias, back pain and myalgias.   Skin: Negative for rash, skin lesions and wound.   Neurological: Negative for dizziness, seizures, weakness, numbness and headache.   Hematological: Does not bruise/bleed easily.   Psychiatric/Behavioral: Negative for  depressed mood. The patient is not nervous/anxious.      Current Outpatient Medications on File Prior to Visit   Medication Sig Dispense Refill   • acetaminophen (TYLENOL) 500 MG tablet Take 500 mg by mouth Every 6 (Six) Hours As Needed.     • apixaban (ELIQUIS) 5 MG tablet tablet Take 5 mg by mouth 2 (Two) Times a Day.     • ascorbic acid (VITAMIN C) 500 MG tablet Take 500 mg by mouth Daily.     • atorvastatin (LIPITOR) 40 MG tablet atorvastatin 40 mg oral tablet take 1 tablet (40 mg) by oral route once daily   Active     • cyanocobalamin (VITAMIN B-12) 1000 MCG tablet Take 1,000 mcg by mouth Daily.     • HYDROcodone-acetaminophen (NORCO) 5-325 MG per tablet Take 1 tablet by mouth Every 6 (Six) Hours As Needed (cancer pain). 60 tablet 0   • levothyroxine (Synthroid) 50 MCG tablet Synthroid 50 mcg oral tablet take 1 tablet (50 mcg) by oral route once daily   Active     • loratadine (CLARITIN) 10 MG tablet loratadine 10 mg oral tablet take 1 tablet (10 mg) by oral route once daily   Active     • metoprolol tartrate (LOPRESSOR) 100 MG tablet Take 1 tablet by mouth 2 (Two) Times a Day. 180 tablet 3   • multivitamin (multivitamin) tablet tablet Take 1 tablet by mouth Daily.     • ondansetron (ZOFRAN) 8 MG tablet Take 1 tablet by mouth Every 8 (Eight) Hours As Needed for Nausea. for nausea 30 tablet 3   • potassium chloride (K-DUR,KLOR-CON) 20 MEQ CR tablet Take 1 tablet by mouth Daily. 30 tablet 5   • Synthroid 25 MCG tablet      • verapamil SR (CALAN-SR) 120 MG CR tablet Take 1 tablet by mouth Daily. 90 tablet 3   • vitamin E 1000 UNIT capsule Take 1,000 Units by mouth Daily.       Current Facility-Administered Medications on File Prior to Visit   Medication Dose Route Frequency Provider Last Rate Last Admin   • heparin injection 500 Units  500 Units Intravenous PRN Maurilio Campos MD   500 Units at 02/08/22 0753   • sodium chloride 0.9 % flush 20 mL  20 mL Intravenous PRN Maurilio Campos MD   20 mL at 02/08/22  0758       No Known Allergies  Past Medical History:   Diagnosis Date   • Asthma    • Atrial fibrillation, persistent 2/26/2021   • Colorectal cancer    • Essential hypertension    • GI cancer    • Hepatitis    • History of DVT of lower extremity 8/27/2021   • Hyperlipidemia LDL goal <100 12/31/2020   • Rectal cancer 6/18/2021   • Secondary malignancy of liver 9/14/2021   • Secondary malignant neoplasm of left lung 9/14/2021   • Secondary malignant neoplasm of right lung 10/5/2021   • Thrombocytopenia (HCC) 1/11/2022   • Thyroid disease      Past Surgical History:   Procedure Laterality Date   • COLON SURGERY     • HERNIA REPAIR     • LIVER BIOPSY      4/27/21 Biopsy of liver revealed metastatic adenocarcinoma, consistent with colorectal primary   • OTHER SURGICAL HISTORY      REMOVED CANCER FROM COLON     Social History     Socioeconomic History   • Marital status:    • Number of children: 2   Tobacco Use   • Smoking status: Never Smoker   • Smokeless tobacco: Never Used   Vaping Use   • Vaping Use: Never used   Substance and Sexual Activity   • Alcohol use: Yes     Comment: occasionally, no recent use   • Drug use: Never   • Sexual activity: Defer     Family History   Problem Relation Age of Onset   • Prostate cancer Father    • Heart murmur Mother    • Diabetes Mother    • Colon cancer Maternal Uncle        Objective   Physical Exam  Vitals reviewed. Exam conducted with a chaperone present.   Constitutional:       General: He is not in acute distress.     Appearance: Normal appearance.   Cardiovascular:      Rate and Rhythm: Normal rate and regular rhythm.      Heart sounds: Normal heart sounds. No murmur heard.  No gallop.    Pulmonary:      Effort: Pulmonary effort is normal.      Breath sounds: Normal breath sounds.      Comments: Port-A-Cath  Abdominal:      General: Abdomen is flat. Bowel sounds are normal.      Palpations: Abdomen is soft.   Musculoskeletal:      Cervical back: Neck supple.       Right lower leg: No edema.      Left lower leg: No edema.   Lymphadenopathy:      Cervical: No cervical adenopathy.   Neurological:      Mental Status: He is alert and oriented to person, place, and time.   Psychiatric:         Mood and Affect: Mood normal.         Behavior: Behavior normal.         Vitals:    02/08/22 0808   BP: 124/80   Pulse: 100   Resp: 16   Temp: 97 °F (36.1 °C)   SpO2: 97%   Weight: 128 kg (282 lb 3 oz)   PainSc: 0-No pain     ECOG score: 0         PHQ-9 Total Score:                    Result Review :   The following data was reviewed by: Maurilio Campos MD on 02/08/2022:  Lab Results   Component Value Date    HGB 12.3 (L) 02/08/2022    HCT 37.3 (L) 02/08/2022    MCV 98.4 (H) 02/08/2022     02/08/2022    WBC 4.46 02/08/2022    NEUTROABS 3.24 02/08/2022    LYMPHSABS 0.60 (L) 02/08/2022    MONOSABS 0.43 02/08/2022    EOSABS 0.13 02/08/2022    BASOSABS 0.03 02/08/2022     Lab Results   Component Value Date    GLUCOSE 179 (H) 02/08/2022    BUN 10 02/08/2022    CREATININE 0.92 02/08/2022     02/08/2022    K 3.8 02/08/2022     02/08/2022    CO2 24.5 02/08/2022    CALCIUM 8.8 02/08/2022    PROTEINTOT 5.7 (L) 02/08/2022    ALBUMIN 3.71 02/08/2022    BILITOT 0.7 02/08/2022    ALKPHOS 83 02/08/2022    AST 30 02/08/2022    ALT 30 02/08/2022     Lab Results   Component Value Date    MG 1.9 08/02/2021    FREET4 0.96 01/10/2022    TSH 3.250 02/24/2021             Assessment and Plan    Diagnoses and all orders for this visit:    1. Rectal cancer (Primary)  Assessment & Plan:  Status post treatment as outlined.  He is now on FOLFOX chemotherapy.  He is due for cycle 11.  Tolerating well with exception of neuropathy-not interfering with normal daily activities.  His lab work today looks good.  Proceed with cycle 11 as planned.  I will see him back in 2 weeks time for cycle 12 with lab work prior to monitor for toxicities.  This will complete his chemotherapy.            Patient Follow Up:  2 weeks    Patient was given instructions and counseling regarding his condition or for health maintenance advice. Please see specific information pulled into the AVS if appropriate.     Maurilio Campos MD    2/8/2022

## 2022-02-09 ENCOUNTER — HOSPITAL ENCOUNTER (OUTPATIENT)
Dept: ONCOLOGY | Facility: HOSPITAL | Age: 67
Setting detail: INFUSION SERIES
Discharge: HOME | End: 2022-02-09

## 2022-02-09 VITALS
RESPIRATION RATE: 20 BRPM | TEMPERATURE: 98.1 F | DIASTOLIC BLOOD PRESSURE: 84 MMHG | BODY MASS INDEX: 34.5 KG/M2 | HEART RATE: 88 BPM | OXYGEN SATURATION: 98 % | SYSTOLIC BLOOD PRESSURE: 142 MMHG | WEIGHT: 283.29 LBS

## 2022-02-09 DIAGNOSIS — C78.7 SECONDARY MALIGNANCY OF LIVER: ICD-10-CM

## 2022-02-09 DIAGNOSIS — C78.01 SECONDARY MALIGNANT NEOPLASM OF RIGHT LUNG: Primary | ICD-10-CM

## 2022-02-09 DIAGNOSIS — C78.02 SECONDARY MALIGNANT NEOPLASM OF LEFT LUNG: ICD-10-CM

## 2022-02-09 DIAGNOSIS — C20 RECTAL CANCER: ICD-10-CM

## 2022-02-09 DIAGNOSIS — Z45.2 ENCOUNTER FOR ADJUSTMENT OR MANAGEMENT OF VASCULAR ACCESS DEVICE: ICD-10-CM

## 2022-02-09 PROCEDURE — 25010000002 DEXAMETHASONE SODIUM PHOSPHATE 120 MG/30ML SOLUTION: Performed by: INTERNAL MEDICINE

## 2022-02-09 PROCEDURE — 25010000002 FLUOROURACIL PER 500 MG: Performed by: INTERNAL MEDICINE

## 2022-02-09 PROCEDURE — 96367 TX/PROPH/DG ADDL SEQ IV INF: CPT

## 2022-02-09 PROCEDURE — G0498 CHEMO EXTEND IV INFUS W/PUMP: HCPCS

## 2022-02-09 PROCEDURE — 25010000002 OXALIPLATIN PER 0.5 MG: Performed by: INTERNAL MEDICINE

## 2022-02-09 PROCEDURE — 25010000002 LEUCOVORIN CALCIUM PER 50 MG: Performed by: INTERNAL MEDICINE

## 2022-02-09 PROCEDURE — 96411 CHEMO IV PUSH ADDL DRUG: CPT

## 2022-02-09 PROCEDURE — 25010000002 PALONOSETRON PER 25 MCG: Performed by: INTERNAL MEDICINE

## 2022-02-09 PROCEDURE — 96415 CHEMO IV INFUSION ADDL HR: CPT

## 2022-02-09 PROCEDURE — 96413 CHEMO IV INFUSION 1 HR: CPT

## 2022-02-09 PROCEDURE — 96368 THER/DIAG CONCURRENT INF: CPT

## 2022-02-09 PROCEDURE — 96375 TX/PRO/DX INJ NEW DRUG ADDON: CPT

## 2022-02-09 RX ORDER — PALONOSETRON 0.05 MG/ML
0.25 INJECTION, SOLUTION INTRAVENOUS ONCE
Status: COMPLETED | OUTPATIENT
Start: 2022-02-09 | End: 2022-02-09

## 2022-02-09 RX ORDER — HEPARIN SODIUM (PORCINE) LOCK FLUSH IV SOLN 100 UNIT/ML 100 UNIT/ML
500 SOLUTION INTRAVENOUS AS NEEDED
Status: DISCONTINUED | OUTPATIENT
Start: 2022-02-09 | End: 2022-02-10 | Stop reason: HOSPADM

## 2022-02-09 RX ORDER — DEXTROSE MONOHYDRATE 50 MG/ML
250 INJECTION, SOLUTION INTRAVENOUS ONCE
Status: COMPLETED | OUTPATIENT
Start: 2022-02-09 | End: 2022-02-09

## 2022-02-09 RX ORDER — SODIUM CHLORIDE 0.9 % (FLUSH) 0.9 %
20 SYRINGE (ML) INJECTION AS NEEDED
Status: CANCELLED | OUTPATIENT
Start: 2022-02-09

## 2022-02-09 RX ORDER — SODIUM CHLORIDE 0.9 % (FLUSH) 0.9 %
20 SYRINGE (ML) INJECTION AS NEEDED
Status: DISCONTINUED | OUTPATIENT
Start: 2022-02-09 | End: 2022-02-10 | Stop reason: HOSPADM

## 2022-02-09 RX ORDER — HEPARIN SODIUM (PORCINE) LOCK FLUSH IV SOLN 100 UNIT/ML 100 UNIT/ML
500 SOLUTION INTRAVENOUS AS NEEDED
Status: CANCELLED | OUTPATIENT
Start: 2022-02-09

## 2022-02-09 RX ORDER — FLUOROURACIL 50 MG/ML
700 INJECTION, SOLUTION INTRAVENOUS ONCE
Status: COMPLETED | OUTPATIENT
Start: 2022-02-09 | End: 2022-02-09

## 2022-02-09 RX ADMIN — LEUCOVORIN CALCIUM 700 MG: 350 INJECTION, POWDER, LYOPHILIZED, FOR SUSPENSION INTRAMUSCULAR; INTRAVENOUS at 09:25

## 2022-02-09 RX ADMIN — FLUOROURACIL 4250 MG: 50 INJECTION, SOLUTION INTRAVENOUS at 11:41

## 2022-02-09 RX ADMIN — DEXAMETHASONE SODIUM PHOSPHATE 12 MG: 4 INJECTION, SOLUTION INTRA-ARTICULAR; INTRALESIONAL; INTRAMUSCULAR; INTRAVENOUS; SOFT TISSUE at 08:54

## 2022-02-09 RX ADMIN — OXALIPLATIN 150 MG: 5 INJECTION, SOLUTION INTRAVENOUS at 09:25

## 2022-02-09 RX ADMIN — DEXTROSE MONOHYDRATE 250 ML: 5 INJECTION, SOLUTION INTRAVENOUS at 08:49

## 2022-02-09 RX ADMIN — FLUOROURACIL 700 MG: 50 INJECTION, SOLUTION INTRAVENOUS at 11:35

## 2022-02-09 RX ADMIN — PALONOSETRON HYDROCHLORIDE 0.25 MG: 0.25 INJECTION, SOLUTION INTRAVENOUS at 08:51

## 2022-02-09 NOTE — ADDENDUM NOTE
Encounter addended by: Zane Damon RN on: 2/9/2022 1:03 PM   Actions taken: Charge Capture section accepted

## 2022-02-11 ENCOUNTER — HOSPITAL ENCOUNTER (OUTPATIENT)
Dept: ONCOLOGY | Facility: HOSPITAL | Age: 67
Setting detail: INFUSION SERIES
Discharge: HOME | End: 2022-02-11

## 2022-02-11 DIAGNOSIS — C78.02 SECONDARY MALIGNANT NEOPLASM OF LEFT LUNG: ICD-10-CM

## 2022-02-11 DIAGNOSIS — C20 RECTAL CANCER: ICD-10-CM

## 2022-02-11 DIAGNOSIS — C78.7 SECONDARY MALIGNANCY OF LIVER: ICD-10-CM

## 2022-02-11 DIAGNOSIS — C78.01 SECONDARY MALIGNANT NEOPLASM OF RIGHT LUNG: Primary | ICD-10-CM

## 2022-02-11 DIAGNOSIS — Z45.2 ENCOUNTER FOR ADJUSTMENT OR MANAGEMENT OF VASCULAR ACCESS DEVICE: ICD-10-CM

## 2022-02-11 PROCEDURE — 25010000002 HEPARIN LOCK FLUSH PER 10 UNITS: Performed by: INTERNAL MEDICINE

## 2022-02-11 RX ORDER — HEPARIN SODIUM (PORCINE) LOCK FLUSH IV SOLN 100 UNIT/ML 100 UNIT/ML
500 SOLUTION INTRAVENOUS AS NEEDED
Status: DISCONTINUED | OUTPATIENT
Start: 2022-02-11 | End: 2022-02-12 | Stop reason: HOSPADM

## 2022-02-11 RX ORDER — HEPARIN SODIUM (PORCINE) LOCK FLUSH IV SOLN 100 UNIT/ML 100 UNIT/ML
500 SOLUTION INTRAVENOUS AS NEEDED
Status: CANCELLED | OUTPATIENT
Start: 2022-02-11

## 2022-02-11 RX ORDER — SODIUM CHLORIDE 0.9 % (FLUSH) 0.9 %
20 SYRINGE (ML) INJECTION AS NEEDED
Status: DISCONTINUED | OUTPATIENT
Start: 2022-02-11 | End: 2022-02-12 | Stop reason: HOSPADM

## 2022-02-11 RX ORDER — SODIUM CHLORIDE 0.9 % (FLUSH) 0.9 %
20 SYRINGE (ML) INJECTION AS NEEDED
Status: CANCELLED | OUTPATIENT
Start: 2022-02-11

## 2022-02-11 RX ADMIN — HEPARIN SODIUM (PORCINE) LOCK FLUSH IV SOLN 100 UNIT/ML 500 UNITS: 100 SOLUTION at 10:08

## 2022-02-11 RX ADMIN — SODIUM CHLORIDE, PRESERVATIVE FREE 20 ML: 5 INJECTION INTRAVENOUS at 10:08

## 2022-02-22 ENCOUNTER — OFFICE VISIT (OUTPATIENT)
Dept: ONCOLOGY | Facility: HOSPITAL | Age: 67
End: 2022-02-22

## 2022-02-22 ENCOUNTER — HOSPITAL ENCOUNTER (OUTPATIENT)
Dept: ONCOLOGY | Facility: HOSPITAL | Age: 67
Setting detail: INFUSION SERIES
Discharge: HOME | End: 2022-02-22

## 2022-02-22 VITALS
DIASTOLIC BLOOD PRESSURE: 72 MMHG | SYSTOLIC BLOOD PRESSURE: 118 MMHG | OXYGEN SATURATION: 98 % | WEIGHT: 282.19 LBS | HEART RATE: 100 BPM | RESPIRATION RATE: 18 BRPM | TEMPERATURE: 98.7 F | BODY MASS INDEX: 34.36 KG/M2

## 2022-02-22 DIAGNOSIS — C78.01 SECONDARY MALIGNANT NEOPLASM OF RIGHT LUNG: Primary | ICD-10-CM

## 2022-02-22 DIAGNOSIS — C78.7 SECONDARY MALIGNANCY OF LIVER: ICD-10-CM

## 2022-02-22 DIAGNOSIS — C20 RECTAL CANCER: Primary | ICD-10-CM

## 2022-02-22 DIAGNOSIS — C78.02 SECONDARY MALIGNANT NEOPLASM OF LEFT LUNG: ICD-10-CM

## 2022-02-22 DIAGNOSIS — Z45.2 ENCOUNTER FOR ADJUSTMENT OR MANAGEMENT OF VASCULAR ACCESS DEVICE: ICD-10-CM

## 2022-02-22 DIAGNOSIS — C20 RECTAL CANCER: ICD-10-CM

## 2022-02-22 LAB
ALBUMIN SERPL-MCNC: 3.74 G/DL (ref 3.5–5.2)
ALBUMIN/GLOB SERPL: 1.7 G/DL
ALP SERPL-CCNC: 83 U/L (ref 39–117)
ALT SERPL W P-5'-P-CCNC: 24 U/L (ref 1–41)
ANION GAP SERPL CALCULATED.3IONS-SCNC: 8.1 MMOL/L (ref 5–15)
AST SERPL-CCNC: 25 U/L (ref 1–40)
BASOPHILS # BLD AUTO: 0.03 10*3/MM3 (ref 0–0.2)
BASOPHILS NFR BLD AUTO: 0.8 % (ref 0–1.5)
BILIRUB SERPL-MCNC: 0.7 MG/DL (ref 0–1.2)
BUN SERPL-MCNC: 11 MG/DL (ref 8–23)
BUN/CREAT SERPL: 11.2 (ref 7–25)
CALCIUM SPEC-SCNC: 8.8 MG/DL (ref 8.6–10.5)
CHLORIDE SERPL-SCNC: 106 MMOL/L (ref 98–107)
CO2 SERPL-SCNC: 24.9 MMOL/L (ref 22–29)
CREAT SERPL-MCNC: 0.98 MG/DL (ref 0.76–1.27)
DEPRECATED RDW RBC AUTO: 68.8 FL (ref 37–54)
EOSINOPHIL # BLD AUTO: 0.11 10*3/MM3 (ref 0–0.4)
EOSINOPHIL NFR BLD AUTO: 3.1 % (ref 0.3–6.2)
ERYTHROCYTE [DISTWIDTH] IN BLOOD BY AUTOMATED COUNT: 18.2 % (ref 12.3–15.4)
GFR SERPL CREATININE-BSD FRML MDRD: 77 ML/MIN/1.73
GLOBULIN UR ELPH-MCNC: 2.3 GM/DL
GLUCOSE SERPL-MCNC: 119 MG/DL (ref 65–99)
HCT VFR BLD AUTO: 37.2 % (ref 37.5–51)
HGB BLD-MCNC: 12.2 G/DL (ref 13–17.7)
IMM GRANULOCYTES # BLD AUTO: 0.01 10*3/MM3 (ref 0–0.05)
IMM GRANULOCYTES NFR BLD AUTO: 0.3 % (ref 0–0.5)
LYMPHOCYTES # BLD AUTO: 0.71 10*3/MM3 (ref 0.7–3.1)
LYMPHOCYTES NFR BLD AUTO: 19.9 % (ref 19.6–45.3)
MCH RBC QN AUTO: 33.2 PG (ref 26.6–33)
MCHC RBC AUTO-ENTMCNC: 32.8 G/DL (ref 31.5–35.7)
MCV RBC AUTO: 101.1 FL (ref 79–97)
MONOCYTES # BLD AUTO: 0.45 10*3/MM3 (ref 0.1–0.9)
MONOCYTES NFR BLD AUTO: 12.6 % (ref 5–12)
NEUTROPHILS NFR BLD AUTO: 2.25 10*3/MM3 (ref 1.7–7)
NEUTROPHILS NFR BLD AUTO: 63.3 % (ref 42.7–76)
PLATELET # BLD AUTO: 166 10*3/MM3 (ref 140–450)
PMV BLD AUTO: 9 FL (ref 6–12)
POTASSIUM SERPL-SCNC: 3.8 MMOL/L (ref 3.5–5.2)
PROT SERPL-MCNC: 6 G/DL (ref 6–8.5)
RBC # BLD AUTO: 3.68 10*6/MM3 (ref 4.14–5.8)
SODIUM SERPL-SCNC: 139 MMOL/L (ref 136–145)
WBC NRBC COR # BLD: 3.56 10*3/MM3 (ref 3.4–10.8)

## 2022-02-22 PROCEDURE — 80053 COMPREHEN METABOLIC PANEL: CPT | Performed by: INTERNAL MEDICINE

## 2022-02-22 PROCEDURE — 85025 COMPLETE CBC W/AUTO DIFF WBC: CPT | Performed by: INTERNAL MEDICINE

## 2022-02-22 PROCEDURE — G0463 HOSPITAL OUTPT CLINIC VISIT: HCPCS

## 2022-02-22 PROCEDURE — 25010000002 HEPARIN LOCK FLUSH PER 10 UNITS: Performed by: INTERNAL MEDICINE

## 2022-02-22 PROCEDURE — 36591 DRAW BLOOD OFF VENOUS DEVICE: CPT

## 2022-02-22 PROCEDURE — 99214 OFFICE O/P EST MOD 30 MIN: CPT | Performed by: INTERNAL MEDICINE

## 2022-02-22 RX ORDER — SODIUM CHLORIDE 0.9 % (FLUSH) 0.9 %
20 SYRINGE (ML) INJECTION AS NEEDED
Status: CANCELLED | OUTPATIENT
Start: 2022-02-22

## 2022-02-22 RX ORDER — HEPARIN SODIUM (PORCINE) LOCK FLUSH IV SOLN 100 UNIT/ML 100 UNIT/ML
500 SOLUTION INTRAVENOUS AS NEEDED
Status: DISCONTINUED | OUTPATIENT
Start: 2022-02-22 | End: 2022-02-23 | Stop reason: HOSPADM

## 2022-02-22 RX ORDER — HEPARIN SODIUM (PORCINE) LOCK FLUSH IV SOLN 100 UNIT/ML 100 UNIT/ML
500 SOLUTION INTRAVENOUS AS NEEDED
Status: CANCELLED | OUTPATIENT
Start: 2022-02-22

## 2022-02-22 RX ORDER — SODIUM CHLORIDE 0.9 % (FLUSH) 0.9 %
20 SYRINGE (ML) INJECTION AS NEEDED
Status: DISCONTINUED | OUTPATIENT
Start: 2022-02-22 | End: 2022-02-23 | Stop reason: HOSPADM

## 2022-02-22 RX ADMIN — Medication 20 ML: at 08:12

## 2022-02-22 RX ADMIN — Medication 500 UNITS: at 08:12

## 2022-02-22 NOTE — ASSESSMENT & PLAN NOTE
Patient is due for cycle 12 of adjuvant FOLFOX.  He is having expected side effects including fatigue and neuropathy but stable.  Lab work is adequate for treatment.  Proceed with cycle 12 as planned.  This will complete his adjuvant therapy.  I will see him back in 1 month with repeat lab work to ensure that all acute toxicities are resolving.  He already has scans arranged with radiation oncology.  Port flush routinely while not an active use.

## 2022-02-22 NOTE — PROGRESS NOTES
Chief Complaint  Rectal Cancer and Follow-up    Maurilio Campos MD Reinberg, Emily, APRN    Subjective          Robbi Kennedy presents to [facility] for cycle 12 of his adjuvant FOLFOX.  Last cycle went okay.  He had some increased fatigue as well as ongoing neuropathy in his feet which is stable.  He denies nausea, vomiting, diarrhea.  He notes good appetite.  No issues from his Port-A-Cath.  He denies any masses or adenopathy, no unusual aches or pains.    Oncology/Hematology History Overview Note   9/30/2019 High rectal vatw-csaljaiwfb-jsrbgyujlqvfbp adenocarcinoma  associated with tubular adenoma.   4/27/21 Biopsy of liver revealed  metastatic adenocarcinoma, consistent with colorectal primary.    Clinical Staging  Stage IIa ([id number])    Chemotherapy      neoadjuvant Xeloda with radiation. adjuvant xeloda        Radiation Therapy      7/8/19 thru 8/14/19 completed neoadjuvant 5040 cGy=28 fxs rectum     10/2021 XRT due to recurrence right middle lung 5 Fx's, 5000cGy  11/26/21 adjuvant FOLFOX Initiated.     Surgeries       9/30/2019 low anterior resection with ostomy        8/31/21 partial hepatectomy, cholecystectomy and MWA for segment 6 medical margin performed at        Rectal cancer   6/2/2021 - 8/15/2021 Chemotherapy    OP COLON mFOLFOX6 + Bevacizumab (OXALIplatin / Leucovorin / Fluorouracil / Bevacizumab)     6/18/2021 Initial Diagnosis    Rectal cancer (CMS/HCC)     6/21/2021 Cancer Staged    Staging form: Colon And Rectum, AJCC 8th Edition  - Clinical: Stage IIA (cT3, cN0, cM0) - Signed by Maurilio Campos MD on 6/21/2021 11/16/2021 -  Chemotherapy    OP COLON mFOLFOX6 OXALIplatin / Leucovorin / Fluorouracil     Secondary malignant neoplasm of left lung   9/14/2021 Initial Diagnosis    Secondary malignant neoplasm of left lung (HCC)     10/20/2021 -  Radiation    RADIATION THERAPY Treatment Details (Noted on 10/5/2021)  Site: Bilateral Lung  Technique:  SBRT  Goal: No goal specified  Planned Treatment Start Date: 10/20/2021     11/16/2021 -  Chemotherapy    OP COLON mFOLFOX6 OXALIplatin / Leucovorin / Fluorouracil     Secondary malignancy of liver   9/14/2021 Initial Diagnosis    Secondary malignancy of liver (HCC)     11/16/2021 -  Chemotherapy    OP COLON mFOLFOX6 OXALIplatin / Leucovorin / Fluorouracil     Secondary malignant neoplasm of right lung   10/5/2021 Initial Diagnosis    Secondary malignant neoplasm of right lung (HCC)     10/20/2021 -  Radiation    RADIATION THERAPY Treatment Details (Noted on 10/5/2021)  Site: Bilateral Lung  Technique: SBRT  Goal: No goal specified  Planned Treatment Start Date: 10/20/2021     11/16/2021 -  Chemotherapy    OP COLON mFOLFOX6 OXALIplatin / Leucovorin / Fluorouracil         Review of Systems   Constitutional: Positive for fatigue. Negative for appetite change, diaphoresis, fever, unexpected weight gain and unexpected weight loss.   HENT: Negative for hearing loss, mouth sores, sore throat, swollen glands, trouble swallowing and voice change.    Eyes: Negative for blurred vision.   Respiratory: Positive for shortness of breath. Negative for cough and wheezing.    Cardiovascular: Negative for chest pain and palpitations.   Gastrointestinal: Negative for abdominal pain, blood in stool, constipation, diarrhea, nausea and vomiting.   Endocrine: Negative for cold intolerance and heat intolerance.   Genitourinary: Negative for difficulty urinating, dysuria, frequency, hematuria and urinary incontinence.   Musculoskeletal: Negative for arthralgias, back pain and myalgias.   Skin: Negative for rash, skin lesions and wound.   Neurological: Negative for dizziness, seizures, weakness, numbness and headache.   Hematological: Does not bruise/bleed easily.   Psychiatric/Behavioral: Negative for depressed mood. The patient is not nervous/anxious.      Current Outpatient Medications on File Prior to Visit   Medication Sig Dispense  Refill   • acetaminophen (TYLENOL) 500 MG tablet Take 500 mg by mouth Every 6 (Six) Hours As Needed.     • apixaban (ELIQUIS) 5 MG tablet tablet Take 5 mg by mouth 2 (Two) Times a Day.     • ascorbic acid (VITAMIN C) 500 MG tablet Take 500 mg by mouth Daily.     • atorvastatin (LIPITOR) 40 MG tablet atorvastatin 40 mg oral tablet take 1 tablet (40 mg) by oral route once daily   Active     • cyanocobalamin (VITAMIN B-12) 1000 MCG tablet Take 1,000 mcg by mouth Daily.     • HYDROcodone-acetaminophen (NORCO) 5-325 MG per tablet Take 1 tablet by mouth Every 6 (Six) Hours As Needed (cancer pain). 60 tablet 0   • levothyroxine (Synthroid) 50 MCG tablet Synthroid 50 mcg oral tablet take 1 tablet (50 mcg) by oral route once daily   Active     • loratadine (CLARITIN) 10 MG tablet loratadine 10 mg oral tablet take 1 tablet (10 mg) by oral route once daily   Active     • metoprolol tartrate (LOPRESSOR) 100 MG tablet Take 1 tablet by mouth 2 (Two) Times a Day. 180 tablet 3   • multivitamin (multivitamin) tablet tablet Take 1 tablet by mouth Daily.     • ondansetron (ZOFRAN) 8 MG tablet Take 1 tablet by mouth Every 8 (Eight) Hours As Needed for Nausea. for nausea 30 tablet 3   • potassium chloride (K-DUR,KLOR-CON) 20 MEQ CR tablet Take 1 tablet by mouth Daily. 30 tablet 5   • Synthroid 25 MCG tablet      • verapamil SR (CALAN-SR) 120 MG CR tablet Take 1 tablet by mouth Daily. 90 tablet 3   • vitamin E 1000 UNIT capsule Take 1,000 Units by mouth Daily.       Current Facility-Administered Medications on File Prior to Visit   Medication Dose Route Frequency Provider Last Rate Last Admin   • heparin injection 500 Units  500 Units Intravenous PRN Maurilio Campos MD   500 Units at 02/22/22 0812   • sodium chloride 0.9 % flush 20 mL  20 mL Intravenous PRN Maurilio Campos MD   20 mL at 02/22/22 0812       No Known Allergies  Past Medical History:   Diagnosis Date   • Asthma    • Atrial fibrillation, persistent 2/26/2021   •  Colorectal cancer    • Essential hypertension    • GI cancer    • Hepatitis    • History of DVT of lower extremity 8/27/2021   • Hyperlipidemia LDL goal <100 12/31/2020   • Rectal cancer 6/18/2021   • Secondary malignancy of liver 9/14/2021   • Secondary malignant neoplasm of left lung 9/14/2021   • Secondary malignant neoplasm of right lung 10/5/2021   • Thrombocytopenia (HCC) 1/11/2022   • Thyroid disease      Past Surgical History:   Procedure Laterality Date   • COLON SURGERY     • HERNIA REPAIR     • LIVER BIOPSY      4/27/21 Biopsy of liver revealed metastatic adenocarcinoma, consistent with colorectal primary   • OTHER SURGICAL HISTORY      REMOVED CANCER FROM COLON     Social History     Socioeconomic History   • Marital status:    • Number of children: 2   Tobacco Use   • Smoking status: Never Smoker   • Smokeless tobacco: Never Used   Vaping Use   • Vaping Use: Never used   Substance and Sexual Activity   • Alcohol use: Yes     Comment: occasionally, no recent use   • Drug use: Never   • Sexual activity: Defer     Family History   Problem Relation Age of Onset   • Prostate cancer Father    • Heart murmur Mother    • Diabetes Mother    • Colon cancer Maternal Uncle        Objective   Physical Exam  Vitals reviewed. Exam conducted with a chaperone present.   Constitutional:       General: He is not in acute distress.     Appearance: Normal appearance.   Cardiovascular:      Rate and Rhythm: Normal rate and regular rhythm.      Heart sounds: Normal heart sounds. No murmur heard.  No gallop.    Pulmonary:      Effort: Pulmonary effort is normal.      Breath sounds: Normal breath sounds.      Comments: Port-A-Cath  Abdominal:      General: Abdomen is flat. Bowel sounds are normal. There is no distension.      Palpations: Abdomen is soft.      Tenderness: There is no abdominal tenderness.   Musculoskeletal:      Cervical back: Neck supple.      Right lower leg: No edema.      Left lower leg: No edema.    Lymphadenopathy:      Cervical: No cervical adenopathy.   Neurological:      Mental Status: He is alert and oriented to person, place, and time.   Psychiatric:         Mood and Affect: Mood normal.         Behavior: Behavior normal.         Vitals:    02/22/22 0824   BP: 118/72   Pulse: 100   Resp: 18   Temp: 98.7 °F (37.1 °C)   SpO2: 98%   Weight: 128 kg (282 lb 3 oz)   PainSc: 0-No pain     ECOG score: 0         PHQ-9 Total Score:                    Result Review :   The following data was reviewed by: Maurilio Campos MD on 02/22/2022:  Lab Results   Component Value Date    HGB 12.2 (L) 02/22/2022    HCT 37.2 (L) 02/22/2022    .1 (H) 02/22/2022     02/22/2022    WBC 3.56 02/22/2022    NEUTROABS 2.25 02/22/2022    LYMPHSABS 0.71 02/22/2022    MONOSABS 0.45 02/22/2022    EOSABS 0.11 02/22/2022    BASOSABS 0.03 02/22/2022     Lab Results   Component Value Date    GLUCOSE 119 (H) 02/22/2022    BUN 11 02/22/2022    CREATININE 0.98 02/22/2022     02/22/2022    K 3.8 02/22/2022     02/22/2022    CO2 24.9 02/22/2022    CALCIUM 8.8 02/22/2022    PROTEINTOT 6.0 02/22/2022    ALBUMIN 3.74 02/22/2022    BILITOT 0.7 02/22/2022    ALKPHOS 83 02/22/2022    AST 25 02/22/2022    ALT 24 02/22/2022     Lab Results   Component Value Date    MG 1.9 08/02/2021    FREET4 0.96 01/10/2022    TSH 3.250 02/24/2021             Assessment and Plan    Diagnoses and all orders for this visit:    1. Rectal cancer (Primary)  Assessment & Plan:  Patient is due for cycle 12 of adjuvant FOLFOX.  He is having expected side effects including fatigue and neuropathy but stable.  Lab work is adequate for treatment.  Proceed with cycle 12 as planned.  This will complete his adjuvant therapy.  I will see him back in 1 month with repeat lab work to ensure that all acute toxicities are resolving.  He already has scans arranged with radiation oncology.  Port flush routinely while not an active use.    Orders:  -     CBC &  Differential; Future  -     Comprehensive Metabolic Panel; Future  -     CEA; Future    2. Secondary malignant neoplasm of left lung          Patient Follow Up: 1 month  Patient was given instructions and counseling regarding his condition or for health maintenance advice. Please see specific information pulled into the AVS if appropriate.     Maurilio Campos MD    2/22/2022

## 2022-02-23 ENCOUNTER — HOSPITAL ENCOUNTER (OUTPATIENT)
Dept: ONCOLOGY | Facility: HOSPITAL | Age: 67
Setting detail: INFUSION SERIES
Discharge: HOME | End: 2022-02-23

## 2022-02-23 VITALS
RESPIRATION RATE: 18 BRPM | SYSTOLIC BLOOD PRESSURE: 116 MMHG | DIASTOLIC BLOOD PRESSURE: 75 MMHG | HEIGHT: 76 IN | OXYGEN SATURATION: 97 % | BODY MASS INDEX: 34.74 KG/M2 | WEIGHT: 285.27 LBS | HEART RATE: 100 BPM | TEMPERATURE: 97.1 F

## 2022-02-23 DIAGNOSIS — C78.02 SECONDARY MALIGNANT NEOPLASM OF LEFT LUNG: ICD-10-CM

## 2022-02-23 DIAGNOSIS — C78.7 SECONDARY MALIGNANCY OF LIVER: ICD-10-CM

## 2022-02-23 DIAGNOSIS — C20 RECTAL CANCER: ICD-10-CM

## 2022-02-23 DIAGNOSIS — C78.01 SECONDARY MALIGNANT NEOPLASM OF RIGHT LUNG: Primary | ICD-10-CM

## 2022-02-23 PROCEDURE — 25010000002 PALONOSETRON PER 25 MCG: Performed by: INTERNAL MEDICINE

## 2022-02-23 PROCEDURE — 96375 TX/PRO/DX INJ NEW DRUG ADDON: CPT

## 2022-02-23 PROCEDURE — G0498 CHEMO EXTEND IV INFUS W/PUMP: HCPCS

## 2022-02-23 PROCEDURE — 96411 CHEMO IV PUSH ADDL DRUG: CPT

## 2022-02-23 PROCEDURE — 96415 CHEMO IV INFUSION ADDL HR: CPT

## 2022-02-23 PROCEDURE — 96368 THER/DIAG CONCURRENT INF: CPT

## 2022-02-23 PROCEDURE — 96413 CHEMO IV INFUSION 1 HR: CPT

## 2022-02-23 PROCEDURE — 25010000002 DEXAMETHASONE SODIUM PHOSPHATE 120 MG/30ML SOLUTION: Performed by: INTERNAL MEDICINE

## 2022-02-23 PROCEDURE — 25010000002 LEUCOVORIN CALCIUM PER 50 MG: Performed by: INTERNAL MEDICINE

## 2022-02-23 PROCEDURE — 25010000002 FLUOROURACIL PER 500 MG: Performed by: INTERNAL MEDICINE

## 2022-02-23 PROCEDURE — 25010000002 OXALIPLATIN PER 0.5 MG: Performed by: INTERNAL MEDICINE

## 2022-02-23 RX ORDER — FLUOROURACIL 50 MG/ML
280 INJECTION, SOLUTION INTRAVENOUS ONCE
Status: CANCELLED | OUTPATIENT
Start: 2022-02-23

## 2022-02-23 RX ORDER — FLUOROURACIL 50 MG/ML
700 INJECTION, SOLUTION INTRAVENOUS ONCE
Status: COMPLETED | OUTPATIENT
Start: 2022-02-23 | End: 2022-02-23

## 2022-02-23 RX ORDER — FAMOTIDINE 10 MG/ML
20 INJECTION, SOLUTION INTRAVENOUS AS NEEDED
Status: CANCELLED | OUTPATIENT
Start: 2022-02-23

## 2022-02-23 RX ORDER — PALONOSETRON 0.05 MG/ML
0.25 INJECTION, SOLUTION INTRAVENOUS ONCE
Status: CANCELLED | OUTPATIENT
Start: 2022-02-23

## 2022-02-23 RX ORDER — DIPHENHYDRAMINE HYDROCHLORIDE 50 MG/ML
50 INJECTION INTRAMUSCULAR; INTRAVENOUS AS NEEDED
Status: CANCELLED | OUTPATIENT
Start: 2022-02-23

## 2022-02-23 RX ORDER — DEXTROSE MONOHYDRATE 50 MG/ML
250 INJECTION, SOLUTION INTRAVENOUS ONCE
Status: CANCELLED | OUTPATIENT
Start: 2022-02-23

## 2022-02-23 RX ORDER — PALONOSETRON 0.05 MG/ML
0.25 INJECTION, SOLUTION INTRAVENOUS ONCE
Status: COMPLETED | OUTPATIENT
Start: 2022-02-23 | End: 2022-02-23

## 2022-02-23 RX ORDER — DEXTROSE MONOHYDRATE 50 MG/ML
250 INJECTION, SOLUTION INTRAVENOUS ONCE
Status: COMPLETED | OUTPATIENT
Start: 2022-02-23 | End: 2022-02-23

## 2022-02-23 RX ADMIN — PALONOSETRON HYDROCHLORIDE 0.25 MG: 0.25 INJECTION, SOLUTION INTRAVENOUS at 08:57

## 2022-02-23 RX ADMIN — OXALIPLATIN 150 MG: 5 INJECTION, SOLUTION INTRAVENOUS at 09:28

## 2022-02-23 RX ADMIN — DEXTROSE MONOHYDRATE 250 ML: 5 INJECTION, SOLUTION INTRAVENOUS at 08:55

## 2022-02-23 RX ADMIN — FLUOROURACIL 700 MG: 50 INJECTION, SOLUTION INTRAVENOUS at 11:36

## 2022-02-23 RX ADMIN — DEXAMETHASONE SODIUM PHOSPHATE 12 MG: 4 INJECTION, SOLUTION INTRA-ARTICULAR; INTRALESIONAL; INTRAMUSCULAR; INTRAVENOUS; SOFT TISSUE at 08:59

## 2022-02-23 RX ADMIN — FLUOROURACIL 4250 MG: 50 INJECTION, SOLUTION INTRAVENOUS at 11:39

## 2022-02-23 RX ADMIN — LEUCOVORIN CALCIUM 700 MG: 350 INJECTION, POWDER, LYOPHILIZED, FOR SUSPENSION INTRAMUSCULAR; INTRAVENOUS at 09:28

## 2022-02-25 ENCOUNTER — HOSPITAL ENCOUNTER (OUTPATIENT)
Dept: ONCOLOGY | Facility: HOSPITAL | Age: 67
Setting detail: INFUSION SERIES
Discharge: HOME | End: 2022-02-25

## 2022-02-25 DIAGNOSIS — C78.7 SECONDARY MALIGNANCY OF LIVER: ICD-10-CM

## 2022-02-25 DIAGNOSIS — C20 RECTAL CANCER: ICD-10-CM

## 2022-02-25 DIAGNOSIS — Z45.2 ENCOUNTER FOR ADJUSTMENT OR MANAGEMENT OF VASCULAR ACCESS DEVICE: Primary | ICD-10-CM

## 2022-02-25 DIAGNOSIS — C78.01 SECONDARY MALIGNANT NEOPLASM OF RIGHT LUNG: ICD-10-CM

## 2022-02-25 DIAGNOSIS — C78.02 SECONDARY MALIGNANT NEOPLASM OF LEFT LUNG: ICD-10-CM

## 2022-02-25 PROCEDURE — 25010000002 HEPARIN LOCK FLUSH PER 10 UNITS: Performed by: INTERNAL MEDICINE

## 2022-02-25 RX ORDER — SODIUM CHLORIDE 0.9 % (FLUSH) 0.9 %
20 SYRINGE (ML) INJECTION AS NEEDED
Status: DISCONTINUED | OUTPATIENT
Start: 2022-02-25 | End: 2022-02-26 | Stop reason: HOSPADM

## 2022-02-25 RX ORDER — HEPARIN SODIUM (PORCINE) LOCK FLUSH IV SOLN 100 UNIT/ML 100 UNIT/ML
500 SOLUTION INTRAVENOUS AS NEEDED
Status: CANCELLED | OUTPATIENT
Start: 2022-02-25

## 2022-02-25 RX ORDER — HEPARIN SODIUM (PORCINE) LOCK FLUSH IV SOLN 100 UNIT/ML 100 UNIT/ML
500 SOLUTION INTRAVENOUS AS NEEDED
Status: DISCONTINUED | OUTPATIENT
Start: 2022-02-25 | End: 2022-02-26 | Stop reason: HOSPADM

## 2022-02-25 RX ORDER — SODIUM CHLORIDE 0.9 % (FLUSH) 0.9 %
20 SYRINGE (ML) INJECTION AS NEEDED
Status: CANCELLED | OUTPATIENT
Start: 2022-02-25

## 2022-02-25 RX ADMIN — HEPARIN SODIUM (PORCINE) LOCK FLUSH IV SOLN 100 UNIT/ML 500 UNITS: 100 SOLUTION at 09:54

## 2022-02-25 RX ADMIN — Medication 20 ML: at 09:54

## 2022-03-07 ENCOUNTER — HOSPITAL ENCOUNTER (OUTPATIENT)
Dept: CT IMAGING | Facility: HOSPITAL | Age: 67
Discharge: HOME | End: 2022-03-07
Admitting: RADIOLOGY

## 2022-03-07 DIAGNOSIS — C20 RECTAL CANCER: ICD-10-CM

## 2022-03-07 PROCEDURE — 74177 CT ABD & PELVIS W/CONTRAST: CPT

## 2022-03-07 PROCEDURE — 71260 CT THORAX DX C+: CPT

## 2022-03-07 PROCEDURE — 0 IOPAMIDOL PER 1 ML: Performed by: RADIOLOGY

## 2022-03-07 RX ORDER — HEPARIN SODIUM (PORCINE) LOCK FLUSH IV SOLN 100 UNIT/ML 100 UNIT/ML
SOLUTION INTRAVENOUS
Status: DISPENSED
Start: 2022-03-07 | End: 2022-03-07

## 2022-03-07 RX ADMIN — IOPAMIDOL 100 ML: 755 INJECTION, SOLUTION INTRAVENOUS at 08:51

## 2022-03-10 ENCOUNTER — OFFICE VISIT (OUTPATIENT)
Dept: RADIATION ONCOLOGY | Facility: HOSPITAL | Age: 67
End: 2022-03-10

## 2022-03-10 VITALS
SYSTOLIC BLOOD PRESSURE: 125 MMHG | WEIGHT: 285.27 LBS | BODY MASS INDEX: 34.74 KG/M2 | TEMPERATURE: 97.3 F | OXYGEN SATURATION: 98 % | DIASTOLIC BLOOD PRESSURE: 92 MMHG | HEART RATE: 95 BPM | RESPIRATION RATE: 16 BRPM

## 2022-03-10 DIAGNOSIS — C20 RECTAL CANCER: ICD-10-CM

## 2022-03-10 DIAGNOSIS — C78.02 SECONDARY MALIGNANT NEOPLASM OF LEFT LUNG: ICD-10-CM

## 2022-03-10 DIAGNOSIS — C78.7 SECONDARY MALIGNANCY OF LIVER: ICD-10-CM

## 2022-03-10 DIAGNOSIS — C78.01 SECONDARY MALIGNANT NEOPLASM OF RIGHT LUNG: Primary | ICD-10-CM

## 2022-03-10 DIAGNOSIS — C78.01 SECONDARY MALIGNANCY OF RIGHT LUNG: ICD-10-CM

## 2022-03-10 PROCEDURE — 99214 OFFICE O/P EST MOD 30 MIN: CPT | Performed by: RADIOLOGY

## 2022-03-10 PROCEDURE — G0463 HOSPITAL OUTPT CLINIC VISIT: HCPCS

## 2022-03-10 NOTE — PROGRESS NOTES
Follow Up Office Visit      Encounter Date: 03/10/2022   Patient Name: Robbi Kennedy  YOB: 1955   Medical Record Number: [id number]   Primary Diagnosis: Secondary malignant neoplasm of right lung (HCC) [C78.01]   Cancer Staging: Cancer Staging  Rectal cancer  Staging form: Colon And Rectum, AJCC 8th Edition  - Clinical: Stage IIA (cT3, cN0, cM0) - Signed by Maurilio Campos MD on 6/21/2021  - Pathologic: No stage assigned - Unsigned                Cancer Staging  Stage IIA (cT3, cN0, cM0)  No stage assigned        Chief Complaint:    Chief Complaint   Patient presents with   • Follow-up     Rectal cancer with lung, liver mets       Oncologic History: Robbi Kennedy is a 66 y.o. male  here for follow-up regarding his metastatic rectal cancer.  He was originally diagnosed with a [id number] moderately differentiated adenocarcinoma of the rectum in 2019. He received neoadjuvant Xeloda with radiation under the care of Dr. Vasquez here at [place]. This was followed by an LAR with ostomy in 9/20/2019. He was staged as having [id number] disease. He did well for some time.     In 2021, he developed metastatic disease. Biopsy of a liver lesion in April 2021 returned as a metastatic adenocarcinoma consistent with a colorectal primary. Metastatic lesions in the chest were noted on CT imaging at that time. He received mFOLFOX with avastin under the care of Dr. Campos. Avastin was held towards the end in anticipation of upcoming abdominal surgery. Restaging CT demonstrated a treatment response in the liver and the chest.  He had a partial hepatectomy, removal of gallbladder, removal of regional lymph nodes, and MWA of a segment 6 liver metastases at the [facility] on 8/31/2021.     He completed SBRT to the a right middle lobe lesion.  This was delivered to a dose of 50 Gy in 5 fractions. He completed on 10/29/2021. We had a plan for close observation of his other 3,  previously stable, lung lesions. Upon completion of SBRT, he completed 12 cycles of adjuvant FOLFOX with Dr. Campos.     Interval History: He had scans and is here to discuss results. He has fatigue 2/2 chemotherapy and stable neuropathy. He denies chest wall pain/discomfort, difficulty breathing, or pain with swallowing. He denies hemoptysis.    Prior Radiation: Right middle lobe - 50 Gy in 5 fractions,completed on 10/29/2021.  50.4 Gy/28 fractions to pelvis for rectal cancer, performed by Dr. Vasquez - 2019       Subjective      Review of Systems: Review of Systems   Constitutional: Positive for fatigue (5/10). Negative for appetite change.   HENT: Negative for sore throat and trouble swallowing.    Respiratory: Positive for shortness of breath (increased with activity). Negative for cough.    Cardiovascular: Positive for leg swelling.   Gastrointestinal: Positive for constipation (intermittently, takes Miralax as needed). Negative for blood in stool, diarrhea and nausea.   Genitourinary: Negative for difficulty urinating.   Musculoskeletal: Positive for myalgias (generalized).   Neurological: Positive for dizziness (more so with standing too quickly), weakness (generalized) and headaches (occurs every couple of days per pt but he states it is becoming less often).   Psychiatric/Behavioral: Negative for sleep disturbance.       The following portions of the patient's history were reviewed and updated as appropriate: allergies, current medications, past family history, past medical history, past social history, past surgical history and problem list.    Measures:   Pain: (on a scale of 0-10)   Pain Score    03/10/22 0909   PainSc: 0-No pain     Robbi Kennedy reports a pain score of 0.  Given his pain assessment as noted, treatment options were discussed and the following options were decided upon as a follow-up plan to address the patient's pain: continuation of current treatment plan for pain.    Advanced Care  Plan: N Advance Care Planning   ACP discussion was declined by the patient. Patient does not have an advance directive, declines further assistance.    KPS/Quality of Life: 80 - Restricted Physical Activity  ECOG: (1) Restricted in physically strenuous activity, ambulatory and able to do work of light nature  Depression Screening:   Patient screened positive for depression based on a PHQ-9 score of 13 on 9/14/2021. Follow-up recommendations include: Referral to Social Work      Objective     Physical Exam:   Vital Signs:   Vitals:    03/10/22 0909   BP: 125/92   Pulse: 95   Resp: 16   Temp: 97.3 °F (36.3 °C)   TempSrc: Temporal   SpO2: 98%   Weight: 129 kg (285 lb 4.4 oz)   PainSc: 0-No pain     Body mass index is 34.74 kg/m².     Constitutional: No acute distress, sitting comfortably  Eye: EOMI, anicteric sclerae  HENT: NC/AT, MMM   Respiratory: Symmetric expansion, nonlabored respiration  MSK: ROM intact in all four extremities, no obvious deformities  Neuro: Alert, oriented x3, CN3-12 grossly intact.   Psych: Appropriate mood and affect.    Results:   CT Chest, Abd, Pelvis - 3/2022  Treatment response surrounding RML lesion. Other lesions in bilateral lungs are stable.  No concerning lymphadenopathy in the abdomen or pelvis.  No evidence of recurrence or progression in abdomen or pelvis.        Assessment / Plan        Assessment/Plan:     Diagnoses and all orders for this visit:    1. Secondary malignant neoplasm of right lung (HCC) (Primary)  -     CT Chest With & Without Contrast Diagnostic; Future  -     CT Abdomen Pelvis With Contrast; Future        Robbi Kennedy is a pleasant 66 y.o. male with 66 y.o. male with an original diagnosis of a stage IIa adenocarcinoma of the rectum. This was managed with neoadjuvant chemoradiation in 2019 followed by an LAR with ostomy. In 2021, he unfortunately developed metastatic disease in the lungs and in the liver. He received systemic therapy under the care of Dr. Campos  and had a good partial response to treatment. He underwent a partial hepatectomy with removal of the gallbladder, removal of regional lymph nodes, and MWA of a segment 6 metastases at the [facility] on August 31, 2021 to address his metastatic disease in the liver.     He received SBRT to a right middle lobe lesion in 10/2021 and completed 12 cycles of adjuvant chemotherapy w Dr. Campos last month.  He has recovered well and has no acute radiation related toxicities at this time.  He has 3 other known lesions in the lungs which continue to be stable on imaging. At this time, he has no evidence of disease progression.   I will see him back in 3 months with scans.      Follow Up:   Return in about 3 months (around 6/10/2022) for Imaging before next visit.        Time:   I spent 35 minutes on this encounter today, 03/10/22. Activities that took place during this time include:   - preparing to see the patient  - obtaining and reviewing separately obtained history  - performing a medically appropriate examination and evaluation  - counseling and educating the patient  - ordering medications/tests/procedures  - documenting clinical information in the health record      Sincerely,        Chapis Luna MD  Radiation Oncology  [facility]    This document has been signed by Chapis Luna MD on March 10, 2022 10:33 EST           NOTICE TO PATIENTS-HEALTH RESULTS RELEASE    We believe in information transparency, and we believe you deserve to see your information as soon as it is available.    Lab Results    • We release ALL notes and results to you promptly.    • Therefore, you may see some results even before we do. Please give us 2 business days to review and let you know our thoughts.  • We look at every result. We will contact you with any results that concern us.  • Some results may show abnormal, but are not clinically important. An example is “MCHC” and “MCV”.   Other results (like “MONICA”) are  really challenging to interpret, and require reviewing other results and other information from your chart. Sometimes these are best discussed in person.  Imaging    CT scan, MRI, and PET reports can show new or re-occurring cancer  • These reports can contain words that are hard to understand  • These reports can also show unexpected results.  • We ALWAYS plan to review these results with you and decide on a plan together. We prefer to do this in person, by video visit, or phone.  • When possible, we will discuss the possible results with you BEFORE getting the test, and the next steps we would take with each result.  • Some patients prefer to see their results online as soon as possible. Because of possible “bad news,” other patients may feel more comfortable waiting to discuss results when their provider is available at their next video visit or in-person visit or by phone. As the patient, you can choose when to view your result

## 2022-03-22 ENCOUNTER — OFFICE VISIT (OUTPATIENT)
Dept: ONCOLOGY | Facility: HOSPITAL | Age: 67
End: 2022-03-22

## 2022-03-22 ENCOUNTER — HOSPITAL ENCOUNTER (OUTPATIENT)
Dept: ONCOLOGY | Facility: HOSPITAL | Age: 67
Setting detail: INFUSION SERIES
Discharge: HOME | End: 2022-03-22

## 2022-03-22 VITALS
SYSTOLIC BLOOD PRESSURE: 126 MMHG | RESPIRATION RATE: 18 BRPM | BODY MASS INDEX: 34.63 KG/M2 | TEMPERATURE: 96.8 F | HEART RATE: 87 BPM | WEIGHT: 284.39 LBS | OXYGEN SATURATION: 95 % | DIASTOLIC BLOOD PRESSURE: 91 MMHG

## 2022-03-22 DIAGNOSIS — Z45.2 ENCOUNTER FOR ADJUSTMENT OR MANAGEMENT OF VASCULAR ACCESS DEVICE: Primary | ICD-10-CM

## 2022-03-22 DIAGNOSIS — C20 RECTAL CANCER: Primary | ICD-10-CM

## 2022-03-22 DIAGNOSIS — C20 RECTAL CANCER: ICD-10-CM

## 2022-03-22 LAB
ALBUMIN SERPL-MCNC: 3.62 G/DL (ref 3.5–5.2)
ALBUMIN/GLOB SERPL: 1.6 G/DL
ALP SERPL-CCNC: 92 U/L (ref 39–117)
ALT SERPL W P-5'-P-CCNC: 19 U/L (ref 1–41)
ANION GAP SERPL CALCULATED.3IONS-SCNC: 8.3 MMOL/L (ref 5–15)
AST SERPL-CCNC: 26 U/L (ref 1–40)
BASOPHILS # BLD AUTO: 0.03 10*3/MM3 (ref 0–0.2)
BASOPHILS NFR BLD AUTO: 0.8 % (ref 0–1.5)
BILIRUB SERPL-MCNC: 0.7 MG/DL (ref 0–1.2)
BUN SERPL-MCNC: 9 MG/DL (ref 8–23)
BUN/CREAT SERPL: 10.5 (ref 7–25)
CALCIUM SPEC-SCNC: 8.8 MG/DL (ref 8.6–10.5)
CEA SERPL-MCNC: 1.9 NG/ML
CHLORIDE SERPL-SCNC: 107 MMOL/L (ref 98–107)
CO2 SERPL-SCNC: 23.7 MMOL/L (ref 22–29)
CREAT SERPL-MCNC: 0.86 MG/DL (ref 0.76–1.27)
DEPRECATED RDW RBC AUTO: 54.6 FL (ref 37–54)
EGFRCR SERPLBLD CKD-EPI 2021: 96.1 ML/MIN/1.73
EOSINOPHIL # BLD AUTO: 0.23 10*3/MM3 (ref 0–0.4)
EOSINOPHIL NFR BLD AUTO: 6.1 % (ref 0.3–6.2)
ERYTHROCYTE [DISTWIDTH] IN BLOOD BY AUTOMATED COUNT: 14.1 % (ref 12.3–15.4)
GLOBULIN UR ELPH-MCNC: 2.3 GM/DL
GLUCOSE SERPL-MCNC: 187 MG/DL (ref 65–99)
HCT VFR BLD AUTO: 39.4 % (ref 37.5–51)
HGB BLD-MCNC: 12.8 G/DL (ref 13–17.7)
IMM GRANULOCYTES # BLD AUTO: 0.01 10*3/MM3 (ref 0–0.05)
IMM GRANULOCYTES NFR BLD AUTO: 0.3 % (ref 0–0.5)
LYMPHOCYTES # BLD AUTO: 0.61 10*3/MM3 (ref 0.7–3.1)
LYMPHOCYTES NFR BLD AUTO: 16.1 % (ref 19.6–45.3)
MCH RBC QN AUTO: 33.8 PG (ref 26.6–33)
MCHC RBC AUTO-ENTMCNC: 32.5 G/DL (ref 31.5–35.7)
MCV RBC AUTO: 104 FL (ref 79–97)
MONOCYTES # BLD AUTO: 0.39 10*3/MM3 (ref 0.1–0.9)
MONOCYTES NFR BLD AUTO: 10.3 % (ref 5–12)
NEUTROPHILS NFR BLD AUTO: 2.51 10*3/MM3 (ref 1.7–7)
NEUTROPHILS NFR BLD AUTO: 66.4 % (ref 42.7–76)
PLATELET # BLD AUTO: 175 10*3/MM3 (ref 140–450)
PMV BLD AUTO: 8.4 FL (ref 6–12)
POTASSIUM SERPL-SCNC: 3.7 MMOL/L (ref 3.5–5.2)
PROT SERPL-MCNC: 5.9 G/DL (ref 6–8.5)
RBC # BLD AUTO: 3.79 10*6/MM3 (ref 4.14–5.8)
SODIUM SERPL-SCNC: 139 MMOL/L (ref 136–145)
WBC NRBC COR # BLD: 3.78 10*3/MM3 (ref 3.4–10.8)

## 2022-03-22 PROCEDURE — 85025 COMPLETE CBC W/AUTO DIFF WBC: CPT | Performed by: INTERNAL MEDICINE

## 2022-03-22 PROCEDURE — G0463 HOSPITAL OUTPT CLINIC VISIT: HCPCS

## 2022-03-22 PROCEDURE — 36591 DRAW BLOOD OFF VENOUS DEVICE: CPT

## 2022-03-22 PROCEDURE — 80053 COMPREHEN METABOLIC PANEL: CPT | Performed by: INTERNAL MEDICINE

## 2022-03-22 PROCEDURE — 25010000002 HEPARIN LOCK FLUSH PER 10 UNITS: Performed by: INTERNAL MEDICINE

## 2022-03-22 PROCEDURE — 82378 CARCINOEMBRYONIC ANTIGEN: CPT | Performed by: INTERNAL MEDICINE

## 2022-03-22 PROCEDURE — 99214 OFFICE O/P EST MOD 30 MIN: CPT | Performed by: INTERNAL MEDICINE

## 2022-03-22 RX ORDER — SODIUM CHLORIDE 0.9 % (FLUSH) 0.9 %
20 SYRINGE (ML) INJECTION AS NEEDED
Status: CANCELLED | OUTPATIENT
Start: 2022-03-22

## 2022-03-22 RX ORDER — HEPARIN SODIUM (PORCINE) LOCK FLUSH IV SOLN 100 UNIT/ML 100 UNIT/ML
500 SOLUTION INTRAVENOUS AS NEEDED
Status: CANCELLED | OUTPATIENT
Start: 2022-03-22

## 2022-03-22 RX ORDER — SODIUM CHLORIDE 0.9 % (FLUSH) 0.9 %
20 SYRINGE (ML) INJECTION AS NEEDED
Status: DISCONTINUED | OUTPATIENT
Start: 2022-03-22 | End: 2022-03-24 | Stop reason: HOSPADM

## 2022-03-22 RX ORDER — HEPARIN SODIUM (PORCINE) LOCK FLUSH IV SOLN 100 UNIT/ML 100 UNIT/ML
500 SOLUTION INTRAVENOUS AS NEEDED
Status: DISCONTINUED | OUTPATIENT
Start: 2022-03-22 | End: 2022-03-24 | Stop reason: HOSPADM

## 2022-03-22 RX ADMIN — Medication 20 ML: at 08:08

## 2022-03-22 RX ADMIN — Medication 500 UNITS: at 08:08

## 2022-03-22 NOTE — ASSESSMENT & PLAN NOTE
Status post treatments as outlined in most recently 12 cycles of FOLFOX chemotherapy.  Lab work still demonstrates anemia secondary to chemotherapy but improving.  The remainder of his lab work has normalized.  We discussed routine exercise to help with his energy and endurance.  Port flush routinely while not in active use.  I will plan to see him back in 3 months for continued surveillance with lab work prior

## 2022-03-22 NOTE — PROGRESS NOTES
Chief Complaint  Rectal Cancer    Eri Moran APRN Reinberg, Emily, REGINALDO Fonseca          Robbi Kennedy presents to [facility]EMATOLOG[facility] for follow-up.  He has recently completed 12 cycles of FOLFOX chemotherapy.  He states that he is getting back to normal.  His energy level still not as good as he would like but improving and he is able to perform all of his ADLs.  He has some mild numbness in the fingers and toes.  He reports normal bowel habits blood productive, pain or melena.  No issues from his Port-A-Cath.    Oncology/Hematology History Overview Note   9/30/2019 High rectal sfug-bcbadkedhl-hhwyjbkrvrozut adenocarcinoma  associated with tubular adenoma.   4/27/21 Biopsy of liver revealed  metastatic adenocarcinoma, consistent with colorectal primary.    Clinical Staging  Stage IIa ([id number])    Chemotherapy      neoadjuvant Xeloda with radiation. adjuvant xeloda        Radiation Therapy      7/8/19 thru 8/14/19 completed neoadjuvant 5040 cGy=28 fxs rectum     10/2021 XRT due to recurrence right middle lung 5 Fx's, 5000cGy  11/26/21 adjuvant FOLFOX Initiated.     Surgeries       9/30/2019 low anterior resection with ostomy        8/31/21 partial hepatectomy, cholecystectomy and MWA for segment 6 medical margin performed at        Rectal cancer   6/2/2021 - 8/15/2021 Chemotherapy    OP COLON mFOLFOX6 + Bevacizumab (OXALIplatin / Leucovorin / Fluorouracil / Bevacizumab)     6/18/2021 Initial Diagnosis    Rectal cancer (CMS/HCC)     6/21/2021 Cancer Staged    Staging form: Colon And Rectum, AJCC 8th Edition  - Clinical: Stage IIA (cT3, cN0, cM0) - Signed by Maurilio Campos MD on 6/21/2021 11/16/2021 -  Chemotherapy    OP COLON mFOLFOX6 OXALIplatin / Leucovorin / Fluorouracil     Secondary malignant neoplasm of left lung   9/14/2021 Initial Diagnosis    Secondary malignant neoplasm of left lung (HCC)     10/20/2021 -  Radiation    RADIATION THERAPY Treatment  Details (Noted on 10/5/2021)  Site: Bilateral Lung  Technique: SBRT  Goal: No goal specified  Planned Treatment Start Date: 10/20/2021     11/16/2021 -  Chemotherapy    OP COLON mFOLFOX6 OXALIplatin / Leucovorin / Fluorouracil     Secondary malignancy of liver   9/14/2021 Initial Diagnosis    Secondary malignancy of liver (HCC)     11/16/2021 -  Chemotherapy    OP COLON mFOLFOX6 OXALIplatin / Leucovorin / Fluorouracil     Secondary malignant neoplasm of right lung   10/5/2021 Initial Diagnosis    Secondary malignant neoplasm of right lung (HCC)     10/20/2021 -  Radiation    RADIATION THERAPY Treatment Details (Noted on 10/5/2021)  Site: Bilateral Lung  Technique: SBRT  Goal: No goal specified  Planned Treatment Start Date: 10/20/2021     11/16/2021 -  Chemotherapy    OP COLON mFOLFOX6 OXALIplatin / Leucovorin / Fluorouracil         Review of Systems   Constitutional: Negative for appetite change, diaphoresis, fatigue, fever, unexpected weight gain and unexpected weight loss.   HENT: Negative for hearing loss, mouth sores, sore throat, swollen glands, trouble swallowing and voice change.    Eyes: Negative for blurred vision.   Respiratory: Negative for cough, shortness of breath and wheezing.    Cardiovascular: Negative for chest pain and palpitations.   Gastrointestinal: Negative for abdominal pain, blood in stool, constipation, diarrhea, nausea and vomiting.   Endocrine: Negative for cold intolerance and heat intolerance.   Genitourinary: Negative for difficulty urinating, dysuria, frequency, hematuria and urinary incontinence.   Musculoskeletal: Negative for arthralgias, back pain and myalgias.   Skin: Negative for rash, skin lesions and wound.   Neurological: Negative for dizziness, seizures, weakness, numbness and headache.   Hematological: Does not bruise/bleed easily.   Psychiatric/Behavioral: Negative for depressed mood. The patient is not nervous/anxious.      Current Outpatient Medications on File Prior to  Visit   Medication Sig Dispense Refill   • acetaminophen (TYLENOL) 500 MG tablet Take 500 mg by mouth Every 6 (Six) Hours As Needed.     • apixaban (ELIQUIS) 5 MG tablet tablet Take 5 mg by mouth 2 (Two) Times a Day.     • ascorbic acid (VITAMIN C) 500 MG tablet Take 500 mg by mouth Daily.     • atorvastatin (LIPITOR) 40 MG tablet atorvastatin 40 mg oral tablet take 1 tablet (40 mg) by oral route once daily   Active     • cyanocobalamin (VITAMIN B-12) 1000 MCG tablet Take 1,000 mcg by mouth Daily.     • HYDROcodone-acetaminophen (NORCO) 5-325 MG per tablet Take 1 tablet by mouth Every 6 (Six) Hours As Needed (cancer pain). 60 tablet 0   • levothyroxine (SYNTHROID, LEVOTHROID) 50 MCG tablet Synthroid 50 mcg oral tablet take 1 tablet (50 mcg) by oral route once daily   Active     • loratadine (CLARITIN) 10 MG tablet loratadine 10 mg oral tablet take 1 tablet (10 mg) by oral route once daily   Active     • metoprolol tartrate (LOPRESSOR) 100 MG tablet Take 1 tablet by mouth 2 (Two) Times a Day. 180 tablet 3   • multivitamin (THERAGRAN) tablet tablet Take 1 tablet by mouth Daily.     • ondansetron (ZOFRAN) 8 MG tablet Take 1 tablet by mouth Every 8 (Eight) Hours As Needed for Nausea. for nausea 30 tablet 3   • potassium chloride (K-DUR,KLOR-CON) 20 MEQ CR tablet Take 1 tablet by mouth Daily. 30 tablet 5   • Synthroid 25 MCG tablet      • verapamil SR (CALAN-SR) 120 MG CR tablet Take 1 tablet by mouth Daily. 90 tablet 3   • vitamin E 1000 UNIT capsule Take 1,000 Units by mouth Daily.       Current Facility-Administered Medications on File Prior to Visit   Medication Dose Route Frequency Provider Last Rate Last Admin   • heparin injection 500 Units  500 Units Intravenous PRN Maurilio Campos MD   500 Units at 03/22/22 0808   • sodium chloride 0.9 % flush 20 mL  20 mL Intravenous PRN Maurilio Campos MD   20 mL at 03/22/22 0808       No Known Allergies  Past Medical History:   Diagnosis Date   • Asthma    • Atrial  fibrillation, persistent 2/26/2021   • Colorectal cancer    • Essential hypertension    • GI cancer    • Hepatitis    • History of DVT of lower extremity 8/27/2021   • Hyperlipidemia LDL goal <100 12/31/2020   • Rectal cancer 6/18/2021   • Secondary malignancy of liver 9/14/2021   • Secondary malignant neoplasm of left lung 9/14/2021   • Secondary malignant neoplasm of right lung 10/5/2021   • Thrombocytopenia (HCC) 1/11/2022   • Thyroid disease      Past Surgical History:   Procedure Laterality Date   • COLON SURGERY     • HERNIA REPAIR     • LIVER BIOPSY      4/27/21 Biopsy of liver revealed metastatic adenocarcinoma, consistent with colorectal primary   • OTHER SURGICAL HISTORY      REMOVED CANCER FROM COLON     Social History     Socioeconomic History   • Marital status:    • Number of children: 2   Tobacco Use   • Smoking status: Never Smoker   • Smokeless tobacco: Never Used   Vaping Use   • Vaping Use: Never used   Substance and Sexual Activity   • Alcohol use: Yes     Comment: occasionally, no recent use   • Drug use: Never   • Sexual activity: Defer     Family History   Problem Relation Age of Onset   • Prostate cancer Father    • Heart murmur Mother    • Diabetes Mother    • Colon cancer Maternal Uncle        Objective   Physical Exam  Vitals reviewed. Exam conducted with a chaperone present.   Constitutional:       General: He is not in acute distress.     Appearance: Normal appearance.   Cardiovascular:      Rate and Rhythm: Normal rate and regular rhythm.      Heart sounds: Normal heart sounds. No murmur heard.    No gallop.   Pulmonary:      Effort: Pulmonary effort is normal.      Breath sounds: Normal breath sounds.      Comments: Port-A-Cath  Abdominal:      General: Abdomen is flat. Bowel sounds are normal. There is no distension.      Palpations: Abdomen is soft.      Tenderness: There is no abdominal tenderness.      Comments: No HSM or masses   Musculoskeletal:      Cervical back: Neck  supple.      Right lower leg: No edema.      Left lower leg: No edema.   Lymphadenopathy:      Cervical: No cervical adenopathy.   Neurological:      Mental Status: He is alert and oriented to person, place, and time.   Psychiatric:         Mood and Affect: Mood normal.         Behavior: Behavior normal.         Vitals:    03/22/22 0825   BP: 126/91   Pulse: 87   Resp: 18   Temp: 96.8 °F (36 °C)   SpO2: 95%   Weight: 129 kg (284 lb 6.3 oz)   PainSc: 0-No pain     ECOG score: 0         PHQ-9 Total Score:                    Result Review :   The following data was reviewed by: Maurilio Campos MD on 03/22/2022:  Lab Results   Component Value Date    HGB 12.8 (L) 03/22/2022    HCT 39.4 03/22/2022    .0 (H) 03/22/2022     03/22/2022    WBC 3.78 03/22/2022    NEUTROABS 2.51 03/22/2022    LYMPHSABS 0.61 (L) 03/22/2022    MONOSABS 0.39 03/22/2022    EOSABS 0.23 03/22/2022    BASOSABS 0.03 03/22/2022     Lab Results   Component Value Date    GLUCOSE 187 (H) 03/22/2022    BUN 9 03/22/2022    CREATININE 0.86 03/22/2022     03/22/2022    K 3.7 03/22/2022     03/22/2022    CO2 23.7 03/22/2022    CALCIUM 8.8 03/22/2022    PROTEINTOT 5.9 (L) 03/22/2022    ALBUMIN 3.62 03/22/2022    BILITOT 0.7 03/22/2022    ALKPHOS 92 03/22/2022    AST 26 03/22/2022    ALT 19 03/22/2022     Lab Results   Component Value Date    MG 1.9 08/02/2021    FREET4 0.96 01/10/2022    TSH 3.250 02/24/2021             Assessment and Plan    Diagnoses and all orders for this visit:    1. Rectal cancer (Primary)  Assessment & Plan:  Status post treatments as outlined in most recently 12 cycles of FOLFOX chemotherapy.  Lab work still demonstrates anemia secondary to chemotherapy but improving.  The remainder of his lab work has normalized.  We discussed routine exercise to help with his energy and endurance.  Port flush routinely while not in active use.  I will plan to see him back in 3 months for continued surveillance with lab work  prior    Orders:  -     CBC & Differential; Future  -     Comprehensive Metabolic Panel; Future  -     CEA; Future          Patient Follow Up: 3 months    Patient was given instructions and counseling regarding his condition or for health maintenance advice. Please see specific information pulled into the AVS if appropriate.     Maurilio Campos MD    3/22/2022

## 2022-03-30 ENCOUNTER — APPOINTMENT (OUTPATIENT)
Dept: CT IMAGING | Facility: HOSPITAL | Age: 67
End: 2022-03-30

## 2022-05-03 ENCOUNTER — HOSPITAL ENCOUNTER (OUTPATIENT)
Dept: ONCOLOGY | Facility: HOSPITAL | Age: 67
Setting detail: INFUSION SERIES
Discharge: HOME | End: 2022-05-03

## 2022-05-03 DIAGNOSIS — Z45.2 ENCOUNTER FOR ADJUSTMENT OR MANAGEMENT OF VASCULAR ACCESS DEVICE: Primary | ICD-10-CM

## 2022-05-03 PROCEDURE — 96523 IRRIG DRUG DELIVERY DEVICE: CPT

## 2022-05-03 PROCEDURE — 25010000002 HEPARIN LOCK FLUSH PER 10 UNITS: Performed by: INTERNAL MEDICINE

## 2022-05-03 RX ORDER — HEPARIN SODIUM (PORCINE) LOCK FLUSH IV SOLN 100 UNIT/ML 100 UNIT/ML
500 SOLUTION INTRAVENOUS AS NEEDED
Status: CANCELLED | OUTPATIENT
Start: 2022-05-03

## 2022-05-03 RX ORDER — SODIUM CHLORIDE 0.9 % (FLUSH) 0.9 %
20 SYRINGE (ML) INJECTION AS NEEDED
Status: CANCELLED | OUTPATIENT
Start: 2022-05-03

## 2022-05-03 RX ORDER — HEPARIN SODIUM (PORCINE) LOCK FLUSH IV SOLN 100 UNIT/ML 100 UNIT/ML
500 SOLUTION INTRAVENOUS AS NEEDED
Status: DISCONTINUED | OUTPATIENT
Start: 2022-05-03 | End: 2022-05-04 | Stop reason: HOSPADM

## 2022-05-03 RX ORDER — SODIUM CHLORIDE 0.9 % (FLUSH) 0.9 %
20 SYRINGE (ML) INJECTION AS NEEDED
Status: DISCONTINUED | OUTPATIENT
Start: 2022-05-03 | End: 2022-05-04 | Stop reason: HOSPADM

## 2022-05-03 RX ADMIN — Medication 20 ML: at 08:09

## 2022-05-03 RX ADMIN — HEPARIN SODIUM (PORCINE) LOCK FLUSH IV SOLN 100 UNIT/ML 500 UNITS: 100 SOLUTION at 08:09

## 2022-06-08 ENCOUNTER — LAB (OUTPATIENT)
Dept: LAB | Facility: HOSPITAL | Age: 67
End: 2022-06-08

## 2022-06-08 ENCOUNTER — TELEPHONE (OUTPATIENT)
Dept: ONCOLOGY | Facility: HOSPITAL | Age: 67
End: 2022-06-08

## 2022-06-08 ENCOUNTER — HOSPITAL ENCOUNTER (OUTPATIENT)
Dept: CT IMAGING | Facility: HOSPITAL | Age: 67
Discharge: HOME | End: 2022-06-08

## 2022-06-08 DIAGNOSIS — C20 RECTAL CANCER: ICD-10-CM

## 2022-06-08 DIAGNOSIS — C78.7 SECONDARY MALIGNANCY OF LIVER: ICD-10-CM

## 2022-06-08 DIAGNOSIS — C78.02 SECONDARY MALIGNANT NEOPLASM OF LEFT LUNG: ICD-10-CM

## 2022-06-08 DIAGNOSIS — C78.01 SECONDARY MALIGNANT NEOPLASM OF RIGHT LUNG: ICD-10-CM

## 2022-06-08 DIAGNOSIS — C78.01 SECONDARY MALIGNANCY OF RIGHT LUNG: ICD-10-CM

## 2022-06-08 LAB
ALBUMIN SERPL-MCNC: 4.2 G/DL (ref 3.5–5.2)
ALBUMIN/GLOB SERPL: 1.6 G/DL
ALP SERPL-CCNC: 100 U/L (ref 39–117)
ALT SERPL W P-5'-P-CCNC: 19 U/L (ref 1–41)
ANION GAP SERPL CALCULATED.3IONS-SCNC: 9.6 MMOL/L (ref 5–15)
AST SERPL-CCNC: 27 U/L (ref 1–40)
BASOPHILS # BLD AUTO: 0.03 10*3/MM3 (ref 0–0.2)
BASOPHILS NFR BLD AUTO: 0.5 % (ref 0–1.5)
BILIRUB SERPL-MCNC: 1.1 MG/DL (ref 0–1.2)
BUN SERPL-MCNC: 12 MG/DL (ref 8–23)
BUN/CREAT SERPL: 13.3 (ref 7–25)
CALCIUM SPEC-SCNC: 9.4 MG/DL (ref 8.6–10.5)
CEA SERPL-MCNC: 3.09 NG/ML
CHLORIDE SERPL-SCNC: 103 MMOL/L (ref 98–107)
CO2 SERPL-SCNC: 25.4 MMOL/L (ref 22–29)
CREAT BLDA-MCNC: 0.9 MG/DL
CREAT SERPL-MCNC: 0.9 MG/DL (ref 0.76–1.27)
DEPRECATED RDW RBC AUTO: 43.1 FL (ref 37–54)
EGFRCR SERPLBLD CKD-EPI 2021: 94.2 ML/MIN/1.73
EGFRCR SERPLBLD CKD-EPI 2021: 94.2 ML/MIN/1.73
EOSINOPHIL # BLD AUTO: 0.34 10*3/MM3 (ref 0–0.4)
EOSINOPHIL NFR BLD AUTO: 5.2 % (ref 0.3–6.2)
ERYTHROCYTE [DISTWIDTH] IN BLOOD BY AUTOMATED COUNT: 12.6 % (ref 12.3–15.4)
GLOBULIN UR ELPH-MCNC: 2.7 GM/DL
GLUCOSE SERPL-MCNC: 113 MG/DL (ref 65–99)
HCT VFR BLD AUTO: 42.9 % (ref 37.5–51)
HGB BLD-MCNC: 14.4 G/DL (ref 13–17.7)
IMM GRANULOCYTES # BLD AUTO: 0.02 10*3/MM3 (ref 0–0.05)
IMM GRANULOCYTES NFR BLD AUTO: 0.3 % (ref 0–0.5)
LYMPHOCYTES # BLD AUTO: 0.8 10*3/MM3 (ref 0.7–3.1)
LYMPHOCYTES NFR BLD AUTO: 12.3 % (ref 19.6–45.3)
MCH RBC QN AUTO: 31.6 PG (ref 26.6–33)
MCHC RBC AUTO-ENTMCNC: 33.6 G/DL (ref 31.5–35.7)
MCV RBC AUTO: 94.3 FL (ref 79–97)
MONOCYTES # BLD AUTO: 0.54 10*3/MM3 (ref 0.1–0.9)
MONOCYTES NFR BLD AUTO: 8.3 % (ref 5–12)
NEUTROPHILS NFR BLD AUTO: 4.75 10*3/MM3 (ref 1.7–7)
NEUTROPHILS NFR BLD AUTO: 73.4 % (ref 42.7–76)
NRBC BLD AUTO-RTO: 0 /100 WBC (ref 0–0.2)
PLATELET # BLD AUTO: 228 10*3/MM3 (ref 140–450)
PMV BLD AUTO: 9.2 FL (ref 6–12)
POTASSIUM SERPL-SCNC: 4.1 MMOL/L (ref 3.5–5.2)
PROT SERPL-MCNC: 6.9 G/DL (ref 6–8.5)
RBC # BLD AUTO: 4.55 10*6/MM3 (ref 4.14–5.8)
SODIUM SERPL-SCNC: 138 MMOL/L (ref 136–145)
WBC NRBC COR # BLD: 6.48 10*3/MM3 (ref 3.4–10.8)

## 2022-06-08 PROCEDURE — 82565 ASSAY OF CREATININE: CPT

## 2022-06-08 PROCEDURE — 71260 CT THORAX DX C+: CPT

## 2022-06-08 PROCEDURE — 36415 COLL VENOUS BLD VENIPUNCTURE: CPT

## 2022-06-08 PROCEDURE — 0 IOPAMIDOL PER 1 ML: Performed by: RADIOLOGY

## 2022-06-08 PROCEDURE — 85025 COMPLETE CBC W/AUTO DIFF WBC: CPT

## 2022-06-08 PROCEDURE — 82378 CARCINOEMBRYONIC ANTIGEN: CPT

## 2022-06-08 PROCEDURE — 74177 CT ABD & PELVIS W/CONTRAST: CPT

## 2022-06-08 PROCEDURE — 80053 COMPREHEN METABOLIC PANEL: CPT

## 2022-06-08 RX ORDER — HEPARIN SODIUM (PORCINE) LOCK FLUSH IV SOLN 100 UNIT/ML 100 UNIT/ML
SOLUTION INTRAVENOUS
Status: DISPENSED
Start: 2022-06-08 | End: 2022-06-08

## 2022-06-08 RX ADMIN — IOPAMIDOL 100 ML: 755 INJECTION, SOLUTION INTRAVENOUS at 10:23

## 2022-06-08 NOTE — TELEPHONE ENCOUNTER
Provider: ELKIN  Caller: ANA LEONARD   Relationship to Patient: SPOUSE  Phone Number: [phone]  Reason for Call: PT HAD PORT FLUSH AND LABS DONE TODAY, PT DOESN'T NEED THE 9:00 AM PORT DRAW APT ON 06/09/2022 @ 9:00 AM BUT WANTS TO KEEP APT WITH PROVIDER @9:45 ON 06/09/2022

## 2022-06-09 ENCOUNTER — HOSPITAL ENCOUNTER (OUTPATIENT)
Dept: ONCOLOGY | Facility: HOSPITAL | Age: 67
Setting detail: INFUSION SERIES
End: 2022-06-09

## 2022-06-09 ENCOUNTER — OFFICE VISIT (OUTPATIENT)
Dept: ONCOLOGY | Facility: HOSPITAL | Age: 67
End: 2022-06-09

## 2022-06-09 VITALS
BODY MASS INDEX: 34.1 KG/M2 | SYSTOLIC BLOOD PRESSURE: 124 MMHG | WEIGHT: 280 LBS | OXYGEN SATURATION: 98 % | DIASTOLIC BLOOD PRESSURE: 80 MMHG | TEMPERATURE: 97 F | RESPIRATION RATE: 18 BRPM | HEART RATE: 88 BPM

## 2022-06-09 DIAGNOSIS — C20 RECTAL CANCER: Primary | ICD-10-CM

## 2022-06-09 PROCEDURE — G0463 HOSPITAL OUTPT CLINIC VISIT: HCPCS

## 2022-06-09 PROCEDURE — 99213 OFFICE O/P EST LOW 20 MIN: CPT | Performed by: INTERNAL MEDICINE

## 2022-06-09 NOTE — PROGRESS NOTES
Robbi Kennedy   : 1955     LOCATION: [facility] HEMATOLOGY & ONCOLOGY     Chief Complaint  Rectal Cancer    Referring Provider: REGINALDO Stratton  PCP: Eri Moran APRN    Oncology/Hematology History Overview Note   2019 High rectal xfux-uaecatezah-xxbxlukkoidyuh adenocarcinoma  associated with tubular adenoma.   21 Biopsy of liver revealed  metastatic adenocarcinoma, consistent with colorectal primary.    Clinical Staging  Stage IIa ([id number])    Chemotherapy      neoadjuvant Xeloda with radiation. adjuvant xeloda        Radiation Therapy      19 thru 19 completed neoadjuvant 5040 cGy=28 fxs rectum     10/2021 XRT due to recurrence right middle lung 5 Fx's, 5000cGy  21 adjuvant FOLFOX Initiated.     Surgeries       2019 low anterior resection with ostomy        21 partial hepatectomy, cholecystectomy and MWA for segment 6 medical margin performed at        Rectal cancer   2021 - 8/15/2021 Chemotherapy    OP COLON mFOLFOX6 + Bevacizumab (OXALIplatin / Leucovorin / Fluorouracil / Bevacizumab)     2021 Initial Diagnosis    Rectal cancer (CMS/HCC)     2021 Cancer Staged    Staging form: Colon And Rectum, AJCC 8th Edition  - Clinical: Stage IIA (cT3, cN0, cM0) - Signed by Maurilio Campos MD on 2021 -  Chemotherapy    OP COLON mFOLFOX6 OXALIplatin / Leucovorin / Fluorouracil     Secondary malignant neoplasm of left lung   2021 Initial Diagnosis    Secondary malignant neoplasm of left lung (HCC)     10/20/2021 -  Radiation    RADIATION THERAPY Treatment Details (Noted on 10/5/2021)  Site: Bilateral Lung  Technique: SBRT  Goal: No goal specified  Planned Treatment Start Date: 10/20/2021     2021 -  Chemotherapy    OP COLON mFOLFOX6 OXALIplatin / Leucovorin / Fluorouracil     Secondary malignancy of liver   2021 Initial Diagnosis    Secondary malignancy of liver (HCC)     2021 -  Chemotherapy     OP COLON mFOLFOX6 OXALIplatin / Leucovorin / Fluorouracil     Secondary malignant neoplasm of right lung   10/5/2021 Initial Diagnosis    Secondary malignant neoplasm of right lung (HCC)     10/20/2021 -  Radiation    RADIATION THERAPY Treatment Details (Noted on 10/5/2021)  Site: Bilateral Lung  Technique: SBRT  Goal: No goal specified  Planned Treatment Start Date: 10/20/2021     11/16/2021 -  Chemotherapy    OP COLON mFOLFOX6 OXALIplatin / Leucovorin / Fluorouracil             Subjective        History of Present Illness   Pt here for f/u metastatic rectal cancer to lung    Feels well   mild neuropathy in hands and feet   mild fatigue       Review of Systems   Constitutional: Positive for fatigue.   Cardiovascular: Positive for leg swelling.   Musculoskeletal: Positive for gait problem.   All other systems reviewed and are negative.    Current Outpatient Medications on File Prior to Visit   Medication Sig Dispense Refill   • acetaminophen (TYLENOL) 500 MG tablet Take 500 mg by mouth Every 6 (Six) Hours As Needed.     • apixaban (ELIQUIS) 5 MG tablet tablet Take 5 mg by mouth 2 (Two) Times a Day.     • ascorbic acid (VITAMIN C) 500 MG tablet Take 500 mg by mouth Daily.     • atorvastatin (LIPITOR) 40 MG tablet atorvastatin 40 mg oral tablet take 1 tablet (40 mg) by oral route once daily   Active     • cyanocobalamin (VITAMIN B-12) 1000 MCG tablet Take 1,000 mcg by mouth Daily.     • HYDROcodone-acetaminophen (NORCO) 5-325 MG per tablet Take 1 tablet by mouth Every 6 (Six) Hours As Needed (cancer pain). 60 tablet 0   • levothyroxine (SYNTHROID, LEVOTHROID) 50 MCG tablet Synthroid 50 mcg oral tablet take 1 tablet (50 mcg) by oral route once daily   Active     • loratadine (CLARITIN) 10 MG tablet loratadine 10 mg oral tablet take 1 tablet (10 mg) by oral route once daily   Active     • metoprolol tartrate (LOPRESSOR) 100 MG tablet Take 1 tablet by mouth 2 (Two) Times a Day. 180 tablet 3   • multivitamin  (THERAGRAN) tablet tablet Take 1 tablet by mouth Daily.     • ondansetron (ZOFRAN) 8 MG tablet Take 1 tablet by mouth Every 8 (Eight) Hours As Needed for Nausea. for nausea 30 tablet 3   • potassium chloride (K-DUR,KLOR-CON) 20 MEQ CR tablet Take 1 tablet by mouth Daily. 30 tablet 5   • Synthroid 25 MCG tablet      • verapamil SR (CALAN-SR) 120 MG CR tablet Take 1 tablet by mouth Daily. 90 tablet 3   • vitamin E 1000 UNIT capsule Take 1,000 Units by mouth Daily.       Current Facility-Administered Medications on File Prior to Visit   Medication Dose Route Frequency Provider Last Rate Last Admin   • [] heparin 100 UNIT/ML injection  - ADS Override Pull            • [COMPLETED] iopamidol (ISOVUE-370) 76 % injection 100 mL  100 mL Intravenous Once in imaging Chapis Luna MD   100 mL at 22 1023       No Known Allergies    Past Medical History:   Diagnosis Date   • Asthma    • Atrial fibrillation, persistent 2021   • Colorectal cancer    • Essential hypertension    • GI cancer    • Hepatitis    • History of DVT of lower extremity 2021   • Hyperlipidemia LDL goal <100 2020   • Rectal cancer 2021   • Secondary malignancy of liver 2021   • Secondary malignant neoplasm of left lung 2021   • Secondary malignant neoplasm of right lung 10/5/2021   • Thrombocytopenia (HCC) 2022   • Thyroid disease      Past Surgical History:   Procedure Laterality Date   • COLON SURGERY     • HERNIA REPAIR     • LIVER BIOPSY      21 Biopsy of liver revealed metastatic adenocarcinoma, consistent with colorectal primary   • OTHER SURGICAL HISTORY      REMOVED CANCER FROM COLON     Social History     Socioeconomic History   • Marital status:    • Number of children: 2   Tobacco Use   • Smoking status: Never Smoker   • Smokeless tobacco: Never Used   Vaping Use   • Vaping Use: Never used   Substance and Sexual Activity   • Alcohol use: Yes     Comment: occasionally, no recent use   •  Drug use: Never   • Sexual activity: Defer     Family History   Problem Relation Age of Onset   • Prostate cancer Father    • Heart murmur Mother    • Diabetes Mother    • Colon cancer Maternal Uncle      Immunization History   Administered Date(s) Administered   • COVID-19 (PFIZER) PURPLE CAP 04/02/2021, 04/26/2021, 12/20/2021   • Fluzone High-Dose 65+yrs 10/14/2021   • INFLUENZA SPLIT TRI 10/14/2009   • Influenza, Unspecified 09/01/2020   • Tdap 08/05/2013   • Zostavax 08/02/2016       Objective     Vitals:    06/09/22 0942   BP: 124/80   Pulse: 88   Resp: 18   Temp: 97 °F (36.1 °C)   SpO2: 98%   Weight: 127 kg (280 lb)   PainSc: 0-No pain     Body mass index is 34.1 kg/m².     Physical Exam    Deferred for discussion      Iron   Date Value Ref Range Status   03/04/2020 60 (L) 70 - 180 ug/dL Final     Iron Saturation   Date Value Ref Range Status   03/04/2020 20 20 - 55 % Final     Transferrin   Date Value Ref Range Status   03/04/2020 215.00 215.00 - 365.00 mg/dL Final     TIBC   Date Value Ref Range Status   03/04/2020 307 245 - 450 ug/dL Final     Comment:     As of 10/15/03, the chemistry department began utilizing a Transferrin  assay. Data suggests that Transferrin is a better estimate of Total Iron  binding capacity than the TIBC assay. As a result the TIBC will now be a  calculated estimate from the measured Transferrin.       Ferritin   Date Value Ref Range Status   03/04/2020 140 30 - 300 ng/mL Final     Comment:     <10 ng/ml usually associated with Iron Deficiency Anemia.  Above normal range levels may be due to Hepatic and/or  Chronic Inflammatory Disease.       Vitamin B-12   Date Value Ref Range Status   03/04/2020 >2000 (H) 211 - 911 pg/mL Final     Folate   Date Value Ref Range Status   03/04/2020 19.4 4.8 - 20.0 ng/mL Final     Comment:     Results may be falsely decreased due to light exposure if  testing added on after collection.       ECOG score: 0           PHQ-9 Total Score:            Result Review :   The following data was reviewed by: Nan Cook MD on 06/09/2022:  Lab Results   Component Value Date    HGB 14.4 06/08/2022    HCT 42.9 06/08/2022    MCV 94.3 06/08/2022     06/08/2022    WBC 6.48 06/08/2022    NEUTROABS 4.75 06/08/2022    LYMPHSABS 0.80 06/08/2022    MONOSABS 0.54 06/08/2022    EOSABS 0.34 06/08/2022    BASOSABS 0.03 06/08/2022     Lab Results   Component Value Date    GLUCOSE 113 (H) 06/08/2022    BUN 12 06/08/2022    CREATININE 0.90 06/08/2022     06/08/2022    K 4.1 06/08/2022     06/08/2022    CO2 25.4 06/08/2022    CALCIUM 9.4 06/08/2022    PROTEINTOT 6.9 06/08/2022    ALBUMIN 4.20 06/08/2022    BILITOT 1.1 06/08/2022    ALKPHOS 100 06/08/2022    AST 27 06/08/2022    ALT 19 06/08/2022         Data reviewed: labs ct scans          Assessment and Plan      ASSESSMENT   rectal cancer  PLAN/RECOMMENDATIONS  Clinically stable post completion of chemo    f/u ct scans reviewed with pt   Lung nodule 5mm inc to 10 mm noted  cea wnl   f/u 3 months       Diagnoses and all orders for this visit:    1. Rectal cancer (HCC) (Primary)  -     CBC & Differential; Future  -     Comprehensive Metabolic Panel; Future  -     CEA; Future      I spent 20 minutes caring for Robbi on this date of service. This time includes time spent by me in the following activities: reviewing tests, counseling and educating the patient/family/caregiver, ordering medications, tests, or procedures, documenting information in the medical record and independently interpreting results and communicating that information with the patient/family/caregiver    Patient was given instructions and counseling regarding his condition or for health maintenance advice. Please see specific information pulled into the AVS if appropriate.     Nan Cook MD    6/9/2022

## 2022-06-15 ENCOUNTER — HOSPITAL ENCOUNTER (OUTPATIENT)
Dept: RADIATION ONCOLOGY | Facility: HOSPITAL | Age: 67
Setting detail: RADIATION/ONCOLOGY SERIES
End: 2022-06-15

## 2022-06-15 ENCOUNTER — OFFICE VISIT (OUTPATIENT)
Dept: RADIATION ONCOLOGY | Facility: HOSPITAL | Age: 67
End: 2022-06-15

## 2022-06-15 ENCOUNTER — HOSPITAL ENCOUNTER (OUTPATIENT)
Dept: RADIATION ONCOLOGY | Facility: HOSPITAL | Age: 67
Discharge: HOME | End: 2022-06-15

## 2022-06-15 VITALS
DIASTOLIC BLOOD PRESSURE: 86 MMHG | HEART RATE: 83 BPM | SYSTOLIC BLOOD PRESSURE: 122 MMHG | WEIGHT: 290.35 LBS | TEMPERATURE: 97 F | OXYGEN SATURATION: 99 % | BODY MASS INDEX: 35.36 KG/M2 | RESPIRATION RATE: 16 BRPM

## 2022-06-15 DIAGNOSIS — C78.02 SECONDARY MALIGNANT NEOPLASM OF LEFT LUNG: ICD-10-CM

## 2022-06-15 DIAGNOSIS — C78.01 SECONDARY MALIGNANT NEOPLASM OF RIGHT LUNG: ICD-10-CM

## 2022-06-15 PROCEDURE — G0463 HOSPITAL OUTPT CLINIC VISIT: HCPCS | Performed by: RADIOLOGY

## 2022-06-15 PROCEDURE — 77334 RADIATION TREATMENT AID(S): CPT | Performed by: RADIOLOGY

## 2022-06-15 PROCEDURE — 77470 SPECIAL RADIATION TREATMENT: CPT | Performed by: RADIOLOGY

## 2022-06-15 PROCEDURE — 77263 THER RADIOLOGY TX PLNG CPLX: CPT | Performed by: RADIOLOGY

## 2022-06-15 PROCEDURE — 99215 OFFICE O/P EST HI 40 MIN: CPT | Performed by: RADIOLOGY

## 2022-06-15 NOTE — PROGRESS NOTES
Follow Up Office Visit      Encounter Date: 06/15/2022   Patient Name: Robbi Kennedy  YOB: 1955   Medical Record Number: [id number]   Primary Diagnosis: No primary diagnosis found.   Cancer Staging: Cancer Staging  Rectal cancer  Staging form: Colon And Rectum, AJCC 8th Edition  - Clinical: Stage IIA (cT3, cN0, cM0) - Signed by Maurilio Campos MD on 6/21/2021  - progression of disease with metastases in liver and lung (2021) - now stage 4                      Chief Complaint:    Chief Complaint   Patient presents with   • Follow-up   • Lung Cancer       Oncologic History: Robbi Kennedy is a 67 y.o. male here for follow-up regarding his metastatic rectal cancer.  He was originally diagnosed with a [id number] moderately differentiated adenocarcinoma of the rectum in 2019. He received neoadjuvant Xeloda with radiation under the care of Dr. Vasquez here at [place]. This was followed by an LAR with ostomy in 9/20/2019. He was staged as having [id number] disease. He did well for some time. In 2021, he developed metastatic disease. Biopsy of a liver lesion in April 2021 returned as a metastatic adenocarcinoma consistent with a colorectal primary. Metastatic lesions in the chest were noted on CT imaging at that time. He received mFOLFOX with avastin under the care of Dr. Campos. Avastin was held towards the end in anticipation of upcoming abdominal surgery.     Restaging CT demonstrated a treatment response in the liver and the chest.  He had a partial hepatectomy, removal of gallbladder, removal of regional lymph nodes, and MWA of a segment 6 liver metastases at the [facility] on 8/31/2021     He had several lung lesions.. There is a 5 mm lesion in the right middle lobe (shrinking).  There was a 2 mm lesion in the right upper lobe (stable) and two 5 mm lesions in the left lower lobe (stable).    He completed SBRT to the right middle lobe lesion.  This was  delivered to a dose of 50 Gy in 5 fractions.  Completed on 10/29/2021. He had 12 cycles of chemotherapy with Dr. Campos. He is now here for follow up after surveillance CT imaging.    Prior Radiation: RML SBRT 50 Gy/5 fractions, 10/2021  50.4 Gy/28 fractions to pelvis for rectal cancer, performed by Dr. Vasquez - 2019      Subjective      Review of Systems: Review of Systems   Constitutional: Positive for fatigue (3/10). Negative for appetite change.   HENT: Negative for hearing loss, sore throat, tinnitus, trouble swallowing and voice change.    Eyes: Negative for visual disturbance.   Respiratory: Positive for shortness of breath (With activity). Negative for cough and wheezing.    Cardiovascular: Positive for palpitations (Has Afib) and leg swelling (Ongoing). Negative for chest pain.   Gastrointestinal: Positive for constipation (Occasional, resolves on own.). Negative for diarrhea and nausea.   Genitourinary: Negative for difficulty urinating, dysuria, frequency and urgency.   Musculoskeletal: Positive for arthralgias (Ongoing, generalized). Negative for back pain.   Skin: Negative for color change and rash.   Neurological: Positive for headaches (Occasional, about every other day, takes tylenol with relief.). Negative for dizziness and weakness.   Psychiatric/Behavioral: Negative for sleep disturbance.       The following portions of the patient's history were reviewed and updated as appropriate: allergies, current medications, past family history, past medical history, past social history, past surgical history and problem list.    Measures:   Pain: (on a scale of 0-10)   Pain Score    06/15/22 0953   PainSc: 0-No pain       Advanced Care Plan: N Advance Care Planning   ACP discussion was declined by the patient. Patient does not have an advance directive, declines further assistance.       KPS/Quality of Life: 80 - Restricted Physical Activity  ECOG: (1) Restricted in physically strenuous activity,  ambulatory and able to do work of light nature  Depression Screening:   PHQ-9 score of 0 on 6/9/2022.         Objective     Physical Exam:   Vital Signs:   Vitals:    06/15/22 0953   BP: 122/86   Pulse: 83   Resp: 16   Temp: 97 °F (36.1 °C)   TempSrc: Temporal   SpO2: 99%   Weight: 132 kg (290 lb 5.5 oz)   PainSc: 0-No pain     Body mass index is 35.36 kg/m².     Constitutional: No acute distress, sitting comfortably  Eye: EOMI, anicteric sclerae  HENT: NC/AT, MMM   Respiratory: Symmetric expansion, nonlabored respiration  MSK: ROM intact in all four extremities, no obvious deformities  Neuro: Alert, oriented x3, CN3-12 grossly intact.   Psych: Appropriate mood and affect.    Results:   Imaging: Images were reviewed personally and pertinent findings are detailed below.     CT chest 6/2022  1. Two left lower lobe nodules measuring up to 10 mm have increased in size, a 3 mm right upper   lobe nodule is unchanged, and a previously described 5 mm right middle lobe nodule is no longer   visualized.  Findings suggest mixed response to treatment.  2. Mild right middle lobe atelectasis.  3. Cardiomegaly with evidence of coronary artery disease    CT abd/pelvis 6/2022    1. No evidence of metastasis within abdomen/pelvis.  2. Stable post-treatment changes within right hepatic lobe and stable postsurgical changes within   upper rectum.    Assessment / Plan        Assessment/Plan:     Diagnoses and all orders for this visit:    1. Secondary malignant neoplasm of right lung (HCC)      Robbi Kennedy is a pleasant 67 y.o. male with an original diagnosis of a stage IIa adenocarcinoma of the rectum. This was managed with neoadjuvant chemoradiation in 2019 followed by an LAR with ostomy. In 2021, he unfortunately developed metastatic disease in the lungs and in the liver. He received systemic therapy under the care of Dr. Campos and had a good partial response to treatment. He underwent a partial hepatectomy with removal of the  gallbladder, removal of regional lymph nodes, and MWA of a segment 6 metastases at the [facility] on August 31, 2021 to address his metastatic disease in the liver.     He received SBRT to a right middle lobe lesion in 10/2021 and completed 12 cycles of adjuvant chemotherapy w Dr. Campos.   He had a response to SBRT at the treated site and had three other lung lesions which were being monitored over time.   He is here today to follow up after surveillance imaging. Fortunately, there is no progression in the abdomen or pelvis. However, 2 of his lung lesions (both in left lower lobe) have grown in the last 3 months and I have recommended ablative radiation therapy for management of his oligometastatic progression. We discussed targeting both lesions with SBRT. We discussed indications, risks, and benefits. Consent for simulation was obtained today.     Follow Up:    Return in about 2 days (around 6/17/2022) for CT SIM next visit.        Time:   I spent 40 minutes on this encounter today, 06/15/22. Activities that took place during this time include:   - preparing to see the patient  - obtaining and reviewing separately obtained history  - performing a medically appropriate examination and evaluation  - counseling and educating the patient  - documenting clinical information in the health record  - coordinating care for this patient.     Sincerely,        Chapis Luna MD  Radiation Oncology  [facility]    This document has been signed by Chapis Luna MD on Nola 15, 2022 11:39 EDT           NOTICE TO PATIENTS-HEALTH RESULTS RELEASE    We believe in information transparency, and we believe you deserve to see your information as soon as it is available.    Lab Results    • We release ALL notes and results to you promptly.    • Therefore, you may see some results even before we do. Please give us 2 business days to review and let you know our thoughts.  • We look at every result. We will contact  you with any results that concern us.  • Some results may show abnormal, but are not clinically important. An example is “MCHC” and “MCV”.   Other results (like “MONICA”) are really challenging to interpret, and require reviewing other results and other information from your chart. Sometimes these are best discussed in person.  Imaging    CT scan, MRI, and PET reports can show new or re-occurring cancer  • These reports can contain words that are hard to understand  • These reports can also show unexpected results.  • We ALWAYS plan to review these results with you and decide on a plan together. We prefer to do this in person, by video visit, or phone.  • When possible, we will discuss the possible results with you BEFORE getting the test, and the next steps we would take with each result.  • Some patients prefer to see their results online as soon as possible. Because of possible “bad news,” other patients may feel more comfortable waiting to discuss results when their provider is available at their next video visit or in-person visit or by phone. As the patient, you can choose when to view your result

## 2022-06-16 LAB
RAD ONC ARIA COURSE END DATE: NORMAL
RAD ONC ARIA COURSE ID: NORMAL
RAD ONC ARIA COURSE INTENT: NORMAL
RAD ONC ARIA COURSE LAST TREATMENT DATE: NORMAL
RAD ONC ARIA COURSE START DATE: NORMAL
RAD ONC ARIA COURSE TREATMENT ELAPSED DAYS: 9
RAD ONC ARIA FIRST TREATMENT DATE: NORMAL
RAD ONC ARIA PLAN FRACTIONS TREATED TO DATE: 5
RAD ONC ARIA PLAN ID: NORMAL
RAD ONC ARIA PLAN NAME: NORMAL
RAD ONC ARIA PLAN PRESCRIBED DOSE PER FRACTION: 10 GY
RAD ONC ARIA PLAN PRIMARY REFERENCE POINT: NORMAL
RAD ONC ARIA PLAN TOTAL FRACTIONS PRESCRIBED: 5
RAD ONC ARIA PLAN TOTAL PRESCRIBED DOSE: 5000 CGY
RAD ONC ARIA REFERENCE POINT DOSAGE GIVEN TO DATE: 50 GY
RAD ONC ARIA REFERENCE POINT ID: NORMAL

## 2022-06-21 ENCOUNTER — HOSPITAL ENCOUNTER (OUTPATIENT)
Dept: RADIATION ONCOLOGY | Facility: HOSPITAL | Age: 67
Discharge: HOME | End: 2022-06-21

## 2022-06-28 ENCOUNTER — HOSPITAL ENCOUNTER (OUTPATIENT)
Dept: RADIATION ONCOLOGY | Facility: HOSPITAL | Age: 67
Discharge: HOME | End: 2022-06-28

## 2022-06-28 VITALS
WEIGHT: 282.63 LBS | RESPIRATION RATE: 16 BRPM | HEART RATE: 60 BPM | OXYGEN SATURATION: 98 % | TEMPERATURE: 97.6 F | DIASTOLIC BLOOD PRESSURE: 96 MMHG | SYSTOLIC BLOOD PRESSURE: 142 MMHG | BODY MASS INDEX: 34.42 KG/M2

## 2022-06-28 DIAGNOSIS — C78.02 SECONDARY MALIGNANT NEOPLASM OF LEFT LUNG: Primary | ICD-10-CM

## 2022-06-28 LAB
RAD ONC ARIA COURSE ID: NORMAL
RAD ONC ARIA COURSE INTENT: NORMAL
RAD ONC ARIA COURSE LAST TREATMENT DATE: NORMAL
RAD ONC ARIA COURSE START DATE: NORMAL
RAD ONC ARIA COURSE TREATMENT ELAPSED DAYS: 0
RAD ONC ARIA FIRST TREATMENT DATE: NORMAL
RAD ONC ARIA PLAN FRACTIONS TREATED TO DATE: 1
RAD ONC ARIA PLAN ID: NORMAL
RAD ONC ARIA PLAN NAME: NORMAL
RAD ONC ARIA PLAN PRESCRIBED DOSE PER FRACTION: 10 GY
RAD ONC ARIA PLAN PRIMARY REFERENCE POINT: NORMAL
RAD ONC ARIA PLAN TOTAL FRACTIONS PRESCRIBED: 5
RAD ONC ARIA PLAN TOTAL PRESCRIBED DOSE: 5000 CGY
RAD ONC ARIA REFERENCE POINT DOSAGE GIVEN TO DATE: 10 GY
RAD ONC ARIA REFERENCE POINT ID: NORMAL
RAD ONC ARIA REFERENCE POINT SESSION DOSAGE GIVEN: 10 GY

## 2022-06-28 PROCEDURE — 77293 RESPIRATOR MOTION MGMT SIMUL: CPT | Performed by: RADIOLOGY

## 2022-06-28 PROCEDURE — 77300 RADIATION THERAPY DOSE PLAN: CPT | Performed by: RADIOLOGY

## 2022-06-28 PROCEDURE — 77301 RADIOTHERAPY DOSE PLAN IMRT: CPT | Performed by: RADIOLOGY

## 2022-06-28 PROCEDURE — 77435 SBRT MANAGEMENT: CPT | Performed by: RADIOLOGY

## 2022-06-28 PROCEDURE — 77373 STRTCTC BDY RAD THER TX DLVR: CPT | Performed by: RADIOLOGY

## 2022-06-28 PROCEDURE — 77338 DESIGN MLC DEVICE FOR IMRT: CPT | Performed by: RADIOLOGY

## 2022-06-28 RX ORDER — HYDROCHLOROTHIAZIDE 12.5 MG/1
TABLET ORAL DAILY
COMMUNITY
Start: 2022-06-22 | End: 2022-08-09

## 2022-06-28 RX ORDER — FLUTICASONE PROPIONATE 50 MCG
SPRAY, SUSPENSION (ML) NASAL
COMMUNITY
Start: 2022-06-22

## 2022-06-28 NOTE — PROGRESS NOTES
On Treatment Note      Encounter Date: 06/28/2022   Patient Name: Robbi Kennedy  YOB: 1955   Medical Record Number: [id number]     Primary Diagnosis:@   Cancer Staging: Cancer Staging  Rectal cancer  Staging form: Colon And Rectum, AJCC 8th Edition  - Clinical: Stage IIA (cT3, cN0, cM0) - Signed by Maurilio Campos MD on 6/21/2021  - Pathologic: No stage assigned - Unsigned            Cancer Staging  Stage IIA (cT3, cN0, cM0)  No stage assigned  Treatment Site: LLL (2 lesions in one PTV)  Prescribed dose: 50 Gy/5 fractions  Completed dose: 10 Gy/1 fraction  Date of First Treatment: 6/28/22    Tolerance: no issues    Radiation related toxicities and management plan: none    Issues raised by patient or treatment team: none      Subjective      Review of Systems: Review of Systems   Constitutional: Positive for fatigue (5/10). Negative for appetite change.   Respiratory: Positive for shortness of breath (with exertion). Negative for cough.    Gastrointestinal: Negative for constipation, diarrhea and nausea.   Genitourinary: Negative for difficulty urinating, dysuria, frequency and urgency.   Skin: Negative for rash.   Neurological: Negative for dizziness and headaches.   Psychiatric/Behavioral: Negative for sleep disturbance.       The following portions of the patient's history were reviewed and updated as appropriate: allergies, current medications, past family history, past medical history, past social history, past surgical history and problem list.    Measures:   Pain: (on a scale of 0-10)   Pain Score    06/28/22 1453   PainSc: 0-No pain       Advanced Care Plan: N Advance Care Planning   ACP discussion was declined by the patient. Patient does not have an advance directive, declines further assistance.       KPS/Quality of Life: 80 - Restricted Physical Activity  ECOG: (1) Restricted in physically strenuous activity, ambulatory and able to do work of light  nature  Depression Screening: PHQ-9 Total Score:     Depression: Not at risk   • PHQ-2 Score: 0           Objective     Physical Exam:   Vital Signs:   Vitals:    06/28/22 1453   BP: 142/96   Pulse: 60   Resp: 16   Temp: 97.6 °F (36.4 °C)   SpO2: 98%      Body mass index is 34.42 kg/m².   Wt Readings from Last 3 Encounters:   06/28/22 128 kg (282 lb 10.1 oz)   06/15/22 132 kg (290 lb 5.5 oz)   06/09/22 127 kg (280 lb)       General: NAD, sitting comfortably  Eye: EOMI, anicteric sclerae  Respiratory: Symmetric expansion, nonlabored respiration  Neuro: Alert oriented x3, cranial nerves III through XII are grossly intact.  Psych: Mood and affect appropriate      Assessment / Plan      Plan:   I reviewed technical aspects of the radiation therapy treatment administration including dose delivery and daily treatment parameters to verify that these meet the specifications from my clinical treatment planning note. I reviewed the treatment setup notes. I reviewed and approved images obtained for “image guidance” with setup and treatment.     We will continue radiation therapy as prescribed.        Chapis Luna MD  Radiation Oncology  [facility]    This document has been signed by Chapis Luna MD on June 28, 2022 15:22 EDT

## 2022-06-28 NOTE — PROGRESS NOTES
Stereotactic Body Radiotherapy Note    06/28/2022    Treatment Site: LLL (2 lesions)  Energy: 6X  Dose: 10 of planned 50 Gy  #Fx: 1 of 5  Start Date: 6/28/22  Elapsed Days: 9      Vitals:    06/28/22 1453   BP: 142/96   Pulse: 60   Resp: 16   Temp: 97.6 °F (36.4 °C)   SpO2: 98%     Pain Score    06/28/22 1453   PainSc: 0-No pain       EXAM:  Gen: Well appearing, NAD  HENT: NC/AT, MMM  Resp: Nonlabored respiration, symmetric expansion  Abd: NT/ND +BS  MSK: ROM intact in all 4 extremities, no obvious joint deformities  Neuro: Alert, Ox3. CN 3-12 grossly intact.   Psych: Appropriate mood and affect.    Comments:   A stereotactic ablative radiation plan was created to deliver the dose as indicated above.   The patient was positioned on the treatment couch in a Vac-Fix immobilization device.   Abdominal compression was utilized.   4D CT was not utilized during simulation to account for respiratory motion during treatment.  Pre-treatment image guidance using cone beam CT imaging was performed to ensure proper setup and alignment.   I personally evaluated and approved the patient's pre-treatment imaging.   I was present for delivery of the patient's treatment. The prescription dose was delivered as intended.   A Medical Physicist was also in attendance during patient set-up and treatment delivery.    The patient tolerated treatment well and completed without incident.     Chapis Luna MD  06/28/2022

## 2022-06-30 ENCOUNTER — HOSPITAL ENCOUNTER (OUTPATIENT)
Dept: RADIATION ONCOLOGY | Facility: HOSPITAL | Age: 67
Discharge: HOME | End: 2022-06-30

## 2022-06-30 VITALS
SYSTOLIC BLOOD PRESSURE: 114 MMHG | RESPIRATION RATE: 18 BRPM | DIASTOLIC BLOOD PRESSURE: 88 MMHG | WEIGHT: 283.51 LBS | HEART RATE: 79 BPM | BODY MASS INDEX: 34.52 KG/M2

## 2022-06-30 DIAGNOSIS — C78.02 SECONDARY MALIGNANT NEOPLASM OF LEFT LUNG: Primary | ICD-10-CM

## 2022-06-30 LAB
RAD ONC ARIA COURSE ID: NORMAL
RAD ONC ARIA COURSE INTENT: NORMAL
RAD ONC ARIA COURSE LAST TREATMENT DATE: NORMAL
RAD ONC ARIA COURSE START DATE: NORMAL
RAD ONC ARIA COURSE TREATMENT ELAPSED DAYS: 2
RAD ONC ARIA FIRST TREATMENT DATE: NORMAL
RAD ONC ARIA PLAN FRACTIONS TREATED TO DATE: 2
RAD ONC ARIA PLAN ID: NORMAL
RAD ONC ARIA PLAN NAME: NORMAL
RAD ONC ARIA PLAN PRESCRIBED DOSE PER FRACTION: 10 GY
RAD ONC ARIA PLAN PRIMARY REFERENCE POINT: NORMAL
RAD ONC ARIA PLAN TOTAL FRACTIONS PRESCRIBED: 5
RAD ONC ARIA PLAN TOTAL PRESCRIBED DOSE: 5000 CGY
RAD ONC ARIA REFERENCE POINT DOSAGE GIVEN TO DATE: 20 GY
RAD ONC ARIA REFERENCE POINT ID: NORMAL
RAD ONC ARIA REFERENCE POINT SESSION DOSAGE GIVEN: 10 GY

## 2022-06-30 PROCEDURE — 77373 STRTCTC BDY RAD THER TX DLVR: CPT | Performed by: RADIOLOGY

## 2022-06-30 NOTE — PROGRESS NOTES
Stereotactic Body Radiotherapy Note    06/30/2022    [unfilled]    Treatment Site:  Energy: 6X  Dose:  #Fx:  Start Date:  Elapsed Days:      [Associated problem not found]  Cancer Staging  Stage IIA (cT3, cN0, cM0)  No stage assigned     Current Outpatient Medications on File Prior to Encounter   Medication Sig   • acetaminophen (TYLENOL) 500 MG tablet Take 500 mg by mouth Every 6 (Six) Hours As Needed.   • apixaban (ELIQUIS) 5 MG tablet tablet Take 5 mg by mouth 2 (Two) Times a Day.   • ascorbic acid (VITAMIN C) 500 MG tablet Take 500 mg by mouth Daily.   • atorvastatin (LIPITOR) 40 MG tablet atorvastatin 40 mg oral tablet take 1 tablet (40 mg) by oral route once daily   Active   • cyanocobalamin (VITAMIN B-12) 1000 MCG tablet Take 1,000 mcg by mouth Daily.   • fluticasone (FLONASE) 50 MCG/ACT nasal spray    • hydroCHLOROthiazide (HYDRODIURIL) 12.5 MG tablet    • loratadine (CLARITIN) 10 MG tablet loratadine 10 mg oral tablet take 1 tablet (10 mg) by oral route once daily   Active   • metoprolol tartrate (LOPRESSOR) 100 MG tablet Take 1 tablet by mouth 2 (Two) Times a Day.   • multivitamin (THERAGRAN) tablet tablet Take 1 tablet by mouth Daily.   • ondansetron (ZOFRAN) 8 MG tablet Take 1 tablet by mouth Every 8 (Eight) Hours As Needed for Nausea. for nausea   • potassium chloride (K-DUR,KLOR-CON) 20 MEQ CR tablet Take 1 tablet by mouth Daily.   • Synthroid 25 MCG tablet    • verapamil SR (CALAN-SR) 120 MG CR tablet Take 1 tablet by mouth Daily.   • vitamin E 1000 UNIT capsule Take 1,000 Units by mouth Daily.     No current facility-administered medications on file prior to encounter.     Vitals:    06/30/22 1436   BP: 114/88   Pulse: 79   Resp: 18     Pain Score    06/30/22 1436   PainSc: 0-No pain       Oncology/Hematology History Overview Note   9/30/2019 High rectal gkzw-dvozzdawoi-asbkdoojflqgwc adenocarcinoma  associated with tubular adenoma.   4/27/21 Biopsy of liver revealed  metastatic adenocarcinoma,  consistent with colorectal primary.    Clinical Staging  Stage IIa ([id number])    Chemotherapy      neoadjuvant Xeloda with radiation. adjuvant xeloda        Radiation Therapy      7/8/19 thru 8/14/19 completed neoadjuvant 5040 cGy=28 fxs rectum     10/2021 XRT due to recurrence right middle lung 5 Fx's, 5000cGy  11/26/21 adjuvant FOLFOX Initiated.     Surgeries       9/30/2019 low anterior resection with ostomy        8/31/21 partial hepatectomy, cholecystectomy and MWA for segment 6 medical margin performed at        Rectal cancer   6/2/2021 - 8/15/2021 Chemotherapy    OP COLON mFOLFOX6 + Bevacizumab (OXALIplatin / Leucovorin / Fluorouracil / Bevacizumab)     6/18/2021 Initial Diagnosis    Rectal cancer (CMS/HCC)     6/21/2021 Cancer Staged    Staging form: Colon And Rectum, AJCC 8th Edition  - Clinical: Stage IIA (cT3, cN0, cM0) - Signed by Maurilio Campos MD on 6/21/2021 11/16/2021 -  Chemotherapy    OP COLON mFOLFOX6 OXALIplatin / Leucovorin / Fluorouracil     Secondary malignant neoplasm of left lung   9/14/2021 Initial Diagnosis    Secondary malignant neoplasm of left lung (HCC)     10/20/2021 -  Radiation    RADIATION THERAPY Treatment Details (Noted on 10/5/2021)  Site: Bilateral Lung  Technique: SBRT  Goal: No goal specified  Planned Treatment Start Date: 10/20/2021     11/16/2021 -  Chemotherapy    OP COLON mFOLFOX6 OXALIplatin / Leucovorin / Fluorouracil     6/15/2022 -  Radiation    RADIATION THERAPY Treatment Details (Noted on 6/15/2022)  Site: Left Lung - Lower lobe  Technique: SBRT  Goal: No goal specified  Planned Treatment Start Date: No planned start date specified     Secondary malignancy of liver   9/14/2021 Initial Diagnosis    Secondary malignancy of liver (HCC)     11/16/2021 -  Chemotherapy    OP COLON mFOLFOX6 OXALIplatin / Leucovorin / Fluorouracil     Secondary malignant neoplasm of right lung   10/5/2021 Initial Diagnosis    Secondary malignant neoplasm of right lung (HCC)      10/20/2021 -  Radiation    RADIATION THERAPY Treatment Details (Noted on 10/5/2021)  Site: Bilateral Lung  Technique: SBRT  Goal: No goal specified  Planned Treatment Start Date: 10/20/2021     11/16/2021 -  Chemotherapy    OP COLON mFOLFOX6 OXALIplatin / Leucovorin / Fluorouracil         Review of Systems   Constitutional: Negative for appetite change and fatigue.   HENT: Negative for trouble swallowing.    Respiratory: Positive for shortness of breath (WITH EXERTION). Negative for cough.    Gastrointestinal: Negative for constipation, diarrhea and nausea.   Genitourinary: Negative for difficulty urinating, dysuria, frequency and urgency.   Musculoskeletal: Negative for back pain.   Skin: Negative for rash.   Neurological: Negative for dizziness and headache.   Psychiatric/Behavioral: Negative for sleep disturbance.            Physical Exam    Comments: A stereotactic ablative radiation plan was devised to deliver the dose as indicated above. The patient was positioned on the treatment couch in a Vac-Fix immobilization device.  We then aligned with CBCT image guidance, which I personally checked. Once alignment was verified, we delivered the prescription dose using dynamic respiratory motion compensation under my guidance.    The Medical Physicist was also in attendance during patient set-up and treatment delivery.    The patient tolerated the procedure without incident.    Bouchra Locke RN  06/30/2022

## 2022-06-30 NOTE — PROGRESS NOTES
Stereotactic Body Radiotherapy Note    06/30/2022    Treatment Site:  Treatment Site: LLL (2 lesions)  Energy: 6X  Dose: 20 of planned 50 Gy  #Fx: 2 of 5  Start Date: 6/28/22  Elapsed Days: 3      Vitals:    06/30/22 1436   BP: 114/88   Pulse: 79   Resp: 18     Pain Score    06/30/22 1436   PainSc: 0-No pain       EXAM:  Gen: Well appearing, NAD  HENT: NC/AT, MMM  Resp: Nonlabored respiration, symmetric expansion  Abd: NT/ND +BS  MSK: ROM intact in all 4 extremities, no obvious joint deformities  Neuro: Alert, Ox3. CN 3-12 grossly intact.   Psych: Appropriate mood and affect.    Comments:   A stereotactic ablative radiation plan was created to deliver the dose as indicated above.   The patient was positioned on the treatment couch in a Vac-Fix immobilization device.   Abdominal compression and 4D CT were utilized during simulation and treatment to account for respiratory motion during treatment.  Pre-treatment image guidance using cone beam CT imaging was performed to ensure proper setup and alignment.   I personally evaluated and approved the patient's pre-treatment imaging.   I was present for delivery of the patient's treatment. The prescription dose was delivered as intended.   A Medical Physicist was also in attendance during patient set-up and treatment delivery.    The patient tolerated treatment well and completed without incident.     Chapis Luna MD  06/30/2022

## 2022-07-01 ENCOUNTER — HOSPITAL ENCOUNTER (OUTPATIENT)
Dept: RADIATION ONCOLOGY | Facility: HOSPITAL | Age: 67
Setting detail: RADIATION/ONCOLOGY SERIES
End: 2022-07-01

## 2022-07-01 ENCOUNTER — HOSPITAL ENCOUNTER (OUTPATIENT)
Dept: RADIATION ONCOLOGY | Facility: HOSPITAL | Age: 67
Discharge: HOME | End: 2022-07-01

## 2022-07-01 VITALS
BODY MASS INDEX: 34.28 KG/M2 | WEIGHT: 281.53 LBS | RESPIRATION RATE: 16 BRPM | TEMPERATURE: 97.6 F | DIASTOLIC BLOOD PRESSURE: 78 MMHG | SYSTOLIC BLOOD PRESSURE: 102 MMHG | HEART RATE: 84 BPM | OXYGEN SATURATION: 96 %

## 2022-07-01 DIAGNOSIS — C78.01 SECONDARY MALIGNANT NEOPLASM OF RIGHT LUNG: Primary | ICD-10-CM

## 2022-07-01 LAB
RAD ONC ARIA COURSE ID: NORMAL
RAD ONC ARIA COURSE INTENT: NORMAL
RAD ONC ARIA COURSE LAST TREATMENT DATE: NORMAL
RAD ONC ARIA COURSE START DATE: NORMAL
RAD ONC ARIA COURSE TREATMENT ELAPSED DAYS: 3
RAD ONC ARIA FIRST TREATMENT DATE: NORMAL
RAD ONC ARIA PLAN FRACTIONS TREATED TO DATE: 3
RAD ONC ARIA PLAN ID: NORMAL
RAD ONC ARIA PLAN NAME: NORMAL
RAD ONC ARIA PLAN PRESCRIBED DOSE PER FRACTION: 10 GY
RAD ONC ARIA PLAN PRIMARY REFERENCE POINT: NORMAL
RAD ONC ARIA PLAN TOTAL FRACTIONS PRESCRIBED: 5
RAD ONC ARIA PLAN TOTAL PRESCRIBED DOSE: 5000 CGY
RAD ONC ARIA REFERENCE POINT DOSAGE GIVEN TO DATE: 30 GY
RAD ONC ARIA REFERENCE POINT ID: NORMAL
RAD ONC ARIA REFERENCE POINT SESSION DOSAGE GIVEN: 10 GY

## 2022-07-01 PROCEDURE — 77373 STRTCTC BDY RAD THER TX DLVR: CPT | Performed by: RADIOLOGY

## 2022-07-01 NOTE — PROGRESS NOTES
Stereotactic Body Radiotherapy Note    07/01/2022        Treatment Site: [facility]  Energy: 6X  Dose: 3000  #Fx: 3  Start Date: 6/28/2022  Elapsed Days: 3      [Associated problem not found]  Cancer Staging  Stage IIA (cT3, cN0, cM0)  No stage assigned     Current Outpatient Medications on File Prior to Encounter   Medication Sig   • acetaminophen (TYLENOL) 500 MG tablet Take 500 mg by mouth Every 6 (Six) Hours As Needed.   • apixaban (ELIQUIS) 5 MG tablet tablet Take 5 mg by mouth 2 (Two) Times a Day.   • ascorbic acid (VITAMIN C) 500 MG tablet Take 500 mg by mouth Daily.   • atorvastatin (LIPITOR) 40 MG tablet atorvastatin 40 mg oral tablet take 1 tablet (40 mg) by oral route once daily   Active   • cyanocobalamin (VITAMIN B-12) 1000 MCG tablet Take 1,000 mcg by mouth Daily.   • fluticasone (FLONASE) 50 MCG/ACT nasal spray    • hydroCHLOROthiazide (HYDRODIURIL) 12.5 MG tablet    • loratadine (CLARITIN) 10 MG tablet loratadine 10 mg oral tablet take 1 tablet (10 mg) by oral route once daily   Active   • metoprolol tartrate (LOPRESSOR) 100 MG tablet Take 1 tablet by mouth 2 (Two) Times a Day.   • multivitamin (THERAGRAN) tablet tablet Take 1 tablet by mouth Daily.   • ondansetron (ZOFRAN) 8 MG tablet Take 1 tablet by mouth Every 8 (Eight) Hours As Needed for Nausea. for nausea   • potassium chloride (K-DUR,KLOR-CON) 20 MEQ CR tablet Take 1 tablet by mouth Daily.   • Synthroid 25 MCG tablet    • verapamil SR (CALAN-SR) 120 MG CR tablet Take 1 tablet by mouth Daily.   • vitamin E 1000 UNIT capsule Take 1,000 Units by mouth Daily.     No current facility-administered medications on file prior to encounter.     Vitals:    07/01/22 1436   BP: 102/78   Pulse: 84   Resp: 16   Temp: 97.6 °F (36.4 °C)   SpO2: 96%     Pain Score    07/01/22 1436   PainSc: 0-No pain       Oncology/Hematology History Overview Note   9/30/2019 High rectal ijbr-yfhzraxhdo-yhnkantpszaxep adenocarcinoma  associated with tubular adenoma.   4/27/21 Biopsy  of liver revealed  metastatic adenocarcinoma, consistent with colorectal primary.    Clinical Staging  Stage IIa ([id number])    Chemotherapy      neoadjuvant Xeloda with radiation. adjuvant xeloda        Radiation Therapy      7/8/19 thru 8/14/19 completed neoadjuvant 5040 cGy=28 fxs rectum     10/2021 XRT due to recurrence right middle lung 5 Fx's, 5000cGy  11/26/21 adjuvant FOLFOX Initiated.     Surgeries       9/30/2019 low anterior resection with ostomy        8/31/21 partial hepatectomy, cholecystectomy and MWA for segment 6 medical margin performed at        Rectal cancer   6/2/2021 - 8/15/2021 Chemotherapy    OP COLON mFOLFOX6 + Bevacizumab (OXALIplatin / Leucovorin / Fluorouracil / Bevacizumab)     6/18/2021 Initial Diagnosis    Rectal cancer (CMS/HCC)     6/21/2021 Cancer Staged    Staging form: Colon And Rectum, AJCC 8th Edition  - Clinical: Stage IIA (cT3, cN0, cM0) - Signed by Maurilio Campos MD on 6/21/2021 11/16/2021 -  Chemotherapy    OP COLON mFOLFOX6 OXALIplatin / Leucovorin / Fluorouracil     Secondary malignant neoplasm of left lung   9/14/2021 Initial Diagnosis    Secondary malignant neoplasm of left lung (HCC)     10/20/2021 -  Radiation    RADIATION THERAPY Treatment Details (Noted on 10/5/2021)  Site: Bilateral Lung  Technique: SBRT  Goal: No goal specified  Planned Treatment Start Date: 10/20/2021     11/16/2021 -  Chemotherapy    OP COLON mFOLFOX6 OXALIplatin / Leucovorin / Fluorouracil     6/15/2022 -  Radiation    RADIATION THERAPY Treatment Details (Noted on 6/15/2022)  Site: Left Lung - Lower lobe  Technique: SBRT  Goal: No goal specified  Planned Treatment Start Date: No planned start date specified     Secondary malignancy of liver   9/14/2021 Initial Diagnosis    Secondary malignancy of liver (HCC)     11/16/2021 -  Chemotherapy    OP COLON mFOLFOX6 OXALIplatin / Leucovorin / Fluorouracil     Secondary malignant neoplasm of right lung   10/5/2021 Initial Diagnosis     Secondary malignant neoplasm of right lung (HCC)     10/20/2021 -  Radiation    RADIATION THERAPY Treatment Details (Noted on 10/5/2021)  Site: Bilateral Lung  Technique: SBRT  Goal: No goal specified  Planned Treatment Start Date: 10/20/2021     11/16/2021 -  Chemotherapy    OP COLON [id number] OXALIplatin / Leucovorin / Fluorouracil               Comments: A stereotactic ablative radiation plan was devised to deliver the dose as indicated above. The patient was positioned on the treatment couch in a Vac-Fix immobilization device.  We then aligned with CBCT image guidance, which I personally checked. Once alignment was verified, we delivered the prescription dose using dynamic respiratory motion compensation under my guidance.    The Medical Physicist was also in attendance during patient set-up and treatment delivery.    The patient tolerated the procedure without incident.    Christiano Kelley MD  07/01/2022

## 2022-07-05 ENCOUNTER — HOSPITAL ENCOUNTER (OUTPATIENT)
Dept: RADIATION ONCOLOGY | Facility: HOSPITAL | Age: 67
Discharge: HOME | End: 2022-07-05

## 2022-07-05 VITALS
RESPIRATION RATE: 16 BRPM | TEMPERATURE: 97.2 F | DIASTOLIC BLOOD PRESSURE: 82 MMHG | BODY MASS INDEX: 34.39 KG/M2 | WEIGHT: 282.41 LBS | HEART RATE: 87 BPM | OXYGEN SATURATION: 95 % | SYSTOLIC BLOOD PRESSURE: 124 MMHG

## 2022-07-05 DIAGNOSIS — C78.02 SECONDARY MALIGNANT NEOPLASM OF LEFT LUNG: Primary | ICD-10-CM

## 2022-07-05 LAB
RAD ONC ARIA COURSE ID: NORMAL
RAD ONC ARIA COURSE INTENT: NORMAL
RAD ONC ARIA COURSE LAST TREATMENT DATE: NORMAL
RAD ONC ARIA COURSE START DATE: NORMAL
RAD ONC ARIA COURSE TREATMENT ELAPSED DAYS: 7
RAD ONC ARIA FIRST TREATMENT DATE: NORMAL
RAD ONC ARIA PLAN FRACTIONS TREATED TO DATE: 4
RAD ONC ARIA PLAN ID: NORMAL
RAD ONC ARIA PLAN NAME: NORMAL
RAD ONC ARIA PLAN PRESCRIBED DOSE PER FRACTION: 10 GY
RAD ONC ARIA PLAN PRIMARY REFERENCE POINT: NORMAL
RAD ONC ARIA PLAN TOTAL FRACTIONS PRESCRIBED: 5
RAD ONC ARIA PLAN TOTAL PRESCRIBED DOSE: 5000 CGY
RAD ONC ARIA REFERENCE POINT DOSAGE GIVEN TO DATE: 40 GY
RAD ONC ARIA REFERENCE POINT ID: NORMAL
RAD ONC ARIA REFERENCE POINT SESSION DOSAGE GIVEN: 10 GY

## 2022-07-05 PROCEDURE — 77373 STRTCTC BDY RAD THER TX DLVR: CPT | Performed by: RADIOLOGY

## 2022-07-05 NOTE — PROGRESS NOTES
Stereotactic Body Radiotherapy Note    07/05/2022    Treatment Site:  Treatment Site: LLL (2 lesions)  Energy: 6X  Dose: 40 of planned 50 Gy  #Fx: 4 of 5  Start Date: 6/28/22  Elapsed Days: 7           Vitals:    07/05/22 1436   BP: 124/82   Pulse: 87   Resp: 16   Temp: 97.2 °F (36.2 °C)   SpO2: 95%     Pain Score    07/05/22 1436   PainSc: 0-No pain       EXAM:  Gen: Well appearing, NAD  HENT: NC/AT, MMM  Resp: Nonlabored respiration, symmetric expansion  Abd: NT/ND +BS  MSK: ROM intact in all 4 extremities, no obvious joint deformities  Neuro: Alert, Ox3. CN 3-12 grossly intact.   Psych: Appropriate mood and affect.    Comments:   A stereotactic ablative radiation plan was created to deliver the dose as indicated above.   The patient was positioned on the treatment couch in a Vac-Fix immobilization device.   Abdominal compression and 4DCT was utilized during simulation to account for respiratory motion during treatment.  Pre-treatment image guidance using cone beam CT imaging was performed to ensure proper setup and alignment.   I personally evaluated and approved the patient's pre-treatment imaging.   I was present for delivery of the patient's treatment. The prescription dose was delivered as intended.   A Medical Physicist was also in attendance during patient set-up and treatment delivery.    The patient tolerated treatment well and completed without incident.     Chapis Luna MD  07/05/2022

## 2022-07-07 ENCOUNTER — HOSPITAL ENCOUNTER (OUTPATIENT)
Dept: RADIATION ONCOLOGY | Facility: HOSPITAL | Age: 67
Discharge: HOME | End: 2022-07-07

## 2022-07-07 VITALS
SYSTOLIC BLOOD PRESSURE: 112 MMHG | TEMPERATURE: 97.2 F | HEART RATE: 86 BPM | WEIGHT: 283.07 LBS | BODY MASS INDEX: 34.47 KG/M2 | OXYGEN SATURATION: 94 % | RESPIRATION RATE: 16 BRPM | DIASTOLIC BLOOD PRESSURE: 84 MMHG

## 2022-07-07 DIAGNOSIS — C78.02 SECONDARY MALIGNANT NEOPLASM OF LEFT LUNG: Primary | ICD-10-CM

## 2022-07-07 LAB
RAD ONC ARIA COURSE ID: NORMAL
RAD ONC ARIA COURSE INTENT: NORMAL
RAD ONC ARIA COURSE LAST TREATMENT DATE: NORMAL
RAD ONC ARIA COURSE START DATE: NORMAL
RAD ONC ARIA COURSE TREATMENT ELAPSED DAYS: 9
RAD ONC ARIA FIRST TREATMENT DATE: NORMAL
RAD ONC ARIA PLAN FRACTIONS TREATED TO DATE: 5
RAD ONC ARIA PLAN ID: NORMAL
RAD ONC ARIA PLAN NAME: NORMAL
RAD ONC ARIA PLAN PRESCRIBED DOSE PER FRACTION: 10 GY
RAD ONC ARIA PLAN PRIMARY REFERENCE POINT: NORMAL
RAD ONC ARIA PLAN TOTAL FRACTIONS PRESCRIBED: 5
RAD ONC ARIA PLAN TOTAL PRESCRIBED DOSE: 5000 CGY
RAD ONC ARIA REFERENCE POINT DOSAGE GIVEN TO DATE: 50 GY
RAD ONC ARIA REFERENCE POINT ID: NORMAL
RAD ONC ARIA REFERENCE POINT SESSION DOSAGE GIVEN: 10 GY

## 2022-07-07 PROCEDURE — 77373 STRTCTC BDY RAD THER TX DLVR: CPT | Performed by: RADIOLOGY

## 2022-07-07 PROCEDURE — 77336 RADIATION PHYSICS CONSULT: CPT | Performed by: RADIOLOGY

## 2022-07-07 NOTE — PROGRESS NOTES
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  She recently has attacks of RUQ abdominal pain that she suspects are gallbladder related. Pain occurs intermittently @1 hour after eating, severe, no fever, some nausea. Colonoscopy is up to date. Past Medical History:   Diagnosis Date    Achromic skin lesions of pinta 1/22/2010    Allergic rhinitis, cause unspecified 1/22/2010    Anemia 1/22/2010    Dysphagia 1/22/2010    Esophageal reflux 1/22/2010    GERD (gastroesophageal reflux disease)     History of menorrhagia 1/22/2010    Lipoma of skin 1/22/2010    Seasonal affective disorder ([facility] [address] 1/22/2010       Past Surgical History:   Procedure Laterality Date    HX GYN  1989    tubal ligation    HX GYN  2006    myomectomy       Family History   Problem Relation Age of Onset    Hypertension Mother     Other Mother      hypothroidism    High Cholesterol Mother     Diabetes Mother     Hypertension Father     Osteoporosis Father     Cancer Brother      colon cancer       Social History   Substance Use Topics    Smoking status: Never Smoker    Smokeless tobacco: Never Used    Alcohol use No       Social History     Social History Narrative    primary care physician,        Health Maintenance Due   Topic Date Due    PAP AKA CERVICAL CYTOLOGY  04/16/2017    ZOSTER VACCINE AGE 60>  03/12/2018     Pap done at [facility], will be getting shingrix soon. Exercising  Regularly. Outpatient Prescriptions Marked as Taking for the 4/6/18 encounter (Office Visit) with Caroline Arshad MD   Medication Sig Dispense Refill    esomeprazole (NEXIUM) 40 mg capsule Take  by mouth daily.  celecoxib (CELEBREX) 200 mg capsule Take 1 Cap by mouth daily. 90 Cap 3    loratadine (CLARITIN) 10 mg tablet Take 10 mg by mouth daily.  acetaminophen (TYLENOL) 325 mg tablet Take  by mouth every four (4) hours as needed.          No Known Allergies    Review of Systems:  Gen: no fatigue, fever, chills, weight loss Stereotactic Body Radiotherapy Note    07/07/2022    Treatment Site:  Treatment Site: LLL (2 lesions)  Energy: 6X  Dose: 50 of planned 50 Gy  #Fx: 5 of 5  Start Date: 6/28/22  Elapsed Days: 7       Vitals:    07/07/22 1445   BP: 112/84   Pulse: 86   Resp: 16   Temp: 97.2 °F (36.2 °C)   SpO2: 94%     Pain Score    07/07/22 1445   PainSc: 0-No pain       EXAM:  Gen: Well appearing, NAD  HENT: NC/AT, MMM  Resp: Nonlabored respiration, symmetric expansion  Abd: NT/ND +BS  MSK: ROM intact in all 4 extremities, no obvious joint deformities  Neuro: Alert, Ox3. CN 3-12 grossly intact.   Psych: Appropriate mood and affect.    Comments:   A stereotactic ablative radiation plan was created to deliver the dose as indicated above.   The patient was positioned on the treatment couch in a Vac-Fix immobilization device.   Abdominal compression and 4DCT were utilized during simulation to account for respiratory motion during treatment.  Pre-treatment image guidance using cone beam CT imaging was performed to ensure proper setup and alignment.   I personally evaluated and approved the patient's pre-treatment imaging.   I was present for delivery of the patient's treatment. The prescription dose was delivered as intended.   A Medical Physicist was also in attendance during patient set-up and treatment delivery.    The patient tolerated treatment well and completed without incident.     Chapis Luna MD  07/07/2022     or weight gain  Eyes: no excessive tearing, itching, or discharge  Nose: no rhinorrhea, no sinus pain  Mouth: no oral lesions, no sore throat  Resp: no shortness of breath, no wheezing, no cough  CV: no chest pain, no orthopnea, no paroxysmal nocturnal dyspnea, no lower extremity edema, no palpitations  Abd: no nausea, no heartburn, no diarrhea, no constipation, no abdominal pain  Neuro: no headaches, no syncope or presyncopal episodes  Endo: no polyuria, no polydipsia  Heme: no lymphadenopathy, no easy bruising or bleeding    Visit Vitals    /79 (BP 1 Location: Left arm, BP Patient Position: Sitting)    Pulse 75    Temp 98 °F (36.7 °C) (Oral)    Resp 17    Ht 5' 4\" (1.626 m)    Wt 166 lb (75.3 kg)    LMP 12/17/2011    SpO2 98%    BMI 28.49 kg/m2     Gen: alert, oriented, no acute distress  Ears: external auditory canals clear, TMs without erythema or effusion  Eyes: pupils equal round reactive to light, sclera clear, conjunctiva clear  Oral: moist mucus membranes, no oral lesions, no pharyngeal inflammation or exudate  Neck: thyroid symmetric and not enlarged, no carotid bruits, no jugular vein distention  Resp: no increase work of breathing, lungs clear to ausculation bilaterally, no wheezing, rales or rhonchi  CV: S1, S2 normal. No murmurs, rubs, or gallops. Abd: soft, mild RUQ tenderness, not distended. No hepatosplenomegaly. Normal bowel sounds. No hernias. Neuro: cranial nerves intact, normal strength and movement in all extremities, reflexes and sensation intact and symmetric. Skin: no lesion or rash  Extremities: no cyanosis or edema    Assessment/Plan:    1. General medical exam  Age appropriate Health Maintenance activities reviewed with patient and updated. - CBC WITH AUTOMATED DIFF; Future  - LIPID PANEL; Future  - METABOLIC PANEL, COMPREHENSIVE; Future  - TSH 3RD GENERATION; Future  - HEMOGLOBIN A1C WITH EAG; Future    2. RUQ abdominal pain  Patient will get abdominal US.   She's aware of warning signs of acute abdomen and when to seek emergent care. - US ABD COMP; Future      Health Maintenance reviewed - updated    Orders Placed This Encounter    US ABD COMP     Standing Status:   Future     Standing Expiration Date:   5/6/2019    CBC WITH AUTOMATED DIFF     Standing Status:   Future     Standing Expiration Date:   11/6/2018    LIPID PANEL     Standing Status:   Future     Standing Expiration Date:   84/6/3957    METABOLIC PANEL, COMPREHENSIVE     Standing Status:   Future     Standing Expiration Date:   11/6/2018    TSH 3RD GENERATION     Standing Status:   Future     Standing Expiration Date:   11/6/2018    HEMOGLOBIN A1C WITH EAG     Standing Status:   Future     Standing Expiration Date:   10/7/2018    esomeprazole (NEXIUM) 40 mg capsule     Sig: Take  by mouth daily. Medications Discontinued During This Encounter   Medication Reason    lansoprazole (PREVACID) 15 mg capsule Alternate Therapy       Current Outpatient Prescriptions   Medication Sig Dispense Refill    esomeprazole (NEXIUM) 40 mg capsule Take  by mouth daily.  celecoxib (CELEBREX) 200 mg capsule Take 1 Cap by mouth daily. 90 Cap 3    loratadine (CLARITIN) 10 mg tablet Take 10 mg by mouth daily.  acetaminophen (TYLENOL) 325 mg tablet Take  by mouth every four (4) hours as needed. Recommended healthy diet low in carbohydrates, fats, sodium and cholesterol. Recommended regular cardiovascular exercise 3-6 times per week for 30-60 minutes daily. Verbal and written instructions (see AVS) provided. Patient expresses understanding of diagnosis and treatment plan.

## 2022-07-13 ENCOUNTER — LAB (OUTPATIENT)
Dept: LAB | Facility: HOSPITAL | Age: 67
End: 2022-07-13

## 2022-07-13 ENCOUNTER — OFFICE VISIT (OUTPATIENT)
Dept: CARDIOLOGY | Facility: CLINIC | Age: 67
End: 2022-07-13

## 2022-07-13 VITALS
SYSTOLIC BLOOD PRESSURE: 111 MMHG | HEIGHT: 76 IN | BODY MASS INDEX: 34.88 KG/M2 | HEART RATE: 110 BPM | DIASTOLIC BLOOD PRESSURE: 89 MMHG | WEIGHT: 286.4 LBS

## 2022-07-13 DIAGNOSIS — R06.09 DOE (DYSPNEA ON EXERTION): ICD-10-CM

## 2022-07-13 DIAGNOSIS — C20 RECTAL CANCER: ICD-10-CM

## 2022-07-13 DIAGNOSIS — I48.19 ATRIAL FIBRILLATION, PERSISTENT: Chronic | ICD-10-CM

## 2022-07-13 DIAGNOSIS — I48.19 ATRIAL FIBRILLATION, PERSISTENT: Primary | Chronic | ICD-10-CM

## 2022-07-13 DIAGNOSIS — I10 ESSENTIAL HYPERTENSION: ICD-10-CM

## 2022-07-13 DIAGNOSIS — E78.5 HYPERLIPIDEMIA LDL GOAL <100: ICD-10-CM

## 2022-07-13 LAB — NT-PROBNP SERPL-MCNC: 1598 PG/ML (ref 0–900)

## 2022-07-13 PROCEDURE — 36415 COLL VENOUS BLD VENIPUNCTURE: CPT

## 2022-07-13 PROCEDURE — 83880 ASSAY OF NATRIURETIC PEPTIDE: CPT

## 2022-07-13 PROCEDURE — 99214 OFFICE O/P EST MOD 30 MIN: CPT | Performed by: INTERNAL MEDICINE

## 2022-07-13 NOTE — PROGRESS NOTES
Chief Complaint  Atrial Fibrillation    Subjective    Patient has had some symptoms this may exertion especially with bending over picking up things as well as lower extremity edema.  He was recently started on hydrochlorothiazide    Past Medical History:   Diagnosis Date   • Asthma    • Atrial fibrillation, persistent 2/26/2021   • Colorectal cancer    • Essential hypertension    • GI cancer    • Hepatitis    • History of DVT of lower extremity 8/27/2021   • Hyperlipidemia LDL goal <100 12/31/2020   • Rectal cancer 6/18/2021   • Secondary malignancy of liver 9/14/2021   • Secondary malignant neoplasm of left lung 9/14/2021   • Secondary malignant neoplasm of right lung 10/5/2021   • Thrombocytopenia (HCC) 1/11/2022   • Thyroid disease          Current Outpatient Medications:   •  acetaminophen (TYLENOL) 500 MG tablet, Take 500 mg by mouth Every 6 (Six) Hours As Needed., Disp: , Rfl:   •  apixaban (ELIQUIS) 5 MG tablet tablet, Take 5 mg by mouth 2 (Two) Times a Day., Disp: , Rfl:   •  ascorbic acid (VITAMIN C) 500 MG tablet, Take 500 mg by mouth Daily., Disp: , Rfl:   •  atorvastatin (LIPITOR) 40 MG tablet, atorvastatin 40 mg oral tablet take 1 tablet (40 mg) by oral route once daily   Active, Disp: , Rfl:   •  cyanocobalamin (VITAMIN B-12) 1000 MCG tablet, Take 1,000 mcg by mouth Daily., Disp: , Rfl:   •  fluticasone (FLONASE) 50 MCG/ACT nasal spray, , Disp: , Rfl:   •  hydroCHLOROthiazide (HYDRODIURIL) 12.5 MG tablet, Daily., Disp: , Rfl:   •  loratadine (CLARITIN) 10 MG tablet, loratadine 10 mg oral tablet take 1 tablet (10 mg) by oral route once daily   Active, Disp: , Rfl:   •  metoprolol tartrate (LOPRESSOR) 100 MG tablet, Take 1 tablet by mouth 2 (Two) Times a Day., Disp: 180 tablet, Rfl: 3  •  multivitamin (THERAGRAN) tablet tablet, Take 1 tablet by mouth Daily., Disp: , Rfl:   •  ondansetron (ZOFRAN) 8 MG tablet, Take 1 tablet by mouth Every 8 (Eight) Hours As Needed for Nausea. for nausea, Disp: 30 tablet,  "Rfl: 3  •  potassium chloride (K-DUR,KLOR-CON) 20 MEQ CR tablet, Take 1 tablet by mouth Daily., Disp: 30 tablet, Rfl: 5  •  Synthroid 25 MCG tablet, Take 25 mcg by mouth Daily., Disp: , Rfl:   •  vitamin E 1000 UNIT capsule, Take 1,000 Units by mouth Daily., Disp: , Rfl:   •  verapamil SR (CALAN-SR) 180 MG CR tablet, Take 1 tablet by mouth Every Night., Disp: 90 tablet, Rfl: 3    Medications Discontinued During This Encounter   Medication Reason   • verapamil SR (CALAN-SR) 120 MG CR tablet      No Known Allergies     Social History     Tobacco Use   • Smoking status: Never Smoker   • Smokeless tobacco: Never Used   Vaping Use   • Vaping Use: Never used   Substance Use Topics   • Alcohol use: Yes     Comment: occasionally, no recent use   • Drug use: Never       Family History   Problem Relation Age of Onset   • Prostate cancer Father    • Heart murmur Mother    • Diabetes Mother    • Colon cancer Maternal Uncle         Objective     /89   Pulse 110   Ht 193 cm (75.98\")   Wt 130 kg (286 lb 6.4 oz)   BMI 34.88 kg/m²       Physical Exam    General Appearance:   · no acute distress  · Alert and oriented x3  HENT:   · lips not cyanotic  · Atraumatic  Neck:  · No jvd   · supple  Respiratory:  · no respiratory distress  · normal breath sounds  · no rales  Cardiovascular:  · Irregularly irregular  · no S3, no S4   · no murmur  · no rub  Extremities  · No cyanosis  · lower extremity edema: +1  Skin:   · warm, dry  · No rashes      Result Review :     No results found for: PROBNP  CMP    CMP 2/22/22 3/22/22 6/8/22 6/8/22      1012 1014   Glucose 119 (A) 187 (A)  113 (A)   BUN 11 9  12   Creatinine 0.98 0.86 0.90 0.90   eGFR Non African Am 77      Sodium 139 139  138   Potassium 3.8 3.7  4.1   Chloride 106 107  103   Calcium 8.8 8.8  9.4   Albumin 3.74 3.62  4.20   Total Bilirubin 0.7 0.7  1.1   Alkaline Phosphatase 83 92  100   AST (SGOT) 25 26  27   ALT (SGPT) 24 19  19   (A) Abnormal value       Comments are " available for some flowsheets but are not being displayed.           CBC w/diff    CBC w/Diff 3/22/22 6/8/22 6/29/22   WBC 3.78 6.48 5.22   RBC 3.79 (A) 4.55 4.73   Hemoglobin 12.8 (A) 14.4 14.7   Hematocrit 39.4 42.9 43.3   .0 (A) 94.3 91.5   MCH 33.8 (A) 31.6 31.1   MCHC 32.5 33.6 33.9   RDW 14.1 12.6 12.2   Platelets 175 228 199   Neutrophil Rel % 66.4 73.4 72.4   Immature Granulocyte Rel % 0.3 0.3 0.0   Lymphocyte Rel % 16.1 (A) 12.3 (A) 14.2 (A)   Monocyte Rel % 10.3 8.3 9.6   Eosinophil Rel % 6.1 5.2 3.4   Basophil Rel % 0.8 0.5 0.4   (A) Abnormal value             Lab Results   Component Value Date    TSH 3.250 02/24/2021      Lab Results   Component Value Date    FREET4 0.84 06/29/2022      No results found for: DDIMERQUANT  Magnesium   Date Value Ref Range Status   09/07/2021 1.8 (L) 1.9 - 2.4 mg/dL Final      Digoxin   Date Value Ref Range Status   01/18/2021 0.5 0.5 - 2.0 ng/mL Final      Lab Results   Component Value Date    TROPONINT 19 (H) 09/02/2021    TROPONINT -4 09/02/2021             No results found for: POCTROP                   Diagnoses and all orders for this visit:    1. Atrial fibrillation, persistent (Primary)  Assessment & Plan:  Elevation mildly and his heart rate increasing diltiazem to 180 daily    Orders:  -     proBNP; Future  -     Adult Transthoracic Echo Complete W/ Cont if Necessary Per Protocol; Future    2. Rectal cancer    3. Essential hypertension    4. Hyperlipidemia LDL goal <100    5. SORTO (dyspnea on exertion)  Assessment & Plan:  Patient with dyspnea exertion or edema issues some of which may be related to his diltiazem or venous insufficiency we will get echocardiogram and proBNP level to evaluate for CHF    Orders:  -     proBNP; Future  -     Adult Transthoracic Echo Complete W/ Cont if Necessary Per Protocol; Future    Other orders  -     verapamil SR (CALAN-SR) 180 MG CR tablet; Take 1 tablet by mouth Every Night.  Dispense: 90 tablet; Refill:  3          Follow Up     Return in about 6 months (around 1/13/2023) for EKG with F/U.          Patient was given instructions and counseling regarding his condition or for health maintenance advice. Please see specific information pulled into the AVS if appropriate.

## 2022-07-13 NOTE — ASSESSMENT & PLAN NOTE
Patient with dyspnea exertion or edema issues some of which may be related to his diltiazem or venous insufficiency we will get echocardiogram and proBNP level to evaluate for CHF

## 2022-07-18 PROBLEM — I50.32 CHRONIC HEART FAILURE WITH PRESERVED EJECTION FRACTION: Status: ACTIVE | Noted: 2022-07-18

## 2022-07-19 DIAGNOSIS — I50.32 CHRONIC DIASTOLIC CHF (CONGESTIVE HEART FAILURE): Primary | ICD-10-CM

## 2022-07-19 RX ORDER — FUROSEMIDE 20 MG/1
20 TABLET ORAL DAILY
Qty: 30 TABLET | Refills: 0 | Status: SHIPPED | OUTPATIENT
Start: 2022-07-19 | End: 2022-07-19

## 2022-07-19 RX ORDER — FUROSEMIDE 20 MG/1
20 TABLET ORAL DAILY
Qty: 30 TABLET | Refills: 0 | Status: SHIPPED | OUTPATIENT
Start: 2022-07-19 | End: 2022-08-10 | Stop reason: SDUPTHER

## 2022-07-21 ENCOUNTER — HOSPITAL ENCOUNTER (OUTPATIENT)
Dept: ONCOLOGY | Facility: HOSPITAL | Age: 67
Setting detail: INFUSION SERIES
Discharge: HOME | End: 2022-07-21

## 2022-07-21 DIAGNOSIS — Z45.2 ENCOUNTER FOR ADJUSTMENT OR MANAGEMENT OF VASCULAR ACCESS DEVICE: Primary | ICD-10-CM

## 2022-07-21 PROCEDURE — 25010000002 HEPARIN LOCK FLUSH PER 10 UNITS: Performed by: INTERNAL MEDICINE

## 2022-07-21 PROCEDURE — 96523 IRRIG DRUG DELIVERY DEVICE: CPT

## 2022-07-21 RX ORDER — HEPARIN SODIUM (PORCINE) LOCK FLUSH IV SOLN 100 UNIT/ML 100 UNIT/ML
500 SOLUTION INTRAVENOUS AS NEEDED
Status: CANCELLED | OUTPATIENT
Start: 2022-07-21

## 2022-07-21 RX ORDER — SODIUM CHLORIDE 0.9 % (FLUSH) 0.9 %
20 SYRINGE (ML) INJECTION AS NEEDED
Status: CANCELLED | OUTPATIENT
Start: 2022-07-21

## 2022-07-21 RX ORDER — SODIUM CHLORIDE 0.9 % (FLUSH) 0.9 %
20 SYRINGE (ML) INJECTION AS NEEDED
Status: DISCONTINUED | OUTPATIENT
Start: 2022-07-21 | End: 2022-07-22 | Stop reason: HOSPADM

## 2022-07-21 RX ORDER — HEPARIN SODIUM (PORCINE) LOCK FLUSH IV SOLN 100 UNIT/ML 100 UNIT/ML
500 SOLUTION INTRAVENOUS AS NEEDED
Status: DISCONTINUED | OUTPATIENT
Start: 2022-07-21 | End: 2022-07-22 | Stop reason: HOSPADM

## 2022-07-21 RX ADMIN — HEPARIN SODIUM (PORCINE) LOCK FLUSH IV SOLN 100 UNIT/ML 500 UNITS: 100 SOLUTION at 13:58

## 2022-07-21 RX ADMIN — Medication 20 ML: at 13:58

## 2022-08-08 ENCOUNTER — LAB (OUTPATIENT)
Dept: LAB | Facility: HOSPITAL | Age: 67
End: 2022-08-08

## 2022-08-08 DIAGNOSIS — I50.32 CHRONIC DIASTOLIC CHF (CONGESTIVE HEART FAILURE): ICD-10-CM

## 2022-08-08 LAB
ANION GAP SERPL CALCULATED.3IONS-SCNC: 9.6 MMOL/L (ref 5–15)
BUN SERPL-MCNC: 9 MG/DL (ref 8–23)
BUN/CREAT SERPL: 10.3 (ref 7–25)
CALCIUM SPEC-SCNC: 9.1 MG/DL (ref 8.6–10.5)
CHLORIDE SERPL-SCNC: 105 MMOL/L (ref 98–107)
CO2 SERPL-SCNC: 24.4 MMOL/L (ref 22–29)
CREAT SERPL-MCNC: 0.87 MG/DL (ref 0.76–1.27)
EGFRCR SERPLBLD CKD-EPI 2021: 95.2 ML/MIN/1.73
GLUCOSE SERPL-MCNC: 135 MG/DL (ref 65–99)
NT-PROBNP SERPL-MCNC: 1234 PG/ML (ref 0–900)
POTASSIUM SERPL-SCNC: 4.1 MMOL/L (ref 3.5–5.2)
SODIUM SERPL-SCNC: 139 MMOL/L (ref 136–145)

## 2022-08-08 PROCEDURE — 83880 ASSAY OF NATRIURETIC PEPTIDE: CPT

## 2022-08-08 PROCEDURE — 36415 COLL VENOUS BLD VENIPUNCTURE: CPT

## 2022-08-08 PROCEDURE — 80048 BASIC METABOLIC PNL TOTAL CA: CPT

## 2022-08-09 ENCOUNTER — OFFICE VISIT (OUTPATIENT)
Dept: RADIATION ONCOLOGY | Facility: HOSPITAL | Age: 67
End: 2022-08-09

## 2022-08-09 ENCOUNTER — TELEPHONE (OUTPATIENT)
Dept: CARDIOLOGY | Facility: CLINIC | Age: 67
End: 2022-08-09

## 2022-08-09 VITALS
TEMPERATURE: 97.1 F | HEART RATE: 88 BPM | BODY MASS INDEX: 34.64 KG/M2 | SYSTOLIC BLOOD PRESSURE: 122 MMHG | OXYGEN SATURATION: 98 % | DIASTOLIC BLOOD PRESSURE: 90 MMHG | RESPIRATION RATE: 16 BRPM | WEIGHT: 284.39 LBS

## 2022-08-09 DIAGNOSIS — C78.01 SECONDARY MALIGNANT NEOPLASM OF RIGHT LUNG: Primary | ICD-10-CM

## 2022-08-09 DIAGNOSIS — R06.09 DOE (DYSPNEA ON EXERTION): Primary | ICD-10-CM

## 2022-08-09 DIAGNOSIS — C20 RECTAL CANCER: ICD-10-CM

## 2022-08-09 DIAGNOSIS — I50.32 CHRONIC DIASTOLIC CHF (CONGESTIVE HEART FAILURE): ICD-10-CM

## 2022-08-09 DIAGNOSIS — C78.02 SECONDARY MALIGNANT NEOPLASM OF LEFT LUNG: ICD-10-CM

## 2022-08-09 PROCEDURE — G0463 HOSPITAL OUTPT CLINIC VISIT: HCPCS | Performed by: RADIOLOGY

## 2022-08-09 PROCEDURE — 99212-NC PR NO CHARGE CBC OFFICE OUTPATIENT VISIT 10 MINUTES: Performed by: RADIOLOGY

## 2022-08-09 NOTE — PROGRESS NOTES
Follow Up Office Visit      Encounter Date: 08/09/2022   Patient Name: Robbi Kennedy  YOB: 1955   Medical Record Number: [id number]   Primary Diagnosis: Secondary malignant neoplasm of right lung (HCC) [C78.01]   Cancer Staging: Cancer Staging  Rectal cancer  Staging form: Colon And Rectum, AJCC 8th Edition  - Clinical: Stage IIA (cT3, cN0, cM0) - Signed by Maurilio Campos MD on 6/21/2021  - Pathologic: No stage assigned - Unsigned                Cancer Staging  Stage IIA (cT3, cN0, cM0)  No stage assigned        Chief Complaint:    Chief Complaint   Patient presents with   • Follow-up   • Lung Cancer       Oncologic History: Robbi Kennedy is a 67 y.o. male here for follow-up regarding his metastatic rectal cancer.    He was originally diagnosed with a [id number] moderately differentiated adenocarcinoma of the rectum in 2019. He received neoadjuvant Xeloda with radiation under the care of Dr. Vasquez here at [place]. This was followed by an LAR with ostomy in 9/20/2019. He was staged as having [id number] disease. He did well for some time. In 2021, he developed metastatic disease. Biopsy of a liver lesion in April 2021 returned as a metastatic adenocarcinoma consistent with a colorectal primary. Metastatic lesions in the chest were noted on CT imaging at that time. He received mFOLFOX with avastin under the care of Dr. Campos. Avastin was held towards the end in anticipation of upcoming abdominal surgery.      Restaging CT demonstrated a treatment response in the liver and the chest.  He had a partial hepatectomy, removal of gallbladder, removal of regional lymph nodes, and MWA of a segment 6 liver metastases at the [facility] on 8/31/2021     He had several lung lesions.. There is a 5 mm lesion in the right middle lobe (shrinking).  There was a 2 mm lesion in the right upper lobe (stable) and two 5 mm lesions in the left lower lobe (stable).     He  completed SBRT to the right middle lobe lesion.  This was delivered to a dose of 50 Gy in 5 fractions.  Completed on 10/29/2021. He had 12 cycles of chemotherapy with Dr. Campos. Two lung lesions (LLL) had grown on surveillance and those were treated with SBRT, completed 7/7/22.    Interval History: Here for 1 mo f/u, doing well, no issues w breathing. Stable fatigue. No rib pain    Prior Radiation:   LLL (x2 lesions in single PTV), 50 Gy/5 fractions, 7/7/22  RML SBRT 50 Gy/5 fractions, 10/2021  50.4 Gy/28 fractions to pelvis for rectal cancer, performed by Dr. Vasquez - 2019        Subjective      Review of Systems: Review of Systems   Constitutional: Positive for fatigue (5/10, ongoing). Negative for appetite change and unexpected weight change.   HENT: Negative for hearing loss, sore throat, tinnitus, trouble swallowing and voice change.    Eyes: Negative for visual disturbance.   Respiratory: Positive for cough (Occasional dry cough, ongoing) and shortness of breath (Ongoing, primarily with exertion).    Cardiovascular: Positive for leg swelling (Ongoing). Negative for chest pain and palpitations.   Gastrointestinal: Negative for abdominal pain, anal bleeding, blood in stool, constipation, diarrhea, nausea, rectal pain and vomiting.   Genitourinary: Positive for frequency (r/t lasix) and urgency (r/t lasix). Negative for difficulty urinating, dysuria and hematuria.   Musculoskeletal: Negative for arthralgias and back pain.   Skin: Negative for color change and rash.   Neurological: Negative for dizziness, weakness and headaches.   Psychiatric/Behavioral: Positive for sleep disturbance (States he sleeps too much. ).       The following portions of the patient's history were reviewed and updated as appropriate: allergies, current medications, past family history, past medical history, past social history, past surgical history and problem list.    Measures:   Pain: (on a scale of 0-10)   Pain Score    08/09/22  1355   PainSc: 0-No pain       Advanced Care Plan: N Advance Care Planning   ACP discussion was declined by the patient. Patient does not have an advance directive, declines further assistance.       KPS/Quality of Life: 80 - Restricted Physical Activity  ECOG: (1) Restricted in physically strenuous activity, ambulatory and able to do work of light nature  Depression Screening:     Depression: Not at risk   • PHQ-2 Score: 0           Objective     Physical Exam:   Vital Signs:   Vitals:    08/09/22 1355   BP: 122/90   Pulse: 88   Resp: 16   Temp: 97.1 °F (36.2 °C)   TempSrc: Temporal   SpO2: 98%   Weight: 129 kg (284 lb 6.3 oz)   PainSc: 0-No pain     Body mass index is 34.64 kg/m².     Constitutional: No acute distress, sitting comfortably  Eye: EOMI, anicteric sclerae  HENT: NC/AT, MMM   Respiratory: Symmetric expansion, nonlabored respiration  MSK: ROM intact in all four extremities, no obvious deformities  Neuro: Alert, oriented x3, CN3-12 grossly intact.   Psych: Appropriate mood and affect.            Assessment / Plan        Assessment/Plan:     Diagnoses and all orders for this visit:    1. Secondary malignant neoplasm of right lung (HCC) (Primary)  -     CT Chest With Contrast; Future  -     CT Abdomen Pelvis With Contrast; Future    2. Secondary malignant neoplasm of left lung (HCC)  -     CT Chest With Contrast; Future  -     CT Abdomen Pelvis With Contrast; Future    3. Rectal cancer (HCC)  -     CT Chest With Contrast; Future  -     CT Abdomen Pelvis With Contrast; Future        Robbi Kennedy is a pleasant 67 y.o. male with with an original diagnosis of a stage IIa adenocarcinoma of the rectum. This was managed with neoadjuvant chemoradiation in 2019 followed by an LAR with ostomy. In 2021, he unfortunately developed metastatic disease in the lungs and in the liver. He received systemic therapy under the care of Dr. Campos and had a good partial response to treatment. He underwent a partial hepatectomy  with removal of the gallbladder, removal of regional lymph nodes, and MWA of a segment 6 metastases at the [facility] on August 31, 2021 to address his metastatic disease in the liver.     He received SBRT to a right middle lobe lesion in 10/2021 and completed 12 cycles of adjuvant chemotherapy w Dr. Campos.   He had a response to SBRT at the treated site and had three other lung lesions which were being monitored over time.   He followed with surveillance imaging. Fortunately, there was no progression in the abdomen or pelvis. However, 2 of his lung lesions (both in left lower lobe) had grown and we managed them with SBRT (50Gy/5 fractions, completed 7/7/22).    He is doing well and has no evidence of acute radiation related toxicity at this time.   I will see him back in 3 mo w CT Chest/Abd/Pelvis w contrast.   He has upcoming follow up in medical oncology.       Follow Up:   Return in about 3 months (around 11/9/2022) for Imaging before next visit.        Time:   I spent 35 minutes on this encounter today, 08/10/22. Activities that took place during this time include:   - preparing to see the patient  - obtaining and reviewing separately obtained history  - performing a medically appropriate examination and evaluation  - counseling and educating the patient  - ordering medications/tests/procedures  - documenting clinical information in the health record  - coordinating care for this patient.     Sincerely,        Chapis Luna MD  Radiation Oncology  [facility]    This document has been signed by Chapis Luna MD on August 10, 2022 12:52 EDT           NOTICE TO PATIENTS-HEALTH RESULTS RELEASE    We believe in information transparency, and we believe you deserve to see your information as soon as it is available.    Lab Results    • We release ALL notes and results to you promptly.    • Therefore, you may see some results even before we do. Please give us 2 business days to review and let  you know our thoughts.  • We look at every result. We will contact you with any results that concern us.  • Some results may show abnormal, but are not clinically important. An example is “MCHC” and “MCV”.   Other results (like “MONICA”) are really challenging to interpret, and require reviewing other results and other information from your chart. Sometimes these are best discussed in person.  Imaging    CT scan, MRI, and PET reports can show new or re-occurring cancer  • These reports can contain words that are hard to understand  • These reports can also show unexpected results.  • We ALWAYS plan to review these results with you and decide on a plan together. We prefer to do this in person, by video visit, or phone.  • When possible, we will discuss the possible results with you BEFORE getting the test, and the next steps we would take with each result.  • Some patients prefer to see their results online as soon as possible. Because of possible “bad news,” other patients may feel more comfortable waiting to discuss results when their provider is available at their next video visit or in-person visit or by phone. As the patient, you can choose when to view your result

## 2022-08-09 NOTE — TELEPHONE ENCOUNTER
----- Message from REGINALDO Soto sent at 8/9/2022 11:10 AM EDT -----  Notify pt BNP level is improved and BMP levels all look good.  Find out if having any shortness of breath/edema.

## 2022-08-10 RX ORDER — FUROSEMIDE 20 MG/1
20 TABLET ORAL DAILY
Qty: 90 TABLET | Refills: 3 | Status: SHIPPED | OUTPATIENT
Start: 2022-08-10

## 2022-08-10 NOTE — TELEPHONE ENCOUNTER
MARK patient regarding results. Patient states he still having SOA and edema. Patient is taking Lasix 20 mg daily.

## 2022-08-25 ENCOUNTER — LAB (OUTPATIENT)
Dept: LAB | Facility: HOSPITAL | Age: 67
End: 2022-08-25

## 2022-08-25 ENCOUNTER — TELEPHONE (OUTPATIENT)
Dept: CARDIOLOGY | Facility: CLINIC | Age: 67
End: 2022-08-25

## 2022-08-25 DIAGNOSIS — C20 RECTAL CANCER: ICD-10-CM

## 2022-08-25 DIAGNOSIS — R06.09 DOE (DYSPNEA ON EXERTION): ICD-10-CM

## 2022-08-25 DIAGNOSIS — I50.32 CHRONIC DIASTOLIC CHF (CONGESTIVE HEART FAILURE): ICD-10-CM

## 2022-08-25 LAB
ALBUMIN SERPL-MCNC: 3.9 G/DL (ref 3.5–5.2)
ALBUMIN/GLOB SERPL: 1.6 G/DL
ALP SERPL-CCNC: 85 U/L (ref 39–117)
ALT SERPL W P-5'-P-CCNC: 20 U/L (ref 1–41)
ANION GAP SERPL CALCULATED.3IONS-SCNC: 10.7 MMOL/L (ref 5–15)
AST SERPL-CCNC: 23 U/L (ref 1–40)
BASOPHILS # BLD AUTO: 0.02 10*3/MM3 (ref 0–0.2)
BASOPHILS NFR BLD AUTO: 0.4 % (ref 0–1.5)
BILIRUB SERPL-MCNC: 0.6 MG/DL (ref 0–1.2)
BUN SERPL-MCNC: 9 MG/DL (ref 8–23)
BUN/CREAT SERPL: 10.7 (ref 7–25)
CALCIUM SPEC-SCNC: 9 MG/DL (ref 8.6–10.5)
CEA SERPL-MCNC: 4.12 NG/ML
CHLORIDE SERPL-SCNC: 107 MMOL/L (ref 98–107)
CO2 SERPL-SCNC: 24.3 MMOL/L (ref 22–29)
CREAT SERPL-MCNC: 0.84 MG/DL (ref 0.76–1.27)
DEPRECATED RDW RBC AUTO: 48.9 FL (ref 37–54)
EGFRCR SERPLBLD CKD-EPI 2021: 96.2 ML/MIN/1.73
EOSINOPHIL # BLD AUTO: 0.23 10*3/MM3 (ref 0–0.4)
EOSINOPHIL NFR BLD AUTO: 4.3 % (ref 0.3–6.2)
ERYTHROCYTE [DISTWIDTH] IN BLOOD BY AUTOMATED COUNT: 14.1 % (ref 12.3–15.4)
GLOBULIN UR ELPH-MCNC: 2.5 GM/DL
GLUCOSE SERPL-MCNC: 119 MG/DL (ref 65–99)
HCT VFR BLD AUTO: 41.6 % (ref 37.5–51)
HGB BLD-MCNC: 14.1 G/DL (ref 13–17.7)
IMM GRANULOCYTES # BLD AUTO: 0.01 10*3/MM3 (ref 0–0.05)
IMM GRANULOCYTES NFR BLD AUTO: 0.2 % (ref 0–0.5)
LYMPHOCYTES # BLD AUTO: 0.66 10*3/MM3 (ref 0.7–3.1)
LYMPHOCYTES NFR BLD AUTO: 12.3 % (ref 19.6–45.3)
MCH RBC QN AUTO: 32.3 PG (ref 26.6–33)
MCHC RBC AUTO-ENTMCNC: 33.9 G/DL (ref 31.5–35.7)
MCV RBC AUTO: 95.4 FL (ref 79–97)
MONOCYTES # BLD AUTO: 0.51 10*3/MM3 (ref 0.1–0.9)
MONOCYTES NFR BLD AUTO: 9.5 % (ref 5–12)
NEUTROPHILS NFR BLD AUTO: 3.94 10*3/MM3 (ref 1.7–7)
NEUTROPHILS NFR BLD AUTO: 73.3 % (ref 42.7–76)
NRBC BLD AUTO-RTO: 0 /100 WBC (ref 0–0.2)
NT-PROBNP SERPL-MCNC: 1589 PG/ML (ref 0–900)
PLATELET # BLD AUTO: 231 10*3/MM3 (ref 140–450)
PMV BLD AUTO: 8.9 FL (ref 6–12)
POTASSIUM SERPL-SCNC: 4.1 MMOL/L (ref 3.5–5.2)
PROT SERPL-MCNC: 6.4 G/DL (ref 6–8.5)
RBC # BLD AUTO: 4.36 10*6/MM3 (ref 4.14–5.8)
SODIUM SERPL-SCNC: 142 MMOL/L (ref 136–145)
WBC NRBC COR # BLD: 5.37 10*3/MM3 (ref 3.4–10.8)

## 2022-08-25 PROCEDURE — 36415 COLL VENOUS BLD VENIPUNCTURE: CPT

## 2022-08-25 PROCEDURE — 85025 COMPLETE CBC W/AUTO DIFF WBC: CPT

## 2022-08-25 PROCEDURE — 82378 CARCINOEMBRYONIC ANTIGEN: CPT

## 2022-08-25 PROCEDURE — 83880 ASSAY OF NATRIURETIC PEPTIDE: CPT

## 2022-08-25 PROCEDURE — 80053 COMPREHEN METABOLIC PANEL: CPT

## 2022-08-25 NOTE — TELEPHONE ENCOUNTER
----- Message from REGINALOD Soto sent at 8/25/2022  2:07 PM EDT -----  Notify pt labs are good, continue current meds

## 2022-09-07 ENCOUNTER — APPOINTMENT (OUTPATIENT)
Dept: CT IMAGING | Facility: HOSPITAL | Age: 67
End: 2022-09-07

## 2022-09-14 ENCOUNTER — HOSPITAL ENCOUNTER (OUTPATIENT)
Dept: ONCOLOGY | Facility: HOSPITAL | Age: 67
Setting detail: INFUSION SERIES
Discharge: HOME | End: 2022-09-14

## 2022-09-14 ENCOUNTER — OFFICE VISIT (OUTPATIENT)
Dept: ONCOLOGY | Facility: HOSPITAL | Age: 67
End: 2022-09-14

## 2022-09-14 VITALS
TEMPERATURE: 97.6 F | WEIGHT: 285.72 LBS | BODY MASS INDEX: 34.8 KG/M2 | RESPIRATION RATE: 16 BRPM | SYSTOLIC BLOOD PRESSURE: 128 MMHG | DIASTOLIC BLOOD PRESSURE: 82 MMHG | OXYGEN SATURATION: 99 % | HEART RATE: 88 BPM

## 2022-09-14 DIAGNOSIS — E87.6 HYPOKALEMIA: ICD-10-CM

## 2022-09-14 DIAGNOSIS — C20 RECTAL CANCER: ICD-10-CM

## 2022-09-14 DIAGNOSIS — Z45.2 ENCOUNTER FOR ADJUSTMENT OR MANAGEMENT OF VASCULAR ACCESS DEVICE: Primary | ICD-10-CM

## 2022-09-14 DIAGNOSIS — C20 RECTAL CANCER: Primary | ICD-10-CM

## 2022-09-14 DIAGNOSIS — C78.01 SECONDARY MALIGNANT NEOPLASM OF RIGHT LUNG: ICD-10-CM

## 2022-09-14 PROCEDURE — 99213 OFFICE O/P EST LOW 20 MIN: CPT | Performed by: NURSE PRACTITIONER

## 2022-09-14 PROCEDURE — 25010000002 HEPARIN LOCK FLUSH PER 10 UNITS: Performed by: INTERNAL MEDICINE

## 2022-09-14 PROCEDURE — 96523 IRRIG DRUG DELIVERY DEVICE: CPT

## 2022-09-14 PROCEDURE — G0463 HOSPITAL OUTPT CLINIC VISIT: HCPCS

## 2022-09-14 PROCEDURE — G0463 HOSPITAL OUTPT CLINIC VISIT: HCPCS | Performed by: NURSE PRACTITIONER

## 2022-09-14 RX ORDER — HEPARIN SODIUM (PORCINE) LOCK FLUSH IV SOLN 100 UNIT/ML 100 UNIT/ML
500 SOLUTION INTRAVENOUS AS NEEDED
Status: CANCELLED | OUTPATIENT
Start: 2022-09-14

## 2022-09-14 RX ORDER — SODIUM CHLORIDE 0.9 % (FLUSH) 0.9 %
20 SYRINGE (ML) INJECTION AS NEEDED
Status: DISCONTINUED | OUTPATIENT
Start: 2022-09-14 | End: 2022-09-15 | Stop reason: HOSPADM

## 2022-09-14 RX ORDER — SODIUM CHLORIDE 0.9 % (FLUSH) 0.9 %
20 SYRINGE (ML) INJECTION AS NEEDED
Status: CANCELLED | OUTPATIENT
Start: 2022-09-14

## 2022-09-14 RX ORDER — POTASSIUM CHLORIDE 20 MEQ/1
20 TABLET, EXTENDED RELEASE ORAL DAILY
Qty: 90 TABLET | Refills: 1 | Status: SHIPPED | OUTPATIENT
Start: 2022-09-14 | End: 2022-09-14 | Stop reason: SDUPTHER

## 2022-09-14 RX ORDER — HEPARIN SODIUM (PORCINE) LOCK FLUSH IV SOLN 100 UNIT/ML 100 UNIT/ML
500 SOLUTION INTRAVENOUS AS NEEDED
Status: DISCONTINUED | OUTPATIENT
Start: 2022-09-14 | End: 2022-09-15 | Stop reason: HOSPADM

## 2022-09-14 RX ORDER — POTASSIUM CHLORIDE 20 MEQ/1
20 TABLET, EXTENDED RELEASE ORAL DAILY
Qty: 90 TABLET | Refills: 1 | Status: SHIPPED | OUTPATIENT
Start: 2022-09-14 | End: 2023-02-21 | Stop reason: SDUPTHER

## 2022-09-14 RX ADMIN — Medication 20 ML: at 10:15

## 2022-09-14 RX ADMIN — HEPARIN SODIUM (PORCINE) LOCK FLUSH IV SOLN 100 UNIT/ML 500 UNITS: 100 SOLUTION at 10:15

## 2022-09-14 NOTE — PROGRESS NOTES
Chief Complaint  Rectal Cancer    Eri Moran, Eri Engel, REGINALDO      Subjective          Robbi Kennedy presents to [facility] for rectal cancer    History of Present Illness     Mr. Robbi Kennedy presents for 3 month follow up for rectal cancer. He saw Dr. Cook back in June 2022 and scans were completed and reviewed at that visit. He reports he feels well overall and offers no complaints. He has good apetitie. His weight is stable. His bowel habits are normal and offers no complaints in this area.   He denies any fatigue.     Labs done on 8/25/22: CMP is normal except for glucose elevated at 119. CBC is normal.     Cancer Staging  Rectal cancer  Staging form: Colon And Rectum, AJCC 8th Edition  - Clinical: Stage IIA (cT3, cN0, cM0) - Signed by Maurilio Campos MD on 6/21/2021  - Pathologic: No stage assigned - Unsigned       Treatment intent: curative    Oncology/Hematology History Overview Note   9/30/2019 High rectal [id number]-[id number] adenocarcinoma  associated with tubular adenoma.   4/27/21 Biopsy of liver revealed  metastatic adenocarcinoma, consistent with colorectal primary.    Clinical Staging  Stage IIa ([id number])    Chemotherapy      neoadjuvant Xeloda with radiation. adjuvant xeloda        Radiation Therapy      7/8/19 thru 8/14/19 completed neoadjuvant 5040 cGy=28 fxs rectum     10/2021 XRT due to recurrence right middle lung 5 Fx's, 5000cGy  11/26/21 adjuvant FOLFOX Initiated.     Surgeries       9/30/2019 low anterior resection with ostomy        8/31/21 partial hepatectomy, cholecystectomy and MWA for segment 6 medical margin performed at        Rectal cancer   6/2/2021 - 8/15/2021 Chemotherapy    OP COLON mFOLFOX6 + Bevacizumab (OXALIplatin / Leucovorin / Fluorouracil / Bevacizumab)     6/18/2021 Initial Diagnosis    Rectal cancer (CMS/HCC)     6/21/2021 Cancer Staged    Staging form: Colon And Rectum, AJCC 8th Edition  -  Clinical: Stage IIA (cT3, cN0, cM0) - Signed by Maurilio Campos MD on 6/21/2021 11/16/2021 -  Chemotherapy    OP COLON mFOLFOX6 OXALIplatin / Leucovorin / Fluorouracil     Secondary malignant neoplasm of left lung   9/14/2021 Initial Diagnosis    Secondary malignant neoplasm of left lung (HCC)     10/20/2021 -  Radiation    RADIATION THERAPY Treatment Details (Noted on 10/5/2021)  Site: Bilateral Lung  Technique: SBRT  Goal: No goal specified  Planned Treatment Start Date: 10/20/2021     11/16/2021 -  Chemotherapy    OP COLON mFOLFOX6 OXALIplatin / Leucovorin / Fluorouracil     6/15/2022 -  Radiation    RADIATION THERAPY Treatment Details (Noted on 6/15/2022)  Site: Left Lung - Lower lobe  Technique: SBRT  Goal: No goal specified  Planned Treatment Start Date: No planned start date specified     Secondary malignancy of liver   9/14/2021 Initial Diagnosis    Secondary malignancy of liver (HCC)     11/16/2021 -  Chemotherapy    OP COLON mFOLFOX6 OXALIplatin / Leucovorin / Fluorouracil     Secondary malignant neoplasm of right lung   10/5/2021 Initial Diagnosis    Secondary malignant neoplasm of right lung (HCC)     10/20/2021 -  Radiation    RADIATION THERAPY Treatment Details (Noted on 10/5/2021)  Site: Bilateral Lung  Technique: SBRT  Goal: No goal specified  Planned Treatment Start Date: 10/20/2021     11/16/2021 -  Chemotherapy    OP COLON mFOLFOX6 OXALIplatin / Leucovorin / Fluorouracil         Review of Systems   Constitutional: Positive for fatigue. Negative for appetite change, diaphoresis, fever, unexpected weight gain and unexpected weight loss.   HENT: Negative for hearing loss, mouth sores, sore throat, swollen glands, trouble swallowing and voice change.    Eyes: Negative for blurred vision.   Respiratory: Negative for cough, shortness of breath and wheezing.    Cardiovascular: Negative for chest pain and palpitations.   Gastrointestinal: Negative for abdominal pain, blood in stool, constipation,  diarrhea, nausea and vomiting.   Endocrine: Negative for cold intolerance and heat intolerance.   Genitourinary: Negative for difficulty urinating, dysuria, frequency, hematuria and urinary incontinence.   Musculoskeletal: Negative for arthralgias, back pain and myalgias.   Skin: Negative for rash, skin lesions and wound.   Neurological: Negative for dizziness, seizures, weakness, numbness and headache.   Hematological: Does not bruise/bleed easily.   Psychiatric/Behavioral: Negative for depressed mood. The patient is not nervous/anxious.        Current Outpatient Medications on File Prior to Visit   Medication Sig Dispense Refill   • acetaminophen (TYLENOL) 500 MG tablet Take 500 mg by mouth Every 6 (Six) Hours As Needed.     • apixaban (ELIQUIS) 5 MG tablet tablet Take 5 mg by mouth 2 (Two) Times a Day.     • ascorbic acid (VITAMIN C) 500 MG tablet Take 500 mg by mouth Daily.     • atorvastatin (LIPITOR) 40 MG tablet atorvastatin 40 mg oral tablet take 1 tablet (40 mg) by oral route once daily   Active     • cyanocobalamin (VITAMIN B-12) 1000 MCG tablet Take 1,000 mcg by mouth Daily.     • fluticasone (FLONASE) 50 MCG/ACT nasal spray      • furosemide (LASIX) 20 MG tablet Take 1 tablet by mouth Daily. 90 tablet 3   • loratadine (CLARITIN) 10 MG tablet loratadine 10 mg oral tablet take 1 tablet (10 mg) by oral route once daily   Active     • metoprolol tartrate (LOPRESSOR) 100 MG tablet Take 1 tablet by mouth 2 (Two) Times a Day. 180 tablet 3   • multivitamin (THERAGRAN) tablet tablet Take 1 tablet by mouth Daily.     • ondansetron (ZOFRAN) 8 MG tablet Take 1 tablet by mouth Every 8 (Eight) Hours As Needed for Nausea. for nausea 30 tablet 3   • Synthroid 25 MCG tablet Take 25 mcg by mouth Daily.     • verapamil SR (CALAN-SR) 180 MG CR tablet Take 1 tablet by mouth Every Night. 90 tablet 3   • vitamin E 1000 UNIT capsule Take 1,000 Units by mouth Daily.     • [DISCONTINUED] potassium chloride (K-DUR,KLOR-CON) 20 MEQ  CR tablet Take 1 tablet by mouth Daily. 30 tablet 5     No current facility-administered medications on file prior to visit.       No Known Allergies  Past Medical History:   Diagnosis Date   • Asthma    • Atrial fibrillation, persistent 2/26/2021   • Colorectal cancer    • Essential hypertension    • GI cancer    • Hepatitis    • History of DVT of lower extremity 8/27/2021   • Hyperlipidemia LDL goal <100 12/31/2020   • Rectal cancer 6/18/2021   • Secondary malignancy of liver 9/14/2021   • Secondary malignant neoplasm of left lung 9/14/2021   • Secondary malignant neoplasm of right lung 10/5/2021   • Thrombocytopenia (HCC) 1/11/2022   • Thyroid disease      Past Surgical History:   Procedure Laterality Date   • COLON SURGERY     • HERNIA REPAIR     • LIVER BIOPSY      4/27/21 Biopsy of liver revealed metastatic adenocarcinoma, consistent with colorectal primary   • OTHER SURGICAL HISTORY      REMOVED CANCER FROM COLON     Social History     Socioeconomic History   • Marital status:    • Number of children: 2   Tobacco Use   • Smoking status: Never Smoker   • Smokeless tobacco: Never Used   Vaping Use   • Vaping Use: Never used   Substance and Sexual Activity   • Alcohol use: Yes     Comment: occasionally, no recent use   • Drug use: Never   • Sexual activity: Defer     Family History   Problem Relation Age of Onset   • Prostate cancer Father    • Heart murmur Mother    • Diabetes Mother    • Colon cancer Maternal Uncle      Immunization History   Administered Date(s) Administered   • COVID-19 (PFIZER) PURPLE CAP 04/02/2021, 04/26/2021, 12/20/2021   • Fluzone High-Dose 65+yrs 10/14/2021   • INFLUENZA SPLIT TRI 10/14/2009   • Influenza, Unspecified 09/01/2020   • Tdap 08/05/2013   • Zostavax 08/02/2016       Objective   Physical Exam  Vitals and nursing note reviewed.   Constitutional:       Appearance: Normal appearance. He is obese.   HENT:      Head: Normocephalic.      Nose: Nose normal.       Mouth/Throat:      Mouth: Mucous membranes are moist.   Eyes:      Pupils: Pupils are equal, round, and reactive to light.   Cardiovascular:      Rate and Rhythm: Normal rate and regular rhythm.      Pulses: Normal pulses.      Heart sounds: Normal heart sounds.   Pulmonary:      Effort: Pulmonary effort is normal. No respiratory distress.      Breath sounds: Normal breath sounds.   Abdominal:      General: Bowel sounds are normal.      Palpations: Abdomen is soft.   Musculoskeletal:         General: Normal range of motion.      Cervical back: Normal range of motion and neck supple.   Skin:     General: Skin is warm and dry.      Capillary Refill: Capillary refill takes less than 2 seconds.   Neurological:      General: No focal deficit present.      Mental Status: He is alert and oriented to person, place, and time.   Psychiatric:         Mood and Affect: Mood normal.         Behavior: Behavior normal.         Thought Content: Thought content normal.         Judgment: Judgment normal.         Vitals:    09/14/22 0928   BP: 128/82   Pulse: 88   Resp: 16   Temp: 97.6 °F (36.4 °C)   SpO2: 99%   Weight: 130 kg (285 lb 11.5 oz)   PainSc: 0-No pain     ECOG score: 0         ECOG: (0) Fully Active - Able to Carry On All Pre-disease Performance Without Restriction  Fall Risk Assessment was completed, and patient is at low risk for falls.  PHQ-9 Total Score: 0       The patient is  experiencing fatigue. Fatigue score: 4    PT/OT Functional Screening: PT fx screen: No needs identified  Speech Functional Screening: Speech fx screen: No needs identified  Rehab to be ordered: Rehab to be ordered: No needs identified        Result Review :   The following data was reviewed by: REGINALDO Guo on 09/14/2022:  Lab Results   Component Value Date    HGB 14.1 08/25/2022    HCT 41.6 08/25/2022    MCV 95.4 08/25/2022     08/25/2022    WBC 5.37 08/25/2022    NEUTROABS 3.94 08/25/2022    LYMPHSABS 0.66 (L) 08/25/2022     MONOSABS 0.51 08/25/2022    EOSABS 0.23 08/25/2022    BASOSABS 0.02 08/25/2022     Lab Results   Component Value Date    GLUCOSE 119 (H) 08/25/2022    BUN 9 08/25/2022    CREATININE 0.84 08/25/2022     08/25/2022    K 4.1 08/25/2022     08/25/2022    CO2 24.3 08/25/2022    CALCIUM 9.0 08/25/2022    PROTEINTOT 6.4 08/25/2022    ALBUMIN 3.90 08/25/2022    BILITOT 0.6 08/25/2022    ALKPHOS 85 08/25/2022    AST 23 08/25/2022    ALT 20 08/25/2022          Assessment and Plan    Diagnoses and all orders for this visit:    1. Rectal cancer (HCC) (Primary)  -     CBC & Differential; Future  -     Comprehensive Metabolic Panel; Future  -     CEA; Future  -     potassium chloride (K-DUR,KLOR-CON) 20 MEQ CR tablet; Take 1 tablet by mouth Daily.  Dispense: 90 tablet; Refill: 1    2. Secondary malignant neoplasm of right lung (HCC)    3. Hypokalemia  -     potassium chloride (K-DUR,KLOR-CON) 20 MEQ CR tablet; Take 1 tablet by mouth Daily.  Dispense: 90 tablet; Refill: 1    We reviewed his current labs and CEA. His CEA is stable and within nromal limits. He reports he has scans scheduled with Dr. Luna in October 7 and follow up. He denies any cough, shortness of breath or CP or hemoptysis.     He will follow up with labs in 3 months with follow up with Dr. Campos.         Patient Follow Up: 3 months with Dr. Campos    Patient was given instructions and counseling regarding his condition or for health maintenance advice. Please see specific information pulled into the AVS if appropriate.     Dasha Puente, APRN    9/14/2022

## 2022-09-14 NOTE — TELEPHONE ENCOUNTER
pt states that he needs the script sent to [facility] instead of [facility]. Please review and sign script.

## 2022-10-06 ENCOUNTER — HOSPITAL ENCOUNTER (OUTPATIENT)
Dept: CT IMAGING | Facility: HOSPITAL | Age: 67
Discharge: HOME | End: 2022-10-06
Admitting: RADIOLOGY

## 2022-10-06 DIAGNOSIS — C78.01 SECONDARY MALIGNANT NEOPLASM OF RIGHT LUNG: ICD-10-CM

## 2022-10-06 DIAGNOSIS — C20 RECTAL CANCER: ICD-10-CM

## 2022-10-06 DIAGNOSIS — C78.02 SECONDARY MALIGNANT NEOPLASM OF LEFT LUNG: ICD-10-CM

## 2022-10-06 LAB
CREAT BLDA-MCNC: 0.9 MG/DL
EGFRCR SERPLBLD CKD-EPI 2021: 94.2 ML/MIN/1.73

## 2022-10-06 PROCEDURE — 71260 CT THORAX DX C+: CPT

## 2022-10-06 PROCEDURE — 82565 ASSAY OF CREATININE: CPT

## 2022-10-06 PROCEDURE — 0 IOPAMIDOL PER 1 ML: Performed by: RADIOLOGY

## 2022-10-06 PROCEDURE — 74177 CT ABD & PELVIS W/CONTRAST: CPT

## 2022-10-06 RX ORDER — HEPARIN SODIUM (PORCINE) LOCK FLUSH IV SOLN 100 UNIT/ML 100 UNIT/ML
SOLUTION INTRAVENOUS
Status: DISPENSED
Start: 2022-10-06 | End: 2022-10-06

## 2022-10-06 RX ADMIN — IOPAMIDOL 100 ML: 755 INJECTION, SOLUTION INTRAVENOUS at 11:06

## 2022-10-07 ENCOUNTER — TELEPHONE (OUTPATIENT)
Dept: ONCOLOGY | Facility: HOSPITAL | Age: 67
End: 2022-10-07

## 2022-10-07 NOTE — TELEPHONE ENCOUNTER
Caller: ALPESH    Relationship: Self    Best call back number: [phone]    What is the best time to reach you: ANY    Who are you requesting to speak with (clinical staff, provider,  specific staff member): DR. ESPINOSA'S NURSE    Do you know the name of the person who called: ALPESH HAD HIS PORT FLUSHED YESTERDAY & IS INQUIRING IF IT IS NECESSARY FOR HIM TO HAVE IT DONE AGAIN ON 10/25/2022 OR CAN THAT ONE BE CANCELLED?    Do you require a callback: YES, PLEASE

## 2022-10-11 ENCOUNTER — OFFICE VISIT (OUTPATIENT)
Dept: RADIATION ONCOLOGY | Facility: HOSPITAL | Age: 67
End: 2022-10-11

## 2022-10-11 VITALS
TEMPERATURE: 98.2 F | HEART RATE: 74 BPM | OXYGEN SATURATION: 98 % | RESPIRATION RATE: 16 BRPM | SYSTOLIC BLOOD PRESSURE: 120 MMHG | WEIGHT: 287.26 LBS | DIASTOLIC BLOOD PRESSURE: 85 MMHG | BODY MASS INDEX: 34.98 KG/M2

## 2022-10-11 DIAGNOSIS — C78.02 SECONDARY MALIGNANT NEOPLASM OF LEFT LUNG: ICD-10-CM

## 2022-10-11 PROCEDURE — G0463 HOSPITAL OUTPT CLINIC VISIT: HCPCS | Performed by: RADIOLOGY

## 2022-10-11 PROCEDURE — 99215 OFFICE O/P EST HI 40 MIN: CPT | Performed by: RADIOLOGY

## 2022-10-11 NOTE — PROGRESS NOTES
Follow Up Office Visit      Encounter Date: 10/11/2022   Patient Name: Robbi Kennedy  YOB: 1955   Medical Record Number: [id number]   Primary Diagnosis: No primary diagnosis found.   Cancer Staging: Cancer Staging   Rectal cancer  Staging form: Colon And Rectum, AJCC 8th Edition  - Clinical: Stage IIA (cT3, cN0, cM0) - Signed by Maurilio Campos MD on 6/21/2021  - Pathologic: No stage assigned - Unsigned                Cancer Staging   Stage IIA (cT3, cN0, cM0)  No stage assigned        Chief Complaint:    Chief Complaint   Patient presents with   • Follow-up   • Rectal Cancer       Oncologic History: Robbi Kennedy is a 67 y.o. male  here for follow-up regarding his metastatic rectal cancer.    He was originally diagnosed with a [id number] moderately differentiated adenocarcinoma of the rectum in 2019. He received neoadjuvant Xeloda with radiation under the care of Dr. Vasquez here at [facility]. This was followed by an LAR with ostomy in 9/20/2019. He was staged as having [id number] disease. He did well for some time. In 2021, he developed metastatic disease. Biopsy of a liver lesion in April 2021 returned as a metastatic adenocarcinoma consistent with a colorectal primary. Metastatic lesions in the chest were noted on CT imaging at that time. He received mFOLFOX with avastin under the care of Dr. Campos. Avastin was held towards the end in anticipation of upcoming abdominal surgery.      Restaging CT demonstrated a treatment response in the liver and the chest.  He had a partial hepatectomy, removal of gallbladder, removal of regional lymph nodes, and MWA of a segment 6 liver metastases at the [facility] on 8/31/2021     He had several lung lesions.. There is a 5 mm lesion in the right middle lobe (shrinking).  There was a 2 mm lesion in the right upper lobe (stable) and two 5 mm lesions in the left lower lobe (stable).     He completed SBRT to the right  middle lobe lesion.  This was delivered to a dose of 50 Gy in 5 fractions.  Completed on 10/29/2021. He had 12 cycles of chemotherapy with Dr. Campos. Two lung lesions (LLL) had grown on surveillance and those were treated with SBRT, completed 7/7/22.    Interval History: Doing well, here to follow up after CT imaging. Stable arthritic back pain and positional dizziness.  No SOA, no hemoptysis, no rib pain    Prior Radiation: LLL (x2 lesions in single PTV), 50 Gy/5 fractions, 7/7/22  RML SBRT 50 Gy/5 fractions, 10/2021  50.4 Gy/28 fractions to pelvis for rectal cancer, performed by Dr. Vasquez - 2019         Subjective      Review of Systems: Review of Systems   Constitutional: Positive for fatigue (7/10, improved). Negative for appetite change and unexpected weight change.   HENT: Negative for hearing loss, sore throat, tinnitus, trouble swallowing and voice change.    Eyes: Negative for visual disturbance.   Respiratory: Negative for cough and shortness of breath.    Cardiovascular: Positive for palpitations (Afib, ongoing). Negative for chest pain.   Gastrointestinal: Negative for blood in stool, constipation, diarrhea, nausea and vomiting.   Genitourinary: Negative for difficulty urinating, dysuria, frequency, hematuria and urgency.   Musculoskeletal: Positive for back pain (Ongoing). Negative for arthralgias.   Skin: Negative for color change and rash.   Neurological: Positive for dizziness (With position changes, ongoing). Negative for weakness and headaches.   Psychiatric/Behavioral: Negative for sleep disturbance.       The following portions of the patient's history were reviewed and updated as appropriate: allergies, current medications, past family history, past medical history, past social history, past surgical history and problem list.    Measures:   Pain: (on a scale of 0-10)   Pain Score    10/11/22 1001   PainSc: 0-No pain       Advanced Care Plan: N Advance Care Planning   ACP discussion was  declined by the patient. Patient does not have an advance directive, declines further assistance.       KPS/Quality of Life: 80 - Restricted Physical Activity  ECOG: (1) Restricted in physically strenuous activity, ambulatory and able to do work of light nature  Depression Screening:   PHQ-9 score of 0 on 9/14/2022.     Objective     Physical Exam:   Vital Signs:   Vitals:    10/11/22 1001   BP: 120/85   Pulse: 74   Resp: 16   Temp: 98.2 °F (36.8 °C)   TempSrc: Temporal   SpO2: 98%   Weight: 130 kg (287 lb 4.2 oz)   PainSc: 0-No pain     Body mass index is 34.98 kg/m².     Constitutional: No acute distress, sitting comfortably  Eye: EOMI, anicteric sclerae  HENT: NC/AT, MMM   Respiratory: Symmetric expansion, nonlabored respiration  MSK: ROM intact in all four extremities, no obvious deformities  Neuro: Alert, oriented x3, CN3-12 grossly intact.   Psych: Appropriate mood and affect.    Results:   Imaging: Images were reviewed personally and pertinent findings are detailed below.   CT chest 10/6/22-  Interval increase in size of right upper lobe pulmonary nodule and development of new left upper   lobe pulmonary nodules  CT abd/pelvis 10/6/22-  Stable low-density mass within the right lobe of the liver with likely treated metastasis no new   liver lesion. No evidence of new metastatic disease within the abdomen or pelvis.         Assessment / Plan        Assessment/Plan:     Diagnoses and all orders for this visit:    1. Secondary malignant neoplasm of left lung (HCC)      Robbi Kennedy is a pleasant 67 y.o. male with an original diagnosis of a stage IIa adenocarcinoma of the rectum. This was managed with neoadjuvant chemoradiation in 2019 followed by an LAR with ostomy. In 2021, he unfortunately developed metastatic disease in the lungs and in the liver. He received systemic therapy under the care of Dr. Campos and had a good partial response to treatment. He underwent a partial hepatectomy with removal of the  gallbladder, removal of regional lymph nodes, and MWA of a segment 6 metastases at the [facility] on August 31, 2021 to address his metastatic disease in the liver.     He received SBRT to a right middle lobe lesion in 10/2021 and completed 12 cycles of adjuvant chemotherapy w Dr. Campos.   He had a response to SBRT at the treated site and had three other lung lesions which were being monitored over time. There was eventual growth in 2 of them in the left lower lobe, and both were treated with a single treatment course of SBRT. To date, his radiation history has involved:     LLL (x2 lesions in single PTV), 50 Gy/5 fractions, 7/7/22  RML SBRT 50 Gy/5 fractions, 10/2021  50.4 Gy/28 fractions to pelvis for rectal cancer, performed by Dr. Vasquez - 2019    He is here today to follow up after surveillance imaging. Fortunately, there is no progression in the abdomen or pelvis. The 3 previously treated lesions all demonstrate a treatment response. However, there is growth in a previously stable RUL lesion and there is interval development of a new left lung lesion.   Dr. Campos and I discussed these findings. We discussed a plan of care involving SBRT to the sites of oligoprogression.   Mr. Kennedy is aware that the goal of treatment is to delay progression in efforts to keep prolong the time that he is off chemotherapy. However, we will need to continue to monitor him with scans and if there is an indication that disease is progressing faster or involvement is more extensive, he and Dr. Campos may choose to resume systemic therapy.  Mr. Kennedy would like to proceed with SBRT and consent was obtained for simulation.     Follow Up:   Return in about 2 weeks (around 10/25/2022) for CT SIM next visit.        Time:   I spent 45 minutes on this encounter today, 10/11/22. Activities that took place during this time include:   - preparing to see the patient  - obtaining and reviewing separately obtained history  -  performing a medically appropriate examination and evaluation  - counseling and educating the patient  - communicating with other healthcare providers  - documenting clinical information in the health record  - coordinating care for this patient.     Sincerely,        Chapis Luna MD  Radiation Oncology  [facility]    This document has been signed by Chapis Luna MD on October 11, 2022 15:24 EDT           NOTICE TO PATIENTS  At [facility], we believe that sharing information builds trust and better relationships. You are receiving this note because you recently visited [facility]. It is possible you will see health information before a provider has talked with you about it. This kind of information can be easy to misunderstand. To help you fully understand what it means for your health, we urge you to discuss this note with your provider.

## 2022-10-12 ENCOUNTER — HOSPITAL ENCOUNTER (OUTPATIENT)
Dept: RADIATION ONCOLOGY | Facility: HOSPITAL | Age: 67
Setting detail: RADIATION/ONCOLOGY SERIES
End: 2022-10-12

## 2022-10-25 ENCOUNTER — HOSPITAL ENCOUNTER (OUTPATIENT)
Dept: RADIATION ONCOLOGY | Facility: HOSPITAL | Age: 67
Discharge: HOME | End: 2022-10-25

## 2022-10-25 ENCOUNTER — HOSPITAL ENCOUNTER (OUTPATIENT)
Dept: ONCOLOGY | Facility: HOSPITAL | Age: 67
Setting detail: INFUSION SERIES
Discharge: HOME | End: 2022-10-25

## 2022-10-25 DIAGNOSIS — Z45.2 ENCOUNTER FOR ADJUSTMENT OR MANAGEMENT OF VASCULAR ACCESS DEVICE: Primary | ICD-10-CM

## 2022-10-25 DIAGNOSIS — C78.02 SECONDARY MALIGNANT NEOPLASM OF LEFT LUNG: ICD-10-CM

## 2022-10-25 PROCEDURE — 77470 SPECIAL RADIATION TREATMENT: CPT | Performed by: RADIOLOGY

## 2022-10-25 PROCEDURE — 77334 RADIATION TREATMENT AID(S): CPT | Performed by: RADIOLOGY

## 2022-10-25 PROCEDURE — 25010000002 HEPARIN LOCK FLUSH PER 10 UNITS: Performed by: INTERNAL MEDICINE

## 2022-10-25 PROCEDURE — 77263 THER RADIOLOGY TX PLNG CPLX: CPT | Performed by: RADIOLOGY

## 2022-10-25 PROCEDURE — 96523 IRRIG DRUG DELIVERY DEVICE: CPT

## 2022-10-25 RX ORDER — SODIUM CHLORIDE 0.9 % (FLUSH) 0.9 %
20 SYRINGE (ML) INJECTION AS NEEDED
Status: DISCONTINUED | OUTPATIENT
Start: 2022-10-25 | End: 2022-10-26 | Stop reason: HOSPADM

## 2022-10-25 RX ORDER — HEPARIN SODIUM (PORCINE) LOCK FLUSH IV SOLN 100 UNIT/ML 100 UNIT/ML
500 SOLUTION INTRAVENOUS AS NEEDED
Status: CANCELLED | OUTPATIENT
Start: 2022-10-25

## 2022-10-25 RX ORDER — HEPARIN SODIUM (PORCINE) LOCK FLUSH IV SOLN 100 UNIT/ML 100 UNIT/ML
500 SOLUTION INTRAVENOUS AS NEEDED
Status: DISCONTINUED | OUTPATIENT
Start: 2022-10-25 | End: 2022-10-26 | Stop reason: HOSPADM

## 2022-10-25 RX ORDER — SODIUM CHLORIDE 0.9 % (FLUSH) 0.9 %
20 SYRINGE (ML) INJECTION AS NEEDED
Status: CANCELLED | OUTPATIENT
Start: 2022-10-25

## 2022-10-25 RX ADMIN — Medication 20 ML: at 14:24

## 2022-10-25 RX ADMIN — HEPARIN SODIUM (PORCINE) LOCK FLUSH IV SOLN 100 UNIT/ML 500 UNITS: 100 SOLUTION at 14:23

## 2022-10-28 ENCOUNTER — HOSPITAL ENCOUNTER (OUTPATIENT)
Dept: RADIATION ONCOLOGY | Facility: HOSPITAL | Age: 67
Discharge: HOME | End: 2022-10-28

## 2022-11-01 ENCOUNTER — HOSPITAL ENCOUNTER (OUTPATIENT)
Dept: RADIATION ONCOLOGY | Facility: HOSPITAL | Age: 67
Setting detail: RADIATION/ONCOLOGY SERIES
End: 2022-11-01

## 2022-11-01 PROCEDURE — 77300 RADIATION THERAPY DOSE PLAN: CPT | Performed by: RADIOLOGY

## 2022-11-01 PROCEDURE — 77301 RADIOTHERAPY DOSE PLAN IMRT: CPT | Performed by: RADIOLOGY

## 2022-11-01 PROCEDURE — 77293 RESPIRATOR MOTION MGMT SIMUL: CPT | Performed by: RADIOLOGY

## 2022-11-01 PROCEDURE — 77338 DESIGN MLC DEVICE FOR IMRT: CPT | Performed by: RADIOLOGY

## 2022-11-03 ENCOUNTER — HOSPITAL ENCOUNTER (OUTPATIENT)
Dept: RADIATION ONCOLOGY | Facility: HOSPITAL | Age: 67
Discharge: HOME | End: 2022-11-03

## 2022-11-03 ENCOUNTER — HOSPITAL ENCOUNTER (OUTPATIENT)
Dept: RADIATION ONCOLOGY | Facility: HOSPITAL | Age: 67
Setting detail: RADIATION/ONCOLOGY SERIES
Discharge: HOME | End: 2022-11-03

## 2022-11-03 VITALS
TEMPERATURE: 98.3 F | BODY MASS INDEX: 34.82 KG/M2 | OXYGEN SATURATION: 99 % | HEART RATE: 70 BPM | SYSTOLIC BLOOD PRESSURE: 127 MMHG | WEIGHT: 285.94 LBS | RESPIRATION RATE: 16 BRPM | DIASTOLIC BLOOD PRESSURE: 82 MMHG

## 2022-11-03 DIAGNOSIS — C78.01 SECONDARY MALIGNANCY OF RIGHT LUNG: Primary | ICD-10-CM

## 2022-11-03 LAB
RAD ONC ARIA COURSE ID: NORMAL
RAD ONC ARIA COURSE INTENT: NORMAL
RAD ONC ARIA COURSE LAST TREATMENT DATE: NORMAL
RAD ONC ARIA COURSE START DATE: NORMAL
RAD ONC ARIA COURSE TREATMENT ELAPSED DAYS: 0
RAD ONC ARIA FIRST TREATMENT DATE: NORMAL
RAD ONC ARIA PLAN FRACTIONS TREATED TO DATE: 1
RAD ONC ARIA PLAN ID: NORMAL
RAD ONC ARIA PLAN PRESCRIBED DOSE PER FRACTION: 10 GY
RAD ONC ARIA PLAN PRIMARY REFERENCE POINT: NORMAL
RAD ONC ARIA PLAN TOTAL FRACTIONS PRESCRIBED: 5
RAD ONC ARIA PLAN TOTAL PRESCRIBED DOSE: 5000 CGY
RAD ONC ARIA REFERENCE POINT DOSAGE GIVEN TO DATE: 10 GY
RAD ONC ARIA REFERENCE POINT ID: NORMAL
RAD ONC ARIA REFERENCE POINT SESSION DOSAGE GIVEN: 10 GY

## 2022-11-03 PROCEDURE — 77014 CHG CT GUIDANCE RADIATION THERAPY FLDS PLACEMENT: CPT | Performed by: RADIOLOGY

## 2022-11-03 PROCEDURE — 77427 RADIATION TX MANAGEMENT X5: CPT | Performed by: RADIOLOGY

## 2022-11-03 PROCEDURE — 77386: CPT | Performed by: RADIOLOGY

## 2022-11-03 PROCEDURE — 77373 STRTCTC BDY RAD THER TX DLVR: CPT | Performed by: RADIOLOGY

## 2022-11-03 RX ORDER — VERAPAMIL HYDROCHLORIDE 180 MG/1
CAPSULE, EXTENDED RELEASE ORAL
COMMUNITY
Start: 2022-10-11 | End: 2023-01-13

## 2022-11-03 NOTE — PROGRESS NOTES
On Treatment Note      Encounter Date: 11/03/2022   Patient Name: Robbi Kennedy  YOB: 1955   Medical Record Number: [id number]     Primary Diagnosis:@   Cancer Staging:  Cancer Staging   Rectal cancer  Staging form: Colon And Rectum, AJCC 8th Edition  - Clinical: Stage IIA (cT3, cN0, cM0) - Signed by Maurilio Campos MD on 6/21/2021  - Pathologic: No stage assigned - Unsigned             Cancer Staging   Stage IIA (cT3, cN0, cM0)  No stage assigned  Treatment Site: RUL  Prescribed dose: 50 Gy/5 fractions  Completed dose: 10 Gy/1 fraction  Date of First Treatment:  11/3/22      Tolerance: doing well    Radiation related toxicities and management plan: none    Issues raised by patient or treatment team:  none      Subjective      Review of Systems:   Constitutional: Positive for fatigue (3/10). Negative for appetite change and unexpected weight change.   HENT: Negative for sore throat, trouble swallowing and voice change.    Respiratory: Negative for cough and shortness of breath.    Cardiovascular: Positive for palpitations (hx of afib). Negative for chest pain and leg swelling.   Gastrointestinal: Negative for constipation, diarrhea, nausea and vomiting.   Genitourinary: Negative for difficulty urinating, dysuria, frequency and urgency.   Musculoskeletal: Positive for arthralgias (Generalized, ongoing) and back pain (ongoing). Negative for gait problem.   Skin: Negative for color change and rash.   Neurological: Positive for dizziness (with positional changes, ongoing) and headaches (Daily, randomly- mostly in the evening before bed, takes tylenol with relief.   Ongoing for years.). Negative for weakness.   Psychiatric/Behavioral: Negative for sleep disturbance.   The following portions of the patient's history were reviewed and updated as appropriate: allergies, current medications, past family history, past medical history, past social history, past surgical  history and problem list.    Measures:   Pain: (on a scale of 0-10)   Pain Score    11/03/22 1006   PainSc: 0-No pain       Advanced Care Plan: N Advance Care Planning   ACP discussion was declined by the patient. Patient does not have an advance directive, declines further assistance.       KPS/Quality of Life: 80 - Restricted Physical Activity  ECOG: (1) Restricted in physically strenuous activity, ambulatory and able to do work of light nature  Depression Screening: PHQ-9 Total Score:     Patient screened positive for depression based on a PHQ-9 score of 0 on 9/14/2022.       Objective     Physical Exam:   Vital Signs:   Vitals:    11/03/22 1006   BP: 127/82   Pulse: 70   Resp: 16   Temp: 98.3 °F (36.8 °C)   SpO2: 99%      Body mass index is 34.82 kg/m².   Wt Readings from Last 3 Encounters:   11/03/22 130 kg (285 lb 15 oz)   10/11/22 130 kg (287 lb 4.2 oz)   09/14/22 130 kg (285 lb 11.5 oz)       General: NAD, sitting comfortably  Eye: EOMI, anicteric sclerae  Respiratory: Symmetric expansion, nonlabored respiration  Neuro: Alert oriented x3, cranial nerves III through XII are grossly intact.  Psych: Mood and affect appropriate      Assessment / Plan      Plan:   I reviewed technical aspects of the radiation therapy treatment administration including dose delivery and daily treatment parameters to verify that these meet the specifications from my clinical treatment planning note. I reviewed the treatment setup notes. I reviewed and approved images obtained for “image guidance” with setup and treatment.     We will continue radiation therapy as prescribed.        Chapis Luna MD  Radiation Oncology  [facility]    This document has been signed by Chapis Luna MD on November 3, 2022 12:27 EDT

## 2022-11-03 NOTE — PROGRESS NOTES
Stereotactic Body Radiotherapy Note    11/03/2022    Treatment Site: [facility]  Energy: 6X  Dose: 10 of 50 Gy  #Fx: 1 of 5  Start Date: 11/3/22  Elapsed Days: 0        Vitals:    11/03/22 1006   BP: 127/82   Pulse: 70   Resp: 16   Temp: 98.3 °F (36.8 °C)   SpO2: 99%     Pain Score    11/03/22 1006   PainSc: 0-No pain       Review of Systems: Review of Systems   Constitutional: Positive for fatigue (3/10). Negative for appetite change and unexpected weight change.   HENT: Negative for sore throat, trouble swallowing and voice change.    Respiratory: Negative for cough and shortness of breath.    Cardiovascular: Positive for palpitations (hx of afib). Negative for chest pain and leg swelling.   Gastrointestinal: Negative for constipation, diarrhea, nausea and vomiting.   Genitourinary: Negative for difficulty urinating, dysuria, frequency and urgency.   Musculoskeletal: Positive for arthralgias (Generalized, ongoing) and back pain (ongoing). Negative for gait problem.   Skin: Negative for color change and rash.   Neurological: Positive for dizziness (with positional changes, ongoing) and headaches (Daily, randomly- mostly in the evening before bed, takes tylenol with relief.   Ongoing for years.). Negative for weakness.   Psychiatric/Behavioral: Negative for sleep disturbance.       EXAM:  Gen: Well appearing, NAD  HENT: NC/AT, MMM  Resp: Nonlabored respiration, symmetric expansion  Abd: NT/ND +BS  MSK: ROM intact in all 4 extremities, no obvious joint deformities  Neuro: Alert, Ox3. CN 3-12 grossly intact.   Psych: Appropriate mood and affect.    Comments:   A stereotactic ablative radiation plan was created to deliver the dose as indicated above.   The patient was positioned on the treatment couch in a Vac-Fix immobilization device. 4D CT wast utilized during simulation to account for respiratory motion during treatment.  Pre-treatment image guidance using cone beam CT imaging was performed to ensure proper setup and  alignment.   I personally evaluated and approved the patient's pre-treatment imaging.   I was present for delivery of the patient's treatment. The prescription dose was delivered as intended.   A Medical Physicist was also in attendance during patient set-up and treatment delivery.    The patient tolerated treatment well and completed without incident.     Chapis Luna MD  11/03/2022

## 2022-11-04 ENCOUNTER — DOCUMENTATION (OUTPATIENT)
Dept: ONCOLOGY | Facility: HOSPITAL | Age: 67
End: 2022-11-04

## 2022-11-04 ENCOUNTER — HOSPITAL ENCOUNTER (OUTPATIENT)
Dept: RADIATION ONCOLOGY | Facility: HOSPITAL | Age: 67
Discharge: HOME | End: 2022-11-04

## 2022-11-04 VITALS
SYSTOLIC BLOOD PRESSURE: 136 MMHG | TEMPERATURE: 97.5 F | DIASTOLIC BLOOD PRESSURE: 86 MMHG | RESPIRATION RATE: 16 BRPM | WEIGHT: 284.39 LBS | BODY MASS INDEX: 34.64 KG/M2 | HEART RATE: 57 BPM | OXYGEN SATURATION: 94 %

## 2022-11-04 DIAGNOSIS — C78.01 SECONDARY MALIGNANT NEOPLASM OF RIGHT LUNG: Primary | ICD-10-CM

## 2022-11-04 LAB

## 2022-11-04 PROCEDURE — 77373 STRTCTC BDY RAD THER TX DLVR: CPT | Performed by: RADIOLOGY

## 2022-11-04 PROCEDURE — 77014 CHG CT GUIDANCE RADIATION THERAPY FLDS PLACEMENT: CPT | Performed by: RADIOLOGY

## 2022-11-04 PROCEDURE — 77386: CPT | Performed by: RADIOLOGY

## 2022-11-04 NOTE — PROGRESS NOTES
Diagnosis: Rectal Cancer    Reason for Referral: Rounding with new patients for radiation    INSURANCE: Medicare/[employer]    Distress Score: 4 indicated for pain and fatigue date of distress score was 9/4/21    PHQ Score: 0 date of score was 9/14/22    Mood: Patient was in a pleasant mood. He was easy to engage in psychosocial needs assessment questions. He was able to effectively communicate his thoughts and feelings. OSW provided active listening, empathy, and normalization. Patient stated occasionally he gets anxious. Patient reported his using his coping skills by working around the yard or house to stay busy. He stated he has been dealing with this for 4 years so he believes he is coping. OSW offered a behavioral health referral but patient declined at this time. Patient is aware of those services if he feels he needs access.    Previous Cancer [place]: None before the past four years.    Hobbies: Patient enjoys working in the yard and around the house. He enjoys building things, and watches tv.    Support systems: Patient reported his wife is his primary support and he has a son and daughter in the area who are supportive. Patient is not affiliated with any [employer] or civic group in his community.    Transportation: Patient drives himself to his treatments but if needed his wife or children are able.    Finances: Patient reported he has no financial concerns for meeting his basic needs or affording his healthcare.    Residential status/Who lives in the home: Patient reported he lives in the home with his wife and two dogs (one large and one small). He reported they do not play around his feet or pose a fall risk.    Home Care Needs: None identified at this time.    Resources/Referrals: OSW provided her business card and discussed the role of the oncology social worker. Patient expressed no concerns for needing a resource to address.

## 2022-11-04 NOTE — PROGRESS NOTES
Stereotactic Body Radiotherapy Note    11/04/2022        Treatment Site: Right upper lobe  Energy: 6X  Dose: 2000 cGy  #Fx: 2  Start Date: 11/3/2022  Elapsed Days: 1    Cancer Staging   Stage IIA (cT3, cN0, cM0)  No stage assigned     Current Outpatient Medications on File Prior to Encounter   Medication Sig   • acetaminophen (TYLENOL) 500 MG tablet Take 500 mg by mouth Every 6 (Six) Hours As Needed.   • apixaban (ELIQUIS) 5 MG tablet tablet Take 5 mg by mouth 2 (Two) Times a Day.   • ascorbic acid (VITAMIN C) 500 MG tablet Take 500 mg by mouth Daily.   • atorvastatin (LIPITOR) 40 MG tablet atorvastatin 40 mg oral tablet take 1 tablet (40 mg) by oral route once daily   Active   • cyanocobalamin (VITAMIN B-12) 1000 MCG tablet Take 1,000 mcg by mouth Daily.   • fluticasone (FLONASE) 50 MCG/ACT nasal spray    • furosemide (LASIX) 20 MG tablet Take 1 tablet by mouth Daily.   • loratadine (CLARITIN) 10 MG tablet loratadine 10 mg oral tablet take 1 tablet (10 mg) by oral route once daily   Active   • metoprolol tartrate (LOPRESSOR) 100 MG tablet Take 1 tablet by mouth 2 (Two) Times a Day.   • multivitamin (THERAGRAN) tablet tablet Take 1 tablet by mouth Daily.   • ondansetron (ZOFRAN) 8 MG tablet Take 1 tablet by mouth Every 8 (Eight) Hours As Needed for Nausea. for nausea   • potassium chloride (K-DUR,KLOR-CON) 20 MEQ CR tablet Take 1 tablet by mouth Daily.   • Synthroid 25 MCG tablet Take 25 mcg by mouth Daily.   • verapamil ER (VERELAN) 180 MG 24 hr capsule    • verapamil SR (CALAN-SR) 180 MG CR tablet Take 1 tablet by mouth Every Night.   • vitamin E 1000 UNIT capsule Take 1,000 Units by mouth Daily.     No current facility-administered medications on file prior to encounter.     Vitals:    11/04/22 1012   BP: 136/86   Pulse: 57   Resp: 16   Temp: 97.5 °F (36.4 °C)   SpO2: 94%     Pain Score    11/04/22 1012   PainSc: 0-No pain       Oncology/Hematology History Overview Note   9/30/2019 High rectal  xkfx-nwzbmzsjni-sxqobjixzbjuhs adenocarcinoma  associated with tubular adenoma.   4/27/21 Biopsy of liver revealed  metastatic adenocarcinoma, consistent with colorectal primary.    Clinical Staging  Stage IIa ([id number])    Chemotherapy      neoadjuvant Xeloda with radiation. adjuvant xeloda        Radiation Therapy      7/8/19 thru 8/14/19 completed neoadjuvant 5040 cGy=28 fxs rectum     10/2021 XRT due to recurrence right middle lung 5 Fx's, 5000cGy  11/26/21 adjuvant FOLFOX Initiated.     Surgeries       9/30/2019 low anterior resection with ostomy        8/31/21 partial hepatectomy, cholecystectomy and MWA for segment 6 medical margin performed at        Rectal cancer   6/2/2021 - 8/15/2021 Chemotherapy    OP COLON mFOLFOX6 + Bevacizumab (OXALIplatin / Leucovorin / Fluorouracil / Bevacizumab)     6/18/2021 Initial Diagnosis    Rectal cancer (CMS/HCC)     6/21/2021 Cancer Staged    Staging form: Colon And Rectum, AJCC 8th Edition  - Clinical: Stage IIA (cT3, cN0, cM0) - Signed by Maurilio Campos MD on 6/21/2021 11/16/2021 -  Chemotherapy    OP COLON mFOLFOX6 OXALIplatin / Leucovorin / Fluorouracil     Secondary malignant neoplasm of left lung   9/14/2021 Initial Diagnosis    Secondary malignant neoplasm of left lung (HCC)     10/20/2021 -  Radiation    RADIATION THERAPY Treatment Details (Noted on 10/5/2021)  Site: Bilateral Lung  Technique: SBRT  Goal: No goal specified  Planned Treatment Start Date: 10/20/2021     11/16/2021 -  Chemotherapy    OP COLON mFOLFOX6 OXALIplatin / Leucovorin / Fluorouracil     6/15/2022 -  Radiation    RADIATION THERAPY Treatment Details (Noted on 6/15/2022)  Site: Left Lung - Lower lobe  Technique: SBRT  Goal: No goal specified  Planned Treatment Start Date: No planned start date specified     10/25/2022 -  Radiation    RADIATION THERAPY Treatment Details (Noted on 10/25/2022)  Site: Left Lung  Technique: SBRT  Goal: No goal specified  Planned Treatment Start Date: No  planned start date specified     Secondary malignancy of liver   9/14/2021 Initial Diagnosis    Secondary malignancy of liver (HCC)     11/16/2021 -  Chemotherapy    OP COLON mFOLFOX6 OXALIplatin / Leucovorin / Fluorouracil     Secondary malignant neoplasm of right lung   10/5/2021 Initial Diagnosis    Secondary malignant neoplasm of right lung (HCC)     10/20/2021 -  Radiation    RADIATION THERAPY Treatment Details (Noted on 10/5/2021)  Site: Bilateral Lung  Technique: SBRT  Goal: No goal specified  Planned Treatment Start Date: 10/20/2021     11/16/2021 -  Chemotherapy    OP COLON mFOLFOX6 OXALIplatin / Leucovorin / Fluorouracil         Review of Systems   Constitutional: Positive for fatigue (3/10). Negative for activity change and appetite change.   HENT: Negative for congestion, sinus pressure, sneezing, sore throat, tinnitus and trouble swallowing.    Eyes: Negative for visual disturbance.   Respiratory: Negative for cough, chest tightness, shortness of breath and wheezing.    Cardiovascular: Positive for palpitations (hx of afib). Negative for chest pain and leg swelling.   Gastrointestinal: Negative for abdominal pain, constipation, diarrhea and nausea.   Genitourinary: Negative.    Musculoskeletal: Positive for arthralgias and back pain (ongoing back pain).   Skin: Negative.    Neurological: Positive for dizziness (with positional changes) and headache (occasional headaches in the evening controlled by tylenol).            Physical Exam    Comments: A stereotactic ablative radiation plan was devised to deliver the dose as indicated above. The patient was positioned on the treatment couch in a Vac-Fix immobilization device.  We then aligned with CBCT image guidance, which I personally checked. Once alignment was verified, we delivered the prescription dose using dynamic respiratory motion compensation under my guidance.    The Medical Physicist was also in attendance during patient set-up and treatment  delivery.    The patient tolerated the procedure without incident.    Christiano Kelley MD  11/04/2022

## 2022-11-07 ENCOUNTER — HOSPITAL ENCOUNTER (OUTPATIENT)
Dept: RADIATION ONCOLOGY | Facility: HOSPITAL | Age: 67
Discharge: HOME | End: 2022-11-07

## 2022-11-07 VITALS
DIASTOLIC BLOOD PRESSURE: 82 MMHG | HEART RATE: 65 BPM | WEIGHT: 283.95 LBS | TEMPERATURE: 98.3 F | RESPIRATION RATE: 16 BRPM | BODY MASS INDEX: 34.58 KG/M2 | SYSTOLIC BLOOD PRESSURE: 122 MMHG | OXYGEN SATURATION: 97 %

## 2022-11-07 DIAGNOSIS — C78.01 SECONDARY MALIGNANT NEOPLASM OF RIGHT LUNG: Primary | ICD-10-CM

## 2022-11-07 LAB
RAD ONC ARIA COURSE ID: NORMAL
RAD ONC ARIA COURSE INTENT: NORMAL
RAD ONC ARIA COURSE LAST TREATMENT DATE: NORMAL
RAD ONC ARIA COURSE START DATE: NORMAL
RAD ONC ARIA COURSE TREATMENT ELAPSED DAYS: 4
RAD ONC ARIA FIRST TREATMENT DATE: NORMAL
RAD ONC ARIA PLAN FRACTIONS TREATED TO DATE: 3
RAD ONC ARIA PLAN ID: NORMAL
RAD ONC ARIA PLAN PRESCRIBED DOSE PER FRACTION: 10 GY
RAD ONC ARIA PLAN PRIMARY REFERENCE POINT: NORMAL
RAD ONC ARIA PLAN TOTAL FRACTIONS PRESCRIBED: 5
RAD ONC ARIA PLAN TOTAL PRESCRIBED DOSE: 5000 CGY
RAD ONC ARIA REFERENCE POINT DOSAGE GIVEN TO DATE: 30 GY
RAD ONC ARIA REFERENCE POINT ID: NORMAL
RAD ONC ARIA REFERENCE POINT SESSION DOSAGE GIVEN: 10 GY

## 2022-11-07 PROCEDURE — 77373 STRTCTC BDY RAD THER TX DLVR: CPT | Performed by: RADIOLOGY

## 2022-11-07 PROCEDURE — 77386: CPT | Performed by: RADIOLOGY

## 2022-11-07 PROCEDURE — 77014 CHG CT GUIDANCE RADIATION THERAPY FLDS PLACEMENT: CPT | Performed by: RADIOLOGY

## 2022-11-07 NOTE — PROGRESS NOTES
Stereotactic Body Radiotherapy Note    11/07/2022        Treatment Site: Right upper lobe  Energy: 6X  Dose: 5000 cGy  #Fx: 5  Start Date: 11/3/2022  Elapsed Days: 9    Cancer Staging   Stage IIA (cT3, cN0, cM0)      Current Outpatient Medications on File Prior to Encounter   Medication Sig   • acetaminophen (TYLENOL) 500 MG tablet Take 500 mg by mouth Every 6 (Six) Hours As Needed.   • apixaban (ELIQUIS) 5 MG tablet tablet Take 5 mg by mouth 2 (Two) Times a Day.   • ascorbic acid (VITAMIN C) 500 MG tablet Take 500 mg by mouth Daily.   • atorvastatin (LIPITOR) 40 MG tablet atorvastatin 40 mg oral tablet take 1 tablet (40 mg) by oral route once daily   Active   • cyanocobalamin (VITAMIN B-12) 1000 MCG tablet Take 1,000 mcg by mouth Daily.   • fluticasone (FLONASE) 50 MCG/ACT nasal spray    • furosemide (LASIX) 20 MG tablet Take 1 tablet by mouth Daily.   • loratadine (CLARITIN) 10 MG tablet loratadine 10 mg oral tablet take 1 tablet (10 mg) by oral route once daily   Active   • metoprolol tartrate (LOPRESSOR) 100 MG tablet Take 1 tablet by mouth 2 (Two) Times a Day.   • multivitamin (THERAGRAN) tablet tablet Take 1 tablet by mouth Daily.   • ondansetron (ZOFRAN) 8 MG tablet Take 1 tablet by mouth Every 8 (Eight) Hours As Needed for Nausea. for nausea   • potassium chloride (K-DUR,KLOR-CON) 20 MEQ CR tablet Take 1 tablet by mouth Daily.   • Synthroid 25 MCG tablet Take 25 mcg by mouth Daily.   • verapamil ER (VERELAN) 180 MG 24 hr capsule    • verapamil SR (CALAN-SR) 180 MG CR tablet Take 1 tablet by mouth Every Night.   • vitamin E 1000 UNIT capsule Take 1,000 Units by mouth Daily.     No current facility-administered medications on file prior to encounter.     Vitals:    11/07/22 1155   BP: 122/82   Pulse: 65   Resp: 16   Temp: 98.3 °F (36.8 °C)   SpO2: 97%     Pain Score    11/07/22 1155   PainSc: 0-No pain       Oncology/Hematology History Overview Note   9/30/2019 High rectal osce-oghuxazvlk-fjeowwxfnwdihi  adenocarcinoma  associated with tubular adenoma.   4/27/21 Biopsy of liver revealed  metastatic adenocarcinoma, consistent with colorectal primary.    Clinical Staging  Stage IIa ([id number])    Chemotherapy      neoadjuvant Xeloda with radiation. adjuvant xeloda        Radiation Therapy      7/8/19 thru 8/14/19 completed neoadjuvant 5040 cGy=28 fxs rectum     10/2021 XRT due to recurrence right middle lung 5 Fx's, 5000cGy  11/26/21 adjuvant FOLFOX Initiated.     Surgeries       9/30/2019 low anterior resection with ostomy        8/31/21 partial hepatectomy, cholecystectomy and MWA for segment 6 medical margin performed at        Rectal cancer   6/2/2021 - 8/15/2021 Chemotherapy    OP COLON mFOLFOX6 + Bevacizumab (OXALIplatin / Leucovorin / Fluorouracil / Bevacizumab)     6/18/2021 Initial Diagnosis    Rectal cancer (CMS/HCC)     6/21/2021 Cancer Staged    Staging form: Colon And Rectum, AJCC 8th Edition  - Clinical: Stage IIA (cT3, cN0, cM0) - Signed by Maurilio Campos MD on 6/21/2021 11/16/2021 -  Chemotherapy    OP COLON mFOLFOX6 OXALIplatin / Leucovorin / Fluorouracil     Secondary malignant neoplasm of left lung   9/14/2021 Initial Diagnosis    Secondary malignant neoplasm of left lung (HCC)     10/20/2021 -  Radiation    RADIATION THERAPY Treatment Details (Noted on 10/5/2021)  Site: Bilateral Lung  Technique: SBRT  Goal: No goal specified  Planned Treatment Start Date: 10/20/2021     11/16/2021 -  Chemotherapy    OP COLON mFOLFOX6 OXALIplatin / Leucovorin / Fluorouracil     6/15/2022 -  Radiation    RADIATION THERAPY Treatment Details (Noted on 6/15/2022)  Site: Left Lung - Lower lobe  Technique: SBRT  Goal: No goal specified  Planned Treatment Start Date: No planned start date specified     10/25/2022 -  Radiation    RADIATION THERAPY Treatment Details (Noted on 10/25/2022)  Site: Left Lung  Technique: SBRT  Goal: No goal specified  Planned Treatment Start Date: No planned start date specified      Secondary malignancy of liver   9/14/2021 Initial Diagnosis    Secondary malignancy of liver (HCC)     11/16/2021 -  Chemotherapy    OP COLON mFOLFOX6 OXALIplatin / Leucovorin / Fluorouracil     Secondary malignant neoplasm of right lung   10/5/2021 Initial Diagnosis    Secondary malignant neoplasm of right lung (HCC)     10/20/2021 -  Radiation    RADIATION THERAPY Treatment Details (Noted on 10/5/2021)  Site: Bilateral Lung  Technique: SBRT  Goal: No goal specified  Planned Treatment Start Date: 10/20/2021     11/16/2021 -  Chemotherapy    OP COLON mFOLFOX6 OXALIplatin / Leucovorin / Fluorouracil         Review of Systems   Constitutional: Positive for fatigue (3/10) and unexpected weight loss (Down 3lbs since last week, per patient no changes in appetiite.). Negative for activity change and appetite change.   HENT: Negative for congestion, sinus pressure, sneezing, sore throat, tinnitus and trouble swallowing.    Eyes: Positive for visual disturbance (Seen a bright light last week, that went from one eye to the other, subsided after 20 minutes. Has happened once before, several years ago.).   Respiratory: Negative for cough, chest tightness, shortness of breath and wheezing.    Cardiovascular: Positive for palpitations (hx of afib) and leg swelling (Ongoing). Negative for chest pain.   Gastrointestinal: Negative for constipation, diarrhea, nausea and vomiting.   Genitourinary: Negative for difficulty urinating, dysuria, frequency, hematuria and urgency.   Musculoskeletal: Positive for arthralgias and back pain (ongoing back pain). Negative for gait problem.   Skin: Negative.    Neurological: Positive for dizziness (with positional changes) and headache (occasional headaches in the evening controlled by tylenol).   Psychiatric/Behavioral: Negative for sleep disturbance.            Physical Exam    Comments: A stereotactic ablative radiation plan was devised to deliver the dose as indicated above. The patient was  positioned on the treatment couch in a Vac-Fix immobilization device.  We then aligned with CBCT image guidance, which I personally checked. Once alignment was verified, we delivered the prescription dose using dynamic respiratory motion compensation under my guidance.    The Medical Physicist was also in attendance during patient set-up and treatment delivery.    The patient tolerated the procedure without incident.    Christiano Kelley MD  11/07/2022

## 2022-11-08 ENCOUNTER — HOSPITAL ENCOUNTER (OUTPATIENT)
Dept: RADIATION ONCOLOGY | Facility: HOSPITAL | Age: 67
Discharge: HOME | End: 2022-11-08

## 2022-11-08 DIAGNOSIS — C78.01 SECONDARY MALIGNANT NEOPLASM OF RIGHT LUNG: Primary | ICD-10-CM

## 2022-11-08 LAB
RAD ONC ARIA COURSE ID: NORMAL
RAD ONC ARIA COURSE INTENT: NORMAL
RAD ONC ARIA COURSE LAST TREATMENT DATE: NORMAL
RAD ONC ARIA COURSE START DATE: NORMAL
RAD ONC ARIA COURSE TREATMENT ELAPSED DAYS: 5
RAD ONC ARIA FIRST TREATMENT DATE: NORMAL
RAD ONC ARIA PLAN FRACTIONS TREATED TO DATE: 4
RAD ONC ARIA PLAN ID: NORMAL
RAD ONC ARIA PLAN PRESCRIBED DOSE PER FRACTION: 10 GY
RAD ONC ARIA PLAN PRIMARY REFERENCE POINT: NORMAL
RAD ONC ARIA PLAN TOTAL FRACTIONS PRESCRIBED: 5
RAD ONC ARIA PLAN TOTAL PRESCRIBED DOSE: 5000 CGY
RAD ONC ARIA REFERENCE POINT DOSAGE GIVEN TO DATE: 40 GY
RAD ONC ARIA REFERENCE POINT ID: NORMAL
RAD ONC ARIA REFERENCE POINT SESSION DOSAGE GIVEN: 10 GY

## 2022-11-08 PROCEDURE — 77014 CHG CT GUIDANCE RADIATION THERAPY FLDS PLACEMENT: CPT | Performed by: RADIOLOGY

## 2022-11-08 PROCEDURE — 77373 STRTCTC BDY RAD THER TX DLVR: CPT | Performed by: RADIOLOGY

## 2022-11-08 PROCEDURE — 77386: CPT | Performed by: RADIOLOGY

## 2022-11-08 NOTE — PROGRESS NOTES
Stereotactic Body Radiotherapy Note    11/08/2022    Treatment Site: [facility]  Energy: 6X  Dose: 40 of 50 Gy  #Fx: 4 of 5  Start Date: 11/3/22  Elapsed Days: 5        Vitals:    11/08/22 1013   BP: (P) 111/78   Pulse: (P) 75   Resp: (P) 16   Temp: (P) 97.9 °F (36.6 °C)   SpO2: (P) 97%     Pain Score    11/08/22 1013   PainSc: (P) 0-No pain       Review of Systems: Review of Systems   Constitutional: Positive for fatigue (3/10). Negative for appetite change and unexpected weight change.   HENT: Negative for sore throat, trouble swallowing and voice change.    Respiratory: Negative for cough and shortness of breath.    Cardiovascular: Positive for palpitations (hx of afib). Negative for chest pain and leg swelling.   Gastrointestinal: Negative for constipation, diarrhea, nausea and vomiting.   Genitourinary: Negative for difficulty urinating, dysuria, frequency and urgency.   Musculoskeletal: Positive for arthralgias (Generalized, ongoing) and back pain (ongoing). Negative for gait problem.   Skin: Negative for color change and rash.   Neurological: Positive for dizziness (with positional changes, ongoing) and headaches (Daily, randomly- mostly in the evening before bed, takes tylenol with relief.   Ongoing for years.). Negative for weakness.   Psychiatric/Behavioral: Negative for sleep disturbance.       EXAM:  Gen: Well appearing, NAD  HENT: NC/AT, MMM  Resp: Nonlabored respiration, symmetric expansion  Abd: NT/ND +BS  MSK: ROM intact in all 4 extremities, no obvious joint deformities  Neuro: Alert, Ox3. CN 3-12 grossly intact.   Psych: Appropriate mood and affect.    Comments:   A stereotactic ablative radiation plan was created to deliver the dose as indicated above.   The patient was positioned on the treatment couch in a Vac-Fix immobilization device. 4D CT wast utilized during simulation to account for respiratory motion during treatment.  Pre-treatment image guidance using cone beam CT imaging was performed to  ensure proper setup and alignment.   I personally evaluated and approved the patient's pre-treatment imaging.   I was present for delivery of the patient's treatment. The prescription dose was delivered as intended.   A Medical Physicist was also in attendance during patient set-up and treatment delivery.    The patient tolerated treatment well and completed without incident.     Chapis Luna MD  11/08/2022

## 2022-11-09 ENCOUNTER — HOSPITAL ENCOUNTER (OUTPATIENT)
Dept: RADIATION ONCOLOGY | Facility: HOSPITAL | Age: 67
Discharge: HOME | End: 2022-11-09

## 2022-11-09 VITALS
OXYGEN SATURATION: 97 % | WEIGHT: 284.39 LBS | SYSTOLIC BLOOD PRESSURE: 135 MMHG | HEART RATE: 88 BPM | DIASTOLIC BLOOD PRESSURE: 83 MMHG | RESPIRATION RATE: 16 BRPM | BODY MASS INDEX: 34.64 KG/M2 | TEMPERATURE: 97.7 F

## 2022-11-09 DIAGNOSIS — C78.01 SECONDARY MALIGNANT NEOPLASM OF RIGHT LUNG: Primary | ICD-10-CM

## 2022-11-09 LAB
RAD ONC ARIA COURSE ID: NORMAL
RAD ONC ARIA COURSE INTENT: NORMAL
RAD ONC ARIA COURSE LAST TREATMENT DATE: NORMAL
RAD ONC ARIA COURSE START DATE: NORMAL
RAD ONC ARIA COURSE TREATMENT ELAPSED DAYS: 6
RAD ONC ARIA FIRST TREATMENT DATE: NORMAL
RAD ONC ARIA PLAN FRACTIONS TREATED TO DATE: 5
RAD ONC ARIA PLAN ID: NORMAL
RAD ONC ARIA PLAN PRESCRIBED DOSE PER FRACTION: 10 GY
RAD ONC ARIA PLAN PRIMARY REFERENCE POINT: NORMAL
RAD ONC ARIA PLAN TOTAL FRACTIONS PRESCRIBED: 5
RAD ONC ARIA PLAN TOTAL PRESCRIBED DOSE: 5000 CGY
RAD ONC ARIA REFERENCE POINT DOSAGE GIVEN TO DATE: 50 GY
RAD ONC ARIA REFERENCE POINT ID: NORMAL
RAD ONC ARIA REFERENCE POINT SESSION DOSAGE GIVEN: 10 GY

## 2022-11-09 PROCEDURE — 77336 RADIATION PHYSICS CONSULT: CPT | Performed by: RADIOLOGY

## 2022-11-09 PROCEDURE — 77386: CPT | Performed by: RADIOLOGY

## 2022-11-09 PROCEDURE — 77014 CHG CT GUIDANCE RADIATION THERAPY FLDS PLACEMENT: CPT | Performed by: RADIOLOGY

## 2022-11-09 NOTE — PROGRESS NOTES
Stereotactic Body Radiotherapy Note    11/09/2022    Treatment Site: [facility]  Energy: 6X  Dose: 50 of 50 Gy  #Fx: 5 of 5  Start Date: 11/3/22  Elapsed Days: 6        Vitals:    11/09/22 1005   BP: 135/83   Pulse: 88   Resp: 16   Temp: 97.7 °F (36.5 °C)   SpO2: 97%     Pain Score    11/09/22 1005   PainSc: 0-No pain       Review of Systems: Review of Systems   Constitutional: Positive for fatigue (3/10). Negative for appetite change and unexpected weight change.   HENT: Negative for sore throat, trouble swallowing and voice change.    Respiratory: Negative for cough and shortness of breath.    Cardiovascular: Positive for palpitations (hx of afib). Negative for chest pain and leg swelling.   Gastrointestinal: Negative for constipation, diarrhea, nausea and vomiting.   Genitourinary: Negative for difficulty urinating, dysuria, frequency and urgency.   Musculoskeletal: Positive for arthralgias (Generalized, ongoing) and back pain (ongoing). Negative for gait problem.   Skin: Negative for color change and rash.   Neurological: Positive for dizziness (with positional changes, ongoing) and headaches (Daily, randomly- mostly in the evening before bed, takes tylenol with relief.   Ongoing for years.). Negative for weakness.   Psychiatric/Behavioral: Negative for sleep disturbance.       EXAM:  Gen: Well appearing, NAD  HENT: NC/AT, MMM  Resp: Nonlabored respiration, symmetric expansion  Abd: NT/ND +BS  MSK: ROM intact in all 4 extremities, no obvious joint deformities  Neuro: Alert, Ox3. CN 3-12 grossly intact.   Psych: Appropriate mood and affect.    Comments:   A stereotactic ablative radiation plan was created to deliver the dose as indicated above.   The patient was positioned on the treatment couch in a Vac-Fix immobilization device. 4D CT wast utilized during simulation to account for respiratory motion during treatment.  Pre-treatment image guidance using cone beam CT imaging was performed to ensure proper setup and  alignment.   I personally evaluated and approved the patient's pre-treatment imaging.   I was present for delivery of the patient's treatment. The prescription dose was delivered as intended.   A Medical Physicist was also in attendance during patient set-up and treatment delivery.    The patient tolerated treatment well and completed without incident.     Chapis Luna MD  11/09/2022

## 2022-11-10 ENCOUNTER — HOSPITAL ENCOUNTER (OUTPATIENT)
Dept: RADIATION ONCOLOGY | Facility: HOSPITAL | Age: 67
Discharge: HOME | End: 2022-11-10

## 2022-11-10 VITALS
SYSTOLIC BLOOD PRESSURE: 122 MMHG | TEMPERATURE: 98 F | HEART RATE: 80 BPM | BODY MASS INDEX: 34.82 KG/M2 | WEIGHT: 285.94 LBS | DIASTOLIC BLOOD PRESSURE: 91 MMHG | OXYGEN SATURATION: 97 % | RESPIRATION RATE: 16 BRPM

## 2022-11-10 DIAGNOSIS — C78.02 SECONDARY MALIGNANT NEOPLASM OF LEFT LUNG: Primary | ICD-10-CM

## 2022-11-10 LAB
RAD ONC ARIA COURSE ID: NORMAL
RAD ONC ARIA COURSE INTENT: NORMAL
RAD ONC ARIA COURSE LAST TREATMENT DATE: NORMAL
RAD ONC ARIA COURSE START DATE: NORMAL
RAD ONC ARIA COURSE TREATMENT ELAPSED DAYS: 7
RAD ONC ARIA FIRST TREATMENT DATE: NORMAL
RAD ONC ARIA PLAN FRACTIONS TREATED TO DATE: 1
RAD ONC ARIA PLAN ID: NORMAL
RAD ONC ARIA PLAN PRESCRIBED DOSE PER FRACTION: 10 GY
RAD ONC ARIA PLAN PRIMARY REFERENCE POINT: NORMAL
RAD ONC ARIA PLAN TOTAL FRACTIONS PRESCRIBED: 5
RAD ONC ARIA PLAN TOTAL PRESCRIBED DOSE: 5000 CGY
RAD ONC ARIA REFERENCE POINT DOSAGE GIVEN TO DATE: 10 GY
RAD ONC ARIA REFERENCE POINT ID: NORMAL
RAD ONC ARIA REFERENCE POINT SESSION DOSAGE GIVEN: 10 GY

## 2022-11-10 PROCEDURE — 77427 RADIATION TX MANAGEMENT X5: CPT | Performed by: RADIOLOGY

## 2022-11-10 PROCEDURE — 77386: CPT | Performed by: RADIOLOGY

## 2022-11-10 PROCEDURE — 77014 CHG CT GUIDANCE RADIATION THERAPY FLDS PLACEMENT: CPT | Performed by: RADIOLOGY

## 2022-11-10 NOTE — PROGRESS NOTES
On Treatment Note      Encounter Date: 11/10/2022   Patient Name: Robbi Kennedy  YOB: 1955   Medical Record Number: [id number]       Treatment Site: [facility]  Prescribed dose: 50 Gy/5 fractions  Completed dose: 10 Gy/1 fraction  Date of First Treatment: 11/9/22    Tolerance: no issues    Radiation related toxicities and management plan: none     Issues raised by patient or treatment team: none      Subjective      Review of Systems: Review of Systems   Constitutional: Positive for fatigue (3/10). Negative for appetite change and unexpected weight change.   HENT: Negative for sore throat, trouble swallowing and voice change.    Respiratory: Negative for cough and shortness of breath.    Cardiovascular: Positive for palpitations (hx of afib). Negative for chest pain and leg swelling.   Gastrointestinal: Negative for constipation, diarrhea, nausea and vomiting.   Genitourinary: Negative for difficulty urinating, dysuria, frequency and urgency.   Musculoskeletal: Positive for arthralgias (Generalized, ongoing) and back pain (ongoing). Negative for gait problem.   Skin: Negative for color change and rash.   Neurological: Positive for dizziness (with positional changes, ongoing) and headaches (Daily, randomly- mostly in the evening before bed, takes tylenol with relief.   Ongoing for years.). Negative for weakness.   Psychiatric/Behavioral: Negative for sleep disturbance  The following portions of the patient's history were reviewed and updated as appropriate: allergies, current medications, past family history, past medical history, past social history, past surgical history and problem list.    Measures:   Pain: (on a scale of 0-10)   Pain Score    11/10/22 1020   PainSc: 0-No pain       Advanced Care Plan: N Advance Care Planning   ACP discussion was declined by the patient. Patient does not have an advance directive, information provided.       KPS/Quality of Life: 80 -  Restricted Physical Activity  ECOG: (1) Restricted in physically strenuous activity, ambulatory and able to do work of light nature  Depression Screening: Patient screened positive for depression based on a PHQ-9 score of 0 on 9/14/2022. .        Objective     Physical Exam:   Vital Signs:   Vitals:    11/10/22 1020   BP: 122/91   Pulse: 80   Resp: 16   Temp: 98 °F (36.7 °C)   SpO2: 97%      Body mass index is 34.82 kg/m².   Wt Readings from Last 3 Encounters:   11/10/22 130 kg (285 lb 15 oz)   11/09/22 129 kg (284 lb 6.3 oz)   11/08/22 (P) 129 kg (285 lb 0.9 oz)       General: NAD, sitting comfortably  Eye: EOMI, anicteric sclerae  Respiratory: Symmetric expansion, nonlabored respiration  Neuro: Alert oriented x3, cranial nerves III through XII are grossly intact.  Psych: Mood and affect appropriate      Assessment / Plan      Plan:   I reviewed technical aspects of the radiation therapy treatment administration including dose delivery and daily treatment parameters to verify that these meet the specifications from my clinical treatment planning note. I reviewed the treatment setup notes. I reviewed and approved images obtained for “image guidance” with setup and treatment.     We will continue radiation therapy as prescribed.        Chapis Luna MD  Radiation Oncology  [facility]    This document has been signed by Chapis Luna MD on November 10, 2022 11:37 EST

## 2022-11-10 NOTE — PROGRESS NOTES
Stereotactic Body Radiotherapy Note    11/10/2022    Treatment Site: [facility]  Energy: 6X  Dose: 10 of 50 Gy  #Fx:  of 5  Start Date: 11/9/22  Elapsed Days: 0        Vitals:    11/10/22 1020   BP: 122/91   Pulse: 80   Resp: 16   Temp: 98 °F (36.7 °C)   SpO2: 97%     Pain Score    11/10/22 1020   PainSc: 0-No pain       Review of Systems: Review of Systems   Constitutional: Positive for fatigue (3/10). Negative for appetite change and unexpected weight change.   HENT: Negative for sore throat, trouble swallowing and voice change.    Respiratory: Negative for cough and shortness of breath.    Cardiovascular: Positive for palpitations (hx of afib). Negative for chest pain and leg swelling.   Gastrointestinal: Negative for constipation, diarrhea, nausea and vomiting.   Genitourinary: Negative for difficulty urinating, dysuria, frequency and urgency.   Musculoskeletal: Positive for arthralgias (Generalized, ongoing) and back pain (ongoing). Negative for gait problem.   Skin: Negative for color change and rash.   Neurological: Positive for dizziness (with positional changes, ongoing) and headaches (Daily, randomly- mostly in the evening before bed, takes tylenol with relief.   Ongoing for years.). Negative for weakness.   Psychiatric/Behavioral: Negative for sleep disturbance.       EXAM:  Gen: Well appearing, NAD  HENT: NC/AT, MMM  Resp: Nonlabored respiration, symmetric expansion  Abd: NT/ND +BS  MSK: ROM intact in all 4 extremities, no obvious joint deformities  Neuro: Alert, Ox3. CN 3-12 grossly intact.   Psych: Appropriate mood and affect.    Comments:   A stereotactic ablative radiation plan was created to deliver the dose as indicated above.   The patient was positioned on the treatment couch in a Vac-Fix immobilization device. 4D CT wast utilized during simulation to account for respiratory motion during treatment.  Pre-treatment image guidance using cone beam CT imaging was performed to ensure proper setup and  alignment.   I personally evaluated and approved the patient's pre-treatment imaging.   I was present for delivery of the patient's treatment. The prescription dose was delivered as intended.   A Medical Physicist was also in attendance during patient set-up and treatment delivery.    The patient tolerated treatment well and completed without incident.     Chapis Luna MD  11/10/2022

## 2022-11-11 ENCOUNTER — HOSPITAL ENCOUNTER (OUTPATIENT)
Dept: RADIATION ONCOLOGY | Facility: HOSPITAL | Age: 67
Discharge: HOME | End: 2022-11-11

## 2022-11-11 VITALS
DIASTOLIC BLOOD PRESSURE: 85 MMHG | WEIGHT: 286.38 LBS | HEART RATE: 78 BPM | RESPIRATION RATE: 18 BRPM | OXYGEN SATURATION: 98 % | TEMPERATURE: 97.6 F | SYSTOLIC BLOOD PRESSURE: 125 MMHG | BODY MASS INDEX: 34.88 KG/M2

## 2022-11-11 DIAGNOSIS — C78.02 SECONDARY MALIGNANT NEOPLASM OF LEFT LUNG: Primary | ICD-10-CM

## 2022-11-11 LAB

## 2022-11-11 PROCEDURE — 77386: CPT | Performed by: RADIOLOGY

## 2022-11-11 PROCEDURE — 77014 CHG CT GUIDANCE RADIATION THERAPY FLDS PLACEMENT: CPT | Performed by: RADIOLOGY

## 2022-11-11 NOTE — PROGRESS NOTES
Stereotactic Body Radiotherapy Note    11/11/2022    [unfilled]    Treatment Site: Left upper lobe lung  Energy: 6X  Dose:2000/5000 cGy  #Fx:  2/5  Start Date:    11/10/22  Elapsed Days: 1    [Associated problem not found]  Cancer Staging   Stage IIA (cT3, cN0, cM0)  No stage assigned     Current Outpatient Medications on File Prior to Encounter   Medication Sig   • acetaminophen (TYLENOL) 500 MG tablet Take 500 mg by mouth Every 6 (Six) Hours As Needed.   • apixaban (ELIQUIS) 5 MG tablet tablet Take 5 mg by mouth 2 (Two) Times a Day.   • ascorbic acid (VITAMIN C) 500 MG tablet Take 500 mg by mouth Daily.   • atorvastatin (LIPITOR) 40 MG tablet atorvastatin 40 mg oral tablet take 1 tablet (40 mg) by oral route once daily   Active   • cyanocobalamin (VITAMIN B-12) 1000 MCG tablet Take 1,000 mcg by mouth Daily.   • fluticasone (FLONASE) 50 MCG/ACT nasal spray    • furosemide (LASIX) 20 MG tablet Take 1 tablet by mouth Daily.   • loratadine (CLARITIN) 10 MG tablet loratadine 10 mg oral tablet take 1 tablet (10 mg) by oral route once daily   Active   • metoprolol tartrate (LOPRESSOR) 100 MG tablet Take 1 tablet by mouth 2 (Two) Times a Day.   • multivitamin (THERAGRAN) tablet tablet Take 1 tablet by mouth Daily.   • ondansetron (ZOFRAN) 8 MG tablet Take 1 tablet by mouth Every 8 (Eight) Hours As Needed for Nausea. for nausea   • potassium chloride (K-DUR,KLOR-CON) 20 MEQ CR tablet Take 1 tablet by mouth Daily.   • Synthroid 25 MCG tablet Take 25 mcg by mouth Daily.   • verapamil ER (VERELAN) 180 MG 24 hr capsule    • verapamil SR (CALAN-SR) 180 MG CR tablet Take 1 tablet by mouth Every Night.   • vitamin E 1000 UNIT capsule Take 1,000 Units by mouth Daily.     No current facility-administered medications on file prior to encounter.     Vitals:    11/11/22 1014   BP: 125/85   Pulse: 78   Resp: 18   Temp: 97.6 °F (36.4 °C)   SpO2: 98%     Pain Score    11/11/22 1014   PainSc: 0-No pain       Oncology/Hematology  History Overview Note   9/30/2019 High rectal qzlx-gxjxvxupfe-lnlffpjabljavq adenocarcinoma  associated with tubular adenoma.   4/27/21 Biopsy of liver revealed  metastatic adenocarcinoma, consistent with colorectal primary.    Clinical Staging  Stage IIa ([id number])    Chemotherapy      neoadjuvant Xeloda with radiation. adjuvant xeloda        Radiation Therapy      7/8/19 thru 8/14/19 completed neoadjuvant 5040 cGy=28 fxs rectum     10/2021 XRT due to recurrence right middle lung 5 Fx's, 5000cGy  11/26/21 adjuvant FOLFOX Initiated.     Surgeries       9/30/2019 low anterior resection with ostomy        8/31/21 partial hepatectomy, cholecystectomy and MWA for segment 6 medical margin performed at        Rectal cancer   6/2/2021 - 8/15/2021 Chemotherapy    OP COLON mFOLFOX6 + Bevacizumab (OXALIplatin / Leucovorin / Fluorouracil / Bevacizumab)     6/18/2021 Initial Diagnosis    Rectal cancer (CMS/HCC)     6/21/2021 Cancer Staged    Staging form: Colon And Rectum, AJCC 8th Edition  - Clinical: Stage IIA (cT3, cN0, cM0) - Signed by Maurilio Campos MD on 6/21/2021 11/16/2021 -  Chemotherapy    OP COLON mFOLFOX6 OXALIplatin / Leucovorin / Fluorouracil     Secondary malignant neoplasm of left lung   9/14/2021 Initial Diagnosis    Secondary malignant neoplasm of left lung (HCC)     10/20/2021 -  Radiation    RADIATION THERAPY Treatment Details (Noted on 10/5/2021)  Site: Bilateral Lung  Technique: SBRT  Goal: No goal specified  Planned Treatment Start Date: 10/20/2021     11/16/2021 -  Chemotherapy    OP COLON mFOLFOX6 OXALIplatin / Leucovorin / Fluorouracil     6/15/2022 -  Radiation    RADIATION THERAPY Treatment Details (Noted on 6/15/2022)  Site: Left Lung - Lower lobe  Technique: SBRT  Goal: No goal specified  Planned Treatment Start Date: No planned start date specified     10/25/2022 -  Radiation    RADIATION THERAPY Treatment Details (Noted on 10/25/2022)  Site: Left Lung  Technique: SBRT  Goal: No goal  specified  Planned Treatment Start Date: No planned start date specified     Secondary malignancy of liver   9/14/2021 Initial Diagnosis    Secondary malignancy of liver (HCC)     11/16/2021 -  Chemotherapy    OP COLON mFOLFOX6 OXALIplatin / Leucovorin / Fluorouracil     Secondary malignant neoplasm of right lung   10/5/2021 Initial Diagnosis    Secondary malignant neoplasm of right lung (HCC)     10/20/2021 -  Radiation    RADIATION THERAPY Treatment Details (Noted on 10/5/2021)  Site: Bilateral Lung  Technique: SBRT  Goal: No goal specified  Planned Treatment Start Date: 10/20/2021     11/16/2021 -  Chemotherapy    OP COLON mFOLFOX6 OXALIplatin / Leucovorin / Fluorouracil         Review of Systems   Constitutional: Negative for appetite change and fatigue.   Respiratory: Negative for cough and shortness of breath.    Gastrointestinal: Negative for constipation, diarrhea and nausea.   Genitourinary: Negative for dysuria, frequency and urgency.   Skin: Negative for rash.   Neurological: Positive for dizziness (with position changes). Negative for headache.   Psychiatric/Behavioral: Negative for sleep disturbance.            Physical Exam  Constitutional:       General: He is not in acute distress.  Cardiovascular:      Rate and Rhythm: Rhythm irregular.   Pulmonary:      Effort: Pulmonary effort is normal.      Breath sounds: No wheezing, rhonchi or rales.   Neurological:      Mental Status: He is alert.         Comments: A stereotactic ablative radiation plan was devised to deliver the dose as indicated above. The patient was positioned on the treatment couch in a Vac-Fix immobilization device.  We then aligned with CBCT image guidance, which I personally checked. Once alignment was verified, we delivered the prescription dose using dynamic respiratory motion compensation under my guidance.        The patient tolerated the procedure without incident.    Kennedi Bowles MD  11/11/2022

## 2022-11-14 ENCOUNTER — HOSPITAL ENCOUNTER (OUTPATIENT)
Dept: RADIATION ONCOLOGY | Facility: HOSPITAL | Age: 67
Discharge: HOME | End: 2022-11-14

## 2022-11-14 VITALS
WEIGHT: 288.14 LBS | RESPIRATION RATE: 16 BRPM | SYSTOLIC BLOOD PRESSURE: 110 MMHG | HEART RATE: 81 BPM | TEMPERATURE: 98.8 F | DIASTOLIC BLOOD PRESSURE: 71 MMHG | BODY MASS INDEX: 35.09 KG/M2 | OXYGEN SATURATION: 95 %

## 2022-11-14 DIAGNOSIS — C78.02 SECONDARY MALIGNANT NEOPLASM OF LEFT LUNG: Primary | ICD-10-CM

## 2022-11-14 LAB
RAD ONC ARIA COURSE ID: NORMAL
RAD ONC ARIA COURSE INTENT: NORMAL
RAD ONC ARIA COURSE LAST TREATMENT DATE: NORMAL
RAD ONC ARIA COURSE START DATE: NORMAL
RAD ONC ARIA COURSE TREATMENT ELAPSED DAYS: 11
RAD ONC ARIA FIRST TREATMENT DATE: NORMAL
RAD ONC ARIA PLAN FRACTIONS TREATED TO DATE: 3
RAD ONC ARIA PLAN ID: NORMAL
RAD ONC ARIA PLAN PRESCRIBED DOSE PER FRACTION: 10 GY
RAD ONC ARIA PLAN PRIMARY REFERENCE POINT: NORMAL
RAD ONC ARIA PLAN TOTAL FRACTIONS PRESCRIBED: 5
RAD ONC ARIA PLAN TOTAL PRESCRIBED DOSE: 5000 CGY
RAD ONC ARIA REFERENCE POINT DOSAGE GIVEN TO DATE: 30 GY
RAD ONC ARIA REFERENCE POINT ID: NORMAL
RAD ONC ARIA REFERENCE POINT SESSION DOSAGE GIVEN: 10 GY

## 2022-11-14 PROCEDURE — 77014 CHG CT GUIDANCE RADIATION THERAPY FLDS PLACEMENT: CPT | Performed by: RADIOLOGY

## 2022-11-14 PROCEDURE — 77386: CPT | Performed by: RADIOLOGY

## 2022-11-14 NOTE — PROGRESS NOTES
Stereotactic Body Radiotherapy Note    11/14/2022    Treatment Site: [facility]  Energy: 6X  Dose: 30 of 50 Gy  #Fx: 3 of 5  Start Date: 11/9/22  Elapsed Days: 4        Vitals:    11/14/22 1016   BP: 110/71   Pulse: 81   Resp: 16   Temp: 98.8 °F (37.1 °C)   SpO2: 95%     Pain Score    11/14/22 1016   PainSc: 0-No pain       Review of Systems: Review of Systems   Constitutional: Positive for fatigue (3/10  ) and unexpected weight change (2lb wt gain since friday). Negative for appetite change.   HENT: Negative for sore throat, trouble swallowing and voice change.    Respiratory: Positive for shortness of breath (With exertion, ongoing). Negative for cough.    Cardiovascular: Positive for palpitations (hx of afib) and leg swelling (Ongoing x1 year). Negative for chest pain.   Gastrointestinal: Negative for constipation, diarrhea, nausea and vomiting.   Genitourinary: Negative for difficulty urinating, dysuria, frequency and urgency.   Musculoskeletal: Positive for arthralgias (Generalized, ongoing) and back pain (ongoing). Negative for gait problem.   Skin: Negative for color change and rash.   Neurological: Positive for dizziness (with positional changes, ongoing) and headaches (Daily, randomly- mostly in the evening before bed, takes tylenol with relief.   Ongoing for years.). Negative for weakness.   Psychiatric/Behavioral: Negative for sleep disturbance.       EXAM:  Gen: Well appearing, NAD  HENT: NC/AT, MMM  Resp: Nonlabored respiration, symmetric expansion  Abd: NT/ND +BS  MSK: ROM intact in all 4 extremities, no obvious joint deformities  Neuro: Alert, Ox3. CN 3-12 grossly intact.   Psych: Appropriate mood and affect.    Comments:   A stereotactic ablative radiation plan was created to deliver the dose as indicated above.   The patient was positioned on the treatment couch in a Vac-Fix immobilization device. 4D CT wast utilized during simulation to account for respiratory motion during treatment.  Pre-treatment image  guidance using cone beam CT imaging was performed to ensure proper setup and alignment.   I personally evaluated and approved the patient's pre-treatment imaging.   I was present for delivery of the patient's treatment. The prescription dose was delivered as intended.   A Medical Physicist was also in attendance during patient set-up and treatment delivery.    The patient tolerated treatment well and completed without incident.     Chapis Luna MD  11/14/2022

## 2022-11-15 ENCOUNTER — HOSPITAL ENCOUNTER (OUTPATIENT)
Dept: RADIATION ONCOLOGY | Facility: HOSPITAL | Age: 67
Discharge: HOME | End: 2022-11-15

## 2022-11-15 VITALS
SYSTOLIC BLOOD PRESSURE: 118 MMHG | WEIGHT: 285.5 LBS | BODY MASS INDEX: 34.77 KG/M2 | OXYGEN SATURATION: 98 % | DIASTOLIC BLOOD PRESSURE: 89 MMHG | TEMPERATURE: 97.8 F | RESPIRATION RATE: 16 BRPM | HEART RATE: 80 BPM

## 2022-11-15 DIAGNOSIS — C78.02 SECONDARY MALIGNANT NEOPLASM OF LEFT LUNG: Primary | ICD-10-CM

## 2022-11-15 LAB
RAD ONC ARIA COURSE ID: NORMAL
RAD ONC ARIA COURSE INTENT: NORMAL
RAD ONC ARIA COURSE LAST TREATMENT DATE: NORMAL
RAD ONC ARIA COURSE START DATE: NORMAL
RAD ONC ARIA COURSE TREATMENT ELAPSED DAYS: 12
RAD ONC ARIA FIRST TREATMENT DATE: NORMAL
RAD ONC ARIA PLAN FRACTIONS TREATED TO DATE: 4
RAD ONC ARIA PLAN ID: NORMAL
RAD ONC ARIA PLAN PRESCRIBED DOSE PER FRACTION: 10 GY
RAD ONC ARIA PLAN PRIMARY REFERENCE POINT: NORMAL
RAD ONC ARIA PLAN TOTAL FRACTIONS PRESCRIBED: 5
RAD ONC ARIA PLAN TOTAL PRESCRIBED DOSE: 5000 CGY
RAD ONC ARIA REFERENCE POINT DOSAGE GIVEN TO DATE: 40 GY
RAD ONC ARIA REFERENCE POINT ID: NORMAL
RAD ONC ARIA REFERENCE POINT SESSION DOSAGE GIVEN: 10 GY

## 2022-11-15 PROCEDURE — 77386: CPT | Performed by: RADIOLOGY

## 2022-11-15 PROCEDURE — 77014 CHG CT GUIDANCE RADIATION THERAPY FLDS PLACEMENT: CPT | Performed by: RADIOLOGY

## 2022-11-15 NOTE — PROGRESS NOTES
Stereotactic Body Radiotherapy Note    11/15/2022      Treatment Site: Left upper lobe  Energy: 6X  Dose: 4000 cGy  #Fx: 4  Start Date: 11/10/2022  Elapsed Days: 5      [Associated problem not found]  Cancer Staging   Stage IIA (cT3, cN0, cM0)  No stage assigned     Current Outpatient Medications on File Prior to Encounter   Medication Sig   • acetaminophen (TYLENOL) 500 MG tablet Take 500 mg by mouth Every 6 (Six) Hours As Needed.   • apixaban (ELIQUIS) 5 MG tablet tablet Take 5 mg by mouth 2 (Two) Times a Day.   • ascorbic acid (VITAMIN C) 500 MG tablet Take 500 mg by mouth Daily.   • atorvastatin (LIPITOR) 40 MG tablet atorvastatin 40 mg oral tablet take 1 tablet (40 mg) by oral route once daily   Active   • cyanocobalamin (VITAMIN B-12) 1000 MCG tablet Take 1,000 mcg by mouth Daily.   • fluticasone (FLONASE) 50 MCG/ACT nasal spray    • furosemide (LASIX) 20 MG tablet Take 1 tablet by mouth Daily.   • loratadine (CLARITIN) 10 MG tablet loratadine 10 mg oral tablet take 1 tablet (10 mg) by oral route once daily   Active   • metoprolol tartrate (LOPRESSOR) 100 MG tablet Take 1 tablet by mouth 2 (Two) Times a Day.   • multivitamin (THERAGRAN) tablet tablet Take 1 tablet by mouth Daily.   • ondansetron (ZOFRAN) 8 MG tablet Take 1 tablet by mouth Every 8 (Eight) Hours As Needed for Nausea. for nausea   • potassium chloride (K-DUR,KLOR-CON) 20 MEQ CR tablet Take 1 tablet by mouth Daily.   • Synthroid 25 MCG tablet Take 25 mcg by mouth Daily.   • verapamil ER (VERELAN) 180 MG 24 hr capsule    • verapamil SR (CALAN-SR) 180 MG CR tablet Take 1 tablet by mouth Every Night.   • vitamin E 1000 UNIT capsule Take 1,000 Units by mouth Daily.     No current facility-administered medications on file prior to encounter.     Vitals:    11/15/22 1001   BP: 118/89   Pulse: 80   Resp: 16   Temp: 97.8 °F (36.6 °C)   SpO2: 98%     Pain Score    11/15/22 1001   PainSc: 0-No pain       Oncology/Hematology History Overview Note   9/30/2019  High rectal biaa-hwabjomyvj-ccztjknlhhvegv adenocarcinoma  associated with tubular adenoma.   4/27/21 Biopsy of liver revealed  metastatic adenocarcinoma, consistent with colorectal primary.    Clinical Staging  Stage IIa ([id number])    Chemotherapy      neoadjuvant Xeloda with radiation. adjuvant xeloda        Radiation Therapy      7/8/19 thru 8/14/19 completed neoadjuvant 5040 cGy=28 fxs rectum     10/2021 XRT due to recurrence right middle lung 5 Fx's, 5000cGy  11/26/21 adjuvant FOLFOX Initiated.     Surgeries       9/30/2019 low anterior resection with ostomy        8/31/21 partial hepatectomy, cholecystectomy and MWA for segment 6 medical margin performed at        Rectal cancer   6/2/2021 - 8/15/2021 Chemotherapy    OP COLON mFOLFOX6 + Bevacizumab (OXALIplatin / Leucovorin / Fluorouracil / Bevacizumab)     6/18/2021 Initial Diagnosis    Rectal cancer (CMS/HCC)     6/21/2021 Cancer Staged    Staging form: Colon And Rectum, AJCC 8th Edition  - Clinical: Stage IIA (cT3, cN0, cM0) - Signed by Maurilio Campos MD on 6/21/2021 11/16/2021 -  Chemotherapy    OP COLON mFOLFOX6 OXALIplatin / Leucovorin / Fluorouracil     Secondary malignant neoplasm of left lung   9/14/2021 Initial Diagnosis    Secondary malignant neoplasm of left lung (HCC)     10/20/2021 -  Radiation    RADIATION THERAPY Treatment Details (Noted on 10/5/2021)  Site: Bilateral Lung  Technique: SBRT  Goal: No goal specified  Planned Treatment Start Date: 10/20/2021     11/16/2021 -  Chemotherapy    OP COLON mFOLFOX6 OXALIplatin / Leucovorin / Fluorouracil     6/15/2022 -  Radiation    RADIATION THERAPY Treatment Details (Noted on 6/15/2022)  Site: Left Lung - Lower lobe  Technique: SBRT  Goal: No goal specified  Planned Treatment Start Date: No planned start date specified     10/25/2022 -  Radiation    RADIATION THERAPY Treatment Details (Noted on 10/25/2022)  Site: Left Lung  Technique: SBRT  Goal: No goal specified  Planned Treatment Start  Date: No planned start date specified     Secondary malignancy of liver   9/14/2021 Initial Diagnosis    Secondary malignancy of liver (HCC)     11/16/2021 -  Chemotherapy    OP COLON mFOLFOX6 OXALIplatin / Leucovorin / Fluorouracil     Secondary malignant neoplasm of right lung   10/5/2021 Initial Diagnosis    Secondary malignant neoplasm of right lung (HCC)     10/20/2021 -  Radiation    RADIATION THERAPY Treatment Details (Noted on 10/5/2021)  Site: Bilateral Lung  Technique: SBRT  Goal: No goal specified  Planned Treatment Start Date: 10/20/2021     11/16/2021 -  Chemotherapy    OP COLON mFOLFOX6 OXALIplatin / Leucovorin / Fluorouracil         Review of Systems   Constitutional: Positive for fatigue (3/10). Negative for appetite change and unexpected weight loss.   HENT: Negative for sore throat and trouble swallowing.    Respiratory: Positive for cough (Cough, dry x3-4 days) and shortness of breath (with exertion, ongoing).    Cardiovascular: Positive for leg swelling (Ongoing). Negative for chest pain and palpitations.   Gastrointestinal: Negative for blood in stool, constipation, diarrhea, nausea and vomiting.   Genitourinary: Negative for difficulty urinating, dysuria, frequency, hematuria and urgency.   Musculoskeletal: Positive for arthralgias (Generalized joint pain, ongoing) and back pain (Ongoing).   Skin: Negative for color change and rash.   Neurological: Positive for dizziness (Occasional with postional changes, ongoing) and headache (Daily headaches, ongoing for years, takes Tylenol with relief.).   Psychiatric/Behavioral: Negative for sleep disturbance.            Physical Exam    Comments: A stereotactic ablative radiation plan was devised to deliver the dose as indicated above. The patient was positioned on the treatment couch in a Vac-Fix immobilization device.  We then aligned with CBCT image guidance, which I personally checked. Once alignment was verified, we delivered the prescription dose  using dynamic respiratory motion compensation under my guidance.    The Medical Physicist was also in attendance during patient set-up and treatment delivery.    The patient tolerated the procedure without incident.    Christiano Kelley MD  11/15/2022

## 2022-11-16 ENCOUNTER — HOSPITAL ENCOUNTER (OUTPATIENT)
Dept: RADIATION ONCOLOGY | Facility: HOSPITAL | Age: 67
Discharge: HOME | End: 2022-11-16

## 2022-11-16 VITALS
OXYGEN SATURATION: 99 % | BODY MASS INDEX: 35.04 KG/M2 | TEMPERATURE: 97.9 F | WEIGHT: 287.7 LBS | SYSTOLIC BLOOD PRESSURE: 134 MMHG | RESPIRATION RATE: 16 BRPM | HEART RATE: 75 BPM | DIASTOLIC BLOOD PRESSURE: 86 MMHG

## 2022-11-16 DIAGNOSIS — C78.02 SECONDARY MALIGNANT NEOPLASM OF LEFT LUNG: Primary | ICD-10-CM

## 2022-11-16 DIAGNOSIS — C78.7 SECONDARY MALIGNANCY OF LIVER: ICD-10-CM

## 2022-11-16 DIAGNOSIS — C78.01 SECONDARY MALIGNANCY OF RIGHT LUNG: ICD-10-CM

## 2022-11-16 DIAGNOSIS — C20 RECTAL CANCER: ICD-10-CM

## 2022-11-16 DIAGNOSIS — C78.01 SECONDARY MALIGNANT NEOPLASM OF RIGHT LUNG: ICD-10-CM

## 2022-11-16 LAB
RAD ONC ARIA COURSE ID: NORMAL
RAD ONC ARIA COURSE INTENT: NORMAL
RAD ONC ARIA COURSE LAST TREATMENT DATE: NORMAL
RAD ONC ARIA COURSE START DATE: NORMAL
RAD ONC ARIA COURSE TREATMENT ELAPSED DAYS: 13
RAD ONC ARIA FIRST TREATMENT DATE: NORMAL
RAD ONC ARIA PLAN FRACTIONS TREATED TO DATE: 5
RAD ONC ARIA PLAN ID: NORMAL
RAD ONC ARIA PLAN PRESCRIBED DOSE PER FRACTION: 10 GY
RAD ONC ARIA PLAN PRIMARY REFERENCE POINT: NORMAL
RAD ONC ARIA PLAN TOTAL FRACTIONS PRESCRIBED: 5
RAD ONC ARIA PLAN TOTAL PRESCRIBED DOSE: 5000 CGY
RAD ONC ARIA REFERENCE POINT DOSAGE GIVEN TO DATE: 50 GY
RAD ONC ARIA REFERENCE POINT ID: NORMAL
RAD ONC ARIA REFERENCE POINT SESSION DOSAGE GIVEN: 10 GY

## 2022-11-16 PROCEDURE — 77014 CHG CT GUIDANCE RADIATION THERAPY FLDS PLACEMENT: CPT | Performed by: RADIOLOGY

## 2022-11-16 PROCEDURE — 77336 RADIATION PHYSICS CONSULT: CPT | Performed by: RADIOLOGY

## 2022-11-16 PROCEDURE — 77386: CPT | Performed by: RADIOLOGY

## 2022-11-16 NOTE — PROGRESS NOTES
Stereotactic Body Radiotherapy Note    11/16/2022    Treatment Site: [facility]  Energy: 6X  Dose: 50 of 50 Gy  #Fx: 5 of 5  Start Date: 11/16/22  Elapsed Days: 6        Vitals:    11/16/22 1005   BP: 134/86   Pulse: 75   Resp: 16   Temp: 97.9 °F (36.6 °C)   SpO2: 99%     Pain Score    11/16/22 1005   PainSc: 0-No pain       Review of Systems: Review of Systems   Constitutional: Positive for fatigue (3/10  ) and unexpected weight change (2lb wt gain since yesterday). Negative for appetite change.   HENT: Negative for sore throat, trouble swallowing and voice change.    Respiratory: Positive for shortness of breath (With exertion, ongoing). Negative for cough.    Cardiovascular: Positive for palpitations (hx of afib) and leg swelling (Ongoing x1 year). Negative for chest pain.   Gastrointestinal: Negative for constipation, diarrhea, nausea and vomiting.   Genitourinary: Negative for difficulty urinating, dysuria, frequency and urgency.   Musculoskeletal: Positive for arthralgias (Generalized, ongoing) and back pain (ongoing). Negative for gait problem.   Skin: Negative for color change and rash.   Neurological: Positive for dizziness (with positional changes, ongoing) and headaches (Daily, randomly- mostly in the evening before bed, takes tylenol with relief.   Ongoing for years.). Negative for weakness.   Psychiatric/Behavioral: Negative for sleep disturbance.       EXAM:  Gen: Well appearing, NAD  HENT: NC/AT, MMM  Resp: Nonlabored respiration, symmetric expansion  Abd: NT/ND +BS  MSK: ROM intact in all 4 extremities, no obvious joint deformities  Neuro: Alert, Ox3. CN 3-12 grossly intact.   Psych: Appropriate mood and affect.    Comments:   A stereotactic ablative radiation plan was created to deliver the dose as indicated above.   The patient was positioned on the treatment couch in a Vac-Fix immobilization device. 4D CT wast utilized during simulation to account for respiratory motion during treatment.  Pre-treatment  image guidance using cone beam CT imaging was performed to ensure proper setup and alignment.   I personally evaluated and approved the patient's pre-treatment imaging.   I was present for delivery of the patient's treatment. The prescription dose was delivered as intended.   A Medical Physicist was also in attendance during patient set-up and treatment delivery.    The patient tolerated treatment well and completed without incident.     Chapis Luna MD  11/16/2022

## 2022-12-01 ENCOUNTER — PATIENT ROUNDING (BHMG ONLY) (OUTPATIENT)
Dept: RADIATION ONCOLOGY | Facility: HOSPITAL | Age: 67
End: 2022-12-01

## 2022-12-01 NOTE — PROGRESS NOTES
"Patient completed radiation treatment for lung cancer on 11/16/22.  Following up with patient regarding any concerns the patient may have at this time and receiving feedback in regards to patient over all care while under treatment.     Patient asked about symptoms and side effects that continue to be bothersome, he denies any side effects.      Patient states that Pain is at 0 on scale of 0/10. Patient states medications have not changed.    Patient was asked if there was anything that could've made their experience better at [facility] while they were under treatment. Patient states: \"No, you all were great.\"    Patient encouraged to call the office if any concerns arise. Patient reminded of Follow up appointment on 12/08/22 at 9am.    "

## 2022-12-08 ENCOUNTER — OFFICE VISIT (OUTPATIENT)
Dept: RADIATION ONCOLOGY | Facility: HOSPITAL | Age: 67
End: 2022-12-08

## 2022-12-08 VITALS
TEMPERATURE: 97.4 F | SYSTOLIC BLOOD PRESSURE: 133 MMHG | OXYGEN SATURATION: 97 % | BODY MASS INDEX: 35.44 KG/M2 | HEART RATE: 87 BPM | RESPIRATION RATE: 16 BRPM | WEIGHT: 291.01 LBS | DIASTOLIC BLOOD PRESSURE: 77 MMHG

## 2022-12-08 DIAGNOSIS — C78.02 SECONDARY MALIGNANT NEOPLASM OF LEFT LUNG: ICD-10-CM

## 2022-12-08 PROCEDURE — G0463 HOSPITAL OUTPT CLINIC VISIT: HCPCS | Performed by: RADIOLOGY

## 2022-12-08 PROCEDURE — 99024 POSTOP FOLLOW-UP VISIT: CPT | Performed by: RADIOLOGY

## 2022-12-08 NOTE — PROGRESS NOTES
Follow Up Office Visit      Encounter Date: 12/08/2022   Patient Name: Robbi Kennedy  YOB: 1955   Medical Record Number: [id number]   Primary Diagnosis: No primary diagnosis found.   Cancer Staging: Cancer Staging   Rectal cancer  Staging form: Colon And Rectum, AJCC 8th Edition  - Clinical: Stage IIA (cT3, cN0, cM0) - Signed by Maurilio Campos MD on 6/21/2021  - Pathologic: No stage assigned - Unsigned                Cancer Staging   Stage IIA (cT3, cN0, cM0)  No stage assigned        Chief Complaint:    Chief Complaint   Patient presents with   • Follow-up   • Lung Cancer       Oncologic History: Robbi Kennedy is a 67 y.o. male here for follow-up regarding his metastatic rectal cancer.    He was originally diagnosed with a [id number] moderately differentiated adenocarcinoma of the rectum in 2019. He received neoadjuvant Xeloda with radiation under the care of Dr. Vasquez here at [place]. This was followed by an LAR with ostomy in 9/20/2019. He was staged as having [id number] disease. He did well for some time. In 2021, he developed metastatic disease. Biopsy of a liver lesion in April 2021 returned as a metastatic adenocarcinoma consistent with a colorectal primary. Metastatic lesions in the chest were noted on CT imaging at that time. He received mFOLFOX with avastin under the care of Dr. Campos. Avastin was held towards the end in anticipation of upcoming abdominal surgery.      Restaging CT demonstrated a treatment response in the liver and the chest.  He had a partial hepatectomy, removal of gallbladder, removal of regional lymph nodes, and MWA of a segment 6 liver metastases at the [facility] on 8/31/2021     He had several lung lesions.. There is a 5 mm lesion in the right middle lobe (shrinking).  There was a 2 mm lesion in the right upper lobe (stable) and two 5 mm lesions in the left lower lobe (stable).     He completed SBRT to the right  middle lobe lesion.  This was delivered to a dose of 50 Gy in 5 fractions.  Completed on 10/29/2021. He had 12 cycles of chemotherapy with Dr. Campos. Two lung lesions (LLL) had grown on surveillance and those were treated with SBRT, completed 7/7/22.    On imaging in Oct 2022, the 3 previously treated lesions all demonstrated a treatment response. However, there was growth in a previously stable RUL lesion and there is interval development of a new left lung lesion.   Dr. Campos and I discussed these findings. We discussed a plan of care involving SBRT to the sites of oligoprogression with the intention of keeping Mr. Kennedy off systemic therapy for as long as possible.     Interval History: S/p SBRT to RAMIRO and RUL lesions, 1 mo f/u. No issues w breathing, rib pain, neuropathic pain, swallowing. Stable fatigue, stable occasional dizziness. No new unexplained symptoms    Prior Radiation:   RAMIRO and RUL lesions - 50 Gy/5 fractions to each lesion, completed 11/16/22   LLL (x2 lesions in single PTV), 50 Gy/5 fractions, 7/7/22  RML SBRT 50 Gy/5 fractions, 10/2021  50.4 Gy/28 fractions to pelvis for rectal cancer, performed by Dr. Vasquez - 2019      Subjective      Review of Systems: Review of Systems   Constitutional: Positive for fatigue (3/10). Negative for appetite change and unexpected weight change.   HENT: Negative for hearing loss, sore throat, tinnitus, trouble swallowing and voice change.    Respiratory: Negative for cough, chest tightness, shortness of breath and wheezing.    Cardiovascular: Negative for chest pain, palpitations and leg swelling.   Gastrointestinal: Negative for blood in stool, constipation, diarrhea, nausea and vomiting.   Genitourinary: Negative for difficulty urinating, dysuria, frequency, hematuria and urgency.   Musculoskeletal: Negative for arthralgias, back pain, gait problem and joint swelling.   Skin: Negative for color change and rash.   Neurological: Positive for dizziness (with  positional changes.). Negative for weakness and headaches.   Psychiatric/Behavioral: Negative for sleep disturbance.       The following portions of the patient's history were reviewed and updated as appropriate: allergies, current medications, past family history, past medical history, past social history, past surgical history and problem list.    Measures:   Pain: (on a scale of 0-10)   Pain Score    12/08/22 0902   PainSc: 0-No pain     Robbi Kennedy reports a pain score of 0.    Advanced Care Plan: N Advance Care Planning   ACP discussion was declined by the patient. Patient does not have an advance directive, declines further assistance.       KPS/Quality of Life: 80 - Restricted Physical Activity  ECOG: (1) Restricted in physically strenuous activity, ambulatory and able to do work of light nature  Depression Screening:   Patient screened positive for depression based on a PHQ-9 score of 0 on 9/14/2022.       Objective     Physical Exam:   Vital Signs:   Vitals:    12/08/22 0902   BP: 133/77   Pulse: 87   Resp: 16   Temp: 97.4 °F (36.3 °C)   TempSrc: Temporal   SpO2: 97%   Weight: 132 kg (291 lb 0.1 oz)   PainSc: 0-No pain     Body mass index is 35.44 kg/m².     Constitutional: No acute distress, sitting comfortably  Eye: EOMI, anicteric sclerae  HENT: NC/AT, MMM   Respiratory: Symmetric expansion, nonlabored respiration  MSK: ROM intact in all four extremities, no obvious deformities  Neuro: Alert, oriented x3, CN3-12 grossly intact.   Psych: Appropriate mood and affect.              Assessment / Plan        Assessment/Plan:     Diagnoses and all orders for this visit:    1. Secondary malignant neoplasm of left lung (HCC)        Robbi Kennedy is a pleasant 67 y.o. male with 67 y.o. male with an original diagnosis of a stage IIa adenocarcinoma of the rectum. This was managed with neoadjuvant chemoradiation in 2019 followed by an LAR with ostomy. In 2021, he unfortunately developed metastatic disease in the  lungs and in the liver. He received systemic therapy under the care of Dr. Campos and had a good partial response to treatment. He underwent a partial hepatectomy with removal of the gallbladder, removal of regional lymph nodes, and MWA of a segment 6 metastases at the [facility] on August 31, 2021 to address his metastatic disease in the liver.     He received SBRT to a right middle lobe lesion in 10/2021 and completed 12 cycles of adjuvant chemotherapy w Dr. Campos.   He had a response to SBRT at the treated site. Several other lung lesions have been monitored over time, with 3 lung lesions lesions receiving SBRT btwn 10/2021 and 7/2022. His most recent surveillance imaging demonstrated growth in a previously stable RUL lesion and identified a new lesion in the RAMIRO. Dr. Campos and I discussed a plan of care involving SBRT to the sites of oligoprogression to prolong time that he is off chemotherapy. He had 50 Gy/5 fractions to each lesion (11/2022) and is here for 1 mo f/u. He is doing well with no acute radiation related toxicity at this time. I will see him back in 2-3 months with repeat imaging of the chest, abdomen, and pelvis. He has upcoming follow up with Dr. Campos.       Follow Up:   No follow-ups on file.        Time:   I spent 30  minutes on this encounter today, 12/27/22. Activities that took place during this time include:   - preparing to see the patient  - obtaining and reviewing separately obtained history  - performing a medically appropriate examination and evaluation  - counseling and educating the patient  - ordering medications/tests/procedures  - documenting clinical information in the health record  - coordinating care for this patient.     Sincerely,        Chapis Luna MD  Radiation Oncology  [facility] [place]    This document has been signed by Chapis Luna MD on December 27, 2022 08:31 EST           NOTICE TO PATIENTS  At [facility], we believe that sharing  information builds trust and better relationships. You are receiving this note because you recently visited [facility]. It is possible you will see health information before a provider has talked with you about it. This kind of information can be easy to misunderstand. To help you fully understand what it means for your health, we urge you to discuss this note with your provider.

## 2022-12-14 ENCOUNTER — APPOINTMENT (OUTPATIENT)
Dept: RADIATION ONCOLOGY | Facility: HOSPITAL | Age: 67
End: 2022-12-14

## 2022-12-20 ENCOUNTER — HOSPITAL ENCOUNTER (OUTPATIENT)
Dept: ONCOLOGY | Facility: HOSPITAL | Age: 67
Setting detail: INFUSION SERIES
Discharge: HOME | End: 2022-12-20

## 2022-12-20 DIAGNOSIS — Z45.2 ENCOUNTER FOR ADJUSTMENT OR MANAGEMENT OF VASCULAR ACCESS DEVICE: Primary | ICD-10-CM

## 2022-12-20 DIAGNOSIS — C20 RECTAL CANCER: ICD-10-CM

## 2022-12-20 LAB
ALBUMIN SERPL-MCNC: 3.89 G/DL (ref 3.5–5.2)
ALBUMIN/GLOB SERPL: 1.8 G/DL
ALP SERPL-CCNC: 82 U/L (ref 39–117)
ALT SERPL W P-5'-P-CCNC: 18 U/L (ref 1–41)
ANION GAP SERPL CALCULATED.3IONS-SCNC: 7.7 MMOL/L (ref 5–15)
AST SERPL-CCNC: 17 U/L (ref 1–40)
BASOPHILS # BLD AUTO: 0.02 10*3/MM3 (ref 0–0.2)
BASOPHILS NFR BLD AUTO: 0.4 % (ref 0–1.5)
BILIRUB SERPL-MCNC: 0.7 MG/DL (ref 0–1.2)
BUN SERPL-MCNC: 10 MG/DL (ref 8–23)
BUN/CREAT SERPL: 11.2 (ref 7–25)
CALCIUM SPEC-SCNC: 9.1 MG/DL (ref 8.6–10.5)
CEA SERPL-MCNC: 5.45 NG/ML
CHLORIDE SERPL-SCNC: 105 MMOL/L (ref 98–107)
CO2 SERPL-SCNC: 27.3 MMOL/L (ref 22–29)
CREAT SERPL-MCNC: 0.89 MG/DL (ref 0.76–1.27)
DEPRECATED RDW RBC AUTO: 42.2 FL (ref 37–54)
EGFRCR SERPLBLD CKD-EPI 2021: 94.5 ML/MIN/1.73
EOSINOPHIL # BLD AUTO: 0.4 10*3/MM3 (ref 0–0.4)
EOSINOPHIL NFR BLD AUTO: 7 % (ref 0.3–6.2)
ERYTHROCYTE [DISTWIDTH] IN BLOOD BY AUTOMATED COUNT: 12.3 % (ref 12.3–15.4)
GLOBULIN UR ELPH-MCNC: 2.2 GM/DL
GLUCOSE SERPL-MCNC: 116 MG/DL (ref 65–99)
HCT VFR BLD AUTO: 41.2 % (ref 37.5–51)
HGB BLD-MCNC: 14.1 G/DL (ref 13–17.7)
IMM GRANULOCYTES # BLD AUTO: 0.01 10*3/MM3 (ref 0–0.05)
IMM GRANULOCYTES NFR BLD AUTO: 0.2 % (ref 0–0.5)
LYMPHOCYTES # BLD AUTO: 1.05 10*3/MM3 (ref 0.7–3.1)
LYMPHOCYTES NFR BLD AUTO: 18.5 % (ref 19.6–45.3)
MCH RBC QN AUTO: 32 PG (ref 26.6–33)
MCHC RBC AUTO-ENTMCNC: 34.2 G/DL (ref 31.5–35.7)
MCV RBC AUTO: 93.4 FL (ref 79–97)
MONOCYTES # BLD AUTO: 0.37 10*3/MM3 (ref 0.1–0.9)
MONOCYTES NFR BLD AUTO: 6.5 % (ref 5–12)
NEUTROPHILS NFR BLD AUTO: 3.84 10*3/MM3 (ref 1.7–7)
NEUTROPHILS NFR BLD AUTO: 67.4 % (ref 42.7–76)
PLATELET # BLD AUTO: 212 10*3/MM3 (ref 140–450)
PMV BLD AUTO: 8.7 FL (ref 6–12)
POTASSIUM SERPL-SCNC: 3.8 MMOL/L (ref 3.5–5.2)
PROT SERPL-MCNC: 6.1 G/DL (ref 6–8.5)
RBC # BLD AUTO: 4.41 10*6/MM3 (ref 4.14–5.8)
SODIUM SERPL-SCNC: 140 MMOL/L (ref 136–145)
WBC NRBC COR # BLD: 5.69 10*3/MM3 (ref 3.4–10.8)

## 2022-12-20 PROCEDURE — 82378 CARCINOEMBRYONIC ANTIGEN: CPT | Performed by: NURSE PRACTITIONER

## 2022-12-20 PROCEDURE — 80053 COMPREHEN METABOLIC PANEL: CPT | Performed by: NURSE PRACTITIONER

## 2022-12-20 PROCEDURE — 25010000002 HEPARIN LOCK FLUSH PER 10 UNITS: Performed by: INTERNAL MEDICINE

## 2022-12-20 PROCEDURE — 36591 DRAW BLOOD OFF VENOUS DEVICE: CPT

## 2022-12-20 PROCEDURE — 85025 COMPLETE CBC W/AUTO DIFF WBC: CPT | Performed by: NURSE PRACTITIONER

## 2022-12-20 RX ORDER — SODIUM CHLORIDE 0.9 % (FLUSH) 0.9 %
20 SYRINGE (ML) INJECTION AS NEEDED
Status: DISCONTINUED | OUTPATIENT
Start: 2022-12-20 | End: 2022-12-21 | Stop reason: HOSPADM

## 2022-12-20 RX ORDER — METOPROLOL TARTRATE 100 MG/1
100 TABLET ORAL 2 TIMES DAILY
Qty: 180 TABLET | Refills: 0 | Status: SHIPPED | OUTPATIENT
Start: 2022-12-20 | End: 2023-03-09 | Stop reason: SDUPTHER

## 2022-12-20 RX ORDER — HEPARIN SODIUM (PORCINE) LOCK FLUSH IV SOLN 100 UNIT/ML 100 UNIT/ML
500 SOLUTION INTRAVENOUS AS NEEDED
Status: CANCELLED | OUTPATIENT
Start: 2022-12-20

## 2022-12-20 RX ORDER — HEPARIN SODIUM (PORCINE) LOCK FLUSH IV SOLN 100 UNIT/ML 100 UNIT/ML
500 SOLUTION INTRAVENOUS AS NEEDED
Status: DISCONTINUED | OUTPATIENT
Start: 2022-12-20 | End: 2022-12-21 | Stop reason: HOSPADM

## 2022-12-20 RX ORDER — SODIUM CHLORIDE 0.9 % (FLUSH) 0.9 %
20 SYRINGE (ML) INJECTION AS NEEDED
Status: CANCELLED | OUTPATIENT
Start: 2022-12-20

## 2022-12-20 RX ADMIN — HEPARIN SODIUM (PORCINE) LOCK FLUSH IV SOLN 100 UNIT/ML 500 UNITS: 100 SOLUTION at 09:51

## 2022-12-20 RX ADMIN — Medication 20 ML: at 09:51

## 2022-12-21 ENCOUNTER — OFFICE VISIT (OUTPATIENT)
Dept: ONCOLOGY | Facility: HOSPITAL | Age: 67
End: 2022-12-21

## 2022-12-21 VITALS
RESPIRATION RATE: 16 BRPM | OXYGEN SATURATION: 97 % | TEMPERATURE: 97.3 F | HEART RATE: 89 BPM | SYSTOLIC BLOOD PRESSURE: 135 MMHG | BODY MASS INDEX: 35.31 KG/M2 | WEIGHT: 289.9 LBS | DIASTOLIC BLOOD PRESSURE: 80 MMHG

## 2022-12-21 DIAGNOSIS — C20 RECTAL CANCER: Primary | ICD-10-CM

## 2022-12-21 PROCEDURE — G0463 HOSPITAL OUTPT CLINIC VISIT: HCPCS | Performed by: INTERNAL MEDICINE

## 2022-12-21 PROCEDURE — 99214 OFFICE O/P EST MOD 30 MIN: CPT | Performed by: INTERNAL MEDICINE

## 2022-12-21 NOTE — ASSESSMENT & PLAN NOTE
Patient is status post treatments as outlined.  Most recently found to have progression/recurrence in the lungs and underwent stereotactic radiation to the isolated pulmonary metastatic foci.  I see no evidence of disease recurrence by his history, physical examination or recent lab work.  He already has restaging imaging scheduled for February and I will plan to see him back afterward with lab work prior to monitor for toxicities.  Port flush routinely while not in active use.

## 2022-12-21 NOTE — PROGRESS NOTES
Chief Complaint  Rectal Cancer    Eri Moran, Eri Engel, REGINALDO    Subjective          Robbi Kennedy presents to [facility] for follow-up.  He recently completed stereotactic radiation to small lung metastatic foci.  He states that it went well.  He denies any shortness of breath, cough or hemoptysis.  He denies new masses or adenopathy, no unusual aches or pains.  He denies issues from his Port-A-Cath.  He states that he has good appetite and his weight is maintained.  His energy level is low but still adequate for his ADLs.  He reports the bowels are working normally without blood per rectum, pain or melena.  He continues to have neuropathy in his fingers and toes but feels like it slowly improving.    Oncology/Hematology History Overview Note   9/30/2019 High rectal dhis-jdehwxnfpv-uxarouziyarxij adenocarcinoma  associated with tubular adenoma.   4/27/21 Biopsy of liver revealed  metastatic adenocarcinoma, consistent with colorectal primary.    Clinical Staging  Stage IIa ([id number])    Chemotherapy      neoadjuvant Xeloda with radiation. adjuvant xeloda        Radiation Therapy      7/8/19 thru 8/14/19 completed neoadjuvant 5040 cGy=28 fxs rectum     10/2021 XRT due to recurrence right middle lung 5 Fx's, 5000cGy  11/26/21 adjuvant FOLFOX Initiated.     Surgeries       9/30/2019 low anterior resection with ostomy        8/31/21 partial hepatectomy, cholecystectomy and MWA for segment 6 medical margin performed at        Rectal cancer   6/2/2021 - 8/15/2021 Chemotherapy    OP COLON mFOLFOX6 + Bevacizumab (OXALIplatin / Leucovorin / Fluorouracil / Bevacizumab)     6/18/2021 Initial Diagnosis    Rectal cancer (CMS/HCC)     6/21/2021 Cancer Staged    Staging form: Colon And Rectum, AJCC 8th Edition  - Clinical: Stage IIA (cT3, cN0, cM0) - Signed by Maurilio Campos MD on 6/21/2021 11/16/2021 - 2/25/2022 Chemotherapy    OP COLON mFOLFOX6 OXALIplatin /  Leucovorin / Fluorouracil     Secondary malignant neoplasm of left lung   9/14/2021 Initial Diagnosis    Secondary malignant neoplasm of left lung (HCC)     10/20/2021 -  Radiation    RADIATION THERAPY Treatment Details (Noted on 10/5/2021)  Site: Bilateral Lung  Technique: SBRT  Goal: No goal specified  Planned Treatment Start Date: 10/20/2021     11/16/2021 - 2/25/2022 Chemotherapy    OP COLON mFOLFOX6 OXALIplatin / Leucovorin / Fluorouracil     6/15/2022 -  Radiation    RADIATION THERAPY Treatment Details (Noted on 6/15/2022)  Site: Left Lung - Lower lobe  Technique: SBRT  Goal: No goal specified  Planned Treatment Start Date: No planned start date specified     10/25/2022 -  Radiation    RADIATION THERAPY Treatment Details (Noted on 10/25/2022)  Site: Left Lung  Technique: SBRT  Goal: No goal specified  Planned Treatment Start Date: No planned start date specified     Secondary malignancy of liver   9/14/2021 Initial Diagnosis    Secondary malignancy of liver (HCC)     11/16/2021 - 2/25/2022 Chemotherapy    OP COLON mFOLFOX6 OXALIplatin / Leucovorin / Fluorouracil     Secondary malignant neoplasm of right lung   10/5/2021 Initial Diagnosis    Secondary malignant neoplasm of right lung (HCC)     10/20/2021 -  Radiation    RADIATION THERAPY Treatment Details (Noted on 10/5/2021)  Site: Bilateral Lung  Technique: SBRT  Goal: No goal specified  Planned Treatment Start Date: 10/20/2021     11/16/2021 - 2/25/2022 Chemotherapy    OP COLON [id number] OXALIplatin / Leucovorin / Fluorouracil         Review of Systems   Constitutional: Negative for appetite change, diaphoresis, fatigue, fever, unexpected weight gain and unexpected weight loss.   HENT: Negative for hearing loss, mouth sores, sore throat, swollen glands, trouble swallowing and voice change.    Eyes: Negative for blurred vision.   Respiratory: Negative for cough, shortness of breath and wheezing.    Cardiovascular: Negative for chest pain and palpitations.    Gastrointestinal: Negative for abdominal pain, blood in stool, constipation, diarrhea, nausea and vomiting.   Endocrine: Negative for cold intolerance and heat intolerance.   Genitourinary: Negative for difficulty urinating, dysuria, frequency, hematuria and urinary incontinence.   Musculoskeletal: Negative for arthralgias, back pain and myalgias.   Skin: Negative for rash, skin lesions and wound.   Neurological: Negative for dizziness, seizures, weakness, numbness and headache.   Hematological: Does not bruise/bleed easily.   Psychiatric/Behavioral: Negative for depressed mood. The patient is not nervous/anxious.      Current Outpatient Medications on File Prior to Visit   Medication Sig Dispense Refill   • acetaminophen (TYLENOL) 500 MG tablet Take 500 mg by mouth Every 6 (Six) Hours As Needed.     • apixaban (ELIQUIS) 5 MG tablet tablet Take 5 mg by mouth 2 (Two) Times a Day.     • ascorbic acid (VITAMIN C) 500 MG tablet Take 500 mg by mouth Daily.     • atorvastatin (LIPITOR) 40 MG tablet atorvastatin 40 mg oral tablet take 1 tablet (40 mg) by oral route once daily   Active     • cyanocobalamin (VITAMIN B-12) 1000 MCG tablet Take 1,000 mcg by mouth Daily.     • fluticasone (FLONASE) 50 MCG/ACT nasal spray      • furosemide (LASIX) 20 MG tablet Take 1 tablet by mouth Daily. 90 tablet 3   • loratadine (CLARITIN) 10 MG tablet loratadine 10 mg oral tablet take 1 tablet (10 mg) by oral route once daily   Active     • metoprolol tartrate (LOPRESSOR) 100 MG tablet Take 1 tablet by mouth 2 (Two) Times a Day. 180 tablet 0   • multivitamin (THERAGRAN) tablet tablet Take 1 tablet by mouth Daily.     • ondansetron (ZOFRAN) 8 MG tablet Take 1 tablet by mouth Every 8 (Eight) Hours As Needed for Nausea. for nausea 30 tablet 3   • potassium chloride (K-DUR,KLOR-CON) 20 MEQ CR tablet Take 1 tablet by mouth Daily. 90 tablet 1   • Synthroid 25 MCG tablet Take 25 mcg by mouth Daily.     • verapamil ER (VERELAN) 180 MG 24 hr  capsule      • verapamil SR (CALAN-SR) 180 MG CR tablet Take 1 tablet by mouth Every Night. 90 tablet 3   • vitamin E 1000 UNIT capsule Take 1,000 Units by mouth Daily.       Current Facility-Administered Medications on File Prior to Visit   Medication Dose Route Frequency Provider Last Rate Last Admin   • [DISCONTINUED] heparin injection 500 Units  500 Units Intravenous PRN Maurilio Campos MD   500 Units at 12/20/22 0951   • [DISCONTINUED] sodium chloride 0.9 % flush 20 mL  20 mL Intravenous PRN Maurilio Campos MD   20 mL at 12/20/22 0951       No Known Allergies  Past Medical History:   Diagnosis Date   • Asthma    • Atrial fibrillation, persistent 2/26/2021   • Colorectal cancer    • Essential hypertension    • GI cancer    • Hepatitis    • History of DVT of lower extremity 8/27/2021   • Hyperlipidemia LDL goal <100 12/31/2020   • Rectal cancer 6/18/2021   • Secondary malignancy of liver 9/14/2021   • Secondary malignant neoplasm of left lung 9/14/2021   • Secondary malignant neoplasm of right lung 10/5/2021   • Thrombocytopenia (HCC) 1/11/2022   • Thyroid disease      Past Surgical History:   Procedure Laterality Date   • COLON SURGERY     • HERNIA REPAIR     • LIVER BIOPSY      4/27/21 Biopsy of liver revealed metastatic adenocarcinoma, consistent with colorectal primary   • OTHER SURGICAL HISTORY      REMOVED CANCER FROM COLON     Social History     Socioeconomic History   • Marital status:    • Number of children: 2   Tobacco Use   • Smoking status: Never   • Smokeless tobacco: Never   Vaping Use   • Vaping Use: Never used   Substance and Sexual Activity   • Alcohol use: Yes     Comment: occasionally, no recent use   • Drug use: Never   • Sexual activity: Defer     Family History   Problem Relation Age of Onset   • Prostate cancer Father    • Heart murmur Mother    • Diabetes Mother    • Colon cancer Maternal Uncle        Objective   Physical Exam  Vitals reviewed. Exam conducted with a chaperone  present.   Constitutional:       General: He is not in acute distress.     Appearance: Normal appearance.   Cardiovascular:      Rate and Rhythm: Normal rate and regular rhythm.      Heart sounds: Normal heart sounds. No murmur heard.    No gallop.   Pulmonary:      Effort: Pulmonary effort is normal.      Breath sounds: Normal breath sounds.      Comments: Port-A-Cath  Abdominal:      General: Abdomen is flat. Bowel sounds are normal. There is no distension.      Palpations: Abdomen is soft.      Tenderness: There is no abdominal tenderness.   Musculoskeletal:      Cervical back: Neck supple.      Right lower leg: No edema.      Left lower leg: No edema.   Lymphadenopathy:      Cervical: No cervical adenopathy.   Neurological:      Mental Status: He is alert and oriented to person, place, and time.   Psychiatric:         Mood and Affect: Mood normal.         Behavior: Behavior normal.         Vitals:    12/21/22 1451   BP: 135/80   Pulse: 89   Resp: 16   Temp: 97.3 °F (36.3 °C)   TempSrc: Temporal   SpO2: 97%   Weight: 132 kg (289 lb 14.5 oz)   PainSc: 0-No pain     ECOG score: 0         PHQ-9 Total Score:                    Result Review :   The following data was reviewed by: Maurilio Campos MD on 12/21/2022:  Lab Results   Component Value Date    HGB 14.1 12/20/2022    HCT 41.2 12/20/2022    MCV 93.4 12/20/2022     12/20/2022    WBC 5.69 12/20/2022    NEUTROABS 3.84 12/20/2022    LYMPHSABS 1.05 12/20/2022    MONOSABS 0.37 12/20/2022    EOSABS 0.40 12/20/2022    BASOSABS 0.02 12/20/2022     Lab Results   Component Value Date    GLUCOSE 116 (H) 12/20/2022    BUN 10 12/20/2022    CREATININE 0.89 12/20/2022     12/20/2022    K 3.8 12/20/2022     12/20/2022    CO2 27.3 12/20/2022    CALCIUM 9.1 12/20/2022    PROTEINTOT 6.1 12/20/2022    ALBUMIN 3.89 12/20/2022    BILITOT 0.7 12/20/2022    ALKPHOS 82 12/20/2022    AST 17 12/20/2022    ALT 18 12/20/2022     Lab Results   Component Value Date    MG  1.8 (L) 09/07/2021    FREET4 0.84 06/29/2022    TSH 3.250 02/24/2021     CEA 5.45    Data reviewed: Radiation oncology records reviewed.      Assessment and Plan    Diagnoses and all orders for this visit:    1. Rectal cancer (Primary)  Assessment & Plan:  Patient is status post treatments as outlined.  Most recently found to have progression/recurrence in the lungs and underwent stereotactic radiation to the isolated pulmonary metastatic foci.  I see no evidence of disease recurrence by his history, physical examination or recent lab work.  He already has restaging imaging scheduled for February and I will plan to see him back afterward with lab work prior to monitor for toxicities.  Port flush routinely while not in active use.    Orders:  -     CBC Auto Differential; Future  -     Comprehensive Metabolic Panel; Future  -     CEA; Future          Patient Follow Up: 2 months    Patient was given instructions and counseling regarding his condition or for health maintenance advice. Please see specific information pulled into the AVS if appropriate.     Maurilio Campos MD    12/21/2022

## 2023-01-13 ENCOUNTER — OFFICE VISIT (OUTPATIENT)
Dept: CARDIOLOGY | Facility: CLINIC | Age: 68
End: 2023-01-13
Payer: MEDICARE

## 2023-01-13 VITALS
BODY MASS INDEX: 35.39 KG/M2 | WEIGHT: 290.6 LBS | HEIGHT: 76 IN | HEART RATE: 92 BPM | SYSTOLIC BLOOD PRESSURE: 125 MMHG | DIASTOLIC BLOOD PRESSURE: 78 MMHG

## 2023-01-13 DIAGNOSIS — E78.5 HYPERLIPIDEMIA LDL GOAL <100: ICD-10-CM

## 2023-01-13 DIAGNOSIS — I10 ESSENTIAL HYPERTENSION: ICD-10-CM

## 2023-01-13 DIAGNOSIS — I48.19 ATRIAL FIBRILLATION, PERSISTENT: Primary | Chronic | ICD-10-CM

## 2023-01-13 DIAGNOSIS — I50.32 CHRONIC HEART FAILURE WITH PRESERVED EJECTION FRACTION: ICD-10-CM

## 2023-01-13 PROBLEM — R06.09 DOE (DYSPNEA ON EXERTION): Status: RESOLVED | Noted: 2022-07-13 | Resolved: 2023-01-13

## 2023-01-13 PROBLEM — R11.0 NAUSEA: Status: RESOLVED | Noted: 2022-01-25 | Resolved: 2023-01-13

## 2023-01-13 PROCEDURE — 99214 OFFICE O/P EST MOD 30 MIN: CPT | Performed by: NURSE PRACTITIONER

## 2023-01-13 PROCEDURE — 93000 ELECTROCARDIOGRAM COMPLETE: CPT | Performed by: NURSE PRACTITIONER

## 2023-01-13 RX ORDER — PREDNISONE 20 MG/1
TABLET ORAL
COMMUNITY
Start: 2023-01-11 | End: 2023-01-13

## 2023-01-13 NOTE — PROGRESS NOTES
Chief Complaint  Atrial Fibrillation, Follow-up, Hypertension, Hyperlipidemia, and Congestive Heart Failure    Subjective            History of Present Illness  Robbi Kennedy is a 67-year-old white/ male patient who presents to the office today for follow-up.  He has persistent atrial fibrillation, chronic HFpEF, hypertension, and hyperlipidemia.  He denies any chest pain or palpitations.  He admits to some lightheadedness when he stands up too quickly.  He also has noticed some occasional shortness of breath with physical exertion and bilateral lower extremity edema which are both improve with taking Lasix 20 mg daily.  He reports compliance with all his medications.    PMH  Past Medical History:   Diagnosis Date   • Asthma    • Atrial fibrillation, persistent 2/26/2021   • Chronic heart failure with preserved ejection fraction 7/18/2022    BNP 1590 on 7/22 Echocardiogram with EF 55% moderate to severe biatrial enlargement and mild right ventricular enlargement 7/22    • Colorectal cancer    • Essential hypertension    • GI cancer    • Hepatitis    • History of DVT of lower extremity 8/27/2021   • Hyperlipidemia LDL goal <100 12/31/2020   • Rectal cancer 6/18/2021   • Secondary malignancy of liver 9/14/2021   • Secondary malignant neoplasm of left lung 9/14/2021   • Secondary malignant neoplasm of right lung 10/5/2021   • Thrombocytopenia (HCC) 1/11/2022   • Thyroid disease          ALLERGY  No Known Allergies       SURGICALHX  Past Surgical History:   Procedure Laterality Date   • COLON SURGERY     • HERNIA REPAIR     • LIVER BIOPSY      4/27/21 Biopsy of liver revealed metastatic adenocarcinoma, consistent with colorectal primary   • OTHER SURGICAL HISTORY      REMOVED CANCER FROM COLON          SOC  Social History     Socioeconomic History   • Marital status:    • Number of children: 2   Tobacco Use   • Smoking status: Never   • Smokeless tobacco: Never   Vaping Use   • Vaping Use: Never used    Substance and Sexual Activity   • Alcohol use: Yes     Comment: occasionally, no recent use   • Drug use: Never   • Sexual activity: Defer         FAMHX  Family History   Problem Relation Age of Onset   • Prostate cancer Father    • Heart murmur Mother    • Diabetes Mother    • Colon cancer Maternal Uncle           MEDSIGONLY  Current Outpatient Medications on File Prior to Visit   Medication Sig   • acetaminophen (TYLENOL) 500 MG tablet Take 500 mg by mouth Every 6 (Six) Hours As Needed.   • apixaban (ELIQUIS) 5 MG tablet tablet Take 5 mg by mouth 2 (Two) Times a Day.   • ascorbic acid (VITAMIN C) 500 MG tablet Take 500 mg by mouth Daily.   • atorvastatin (LIPITOR) 40 MG tablet atorvastatin 40 mg oral tablet take 1 tablet (40 mg) by oral route once daily   Active   • cyanocobalamin (VITAMIN B-12) 1000 MCG tablet Take 1,000 mcg by mouth Daily.   • diclofenac (VOLTAREN) 50 MG EC tablet    • fluticasone (FLONASE) 50 MCG/ACT nasal spray    • furosemide (LASIX) 20 MG tablet Take 1 tablet by mouth Daily.   • loratadine (CLARITIN) 10 MG tablet loratadine 10 mg oral tablet take 1 tablet (10 mg) by oral route once daily   Active   • metoprolol tartrate (LOPRESSOR) 100 MG tablet Take 1 tablet by mouth 2 (Two) Times a Day.   • multivitamin (THERAGRAN) tablet tablet Take 1 tablet by mouth Daily.   • ondansetron (ZOFRAN) 8 MG tablet Take 1 tablet by mouth Every 8 (Eight) Hours As Needed for Nausea. for nausea   • potassium chloride (K-DUR,KLOR-CON) 20 MEQ CR tablet Take 1 tablet by mouth Daily.   • Synthroid 25 MCG tablet Take 25 mcg by mouth Daily.   • vitamin E 1000 UNIT capsule Take 1,000 Units by mouth Daily.   • [DISCONTINUED] verapamil SR (CALAN-SR) 180 MG CR tablet Take 1 tablet by mouth Every Night.   • [DISCONTINUED] predniSONE (DELTASONE) 20 MG tablet    • [DISCONTINUED] verapamil ER (VERELAN) 180 MG 24 hr capsule      No current facility-administered medications on file prior to visit.         Objective   /78  "  Pulse 92   Ht 193 cm (75.98\")   Wt 132 kg (290 lb 9.6 oz)   BMI 35.39 kg/m²       Physical Exam  Constitutional:       Appearance: He is obese.   HENT:      Head: Normocephalic.   Neck:      Vascular: No carotid bruit.   Cardiovascular:      Rate and Rhythm: Normal rate. Rhythm irregular.      Pulses: Normal pulses.      Heart sounds: Normal heart sounds. No murmur heard.  Pulmonary:      Effort: Pulmonary effort is normal.      Breath sounds: Normal breath sounds.   Musculoskeletal:      Cervical back: Neck supple.      Right lower leg: No edema.      Left lower leg: No edema.   Skin:     General: Skin is dry.      Capillary Refill: Capillary refill takes less than 2 seconds.   Neurological:      Mental Status: He is alert and oriented to person, place, and time.   Psychiatric:         Behavior: Behavior normal.       ECG 12 Lead    Date/Time: 1/13/2023 9:30 AM  Performed by: Nyla Ramírez APRN  Authorized by: Nyla Ramírez APRN   Comparison: compared with previous ECG from 1/15/2021  Similar to previous ECG  Comparison to previous ECG: Rate is now better controlled  Rhythm: atrial fibrillation  Rate: normal  BPM: 92  Conduction: conduction normal  ST Segments: ST segments normal  T Waves: T waves normal  QRS axis: left  Other: no other findings    Clinical impression: abnormal EKG          Result Review :   The following data was reviewed by: REGINALDO Nails on 01/13/2023:  proBNP   Date Value Ref Range Status   08/25/2022 1,589.0 (H) 0.0 - 900.0 pg/mL Final     CMP    CMP 12/20/22   Glucose 116 (A)   BUN 10   Creatinine 0.89   eGFR 94.5   Sodium 140   Potassium 3.8   Chloride 105   Calcium 9.1   Total Protein 6.1   Albumin 3.89   Globulin 2.2   Total Bilirubin 0.7   Alkaline Phosphatase 82   AST (SGOT) 17   ALT (SGPT) 18   Albumin/Globulin Ratio 1.8   BUN/Creatinine Ratio 11.2   Anion Gap 7.7   (A) Abnormal value       Comments are available for some flowsheets but are not being " displayed.           CBC w/diff    CBC w/Diff 12/20/22   WBC 5.69   RBC 4.41   Hemoglobin 14.1   Hematocrit 41.2   MCV 93.4   MCH 32.0   MCHC 34.2   RDW 12.3   Platelets 212   Neutrophil Rel % 67.4   Immature Granulocyte Rel % 0.2   Lymphocyte Rel % 18.5 (A)   Monocyte Rel % 6.5   Eosinophil Rel % 7.0 (A)   Basophil Rel % 0.4   (A) Abnormal value             Lab Results   Component Value Date    TSH 3.250 02/24/2021      Lab Results   Component Value Date    FREET4 0.84 06/29/2022      No results found for: DDIMERQUANT  Magnesium   Date Value Ref Range Status   09/07/2021 1.8 (L) 1.9 - 2.4 mg/dL Final      Digoxin   Date Value Ref Range Status   01/18/2021 0.5 0.5 - 2.0 ng/mL Final      Lab Results   Component Value Date    TROPONINT 19 (H) 09/02/2021    TROPONINT -4 09/02/2021           Results for orders placed in visit on 07/16/22    Adult Transthoracic Echo Complete W/ Cont if Necessary Per Protocol    Interpretation Summary  · Estimated left ventricular EF = 55% Left ventricular systolic function is normal.  · The left ventricular cavity is mildly dilated.  · Left ventricular wall thickness is consistent with mild concentric hypertrophy.  · The right ventricular cavity is mildly dilated.  · The right atrial cavity is moderate to severely dilated.  · Estimated right ventricular systolic pressure from tricuspid regurgitation is mildly elevated (35-45 mmHg).      [id number] Score: 3          Assessment and Plan    Diagnoses and all orders for this visit:    1. Atrial fibrillation, persistent (Primary)  Symptomatically stable at this time, rate controlled, continue metoprolol 100 mg twice daily.  Continue Eliquis for CVA prevention.    2. Chronic heart failure with preserved ejection fraction  Symptomatically stable at this time and euvolemic on exam, continue Lasix 20 mg daily and metoprolol 100 mg twice daily.  We discussed the importance of daily weight, fluid restriction, and compression socks.    3.  Essential hypertension  Currently controlled and without adverse effect from medication, continue metoprolol 100 mg twice daily.  Low-sodium diet discussed.    4. Hyperlipidemia LDL goal <100  Last lipid panel was 6/29/2022 with LDL 63 which is within his goal range, continue atorvastatin 40 mg nightly.    Other orders  -     ECG 12 Lead            Follow Up   Return in about 6 months (around 7/13/2023) for Follow up with Dr Madden.    Patient was given instructions and counseling regarding his condition or for health maintenance advice. Please see specific information pulled into the AVS if appropriate.     Robbi Kennedy  reports that he has never smoked. He has never used smokeless tobacco.           Nyla Ramírez, APRN  01/13/23  08:42 EST    Dictated Utilizing Dragon Dictation

## 2023-01-27 ENCOUNTER — HOSPITAL ENCOUNTER (OUTPATIENT)
Dept: ONCOLOGY | Facility: HOSPITAL | Age: 68
Setting detail: INFUSION SERIES
Discharge: HOME | End: 2023-01-27
Payer: MEDICARE

## 2023-01-27 DIAGNOSIS — Z45.2 ENCOUNTER FOR ADJUSTMENT OR MANAGEMENT OF VASCULAR ACCESS DEVICE: Primary | ICD-10-CM

## 2023-01-27 PROCEDURE — 96523 IRRIG DRUG DELIVERY DEVICE: CPT

## 2023-01-27 PROCEDURE — 25010000002 HEPARIN LOCK FLUSH PER 10 UNITS: Performed by: INTERNAL MEDICINE

## 2023-01-27 RX ORDER — SODIUM CHLORIDE 0.9 % (FLUSH) 0.9 %
20 SYRINGE (ML) INJECTION AS NEEDED
Status: DISCONTINUED | OUTPATIENT
Start: 2023-01-27 | End: 2023-01-28 | Stop reason: HOSPADM

## 2023-01-27 RX ORDER — SODIUM CHLORIDE 0.9 % (FLUSH) 0.9 %
20 SYRINGE (ML) INJECTION AS NEEDED
Status: CANCELLED | OUTPATIENT
Start: 2023-01-27

## 2023-01-27 RX ORDER — HEPARIN SODIUM (PORCINE) LOCK FLUSH IV SOLN 100 UNIT/ML 100 UNIT/ML
500 SOLUTION INTRAVENOUS AS NEEDED
Status: CANCELLED | OUTPATIENT
Start: 2023-01-27

## 2023-01-27 RX ORDER — HEPARIN SODIUM (PORCINE) LOCK FLUSH IV SOLN 100 UNIT/ML 100 UNIT/ML
500 SOLUTION INTRAVENOUS AS NEEDED
Status: DISCONTINUED | OUTPATIENT
Start: 2023-01-27 | End: 2023-01-28 | Stop reason: HOSPADM

## 2023-01-27 RX ADMIN — HEPARIN SODIUM (PORCINE) LOCK FLUSH IV SOLN 100 UNIT/ML 500 UNITS: 100 SOLUTION at 09:54

## 2023-01-27 RX ADMIN — Medication 20 ML: at 09:54

## 2023-02-08 ENCOUNTER — HOSPITAL ENCOUNTER (OUTPATIENT)
Dept: CT IMAGING | Facility: HOSPITAL | Age: 68
Discharge: HOME | End: 2023-02-08
Admitting: RADIOLOGY
Payer: MEDICARE

## 2023-02-08 DIAGNOSIS — C20 RECTAL CANCER: ICD-10-CM

## 2023-02-08 DIAGNOSIS — C78.7 SECONDARY MALIGNANCY OF LIVER: ICD-10-CM

## 2023-02-08 DIAGNOSIS — C78.01 SECONDARY MALIGNANT NEOPLASM OF RIGHT LUNG: ICD-10-CM

## 2023-02-08 DIAGNOSIS — C78.02 SECONDARY MALIGNANT NEOPLASM OF LEFT LUNG: ICD-10-CM

## 2023-02-08 DIAGNOSIS — C78.01 SECONDARY MALIGNANCY OF RIGHT LUNG: ICD-10-CM

## 2023-02-08 LAB
ALBUMIN SERPL-MCNC: 4.1 G/DL (ref 3.5–5.2)
ALBUMIN/GLOB SERPL: 1.5 G/DL
ALP SERPL-CCNC: 86 U/L (ref 39–117)
ALT SERPL W P-5'-P-CCNC: 22 U/L (ref 1–41)
ANION GAP SERPL CALCULATED.3IONS-SCNC: 9.2 MMOL/L (ref 5–15)
AST SERPL-CCNC: 22 U/L (ref 1–40)
BASOPHILS # BLD AUTO: 0.03 10*3/MM3 (ref 0–0.2)
BASOPHILS NFR BLD AUTO: 0.3 % (ref 0–1.5)
BILIRUB SERPL-MCNC: 1.1 MG/DL (ref 0–1.2)
BUN SERPL-MCNC: 12 MG/DL (ref 8–23)
BUN/CREAT SERPL: 12 (ref 7–25)
CALCIUM SPEC-SCNC: 9 MG/DL (ref 8.6–10.5)
CEA SERPL-MCNC: 5.58 NG/ML
CHLORIDE SERPL-SCNC: 103 MMOL/L (ref 98–107)
CO2 SERPL-SCNC: 26.8 MMOL/L (ref 22–29)
CREAT BLDA-MCNC: 1 MG/DL
CREAT SERPL-MCNC: 1 MG/DL (ref 0.76–1.27)
DEPRECATED RDW RBC AUTO: 42.2 FL (ref 37–54)
EGFRCR SERPLBLD CKD-EPI 2021: 83 ML/MIN/1.73
EGFRCR SERPLBLD CKD-EPI 2021: 83 ML/MIN/1.73
EOSINOPHIL # BLD AUTO: 0.24 10*3/MM3 (ref 0–0.4)
EOSINOPHIL NFR BLD AUTO: 2.8 % (ref 0.3–6.2)
ERYTHROCYTE [DISTWIDTH] IN BLOOD BY AUTOMATED COUNT: 12.5 % (ref 12.3–15.4)
GLOBULIN UR ELPH-MCNC: 2.8 GM/DL
GLUCOSE SERPL-MCNC: 113 MG/DL (ref 65–99)
HCT VFR BLD AUTO: 42.5 % (ref 37.5–51)
HGB BLD-MCNC: 14.6 G/DL (ref 13–17.7)
IMM GRANULOCYTES # BLD AUTO: 0.03 10*3/MM3 (ref 0–0.05)
IMM GRANULOCYTES NFR BLD AUTO: 0.3 % (ref 0–0.5)
LYMPHOCYTES # BLD AUTO: 1.68 10*3/MM3 (ref 0.7–3.1)
LYMPHOCYTES NFR BLD AUTO: 19.5 % (ref 19.6–45.3)
MCH RBC QN AUTO: 31.6 PG (ref 26.6–33)
MCHC RBC AUTO-ENTMCNC: 34.4 G/DL (ref 31.5–35.7)
MCV RBC AUTO: 92 FL (ref 79–97)
MONOCYTES # BLD AUTO: 0.57 10*3/MM3 (ref 0.1–0.9)
MONOCYTES NFR BLD AUTO: 6.6 % (ref 5–12)
NEUTROPHILS NFR BLD AUTO: 6.06 10*3/MM3 (ref 1.7–7)
NEUTROPHILS NFR BLD AUTO: 70.5 % (ref 42.7–76)
NRBC BLD AUTO-RTO: 0 /100 WBC (ref 0–0.2)
PLATELET # BLD AUTO: 213 10*3/MM3 (ref 140–450)
PMV BLD AUTO: 8.8 FL (ref 6–12)
POTASSIUM SERPL-SCNC: 4.1 MMOL/L (ref 3.5–5.2)
PROT SERPL-MCNC: 6.9 G/DL (ref 6–8.5)
RBC # BLD AUTO: 4.62 10*6/MM3 (ref 4.14–5.8)
SODIUM SERPL-SCNC: 139 MMOL/L (ref 136–145)
WBC NRBC COR # BLD: 8.61 10*3/MM3 (ref 3.4–10.8)

## 2023-02-08 PROCEDURE — 82378 CARCINOEMBRYONIC ANTIGEN: CPT | Performed by: INTERNAL MEDICINE

## 2023-02-08 PROCEDURE — 85025 COMPLETE CBC W/AUTO DIFF WBC: CPT | Performed by: INTERNAL MEDICINE

## 2023-02-08 PROCEDURE — 74177 CT ABD & PELVIS W/CONTRAST: CPT

## 2023-02-08 PROCEDURE — 82565 ASSAY OF CREATININE: CPT

## 2023-02-08 PROCEDURE — 0 IOPAMIDOL PER 1 ML: Performed by: RADIOLOGY

## 2023-02-08 PROCEDURE — 80053 COMPREHEN METABOLIC PANEL: CPT | Performed by: INTERNAL MEDICINE

## 2023-02-08 PROCEDURE — 71260 CT THORAX DX C+: CPT

## 2023-02-08 RX ORDER — HEPARIN SODIUM (PORCINE) LOCK FLUSH IV SOLN 100 UNIT/ML 100 UNIT/ML
SOLUTION INTRAVENOUS
Status: DISPENSED
Start: 2023-02-08 | End: 2023-02-08

## 2023-02-08 RX ADMIN — IOPAMIDOL 100 ML: 755 INJECTION, SOLUTION INTRAVENOUS at 11:07

## 2023-02-09 ENCOUNTER — OFFICE VISIT (OUTPATIENT)
Dept: RADIATION ONCOLOGY | Facility: HOSPITAL | Age: 68
End: 2023-02-09
Payer: MEDICARE

## 2023-02-09 VITALS
RESPIRATION RATE: 16 BRPM | HEART RATE: 80 BPM | SYSTOLIC BLOOD PRESSURE: 121 MMHG | OXYGEN SATURATION: 97 % | DIASTOLIC BLOOD PRESSURE: 84 MMHG | BODY MASS INDEX: 35.36 KG/M2 | WEIGHT: 290.35 LBS | TEMPERATURE: 98.3 F

## 2023-02-09 DIAGNOSIS — C78.02 SECONDARY MALIGNANT NEOPLASM OF LEFT LUNG: ICD-10-CM

## 2023-02-09 PROCEDURE — G0463 HOSPITAL OUTPT CLINIC VISIT: HCPCS | Performed by: RADIOLOGY

## 2023-02-09 PROCEDURE — 99024 POSTOP FOLLOW-UP VISIT: CPT | Performed by: RADIOLOGY

## 2023-02-09 NOTE — PROGRESS NOTES
Follow Up Office Visit      Encounter Date: 02/09/2023   Patient Name: Robbi Kennedy  YOB: 1955   Medical Record Number: [id number]   Primary Diagnosis: No primary diagnosis found.   Cancer Staging: Cancer Staging   Rectal cancer  Staging form: Colon And Rectum, AJCC 8th Edition  - Clinical: Stage IIA (cT3, cN0, cM0) - Signed by Maurilio Campos MD on 6/21/2021  - Pathologic: No stage assigned - Unsigned                Cancer Staging   Stage IIA (cT3, cN0, cM0)  No stage assigned        Chief Complaint:    Chief Complaint   Patient presents with   • Follow-up   • Rectal Cancer   • Lung Cancer       Oncologic History: Robbi Kennedy is a 67 y.o. male  here for follow-up regarding his metastatic rectal cancer.    He was originally diagnosed with a [id number] moderately differentiated adenocarcinoma of the rectum in 2019. He received neoadjuvant Xeloda with radiation under the care of Dr. Vasquez here at [facility]. This was followed by an LAR with ostomy in 9/20/2019. He was staged as having [id number] disease. He did well for some time. In 2021, he developed metastatic disease. Biopsy of a liver lesion in April 2021 returned as a metastatic adenocarcinoma consistent with a colorectal primary. Metastatic lesions in the chest were noted on CT imaging at that time. He received mFOLFOX with avastin under the care of Dr. Campos. Avastin was held towards the end in anticipation of upcoming abdominal surgery.      Restaging CT demonstrated a treatment response in the liver and the chest.  He had a partial hepatectomy, removal of gallbladder, removal of regional lymph nodes, and MWA of a segment 6 liver metastases at the [facility] on 8/31/2021     He had several lung lesions, with limited sites of oligoprogression. Dr. Campos and I discussed a plan of care involving SBRT to the sites of oligoprogression with the intention of keeping Mr. Kennedy off systemic therapy for  as long as possible.   Btwn 10/2021 - 11/2022, he had three separate encounters during which 5 lesions were treated (10/2021 - RML, 7/2022- LLL two lesions, 11/2022- RAMIRO and RUL lesions)      Interval History:   Here today to discuss imaging results. Stable SOA and palpitations. No hemoptysis, no rib pain, no issues with swallowing. Stable headaches and occasional dizziness (has been going on for years). Feels good, very active, good appetite, maintaining weight.    Prior Radiation:   RAMIRO and RUL lesions - 50 Gy/5 fractions to each lesion, completed 11/16/22   LLL (x2 lesions in single PTV), 50 Gy/5 fractions, 7/7/22  RML SBRT 50 Gy/5 fractions, 10/2021  50.4 Gy/28 fractions to pelvis for rectal cancer, performed by Dr. Vasquez - 2019           Subjective      Review of Systems: Review of Systems   Constitutional: Positive for fatigue (5/10, ongoing). Negative for appetite change and unexpected weight change.   HENT: Negative for hearing loss, sore throat and trouble swallowing.    Eyes: Negative for visual disturbance.   Respiratory: Positive for shortness of breath (Ongoing). Negative for cough, chest tightness and wheezing.    Cardiovascular: Positive for palpitations (Ongoing, has AFIB) and leg swelling (Ongoing). Negative for chest pain.   Gastrointestinal: Positive for abdominal distention (Ongoing). Negative for abdominal pain, anal bleeding, blood in stool, constipation, diarrhea, nausea, rectal pain and vomiting.   Genitourinary: Negative for difficulty urinating, dysuria, frequency, hematuria and testicular pain.   Musculoskeletal: Positive for arthralgias (Ongoing), back pain (Ongoing) and joint swelling (Ankles and feet, ongoing). Negative for gait problem.   Skin: Negative for color change and rash.   Neurological: Positive for dizziness (  With positional changes, ongoing) and headaches (Almost every day, takes tylenol with relief. Ongoing). Negative for weakness.   Psychiatric/Behavioral: Negative for  sleep disturbance.       The following portions of the patient's history were reviewed and updated as appropriate: allergies, current medications, past family history, past medical history, past social history, past surgical history and problem list.    Measures:   Pain: (on a scale of 0-10)   Pain Score    02/09/23 1110   PainSc: 0-No pain       Advanced Care Plan: N Advance Care Planning   ACP discussion was declined by the patient. Patient does not have an advance directive, declines further assistance.       KPS/Quality of Life: 80 - Restricted Physical Activity  ECOG: (1) Restricted in physically strenuous activity, ambulatory and able to do work of light nature  Depression Screening:   PHQ-9 score of 0 on 9/14/2022.      Objective     Physical Exam:   Vital Signs:   Vitals:    02/09/23 1110   BP: 121/84   Pulse: 80   Resp: 16   Temp: 98.3 °F (36.8 °C)   TempSrc: Temporal   SpO2: 97%   Weight: 132 kg (290 lb 5.5 oz)   PainSc: 0-No pain     Body mass index is 35.36 kg/m².     Constitutional: No acute distress, sitting comfortably  Eye: EOMI, anicteric sclerae  HENT: NC/AT, MMM   Respiratory: Symmetric expansion, nonlabored respiration  MSK: ROM intact in all four extremities, no obvious deformities  Neuro: Alert, oriented x3, CN3-12 grossly intact.   Psych: Appropriate mood and affect.    Results:     Imaging: Images were reviewed personally and pertinent findings are detailed below.   CT Chest, Abd, Pelvis 2/9/23  Chest:  There are multiple bilateral pulmonary nodules which have increased in size.  There is a   0.7 cm nodule in the right upper lobe on image 58 of series 5 which measured 0.7 cm on the previous   study.  There is a bilobed shaped nodule in the superior lingula on image 66 measuring 1.0 cm.  It   measured 0.8 cm on the prior study.  There is a lobulated shaped mass in the right lung base on   image 97 adjacent to the hemidiaphragm measuring 1.6 x 1.0 cm barely perceptible on the previous   study.   There is a 0.9 cm nodule in the left lower lobe on image 105 which is probably stable.    There is a 0.7 cm nodule in the right lower lobe near the hemidiaphragm on image 107. This nodule   measured 0.5 cm on the previous study.  The findings appear consistent with worsening pulmonary   metastatic disease.  There is bronchial wall thickening most prominent in the left lower lobe.    There is interstitial thickening with hazy areas of ground-glass density throughout the basilar   segments of the left lower lobe consistent with chronic inflammation/infection.  They are unchanged   bandlike linear densities in the anterior basilar segment of the right lower lobe and within the   inferior aspect of the right middle lobe felt to represent chronic scarring.  There is also mild   pulmonary scarring in the lingula.  There are no layering pleural effusions.  The heart is enlarged   with atherosclerotic vascular calcifications including involvement of coronary arteries.  There are   no enlarged hilar or mediastinal lymph nodes.  There is a right-sided port in place.  There are no   suspicious osteolytic or sclerotic lesions within the bony thorax.     Abdomen and pelvis:  There is a stable lobulated shaped fluid density lesion with a coarse   calcification in the anterior segment right lobe of the liver consistent with a treated metastasis.    The remaining liver is within range of normal.  The adrenal glands, pancreas, kidneys, and spleen   are normal.  The gallbladder is surgically absent.  The prostate gland, seminal vesicles, and   urinary bladder are normal.  There is an anastomotic suture line within the sigmoid colon.  There   is increased stool indicating mild increased stool burden.  The appendix is normal.  There are no   dilated loops of bowel to indicate an obstructive process.  There are findings within the right   lower quadrant anterior abdominal wall presumably secondary to a previous ostomy site.  The patient    has a wide-mouth incisional ventral hernia.  There are no enlarged lymph nodes in the abdomen and   pelvis.  There are scattered atherosclerotic vascular calcifications.  There are no suspicious   osteolytic or sclerotic lesions within the bony structures.          Assessment / Plan        Assessment/Plan:     Diagnoses and all orders for this visit:    1. Secondary malignant neoplasm of left lung (HCC)        Robbi Kennedy is a pleasant 67 y.o. male with an original diagnosis of a stage IIa adenocarcinoma of the rectum. This was managed with neoadjuvant chemoradiation in 2019 followed by an LAR with ostomy. In 2021, he unfortunately developed metastatic disease in the lungs and in the liver. He received systemic therapy under the care of Dr. Campos and had a good partial response to treatment. He underwent a partial hepatectomy with removal of the gallbladder, removal of regional lymph nodes, and MWA of a segment 6 metastases at the [facility] on August 31, 2021 to address his metastatic disease in the liver. He had several lung lesions, with limited sites of oligoprogression. Dr. Campos and I discussed a plan of care involving SBRT to the sites of oligoprogression with the intention of keeping Mr. Kennedy off systemic therapy for as long as possible.   Btwn 10/2021 - 11/2022, he had three separate encounters during which 5 lesions were treated (10/2021 - RML, 7/2022- LLL two lesions, 11/2022- RAMIRO and RUL lesions)  Unfortunately, imaging now demonstrates many sites of growth across both lungs. Due to the short interval to progression and the amount of progression, Dr. Campos plans to see Mr. Kennedy to discuss systemic therapy.   We discussed the ongoing role of radiation for palliation of symptoms or for continued ablation of oligoprogression.   I will see him back as needed    Follow Up:   No follow-ups on file.        Time:   I spent 45 minutes on this encounter today, 02/09/23. Activities that took  place during this time include:   - preparing to see the patient  - obtaining and reviewing separately obtained history  - performing a medically appropriate examination and evaluation  - counseling and educating the patient  - ordering medications/tests/procedures  - communicating with other healthcare providers  - documenting clinical information in the health record  - coordinating care for this patient.     Sincerely,        Chapis Luna MD  Radiation Oncology  [facility]    This document has been signed by Chapis Luna MD on February 9, 2023 13:51 EST           NOTICE TO PATIENTS  At [facility], we believe that sharing information builds trust and better relationships. You are receiving this note because you recently visited [facility]. It is possible you will see health information before a provider has talked with you about it. This kind of information can be easy to misunderstand. To help you fully understand what it means for your health, we urge you to discuss this note with your provider.

## 2023-02-15 ENCOUNTER — TELEPHONE (OUTPATIENT)
Dept: ONCOLOGY | Facility: HOSPITAL | Age: 68
End: 2023-02-15

## 2023-02-15 NOTE — TELEPHONE ENCOUNTER
Caller: Robbi Kennedy    Relationship: Self    Best call back number:[phone]    What is the best time to reach you: ASAP    Who are you requesting to speak with (clinical staff, provider,  specific staff member): CLINICAL      What was the call regarding: PT HAD LAB DONE DURING SCAN LAST WEEK, WONDERING IF HE STILL NEEDS TO HAVE HIS LAB DONE NEXT WEEK ON 2/21 ALSO PRIOR TO FOLLOW UP VISIT    Do you require a callback: YES, PLEASE CALL BACK TO ADVISE.

## 2023-02-21 ENCOUNTER — LAB (OUTPATIENT)
Dept: ONCOLOGY | Facility: HOSPITAL | Age: 68
End: 2023-02-21
Payer: MEDICARE

## 2023-02-21 ENCOUNTER — OFFICE VISIT (OUTPATIENT)
Dept: ONCOLOGY | Facility: HOSPITAL | Age: 68
End: 2023-02-21
Payer: MEDICARE

## 2023-02-21 VITALS
BODY MASS INDEX: 35.28 KG/M2 | SYSTOLIC BLOOD PRESSURE: 129 MMHG | WEIGHT: 289.68 LBS | TEMPERATURE: 97 F | OXYGEN SATURATION: 98 % | RESPIRATION RATE: 16 BRPM | DIASTOLIC BLOOD PRESSURE: 83 MMHG | HEART RATE: 80 BPM

## 2023-02-21 DIAGNOSIS — E87.6 HYPOKALEMIA: ICD-10-CM

## 2023-02-21 DIAGNOSIS — C20 RECTAL CANCER: ICD-10-CM

## 2023-02-21 DIAGNOSIS — C20 RECTAL CANCER: Primary | ICD-10-CM

## 2023-02-21 LAB
BILIRUB UR QL STRIP: NEGATIVE
CLARITY UR: CLEAR
COLOR UR: YELLOW
GLUCOSE UR STRIP-MCNC: NEGATIVE MG/DL
HGB UR QL STRIP.AUTO: NEGATIVE
KETONES UR QL STRIP: NEGATIVE
LEUKOCYTE ESTERASE UR QL STRIP.AUTO: NEGATIVE
NITRITE UR QL STRIP: NEGATIVE
PH UR STRIP.AUTO: 6 [PH] (ref 5–8)
PROT UR QL STRIP: NEGATIVE
SP GR UR STRIP: 1.01 (ref 1–1.03)
UROBILINOGEN UR QL STRIP: NORMAL

## 2023-02-21 PROCEDURE — 99215 OFFICE O/P EST HI 40 MIN: CPT | Performed by: INTERNAL MEDICINE

## 2023-02-21 PROCEDURE — 81003 URINALYSIS AUTO W/O SCOPE: CPT

## 2023-02-21 PROCEDURE — G0463 HOSPITAL OUTPT CLINIC VISIT: HCPCS | Performed by: INTERNAL MEDICINE

## 2023-02-21 RX ORDER — POTASSIUM CHLORIDE 20 MEQ/1
20 TABLET, EXTENDED RELEASE ORAL DAILY
Qty: 90 TABLET | Refills: 1 | Status: SHIPPED | OUTPATIENT
Start: 2023-02-21

## 2023-02-21 RX ORDER — ONDANSETRON HYDROCHLORIDE 8 MG/1
8 TABLET, FILM COATED ORAL EVERY 8 HOURS PRN
Qty: 30 TABLET | Refills: 3 | Status: SHIPPED | OUTPATIENT
Start: 2023-02-21 | End: 2023-03-27

## 2023-02-21 NOTE — PROGRESS NOTES
Chief Complaint  Rectal Cancer    Eri Moran APRN Reinberg, Emily, REGINALDO    Subjective          Robbi Kennedy presents to [facility] for consideration of ongoing chemotherapy.  He is status post treatments as outlined.  Most recently, he has undergone stereotactic radiation to several pulmonary nodules.  Recent imaging demonstrates more new lesions in the lungs.  He denies shortness of breath, cough or hemoptysis.  His bowels are working normally.  He notes good appetite.  His energy level is adequate for his ADLs.  He continues to have neuropathy especially in his feet.  Patient is on potassium supplement and requests refill today.    Oncology/Hematology History Overview Note   9/30/2019 High rectal rpxi-sombvgcatf-svnwljvoxnmwug adenocarcinoma  associated with tubular adenoma.   4/27/21 Biopsy of liver revealed metastatic adenocarcinoma, consistent with colorectal primary.    Clinical Staging  Stage IIa ([id number])    Chemotherapy      neoadjuvant Xeloda with radiation. adjuvant xeloda        Radiation Therapy      7/8/19 thru 8/14/19 completed neoadjuvant 5040 cGy=28 fxs rectum     10/2021 XRT due to recurrence right middle lung 5 Fx's, 5000cGy  11/26/21 adjuvant FOLFOX -2/25/2022    Surgeries       9/30/2019 low anterior resection with ostomy        8/31/21 partial hepatectomy, cholecystectomy and MWA for segment 6 medical margin performed at     2/8/2023 CT Scans revealed Worsening pulmonary metastatic disease.    2/21/2023 orders written for FOLFIRI + AVASTIN       Rectal cancer   6/2/2021 - 8/15/2021 Chemotherapy    OP COLON mFOLFOX6 + Bevacizumab (OXALIplatin / Leucovorin / Fluorouracil / Bevacizumab)     6/18/2021 Initial Diagnosis    Rectal cancer (CMS/HCC)     6/21/2021 Cancer Staged    Staging form: Colon And Rectum, AJCC 8th Edition  - Clinical: Stage IIA (cT3, cN0, cM0) - Signed by Maurilio Campos MD on 6/21/2021 11/16/2021 - 2/25/2022  Chemotherapy    OP COLON mFOLFOX6 OXALIplatin / Leucovorin / Fluorouracil     2/28/2023 -  Chemotherapy    OP COLORECTAL FOLFIRI + Bevacizumab 5mg/kg (Irinotecan / Leucovorin / Fluorouracil / Bevacizumab)     Secondary malignant neoplasm of left lung   9/14/2021 Initial Diagnosis    Secondary malignant neoplasm of left lung (HCC)     10/20/2021 -  Radiation    RADIATION THERAPY Treatment Details (Noted on 10/5/2021)  Site: Bilateral Lung  Technique: SBRT  Goal: No goal specified  Planned Treatment Start Date: 10/20/2021     11/16/2021 - 2/25/2022 Chemotherapy    OP COLON mFOLFOX6 OXALIplatin / Leucovorin / Fluorouracil     6/15/2022 -  Radiation    RADIATION THERAPY Treatment Details (Noted on 6/15/2022)  Site: Left Lung - Lower lobe  Technique: SBRT  Goal: No goal specified  Planned Treatment Start Date: No planned start date specified     10/25/2022 -  Radiation    RADIATION THERAPY Treatment Details (Noted on 10/25/2022)  Site: Left Lung  Technique: SBRT  Goal: No goal specified  Planned Treatment Start Date: No planned start date specified     Secondary malignancy of liver   9/14/2021 Initial Diagnosis    Secondary malignancy of liver (HCC)     11/16/2021 - 2/25/2022 Chemotherapy    OP COLON mFOLFOX6 OXALIplatin / Leucovorin / Fluorouracil     Secondary malignant neoplasm of right lung   10/5/2021 Initial Diagnosis    Secondary malignant neoplasm of right lung (HCC)     10/20/2021 -  Radiation    RADIATION THERAPY Treatment Details (Noted on 10/5/2021)  Site: Bilateral Lung  Technique: SBRT  Goal: No goal specified  Planned Treatment Start Date: 10/20/2021     11/16/2021 - 2/25/2022 Chemotherapy    OP COLON mFOLFOX6 OXALIplatin / Leucovorin / Fluorouracil         Review of Systems   Constitutional: Positive for fatigue. Negative for appetite change, diaphoresis, fever, unexpected weight gain and unexpected weight loss.   HENT: Negative for hearing loss, mouth sores, sore throat, swollen glands, trouble  swallowing and voice change.    Eyes: Negative for blurred vision.   Respiratory: Negative for cough, shortness of breath and wheezing.    Cardiovascular: Negative for chest pain and palpitations.   Gastrointestinal: Negative for abdominal pain, blood in stool, constipation, diarrhea, nausea and vomiting.   Endocrine: Negative for cold intolerance and heat intolerance.   Genitourinary: Negative for difficulty urinating, dysuria, frequency, hematuria and urinary incontinence.   Musculoskeletal: Negative for arthralgias, back pain and myalgias.   Skin: Negative for rash, skin lesions and wound.   Neurological: Negative for dizziness, seizures, weakness, numbness and headache.   Hematological: Does not bruise/bleed easily.   Psychiatric/Behavioral: Negative for depressed mood. The patient is not nervous/anxious.      Current Outpatient Medications on File Prior to Visit   Medication Sig Dispense Refill   • acetaminophen (TYLENOL) 500 MG tablet Take 500 mg by mouth Every 6 (Six) Hours As Needed.     • apixaban (ELIQUIS) 5 MG tablet tablet Take 5 mg by mouth 2 (Two) Times a Day.     • ascorbic acid (VITAMIN C) 500 MG tablet Take 500 mg by mouth Daily.     • atorvastatin (LIPITOR) 40 MG tablet atorvastatin 40 mg oral tablet take 1 tablet (40 mg) by oral route once daily   Active     • cyanocobalamin (VITAMIN B-12) 1000 MCG tablet Take 1,000 mcg by mouth Daily.     • diclofenac (VOLTAREN) 50 MG EC tablet      • fluticasone (FLONASE) 50 MCG/ACT nasal spray      • furosemide (LASIX) 20 MG tablet Take 1 tablet by mouth Daily. 90 tablet 3   • loratadine (CLARITIN) 10 MG tablet loratadine 10 mg oral tablet take 1 tablet (10 mg) by oral route once daily   Active     • metoprolol tartrate (LOPRESSOR) 100 MG tablet Take 1 tablet by mouth 2 (Two) Times a Day. 180 tablet 0   • multivitamin (THERAGRAN) tablet tablet Take 1 tablet by mouth Daily.     • Synthroid 25 MCG tablet Take 25 mcg by mouth Daily.     • vitamin E 1000 UNIT  capsule Take 1,000 Units by mouth Daily.     • [DISCONTINUED] ondansetron (ZOFRAN) 8 MG tablet Take 1 tablet by mouth Every 8 (Eight) Hours As Needed for Nausea. for nausea 30 tablet 3   • [DISCONTINUED] potassium chloride (K-DUR,KLOR-CON) 20 MEQ CR tablet Take 1 tablet by mouth Daily. 90 tablet 1     No current facility-administered medications on file prior to visit.       No Known Allergies  Past Medical History:   Diagnosis Date   • Asthma    • Atrial fibrillation, persistent 2/26/2021   • Chronic heart failure with preserved ejection fraction 7/18/2022    BNP 1590 on 7/22 Echocardiogram with EF 55% moderate to severe biatrial enlargement and mild right ventricular enlargement 7/22    • Colorectal cancer    • Essential hypertension    • GI cancer    • Hepatitis    • History of DVT of lower extremity 8/27/2021   • Hyperlipidemia LDL goal <100 12/31/2020   • Rectal cancer 6/18/2021   • Secondary malignancy of liver 9/14/2021   • Secondary malignant neoplasm of left lung 9/14/2021   • Secondary malignant neoplasm of right lung 10/5/2021   • Thrombocytopenia (HCC) 1/11/2022   • Thyroid disease      Past Surgical History:   Procedure Laterality Date   • COLON SURGERY     • HERNIA REPAIR     • LIVER BIOPSY      4/27/21 Biopsy of liver revealed metastatic adenocarcinoma, consistent with colorectal primary   • OTHER SURGICAL HISTORY      REMOVED CANCER FROM COLON     Social History     Socioeconomic History   • Marital status:    • Number of children: 2   Tobacco Use   • Smoking status: Never   • Smokeless tobacco: Never   Vaping Use   • Vaping Use: Never used   Substance and Sexual Activity   • Alcohol use: Yes     Comment: occasionally, no recent use   • Drug use: Never   • Sexual activity: Defer     Family History   Problem Relation Age of Onset   • Prostate cancer Father    • Heart murmur Mother    • Diabetes Mother    • Colon cancer Maternal Uncle        Objective   Physical Exam  Vitals reviewed. Exam  conducted with a chaperone present.   Constitutional:       General: He is not in acute distress.     Appearance: Normal appearance.   Cardiovascular:      Rate and Rhythm: Normal rate and regular rhythm.      Heart sounds: Normal heart sounds. No murmur heard.    No gallop.   Pulmonary:      Effort: Pulmonary effort is normal.      Breath sounds: Normal breath sounds.      Comments: Port-A-Cath  Abdominal:      General: Abdomen is flat. Bowel sounds are normal. There is no distension.      Palpations: Abdomen is soft.      Tenderness: There is no abdominal tenderness.   Musculoskeletal:      Cervical back: Neck supple.      Right lower leg: No edema.      Left lower leg: No edema.   Lymphadenopathy:      Cervical: No cervical adenopathy.   Neurological:      Mental Status: He is alert and oriented to person, place, and time.   Psychiatric:         Mood and Affect: Mood normal.         Behavior: Behavior normal.         Vitals:    02/21/23 1051   BP: 129/83   Pulse: 80   Resp: 16   Temp: 97 °F (36.1 °C)   TempSrc: Temporal   SpO2: 98%   Weight: 131 kg (289 lb 11 oz)   PainSc: 0-No pain     ECOG score: 1         PHQ-9 Total Score:                    Result Review :   The following data was reviewed by: Maurilio Campos MD on 02/21/2023:  Lab Results   Component Value Date    HGB 14.6 02/08/2023    HCT 42.5 02/08/2023    MCV 92.0 02/08/2023     02/08/2023    WBC 8.61 02/08/2023    NEUTROABS 6.06 02/08/2023    LYMPHSABS 1.68 02/08/2023    MONOSABS 0.57 02/08/2023    EOSABS 0.24 02/08/2023    BASOSABS 0.03 02/08/2023     Lab Results   Component Value Date    GLUCOSE 113 (H) 02/08/2023    BUN 12 02/08/2023    CREATININE 1.00 02/08/2023     02/08/2023    K 4.1 02/08/2023     02/08/2023    CO2 26.8 02/08/2023    CALCIUM 9.0 02/08/2023    PROTEINTOT 6.9 02/08/2023    ALBUMIN 4.1 02/08/2023    BILITOT 1.1 02/08/2023    ALKPHOS 86 02/08/2023    AST 22 02/08/2023    ALT 22 02/08/2023     Lab Results    Component Value Date    MG 1.8 (L) 09/07/2021    FREET4 0.84 06/29/2022    TSH 3.250 02/24/2021     Lab Results   Component Value Date    IRON 60 (L) 03/04/2020    LABIRON 20 03/04/2020    TRANSFERRIN 215.00 03/04/2020    TIBC 307 03/04/2020     Lab Results   Component Value Date    FERRITIN 140 03/04/2020    HLSRZLNA40 >2000 (H) 03/04/2020    FOLATE 19.4 03/04/2020     Lab Results   Component Value Date    CEA 5.58 02/08/2023             Assessment and Plan    Diagnoses and all orders for this visit:    1. Rectal cancer (HCC) (Primary)  Assessment & Plan:  Metastatic/recurrent.  Patient is status post treatments as outlined, most recently stereotactic radiation to the chest.  Imaging demonstrates multiple new pulmonary nodules-progression of disease.  I discussed the case with Dr. Luna.  There are too many lesions for her to radiate and it has been a very short interval from his last radiation treatment to the progressive disease.  Systemic therapy would be more appropriate.  He continues to have fairly significant neuropathy especially in the feet.  FOLFOX would be a consideration but that would likely make the neuropathy worse.  We discussed FOLFIRI plus bevacizumab (he is on anticoagulation).  Side effects discussed including alopecia, mucositis, fatigue, taste changes, nausea, vomiting, diarrhea, liver dysfunction, kidney dysfunction, cytopenias, increased risk of bleeding, rare instance of thromboembolic events including heart attack, stroke, blood clots, proteinuria, elevations in blood pressure etc.  We did discuss looking at clinical trials but he is not interested in travel.  Prior mutation testing did not demonstrate an actionable mutation.  After discussion, patient is agreeable to therapy as outlined.  Written teaching information provided.  I will plan his initial cycle in the near future.  He will have lab work today to ensure adequate endorgan functions and blood counts along with a  urinalysis.  Prescriptions for Zofran and Compazine provided as needed for nausea.  I will see him back for cycle 2, day 1 with lab work prior to monitor for toxicities.  I will plan restaging scans after cycle 4.    Orders:  -     ondansetron (ZOFRAN) 8 MG tablet; Take 1 tablet by mouth Every 8 (Eight) Hours As Needed for Nausea. for nausea  Dispense: 30 tablet; Refill: 3  -     potassium chloride (K-DUR,KLOR-CON) 20 MEQ CR tablet; Take 1 tablet by mouth Daily.  Dispense: 90 tablet; Refill: 1  -     Urinalysis without microscopic (no culture) - Urine, Clean Catch; Future    2. Hypokalemia  -     potassium chloride (K-DUR,KLOR-CON) 20 MEQ CR tablet; Take 1 tablet by mouth Daily.  Dispense: 90 tablet; Refill: 1    Other orders  -     CBC and Differential; Future  -     Comprehensive metabolic panel; Future  -     Lab Appointment Request; Future  -     Clinic Appointment Request; Future  -     Infusion Appointment Request 3; Future          Patient Follow Up: Cycle 2-day 1    Patient was given instructions and counseling regarding his condition or for health maintenance advice. Please see specific information pulled into the AVS if appropriate.     Maurilio Campos MD    2/21/2023

## 2023-02-21 NOTE — ASSESSMENT & PLAN NOTE
Metastatic/recurrent.  Patient is status post treatments as outlined, most recently stereotactic radiation to the chest.  Imaging demonstrates multiple new pulmonary nodules-progression of disease.  I discussed the case with Dr. Luna.  There are too many lesions for her to radiate and it has been a very short interval from his last radiation treatment to the progressive disease.  Systemic therapy would be more appropriate.  He continues to have fairly significant neuropathy especially in the feet.  FOLFOX would be a consideration but that would likely make the neuropathy worse.  We discussed FOLFIRI plus bevacizumab (he is on anticoagulation).  Side effects discussed including alopecia, mucositis, fatigue, taste changes, nausea, vomiting, diarrhea, liver dysfunction, kidney dysfunction, cytopenias, increased risk of bleeding, rare instance of thromboembolic events including heart attack, stroke, blood clots, proteinuria, elevations in blood pressure etc.  We did discuss looking at clinical trials but he is not interested in travel.  Prior mutation testing did not demonstrate an actionable mutation.  After discussion, patient is agreeable to therapy as outlined.  Written teaching information provided.  I will plan his initial cycle in the near future.  He will have lab work today to ensure adequate endorgan functions and blood counts along with a urinalysis.  Prescriptions for Zofran and Compazine provided as needed for nausea.  I will see him back for cycle 2, day 1 with lab work prior to monitor for toxicities.  I will plan restaging scans after cycle 4.

## 2023-02-28 ENCOUNTER — DOCUMENTATION (OUTPATIENT)
Dept: ONCOLOGY | Facility: HOSPITAL | Age: 68
End: 2023-02-28
Payer: MEDICARE

## 2023-02-28 ENCOUNTER — HOSPITAL ENCOUNTER (OUTPATIENT)
Dept: ONCOLOGY | Facility: HOSPITAL | Age: 68
Discharge: HOME | End: 2023-02-28
Admitting: INTERNAL MEDICINE
Payer: MEDICARE

## 2023-02-28 VITALS
HEIGHT: 74 IN | OXYGEN SATURATION: 97 % | HEART RATE: 84 BPM | SYSTOLIC BLOOD PRESSURE: 126 MMHG | DIASTOLIC BLOOD PRESSURE: 84 MMHG | RESPIRATION RATE: 18 BRPM | WEIGHT: 292.77 LBS | TEMPERATURE: 98.7 F | BODY MASS INDEX: 37.57 KG/M2

## 2023-02-28 DIAGNOSIS — C20 RECTAL CANCER: ICD-10-CM

## 2023-02-28 DIAGNOSIS — C20 RECTAL CANCER: Primary | ICD-10-CM

## 2023-02-28 LAB
ALBUMIN SERPL-MCNC: 3.8 G/DL (ref 3.5–5.2)
ALBUMIN/GLOB SERPL: 1.7 G/DL
ALP SERPL-CCNC: 74 U/L (ref 39–117)
ALT SERPL W P-5'-P-CCNC: 15 U/L (ref 1–41)
ANION GAP SERPL CALCULATED.3IONS-SCNC: 8.3 MMOL/L (ref 5–15)
AST SERPL-CCNC: 17 U/L (ref 1–40)
BASOPHILS # BLD AUTO: 0.02 10*3/MM3 (ref 0–0.2)
BASOPHILS NFR BLD AUTO: 0.3 % (ref 0–1.5)
BILIRUB SERPL-MCNC: 0.7 MG/DL (ref 0–1.2)
BUN SERPL-MCNC: 11 MG/DL (ref 8–23)
BUN/CREAT SERPL: 12.2 (ref 7–25)
CALCIUM SPEC-SCNC: 8.5 MG/DL (ref 8.6–10.5)
CHLORIDE SERPL-SCNC: 104 MMOL/L (ref 98–107)
CO2 SERPL-SCNC: 24.7 MMOL/L (ref 22–29)
CREAT SERPL-MCNC: 0.9 MG/DL (ref 0.76–1.27)
DEPRECATED RDW RBC AUTO: 44.6 FL (ref 37–54)
EGFRCR SERPLBLD CKD-EPI 2021: 94.2 ML/MIN/1.73
EOSINOPHIL # BLD AUTO: 0.3 10*3/MM3 (ref 0–0.4)
EOSINOPHIL NFR BLD AUTO: 5.2 % (ref 0.3–6.2)
ERYTHROCYTE [DISTWIDTH] IN BLOOD BY AUTOMATED COUNT: 12.7 % (ref 12.3–15.4)
GLOBULIN UR ELPH-MCNC: 2.2 GM/DL
GLUCOSE SERPL-MCNC: 166 MG/DL (ref 65–99)
HCT VFR BLD AUTO: 41.3 % (ref 37.5–51)
HGB BLD-MCNC: 14.1 G/DL (ref 13–17.7)
IMM GRANULOCYTES # BLD AUTO: 0.02 10*3/MM3 (ref 0–0.05)
IMM GRANULOCYTES NFR BLD AUTO: 0.3 % (ref 0–0.5)
LYMPHOCYTES # BLD AUTO: 1.24 10*3/MM3 (ref 0.7–3.1)
LYMPHOCYTES NFR BLD AUTO: 21.4 % (ref 19.6–45.3)
MCH RBC QN AUTO: 32.2 PG (ref 26.6–33)
MCHC RBC AUTO-ENTMCNC: 34.1 G/DL (ref 31.5–35.7)
MCV RBC AUTO: 94.3 FL (ref 79–97)
MONOCYTES # BLD AUTO: 0.33 10*3/MM3 (ref 0.1–0.9)
MONOCYTES NFR BLD AUTO: 5.7 % (ref 5–12)
NEUTROPHILS NFR BLD AUTO: 3.88 10*3/MM3 (ref 1.7–7)
NEUTROPHILS NFR BLD AUTO: 67.1 % (ref 42.7–76)
PLATELET # BLD AUTO: 205 10*3/MM3 (ref 140–450)
PMV BLD AUTO: 8.8 FL (ref 6–12)
POTASSIUM SERPL-SCNC: 3.6 MMOL/L (ref 3.5–5.2)
PROT SERPL-MCNC: 6 G/DL (ref 6–8.5)
RBC # BLD AUTO: 4.38 10*6/MM3 (ref 4.14–5.8)
SODIUM SERPL-SCNC: 137 MMOL/L (ref 136–145)
WBC NRBC COR # BLD: 5.79 10*3/MM3 (ref 3.4–10.8)

## 2023-02-28 PROCEDURE — 80053 COMPREHEN METABOLIC PANEL: CPT | Performed by: INTERNAL MEDICINE

## 2023-02-28 PROCEDURE — G0498 CHEMO EXTEND IV INFUS W/PUMP: HCPCS

## 2023-02-28 PROCEDURE — 25010000002 DEXAMETHASONE SODIUM PHOSPHATE 120 MG/30ML SOLUTION: Performed by: INTERNAL MEDICINE

## 2023-02-28 PROCEDURE — 25010000002 IRINOTECAN PER 20 MG: Performed by: INTERNAL MEDICINE

## 2023-02-28 PROCEDURE — 96368 THER/DIAG CONCURRENT INF: CPT

## 2023-02-28 PROCEDURE — 25010000002 PALONOSETRON PER 25 MCG: Performed by: INTERNAL MEDICINE

## 2023-02-28 PROCEDURE — 85025 COMPLETE CBC W/AUTO DIFF WBC: CPT | Performed by: INTERNAL MEDICINE

## 2023-02-28 PROCEDURE — 96409 CHEMO IV PUSH SNGL DRUG: CPT

## 2023-02-28 PROCEDURE — 25010000002 BEVACIZUMAB-BVZR 100 MG/4ML SOLUTION 4 ML VIAL: Performed by: INTERNAL MEDICINE

## 2023-02-28 PROCEDURE — 25010000002 FLUOROURACIL PER 500 MG: Performed by: INTERNAL MEDICINE

## 2023-02-28 PROCEDURE — 96417 CHEMO IV INFUS EACH ADDL SEQ: CPT

## 2023-02-28 PROCEDURE — 25010000002 LEUCOVORIN CALCIUM PER 50 MG: Performed by: INTERNAL MEDICINE

## 2023-02-28 PROCEDURE — 96413 CHEMO IV INFUSION 1 HR: CPT

## 2023-02-28 PROCEDURE — 96411 CHEMO IV PUSH ADDL DRUG: CPT

## 2023-02-28 PROCEDURE — 96375 TX/PRO/DX INJ NEW DRUG ADDON: CPT

## 2023-02-28 PROCEDURE — 25010000002 BEVACIZUMAB-BVZR 400 MG/16ML SOLUTION 16 ML VIAL: Performed by: INTERNAL MEDICINE

## 2023-02-28 RX ORDER — SODIUM CHLORIDE 9 MG/ML
250 INJECTION, SOLUTION INTRAVENOUS ONCE
Status: COMPLETED | OUTPATIENT
Start: 2023-02-28 | End: 2023-02-28

## 2023-02-28 RX ORDER — FLUOROURACIL 50 MG/ML
400 INJECTION, SOLUTION INTRAVENOUS ONCE
Status: CANCELLED | OUTPATIENT
Start: 2023-02-28

## 2023-02-28 RX ORDER — ATROPINE SULFATE 1 MG/ML
0.25 INJECTION, SOLUTION INTRAMUSCULAR; INTRAVENOUS; SUBCUTANEOUS
Status: CANCELLED | OUTPATIENT
Start: 2023-02-28

## 2023-02-28 RX ORDER — PALONOSETRON 0.05 MG/ML
0.25 INJECTION, SOLUTION INTRAVENOUS ONCE
Status: CANCELLED | OUTPATIENT
Start: 2023-02-28

## 2023-02-28 RX ORDER — ONDANSETRON HYDROCHLORIDE 8 MG/1
8 TABLET, FILM COATED ORAL 3 TIMES DAILY PRN
Qty: 30 TABLET | Refills: 5 | Status: SHIPPED | OUTPATIENT
Start: 2023-02-28

## 2023-02-28 RX ORDER — FLUOROURACIL 50 MG/ML
1000 INJECTION, SOLUTION INTRAVENOUS ONCE
Status: COMPLETED | OUTPATIENT
Start: 2023-02-28 | End: 2023-02-28

## 2023-02-28 RX ORDER — PALONOSETRON 0.05 MG/ML
0.25 INJECTION, SOLUTION INTRAVENOUS ONCE
Status: COMPLETED | OUTPATIENT
Start: 2023-02-28 | End: 2023-02-28

## 2023-02-28 RX ORDER — SODIUM CHLORIDE 9 MG/ML
250 INJECTION, SOLUTION INTRAVENOUS ONCE
Status: CANCELLED | OUTPATIENT
Start: 2023-02-28

## 2023-02-28 RX ADMIN — LEUCOVORIN CALCIUM 1000 MG: 350 INJECTION, POWDER, LYOPHILIZED, FOR SUSPENSION INTRAMUSCULAR; INTRAVENOUS at 10:34

## 2023-02-28 RX ADMIN — PALONOSETRON HYDROCHLORIDE 0.25 MG: 0.25 INJECTION, SOLUTION INTRAVENOUS at 09:41

## 2023-02-28 RX ADMIN — DEXAMETHASONE SODIUM PHOSPHATE 12 MG: 4 INJECTION, SOLUTION INTRA-ARTICULAR; INTRALESIONAL; INTRAMUSCULAR; INTRAVENOUS; SOFT TISSUE at 09:19

## 2023-02-28 RX ADMIN — SODIUM CHLORIDE 660 MG: 9 INJECTION, SOLUTION INTRAVENOUS at 09:46

## 2023-02-28 RX ADMIN — SODIUM CHLORIDE 250 ML: 9 INJECTION, SOLUTION INTRAVENOUS at 09:00

## 2023-02-28 RX ADMIN — FLUOROURACIL 1000 MG: 50 INJECTION, SOLUTION INTRAVENOUS at 12:14

## 2023-02-28 RX ADMIN — IRINOTECAN HYDROCHLORIDE 465 MG: 20 INJECTION, SOLUTION INTRAVENOUS at 10:34

## 2023-02-28 RX ADMIN — FLUOROURACIL 6250 MG: 50 INJECTION, SOLUTION INTRAVENOUS at 12:20

## 2023-02-28 NOTE — PROGRESS NOTES
"Outpatient Nutrition Oncology Assessment    Patient Name: Robbi Kennedy  YOB: 1955  MRN: [id number]  Assessment Date: 2/28/2023    CLINICAL NUTRITION ASSESSMENT    Dx:  Colorectal Ca      Type of Cancer Treatment FOLFIRI + Avastin          Reason for Assessment  Assessment       Current Problems:   Patient Active Problem List   Diagnosis Code   • Allergic rhinitis J30.9   • Arthritis M19.90   • Asthma J45.909   • Rectal cancer C20   • Encounter for adjustment or management of vascular access device Z45.2   • Atrial fibrillation, persistent I48.19   • Benign prostatic hyperplasia N40.0   • Adenomatosis D12.6   • Glossodynia K14.6   • Psychosexual dysfunction with inhibited sexual excitement F52.8   • Varicocele I86.1   • Essential hypertension I10   • Hyperlipidemia LDL goal <100 E78.5   • Other obesity due to excess calories E66.09   • Hypokalemia E87.6   • Cancer related pain G89.3   • Secondary malignant neoplasm of left lung C78.02   • Secondary malignancy of liver C78.7   • Hypothyroidism E03.9   • Secondary malignant neoplasm of right lung C78.01   • Thrombocytopenia (HCC) D69.6   • Chronic heart failure with preserved ejection fraction I50.32         Anthropometrics     Row Name 02/28/23 0941 02/28/23 0807       Anthropometrics    Height -- 189 cm (74.41\")    Weight -- 133 kg (292 lb 12.3 oz)    Weight for Calculation 133 kg (293 lb 3.4 oz) --                BMI kg/m2   Body mass index is 37.18 kg/m².    Weight Hx  Wt Readings from Last 30 Encounters:   02/28/23 0807 133 kg (292 lb 12.3 oz)   02/21/23 1051 131 kg (289 lb 11 oz)   02/09/23 1110 132 kg (290 lb 5.5 oz)   01/13/23 0830 132 kg (290 lb 9.6 oz)   12/21/22 1451 132 kg (289 lb 14.5 oz)   12/08/22 0902 132 kg (291 lb 0.1 oz)   11/16/22 1005 130 kg (287 lb 11.2 oz)   11/15/22 1001 129 kg (285 lb 7.9 oz)   11/14/22 1016 131 kg (288 lb 2.3 oz)   11/11/22 1014 130 kg (286 lb 6 oz)   11/10/22 1020 130 kg (285 lb 15 oz)   11/09/22 1005 129 kg " "(284 lb 6.3 oz)   11/08/22 1013 (P) 129 kg (285 lb 0.9 oz)   11/07/22 1155 129 kg (283 lb 15.2 oz)   11/04/22 1012 129 kg (284 lb 6.3 oz)   11/03/22 1006 130 kg (285 lb 15 oz)   10/11/22 1001 130 kg (287 lb 4.2 oz)   09/14/22 0928 130 kg (285 lb 11.5 oz)   08/09/22 1355 129 kg (284 lb 6.3 oz)   07/13/22 0853 130 kg (286 lb 6.4 oz)   07/07/22 1445 128 kg (283 lb 1.1 oz)   07/05/22 1436 128 kg (282 lb 6.6 oz)   07/01/22 1436 128 kg (281 lb 8.4 oz)   06/30/22 1436 129 kg (283 lb 8.2 oz)   06/28/22 1453 128 kg (282 lb 10.1 oz)   06/15/22 0953 132 kg (290 lb 5.5 oz)   06/09/22 0942 127 kg (280 lb)   03/22/22 0825 129 kg (284 lb 6.3 oz)   03/10/22 0909 129 kg (285 lb 4.4 oz)   02/23/22 0839 129 kg (285 lb 4.4 oz)         Estimated/Assessed Needs - Anthropometrics     Row Name 02/28/23 0941 02/28/23 0807       Anthropometrics    Height -- 189 cm (74.41\")    Weight -- 133 kg (292 lb 12.3 oz)    Weight for Calculation 133 kg (293 lb 3.4 oz) --       Estimated/Assessed Needs    Additional Documentation Fluid Requirements (Group);KCAL/KG (Group);Protein Requirements (Group) --       KCAL/KG    KCAL/KG 25 Kcal/Kg (kcal) --    25 Kcal/Kg (kcal) 3325 --       Protein Requirements    Weight Used For Protein Calculations 89.1 kg (196 lb 6.9 oz) --    Est Protein Requirement Amount (gms/kg) 1.3 gm protein --    Estimated Protein Requirements (gms/day) 115.83 --       Fluid Requirements    Fluid Requirements (mL/day) 8413 --    RDA Method (mL) 2673 --                 Labs/Medications        Pertinent Labs Reviewed.   Results from last 7 days   Lab Units 02/28/23  0802   SODIUM mmol/L 137   POTASSIUM mmol/L 3.6   CHLORIDE mmol/L 104   CO2 mmol/L 24.7   BUN mg/dL 11   CREATININE mg/dL 0.90   CALCIUM mg/dL 8.5*   BILIRUBIN mg/dL 0.7   ALK PHOS U/L 74   ALT (SGPT) U/L 15   AST (SGOT) U/L 17   GLUCOSE mg/dL 166*     Results from last 7 days   Lab Units 02/28/23  0802   HEMOGLOBIN g/dL 14.1   HEMATOCRIT % 41.3       Pertinent Medications " acetaminophen, apixaban, ascorbic acid, atorvastatin, cyanocobalamin, diclofenac, fluticasone, furosemide, levothyroxine, loratadine, metoprolol tartrate, multivitamin, ondansetron, potassium chloride, and vitamin E     Nutrition Diagnosis        Nutrition Dx Problem 1 Increased nutrient needs related to increased nutrient needs due to catabolic disease as evidenced by physiological causes increasing nutrient needs.       Nutrition Intervention       RD Action Nutrition assessment    Discussion with pt regarding the following:  Current nutritional state / PO intake  Importance of adequate nutritional intake (kcals & protein) & fluid intake  Potential nutrition-related side effects of new tx (diarrhea, n/v, mucositis)  Reviewed MNT / diet-related tips for each concern    Handouts provided:  Oral Care  Diarrhea     Monitor/Evaluation       Monitor Per oncology nutrition protocol.     Comments:    Pt has received chemotherapy in the past, but now on new tx.  Pt familiar with the importance of nutrition & hydration while receiving tx.  No nutrition-related concerns reported currently and pt notes he has actually gained 5-8# over the past 3 months.  Reviewed information with pt as above- pt v/u.     Electronically signed by:  Althea Duarte RD  02/28/23 10:43 EST

## 2023-02-28 NOTE — PROGRESS NOTES
Diagnosis: Rectal Cancer    Reason for Referral: Rounding with new patient at infusion clinic    Content of Visit: OSW met with patient to identify any needs to be addressed. Patient stated he did not need anything today. Patient thanked OSW for checking in with him.    Resources/Referrals Provided: None identified

## 2023-02-28 NOTE — PROGRESS NOTES
"Presents for cycle #1 FOLFIRI plus bevacizumab.  Patient has received FOLFOX and bevacizumab in the past.    Ht: 189 cm  Wt: 133 kg  BSA: 2.57 m2    Doses verified using today's parameters and are within acceptable limits of variation and rounding.    Labs reviewed and are within Epic comm parameter limits to proceed.    UpToDate patient information printed and provided to patient and key information verbally highlighted including: Overview of regimen including infusion times; Recognition and management of allergic/infusion reactions; N/V management; \"call your doctor right away\" parameters.    All questions were answered in kind and no outstanding issues are noted at this time.  RTC on Thursday for pump disconnect then in 2 weeks for cycle #2 with labs prior.  "

## 2023-03-02 ENCOUNTER — HOSPITAL ENCOUNTER (OUTPATIENT)
Dept: ONCOLOGY | Facility: HOSPITAL | Age: 68
Discharge: HOME | End: 2023-03-02
Admitting: INTERNAL MEDICINE
Payer: MEDICARE

## 2023-03-02 ENCOUNTER — TELEPHONE (OUTPATIENT)
Dept: ONCOLOGY | Facility: HOSPITAL | Age: 68
End: 2023-03-02
Payer: MEDICARE

## 2023-03-02 DIAGNOSIS — Z45.2 ENCOUNTER FOR ADJUSTMENT OR MANAGEMENT OF VASCULAR ACCESS DEVICE: Primary | ICD-10-CM

## 2023-03-02 DIAGNOSIS — C20 RECTAL CANCER: ICD-10-CM

## 2023-03-02 PROCEDURE — 25010000002 HEPARIN LOCK FLUSH PER 10 UNITS: Performed by: INTERNAL MEDICINE

## 2023-03-02 RX ORDER — SODIUM CHLORIDE 0.9 % (FLUSH) 0.9 %
20 SYRINGE (ML) INJECTION AS NEEDED
Status: CANCELLED | OUTPATIENT
Start: 2023-03-02

## 2023-03-02 RX ORDER — HEPARIN SODIUM (PORCINE) LOCK FLUSH IV SOLN 100 UNIT/ML 100 UNIT/ML
500 SOLUTION INTRAVENOUS AS NEEDED
Status: CANCELLED | OUTPATIENT
Start: 2023-03-02

## 2023-03-02 RX ORDER — SODIUM CHLORIDE 0.9 % (FLUSH) 0.9 %
20 SYRINGE (ML) INJECTION AS NEEDED
Status: DISCONTINUED | OUTPATIENT
Start: 2023-03-02 | End: 2023-03-03 | Stop reason: HOSPADM

## 2023-03-02 RX ORDER — HEPARIN SODIUM (PORCINE) LOCK FLUSH IV SOLN 100 UNIT/ML 100 UNIT/ML
500 SOLUTION INTRAVENOUS AS NEEDED
Status: DISCONTINUED | OUTPATIENT
Start: 2023-03-02 | End: 2023-03-03 | Stop reason: HOSPADM

## 2023-03-02 RX ADMIN — HEPARIN SODIUM (PORCINE) LOCK FLUSH IV SOLN 100 UNIT/ML 500 UNITS: 100 SOLUTION at 13:46

## 2023-03-02 RX ADMIN — Medication 20 ML: at 13:46

## 2023-03-02 NOTE — TELEPHONE ENCOUNTER
Robbi Kennedy 1955       Symptoms    • Does patient have nausea and vomiting? Y    • Does patient have medications prescribed for N/V? N    • Does patient have diarrhea? N    • Does the patient have diarrhea medications? N    • Does the patient have pain? N    • Is pain medications effective?    Discharge  • Do you have any questions about your discharge instructions? N    [facility] with [facility]    • Where you satisfied with the care you received? Y    Pt suggestions for the [facility]    • Do you have any suggestions to improve your care? N    Comments    • Follow up phone call comments

## 2023-03-09 DIAGNOSIS — I10 ESSENTIAL HYPERTENSION: Primary | ICD-10-CM

## 2023-03-09 RX ORDER — METOPROLOL TARTRATE 100 MG/1
100 TABLET ORAL 2 TIMES DAILY
Qty: 180 TABLET | Refills: 1 | Status: SHIPPED | OUTPATIENT
Start: 2023-03-09 | End: 2023-03-15 | Stop reason: SDUPTHER

## 2023-03-14 ENCOUNTER — HOSPITAL ENCOUNTER (OUTPATIENT)
Dept: ONCOLOGY | Facility: HOSPITAL | Age: 68
Discharge: HOME | End: 2023-03-14
Admitting: INTERNAL MEDICINE
Payer: MEDICARE

## 2023-03-14 ENCOUNTER — OFFICE VISIT (OUTPATIENT)
Dept: ONCOLOGY | Facility: HOSPITAL | Age: 68
End: 2023-03-14
Payer: MEDICARE

## 2023-03-14 VITALS
WEIGHT: 293.21 LBS | BODY MASS INDEX: 37.23 KG/M2 | DIASTOLIC BLOOD PRESSURE: 74 MMHG | OXYGEN SATURATION: 97 % | RESPIRATION RATE: 18 BRPM | TEMPERATURE: 97.9 F | SYSTOLIC BLOOD PRESSURE: 111 MMHG | HEART RATE: 79 BPM

## 2023-03-14 VITALS
DIASTOLIC BLOOD PRESSURE: 74 MMHG | WEIGHT: 292.77 LBS | SYSTOLIC BLOOD PRESSURE: 111 MMHG | HEART RATE: 79 BPM | TEMPERATURE: 97.9 F | OXYGEN SATURATION: 97 % | HEIGHT: 74 IN | BODY MASS INDEX: 37.57 KG/M2 | RESPIRATION RATE: 18 BRPM

## 2023-03-14 DIAGNOSIS — C20 RECTAL CANCER: Primary | ICD-10-CM

## 2023-03-14 LAB
ALBUMIN SERPL-MCNC: 3.7 G/DL (ref 3.5–5.2)
ALBUMIN/GLOB SERPL: 1.6 G/DL
ALP SERPL-CCNC: 80 U/L (ref 39–117)
ALT SERPL W P-5'-P-CCNC: 19 U/L (ref 1–41)
ANION GAP SERPL CALCULATED.3IONS-SCNC: 5.5 MMOL/L (ref 5–15)
AST SERPL-CCNC: 18 U/L (ref 1–40)
BASOPHILS # BLD AUTO: 0.02 10*3/MM3 (ref 0–0.2)
BASOPHILS NFR BLD AUTO: 0.5 % (ref 0–1.5)
BILIRUB SERPL-MCNC: 0.7 MG/DL (ref 0–1.2)
BILIRUB UR QL STRIP: NEGATIVE
BUN SERPL-MCNC: 12 MG/DL (ref 8–23)
BUN/CREAT SERPL: 13.3 (ref 7–25)
CALCIUM SPEC-SCNC: 8.5 MG/DL (ref 8.6–10.5)
CHLORIDE SERPL-SCNC: 104 MMOL/L (ref 98–107)
CLARITY UR: CLEAR
CO2 SERPL-SCNC: 27.5 MMOL/L (ref 22–29)
COLOR UR: YELLOW
CREAT SERPL-MCNC: 0.9 MG/DL (ref 0.76–1.27)
DEPRECATED RDW RBC AUTO: 44.3 FL (ref 37–54)
EGFRCR SERPLBLD CKD-EPI 2021: 94.2 ML/MIN/1.73
EOSINOPHIL # BLD AUTO: 0.29 10*3/MM3 (ref 0–0.4)
EOSINOPHIL NFR BLD AUTO: 7.1 % (ref 0.3–6.2)
ERYTHROCYTE [DISTWIDTH] IN BLOOD BY AUTOMATED COUNT: 13 % (ref 12.3–15.4)
GLOBULIN UR ELPH-MCNC: 2.3 GM/DL
GLUCOSE SERPL-MCNC: 152 MG/DL (ref 65–99)
GLUCOSE UR STRIP-MCNC: NEGATIVE MG/DL
HCT VFR BLD AUTO: 41.2 % (ref 37.5–51)
HGB BLD-MCNC: 13.8 G/DL (ref 13–17.7)
HGB UR QL STRIP.AUTO: NEGATIVE
IMM GRANULOCYTES # BLD AUTO: 0 10*3/MM3 (ref 0–0.05)
IMM GRANULOCYTES NFR BLD AUTO: 0 % (ref 0–0.5)
KETONES UR QL STRIP: NEGATIVE
LEUKOCYTE ESTERASE UR QL STRIP.AUTO: NEGATIVE
LYMPHOCYTES # BLD AUTO: 0.91 10*3/MM3 (ref 0.7–3.1)
LYMPHOCYTES NFR BLD AUTO: 22.2 % (ref 19.6–45.3)
MCH RBC QN AUTO: 31.6 PG (ref 26.6–33)
MCHC RBC AUTO-ENTMCNC: 33.5 G/DL (ref 31.5–35.7)
MCV RBC AUTO: 94.3 FL (ref 79–97)
MONOCYTES # BLD AUTO: 0.24 10*3/MM3 (ref 0.1–0.9)
MONOCYTES NFR BLD AUTO: 5.9 % (ref 5–12)
NEUTROPHILS NFR BLD AUTO: 2.63 10*3/MM3 (ref 1.7–7)
NEUTROPHILS NFR BLD AUTO: 64.3 % (ref 42.7–76)
NITRITE UR QL STRIP: NEGATIVE
PH UR STRIP.AUTO: 5.5 [PH] (ref 5–8)
PLATELET # BLD AUTO: 203 10*3/MM3 (ref 140–450)
PMV BLD AUTO: 8.8 FL (ref 6–12)
POTASSIUM SERPL-SCNC: 3.5 MMOL/L (ref 3.5–5.2)
PROT SERPL-MCNC: 6 G/DL (ref 6–8.5)
PROT UR QL STRIP: NEGATIVE
RBC # BLD AUTO: 4.37 10*6/MM3 (ref 4.14–5.8)
SODIUM SERPL-SCNC: 137 MMOL/L (ref 136–145)
SP GR UR STRIP: 1.01 (ref 1–1.03)
UROBILINOGEN UR QL STRIP: NORMAL
WBC NRBC COR # BLD: 4.09 10*3/MM3 (ref 3.4–10.8)

## 2023-03-14 PROCEDURE — 1126F AMNT PAIN NOTED NONE PRSNT: CPT | Performed by: INTERNAL MEDICINE

## 2023-03-14 PROCEDURE — 25010000002 IRINOTECAN PER 20 MG: Performed by: INTERNAL MEDICINE

## 2023-03-14 PROCEDURE — 99214 OFFICE O/P EST MOD 30 MIN: CPT | Performed by: INTERNAL MEDICINE

## 2023-03-14 PROCEDURE — 25010000002 DEXAMETHASONE SODIUM PHOSPHATE 120 MG/30ML SOLUTION: Performed by: INTERNAL MEDICINE

## 2023-03-14 PROCEDURE — G0498 CHEMO EXTEND IV INFUS W/PUMP: HCPCS

## 2023-03-14 PROCEDURE — 96368 THER/DIAG CONCURRENT INF: CPT

## 2023-03-14 PROCEDURE — 25010000002 BEVACIZUMAB-BVZR 100 MG/4ML SOLUTION 4 ML VIAL: Performed by: INTERNAL MEDICINE

## 2023-03-14 PROCEDURE — 85025 COMPLETE CBC W/AUTO DIFF WBC: CPT | Performed by: INTERNAL MEDICINE

## 2023-03-14 PROCEDURE — 96413 CHEMO IV INFUSION 1 HR: CPT

## 2023-03-14 PROCEDURE — 81003 URINALYSIS AUTO W/O SCOPE: CPT | Performed by: INTERNAL MEDICINE

## 2023-03-14 PROCEDURE — 80053 COMPREHEN METABOLIC PANEL: CPT | Performed by: INTERNAL MEDICINE

## 2023-03-14 PROCEDURE — 25010000002 LEUCOVORIN CALCIUM PER 50 MG: Performed by: INTERNAL MEDICINE

## 2023-03-14 PROCEDURE — 25010000002 PALONOSETRON PER 25 MCG: Performed by: INTERNAL MEDICINE

## 2023-03-14 PROCEDURE — 3078F DIAST BP <80 MM HG: CPT | Performed by: INTERNAL MEDICINE

## 2023-03-14 PROCEDURE — 1159F MED LIST DOCD IN RCRD: CPT | Performed by: INTERNAL MEDICINE

## 2023-03-14 PROCEDURE — 1160F RVW MEDS BY RX/DR IN RCRD: CPT | Performed by: INTERNAL MEDICINE

## 2023-03-14 PROCEDURE — 96411 CHEMO IV PUSH ADDL DRUG: CPT

## 2023-03-14 PROCEDURE — 3074F SYST BP LT 130 MM HG: CPT | Performed by: INTERNAL MEDICINE

## 2023-03-14 PROCEDURE — 96375 TX/PRO/DX INJ NEW DRUG ADDON: CPT

## 2023-03-14 PROCEDURE — 25010000002 BEVACIZUMAB-BVZR 400 MG/16ML SOLUTION 16 ML VIAL: Performed by: INTERNAL MEDICINE

## 2023-03-14 PROCEDURE — 96417 CHEMO IV INFUS EACH ADDL SEQ: CPT

## 2023-03-14 PROCEDURE — 25010000002 FLUOROURACIL PER 500 MG: Performed by: INTERNAL MEDICINE

## 2023-03-14 RX ORDER — SODIUM CHLORIDE 9 MG/ML
250 INJECTION, SOLUTION INTRAVENOUS ONCE
Status: COMPLETED | OUTPATIENT
Start: 2023-03-14 | End: 2023-03-14

## 2023-03-14 RX ORDER — SODIUM CHLORIDE 9 MG/ML
250 INJECTION, SOLUTION INTRAVENOUS ONCE
Status: CANCELLED | OUTPATIENT
Start: 2023-03-14

## 2023-03-14 RX ORDER — ATROPINE SULFATE 1 MG/ML
0.25 INJECTION, SOLUTION INTRAMUSCULAR; INTRAVENOUS; SUBCUTANEOUS
Status: CANCELLED | OUTPATIENT
Start: 2023-03-14

## 2023-03-14 RX ORDER — FLUOROURACIL 50 MG/ML
400 INJECTION, SOLUTION INTRAVENOUS ONCE
Status: CANCELLED | OUTPATIENT
Start: 2023-03-14

## 2023-03-14 RX ORDER — PALONOSETRON 0.05 MG/ML
0.25 INJECTION, SOLUTION INTRAVENOUS ONCE
Status: CANCELLED | OUTPATIENT
Start: 2023-03-14

## 2023-03-14 RX ORDER — FLUOROURACIL 50 MG/ML
1000 INJECTION, SOLUTION INTRAVENOUS ONCE
Status: COMPLETED | OUTPATIENT
Start: 2023-03-14 | End: 2023-03-14

## 2023-03-14 RX ORDER — PALONOSETRON 0.05 MG/ML
0.25 INJECTION, SOLUTION INTRAVENOUS ONCE
Status: COMPLETED | OUTPATIENT
Start: 2023-03-14 | End: 2023-03-14

## 2023-03-14 RX ADMIN — FLUOROURACIL 6250 MG: 50 INJECTION, SOLUTION INTRAVENOUS at 13:37

## 2023-03-14 RX ADMIN — SODIUM CHLORIDE 250 ML: 9 INJECTION, SOLUTION INTRAVENOUS at 10:55

## 2023-03-14 RX ADMIN — SODIUM CHLORIDE 660 MG: 9 INJECTION, SOLUTION INTRAVENOUS at 11:23

## 2023-03-14 RX ADMIN — FLUOROURACIL 1000 MG: 50 INJECTION, SOLUTION INTRAVENOUS at 13:33

## 2023-03-14 RX ADMIN — IRINOTECAN HYDROCHLORIDE 465 MG: 20 INJECTION, SOLUTION INTRAVENOUS at 12:03

## 2023-03-14 RX ADMIN — LEUCOVORIN CALCIUM 1000 MG: 350 INJECTION, POWDER, LYOPHILIZED, FOR SUSPENSION INTRAMUSCULAR; INTRAVENOUS at 12:03

## 2023-03-14 RX ADMIN — DEXAMETHASONE SODIUM PHOSPHATE 12 MG: 4 INJECTION, SOLUTION INTRA-ARTICULAR; INTRALESIONAL; INTRAMUSCULAR; INTRAVENOUS; SOFT TISSUE at 11:01

## 2023-03-14 RX ADMIN — PALONOSETRON 0.25 MG: 0.05 INJECTION, SOLUTION INTRAVENOUS at 10:55

## 2023-03-14 NOTE — ASSESSMENT & PLAN NOTE
Metastatic/recurrent.  Patient is on FOLFIRI plus bevacizumab.  He had increased fatigue with his last cycle.  His lab work is adequate for treatment.  Proceed with cycle 2 as planned.  RTC 2 weeks for OV, cycle 3 with lab work prior to monitor for toxicities.  Plan repeat scans after cycle 4.

## 2023-03-14 NOTE — PROGRESS NOTES
"Chief Complaint  Rectal Cancer    Eri Moran, Eri Engel, REGINALDO Fonseca          Robbi Kennedy presents to [facility] for ongoing treatment of his metastatic rectal cancer.  He is on FOLFIRI plus bevacizumab.  His last cycle was notable for increased fatigue.  He states that he just felt very \"down\" for several days after the cycle but then his energy slowly improved.  He denies nausea, vomiting or diarrhea.  He notes adequate appetite and energy level.  No issues from his Port-A-Cath.    Oncology/Hematology History Overview Note   9/30/2019 High rectal nbsn-xvfdhszxva-igodiipcqugoly adenocarcinoma  associated with tubular adenoma.   4/27/21 Biopsy of liver revealed metastatic adenocarcinoma, consistent with colorectal primary.    Clinical Staging  Stage IIa ([id number])    Chemotherapy      neoadjuvant Xeloda with radiation. adjuvant xeloda        Radiation Therapy      7/8/19 thru 8/14/19 completed neoadjuvant 5040 cGy=28 fxs rectum     10/2021 XRT due to recurrence right middle lung 5 Fx's, 5000cGy  11/26/21 adjuvant FOLFOX -2/25/2022    Surgeries       9/30/2019 low anterior resection with ostomy        8/31/21 partial hepatectomy, cholecystectomy and MWA for segment 6 medical margin performed at     2/8/2023 CT Scans revealed Worsening pulmonary metastatic disease.    2/21/2023 orders written for FOLFIRI + AVASTIN       Rectal cancer   6/2/2021 - 8/15/2021 Chemotherapy    OP COLON mFOLFOX6 + Bevacizumab (OXALIplatin / Leucovorin / Fluorouracil / Bevacizumab)     6/18/2021 Initial Diagnosis    Rectal cancer (CMS/HCC)     6/21/2021 Cancer Staged    Staging form: Colon And Rectum, AJCC 8th Edition  - Clinical: Stage IIA (cT3, cN0, cM0) - Signed by Maurilio Campos MD on 6/21/2021 11/16/2021 - 2/25/2022 Chemotherapy    OP COLON mFOLFOX6 OXALIplatin / Leucovorin / Fluorouracil     2/28/2023 -  Chemotherapy    OP COLORECTAL FOLFIRI + Bevacizumab " 5mg/kg (Irinotecan / Leucovorin / Fluorouracil / Bevacizumab)     Secondary malignant neoplasm of left lung   9/14/2021 Initial Diagnosis    Secondary malignant neoplasm of left lung (HCC)     10/20/2021 -  Radiation    RADIATION THERAPY Treatment Details (Noted on 10/5/2021)  Site: Bilateral Lung  Technique: SBRT  Goal: No goal specified  Planned Treatment Start Date: 10/20/2021     11/16/2021 - 2/25/2022 Chemotherapy    OP COLON mFOLFOX6 OXALIplatin / Leucovorin / Fluorouracil     6/15/2022 -  Radiation    RADIATION THERAPY Treatment Details (Noted on 6/15/2022)  Site: Left Lung - Lower lobe  Technique: SBRT  Goal: No goal specified  Planned Treatment Start Date: No planned start date specified     10/25/2022 -  Radiation    RADIATION THERAPY Treatment Details (Noted on 10/25/2022)  Site: Left Lung  Technique: SBRT  Goal: No goal specified  Planned Treatment Start Date: No planned start date specified     Secondary malignancy of liver   9/14/2021 Initial Diagnosis    Secondary malignancy of liver (HCC)     11/16/2021 - 2/25/2022 Chemotherapy    OP COLON mFOLFOX6 OXALIplatin / Leucovorin / Fluorouracil     Secondary malignant neoplasm of right lung   10/5/2021 Initial Diagnosis    Secondary malignant neoplasm of right lung (HCC)     10/20/2021 -  Radiation    RADIATION THERAPY Treatment Details (Noted on 10/5/2021)  Site: Bilateral Lung  Technique: SBRT  Goal: No goal specified  Planned Treatment Start Date: 10/20/2021     11/16/2021 - 2/25/2022 Chemotherapy    OP COLON mFOLFOX6 OXALIplatin / Leucovorin / Fluorouracil         Review of Systems   Constitutional: Positive for fatigue. Negative for appetite change, diaphoresis, fever, unexpected weight gain and unexpected weight loss.   HENT: Negative for hearing loss, mouth sores, sore throat, swollen glands, trouble swallowing and voice change.    Eyes: Negative for blurred vision.   Respiratory: Negative for cough, shortness of breath and wheezing.     Cardiovascular: Negative for chest pain and palpitations.   Gastrointestinal: Negative for abdominal pain, blood in stool, constipation, diarrhea, nausea and vomiting.   Endocrine: Negative for cold intolerance and heat intolerance.   Genitourinary: Negative for difficulty urinating, dysuria, frequency, hematuria and urinary incontinence.   Musculoskeletal: Negative for arthralgias, back pain and myalgias.   Skin: Negative for rash, skin lesions and wound.   Neurological: Negative for dizziness, seizures, weakness, numbness and headache.   Hematological: Does not bruise/bleed easily.   Psychiatric/Behavioral: Negative for depressed mood. The patient is not nervous/anxious.      Current Outpatient Medications on File Prior to Visit   Medication Sig Dispense Refill   • acetaminophen (TYLENOL) 500 MG tablet Take 1 tablet by mouth Every 6 (Six) Hours As Needed.     • apixaban (ELIQUIS) 5 MG tablet tablet Take 1 tablet by mouth 2 (Two) Times a Day.     • ascorbic acid (VITAMIN C) 500 MG tablet Take 1 tablet by mouth Daily.     • atorvastatin (LIPITOR) 40 MG tablet atorvastatin 40 mg oral tablet take 1 tablet (40 mg) by oral route once daily   Active     • cyanocobalamin (VITAMIN B-12) 1000 MCG tablet Take 1 tablet by mouth Daily.     • diclofenac (VOLTAREN) 50 MG EC tablet      • fluticasone (FLONASE) 50 MCG/ACT nasal spray      • furosemide (LASIX) 20 MG tablet Take 1 tablet by mouth Daily. 90 tablet 3   • loratadine (CLARITIN) 10 MG tablet loratadine 10 mg oral tablet take 1 tablet (10 mg) by oral route once daily   Active     • metoprolol tartrate (LOPRESSOR) 100 MG tablet Take 1 tablet by mouth 2 (Two) Times a Day. 180 tablet 1   • multivitamin (THERAGRAN) tablet tablet Take 1 tablet by mouth Daily.     • ondansetron (ZOFRAN) 8 MG tablet Take 1 tablet by mouth Every 8 (Eight) Hours As Needed for Nausea. for nausea 30 tablet 3   • ondansetron (ZOFRAN) 8 MG tablet Take 1 tablet by mouth 3 (Three) Times a Day As  Needed for Nausea or Vomiting. 30 tablet 5   • potassium chloride (K-DUR,KLOR-CON) 20 MEQ CR tablet Take 1 tablet by mouth Daily. 90 tablet 1   • Synthroid 25 MCG tablet Take 1 tablet by mouth Daily.     • vitamin E 1000 UNIT capsule Take 1 capsule by mouth Daily.       Current Facility-Administered Medications on File Prior to Visit   Medication Dose Route Frequency Provider Last Rate Last Admin   • [COMPLETED] Bevacizumab-bvzr (ZIRABEV) 660 mg in sodium chloride 0.9 % 136.4 mL chemo IVPB  5 mg/kg (Treatment Plan Recorded) Intravenous Once Maurilio Campos MD   Stopped at 03/14/23 1155   • [COMPLETED] dexamethasone (DECADRON) IVPB 12 mg  12 mg Intravenous Once Maurilio Campos MD   Stopped at 03/14/23 1116   • fluorouracil (ADRUCIL) 6,250 mg in sodium chloride 0.9 % 138 mL chemo infusion - FOR HOME USE  6,250 mg Intravenous Once Maurilio Campos MD   6,250 mg at 03/14/23 1337   • [COMPLETED] fluorouracil (ADRUCIL) chemo injection 1,000 mg (20 ml)  1,000 mg Intravenous Once Maurliio Campos MD   Stopped at 03/14/23 1336   • [COMPLETED] irinotecan (CAMPTOSAR) 465 mg in dextrose (D5W) 5 % 573.3 mL chemo IVPB  180 mg/m2 (Treatment Plan Recorded) Intravenous Once Maurilio Campos MD   Stopped at 03/14/23 1332   • [COMPLETED] leucovorin 1,000 mg in dextrose (D5W) 5 % 325 mL IVPB  1,000 mg Intravenous Once Maurilio Campos MD   Stopped at 03/14/23 1332   • [COMPLETED] palonosetron (ALOXI) injection 0.25 mg  0.25 mg Intravenous Once Maurilio Campos MD   0.25 mg at 03/14/23 1055   • [COMPLETED] sodium chloride 0.9 % infusion 250 mL  250 mL Intravenous Once Maurilio Campos MD   Stopped at 03/14/23 1337       No Known Allergies  Past Medical History:   Diagnosis Date   • Asthma    • Atrial fibrillation, persistent 2/26/2021   • Chronic heart failure with preserved ejection fraction 7/18/2022    BNP 1590 on 7/22 Echocardiogram with EF 55% moderate to severe biatrial enlargement and mild right ventricular enlargement  7/22    • Colorectal cancer    • Essential hypertension    • GI cancer    • Hepatitis    • History of DVT of lower extremity 8/27/2021   • Hyperlipidemia LDL goal <100 12/31/2020   • Rectal cancer 6/18/2021   • Secondary malignancy of liver 9/14/2021   • Secondary malignant neoplasm of left lung 9/14/2021   • Secondary malignant neoplasm of right lung 10/5/2021   • Thrombocytopenia (HCC) 1/11/2022   • Thyroid disease      Past Surgical History:   Procedure Laterality Date   • COLON SURGERY     • HERNIA REPAIR     • LIVER BIOPSY      4/27/21 Biopsy of liver revealed metastatic adenocarcinoma, consistent with colorectal primary   • OTHER SURGICAL HISTORY      REMOVED CANCER FROM COLON     Social History     Socioeconomic History   • Marital status:    • Number of children: 2   Tobacco Use   • Smoking status: Never   • Smokeless tobacco: Never   Vaping Use   • Vaping Use: Never used   Substance and Sexual Activity   • Alcohol use: Yes     Comment: occasionally, no recent use   • Drug use: Never   • Sexual activity: Defer     Family History   Problem Relation Age of Onset   • Prostate cancer Father    • Heart murmur Mother    • Diabetes Mother    • Colon cancer Maternal Uncle        Objective   Physical Exam  Vitals reviewed. Exam conducted with a chaperone present.   Constitutional:       General: He is not in acute distress.     Appearance: Normal appearance.   Cardiovascular:      Rate and Rhythm: Normal rate and regular rhythm.      Heart sounds: Normal heart sounds. No murmur heard.    No gallop.   Pulmonary:      Effort: Pulmonary effort is normal.      Breath sounds: Normal breath sounds.      Comments: Port  Abdominal:      General: Abdomen is flat. Bowel sounds are normal.      Palpations: Abdomen is soft.   Musculoskeletal:      Cervical back: Neck supple.      Right lower leg: No edema.      Left lower leg: No edema.   Lymphadenopathy:      Cervical: No cervical adenopathy.   Neurological:      Mental  Status: He is alert and oriented to person, place, and time.   Psychiatric:         Mood and Affect: Mood normal.         Behavior: Behavior normal.         Vitals:    03/14/23 0941   BP: 111/74   Pulse: 79   Resp: 18   Temp: 97.9 °F (36.6 °C)   TempSrc: Temporal   SpO2: 97%   Weight: 133 kg (293 lb 3.4 oz)   PainSc: 0-No pain     ECOG score: 1         PHQ-9 Total Score:                    Result Review :   The following data was reviewed by: Maurilio Campos MD on 03/14/2023:  Lab Results   Component Value Date    HGB 13.8 03/14/2023    HCT 41.2 03/14/2023    MCV 94.3 03/14/2023     03/14/2023    WBC 4.09 03/14/2023    NEUTROABS 2.63 03/14/2023    LYMPHSABS 0.91 03/14/2023    MONOSABS 0.24 03/14/2023    EOSABS 0.29 03/14/2023    BASOSABS 0.02 03/14/2023     Lab Results   Component Value Date    GLUCOSE 152 (H) 03/14/2023    BUN 12 03/14/2023    CREATININE 0.90 03/14/2023     03/14/2023    K 3.5 03/14/2023     03/14/2023    CO2 27.5 03/14/2023    CALCIUM 8.5 (L) 03/14/2023    PROTEINTOT 6.0 03/14/2023    ALBUMIN 3.7 03/14/2023    BILITOT 0.7 03/14/2023    ALKPHOS 80 03/14/2023    AST 18 03/14/2023    ALT 19 03/14/2023     Lab Results   Component Value Date    MG 1.8 (L) 09/07/2021    FREET4 0.84 06/29/2022    TSH 3.250 02/24/2021     Lab Results   Component Value Date    IRON 60 (L) 03/04/2020    LABIRON 20 03/04/2020    TRANSFERRIN 215.00 03/04/2020    TIBC 307 03/04/2020     Lab Results   Component Value Date    FERRITIN 140 03/04/2020    FOKKBLEX80 >2000 (H) 03/04/2020    FOLATE 19.4 03/04/2020     Lab Results   Component Value Date    CEA 5.58 02/08/2023             Assessment and Plan    Diagnoses and all orders for this visit:    1. Rectal cancer (Primary)  Assessment & Plan:  Metastatic/recurrent.  Patient is on FOLFIRI plus bevacizumab.  He had increased fatigue with his last cycle.  His lab work is adequate for treatment.  Proceed with cycle 2 as planned.  RTC 2 weeks for OV, cycle 3  with lab work prior to monitor for toxicities.  Plan repeat scans after cycle 4.    Orders:  -     Cancel: sodium chloride 0.9 % infusion 250 mL  -     Cancel: palonosetron (ALOXI) injection 0.25 mg  -     Cancel: dexamethasone (DECADRON) 12 mg in sodium chloride 0.9 % IVPB  -     Cancel: Bevacizumab-bvzr (ZIRABEV) 660 mg in sodium chloride 0.9 % 126.4 mL chemo IVPB  -     Cancel: leucovorin 1,040 mg in dextrose (D5W) 5 % 354 mL IVPB  -     Cancel: irinotecan (CAMPTOSAR) 465 mg in dextrose (D5W) 5 % 523.3 mL chemo IVPB  -     Cancel: fluorouracil (ADRUCIL) chemo injection 1,040 mg  -     Cancel: fluorouracil (ADRUCIL) 6,220 mg in sodium chloride 0.9 % 124.4 mL chemo infusion - FOR HOME USE  -     Cancel: Infusion Appointment Request 10; Future    Other orders  -     Cancel: atropine injection 0.25 mg          Patient Follow Up: Cycle 3-day 1    Patient was given instructions and counseling regarding his condition or for health maintenance advice. Please see specific information pulled into the AVS if appropriate.     Maurilio Campos MD    3/14/2023

## 2023-03-15 DIAGNOSIS — I10 ESSENTIAL HYPERTENSION: ICD-10-CM

## 2023-03-16 ENCOUNTER — HOSPITAL ENCOUNTER (OUTPATIENT)
Dept: ONCOLOGY | Facility: HOSPITAL | Age: 68
Discharge: HOME | End: 2023-03-16
Admitting: INTERNAL MEDICINE
Payer: MEDICARE

## 2023-03-16 DIAGNOSIS — Z45.2 ENCOUNTER FOR ADJUSTMENT OR MANAGEMENT OF VASCULAR ACCESS DEVICE: Primary | ICD-10-CM

## 2023-03-16 DIAGNOSIS — C20 RECTAL CANCER: ICD-10-CM

## 2023-03-16 PROCEDURE — 25010000002 HEPARIN LOCK FLUSH PER 10 UNITS: Performed by: INTERNAL MEDICINE

## 2023-03-16 RX ORDER — SODIUM CHLORIDE 0.9 % (FLUSH) 0.9 %
20 SYRINGE (ML) INJECTION AS NEEDED
Status: DISCONTINUED | OUTPATIENT
Start: 2023-03-16 | End: 2023-03-17 | Stop reason: HOSPADM

## 2023-03-16 RX ORDER — HEPARIN SODIUM (PORCINE) LOCK FLUSH IV SOLN 100 UNIT/ML 100 UNIT/ML
500 SOLUTION INTRAVENOUS AS NEEDED
Status: CANCELLED | OUTPATIENT
Start: 2023-03-16

## 2023-03-16 RX ORDER — HEPARIN SODIUM (PORCINE) LOCK FLUSH IV SOLN 100 UNIT/ML 100 UNIT/ML
500 SOLUTION INTRAVENOUS AS NEEDED
Status: DISCONTINUED | OUTPATIENT
Start: 2023-03-16 | End: 2023-03-17 | Stop reason: HOSPADM

## 2023-03-16 RX ORDER — SODIUM CHLORIDE 0.9 % (FLUSH) 0.9 %
20 SYRINGE (ML) INJECTION AS NEEDED
Status: CANCELLED | OUTPATIENT
Start: 2023-03-16

## 2023-03-16 RX ORDER — METOPROLOL TARTRATE 100 MG/1
100 TABLET ORAL 2 TIMES DAILY
Qty: 180 TABLET | Refills: 1 | Status: SHIPPED | OUTPATIENT
Start: 2023-03-16

## 2023-03-16 RX ADMIN — Medication 20 ML: at 13:15

## 2023-03-16 RX ADMIN — HEPARIN SODIUM (PORCINE) LOCK FLUSH IV SOLN 100 UNIT/ML 500 UNITS: 100 SOLUTION at 13:15

## 2023-03-16 NOTE — TELEPHONE ENCOUNTER
Caller: ANA    Relationship: WIFE    Best call back number: [phone]    Requested Prescriptions:   Requested Prescriptions     Pending Prescriptions Disp Refills   • metoprolol tartrate (LOPRESSOR) 100 MG tablet 180 tablet 1     Sig: Take 1 tablet by mouth 2 (Two) Times a Day.        Pharm[facility] where request should be sent:  [facility]    Additional details provided by patient: PATIENT REQUESTING 90 DAY SUPPLY    Does the patient have less than a 3 day supply:  [] Yes  [x] No    Would you like a call back once the refill request has been completed: [] Yes [] No    If the office needs to give you a call back, can they leave a voicemail: [] Yes [] No    Jamie Rascon Rep   03/15/23 12:27 EDT           
Last OV: 01-13-23      Next  OV: 08-07-23    Medication matches last office note        
R1 Progress Note    S: 43yo PPD#1 s/p . Patient feels well. Pain is well controlled. She is tolerating a regular diet, passing flatus, voiding spontaneously, and ambulating without difficulty. Denies headache, lightheadedness, CP, SOB, N/V, or heavy vaginal bleeding.     O:  Vitals:  Vital Signs Last 24 Hrs  T(C): 36.8 (15 Sep 2020 06:27), Max: 36.9 (14 Sep 2020 13:54)  T(F): 98.2 (15 Sep 2020 06:27), Max: 98.4 (14 Sep 2020 13:54)  HR: 91 (15 Sep 2020 06:27) (76 - 116)  BP: 96/57 (15 Sep 2020 06:27) (96/57 - 108/57)  BP(mean): --  RR: 18 (15 Sep 2020 06:27) (17 - 18)  SpO2: 98% (15 Sep 2020 06:27) (90% - 99%)    MEDICATIONS  (STANDING):  acetaminophen   Tablet .. 975 milliGRAM(s) Oral <User Schedule>  diphtheria/tetanus/pertussis (acellular) Vaccine (ADAcel) 0.5 milliLiter(s) IntraMuscular once  ibuprofen  Tablet. 600 milliGRAM(s) Oral every 6 hours  influenza   Vaccine 0.5 milliLiter(s) IntraMuscular once  prenatal multivitamin 1 Tablet(s) Oral daily  sodium chloride 0.9% lock flush 3 milliLiter(s) IV Push every 8 hours      Labs:  Blood type: O Positive  Rubella IgG: RPR: Negative                          12.3   9.56 >-----------< 127<L>    (  @ 07:32 )             36.8    Physical Exam:  General: NAD  Abdomen: soft, appropriately tender, non-distended, fundus firm  Vaginal: Lochia wnl

## 2023-03-27 ENCOUNTER — HOSPITAL ENCOUNTER (OUTPATIENT)
Dept: ONCOLOGY | Facility: HOSPITAL | Age: 68
Discharge: HOME | End: 2023-03-27
Admitting: INTERNAL MEDICINE
Payer: MEDICARE

## 2023-03-27 ENCOUNTER — OFFICE VISIT (OUTPATIENT)
Dept: ONCOLOGY | Facility: HOSPITAL | Age: 68
End: 2023-03-27
Payer: MEDICARE

## 2023-03-27 VITALS
RESPIRATION RATE: 18 BRPM | WEIGHT: 292.99 LBS | DIASTOLIC BLOOD PRESSURE: 86 MMHG | HEART RATE: 72 BPM | SYSTOLIC BLOOD PRESSURE: 112 MMHG | TEMPERATURE: 97.6 F | OXYGEN SATURATION: 98 % | BODY MASS INDEX: 37.21 KG/M2

## 2023-03-27 DIAGNOSIS — C20 RECTAL CANCER: Primary | ICD-10-CM

## 2023-03-27 DIAGNOSIS — Z45.2 ENCOUNTER FOR ADJUSTMENT OR MANAGEMENT OF VASCULAR ACCESS DEVICE: ICD-10-CM

## 2023-03-27 LAB
ALBUMIN SERPL-MCNC: 3.7 G/DL (ref 3.5–5.2)
ALBUMIN/GLOB SERPL: 1.7 G/DL
ALP SERPL-CCNC: 78 U/L (ref 39–117)
ALT SERPL W P-5'-P-CCNC: 30 U/L (ref 1–41)
ANION GAP SERPL CALCULATED.3IONS-SCNC: 7.5 MMOL/L (ref 5–15)
AST SERPL-CCNC: 29 U/L (ref 1–40)
BASOPHILS # BLD AUTO: 0.02 10*3/MM3 (ref 0–0.2)
BASOPHILS NFR BLD AUTO: 0.4 % (ref 0–1.5)
BILIRUB SERPL-MCNC: 0.7 MG/DL (ref 0–1.2)
BILIRUB UR QL STRIP: NEGATIVE
BUN SERPL-MCNC: 10 MG/DL (ref 8–23)
BUN/CREAT SERPL: 9.8 (ref 7–25)
CALCIUM SPEC-SCNC: 8.7 MG/DL (ref 8.6–10.5)
CHLORIDE SERPL-SCNC: 103 MMOL/L (ref 98–107)
CLARITY UR: CLEAR
CO2 SERPL-SCNC: 28.5 MMOL/L (ref 22–29)
COLOR UR: YELLOW
CREAT SERPL-MCNC: 1.02 MG/DL (ref 0.76–1.27)
DEPRECATED RDW RBC AUTO: 45.5 FL (ref 37–54)
EGFRCR SERPLBLD CKD-EPI 2021: 81.1 ML/MIN/1.73
EOSINOPHIL # BLD AUTO: 0.18 10*3/MM3 (ref 0–0.4)
EOSINOPHIL NFR BLD AUTO: 3.2 % (ref 0.3–6.2)
ERYTHROCYTE [DISTWIDTH] IN BLOOD BY AUTOMATED COUNT: 13.6 % (ref 12.3–15.4)
GLOBULIN UR ELPH-MCNC: 2.2 GM/DL
GLUCOSE SERPL-MCNC: 102 MG/DL (ref 65–99)
GLUCOSE UR STRIP-MCNC: NEGATIVE MG/DL
HCT VFR BLD AUTO: 39.3 % (ref 37.5–51)
HGB BLD-MCNC: 13.3 G/DL (ref 13–17.7)
HGB UR QL STRIP.AUTO: NEGATIVE
IMM GRANULOCYTES # BLD AUTO: 0 10*3/MM3 (ref 0–0.05)
IMM GRANULOCYTES NFR BLD AUTO: 0 % (ref 0–0.5)
KETONES UR QL STRIP: NEGATIVE
LEUKOCYTE ESTERASE UR QL STRIP.AUTO: NEGATIVE
LYMPHOCYTES # BLD AUTO: 1.7 10*3/MM3 (ref 0.7–3.1)
LYMPHOCYTES NFR BLD AUTO: 30.7 % (ref 19.6–45.3)
MCH RBC QN AUTO: 31.8 PG (ref 26.6–33)
MCHC RBC AUTO-ENTMCNC: 33.8 G/DL (ref 31.5–35.7)
MCV RBC AUTO: 94 FL (ref 79–97)
MONOCYTES # BLD AUTO: 0.43 10*3/MM3 (ref 0.1–0.9)
MONOCYTES NFR BLD AUTO: 7.8 % (ref 5–12)
NEUTROPHILS NFR BLD AUTO: 3.21 10*3/MM3 (ref 1.7–7)
NEUTROPHILS NFR BLD AUTO: 57.9 % (ref 42.7–76)
NITRITE UR QL STRIP: NEGATIVE
PH UR STRIP.AUTO: 5.5 [PH] (ref 5–8)
PLATELET # BLD AUTO: 160 10*3/MM3 (ref 140–450)
PMV BLD AUTO: 8.7 FL (ref 6–12)
POTASSIUM SERPL-SCNC: 4.2 MMOL/L (ref 3.5–5.2)
PROT SERPL-MCNC: 5.9 G/DL (ref 6–8.5)
PROT UR QL STRIP: NEGATIVE
RBC # BLD AUTO: 4.18 10*6/MM3 (ref 4.14–5.8)
SODIUM SERPL-SCNC: 139 MMOL/L (ref 136–145)
SP GR UR STRIP: 1.01 (ref 1–1.03)
UROBILINOGEN UR QL STRIP: NORMAL
WBC NRBC COR # BLD: 5.54 10*3/MM3 (ref 3.4–10.8)

## 2023-03-27 PROCEDURE — 1126F AMNT PAIN NOTED NONE PRSNT: CPT | Performed by: INTERNAL MEDICINE

## 2023-03-27 PROCEDURE — 36591 DRAW BLOOD OFF VENOUS DEVICE: CPT

## 2023-03-27 PROCEDURE — 80053 COMPREHEN METABOLIC PANEL: CPT | Performed by: INTERNAL MEDICINE

## 2023-03-27 PROCEDURE — 99214 OFFICE O/P EST MOD 30 MIN: CPT | Performed by: INTERNAL MEDICINE

## 2023-03-27 PROCEDURE — 81003 URINALYSIS AUTO W/O SCOPE: CPT | Performed by: INTERNAL MEDICINE

## 2023-03-27 PROCEDURE — 3074F SYST BP LT 130 MM HG: CPT | Performed by: INTERNAL MEDICINE

## 2023-03-27 PROCEDURE — 85025 COMPLETE CBC W/AUTO DIFF WBC: CPT | Performed by: INTERNAL MEDICINE

## 2023-03-27 PROCEDURE — 1160F RVW MEDS BY RX/DR IN RCRD: CPT | Performed by: INTERNAL MEDICINE

## 2023-03-27 PROCEDURE — 25010000002 HEPARIN LOCK FLUSH PER 10 UNITS: Performed by: INTERNAL MEDICINE

## 2023-03-27 PROCEDURE — 1159F MED LIST DOCD IN RCRD: CPT | Performed by: INTERNAL MEDICINE

## 2023-03-27 PROCEDURE — 3079F DIAST BP 80-89 MM HG: CPT | Performed by: INTERNAL MEDICINE

## 2023-03-27 RX ORDER — SODIUM CHLORIDE 9 MG/ML
250 INJECTION, SOLUTION INTRAVENOUS ONCE
Status: CANCELLED | OUTPATIENT
Start: 2023-03-28

## 2023-03-27 RX ORDER — HEPARIN SODIUM (PORCINE) LOCK FLUSH IV SOLN 100 UNIT/ML 100 UNIT/ML
500 SOLUTION INTRAVENOUS AS NEEDED
Status: CANCELLED | OUTPATIENT
Start: 2023-03-27

## 2023-03-27 RX ORDER — FLUOROURACIL 50 MG/ML
400 INJECTION, SOLUTION INTRAVENOUS ONCE
Status: CANCELLED | OUTPATIENT
Start: 2023-03-28

## 2023-03-27 RX ORDER — PALONOSETRON 0.05 MG/ML
0.25 INJECTION, SOLUTION INTRAVENOUS ONCE
Status: CANCELLED | OUTPATIENT
Start: 2023-03-28

## 2023-03-27 RX ORDER — ATROPINE SULFATE 1 MG/ML
0.25 INJECTION, SOLUTION INTRAMUSCULAR; INTRAVENOUS; SUBCUTANEOUS
Status: CANCELLED | OUTPATIENT
Start: 2023-03-28

## 2023-03-27 RX ORDER — HEPARIN SODIUM (PORCINE) LOCK FLUSH IV SOLN 100 UNIT/ML 100 UNIT/ML
500 SOLUTION INTRAVENOUS AS NEEDED
Status: DISCONTINUED | OUTPATIENT
Start: 2023-03-27 | End: 2023-03-28 | Stop reason: HOSPADM

## 2023-03-27 RX ORDER — SODIUM CHLORIDE 0.9 % (FLUSH) 0.9 %
20 SYRINGE (ML) INJECTION AS NEEDED
Status: DISCONTINUED | OUTPATIENT
Start: 2023-03-27 | End: 2023-03-28 | Stop reason: HOSPADM

## 2023-03-27 RX ORDER — SODIUM CHLORIDE 0.9 % (FLUSH) 0.9 %
20 SYRINGE (ML) INJECTION AS NEEDED
Status: CANCELLED | OUTPATIENT
Start: 2023-03-27

## 2023-03-27 RX ADMIN — HEPARIN SODIUM (PORCINE) LOCK FLUSH IV SOLN 100 UNIT/ML 500 UNITS: 100 SOLUTION at 13:43

## 2023-03-27 RX ADMIN — Medication 20 ML: at 13:43

## 2023-03-27 NOTE — PROGRESS NOTES
Chief Complaint  Follow-up    Eri Moran APRN Reinberg, Emily, APRN Subjective          Robbi Kennedy presents to [facility] for ongoing treatment of his metastatic rectal cancer.  He is on FOLFIRI plus bevacizumab. Due for C3 today.   He states he again had several rough days after chemo. He felt tired and nauseous with associated headache. He took anti-emetics with relief of the nausea. He took tyelnol for the headaches. He feels much better now. No diarrhea. Denies fevers, chills, infection. No breathing issues. Port functioning.     Oncology/Hematology History Overview Note   9/30/2019 High rectal ahmi-yszmwshsnj-lcjzpvkfgoxmky adenocarcinoma  associated with tubular adenoma.   4/27/21 Biopsy of liver revealed metastatic adenocarcinoma, consistent with colorectal primary.    Clinical Staging  Stage IIa ([id number])    Chemotherapy      neoadjuvant Xeloda with radiation. adjuvant xeloda        Radiation Therapy      7/8/19 thru 8/14/19 completed neoadjuvant 5040 cGy=28 fxs rectum     10/2021 XRT due to recurrence right middle lung 5 Fx's, 5000cGy  11/26/21 adjuvant FOLFOX -2/25/2022    Surgeries       9/30/2019 low anterior resection with ostomy        8/31/21 partial hepatectomy, cholecystectomy and MWA for segment 6 medical margin performed at     2/8/2023 CT Scans revealed Worsening pulmonary metastatic disease.    2/21/2023 orders written for FOLFIRI + AVASTIN       Rectal cancer   6/2/2021 - 8/15/2021 Chemotherapy    OP COLON mFOLFOX6 + Bevacizumab (OXALIplatin / Leucovorin / Fluorouracil / Bevacizumab)     6/18/2021 Initial Diagnosis    Rectal cancer (CMS/HCC)     6/21/2021 Cancer Staged    Staging form: Colon And Rectum, AJCC 8th Edition  - Clinical: Stage IIA (cT3, cN0, cM0) - Signed by Maurilio Campos MD on 6/21/2021 11/16/2021 - 2/25/2022 Chemotherapy    OP COLON mFOLFOX6 OXALIplatin / Leucovorin / Fluorouracil     2/28/2023 -  Chemotherapy     OP COLORECTAL FOLFIRI + Bevacizumab 5mg/kg (Irinotecan / Leucovorin / Fluorouracil / Bevacizumab)     Secondary malignant neoplasm of left lung   9/14/2021 Initial Diagnosis    Secondary malignant neoplasm of left lung (HCC)     10/20/2021 -  Radiation    RADIATION THERAPY Treatment Details (Noted on 10/5/2021)  Site: Bilateral Lung  Technique: SBRT  Goal: No goal specified  Planned Treatment Start Date: 10/20/2021     11/16/2021 - 2/25/2022 Chemotherapy    OP COLON mFOLFOX6 OXALIplatin / Leucovorin / Fluorouracil     6/15/2022 -  Radiation    RADIATION THERAPY Treatment Details (Noted on 6/15/2022)  Site: Left Lung - Lower lobe  Technique: SBRT  Goal: No goal specified  Planned Treatment Start Date: No planned start date specified     10/25/2022 -  Radiation    RADIATION THERAPY Treatment Details (Noted on 10/25/2022)  Site: Left Lung  Technique: SBRT  Goal: No goal specified  Planned Treatment Start Date: No planned start date specified     Secondary malignancy of liver   9/14/2021 Initial Diagnosis    Secondary malignancy of liver (HCC)     11/16/2021 - 2/25/2022 Chemotherapy    OP COLON mFOLFOX6 OXALIplatin / Leucovorin / Fluorouracil     Secondary malignant neoplasm of right lung   10/5/2021 Initial Diagnosis    Secondary malignant neoplasm of right lung (HCC)     10/20/2021 -  Radiation    RADIATION THERAPY Treatment Details (Noted on 10/5/2021)  Site: Bilateral Lung  Technique: SBRT  Goal: No goal specified  Planned Treatment Start Date: 10/20/2021     11/16/2021 - 2/25/2022 Chemotherapy    OP COLON mFOLFOX6 OXALIplatin / Leucovorin / Fluorouracil         Review of Systems   Constitutional: Positive for fatigue. Negative for appetite change, diaphoresis, fever, unexpected weight gain and unexpected weight loss.   HENT: Negative for hearing loss, mouth sores, sore throat, swollen glands, trouble swallowing and voice change.    Eyes: Negative for blurred vision.   Respiratory: Negative for cough, shortness of  breath and wheezing.    Cardiovascular: Negative for chest pain and palpitations.   Gastrointestinal: Negative for abdominal pain, blood in stool, constipation, diarrhea, nausea and vomiting.   Endocrine: Negative for cold intolerance and heat intolerance.   Genitourinary: Negative for difficulty urinating, dysuria, frequency, hematuria and urinary incontinence.   Musculoskeletal: Negative for arthralgias, back pain and myalgias.   Skin: Negative for rash, skin lesions and wound.   Neurological: Negative for dizziness, seizures, weakness, numbness and headache.   Hematological: Does not bruise/bleed easily.   Psychiatric/Behavioral: Negative for depressed mood. The patient is not nervous/anxious.    All other systems reviewed and are negative.    Current Outpatient Medications on File Prior to Visit   Medication Sig Dispense Refill   • acetaminophen (TYLENOL) 500 MG tablet Take 1 tablet by mouth Every 6 (Six) Hours As Needed.     • apixaban (ELIQUIS) 5 MG tablet tablet Take 1 tablet by mouth 2 (Two) Times a Day.     • ascorbic acid (VITAMIN C) 500 MG tablet Take 1 tablet by mouth Daily.     • atorvastatin (LIPITOR) 40 MG tablet atorvastatin 40 mg oral tablet take 1 tablet (40 mg) by oral route once daily   Active     • cyanocobalamin (VITAMIN B-12) 1000 MCG tablet Take 1 tablet by mouth Daily.     • diclofenac (VOLTAREN) 50 MG EC tablet      • fluticasone (FLONASE) 50 MCG/ACT nasal spray      • furosemide (LASIX) 20 MG tablet Take 1 tablet by mouth Daily. 90 tablet 3   • loratadine (CLARITIN) 10 MG tablet loratadine 10 mg oral tablet take 1 tablet (10 mg) by oral route once daily   Active     • metoprolol tartrate (LOPRESSOR) 100 MG tablet Take 1 tablet by mouth 2 (Two) Times a Day. 180 tablet 1   • multivitamin (THERAGRAN) tablet tablet Take 1 tablet by mouth Daily.     • ondansetron (ZOFRAN) 8 MG tablet Take 1 tablet by mouth Every 8 (Eight) Hours As Needed for Nausea. for nausea 30 tablet 3   • ondansetron  (ZOFRAN) 8 MG tablet Take 1 tablet by mouth 3 (Three) Times a Day As Needed for Nausea or Vomiting. 30 tablet 5   • potassium chloride (K-DUR,KLOR-CON) 20 MEQ CR tablet Take 1 tablet by mouth Daily. 90 tablet 1   • Synthroid 25 MCG tablet Take 1 tablet by mouth Daily.     • verapamil SR (CALAN-SR) 180 MG CR tablet      • vitamin E 1000 UNIT capsule Take 1 capsule by mouth Daily.       Current Facility-Administered Medications on File Prior to Visit   Medication Dose Route Frequency Provider Last Rate Last Admin   • heparin injection 500 Units  500 Units Intravenous PRN Maurilio Campos MD       • sodium chloride 0.9 % flush 20 mL  20 mL Intravenous PRN Maurilio Campos MD           No Known Allergies  Past Medical History:   Diagnosis Date   • Asthma    • Atrial fibrillation, persistent 2/26/2021   • Chronic heart failure with preserved ejection fraction 7/18/2022    BNP 1590 on 7/22 Echocardiogram with EF 55% moderate to severe biatrial enlargement and mild right ventricular enlargement 7/22    • Colorectal cancer    • Essential hypertension    • GI cancer    • Hepatitis    • History of DVT of lower extremity 8/27/2021   • Hyperlipidemia LDL goal <100 12/31/2020   • Rectal cancer 6/18/2021   • Secondary malignancy of liver 9/14/2021   • Secondary malignant neoplasm of left lung 9/14/2021   • Secondary malignant neoplasm of right lung 10/5/2021   • Thrombocytopenia (HCC) 1/11/2022   • Thyroid disease      Past Surgical History:   Procedure Laterality Date   • COLON SURGERY     • HERNIA REPAIR     • LIVER BIOPSY      4/27/21 Biopsy of liver revealed metastatic adenocarcinoma, consistent with colorectal primary   • OTHER SURGICAL HISTORY      REMOVED CANCER FROM COLON     Social History     Socioeconomic History   • Marital status:    • Number of children: 2   Tobacco Use   • Smoking status: Never   • Smokeless tobacco: Never   Vaping Use   • Vaping Use: Never used   Substance and Sexual Activity   • Alcohol  use: Yes     Comment: occasionally, no recent use   • Drug use: Never   • Sexual activity: Defer     Family History   Problem Relation Age of Onset   • Prostate cancer Father    • Heart murmur Mother    • Diabetes Mother    • Colon cancer Maternal Uncle        Objective   Physical Exam  Vitals reviewed. Exam conducted with a chaperone present.   Constitutional:       General: He is not in acute distress.     Appearance: Normal appearance.   HENT:      Head: Normocephalic and atraumatic.   Pulmonary:      Effort: Pulmonary effort is normal.      Comments: Port  Musculoskeletal:      Cervical back: Neck supple.      Right lower leg: No edema.      Left lower leg: No edema.   Lymphadenopathy:      Cervical: No cervical adenopathy.   Neurological:      General: No focal deficit present.      Mental Status: He is alert and oriented to person, place, and time.   Psychiatric:         Mood and Affect: Mood normal.         Behavior: Behavior normal.           Vitals:    03/27/23 1357   BP: 112/86   Pulse: 72   Resp: 18   Temp: 97.6 °F (36.4 °C)   TempSrc: Temporal   SpO2: 98%   Weight: 133 kg (292 lb 15.9 oz)   PainSc: 0-No pain               _PQ-9 Total Score:                Result Review :   The following data was reviewed by: Monico Perry MD on 03/27/23:  Lab Results   Component Value Date    HGB 13.3 03/27/2023    HCT 39.3 03/27/2023    MCV 94.0 03/27/2023     03/27/2023    WBC 5.54 03/27/2023    NEUTROABS 3.21 03/27/2023    LYMPHSABS 1.70 03/27/2023    MONOSABS 0.43 03/27/2023    EOSABS 0.18 03/27/2023    BASOSABS 0.02 03/27/2023     Lab Results   Component Value Date    GLUCOSE 102 (H) 03/27/2023    BUN 10 03/27/2023    CREATININE 1.02 03/27/2023     03/27/2023    K 4.2 03/27/2023     03/27/2023    CO2 28.5 03/27/2023    CALCIUM 8.7 03/27/2023    PROTEINTOT 5.9 (L) 03/27/2023    ALBUMIN 3.7 03/27/2023    BILITOT 0.7 03/27/2023    ALKPHOS 78 03/27/2023    AST 29 03/27/2023    ALT 30  03/27/2023     Lab Results   Component Value Date    MG 1.8 (L) 09/07/2021    FREET4 0.84 06/29/2022    TSH 3.250 02/24/2021     Lab Results   Component Value Date    IRON 60 (L) 03/04/2020    LABIRON 20 03/04/2020    TRANSFERRIN 215.00 03/04/2020    TIBC 307 03/04/2020     Lab Results   Component Value Date    FERRITIN 140 03/04/2020    [id number] >2000 (H) 03/04/2020    FOLATE 19.4 03/04/2020     Lab Results   Component Value Date    CEA 5.58 02/08/2023     Labs personally reviewed and stable. Creatinine normal.         Assessment and Plan    Diagnoses and all orders for this visit:    1. Rectal cancer (Primary)    Other orders  -     sodium chloride 0.9 % infusion 250 mL  -     palonosetron (ALOXI) injection 0.25 mg  -     dexamethasone (DECADRON) 12 mg in sodium chloride 0.9 % IVPB  -     atropine injection 0.25 mg  -     Bevacizumab-bvzr (ZIRABEV) 660 mg in sodium chloride 0.9 % 126.4 mL chemo IVPB  -     leucovorin 1,040 mg in dextrose (D5W) 5 % 354 mL IVPB  -     irinotecan (CAMPTOSAR) 465 mg in dextrose (D5W) 5 % 523.3 mL chemo IVPB  -     fluorouracil (ADRUCIL) chemo injection 1,040 mg  -     fluorouracil (ADRUCIL) 6,220 mg in sodium chloride 0.9 % 124.4 mL chemo infusion - FOR HOME USE       Rectal Cancer  Metastatic/recurrent.  Patient is on FOLFIRI plus bevacizumab.  He continues to have a lot of fatigue and nausea, but it is short lived and managed well with anti-emetics.  His lab work is adequate for treatment. Proceed with cycle 3 as planned.  RTC 2 weeks for OV, cycle 4 with lab work prior to monitor for toxicities.  Plan repeat scans after cycle 4.    Caris has been sent previously, no actionable mutations. TMB score of 4.     Will send for Guardant.       Patient Follow Up: Cycle 4-day 1    Patient was given instructions and counseling regarding his condition or for health maintenance advice. Please see specific information pulled into the AVS if appropriate.     Monico Perry,  MD    3/27/2023

## 2023-03-28 ENCOUNTER — HOSPITAL ENCOUNTER (OUTPATIENT)
Dept: ONCOLOGY | Facility: HOSPITAL | Age: 68
Discharge: HOME | End: 2023-03-28
Admitting: INTERNAL MEDICINE
Payer: MEDICARE

## 2023-03-28 VITALS
BODY MASS INDEX: 37.74 KG/M2 | HEART RATE: 98 BPM | DIASTOLIC BLOOD PRESSURE: 70 MMHG | OXYGEN SATURATION: 95 % | TEMPERATURE: 97.6 F | SYSTOLIC BLOOD PRESSURE: 121 MMHG | WEIGHT: 294.09 LBS | HEIGHT: 74 IN | RESPIRATION RATE: 20 BRPM

## 2023-03-28 DIAGNOSIS — C20 RECTAL CANCER: Primary | ICD-10-CM

## 2023-03-28 DIAGNOSIS — Z45.2 ENCOUNTER FOR ADJUSTMENT OR MANAGEMENT OF VASCULAR ACCESS DEVICE: ICD-10-CM

## 2023-03-28 PROCEDURE — 25010000002 ATROPINE PER 0.01 MG: Performed by: INTERNAL MEDICINE

## 2023-03-28 PROCEDURE — 96368 THER/DIAG CONCURRENT INF: CPT

## 2023-03-28 PROCEDURE — 25010000002 DEXAMETHASONE SODIUM PHOSPHATE 120 MG/30ML SOLUTION: Performed by: INTERNAL MEDICINE

## 2023-03-28 PROCEDURE — 25010000002 PALONOSETRON PER 25 MCG: Performed by: INTERNAL MEDICINE

## 2023-03-28 PROCEDURE — 25010000002 IRINOTECAN PER 20 MG: Performed by: INTERNAL MEDICINE

## 2023-03-28 PROCEDURE — 96411 CHEMO IV PUSH ADDL DRUG: CPT

## 2023-03-28 PROCEDURE — 96375 TX/PRO/DX INJ NEW DRUG ADDON: CPT

## 2023-03-28 PROCEDURE — 25010000002 FLUOROURACIL PER 500 MG: Performed by: INTERNAL MEDICINE

## 2023-03-28 PROCEDURE — G0498 CHEMO EXTEND IV INFUS W/PUMP: HCPCS

## 2023-03-28 PROCEDURE — 25010000002 LEUCOVORIN CALCIUM PER 50 MG: Performed by: INTERNAL MEDICINE

## 2023-03-28 PROCEDURE — 96413 CHEMO IV INFUSION 1 HR: CPT

## 2023-03-28 PROCEDURE — 25010000002 BEVACIZUMAB-BVZR 400 MG/16ML SOLUTION 16 ML VIAL: Performed by: INTERNAL MEDICINE

## 2023-03-28 PROCEDURE — 96417 CHEMO IV INFUS EACH ADDL SEQ: CPT

## 2023-03-28 PROCEDURE — 25010000002 BEVACIZUMAB-BVZR 100 MG/4ML SOLUTION 4 ML VIAL: Performed by: INTERNAL MEDICINE

## 2023-03-28 RX ORDER — SODIUM CHLORIDE 0.9 % (FLUSH) 0.9 %
20 SYRINGE (ML) INJECTION AS NEEDED
Status: CANCELLED | OUTPATIENT
Start: 2023-03-28

## 2023-03-28 RX ORDER — PALONOSETRON 0.05 MG/ML
0.25 INJECTION, SOLUTION INTRAVENOUS ONCE
Status: COMPLETED | OUTPATIENT
Start: 2023-03-28 | End: 2023-03-28

## 2023-03-28 RX ORDER — SODIUM CHLORIDE 9 MG/ML
250 INJECTION, SOLUTION INTRAVENOUS ONCE
Status: COMPLETED | OUTPATIENT
Start: 2023-03-28 | End: 2023-03-28

## 2023-03-28 RX ORDER — HEPARIN SODIUM (PORCINE) LOCK FLUSH IV SOLN 100 UNIT/ML 100 UNIT/ML
500 SOLUTION INTRAVENOUS AS NEEDED
Status: DISCONTINUED | OUTPATIENT
Start: 2023-03-28 | End: 2023-03-30 | Stop reason: HOSPADM

## 2023-03-28 RX ORDER — FLUOROURACIL 50 MG/ML
1000 INJECTION, SOLUTION INTRAVENOUS ONCE
Status: COMPLETED | OUTPATIENT
Start: 2023-03-28 | End: 2023-03-28

## 2023-03-28 RX ORDER — ATROPINE SULFATE 0.4 MG/ML
0.25 AMPUL (ML) INJECTION
Status: DISCONTINUED | OUTPATIENT
Start: 2023-03-28 | End: 2023-03-30 | Stop reason: HOSPADM

## 2023-03-28 RX ORDER — HEPARIN SODIUM (PORCINE) LOCK FLUSH IV SOLN 100 UNIT/ML 100 UNIT/ML
500 SOLUTION INTRAVENOUS AS NEEDED
Status: CANCELLED | OUTPATIENT
Start: 2023-03-28

## 2023-03-28 RX ORDER — SODIUM CHLORIDE 0.9 % (FLUSH) 0.9 %
20 SYRINGE (ML) INJECTION AS NEEDED
Status: DISCONTINUED | OUTPATIENT
Start: 2023-03-28 | End: 2023-03-30 | Stop reason: HOSPADM

## 2023-03-28 RX ADMIN — DEXAMETHASONE SODIUM PHOSPHATE 12 MG: 4 INJECTION, SOLUTION INTRA-ARTICULAR; INTRALESIONAL; INTRAMUSCULAR; INTRAVENOUS; SOFT TISSUE at 08:27

## 2023-03-28 RX ADMIN — FLUOROURACIL 6250 MG: 50 INJECTION, SOLUTION INTRAVENOUS at 11:50

## 2023-03-28 RX ADMIN — SODIUM CHLORIDE 660 MG: 9 INJECTION, SOLUTION INTRAVENOUS at 09:13

## 2023-03-28 RX ADMIN — PALONOSETRON HYDROCHLORIDE 0.25 MG: 0.25 INJECTION INTRAVENOUS at 08:24

## 2023-03-28 RX ADMIN — ATROPINE SULFATE 0.25 MG: 0.4 INJECTION, SOLUTION INTRAMUSCULAR; INTRAVENOUS; SUBCUTANEOUS at 11:08

## 2023-03-28 RX ADMIN — IRINOTECAN HYDROCHLORIDE 465 MG: 20 INJECTION, SOLUTION INTRAVENOUS at 09:51

## 2023-03-28 RX ADMIN — SODIUM CHLORIDE 250 ML: 9 INJECTION, SOLUTION INTRAVENOUS at 08:22

## 2023-03-28 RX ADMIN — FLUOROURACIL 1000 MG: 50 INJECTION, SOLUTION INTRAVENOUS at 11:35

## 2023-03-28 RX ADMIN — LEUCOVORIN CALCIUM 1000 MG: 350 INJECTION, POWDER, LYOPHILIZED, FOR SOLUTION INTRAMUSCULAR; INTRAVENOUS at 09:51

## 2023-03-30 ENCOUNTER — HOSPITAL ENCOUNTER (OUTPATIENT)
Dept: ONCOLOGY | Facility: HOSPITAL | Age: 68
Discharge: HOME | End: 2023-03-30
Admitting: INTERNAL MEDICINE
Payer: MEDICARE

## 2023-03-30 DIAGNOSIS — C20 RECTAL CANCER: ICD-10-CM

## 2023-03-30 DIAGNOSIS — Z45.2 ENCOUNTER FOR ADJUSTMENT OR MANAGEMENT OF VASCULAR ACCESS DEVICE: Primary | ICD-10-CM

## 2023-03-30 PROCEDURE — 25010000002 HEPARIN LOCK FLUSH PER 10 UNITS: Performed by: INTERNAL MEDICINE

## 2023-03-30 RX ORDER — HEPARIN SODIUM (PORCINE) LOCK FLUSH IV SOLN 100 UNIT/ML 100 UNIT/ML
500 SOLUTION INTRAVENOUS AS NEEDED
OUTPATIENT
Start: 2023-03-30

## 2023-03-30 RX ORDER — HEPARIN SODIUM (PORCINE) LOCK FLUSH IV SOLN 100 UNIT/ML 100 UNIT/ML
500 SOLUTION INTRAVENOUS AS NEEDED
Status: DISCONTINUED | OUTPATIENT
Start: 2023-03-30 | End: 2023-03-31 | Stop reason: HOSPADM

## 2023-03-30 RX ORDER — SODIUM CHLORIDE 0.9 % (FLUSH) 0.9 %
20 SYRINGE (ML) INJECTION AS NEEDED
Status: DISCONTINUED | OUTPATIENT
Start: 2023-03-30 | End: 2023-03-31 | Stop reason: HOSPADM

## 2023-03-30 RX ORDER — SODIUM CHLORIDE 0.9 % (FLUSH) 0.9 %
20 SYRINGE (ML) INJECTION AS NEEDED
OUTPATIENT
Start: 2023-03-30

## 2023-03-30 RX ADMIN — Medication 20 ML: at 10:30

## 2023-03-30 RX ADMIN — HEPARIN SODIUM (PORCINE) LOCK FLUSH IV SOLN 100 UNIT/ML 500 UNITS: 100 SOLUTION at 10:30

## 2023-04-11 ENCOUNTER — HOSPITAL ENCOUNTER (OUTPATIENT)
Dept: ONCOLOGY | Facility: HOSPITAL | Age: 68
Discharge: HOME | End: 2023-04-11
Admitting: INTERNAL MEDICINE
Payer: MEDICARE

## 2023-04-11 ENCOUNTER — OFFICE VISIT (OUTPATIENT)
Dept: ONCOLOGY | Facility: HOSPITAL | Age: 68
End: 2023-04-11
Payer: MEDICARE

## 2023-04-11 VITALS
DIASTOLIC BLOOD PRESSURE: 78 MMHG | RESPIRATION RATE: 16 BRPM | HEART RATE: 78 BPM | SYSTOLIC BLOOD PRESSURE: 112 MMHG | WEIGHT: 291.01 LBS | OXYGEN SATURATION: 96 % | BODY MASS INDEX: 36.95 KG/M2 | TEMPERATURE: 96.9 F

## 2023-04-11 VITALS
DIASTOLIC BLOOD PRESSURE: 78 MMHG | BODY MASS INDEX: 37.32 KG/M2 | RESPIRATION RATE: 16 BRPM | SYSTOLIC BLOOD PRESSURE: 112 MMHG | WEIGHT: 290.79 LBS | HEIGHT: 74 IN | OXYGEN SATURATION: 96 % | HEART RATE: 78 BPM | TEMPERATURE: 96.9 F

## 2023-04-11 DIAGNOSIS — C20 RECTAL CANCER: Primary | ICD-10-CM

## 2023-04-11 LAB
ALBUMIN SERPL-MCNC: 3.6 G/DL (ref 3.5–5.2)
ALBUMIN/GLOB SERPL: 1.6 G/DL
ALP SERPL-CCNC: 81 U/L (ref 39–117)
ALT SERPL W P-5'-P-CCNC: 21 U/L (ref 1–41)
ANION GAP SERPL CALCULATED.3IONS-SCNC: 5.4 MMOL/L (ref 5–15)
AST SERPL-CCNC: 21 U/L (ref 1–40)
BASOPHILS # BLD AUTO: 0.02 10*3/MM3 (ref 0–0.2)
BASOPHILS NFR BLD AUTO: 0.5 % (ref 0–1.5)
BILIRUB SERPL-MCNC: 0.7 MG/DL (ref 0–1.2)
BILIRUB UR QL STRIP: NEGATIVE
BUN SERPL-MCNC: 10 MG/DL (ref 8–23)
BUN/CREAT SERPL: 10 (ref 7–25)
CALCIUM SPEC-SCNC: 8.7 MG/DL (ref 8.6–10.5)
CHLORIDE SERPL-SCNC: 104 MMOL/L (ref 98–107)
CLARITY UR: CLEAR
CO2 SERPL-SCNC: 26.6 MMOL/L (ref 22–29)
COLOR UR: YELLOW
CREAT SERPL-MCNC: 1 MG/DL (ref 0.76–1.27)
DEPRECATED RDW RBC AUTO: 48.1 FL (ref 37–54)
EGFRCR SERPLBLD CKD-EPI 2021: 83 ML/MIN/1.73
EOSINOPHIL # BLD AUTO: 0.18 10*3/MM3 (ref 0–0.4)
EOSINOPHIL NFR BLD AUTO: 4.5 % (ref 0.3–6.2)
ERYTHROCYTE [DISTWIDTH] IN BLOOD BY AUTOMATED COUNT: 14.7 % (ref 12.3–15.4)
GLOBULIN UR ELPH-MCNC: 2.2 GM/DL
GLUCOSE SERPL-MCNC: 141 MG/DL (ref 65–99)
GLUCOSE UR STRIP-MCNC: NEGATIVE MG/DL
HCT VFR BLD AUTO: 38.6 % (ref 37.5–51)
HGB BLD-MCNC: 13.2 G/DL (ref 13–17.7)
HGB UR QL STRIP.AUTO: NEGATIVE
IMM GRANULOCYTES # BLD AUTO: 0.02 10*3/MM3 (ref 0–0.05)
IMM GRANULOCYTES NFR BLD AUTO: 0.5 % (ref 0–0.5)
KETONES UR QL STRIP: NEGATIVE
LEUKOCYTE ESTERASE UR QL STRIP.AUTO: NEGATIVE
LYMPHOCYTES # BLD AUTO: 1.1 10*3/MM3 (ref 0.7–3.1)
LYMPHOCYTES NFR BLD AUTO: 27.8 % (ref 19.6–45.3)
MCH RBC QN AUTO: 32.6 PG (ref 26.6–33)
MCHC RBC AUTO-ENTMCNC: 34.2 G/DL (ref 31.5–35.7)
MCV RBC AUTO: 95.3 FL (ref 79–97)
MONOCYTES # BLD AUTO: 0.38 10*3/MM3 (ref 0.1–0.9)
MONOCYTES NFR BLD AUTO: 9.6 % (ref 5–12)
NEUTROPHILS NFR BLD AUTO: 2.26 10*3/MM3 (ref 1.7–7)
NEUTROPHILS NFR BLD AUTO: 57.1 % (ref 42.7–76)
NITRITE UR QL STRIP: NEGATIVE
NRBC BLD AUTO-RTO: 0 /100 WBC (ref 0–0.2)
PH UR STRIP.AUTO: 5.5 [PH] (ref 5–8)
PLATELET # BLD AUTO: 205 10*3/MM3 (ref 140–450)
PMV BLD AUTO: 9.2 FL (ref 6–12)
POTASSIUM SERPL-SCNC: 3.8 MMOL/L (ref 3.5–5.2)
PROT SERPL-MCNC: 5.8 G/DL (ref 6–8.5)
PROT UR QL STRIP: NEGATIVE
RBC # BLD AUTO: 4.05 10*6/MM3 (ref 4.14–5.8)
SODIUM SERPL-SCNC: 136 MMOL/L (ref 136–145)
SP GR UR STRIP: 1.01 (ref 1–1.03)
UROBILINOGEN UR QL STRIP: NORMAL
WBC NRBC COR # BLD: 3.96 10*3/MM3 (ref 3.4–10.8)

## 2023-04-11 PROCEDURE — 25010000002 BEVACIZUMAB-BVZR 100 MG/4ML SOLUTION 4 ML VIAL: Performed by: INTERNAL MEDICINE

## 2023-04-11 PROCEDURE — 96417 CHEMO IV INFUS EACH ADDL SEQ: CPT

## 2023-04-11 PROCEDURE — 99214 OFFICE O/P EST MOD 30 MIN: CPT | Performed by: INTERNAL MEDICINE

## 2023-04-11 PROCEDURE — 96368 THER/DIAG CONCURRENT INF: CPT

## 2023-04-11 PROCEDURE — 25010000002 BEVACIZUMAB-BVZR 400 MG/16ML SOLUTION 16 ML VIAL: Performed by: INTERNAL MEDICINE

## 2023-04-11 PROCEDURE — 1160F RVW MEDS BY RX/DR IN RCRD: CPT | Performed by: INTERNAL MEDICINE

## 2023-04-11 PROCEDURE — 3074F SYST BP LT 130 MM HG: CPT | Performed by: INTERNAL MEDICINE

## 2023-04-11 PROCEDURE — 96413 CHEMO IV INFUSION 1 HR: CPT

## 2023-04-11 PROCEDURE — 25010000002 DEXAMETHASONE SODIUM PHOSPHATE 120 MG/30ML SOLUTION: Performed by: INTERNAL MEDICINE

## 2023-04-11 PROCEDURE — 25010000002 LEUCOVORIN CALCIUM PER 50 MG: Performed by: INTERNAL MEDICINE

## 2023-04-11 PROCEDURE — 1159F MED LIST DOCD IN RCRD: CPT | Performed by: INTERNAL MEDICINE

## 2023-04-11 PROCEDURE — 1126F AMNT PAIN NOTED NONE PRSNT: CPT | Performed by: INTERNAL MEDICINE

## 2023-04-11 PROCEDURE — 96375 TX/PRO/DX INJ NEW DRUG ADDON: CPT

## 2023-04-11 PROCEDURE — 25010000002 IRINOTECAN PER 20 MG: Performed by: INTERNAL MEDICINE

## 2023-04-11 PROCEDURE — 96411 CHEMO IV PUSH ADDL DRUG: CPT

## 2023-04-11 PROCEDURE — 25010000002 ATROPINE PER 0.01 MG: Performed by: INTERNAL MEDICINE

## 2023-04-11 PROCEDURE — 96416 CHEMO PROLONG INFUSE W/PUMP: CPT

## 2023-04-11 PROCEDURE — G0498 CHEMO EXTEND IV INFUS W/PUMP: HCPCS

## 2023-04-11 PROCEDURE — 80053 COMPREHEN METABOLIC PANEL: CPT | Performed by: INTERNAL MEDICINE

## 2023-04-11 PROCEDURE — 96415 CHEMO IV INFUSION ADDL HR: CPT

## 2023-04-11 PROCEDURE — 85025 COMPLETE CBC W/AUTO DIFF WBC: CPT | Performed by: INTERNAL MEDICINE

## 2023-04-11 PROCEDURE — 3078F DIAST BP <80 MM HG: CPT | Performed by: INTERNAL MEDICINE

## 2023-04-11 PROCEDURE — 96376 TX/PRO/DX INJ SAME DRUG ADON: CPT

## 2023-04-11 PROCEDURE — 25010000002 FLUOROURACIL PER 500 MG: Performed by: INTERNAL MEDICINE

## 2023-04-11 PROCEDURE — 81003 URINALYSIS AUTO W/O SCOPE: CPT | Performed by: INTERNAL MEDICINE

## 2023-04-11 PROCEDURE — 25010000002 PALONOSETRON PER 25 MCG: Performed by: INTERNAL MEDICINE

## 2023-04-11 RX ORDER — SODIUM CHLORIDE 9 MG/ML
250 INJECTION, SOLUTION INTRAVENOUS ONCE
Status: CANCELLED | OUTPATIENT
Start: 2023-04-11

## 2023-04-11 RX ORDER — ATROPINE SULFATE 0.4 MG/ML
0.25 AMPUL (ML) INJECTION
Status: COMPLETED | OUTPATIENT
Start: 2023-04-11 | End: 2023-04-11

## 2023-04-11 RX ORDER — FLUOROURACIL 50 MG/ML
400 INJECTION, SOLUTION INTRAVENOUS ONCE
Status: CANCELLED | OUTPATIENT
Start: 2023-04-11

## 2023-04-11 RX ORDER — PALONOSETRON 0.05 MG/ML
0.25 INJECTION, SOLUTION INTRAVENOUS ONCE
Status: COMPLETED | OUTPATIENT
Start: 2023-04-11 | End: 2023-04-11

## 2023-04-11 RX ORDER — ATROPINE SULFATE 1 MG/ML
0.25 INJECTION, SOLUTION INTRAMUSCULAR; INTRAVENOUS; SUBCUTANEOUS
Status: CANCELLED | OUTPATIENT
Start: 2023-04-11

## 2023-04-11 RX ORDER — FLUOROURACIL 50 MG/ML
1000 INJECTION, SOLUTION INTRAVENOUS ONCE
Status: COMPLETED | OUTPATIENT
Start: 2023-04-11 | End: 2023-04-11

## 2023-04-11 RX ORDER — SODIUM CHLORIDE 9 MG/ML
250 INJECTION, SOLUTION INTRAVENOUS ONCE
Status: COMPLETED | OUTPATIENT
Start: 2023-04-11 | End: 2023-04-11

## 2023-04-11 RX ORDER — PALONOSETRON 0.05 MG/ML
0.25 INJECTION, SOLUTION INTRAVENOUS ONCE
Status: CANCELLED | OUTPATIENT
Start: 2023-04-11

## 2023-04-11 RX ADMIN — ATROPINE SULFATE 0.25 MG: 0.4 INJECTION, SOLUTION INTRAMUSCULAR; INTRAVENOUS; SUBCUTANEOUS at 13:54

## 2023-04-11 RX ADMIN — ATROPINE SULFATE 0.25 MG: 0.4 INJECTION, SOLUTION INTRAMUSCULAR; INTRAVENOUS; SUBCUTANEOUS at 12:41

## 2023-04-11 RX ADMIN — DEXAMETHASONE SODIUM PHOSPHATE 12 MG: 4 INJECTION, SOLUTION INTRA-ARTICULAR; INTRALESIONAL; INTRAMUSCULAR; INTRAVENOUS; SOFT TISSUE at 11:50

## 2023-04-11 RX ADMIN — SODIUM CHLORIDE 660 MG: 9 INJECTION, SOLUTION INTRAVENOUS at 12:06

## 2023-04-11 RX ADMIN — LEUCOVORIN CALCIUM 1000 MG: 350 INJECTION, POWDER, LYOPHILIZED, FOR SOLUTION INTRAMUSCULAR; INTRAVENOUS at 12:45

## 2023-04-11 RX ADMIN — FLUOROURACIL 6250 MG: 50 INJECTION, SOLUTION INTRAVENOUS at 14:30

## 2023-04-11 RX ADMIN — FLUOROURACIL 1000 MG: 50 INJECTION, SOLUTION INTRAVENOUS at 14:24

## 2023-04-11 RX ADMIN — PALONOSETRON 0.25 MG: 0.05 INJECTION, SOLUTION INTRAVENOUS at 11:37

## 2023-04-11 RX ADMIN — SODIUM CHLORIDE 250 ML: 9 INJECTION, SOLUTION INTRAVENOUS at 11:35

## 2023-04-11 RX ADMIN — IRINOTECAN HYDROCHLORIDE 465 MG: 20 INJECTION, SOLUTION INTRAVENOUS at 12:45

## 2023-04-11 NOTE — ASSESSMENT & PLAN NOTE
Metastatic.  Patient is on palliative treatment FOLFIRI plus bevacizumab.  He is due for cycle 4.  He notes some fatigue and nausea with each cycle but he is able to mitigate the symptoms.  His lab work is adequate for treatment.  Proceed with cycle 4 as planned.  RTC 2 weeks for OV, cycle 5 with lab work prior to monitor for toxicities and restaging scans to assess response to therapy.

## 2023-04-11 NOTE — PROGRESS NOTES
Chief Complaint  Chemotherapy    Eri Moran APRN    Subjective          Robbi Kennedy presents to [facility] for ongoing treatment of his metastatic rectal cancer.  He is on FOLFIRI plus bevacizumab.  He notes some fatigue and nausea with his last cycle but he was able to mitigate those symptoms.  He denies diarrhea.  He feels like he is eating and drinking adequately, his weight is maintained.  His energy level is low but adequate for his ADLs.  He denies any masses or adenopathy, no unusual aches or pains.  He denies problems from his Port-A-Cath.    Oncology/Hematology History Overview Note   9/30/2019 High rectal ypae-ijevjmsmlf-tweemldianhred adenocarcinoma  associated with tubular adenoma.   4/27/21 Biopsy of liver revealed metastatic adenocarcinoma, consistent with colorectal primary.    Clinical Staging  Stage IIa ([id number])    Chemotherapy      neoadjuvant Xeloda with radiation. adjuvant xeloda        Radiation Therapy      7/8/19 thru 8/14/19 completed neoadjuvant 5040 cGy=28 fxs rectum     10/2021 XRT due to recurrence right middle lung 5 Fx's, 5000cGy  11/26/21 adjuvant FOLFOX -2/25/2022    Surgeries       9/30/2019 low anterior resection with ostomy        8/31/21 partial hepatectomy, cholecystectomy and MWA for segment 6 medical margin performed at     2/8/2023 CT Scans revealed Worsening pulmonary metastatic disease.    2/21/2023 orders written for FOLFIRI + AVASTIN       Rectal cancer   6/2/2021 - 8/15/2021 Chemotherapy    OP COLON mFOLFOX6 + Bevacizumab (OXALIplatin / Leucovorin / Fluorouracil / Bevacizumab)     6/18/2021 Initial Diagnosis    Rectal cancer (CMS/HCC)     6/21/2021 Cancer Staged    Staging form: Colon And Rectum, AJCC 8th Edition  - Clinical: Stage IIA (cT3, cN0, cM0) - Signed by Maurilio Campos MD on 6/21/2021 11/16/2021 - 2/25/2022 Chemotherapy    OP COLON mFOLFOX6 OXALIplatin / Leucovorin / Fluorouracil     2/28/2023 -   Chemotherapy    OP COLORECTAL FOLFIRI + Bevacizumab 5mg/kg (Irinotecan / Leucovorin / Fluorouracil / Bevacizumab)     Secondary malignant neoplasm of left lung   9/14/2021 Initial Diagnosis    Secondary malignant neoplasm of left lung (HCC)     10/20/2021 -  Radiation    RADIATION THERAPY Treatment Details (Noted on 10/5/2021)  Site: Bilateral Lung  Technique: SBRT  Goal: No goal specified  Planned Treatment Start Date: 10/20/2021     11/16/2021 - 2/25/2022 Chemotherapy    OP COLON mFOLFOX6 OXALIplatin / Leucovorin / Fluorouracil     6/15/2022 -  Radiation    RADIATION THERAPY Treatment Details (Noted on 6/15/2022)  Site: Left Lung - Lower lobe  Technique: SBRT  Goal: No goal specified  Planned Treatment Start Date: No planned start date specified     10/25/2022 -  Radiation    RADIATION THERAPY Treatment Details (Noted on 10/25/2022)  Site: Left Lung  Technique: SBRT  Goal: No goal specified  Planned Treatment Start Date: No planned start date specified     Secondary malignancy of liver   9/14/2021 Initial Diagnosis    Secondary malignancy of liver (HCC)     11/16/2021 - 2/25/2022 Chemotherapy    OP COLON mFOLFOX6 OXALIplatin / Leucovorin / Fluorouracil     Secondary malignant neoplasm of right lung   10/5/2021 Initial Diagnosis    Secondary malignant neoplasm of right lung (HCC)     10/20/2021 -  Radiation    RADIATION THERAPY Treatment Details (Noted on 10/5/2021)  Site: Bilateral Lung  Technique: SBRT  Goal: No goal specified  Planned Treatment Start Date: 10/20/2021     11/16/2021 - 2/25/2022 Chemotherapy    OP COLON mFOLFOX6 OXALIplatin / Leucovorin / Fluorouracil         Review of Systems   Constitutional: Negative for appetite change, diaphoresis, fatigue, fever, unexpected weight gain and unexpected weight loss.   HENT: Negative for hearing loss, mouth sores, sore throat, swollen glands, trouble swallowing and voice change.    Eyes: Negative for blurred vision.   Respiratory: Negative for cough, shortness  of breath and wheezing.    Cardiovascular: Negative for chest pain and palpitations.   Gastrointestinal: Negative for abdominal pain, blood in stool, constipation, diarrhea, nausea and vomiting.   Endocrine: Negative for cold intolerance and heat intolerance.   Genitourinary: Negative for difficulty urinating, dysuria, frequency, hematuria and urinary incontinence.   Musculoskeletal: Negative for arthralgias, back pain and myalgias.   Skin: Negative for rash, skin lesions and wound.   Neurological: Negative for dizziness, seizures, weakness, numbness and headache.   Hematological: Does not bruise/bleed easily.   Psychiatric/Behavioral: Negative for depressed mood. The patient is not nervous/anxious.      Current Outpatient Medications on File Prior to Visit   Medication Sig Dispense Refill   • acetaminophen (TYLENOL) 500 MG tablet Take 1 tablet by mouth Every 6 (Six) Hours As Needed.     • apixaban (ELIQUIS) 5 MG tablet tablet Take 1 tablet by mouth 2 (Two) Times a Day.     • ascorbic acid (VITAMIN C) 500 MG tablet Take 1 tablet by mouth Daily.     • atorvastatin (LIPITOR) 40 MG tablet atorvastatin 40 mg oral tablet take 1 tablet (40 mg) by oral route once daily   Active     • cyanocobalamin (VITAMIN B-12) 1000 MCG tablet Take 1 tablet by mouth Daily.     • diclofenac (VOLTAREN) 50 MG EC tablet      • fluticasone (FLONASE) 50 MCG/ACT nasal spray      • furosemide (LASIX) 20 MG tablet Take 1 tablet by mouth Daily. 90 tablet 3   • loratadine (CLARITIN) 10 MG tablet loratadine 10 mg oral tablet take 1 tablet (10 mg) by oral route once daily   Active     • metoprolol tartrate (LOPRESSOR) 100 MG tablet Take 1 tablet by mouth 2 (Two) Times a Day. 180 tablet 1   • multivitamin (THERAGRAN) tablet tablet Take 1 tablet by mouth Daily.     • ondansetron (ZOFRAN) 8 MG tablet Take 1 tablet by mouth 3 (Three) Times a Day As Needed for Nausea or Vomiting. 30 tablet 5   • potassium chloride (K-DUR,KLOR-CON) 20 MEQ CR tablet Take 1  tablet by mouth Daily. 90 tablet 1   • Synthroid 25 MCG tablet Take 1 tablet by mouth Daily.     • verapamil SR (CALAN-SR) 180 MG CR tablet      • vitamin E 1000 UNIT capsule Take 1 capsule by mouth Daily.       Current Facility-Administered Medications on File Prior to Visit   Medication Dose Route Frequency Provider Last Rate Last Admin   • [COMPLETED] atropine injection 0.25 mg  0.25 mg Intravenous Q15 Min PRN Maurilio Campos MD   0.25 mg at 04/11/23 1354   • [COMPLETED] Bevacizumab-bvzr (ZIRABEV) 660 mg in sodium chloride 0.9 % 136.4 mL chemo IVPB  5 mg/kg (Treatment Plan Recorded) Intravenous Once Maurilio Campos MD   Stopped at 04/11/23 1237   • [COMPLETED] dexamethasone (DECADRON) IVPB 12 mg  12 mg Intravenous Once Maurilio Campos MD   Stopped at 04/11/23 1205   • fluorouracil (ADRUCIL) 6,250 mg in sodium chloride 0.9 % 138 mL chemo infusion - FOR HOME USE  6,250 mg Intravenous Once Maurilio Campos MD   6,250 mg at 04/11/23 1430   • [COMPLETED] fluorouracil (ADRUCIL) chemo injection 1,000 mg (20 ml)  1,000 mg Intravenous Once Maurilio Campos MD   Stopped at 04/11/23 1429   • [COMPLETED] irinotecan (CAMPTOSAR) 465 mg in dextrose (D5W) 5 % 573.3 mL chemo IVPB  180 mg/m2 (Treatment Plan Recorded) Intravenous Once Maurilio Campos MD   Stopped at 04/11/23 1416   • [COMPLETED] leucovorin 1,000 mg in dextrose (D5W) 5 % 325 mL IVPB  1,000 mg Intravenous Once Maurilio Campos MD   Stopped at 04/11/23 1416   • [COMPLETED] palonosetron (ALOXI) injection 0.25 mg  0.25 mg Intravenous Once Maurilio Campos MD   0.25 mg at 04/11/23 1137   • [COMPLETED] sodium chloride 0.9 % infusion 250 mL  250 mL Intravenous Once Maurilio Campos MD   Stopped at 04/11/23 1429       No Known Allergies  Past Medical History:   Diagnosis Date   • Asthma    • Atrial fibrillation, persistent 2/26/2021   • Chronic heart failure with preserved ejection fraction 7/18/2022    BNP 1590 on 7/22 Echocardiogram with EF 55% moderate to  severe biatrial enlargement and mild right ventricular enlargement 7/22    • Colorectal cancer    • Essential hypertension    • GI cancer    • Hepatitis    • History of DVT of lower extremity 8/27/2021   • Hyperlipidemia LDL goal <100 12/31/2020   • Rectal cancer 6/18/2021   • Secondary malignancy of liver 9/14/2021   • Secondary malignant neoplasm of left lung 9/14/2021   • Secondary malignant neoplasm of right lung 10/5/2021   • Thrombocytopenia 1/11/2022   • Thyroid disease      Past Surgical History:   Procedure Laterality Date   • COLON SURGERY     • HERNIA REPAIR     • LIVER BIOPSY      4/27/21 Biopsy of liver revealed metastatic adenocarcinoma, consistent with colorectal primary   • OTHER SURGICAL HISTORY      REMOVED CANCER FROM COLON     Social History     Socioeconomic History   • Marital status:    • Number of children: 2   Tobacco Use   • Smoking status: Never   • Smokeless tobacco: Never   Vaping Use   • Vaping Use: Never used   Substance and Sexual Activity   • Alcohol use: Yes     Comment: occasionally, no recent use   • Drug use: Never   • Sexual activity: Defer     Family History   Problem Relation Age of Onset   • Prostate cancer Father    • Heart murmur Mother    • Diabetes Mother    • Colon cancer Maternal Uncle        Objective   Physical Exam  Vitals reviewed. Exam conducted with a chaperone present.   Constitutional:       General: He is not in acute distress.     Appearance: Normal appearance.   Cardiovascular:      Rate and Rhythm: Normal rate and regular rhythm.      Heart sounds: Normal heart sounds. No murmur heard.    No gallop.   Pulmonary:      Effort: Pulmonary effort is normal.      Breath sounds: Normal breath sounds.      Comments: Port-A-Cath  Abdominal:      General: Abdomen is flat. Bowel sounds are normal.      Palpations: Abdomen is soft.   Musculoskeletal:      Cervical back: Neck supple.      Right lower leg: No edema.      Left lower leg: No edema.   Lymphadenopathy:       Cervical: No cervical adenopathy.   Neurological:      Mental Status: He is alert and oriented to person, place, and time.   Psychiatric:         Mood and Affect: Mood normal.         Behavior: Behavior normal.         Vitals:    04/11/23 0949   BP: 112/78   Pulse: 78   Resp: 16   Temp: 96.9 °F (36.1 °C)   TempSrc: Temporal   SpO2: 96%   Weight: 132 kg (291 lb 0.1 oz)   PainSc: 0-No pain     ECOG score: 0         PHQ-9 Total Score:                    Result Review :   The following data was reviewed by: Maurilio Campos MD on 04/11/2023:  Lab Results   Component Value Date    HGB 13.2 04/11/2023    HCT 38.6 04/11/2023    MCV 95.3 04/11/2023     04/11/2023    WBC 3.96 04/11/2023    NEUTROABS 2.26 04/11/2023    LYMPHSABS 1.10 04/11/2023    MONOSABS 0.38 04/11/2023    EOSABS 0.18 04/11/2023    BASOSABS 0.02 04/11/2023     Lab Results   Component Value Date    GLUCOSE 141 (H) 04/11/2023    BUN 10 04/11/2023    CREATININE 1.00 04/11/2023     04/11/2023    K 3.8 04/11/2023     04/11/2023    CO2 26.6 04/11/2023    CALCIUM 8.7 04/11/2023    PROTEINTOT 5.8 (L) 04/11/2023    ALBUMIN 3.6 04/11/2023    BILITOT 0.7 04/11/2023    ALKPHOS 81 04/11/2023    AST 21 04/11/2023    ALT 21 04/11/2023     Lab Results   Component Value Date    MG 1.8 (L) 09/07/2021    FREET4 0.84 06/29/2022    TSH 3.250 02/24/2021     Lab Results   Component Value Date    IRON 60 (L) 03/04/2020    LABIRON 20 03/04/2020    TRANSFERRIN 215.00 03/04/2020    TIBC 307 03/04/2020     Lab Results   Component Value Date    FERRITIN 140 03/04/2020    TKGRFTBM43 >2000 (H) 03/04/2020    FOLATE 19.4 03/04/2020     Lab Results   Component Value Date    CEA 5.58 02/08/2023             Assessment and Plan    Diagnoses and all orders for this visit:    1. Rectal cancer (Primary)  Assessment & Plan:  Metastatic.  Patient is on palliative treatment FOLFIRI plus bevacizumab.  He is due for cycle 4.  He notes some fatigue and nausea with each cycle  but he is able to mitigate the symptoms.  His lab work is adequate for treatment.  Proceed with cycle 4 as planned.  RTC 2 weeks for OV, cycle 5 with lab work prior to monitor for toxicities and restaging scans to assess response to therapy.    Orders:  -     CT chest w contrast; Future  -     CT abdomen pelvis w contrast; Future  -     CBC & Differential; Future  -     Comprehensive Metabolic Panel; Future  -     Urinalysis With Microscopic - Urine, Clean Catch; Future  -     Cancel: sodium chloride 0.9 % infusion 250 mL  -     Cancel: palonosetron (ALOXI) injection 0.25 mg  -     Cancel: dexamethasone (DECADRON) 12 mg in sodium chloride 0.9 % IVPB  -     Cancel: atropine injection 0.25 mg  -     Cancel: Bevacizumab-bvzr (ZIRABEV) 660 mg in sodium chloride 0.9 % 126.4 mL chemo IVPB  -     Cancel: leucovorin 1,040 mg in dextrose (D5W) 5 % 354 mL IVPB  -     Cancel: irinotecan (CAMPTOSAR) 465 mg in dextrose (D5W) 5 % 523.3 mL chemo IVPB  -     Cancel: fluorouracil (ADRUCIL) chemo injection 1,040 mg  -     Cancel: fluorouracil (ADRUCIL) 6,220 mg in sodium chloride 0.9 % 124.4 mL chemo infusion - FOR HOME USE          Patient Follow Up: Cycle 5-day 1    Patient was given instructions and counseling regarding his condition or for health maintenance advice. Please see specific information pulled into the AVS if appropriate.     Maurilio Campos MD    4/11/2023

## 2023-04-13 ENCOUNTER — HOSPITAL ENCOUNTER (OUTPATIENT)
Dept: ONCOLOGY | Facility: HOSPITAL | Age: 68
Discharge: HOME | End: 2023-04-13
Admitting: INTERNAL MEDICINE
Payer: MEDICARE

## 2023-04-13 DIAGNOSIS — C20 RECTAL CANCER: ICD-10-CM

## 2023-04-13 DIAGNOSIS — Z45.2 ENCOUNTER FOR ADJUSTMENT OR MANAGEMENT OF VASCULAR ACCESS DEVICE: Primary | ICD-10-CM

## 2023-04-13 PROCEDURE — 25010000002 HEPARIN LOCK FLUSH PER 10 UNITS: Performed by: INTERNAL MEDICINE

## 2023-04-13 RX ORDER — HEPARIN SODIUM (PORCINE) LOCK FLUSH IV SOLN 100 UNIT/ML 100 UNIT/ML
500 SOLUTION INTRAVENOUS AS NEEDED
Status: DISCONTINUED | OUTPATIENT
Start: 2023-04-13 | End: 2023-04-14 | Stop reason: HOSPADM

## 2023-04-13 RX ORDER — HEPARIN SODIUM (PORCINE) LOCK FLUSH IV SOLN 100 UNIT/ML 100 UNIT/ML
500 SOLUTION INTRAVENOUS AS NEEDED
OUTPATIENT
Start: 2023-04-13

## 2023-04-13 RX ORDER — SODIUM CHLORIDE 0.9 % (FLUSH) 0.9 %
20 SYRINGE (ML) INJECTION AS NEEDED
Status: DISCONTINUED | OUTPATIENT
Start: 2023-04-13 | End: 2023-04-14 | Stop reason: HOSPADM

## 2023-04-13 RX ORDER — SODIUM CHLORIDE 0.9 % (FLUSH) 0.9 %
20 SYRINGE (ML) INJECTION AS NEEDED
OUTPATIENT
Start: 2023-04-13

## 2023-04-13 RX ADMIN — Medication 20 ML: at 14:31

## 2023-04-13 RX ADMIN — HEPARIN SODIUM (PORCINE) LOCK FLUSH IV SOLN 100 UNIT/ML 500 UNITS: 100 SOLUTION at 14:31

## 2023-04-17 NOTE — ADDENDUM NOTE
Encounter addended by: Jackeline Casillas RN on: 4/17/2023 11:54 AM   Actions taken: Flowsheet accepted

## 2023-04-21 ENCOUNTER — HOSPITAL ENCOUNTER (OUTPATIENT)
Dept: CT IMAGING | Facility: HOSPITAL | Age: 68
Discharge: HOME | End: 2023-04-21
Payer: MEDICARE

## 2023-04-21 DIAGNOSIS — C20 RECTAL CANCER: ICD-10-CM

## 2023-04-21 PROCEDURE — 74177 CT ABD & PELVIS W/CONTRAST: CPT

## 2023-04-21 PROCEDURE — 25510000001 IOPAMIDOL PER 1 ML: Performed by: INTERNAL MEDICINE

## 2023-04-21 PROCEDURE — 71260 CT THORAX DX C+: CPT

## 2023-04-21 RX ORDER — HEPARIN SODIUM (PORCINE) LOCK FLUSH IV SOLN 100 UNIT/ML 100 UNIT/ML
SOLUTION INTRAVENOUS
Status: DISCONTINUED
Start: 2023-04-21 | End: 2023-04-22 | Stop reason: HOSPADM

## 2023-04-21 RX ADMIN — IOPAMIDOL 100 ML: 755 INJECTION, SOLUTION INTRAVENOUS at 18:36

## 2023-04-24 ENCOUNTER — OFFICE VISIT (OUTPATIENT)
Dept: ONCOLOGY | Facility: HOSPITAL | Age: 68
End: 2023-04-24
Payer: MEDICARE

## 2023-04-24 ENCOUNTER — HOSPITAL ENCOUNTER (OUTPATIENT)
Dept: ONCOLOGY | Facility: HOSPITAL | Age: 68
Discharge: HOME | End: 2023-04-24
Admitting: INTERNAL MEDICINE
Payer: MEDICARE

## 2023-04-24 VITALS
BODY MASS INDEX: 36.45 KG/M2 | HEART RATE: 86 BPM | DIASTOLIC BLOOD PRESSURE: 77 MMHG | WEIGHT: 287.04 LBS | SYSTOLIC BLOOD PRESSURE: 116 MMHG | RESPIRATION RATE: 16 BRPM | OXYGEN SATURATION: 96 % | TEMPERATURE: 97.3 F

## 2023-04-24 DIAGNOSIS — Z45.2 ENCOUNTER FOR ADJUSTMENT OR MANAGEMENT OF VASCULAR ACCESS DEVICE: ICD-10-CM

## 2023-04-24 DIAGNOSIS — C20 RECTAL CANCER: Primary | ICD-10-CM

## 2023-04-24 DIAGNOSIS — J06.9 VIRAL UPPER RESPIRATORY TRACT INFECTION: ICD-10-CM

## 2023-04-24 LAB
ALBUMIN SERPL-MCNC: 3.7 G/DL (ref 3.5–5.2)
ALBUMIN/GLOB SERPL: 1.6 G/DL
ALP SERPL-CCNC: 90 U/L (ref 39–117)
ALT SERPL W P-5'-P-CCNC: 20 U/L (ref 1–41)
ANION GAP SERPL CALCULATED.3IONS-SCNC: 9.4 MMOL/L (ref 5–15)
AST SERPL-CCNC: 20 U/L (ref 1–40)
BASOPHILS # BLD AUTO: 0.02 10*3/MM3 (ref 0–0.2)
BASOPHILS NFR BLD AUTO: 0.4 % (ref 0–1.5)
BILIRUB SERPL-MCNC: 0.9 MG/DL (ref 0–1.2)
BILIRUB UR QL STRIP: NEGATIVE
BUN SERPL-MCNC: 10 MG/DL (ref 8–23)
BUN/CREAT SERPL: 10.6 (ref 7–25)
CALCIUM SPEC-SCNC: 8.8 MG/DL (ref 8.6–10.5)
CHLORIDE SERPL-SCNC: 102 MMOL/L (ref 98–107)
CLARITY UR: CLEAR
CO2 SERPL-SCNC: 25.6 MMOL/L (ref 22–29)
COLOR UR: YELLOW
CREAT SERPL-MCNC: 0.94 MG/DL (ref 0.76–1.27)
DEPRECATED RDW RBC AUTO: 51.5 FL (ref 37–54)
EGFRCR SERPLBLD CKD-EPI 2021: 89.4 ML/MIN/1.73
EOSINOPHIL # BLD AUTO: 0.18 10*3/MM3 (ref 0–0.4)
EOSINOPHIL NFR BLD AUTO: 3.4 % (ref 0.3–6.2)
ERYTHROCYTE [DISTWIDTH] IN BLOOD BY AUTOMATED COUNT: 15.8 % (ref 12.3–15.4)
GLOBULIN UR ELPH-MCNC: 2.3 GM/DL
GLUCOSE SERPL-MCNC: 107 MG/DL (ref 65–99)
GLUCOSE UR STRIP-MCNC: NEGATIVE MG/DL
HCT VFR BLD AUTO: 38.2 % (ref 37.5–51)
HGB BLD-MCNC: 13.1 G/DL (ref 13–17.7)
HGB UR QL STRIP.AUTO: NEGATIVE
IMM GRANULOCYTES # BLD AUTO: 0 10*3/MM3 (ref 0–0.05)
IMM GRANULOCYTES NFR BLD AUTO: 0 % (ref 0–0.5)
KETONES UR QL STRIP: NEGATIVE
LEUKOCYTE ESTERASE UR QL STRIP.AUTO: NEGATIVE
LYMPHOCYTES # BLD AUTO: 0.64 10*3/MM3 (ref 0.7–3.1)
LYMPHOCYTES NFR BLD AUTO: 12.1 % (ref 19.6–45.3)
MCH RBC QN AUTO: 33.1 PG (ref 26.6–33)
MCHC RBC AUTO-ENTMCNC: 34.3 G/DL (ref 31.5–35.7)
MCV RBC AUTO: 96.5 FL (ref 79–97)
MONOCYTES # BLD AUTO: 0.35 10*3/MM3 (ref 0.1–0.9)
MONOCYTES NFR BLD AUTO: 6.6 % (ref 5–12)
NEUTROPHILS NFR BLD AUTO: 4.12 10*3/MM3 (ref 1.7–7)
NEUTROPHILS NFR BLD AUTO: 77.5 % (ref 42.7–76)
NITRITE UR QL STRIP: NEGATIVE
PH UR STRIP.AUTO: 5.5 [PH] (ref 5–8)
PLATELET # BLD AUTO: 178 10*3/MM3 (ref 140–450)
PMV BLD AUTO: 9.2 FL (ref 6–12)
POTASSIUM SERPL-SCNC: 4.2 MMOL/L (ref 3.5–5.2)
PROT SERPL-MCNC: 6 G/DL (ref 6–8.5)
PROT UR QL STRIP: NEGATIVE
RBC # BLD AUTO: 3.96 10*6/MM3 (ref 4.14–5.8)
SODIUM SERPL-SCNC: 137 MMOL/L (ref 136–145)
SP GR UR STRIP: 1.01 (ref 1–1.03)
UROBILINOGEN UR QL STRIP: NORMAL
WBC NRBC COR # BLD: 5.31 10*3/MM3 (ref 3.4–10.8)

## 2023-04-24 PROCEDURE — 80053 COMPREHEN METABOLIC PANEL: CPT | Performed by: INTERNAL MEDICINE

## 2023-04-24 PROCEDURE — G0463 HOSPITAL OUTPT CLINIC VISIT: HCPCS

## 2023-04-24 PROCEDURE — 25010000002 HEPARIN LOCK FLUSH PER 10 UNITS: Performed by: INTERNAL MEDICINE

## 2023-04-24 PROCEDURE — 85025 COMPLETE CBC W/AUTO DIFF WBC: CPT | Performed by: INTERNAL MEDICINE

## 2023-04-24 PROCEDURE — 81003 URINALYSIS AUTO W/O SCOPE: CPT | Performed by: INTERNAL MEDICINE

## 2023-04-24 PROCEDURE — 36591 DRAW BLOOD OFF VENOUS DEVICE: CPT

## 2023-04-24 RX ORDER — HEPARIN SODIUM (PORCINE) LOCK FLUSH IV SOLN 100 UNIT/ML 100 UNIT/ML
500 SOLUTION INTRAVENOUS AS NEEDED
OUTPATIENT
Start: 2023-04-24

## 2023-04-24 RX ORDER — SODIUM CHLORIDE 0.9 % (FLUSH) 0.9 %
20 SYRINGE (ML) INJECTION AS NEEDED
Status: DISCONTINUED | OUTPATIENT
Start: 2023-04-24 | End: 2023-04-25 | Stop reason: HOSPADM

## 2023-04-24 RX ORDER — HEPARIN SODIUM (PORCINE) LOCK FLUSH IV SOLN 100 UNIT/ML 100 UNIT/ML
500 SOLUTION INTRAVENOUS AS NEEDED
Status: DISCONTINUED | OUTPATIENT
Start: 2023-04-24 | End: 2023-04-25 | Stop reason: HOSPADM

## 2023-04-24 RX ORDER — SODIUM CHLORIDE 0.9 % (FLUSH) 0.9 %
20 SYRINGE (ML) INJECTION AS NEEDED
OUTPATIENT
Start: 2023-04-24

## 2023-04-24 RX ADMIN — Medication 20 ML: at 10:11

## 2023-04-24 RX ADMIN — HEPARIN SODIUM (PORCINE) LOCK FLUSH IV SOLN 100 UNIT/ML 500 UNITS: 100 SOLUTION at 10:11

## 2023-04-24 NOTE — PROGRESS NOTES
Chief Complaint  Rectal Cancer    Eri Moran, REGINALDO    Subjective          Robbi Kennedy presents to [facility] for consideration of ongoing treatment for his rectal cancer.  Patient notes some nausea diarrhea and fatigue with each cycle.  It was about the same with his most recent cycle.  He was able to control his symptoms.  He notes that for the last day or 2 he has had upper respiratory infection symptoms with congestion, drainage and low-grade temperature.  He is also noticed some scant bleeding from his nose when he wipes.  He does not feel up to treatment this week.  He denies issues from his Port-A-Cath.    Oncology/Hematology History Overview Note   9/30/2019 High rectal dxiu-jkuxvniynn-euriunsmihpxru adenocarcinoma  associated with tubular adenoma.   4/27/21 Biopsy of liver revealed metastatic adenocarcinoma, consistent with colorectal primary.    Clinical Staging  Stage IIa ([id number])    Chemotherapy      neoadjuvant Xeloda with radiation. adjuvant xeloda        Radiation Therapy      7/8/19 thru 8/14/19 completed neoadjuvant 5040 cGy=28 fxs rectum     10/2021 XRT due to recurrence right middle lung 5 Fx's, 5000cGy  11/26/21 adjuvant FOLFOX -2/25/2022    Surgeries       9/30/2019 low anterior resection with ostomy        8/31/21 partial hepatectomy, cholecystectomy and MWA for segment 6 medical margin performed at     2/8/2023 CT Scans revealed Worsening pulmonary metastatic disease.    2/21/2023 orders written for FOLFIRI + AVASTIN       Rectal cancer   6/2/2021 - 8/15/2021 Chemotherapy    OP COLON mFOLFOX6 + Bevacizumab (OXALIplatin / Leucovorin / Fluorouracil / Bevacizumab)     6/18/2021 Initial Diagnosis    Rectal cancer (CMS/HCC)     6/21/2021 Cancer Staged    Staging form: Colon And Rectum, AJCC 8th Edition  - Clinical: Stage IIA (cT3, cN0, cM0) - Signed by Maurilio Campos MD on 6/21/2021 11/16/2021 - 2/25/2022 Chemotherapy    OP COLON mFOLFOX6  OXALIplatin / Leucovorin / Fluorouracil     2/28/2023 -  Chemotherapy    OP COLORECTAL FOLFIRI + Bevacizumab 5mg/kg (Irinotecan / Leucovorin / Fluorouracil / Bevacizumab)     Secondary malignant neoplasm of left lung   9/14/2021 Initial Diagnosis    Secondary malignant neoplasm of left lung (HCC)     10/20/2021 -  Radiation    RADIATION THERAPY Treatment Details (Noted on 10/5/2021)  Site: Bilateral Lung  Technique: SBRT  Goal: No goal specified  Planned Treatment Start Date: 10/20/2021     11/16/2021 - 2/25/2022 Chemotherapy    OP COLON mFOLFOX6 OXALIplatin / Leucovorin / Fluorouracil     6/15/2022 -  Radiation    RADIATION THERAPY Treatment Details (Noted on 6/15/2022)  Site: Left Lung - Lower lobe  Technique: SBRT  Goal: No goal specified  Planned Treatment Start Date: No planned start date specified     10/25/2022 -  Radiation    RADIATION THERAPY Treatment Details (Noted on 10/25/2022)  Site: Left Lung  Technique: SBRT  Goal: No goal specified  Planned Treatment Start Date: No planned start date specified     Secondary malignancy of liver   9/14/2021 Initial Diagnosis    Secondary malignancy of liver (HCC)     11/16/2021 - 2/25/2022 Chemotherapy    OP COLON mFOLFOX6 OXALIplatin / Leucovorin / Fluorouracil     Secondary malignant neoplasm of right lung   10/5/2021 Initial Diagnosis    Secondary malignant neoplasm of right lung (HCC)     10/20/2021 -  Radiation    RADIATION THERAPY Treatment Details (Noted on 10/5/2021)  Site: Bilateral Lung  Technique: SBRT  Goal: No goal specified  Planned Treatment Start Date: 10/20/2021     11/16/2021 - 2/25/2022 Chemotherapy    OP COLON mFOLFOX6 OXALIplatin / Leucovorin / Fluorouracil         Review of Systems   Constitutional: Positive for fatigue. Negative for appetite change, diaphoresis, fever, unexpected weight gain and unexpected weight loss.   HENT: Negative for hearing loss, mouth sores, sore throat, swollen glands, trouble swallowing and voice change.    Eyes:  Negative for blurred vision.   Respiratory: Negative for cough, shortness of breath and wheezing.    Cardiovascular: Negative for chest pain and palpitations.   Gastrointestinal: Negative for abdominal pain, blood in stool, constipation, diarrhea, nausea and vomiting.   Endocrine: Negative for cold intolerance and heat intolerance.   Genitourinary: Negative for difficulty urinating, dysuria, frequency, hematuria and urinary incontinence.   Musculoskeletal: Negative for arthralgias, back pain and myalgias.   Skin: Negative for rash, skin lesions and wound.   Neurological: Positive for headache. Negative for dizziness, seizures, weakness and numbness.   Hematological: Does not bruise/bleed easily.   Psychiatric/Behavioral: Negative for depressed mood. The patient is not nervous/anxious.      Current Outpatient Medications on File Prior to Visit   Medication Sig Dispense Refill   • acetaminophen (TYLENOL) 500 MG tablet Take 1 tablet by mouth Every 6 (Six) Hours As Needed.     • apixaban (ELIQUIS) 5 MG tablet tablet Take 1 tablet by mouth 2 (Two) Times a Day.     • ascorbic acid (VITAMIN C) 500 MG tablet Take 1 tablet by mouth Daily.     • atorvastatin (LIPITOR) 40 MG tablet atorvastatin 40 mg oral tablet take 1 tablet (40 mg) by oral route once daily   Active     • cyanocobalamin (VITAMIN B-12) 1000 MCG tablet Take 1 tablet by mouth Daily.     • diclofenac (VOLTAREN) 50 MG EC tablet      • fluticasone (FLONASE) 50 MCG/ACT nasal spray      • furosemide (LASIX) 20 MG tablet Take 1 tablet by mouth Daily. 90 tablet 3   • loratadine (CLARITIN) 10 MG tablet loratadine 10 mg oral tablet take 1 tablet (10 mg) by oral route once daily   Active     • metoprolol tartrate (LOPRESSOR) 100 MG tablet Take 1 tablet by mouth 2 (Two) Times a Day. 180 tablet 1   • multivitamin (THERAGRAN) tablet tablet Take 1 tablet by mouth Daily.     • ondansetron (ZOFRAN) 8 MG tablet Take 1 tablet by mouth 3 (Three) Times a Day As Needed for Nausea or  Vomiting. 30 tablet 5   • potassium chloride (K-DUR,KLOR-CON) 20 MEQ CR tablet Take 1 tablet by mouth Daily. 90 tablet 1   • Synthroid 25 MCG tablet Take 1 tablet by mouth Daily.     • verapamil SR (CALAN-SR) 180 MG CR tablet      • vitamin E 1000 UNIT capsule Take 1 capsule by mouth Daily.       Current Facility-Administered Medications on File Prior to Visit   Medication Dose Route Frequency Provider Last Rate Last Admin   • heparin injection 500 Units  500 Units Intravenous PRN Maurilio Campos MD   500 Units at 04/24/23 1011   • sodium chloride 0.9 % flush 20 mL  20 mL Intravenous PRN Maurilio Campos MD   20 mL at 04/24/23 1011       No Known Allergies  Past Medical History:   Diagnosis Date   • Asthma    • Atrial fibrillation, persistent 2/26/2021   • Chronic heart failure with preserved ejection fraction 7/18/2022    BNP 1590 on 7/22 Echocardiogram with EF 55% moderate to severe biatrial enlargement and mild right ventricular enlargement 7/22    • Colorectal cancer    • Essential hypertension    • GI cancer    • Hepatitis    • History of DVT of lower extremity 8/27/2021   • Hyperlipidemia LDL goal <100 12/31/2020   • Rectal cancer 6/18/2021   • Secondary malignancy of liver 9/14/2021   • Secondary malignant neoplasm of left lung 9/14/2021   • Secondary malignant neoplasm of right lung 10/5/2021   • Thrombocytopenia 1/11/2022   • Thyroid disease    • Viral upper respiratory tract infection 4/24/2023     Past Surgical History:   Procedure Laterality Date   • COLON SURGERY     • HERNIA REPAIR     • LIVER BIOPSY      4/27/21 Biopsy of liver revealed metastatic adenocarcinoma, consistent with colorectal primary   • OTHER SURGICAL HISTORY      REMOVED CANCER FROM COLON     Social History     Socioeconomic History   • Marital status:    • Number of children: 2   Tobacco Use   • Smoking status: Never   • Smokeless tobacco: Never   Vaping Use   • Vaping Use: Never used   Substance and Sexual Activity   •  Alcohol use: Yes     Comment: occasionally, no recent use   • Drug use: Never   • Sexual activity: Defer     Family History   Problem Relation Age of Onset   • Prostate cancer Father    • Heart murmur Mother    • Diabetes Mother    • Colon cancer Maternal Uncle        Objective   Physical Exam  Vitals reviewed. Exam conducted with a chaperone present.   Constitutional:       General: He is not in acute distress.     Appearance: Normal appearance.   Cardiovascular:      Rate and Rhythm: Normal rate and regular rhythm.      Heart sounds: Normal heart sounds. No murmur heard.    No gallop.   Pulmonary:      Effort: Pulmonary effort is normal.      Breath sounds: Normal breath sounds.      Comments: Port-A-Cath  Abdominal:      General: Abdomen is flat. Bowel sounds are normal.      Palpations: Abdomen is soft.   Musculoskeletal:      Cervical back: Neck supple.      Right lower leg: No edema.      Left lower leg: No edema.   Lymphadenopathy:      Cervical: No cervical adenopathy.   Neurological:      Mental Status: He is alert and oriented to person, place, and time.   Psychiatric:         Mood and Affect: Mood normal.         Behavior: Behavior normal.         Vitals:    04/24/23 1059   BP: 116/77   Pulse: 86   Resp: 16   Temp: 97.3 °F (36.3 °C)   TempSrc: Temporal   SpO2: 96%   Weight: 130 kg (287 lb 0.6 oz)   PainSc:   5   PainLoc: Head     ECOG score: 0         PHQ-9 Total Score:                    Result Review :   The following data was reviewed by: Maurilio Campos MD on 04/24/2023:  Lab Results   Component Value Date    HGB 13.1 04/24/2023    HCT 38.2 04/24/2023    MCV 96.5 04/24/2023     04/24/2023    WBC 5.31 04/24/2023    NEUTROABS 4.12 04/24/2023    LYMPHSABS 0.64 (L) 04/24/2023    MONOSABS 0.35 04/24/2023    EOSABS 0.18 04/24/2023    BASOSABS 0.02 04/24/2023     Lab Results   Component Value Date    GLUCOSE 107 (H) 04/24/2023    BUN 10 04/24/2023    CREATININE 0.94 04/24/2023     04/24/2023     K 4.2 04/24/2023     04/24/2023    CO2 25.6 04/24/2023    CALCIUM 8.8 04/24/2023    PROTEINTOT 6.0 04/24/2023    ALBUMIN 3.7 04/24/2023    BILITOT 0.9 04/24/2023    ALKPHOS 90 04/24/2023    AST 20 04/24/2023    ALT 20 04/24/2023     Lab Results   Component Value Date    MG 1.8 (L) 09/07/2021    FREET4 0.84 06/29/2022    TSH 3.250 02/24/2021     Lab Results   Component Value Date    IRON 60 (L) 03/04/2020    LABIRON 20 03/04/2020    TRANSFERRIN 215.00 03/04/2020    TIBC 307 03/04/2020     Lab Results   Component Value Date    FERRITIN 140 03/04/2020    [id number] >2000 (H) 03/04/2020    FOLATE 19.4 03/04/2020     Lab Results   Component Value Date    CEA 5.58 02/08/2023             Assessment and Plan    Diagnoses and all orders for this visit:    1. Rectal cancer (Primary)  Assessment & Plan:  Patient is on palliative treatment with FOLFIRI plus bevacizumab for his metastatic cancer.  He is due for cycle 5.  Restaging scans show good response to treatment thus far.  He is having upper respiratory infection currently and does not feel up to treatment this week.  I will defer his next treatment until next week to allow him to recover from the infection.  We discussed over-the-counter cough and cold preparations which are all fine.  For the scant nosebleeds we discussed frequent use of nasal saline or Vaseline coating to help the irritated nasal mucosa.  If his symptoms worsen he should follow-up with PCP or urgent care.      2. Viral upper respiratory tract infection  Assessment & Plan:  Patient has symptoms of an upper respiratory viral infection.  We discussed symptomatic management with over-the-counter cough and cold preparations, Tylenol, nasal saline as needed.            Patient Follow Up: Cycle 6-day 1    Patient was given instructions and counseling regarding his condition or for health maintenance advice. Please see specific information pulled into the AVS if appropriate.     Maurilio Campos,  MD    4/24/2023

## 2023-04-24 NOTE — ASSESSMENT & PLAN NOTE
Patient is on palliative treatment with FOLFIRI plus bevacizumab for his metastatic cancer.  He is due for cycle 5.  Restaging scans show good response to treatment thus far.  He is having upper respiratory infection currently and does not feel up to treatment this week.  I will defer his next treatment until next week to allow him to recover from the infection.  We discussed over-the-counter cough and cold preparations which are all fine.  For the scant nosebleeds we discussed frequent use of nasal saline or Vaseline coating to help the irritated nasal mucosa.  If his symptoms worsen he should follow-up with PCP or urgent care.

## 2023-04-24 NOTE — ASSESSMENT & PLAN NOTE
Patient has symptoms of an upper respiratory viral infection.  We discussed symptomatic management with over-the-counter cough and cold preparations, Tylenol, nasal saline as needed.

## 2023-04-25 ENCOUNTER — TELEPHONE (OUTPATIENT)
Dept: ONCOLOGY | Facility: HOSPITAL | Age: 68
End: 2023-04-25
Payer: MEDICARE

## 2023-04-25 NOTE — TELEPHONE ENCOUNTER
Caller: ANA LEONARD    Relationship: Emergency Contact    Best call back number: [phone]      What was the call regarding: SPOUSE CALLED STATED DR MARTIN WOULD CALL IN PATIENT SOME TYLENOL 3 AND PATIENT WOULD LIKE TO HAVE CALLED IN     [facility] [address] - [facility], [place] - [address] - [phone]  - [phone] FX

## 2023-04-25 NOTE — TELEPHONE ENCOUNTER
Returned call and to spoke to patient, informed patient that Dr. Campos said they did not discuss pain medications at his vist this week.  Asked patient about his pain, he states that he has come down with something and his head is on fire.   Asked patient if he had a headache, he states no that his he is having sinus pressure and pain. Informed him that Dr. Campos would not order tylenol 3 or any pain medication for a headache or sinus pressure. He needs to call his primary care or go to and urgent care- he may have a sinus infection and what he may need is an antibiotic not pain medications. Patient verbalized understanding.,

## 2023-04-25 NOTE — TELEPHONE ENCOUNTER
Caller: ANA LEONARD    Relationship: Emergency Contact    Best call back number: [phone]    What is the best time to reach you: ASAP    Who are you requesting to speak with (clinical staff, provider,  specific staff member): CLINICAL    What was the call regarding: PT NOW WANTS THIS SENT TO THE [facility] ON [address] FROM [facility], PLEASE CALL PT'S WIFE BACK TO CONFIRM WHEN SENT.    Do you require a callback: YES

## 2023-05-01 ENCOUNTER — TELEPHONE (OUTPATIENT)
Dept: ONCOLOGY | Facility: HOSPITAL | Age: 68
End: 2023-05-01

## 2023-05-01 RX ORDER — ATROPINE SULFATE 1 MG/ML
0.25 INJECTION, SOLUTION INTRAMUSCULAR; INTRAVENOUS; SUBCUTANEOUS
OUTPATIENT
Start: 2023-05-09

## 2023-05-01 RX ORDER — SODIUM CHLORIDE 9 MG/ML
250 INJECTION, SOLUTION INTRAVENOUS ONCE
OUTPATIENT
Start: 2023-05-09

## 2023-05-01 RX ORDER — PALONOSETRON 0.05 MG/ML
0.25 INJECTION, SOLUTION INTRAVENOUS ONCE
OUTPATIENT
Start: 2023-05-09

## 2023-05-01 RX ORDER — FLUOROURACIL 50 MG/ML
400 INJECTION, SOLUTION INTRAVENOUS ONCE
OUTPATIENT
Start: 2023-05-09

## 2023-05-01 NOTE — TELEPHONE ENCOUNTER
"    Caller: Robbi Kennedy \"ALPESH\"    Relationship to patient: Self    Best call back number: [phone]    Chief complaint: SICK     Type of visit: INFUSION     Requested date: CALL TO DISCUSS, POSSIBLY NEXT TUES    If rescheduling, when is the original appointment: 5/2/2023    "

## 2023-05-02 ENCOUNTER — HOSPITAL ENCOUNTER (OUTPATIENT)
Dept: ONCOLOGY | Facility: HOSPITAL | Age: 68
Discharge: HOME | End: 2023-05-02
Payer: MEDICARE

## 2023-05-09 ENCOUNTER — HOSPITAL ENCOUNTER (OUTPATIENT)
Dept: ONCOLOGY | Facility: HOSPITAL | Age: 68
Discharge: HOME | End: 2023-05-09
Admitting: INTERNAL MEDICINE
Payer: MEDICARE

## 2023-05-09 VITALS
HEIGHT: 74 IN | OXYGEN SATURATION: 96 % | DIASTOLIC BLOOD PRESSURE: 90 MMHG | SYSTOLIC BLOOD PRESSURE: 121 MMHG | TEMPERATURE: 96.9 F | BODY MASS INDEX: 36.61 KG/M2 | HEART RATE: 99 BPM | RESPIRATION RATE: 18 BRPM | WEIGHT: 285.27 LBS

## 2023-05-09 DIAGNOSIS — C20 RECTAL CANCER: Primary | ICD-10-CM

## 2023-05-09 LAB
ALBUMIN SERPL-MCNC: 3.5 G/DL (ref 3.5–5.2)
ALBUMIN/GLOB SERPL: 1.3 G/DL
ALP SERPL-CCNC: 100 U/L (ref 39–117)
ALT SERPL W P-5'-P-CCNC: 16 U/L (ref 1–41)
ANION GAP SERPL CALCULATED.3IONS-SCNC: 11 MMOL/L (ref 5–15)
AST SERPL-CCNC: 19 U/L (ref 1–40)
BASOPHILS # BLD AUTO: 0.02 10*3/MM3 (ref 0–0.2)
BASOPHILS NFR BLD AUTO: 0.3 % (ref 0–1.5)
BILIRUB SERPL-MCNC: 0.7 MG/DL (ref 0–1.2)
BILIRUB UR QL STRIP: NEGATIVE
BUN SERPL-MCNC: 16 MG/DL (ref 8–23)
BUN/CREAT SERPL: 15.5 (ref 7–25)
CALCIUM SPEC-SCNC: 8.9 MG/DL (ref 8.6–10.5)
CHLORIDE SERPL-SCNC: 102 MMOL/L (ref 98–107)
CLARITY UR: CLEAR
CO2 SERPL-SCNC: 24 MMOL/L (ref 22–29)
COLOR UR: ABNORMAL
CREAT SERPL-MCNC: 1.03 MG/DL (ref 0.76–1.27)
DEPRECATED RDW RBC AUTO: 58.5 FL (ref 37–54)
EGFRCR SERPLBLD CKD-EPI 2021: 80.1 ML/MIN/1.73
EOSINOPHIL # BLD AUTO: 0.54 10*3/MM3 (ref 0–0.4)
EOSINOPHIL NFR BLD AUTO: 7.4 % (ref 0.3–6.2)
ERYTHROCYTE [DISTWIDTH] IN BLOOD BY AUTOMATED COUNT: 16.2 % (ref 12.3–15.4)
GLOBULIN UR ELPH-MCNC: 2.7 GM/DL
GLUCOSE SERPL-MCNC: 174 MG/DL (ref 65–99)
GLUCOSE UR STRIP-MCNC: NEGATIVE MG/DL
HCT VFR BLD AUTO: 39.1 % (ref 37.5–51)
HGB BLD-MCNC: 13.2 G/DL (ref 13–17.7)
HGB UR QL STRIP.AUTO: NEGATIVE
IMM GRANULOCYTES # BLD AUTO: 0.04 10*3/MM3 (ref 0–0.05)
IMM GRANULOCYTES NFR BLD AUTO: 0.5 % (ref 0–0.5)
KETONES UR QL STRIP: NEGATIVE
LEUKOCYTE ESTERASE UR QL STRIP.AUTO: NEGATIVE
LYMPHOCYTES # BLD AUTO: 1.46 10*3/MM3 (ref 0.7–3.1)
LYMPHOCYTES NFR BLD AUTO: 20 % (ref 19.6–45.3)
MCH RBC QN AUTO: 33.2 PG (ref 26.6–33)
MCHC RBC AUTO-ENTMCNC: 33.8 G/DL (ref 31.5–35.7)
MCV RBC AUTO: 98.2 FL (ref 79–97)
MONOCYTES # BLD AUTO: 0.49 10*3/MM3 (ref 0.1–0.9)
MONOCYTES NFR BLD AUTO: 6.7 % (ref 5–12)
NEUTROPHILS NFR BLD AUTO: 4.74 10*3/MM3 (ref 1.7–7)
NEUTROPHILS NFR BLD AUTO: 65.1 % (ref 42.7–76)
NITRITE UR QL STRIP: NEGATIVE
PH UR STRIP.AUTO: 5.5 [PH] (ref 5–8)
PLATELET # BLD AUTO: 312 10*3/MM3 (ref 140–450)
PMV BLD AUTO: 9.2 FL (ref 6–12)
POTASSIUM SERPL-SCNC: 4.1 MMOL/L (ref 3.5–5.2)
PROT SERPL-MCNC: 6.2 G/DL (ref 6–8.5)
PROT UR QL STRIP: NEGATIVE
RBC # BLD AUTO: 3.98 10*6/MM3 (ref 4.14–5.8)
SODIUM SERPL-SCNC: 137 MMOL/L (ref 136–145)
SP GR UR STRIP: 1.01 (ref 1–1.03)
UROBILINOGEN UR QL STRIP: ABNORMAL
WBC NRBC COR # BLD: 7.29 10*3/MM3 (ref 3.4–10.8)

## 2023-05-09 PROCEDURE — 25010000002 BEVACIZUMAB-BVZR 400 MG/16ML SOLUTION 16 ML VIAL: Performed by: INTERNAL MEDICINE

## 2023-05-09 PROCEDURE — 96413 CHEMO IV INFUSION 1 HR: CPT

## 2023-05-09 PROCEDURE — 81003 URINALYSIS AUTO W/O SCOPE: CPT | Performed by: INTERNAL MEDICINE

## 2023-05-09 PROCEDURE — 25010000002 ATROPINE PER 0.01 MG: Performed by: INTERNAL MEDICINE

## 2023-05-09 PROCEDURE — 96375 TX/PRO/DX INJ NEW DRUG ADDON: CPT

## 2023-05-09 PROCEDURE — 25010000002 DEXAMETHASONE SODIUM PHOSPHATE 120 MG/30ML SOLUTION: Performed by: INTERNAL MEDICINE

## 2023-05-09 PROCEDURE — 25010000002 BEVACIZUMAB-BVZR 100 MG/4ML SOLUTION 4 ML VIAL: Performed by: INTERNAL MEDICINE

## 2023-05-09 PROCEDURE — 80053 COMPREHEN METABOLIC PANEL: CPT | Performed by: INTERNAL MEDICINE

## 2023-05-09 PROCEDURE — 25010000002 IRINOTECAN PER 20 MG: Performed by: INTERNAL MEDICINE

## 2023-05-09 PROCEDURE — 85025 COMPLETE CBC W/AUTO DIFF WBC: CPT | Performed by: INTERNAL MEDICINE

## 2023-05-09 PROCEDURE — 25010000002 FLUOROURACIL PER 500 MG: Performed by: INTERNAL MEDICINE

## 2023-05-09 PROCEDURE — 96368 THER/DIAG CONCURRENT INF: CPT

## 2023-05-09 PROCEDURE — G0498 CHEMO EXTEND IV INFUS W/PUMP: HCPCS

## 2023-05-09 PROCEDURE — 25010000002 LEUCOVORIN CALCIUM PER 50 MG: Performed by: INTERNAL MEDICINE

## 2023-05-09 PROCEDURE — 96417 CHEMO IV INFUS EACH ADDL SEQ: CPT

## 2023-05-09 PROCEDURE — 25010000002 PALONOSETRON PER 25 MCG: Performed by: INTERNAL MEDICINE

## 2023-05-09 RX ORDER — PALONOSETRON 0.05 MG/ML
0.25 INJECTION, SOLUTION INTRAVENOUS ONCE
Status: COMPLETED | OUTPATIENT
Start: 2023-05-09 | End: 2023-05-09

## 2023-05-09 RX ORDER — ATROPINE SULFATE 0.4 MG/ML
0.25 AMPUL (ML) INJECTION
Status: DISCONTINUED | OUTPATIENT
Start: 2023-05-09 | End: 2023-05-10 | Stop reason: HOSPADM

## 2023-05-09 RX ORDER — SODIUM CHLORIDE 9 MG/ML
250 INJECTION, SOLUTION INTRAVENOUS ONCE
Status: COMPLETED | OUTPATIENT
Start: 2023-05-09 | End: 2023-05-09

## 2023-05-09 RX ORDER — FLUOROURACIL 50 MG/ML
1000 INJECTION, SOLUTION INTRAVENOUS ONCE
Status: COMPLETED | OUTPATIENT
Start: 2023-05-09 | End: 2023-05-09

## 2023-05-09 RX ADMIN — FLUOROURACIL 6250 MG: 50 INJECTION, SOLUTION INTRAVENOUS at 12:42

## 2023-05-09 RX ADMIN — DEXAMETHASONE SODIUM PHOSPHATE 12 MG: 4 INJECTION, SOLUTION INTRA-ARTICULAR; INTRALESIONAL; INTRAMUSCULAR; INTRAVENOUS; SOFT TISSUE at 09:42

## 2023-05-09 RX ADMIN — IRINOTECAN HYDROCHLORIDE 465 MG: 20 INJECTION, SOLUTION INTRAVENOUS at 10:58

## 2023-05-09 RX ADMIN — SODIUM CHLORIDE 660 MG: 9 INJECTION, SOLUTION INTRAVENOUS at 10:15

## 2023-05-09 RX ADMIN — PALONOSETRON 0.25 MG: 0.05 INJECTION, SOLUTION INTRAVENOUS at 09:38

## 2023-05-09 RX ADMIN — FLUOROURACIL 1000 MG: 50 INJECTION, SOLUTION INTRAVENOUS at 12:34

## 2023-05-09 RX ADMIN — SODIUM CHLORIDE 250 ML: 9 INJECTION, SOLUTION INTRAVENOUS at 09:37

## 2023-05-09 RX ADMIN — ATROPINE SULFATE 0.25 MG: 0.4 INJECTION, SOLUTION INTRAMUSCULAR; INTRAVENOUS; SUBCUTANEOUS at 11:44

## 2023-05-09 RX ADMIN — LEUCOVORIN CALCIUM 1000 MG: 350 INJECTION, POWDER, LYOPHILIZED, FOR SOLUTION INTRAMUSCULAR; INTRAVENOUS at 10:58

## 2023-05-11 ENCOUNTER — HOSPITAL ENCOUNTER (OUTPATIENT)
Dept: ONCOLOGY | Facility: HOSPITAL | Age: 68
Discharge: HOME | End: 2023-05-11
Admitting: INTERNAL MEDICINE
Payer: MEDICARE

## 2023-05-11 DIAGNOSIS — Z45.2 ENCOUNTER FOR ADJUSTMENT OR MANAGEMENT OF VASCULAR ACCESS DEVICE: Primary | ICD-10-CM

## 2023-05-11 PROCEDURE — 25010000002 HEPARIN LOCK FLUSH PER 10 UNITS: Performed by: INTERNAL MEDICINE

## 2023-05-11 RX ORDER — SODIUM CHLORIDE 0.9 % (FLUSH) 0.9 %
20 SYRINGE (ML) INJECTION AS NEEDED
Status: DISCONTINUED | OUTPATIENT
Start: 2023-05-11 | End: 2023-05-12 | Stop reason: HOSPADM

## 2023-05-11 RX ORDER — HEPARIN SODIUM (PORCINE) LOCK FLUSH IV SOLN 100 UNIT/ML 100 UNIT/ML
500 SOLUTION INTRAVENOUS AS NEEDED
OUTPATIENT
Start: 2023-05-11

## 2023-05-11 RX ORDER — HEPARIN SODIUM (PORCINE) LOCK FLUSH IV SOLN 100 UNIT/ML 100 UNIT/ML
500 SOLUTION INTRAVENOUS AS NEEDED
Status: DISCONTINUED | OUTPATIENT
Start: 2023-05-11 | End: 2023-05-12 | Stop reason: HOSPADM

## 2023-05-11 RX ORDER — SODIUM CHLORIDE 0.9 % (FLUSH) 0.9 %
20 SYRINGE (ML) INJECTION AS NEEDED
OUTPATIENT
Start: 2023-05-11

## 2023-05-11 RX ADMIN — Medication 20 ML: at 11:23

## 2023-05-11 RX ADMIN — HEPARIN SODIUM (PORCINE) LOCK FLUSH IV SOLN 100 UNIT/ML 500 UNITS: 100 SOLUTION at 11:24

## 2023-05-22 ENCOUNTER — OFFICE VISIT (OUTPATIENT)
Dept: ONCOLOGY | Facility: HOSPITAL | Age: 68
End: 2023-05-22
Payer: MEDICARE

## 2023-05-22 ENCOUNTER — HOSPITAL ENCOUNTER (OUTPATIENT)
Dept: ONCOLOGY | Facility: HOSPITAL | Age: 68
Discharge: HOME | End: 2023-05-22
Admitting: INTERNAL MEDICINE
Payer: MEDICARE

## 2023-05-22 VITALS
RESPIRATION RATE: 18 BRPM | SYSTOLIC BLOOD PRESSURE: 123 MMHG | HEART RATE: 79 BPM | HEIGHT: 74 IN | WEIGHT: 280.43 LBS | BODY MASS INDEX: 35.99 KG/M2 | DIASTOLIC BLOOD PRESSURE: 81 MMHG | OXYGEN SATURATION: 97 % | TEMPERATURE: 96.9 F

## 2023-05-22 DIAGNOSIS — C20 RECTAL CANCER: Primary | ICD-10-CM

## 2023-05-22 DIAGNOSIS — H53.9 VISION CHANGES: ICD-10-CM

## 2023-05-22 LAB
ALBUMIN SERPL-MCNC: 3.6 G/DL (ref 3.5–5.2)
ALBUMIN/GLOB SERPL: 1.5 G/DL
ALP SERPL-CCNC: 81 U/L (ref 39–117)
ALT SERPL W P-5'-P-CCNC: 20 U/L (ref 1–41)
ANION GAP SERPL CALCULATED.3IONS-SCNC: 7.6 MMOL/L (ref 5–15)
AST SERPL-CCNC: 19 U/L (ref 1–40)
BASOPHILS # BLD AUTO: 0.02 10*3/MM3 (ref 0–0.2)
BASOPHILS NFR BLD AUTO: 0.5 % (ref 0–1.5)
BILIRUB SERPL-MCNC: 0.7 MG/DL (ref 0–1.2)
BILIRUB UR QL STRIP: NEGATIVE
BUN SERPL-MCNC: 8 MG/DL (ref 8–23)
BUN/CREAT SERPL: 9.8 (ref 7–25)
CALCIUM SPEC-SCNC: 9 MG/DL (ref 8.6–10.5)
CHLORIDE SERPL-SCNC: 106 MMOL/L (ref 98–107)
CLARITY UR: CLEAR
CO2 SERPL-SCNC: 25.4 MMOL/L (ref 22–29)
COLOR UR: YELLOW
CREAT SERPL-MCNC: 0.82 MG/DL (ref 0.76–1.27)
DEPRECATED RDW RBC AUTO: 58.4 FL (ref 37–54)
EGFRCR SERPLBLD CKD-EPI 2021: 96.9 ML/MIN/1.73
EOSINOPHIL # BLD AUTO: 0.4 10*3/MM3 (ref 0–0.4)
EOSINOPHIL NFR BLD AUTO: 9.6 % (ref 0.3–6.2)
ERYTHROCYTE [DISTWIDTH] IN BLOOD BY AUTOMATED COUNT: 16.1 % (ref 12.3–15.4)
GLOBULIN UR ELPH-MCNC: 2.4 GM/DL
GLUCOSE SERPL-MCNC: 137 MG/DL (ref 65–99)
GLUCOSE UR STRIP-MCNC: NEGATIVE MG/DL
HCT VFR BLD AUTO: 37.8 % (ref 37.5–51)
HGB BLD-MCNC: 12.7 G/DL (ref 13–17.7)
HGB UR QL STRIP.AUTO: NEGATIVE
IMM GRANULOCYTES # BLD AUTO: 0.01 10*3/MM3 (ref 0–0.05)
IMM GRANULOCYTES NFR BLD AUTO: 0.2 % (ref 0–0.5)
KETONES UR QL STRIP: NEGATIVE
LEUKOCYTE ESTERASE UR QL STRIP.AUTO: NEGATIVE
LYMPHOCYTES # BLD AUTO: 0.99 10*3/MM3 (ref 0.7–3.1)
LYMPHOCYTES NFR BLD AUTO: 23.7 % (ref 19.6–45.3)
MCH RBC QN AUTO: 33.3 PG (ref 26.6–33)
MCHC RBC AUTO-ENTMCNC: 33.6 G/DL (ref 31.5–35.7)
MCV RBC AUTO: 99.2 FL (ref 79–97)
MONOCYTES # BLD AUTO: 0.31 10*3/MM3 (ref 0.1–0.9)
MONOCYTES NFR BLD AUTO: 7.4 % (ref 5–12)
NEUTROPHILS NFR BLD AUTO: 2.45 10*3/MM3 (ref 1.7–7)
NEUTROPHILS NFR BLD AUTO: 58.6 % (ref 42.7–76)
NITRITE UR QL STRIP: NEGATIVE
PH UR STRIP.AUTO: 5.5 [PH] (ref 5–8)
PLATELET # BLD AUTO: 171 10*3/MM3 (ref 140–450)
PMV BLD AUTO: 8.9 FL (ref 6–12)
POTASSIUM SERPL-SCNC: 4.2 MMOL/L (ref 3.5–5.2)
PROT SERPL-MCNC: 6 G/DL (ref 6–8.5)
PROT UR QL STRIP: NEGATIVE
RBC # BLD AUTO: 3.81 10*6/MM3 (ref 4.14–5.8)
SODIUM SERPL-SCNC: 139 MMOL/L (ref 136–145)
SP GR UR STRIP: 1.01 (ref 1–1.03)
UROBILINOGEN UR QL STRIP: NORMAL
WBC NRBC COR # BLD: 4.18 10*3/MM3 (ref 3.4–10.8)

## 2023-05-22 PROCEDURE — 36591 DRAW BLOOD OFF VENOUS DEVICE: CPT

## 2023-05-22 PROCEDURE — G0463 HOSPITAL OUTPT CLINIC VISIT: HCPCS

## 2023-05-22 PROCEDURE — 80053 COMPREHEN METABOLIC PANEL: CPT | Performed by: INTERNAL MEDICINE

## 2023-05-22 PROCEDURE — 81003 URINALYSIS AUTO W/O SCOPE: CPT | Performed by: INTERNAL MEDICINE

## 2023-05-22 PROCEDURE — 85025 COMPLETE CBC W/AUTO DIFF WBC: CPT | Performed by: INTERNAL MEDICINE

## 2023-05-22 NOTE — PROGRESS NOTES
Chief Complaint  Rectal Cancer    Eri Moran APRN Reinberg, Emily, APRN    Subjective          Robbi Kennedy presents to [facility]EMATOLOGY [facility]COLOGY for treatment of his rectal cancer.  He is on FOLFIRI plus bevacizumab.  He states that he had increased fatigue as well as some bowel crampiness with his last cycle.  This usually started around day 3 and lasted for 3 to 4 days.  He still feels like he is eating and drinking well.  He denies any issues from his Port-A-Cath.  No new masses or adenopathy.  He reports increasing trouble with flashing bright lights in his eyes.  This comes on with no clear inciting event.  He denies mentation changes, headaches, numbness or weakness.    Oncology/Hematology History Overview Note   9/30/2019 High rectal aght-ymiqebaqdi-rrykrllqxovzlc adenocarcinoma  associated with tubular adenoma.   4/27/21 Biopsy of liver revealed metastatic adenocarcinoma, consistent with colorectal primary.    Clinical Staging  Stage IIa ([id number])    Chemotherapy      neoadjuvant Xeloda with radiation. adjuvant xeloda        Radiation Therapy      7/8/19 thru 8/14/19 completed neoadjuvant 5040 cGy=28 fxs rectum     10/2021 XRT due to recurrence right middle lung 5 Fx's, 5000cGy  11/26/21 adjuvant FOLFOX -2/25/2022    Surgeries       9/30/2019 low anterior resection with ostomy        8/31/21 partial hepatectomy, cholecystectomy and MWA for segment 6 medical margin performed at     2/8/2023 CT Scans revealed Worsening pulmonary metastatic disease.    2/21/2023 orders written for FOLFIRI + AVASTIN       Rectal cancer   6/2/2021 - 8/15/2021 Chemotherapy    OP COLON mFOLFOX6 + Bevacizumab (OXALIplatin / Leucovorin / Fluorouracil / Bevacizumab)     6/18/2021 Initial Diagnosis    Rectal cancer (CMS/HCC)     6/21/2021 Cancer Staged    Staging form: Colon And Rectum, AJCC 8th Edition  - Clinical: Stage IIA (cT3, cN0, cM0) - Signed by Maurilio Campos MD on 6/21/2021      11/16/2021 - 2/25/2022 Chemotherapy    OP COLON mFOLFOX6 OXALIplatin / Leucovorin / Fluorouracil     2/28/2023 -  Chemotherapy    OP COLORECTAL FOLFIRI + Bevacizumab 5mg/kg (Irinotecan / Leucovorin / Fluorouracil / Bevacizumab)     Secondary malignant neoplasm of left lung   9/14/2021 Initial Diagnosis    Secondary malignant neoplasm of left lung (HCC)     10/20/2021 -  Radiation    RADIATION THERAPY Treatment Details (Noted on 10/5/2021)  Site: Bilateral Lung  Technique: SBRT  Goal: No goal specified  Planned Treatment Start Date: 10/20/2021     11/16/2021 - 2/25/2022 Chemotherapy    OP COLON mFOLFOX6 OXALIplatin / Leucovorin / Fluorouracil     6/15/2022 -  Radiation    RADIATION THERAPY Treatment Details (Noted on 6/15/2022)  Site: Left Lung - Lower lobe  Technique: SBRT  Goal: No goal specified  Planned Treatment Start Date: No planned start date specified     10/25/2022 -  Radiation    RADIATION THERAPY Treatment Details (Noted on 10/25/2022)  Site: Left Lung  Technique: SBRT  Goal: No goal specified  Planned Treatment Start Date: No planned start date specified     Secondary malignancy of liver   9/14/2021 Initial Diagnosis    Secondary malignancy of liver (HCC)     11/16/2021 - 2/25/2022 Chemotherapy    OP COLON mFOLFOX6 OXALIplatin / Leucovorin / Fluorouracil     Secondary malignant neoplasm of right lung   10/5/2021 Initial Diagnosis    Secondary malignant neoplasm of right lung (HCC)     10/20/2021 -  Radiation    RADIATION THERAPY Treatment Details (Noted on 10/5/2021)  Site: Bilateral Lung  Technique: SBRT  Goal: No goal specified  Planned Treatment Start Date: 10/20/2021     11/16/2021 - 2/25/2022 Chemotherapy    OP COLON mFOLFOX6 OXALIplatin / Leucovorin / Fluorouracil         Review of Systems   Constitutional: Positive for fatigue (5/10). Negative for appetite change, diaphoresis, fever, unexpected weight gain and unexpected weight loss.   HENT: Negative for hearing loss, mouth sores, sore throat,  swollen glands, trouble swallowing and voice change.    Eyes: Negative for blurred vision.   Respiratory: Negative for cough, shortness of breath and wheezing.    Cardiovascular: Negative for chest pain and palpitations.   Gastrointestinal: Negative for abdominal pain, blood in stool, constipation, diarrhea, nausea and vomiting.   Endocrine: Negative for cold intolerance and heat intolerance.   Genitourinary: Negative for difficulty urinating, dysuria, frequency, hematuria and urinary incontinence.   Musculoskeletal: Negative for arthralgias, back pain and myalgias.   Skin: Negative for rash, skin lesions and wound.   Neurological: Negative for dizziness, seizures, weakness, numbness and headache.   Hematological: Does not bruise/bleed easily.   Psychiatric/Behavioral: Negative for depressed mood. The patient is not nervous/anxious.      Current Outpatient Medications on File Prior to Visit   Medication Sig Dispense Refill   • acetaminophen (TYLENOL) 500 MG tablet Take 1 tablet by mouth Every 6 (Six) Hours As Needed.     • apixaban (ELIQUIS) 5 MG tablet tablet Take 1 tablet by mouth 2 (Two) Times a Day.     • ascorbic acid (VITAMIN C) 500 MG tablet Take 1 tablet by mouth Daily.     • atorvastatin (LIPITOR) 40 MG tablet atorvastatin 40 mg oral tablet take 1 tablet (40 mg) by oral route once daily   Active     • Chlorcyclizine-Pseudoephed 25-60 MG tablet Take 1 tablet by mouth.     • cyanocobalamin (VITAMIN B-12) 1000 MCG tablet Take 1 tablet by mouth Daily.     • diclofenac (VOLTAREN) 50 MG EC tablet      • fluticasone (FLONASE) 50 MCG/ACT nasal spray      • furosemide (LASIX) 20 MG tablet Take 1 tablet by mouth Daily. 90 tablet 3   • loratadine (CLARITIN) 10 MG tablet loratadine 10 mg oral tablet take 1 tablet (10 mg) by oral route once daily   Active     • metoprolol tartrate (LOPRESSOR) 100 MG tablet Take 1 tablet by mouth 2 (Two) Times a Day. 180 tablet 1   • multivitamin (THERAGRAN) tablet tablet Take 1 tablet  by mouth Daily.     • ondansetron (ZOFRAN) 8 MG tablet Take 1 tablet by mouth 3 (Three) Times a Day As Needed for Nausea or Vomiting. 30 tablet 5   • potassium chloride (K-DUR,KLOR-CON) 20 MEQ CR tablet Take 1 tablet by mouth Daily. 90 tablet 1   • Synthroid 25 MCG tablet Take 1 tablet by mouth Daily.     • verapamil SR (CALAN-SR) 180 MG CR tablet      • vitamin E 1000 UNIT capsule Take 1 capsule by mouth Daily.       No current facility-administered medications on file prior to visit.       No Known Allergies  Past Medical History:   Diagnosis Date   • Asthma    • Atrial fibrillation, persistent 2/26/2021   • Chronic heart failure with preserved ejection fraction 7/18/2022    BNP 1590 on 7/22 Echocardiogram with EF 55% moderate to severe biatrial enlargement and mild right ventricular enlargement 7/22    • Colorectal cancer    • Essential hypertension    • GI cancer    • Hepatitis    • History of DVT of lower extremity 8/27/2021   • Hyperlipidemia LDL goal <100 12/31/2020   • Rectal cancer 6/18/2021   • Secondary malignancy of liver 9/14/2021   • Secondary malignant neoplasm of left lung 9/14/2021   • Secondary malignant neoplasm of right lung 10/5/2021   • Thrombocytopenia 1/11/2022   • Thyroid disease    • Viral upper respiratory tract infection 4/24/2023     Past Surgical History:   Procedure Laterality Date   • COLON SURGERY     • HERNIA REPAIR     • LIVER BIOPSY      4/27/21 Biopsy of liver revealed metastatic adenocarcinoma, consistent with colorectal primary   • OTHER SURGICAL HISTORY      REMOVED CANCER FROM COLON     Social History     Socioeconomic History   • Marital status:    • Number of children: 2   Tobacco Use   • Smoking status: Never   • Smokeless tobacco: Never   Vaping Use   • Vaping Use: Never used   Substance and Sexual Activity   • Alcohol use: Yes     Comment: occasionally, no recent use   • Drug use: Never   • Sexual activity: Defer     Family History   Problem Relation Age of Onset  "  • Prostate cancer Father    • Heart murmur Mother    • Diabetes Mother    • Colon cancer Maternal Uncle        Objective   Physical Exam  Vitals reviewed. Exam conducted with a chaperone present.   Constitutional:       General: He is not in acute distress.     Appearance: Normal appearance.   Cardiovascular:      Rate and Rhythm: Normal rate and regular rhythm.      Heart sounds: Normal heart sounds. No murmur heard.    No gallop.   Pulmonary:      Effort: Pulmonary effort is normal.      Breath sounds: Normal breath sounds.      Comments: Port-A-Cath  Abdominal:      General: Abdomen is flat. Bowel sounds are normal.      Palpations: Abdomen is soft.   Musculoskeletal:      Cervical back: Neck supple.      Right lower leg: No edema.      Left lower leg: No edema.   Lymphadenopathy:      Cervical: No cervical adenopathy.   Neurological:      Mental Status: He is alert and oriented to person, place, and time.   Psychiatric:         Mood and Affect: Mood normal.         Behavior: Behavior normal.         Vitals:    05/22/23 1308   BP: 123/81   Pulse: 79   Resp: 18   Temp: 96.9 °F (36.1 °C)   SpO2: 97%   Weight: 127 kg (280 lb 6.8 oz)   Height: 189 cm (74.41\")   PainSc: 0-No pain     ECOG score: 0         PHQ-9 Total Score:                    Result Review :   The following data was reviewed by: Maurilio Campos MD on 05/22/2023:  Lab Results   Component Value Date    HGB 12.7 (L) 05/22/2023    HCT 37.8 05/22/2023    MCV 99.2 (H) 05/22/2023     05/22/2023    WBC 4.18 05/22/2023    NEUTROABS 2.45 05/22/2023    LYMPHSABS 0.99 05/22/2023    MONOSABS 0.31 05/22/2023    EOSABS 0.40 05/22/2023    BASOSABS 0.02 05/22/2023     Lab Results   Component Value Date    GLUCOSE 137 (H) 05/22/2023    BUN 8 05/22/2023    CREATININE 0.82 05/22/2023     05/22/2023    K 4.2 05/22/2023     05/22/2023    CO2 25.4 05/22/2023    CALCIUM 9.0 05/22/2023    PROTEINTOT 6.0 05/22/2023    ALBUMIN 3.6 05/22/2023    BILITOT " 0.7 05/22/2023    ALKPHOS 81 05/22/2023    AST 19 05/22/2023    ALT 20 05/22/2023     Lab Results   Component Value Date    MG 1.8 (L) 09/07/2021    FREET4 0.84 06/29/2022    TSH 3.250 02/24/2021     Lab Results   Component Value Date    IRON 60 (L) 03/04/2020    LABIRON 20 03/04/2020    TRANSFERRIN 215.00 03/04/2020    TIBC 307 03/04/2020     Lab Results   Component Value Date    FERRITIN 140 03/04/2020    [id number] >2000 (H) 03/04/2020    FOLATE 19.4 03/04/2020     Lab Results   Component Value Date    CEA 5.58 02/08/2023             Assessment and Plan    Diagnoses and all orders for this visit:    1. Rectal cancer (Primary)  Assessment & Plan:  Metastatic.  Patient is on palliative treatment with FOLFIRI plus bevacizumab.  He is having some fatigue but otherwise tolerating fairly well.  He is due for cycle 6.  Lab work is adequate for treatment.  Proceed with cycle 6 as planned.  RTC 2 weeks for OV, cycle 7 with lab work prior to monitor for toxicities.  Plan restaging scans after cycle 8    Orders:  -     MRI Brain With & Without Contrast; Future    2. Vision changes  Assessment & Plan:  Patient reports vision changes that have started recently.  I will order MRI brain.    Orders:  -     MRI Brain With & Without Contrast; Future          Patient Follow Up: Cycle 7-day 1    Patient was given instructions and counseling regarding his condition or for health maintenance advice. Please see specific information pulled into the AVS if appropriate.     Maurilio Campos MD    5/23/2023

## 2023-05-23 ENCOUNTER — HOSPITAL ENCOUNTER (OUTPATIENT)
Dept: ONCOLOGY | Facility: HOSPITAL | Age: 68
Discharge: HOME | End: 2023-05-23
Admitting: INTERNAL MEDICINE
Payer: MEDICARE

## 2023-05-23 VITALS
WEIGHT: 281.75 LBS | HEART RATE: 108 BPM | DIASTOLIC BLOOD PRESSURE: 82 MMHG | TEMPERATURE: 97.8 F | HEIGHT: 74 IN | OXYGEN SATURATION: 97 % | RESPIRATION RATE: 18 BRPM | SYSTOLIC BLOOD PRESSURE: 135 MMHG | BODY MASS INDEX: 36.16 KG/M2

## 2023-05-23 DIAGNOSIS — Z45.2 ENCOUNTER FOR ADJUSTMENT OR MANAGEMENT OF VASCULAR ACCESS DEVICE: Primary | ICD-10-CM

## 2023-05-23 DIAGNOSIS — C20 RECTAL CANCER: ICD-10-CM

## 2023-05-23 PROBLEM — H53.9 VISION CHANGES: Status: ACTIVE | Noted: 2023-05-23

## 2023-05-23 PROCEDURE — 25010000002 LEUCOVORIN CALCIUM PER 50 MG: Performed by: INTERNAL MEDICINE

## 2023-05-23 PROCEDURE — 25010000002 BEVACIZUMAB-BVZR 100 MG/4ML SOLUTION 4 ML VIAL: Performed by: INTERNAL MEDICINE

## 2023-05-23 PROCEDURE — 25010000002 PALONOSETRON PER 25 MCG: Performed by: INTERNAL MEDICINE

## 2023-05-23 PROCEDURE — 25010000002 ATROPINE PER 0.01 MG: Performed by: INTERNAL MEDICINE

## 2023-05-23 PROCEDURE — 96368 THER/DIAG CONCURRENT INF: CPT

## 2023-05-23 PROCEDURE — 25010000002 DEXAMETHASONE SODIUM PHOSPHATE 120 MG/30ML SOLUTION: Performed by: INTERNAL MEDICINE

## 2023-05-23 PROCEDURE — 25010000002 IRINOTECAN PER 20 MG: Performed by: INTERNAL MEDICINE

## 2023-05-23 PROCEDURE — 96417 CHEMO IV INFUS EACH ADDL SEQ: CPT

## 2023-05-23 PROCEDURE — G0498 CHEMO EXTEND IV INFUS W/PUMP: HCPCS

## 2023-05-23 PROCEDURE — 25010000002 BEVACIZUMAB-BVZR 400 MG/16ML SOLUTION 16 ML VIAL: Performed by: INTERNAL MEDICINE

## 2023-05-23 PROCEDURE — 96409 CHEMO IV PUSH SNGL DRUG: CPT

## 2023-05-23 PROCEDURE — 25010000002 FLUOROURACIL PER 500 MG: Performed by: INTERNAL MEDICINE

## 2023-05-23 PROCEDURE — 96413 CHEMO IV INFUSION 1 HR: CPT

## 2023-05-23 PROCEDURE — 96375 TX/PRO/DX INJ NEW DRUG ADDON: CPT

## 2023-05-23 PROCEDURE — 96411 CHEMO IV PUSH ADDL DRUG: CPT

## 2023-05-23 RX ORDER — SODIUM CHLORIDE 9 MG/ML
250 INJECTION, SOLUTION INTRAVENOUS ONCE
Status: CANCELLED | OUTPATIENT
Start: 2023-05-23

## 2023-05-23 RX ORDER — PALONOSETRON 0.05 MG/ML
0.25 INJECTION, SOLUTION INTRAVENOUS ONCE
Status: CANCELLED | OUTPATIENT
Start: 2023-05-23

## 2023-05-23 RX ORDER — SODIUM CHLORIDE 0.9 % (FLUSH) 0.9 %
20 SYRINGE (ML) INJECTION AS NEEDED
Status: CANCELLED | OUTPATIENT
Start: 2023-05-23

## 2023-05-23 RX ORDER — FLUOROURACIL 50 MG/ML
1000 INJECTION, SOLUTION INTRAVENOUS ONCE
Status: COMPLETED | OUTPATIENT
Start: 2023-05-23 | End: 2023-05-23

## 2023-05-23 RX ORDER — ATROPINE SULFATE 0.4 MG/ML
0.25 INJECTION, SOLUTION INTRAMUSCULAR; INTRAVENOUS; SUBCUTANEOUS
Status: DISCONTINUED | OUTPATIENT
Start: 2023-05-23 | End: 2023-05-24 | Stop reason: HOSPADM

## 2023-05-23 RX ORDER — ATROPINE SULFATE 1 MG/ML
0.25 INJECTION, SOLUTION INTRAMUSCULAR; INTRAVENOUS; SUBCUTANEOUS
Status: CANCELLED | OUTPATIENT
Start: 2023-05-23

## 2023-05-23 RX ORDER — FLUOROURACIL 50 MG/ML
400 INJECTION, SOLUTION INTRAVENOUS ONCE
Status: CANCELLED | OUTPATIENT
Start: 2023-05-23

## 2023-05-23 RX ORDER — SODIUM CHLORIDE 0.9 % (FLUSH) 0.9 %
20 SYRINGE (ML) INJECTION AS NEEDED
Status: DISCONTINUED | OUTPATIENT
Start: 2023-05-23 | End: 2023-05-24 | Stop reason: HOSPADM

## 2023-05-23 RX ORDER — PALONOSETRON 0.05 MG/ML
0.25 INJECTION, SOLUTION INTRAVENOUS ONCE
Status: COMPLETED | OUTPATIENT
Start: 2023-05-23 | End: 2023-05-23

## 2023-05-23 RX ORDER — SODIUM CHLORIDE 9 MG/ML
250 INJECTION, SOLUTION INTRAVENOUS ONCE
Status: COMPLETED | OUTPATIENT
Start: 2023-05-23 | End: 2023-05-23

## 2023-05-23 RX ORDER — HEPARIN SODIUM (PORCINE) LOCK FLUSH IV SOLN 100 UNIT/ML 100 UNIT/ML
500 SOLUTION INTRAVENOUS AS NEEDED
Status: DISCONTINUED | OUTPATIENT
Start: 2023-05-23 | End: 2023-05-24 | Stop reason: HOSPADM

## 2023-05-23 RX ORDER — HEPARIN SODIUM (PORCINE) LOCK FLUSH IV SOLN 100 UNIT/ML 100 UNIT/ML
500 SOLUTION INTRAVENOUS AS NEEDED
Status: CANCELLED | OUTPATIENT
Start: 2023-05-23

## 2023-05-23 RX ADMIN — LEUCOVORIN CALCIUM 1000 MG: 350 INJECTION, POWDER, LYOPHILIZED, FOR SOLUTION INTRAMUSCULAR; INTRAVENOUS at 10:07

## 2023-05-23 RX ADMIN — DEXAMETHASONE SODIUM PHOSPHATE 12 MG: 4 INJECTION, SOLUTION INTRA-ARTICULAR; INTRALESIONAL; INTRAMUSCULAR; INTRAVENOUS; SOFT TISSUE at 08:46

## 2023-05-23 RX ADMIN — SODIUM CHLORIDE 250 ML: 9 INJECTION, SOLUTION INTRAVENOUS at 08:41

## 2023-05-23 RX ADMIN — Medication 20 ML: at 11:56

## 2023-05-23 RX ADMIN — PALONOSETRON 0.25 MG: 0.05 INJECTION, SOLUTION INTRAVENOUS at 08:42

## 2023-05-23 RX ADMIN — FLUOROURACIL 1000 MG: 50 INJECTION, SOLUTION INTRAVENOUS at 11:52

## 2023-05-23 RX ADMIN — ATROPINE SULFATE 0.25 MG: 0.4 INJECTION, SOLUTION INTRAMUSCULAR; INTRAVENOUS; SUBCUTANEOUS at 10:49

## 2023-05-23 RX ADMIN — FLUOROURACIL 6250 MG: 50 INJECTION, SOLUTION INTRAVENOUS at 11:56

## 2023-05-23 RX ADMIN — IRINOTECAN HYDROCHLORIDE 460 MG: 20 INJECTION, SOLUTION INTRAVENOUS at 10:06

## 2023-05-23 RX ADMIN — SODIUM CHLORIDE 660 MG: 9 INJECTION, SOLUTION INTRAVENOUS at 09:24

## 2023-05-23 NOTE — ASSESSMENT & PLAN NOTE
Metastatic.  Patient is on palliative treatment with FOLFIRI plus bevacizumab.  He is having some fatigue but otherwise tolerating fairly well.  He is due for cycle 6.  Lab work is adequate for treatment.  Proceed with cycle 6 as planned.  RTC 2 weeks for OV, cycle 7 with lab work prior to monitor for toxicities.  Plan restaging scans after cycle 8

## 2023-05-25 ENCOUNTER — HOSPITAL ENCOUNTER (OUTPATIENT)
Dept: ONCOLOGY | Facility: HOSPITAL | Age: 68
Discharge: HOME | End: 2023-05-25
Admitting: INTERNAL MEDICINE
Payer: MEDICARE

## 2023-05-25 DIAGNOSIS — C20 RECTAL CANCER: ICD-10-CM

## 2023-05-25 DIAGNOSIS — Z45.2 ENCOUNTER FOR ADJUSTMENT OR MANAGEMENT OF VASCULAR ACCESS DEVICE: Primary | ICD-10-CM

## 2023-05-25 PROCEDURE — 25010000002 HEPARIN LOCK FLUSH PER 10 UNITS: Performed by: INTERNAL MEDICINE

## 2023-05-25 RX ORDER — SODIUM CHLORIDE 0.9 % (FLUSH) 0.9 %
20 SYRINGE (ML) INJECTION AS NEEDED
OUTPATIENT
Start: 2023-05-25

## 2023-05-25 RX ORDER — HEPARIN SODIUM (PORCINE) LOCK FLUSH IV SOLN 100 UNIT/ML 100 UNIT/ML
500 SOLUTION INTRAVENOUS AS NEEDED
Status: DISCONTINUED | OUTPATIENT
Start: 2023-05-25 | End: 2023-05-26 | Stop reason: HOSPADM

## 2023-05-25 RX ORDER — HEPARIN SODIUM (PORCINE) LOCK FLUSH IV SOLN 100 UNIT/ML 100 UNIT/ML
500 SOLUTION INTRAVENOUS AS NEEDED
OUTPATIENT
Start: 2023-05-25

## 2023-05-25 RX ORDER — SODIUM CHLORIDE 0.9 % (FLUSH) 0.9 %
20 SYRINGE (ML) INJECTION AS NEEDED
Status: DISCONTINUED | OUTPATIENT
Start: 2023-05-25 | End: 2023-05-26 | Stop reason: HOSPADM

## 2023-05-25 RX ADMIN — HEPARIN SODIUM (PORCINE) LOCK FLUSH IV SOLN 100 UNIT/ML 500 UNITS: 100 SOLUTION at 13:23

## 2023-05-25 RX ADMIN — Medication 20 ML: at 13:22

## 2023-06-02 ENCOUNTER — HOSPITAL ENCOUNTER (OUTPATIENT)
Dept: MRI IMAGING | Facility: HOSPITAL | Age: 68
Discharge: HOME | End: 2023-06-02

## 2023-06-02 DIAGNOSIS — H53.9 VISION CHANGES: ICD-10-CM

## 2023-06-02 DIAGNOSIS — C20 RECTAL CANCER: ICD-10-CM

## 2023-06-02 PROCEDURE — A9577 INJ MULTIHANCE: HCPCS | Performed by: INTERNAL MEDICINE

## 2023-06-02 PROCEDURE — 70553 MRI BRAIN STEM W/O & W/DYE: CPT

## 2023-06-02 PROCEDURE — 0 GADOBENATE DIMEGLUMINE 529 MG/ML SOLUTION: Performed by: INTERNAL MEDICINE

## 2023-06-02 RX ORDER — HEPARIN SODIUM (PORCINE) LOCK FLUSH IV SOLN 100 UNIT/ML 100 UNIT/ML
SOLUTION INTRAVENOUS
Status: DISCONTINUED
Start: 2023-06-02 | End: 2023-06-03 | Stop reason: HOSPADM

## 2023-06-02 RX ADMIN — GADOBENATE DIMEGLUMINE 20 ML: 529 INJECTION, SOLUTION INTRAVENOUS at 14:05

## 2023-06-05 ENCOUNTER — OFFICE VISIT (OUTPATIENT)
Dept: ONCOLOGY | Facility: HOSPITAL | Age: 68
End: 2023-06-05
Payer: MEDICARE

## 2023-06-05 ENCOUNTER — HOSPITAL ENCOUNTER (OUTPATIENT)
Dept: ONCOLOGY | Facility: HOSPITAL | Age: 68
Discharge: HOME | End: 2023-06-05
Admitting: INTERNAL MEDICINE
Payer: MEDICARE

## 2023-06-05 VITALS
DIASTOLIC BLOOD PRESSURE: 94 MMHG | WEIGHT: 279.98 LBS | TEMPERATURE: 97.3 F | BODY MASS INDEX: 35.55 KG/M2 | SYSTOLIC BLOOD PRESSURE: 132 MMHG | OXYGEN SATURATION: 97 % | HEART RATE: 61 BPM | RESPIRATION RATE: 18 BRPM

## 2023-06-05 DIAGNOSIS — C20 RECTAL CANCER: Primary | ICD-10-CM

## 2023-06-05 DIAGNOSIS — Z45.2 ENCOUNTER FOR ADJUSTMENT OR MANAGEMENT OF VASCULAR ACCESS DEVICE: ICD-10-CM

## 2023-06-05 LAB
ALBUMIN SERPL-MCNC: 3.6 G/DL (ref 3.5–5.2)
ALBUMIN/GLOB SERPL: 1.4 G/DL
ALP SERPL-CCNC: 76 U/L (ref 39–117)
ALT SERPL W P-5'-P-CCNC: 20 U/L (ref 1–41)
ANION GAP SERPL CALCULATED.3IONS-SCNC: 9.2 MMOL/L (ref 5–15)
AST SERPL-CCNC: 21 U/L (ref 1–40)
BASOPHILS # BLD AUTO: 0.02 10*3/MM3 (ref 0–0.2)
BASOPHILS NFR BLD AUTO: 0.5 % (ref 0–1.5)
BILIRUB SERPL-MCNC: 0.7 MG/DL (ref 0–1.2)
BILIRUB UR QL STRIP: NEGATIVE
BUN SERPL-MCNC: 10 MG/DL (ref 8–23)
BUN/CREAT SERPL: 12.2 (ref 7–25)
CALCIUM SPEC-SCNC: 9 MG/DL (ref 8.6–10.5)
CHLORIDE SERPL-SCNC: 105 MMOL/L (ref 98–107)
CLARITY UR: CLEAR
CO2 SERPL-SCNC: 25.8 MMOL/L (ref 22–29)
COLOR UR: YELLOW
CREAT SERPL-MCNC: 0.82 MG/DL (ref 0.76–1.27)
DEPRECATED RDW RBC AUTO: 58.8 FL (ref 37–54)
EGFRCR SERPLBLD CKD-EPI 2021: 96.3 ML/MIN/1.73
EOSINOPHIL # BLD AUTO: 0.15 10*3/MM3 (ref 0–0.4)
EOSINOPHIL NFR BLD AUTO: 3.5 % (ref 0.3–6.2)
ERYTHROCYTE [DISTWIDTH] IN BLOOD BY AUTOMATED COUNT: 15.9 % (ref 12.3–15.4)
GLOBULIN UR ELPH-MCNC: 2.5 GM/DL
GLUCOSE SERPL-MCNC: 128 MG/DL (ref 65–99)
GLUCOSE UR STRIP-MCNC: NEGATIVE MG/DL
HCT VFR BLD AUTO: 38 % (ref 37.5–51)
HGB BLD-MCNC: 12.3 G/DL (ref 13–17.7)
HGB UR QL STRIP.AUTO: NEGATIVE
IMM GRANULOCYTES # BLD AUTO: 0.01 10*3/MM3 (ref 0–0.05)
IMM GRANULOCYTES NFR BLD AUTO: 0.2 % (ref 0–0.5)
KETONES UR QL STRIP: NEGATIVE
LEUKOCYTE ESTERASE UR QL STRIP.AUTO: NEGATIVE
LYMPHOCYTES # BLD AUTO: 1.1 10*3/MM3 (ref 0.7–3.1)
LYMPHOCYTES NFR BLD AUTO: 25.9 % (ref 19.6–45.3)
MCH RBC QN AUTO: 32.9 PG (ref 26.6–33)
MCHC RBC AUTO-ENTMCNC: 32.4 G/DL (ref 31.5–35.7)
MCV RBC AUTO: 101.6 FL (ref 79–97)
MONOCYTES # BLD AUTO: 0.37 10*3/MM3 (ref 0.1–0.9)
MONOCYTES NFR BLD AUTO: 8.7 % (ref 5–12)
NEUTROPHILS NFR BLD AUTO: 2.59 10*3/MM3 (ref 1.7–7)
NEUTROPHILS NFR BLD AUTO: 61.2 % (ref 42.7–76)
NITRITE UR QL STRIP: NEGATIVE
PH UR STRIP.AUTO: 6 [PH] (ref 5–8)
PLATELET # BLD AUTO: 175 10*3/MM3 (ref 140–450)
PMV BLD AUTO: 9.6 FL (ref 6–12)
POTASSIUM SERPL-SCNC: 4.2 MMOL/L (ref 3.5–5.2)
PROT SERPL-MCNC: 6.1 G/DL (ref 6–8.5)
PROT UR QL STRIP: NEGATIVE
RBC # BLD AUTO: 3.74 10*6/MM3 (ref 4.14–5.8)
SODIUM SERPL-SCNC: 140 MMOL/L (ref 136–145)
SP GR UR STRIP: 1.01 (ref 1–1.03)
UROBILINOGEN UR QL STRIP: NORMAL
WBC NRBC COR # BLD: 4.24 10*3/MM3 (ref 3.4–10.8)

## 2023-06-05 PROCEDURE — 25010000002 HEPARIN LOCK FLUSH PER 10 UNITS: Performed by: INTERNAL MEDICINE

## 2023-06-05 PROCEDURE — 1126F AMNT PAIN NOTED NONE PRSNT: CPT | Performed by: INTERNAL MEDICINE

## 2023-06-05 PROCEDURE — 1160F RVW MEDS BY RX/DR IN RCRD: CPT | Performed by: INTERNAL MEDICINE

## 2023-06-05 PROCEDURE — 99214 OFFICE O/P EST MOD 30 MIN: CPT | Performed by: INTERNAL MEDICINE

## 2023-06-05 PROCEDURE — 1159F MED LIST DOCD IN RCRD: CPT | Performed by: INTERNAL MEDICINE

## 2023-06-05 PROCEDURE — 3080F DIAST BP >= 90 MM HG: CPT | Performed by: INTERNAL MEDICINE

## 2023-06-05 PROCEDURE — 36591 DRAW BLOOD OFF VENOUS DEVICE: CPT

## 2023-06-05 PROCEDURE — 85025 COMPLETE CBC W/AUTO DIFF WBC: CPT | Performed by: INTERNAL MEDICINE

## 2023-06-05 PROCEDURE — 80053 COMPREHEN METABOLIC PANEL: CPT | Performed by: INTERNAL MEDICINE

## 2023-06-05 PROCEDURE — 81003 URINALYSIS AUTO W/O SCOPE: CPT | Performed by: INTERNAL MEDICINE

## 2023-06-05 PROCEDURE — 3075F SYST BP GE 130 - 139MM HG: CPT | Performed by: INTERNAL MEDICINE

## 2023-06-05 RX ORDER — SODIUM CHLORIDE 0.9 % (FLUSH) 0.9 %
20 SYRINGE (ML) INJECTION AS NEEDED
Status: DISCONTINUED | OUTPATIENT
Start: 2023-06-05 | End: 2023-06-06 | Stop reason: HOSPADM

## 2023-06-05 RX ORDER — HEPARIN SODIUM (PORCINE) LOCK FLUSH IV SOLN 100 UNIT/ML 100 UNIT/ML
500 SOLUTION INTRAVENOUS AS NEEDED
Status: CANCELLED | OUTPATIENT
Start: 2023-06-06

## 2023-06-05 RX ORDER — FLUOROURACIL 50 MG/ML
400 INJECTION, SOLUTION INTRAVENOUS ONCE
Status: CANCELLED | OUTPATIENT
Start: 2023-06-06

## 2023-06-05 RX ORDER — PALONOSETRON 0.05 MG/ML
0.25 INJECTION, SOLUTION INTRAVENOUS ONCE
Status: CANCELLED | OUTPATIENT
Start: 2023-06-06

## 2023-06-05 RX ORDER — ATROPINE SULFATE 1 MG/ML
0.25 INJECTION, SOLUTION INTRAMUSCULAR; INTRAVENOUS; SUBCUTANEOUS
Status: CANCELLED | OUTPATIENT
Start: 2023-06-06

## 2023-06-05 RX ORDER — HEPARIN SODIUM (PORCINE) LOCK FLUSH IV SOLN 100 UNIT/ML 100 UNIT/ML
500 SOLUTION INTRAVENOUS AS NEEDED
Status: DISCONTINUED | OUTPATIENT
Start: 2023-06-05 | End: 2023-06-06 | Stop reason: HOSPADM

## 2023-06-05 RX ORDER — SODIUM CHLORIDE 0.9 % (FLUSH) 0.9 %
20 SYRINGE (ML) INJECTION AS NEEDED
Status: CANCELLED | OUTPATIENT
Start: 2023-06-05

## 2023-06-05 RX ORDER — SODIUM CHLORIDE 9 MG/ML
250 INJECTION, SOLUTION INTRAVENOUS ONCE
Status: CANCELLED | OUTPATIENT
Start: 2023-06-06

## 2023-06-05 RX ADMIN — HEPARIN SODIUM (PORCINE) LOCK FLUSH IV SOLN 100 UNIT/ML 500 UNITS: 100 SOLUTION at 10:32

## 2023-06-05 RX ADMIN — Medication 20 ML: at 10:32

## 2023-06-05 NOTE — ASSESSMENT & PLAN NOTE
Patient is due for cycle 7 of FOLFIRI plus bevacizumab.  Lab work is adequate for treatment.  Blood pressure today is a little high diastolic but usually better.  Proceed with cycle 7 as planned.  RTC 2 weeks for OV, cycle 8 with lab work prior to monitor for toxicity.

## 2023-06-05 NOTE — PROGRESS NOTES
Chief Complaint  Rectal Cancer    Eri Moran APRN Reinberg, Emily, APRN Subjective          Robbi Kennedy presents to [facility] HEMATOLOGY [facility] ONCOLOGY for ongoing treatment of his rectal cancer.  He is on palliative treatment FOLFIRI plus bevacizumab.  He notes increased fatigue with his last cycle.  He reports adequate appetite and his weight is maintained.  He is able to perform his ADLs.  No issues from his Port-A-Cath.  He reports no new masses or adenopathy.    Oncology/Hematology History Overview Note   9/30/2019 High rectal gmrk-xlmybfqlji-jzaszelixowqrx adenocarcinoma  associated with tubular adenoma.   4/27/21 Biopsy of liver revealed metastatic adenocarcinoma, consistent with colorectal primary.    Clinical Staging  Stage IIa ([id number])    Chemotherapy      neoadjuvant Xeloda with radiation. adjuvant xeloda        Radiation Therapy      7/8/19 thru 8/14/19 completed neoadjuvant 5040 cGy=28 fxs rectum     10/2021 XRT due to recurrence right middle lung 5 Fx's, 5000cGy  11/26/21 adjuvant FOLFOX -2/25/2022    Surgeries       9/30/2019 low anterior resection with ostomy        8/31/21 partial hepatectomy, cholecystectomy and MWA for segment 6 medical margin performed at     2/8/2023 CT Scans revealed Worsening pulmonary metastatic disease.    2/21/2023 orders written for FOLFIRI + AVASTIN       Rectal cancer   6/2/2021 - 8/15/2021 Chemotherapy    OP COLON mFOLFOX6 + Bevacizumab (OXALIplatin / Leucovorin / Fluorouracil / Bevacizumab)       6/18/2021 Initial Diagnosis    Rectal cancer (CMS/HCC)     6/21/2021 Cancer Staged    Staging form: Colon And Rectum, AJCC 8th Edition  - Clinical: Stage IIA (cT3, cN0, cM0) - Signed by Maurilio Campos MD on 6/21/2021 11/16/2021 - 2/25/2022 Chemotherapy    OP COLON mFOLFOX6 OXALIplatin / Leucovorin / Fluorouracil       2/28/2023 -  Chemotherapy    OP COLORECTAL FOLFIRI + Bevacizumab 5mg/kg (Irinotecan / Leucovorin / Fluorouracil /  Bevacizumab)       Secondary malignant neoplasm of left lung   9/14/2021 Initial Diagnosis    Secondary malignant neoplasm of left lung (HCC)     10/20/2021 -  Radiation    RADIATION THERAPY Treatment Details (Noted on 10/5/2021)  Site: Bilateral Lung  Technique: SBRT  Goal: No goal specified  Planned Treatment Start Date: 10/20/2021     11/16/2021 - 2/25/2022 Chemotherapy    OP COLON mFOLFOX6 OXALIplatin / Leucovorin / Fluorouracil       6/15/2022 -  Radiation    RADIATION THERAPY Treatment Details (Noted on 6/15/2022)  Site: Left Lung - Lower lobe  Technique: SBRT  Goal: No goal specified  Planned Treatment Start Date: No planned start date specified     10/25/2022 -  Radiation    RADIATION THERAPY Treatment Details (Noted on 10/25/2022)  Site: Left Lung  Technique: SBRT  Goal: No goal specified  Planned Treatment Start Date: No planned start date specified     Secondary malignancy of liver   9/14/2021 Initial Diagnosis    Secondary malignancy of liver (HCC)     11/16/2021 - 2/25/2022 Chemotherapy    OP COLON mFOLFOX6 OXALIplatin / Leucovorin / Fluorouracil       Secondary malignant neoplasm of right lung   10/5/2021 Initial Diagnosis    Secondary malignant neoplasm of right lung (HCC)     10/20/2021 -  Radiation    RADIATION THERAPY Treatment Details (Noted on 10/5/2021)  Site: Bilateral Lung  Technique: SBRT  Goal: No goal specified  Planned Treatment Start Date: 10/20/2021     11/16/2021 - 2/25/2022 Chemotherapy    OP COLON mFOLFOX6 OXALIplatin / Leucovorin / Fluorouracil           Review of Systems   Constitutional:  Positive for fatigue. Negative for appetite change, diaphoresis, fever, unexpected weight gain and unexpected weight loss.   HENT:  Negative for hearing loss, mouth sores, sore throat, swollen glands, trouble swallowing and voice change.    Eyes:  Negative for blurred vision.   Respiratory:  Negative for cough, shortness of breath and wheezing.    Cardiovascular:  Negative for chest pain and  palpitations.   Gastrointestinal:  Negative for abdominal pain, blood in stool, constipation, diarrhea, nausea and vomiting.   Endocrine: Negative for cold intolerance and heat intolerance.   Genitourinary:  Negative for difficulty urinating, dysuria, frequency, hematuria and urinary incontinence.   Musculoskeletal:  Negative for arthralgias, back pain and myalgias.   Skin:  Negative for rash, skin lesions and wound.   Neurological:  Positive for weakness. Negative for dizziness, seizures, numbness and headache.   Hematological:  Does not bruise/bleed easily.   Psychiatric/Behavioral:  Negative for depressed mood. The patient is not nervous/anxious.    Current Outpatient Medications on File Prior to Visit   Medication Sig Dispense Refill    acetaminophen (TYLENOL) 500 MG tablet Take 1 tablet by mouth Every 6 (Six) Hours As Needed.      apixaban (ELIQUIS) 5 MG tablet tablet Take 1 tablet by mouth 2 (Two) Times a Day.      ascorbic acid (VITAMIN C) 500 MG tablet Take 1 tablet by mouth Daily.      atorvastatin (LIPITOR) 40 MG tablet atorvastatin 40 mg oral tablet take 1 tablet (40 mg) by oral route once daily   Active      cyanocobalamin (VITAMIN B-12) 1000 MCG tablet Take 1 tablet by mouth Daily.      diclofenac (VOLTAREN) 50 MG EC tablet       fluticasone (FLONASE) 50 MCG/ACT nasal spray       furosemide (LASIX) 20 MG tablet Take 1 tablet by mouth Daily. 90 tablet 3    loratadine (CLARITIN) 10 MG tablet loratadine 10 mg oral tablet take 1 tablet (10 mg) by oral route once daily   Active      metoprolol tartrate (LOPRESSOR) 100 MG tablet Take 1 tablet by mouth 2 (Two) Times a Day. 180 tablet 1    multivitamin (THERAGRAN) tablet tablet Take 1 tablet by mouth Daily.      ondansetron (ZOFRAN) 8 MG tablet Take 1 tablet by mouth 3 (Three) Times a Day As Needed for Nausea or Vomiting. 30 tablet 5    potassium chloride (K-DUR,KLOR-CON) 20 MEQ CR tablet Take 1 tablet by mouth Daily. 90 tablet 1    Synthroid 25 MCG tablet Take  1 tablet by mouth Daily.      verapamil SR (CALAN-SR) 180 MG CR tablet       vitamin E 1000 UNIT capsule Take 1 capsule by mouth Daily.       Current Facility-Administered Medications on File Prior to Visit   Medication Dose Route Frequency Provider Last Rate Last Admin    heparin injection 500 Units  500 Units Intravenous PRN Maurilio Campos MD   500 Units at 06/05/23 1032    sodium chloride 0.9 % flush 20 mL  20 mL Intravenous PRN Maurilio Campos MD   20 mL at 06/05/23 1032       No Known Allergies  Past Medical History:   Diagnosis Date    Asthma     Atrial fibrillation, persistent 2/26/2021    Chronic heart failure with preserved ejection fraction 7/18/2022    BNP 1590 on 7/22 Echocardiogram with EF 55% moderate to severe biatrial enlargement and mild right ventricular enlargement 7/22     Colorectal cancer     Essential hypertension     GI cancer     Hepatitis     History of DVT of lower extremity 8/27/2021    Hyperlipidemia LDL goal <100 12/31/2020    Rectal cancer 6/18/2021    Secondary malignancy of liver 9/14/2021    Secondary malignant neoplasm of left lung 9/14/2021    Secondary malignant neoplasm of right lung 10/5/2021    Thrombocytopenia 1/11/2022    Thyroid disease     Viral upper respiratory tract infection 4/24/2023     Past Surgical History:   Procedure Laterality Date    COLON SURGERY      HERNIA REPAIR      LIVER BIOPSY      4/27/21 Biopsy of liver revealed metastatic adenocarcinoma, consistent with colorectal primary    OTHER SURGICAL HISTORY      REMOVED CANCER FROM COLON     Social History     Socioeconomic History    Marital status:     Number of children: 2   Tobacco Use    Smoking status: Never    Smokeless tobacco: Never   Vaping Use    Vaping Use: Never used   Substance and Sexual Activity    Alcohol use: Yes     Comment: occasionally, no recent use    Drug use: Never    Sexual activity: Defer     Family History   Problem Relation Age of Onset    Prostate cancer Father      Heart murmur Mother     Diabetes Mother     Colon cancer Maternal Uncle        Objective   Physical Exam  Vitals reviewed. Exam conducted with a chaperone present.   Constitutional:       General: He is not in acute distress.     Appearance: Normal appearance.   Cardiovascular:      Rate and Rhythm: Normal rate and regular rhythm.      Heart sounds: Normal heart sounds. No murmur heard.    No gallop.   Pulmonary:      Effort: Pulmonary effort is normal.      Breath sounds: Normal breath sounds.      Comments: Port-A-Cath  Abdominal:      General: Abdomen is flat. Bowel sounds are normal.      Palpations: Abdomen is soft.   Musculoskeletal:      Cervical back: Neck supple.      Right lower leg: No edema.      Left lower leg: No edema.   Lymphadenopathy:      Cervical: No cervical adenopathy.   Neurological:      Mental Status: He is alert and oriented to person, place, and time.   Psychiatric:         Mood and Affect: Mood normal.         Behavior: Behavior normal.       Vitals:    06/05/23 1011   BP: 132/94   Pulse: 61   Resp: 18   Temp: 97.3 °F (36.3 °C)   TempSrc: Temporal   SpO2: 97%   Weight: 127 kg (279 lb 15.8 oz)     ECOG score: 0         PHQ-9 Total Score:                    Result Review :   The following data was reviewed by: Maurilio Campos MD on 06/05/2023:  Lab Results   Component Value Date    HGB 12.3 (L) 06/05/2023    HCT 38.0 06/05/2023    .6 (H) 06/05/2023     06/05/2023    WBC 4.24 06/05/2023    NEUTROABS 2.59 06/05/2023    LYMPHSABS 1.10 06/05/2023    MONOSABS 0.37 06/05/2023    EOSABS 0.15 06/05/2023    BASOSABS 0.02 06/05/2023     Lab Results   Component Value Date    GLUCOSE 128 (H) 06/05/2023    BUN 10 06/05/2023    CREATININE 0.82 06/05/2023     06/05/2023    K 4.2 06/05/2023     06/05/2023    CO2 25.8 06/05/2023    CALCIUM 9.0 06/05/2023    PROTEINTOT 6.1 06/05/2023    ALBUMIN 3.6 06/05/2023    BILITOT 0.7 06/05/2023    ALKPHOS 76 06/05/2023    AST 21 06/05/2023     ALT 20 06/05/2023     Lab Results   Component Value Date    MG 1.8 (L) 09/07/2021    FREET4 0.84 06/29/2022    TSH 3.250 02/24/2021     Lab Results   Component Value Date    IRON 60 (L) 03/04/2020    LABIRON 20 03/04/2020    TRANSFERRIN 215.00 03/04/2020    TIBC 307 03/04/2020     Lab Results   Component Value Date    FERRITIN 140 03/04/2020    FHHSVSUF23 >2000 (H) 03/04/2020    FOLATE 19.4 03/04/2020     Lab Results   Component Value Date    PSA 0.91 02/24/2021    CEA 5.58 02/08/2023     Urinalysis negative for protein        Assessment and Plan    Diagnoses and all orders for this visit:    1. Rectal cancer (Primary)  Assessment & Plan:  Patient is due for cycle 7 of FOLFIRI plus bevacizumab.  Lab work is adequate for treatment.  Blood pressure today is a little high diastolic but usually better.  Proceed with cycle 7 as planned.  RTC 2 weeks for OV, cycle 8 with lab work prior to monitor for toxicity.      Other orders  -     sodium chloride 0.9 % infusion 250 mL  -     palonosetron (ALOXI) injection 0.25 mg  -     dexamethasone (DECADRON) 12 mg in sodium chloride 0.9 % IVPB  -     atropine injection 0.25 mg  -     Bevacizumab-bvzr (ZIRABEV) 660 mg in sodium chloride 0.9 % 126.4 mL chemo IVPB  -     leucovorin 1,040 mg in dextrose (D5W) 5 % 354 mL IVPB  -     irinotecan (CAMPTOSAR) 465 mg in dextrose (D5W) 5 % 523.3 mL chemo IVPB  -     fluorouracil (ADRUCIL) chemo injection 1,040 mg  -     fluorouracil (ADRUCIL) 6,220 mg in sodium chloride 0.9 % 124.4 mL chemo infusion - FOR HOME USE            Patient Follow Up: Cycle 8-day 1    Patient was given instructions and counseling regarding his condition or for health maintenance advice. Please see specific information pulled into the AVS if appropriate.     Maurilio Campos MD    6/5/2023

## 2023-06-06 ENCOUNTER — HOSPITAL ENCOUNTER (OUTPATIENT)
Dept: ONCOLOGY | Facility: HOSPITAL | Age: 68
Discharge: HOME | End: 2023-06-06
Admitting: INTERNAL MEDICINE
Payer: MEDICARE

## 2023-06-06 VITALS
SYSTOLIC BLOOD PRESSURE: 122 MMHG | WEIGHT: 281.31 LBS | OXYGEN SATURATION: 97 % | RESPIRATION RATE: 18 BRPM | TEMPERATURE: 97.7 F | DIASTOLIC BLOOD PRESSURE: 80 MMHG | HEIGHT: 74 IN | BODY MASS INDEX: 36.1 KG/M2 | HEART RATE: 91 BPM

## 2023-06-06 DIAGNOSIS — C20 RECTAL CANCER: Primary | ICD-10-CM

## 2023-06-06 PROCEDURE — G0498 CHEMO EXTEND IV INFUS W/PUMP: HCPCS

## 2023-06-06 PROCEDURE — 25010000002 PALONOSETRON PER 25 MCG: Performed by: INTERNAL MEDICINE

## 2023-06-06 PROCEDURE — 25010000002 FLUOROURACIL PER 500 MG: Performed by: INTERNAL MEDICINE

## 2023-06-06 PROCEDURE — 25010000002 DEXAMETHASONE SODIUM PHOSPHATE 120 MG/30ML SOLUTION: Performed by: INTERNAL MEDICINE

## 2023-06-06 PROCEDURE — 96411 CHEMO IV PUSH ADDL DRUG: CPT

## 2023-06-06 PROCEDURE — 25010000002 BEVACIZUMAB-BVZR 100 MG/4ML SOLUTION 4 ML VIAL: Performed by: INTERNAL MEDICINE

## 2023-06-06 PROCEDURE — 96375 TX/PRO/DX INJ NEW DRUG ADDON: CPT

## 2023-06-06 PROCEDURE — 96417 CHEMO IV INFUS EACH ADDL SEQ: CPT

## 2023-06-06 PROCEDURE — 0 ATROPINE PER 0.01 MG: Performed by: INTERNAL MEDICINE

## 2023-06-06 PROCEDURE — 25010000002 LEUCOVORIN CALCIUM PER 50 MG: Performed by: INTERNAL MEDICINE

## 2023-06-06 PROCEDURE — 96413 CHEMO IV INFUSION 1 HR: CPT

## 2023-06-06 PROCEDURE — 25010000002 BEVACIZUMAB-BVZR 400 MG/16ML SOLUTION 16 ML VIAL: Performed by: INTERNAL MEDICINE

## 2023-06-06 PROCEDURE — 96368 THER/DIAG CONCURRENT INF: CPT

## 2023-06-06 PROCEDURE — 25010000002 IRINOTECAN PER 20 MG: Performed by: INTERNAL MEDICINE

## 2023-06-06 RX ORDER — FLUOROURACIL 50 MG/ML
1000 INJECTION, SOLUTION INTRAVENOUS ONCE
Status: COMPLETED | OUTPATIENT
Start: 2023-06-06 | End: 2023-06-06

## 2023-06-06 RX ORDER — SODIUM CHLORIDE 9 MG/ML
250 INJECTION, SOLUTION INTRAVENOUS ONCE
Status: COMPLETED | OUTPATIENT
Start: 2023-06-06 | End: 2023-06-06

## 2023-06-06 RX ORDER — PALONOSETRON 0.05 MG/ML
0.25 INJECTION, SOLUTION INTRAVENOUS ONCE
Status: COMPLETED | OUTPATIENT
Start: 2023-06-06 | End: 2023-06-06

## 2023-06-06 RX ADMIN — DEXAMETHASONE SODIUM PHOSPHATE 12 MG: 4 INJECTION, SOLUTION INTRA-ARTICULAR; INTRALESIONAL; INTRAMUSCULAR; INTRAVENOUS; SOFT TISSUE at 08:55

## 2023-06-06 RX ADMIN — PALONOSETRON 0.25 MG: 0.05 INJECTION, SOLUTION INTRAVENOUS at 08:51

## 2023-06-06 RX ADMIN — FLUOROURACIL 6250 MG: 50 INJECTION, SOLUTION INTRAVENOUS at 12:05

## 2023-06-06 RX ADMIN — ATROPINE SULFATE 0.25 MG: 0.4 INJECTION, SOLUTION INTRAMUSCULAR; INTRAVENOUS; SUBCUTANEOUS at 10:48

## 2023-06-06 RX ADMIN — IRINOTECAN HYDROCHLORIDE 460 MG: 20 INJECTION, SOLUTION INTRAVENOUS at 10:23

## 2023-06-06 RX ADMIN — LEUCOVORIN CALCIUM 1000 MG: 350 INJECTION, POWDER, LYOPHILIZED, FOR SOLUTION INTRAMUSCULAR; INTRAVENOUS at 10:22

## 2023-06-06 RX ADMIN — SODIUM CHLORIDE 250 ML: 9 INJECTION, SOLUTION INTRAVENOUS at 08:49

## 2023-06-06 RX ADMIN — FLUOROURACIL 1000 MG: 50 INJECTION, SOLUTION INTRAVENOUS at 12:00

## 2023-06-06 RX ADMIN — SODIUM CHLORIDE 660 MG: 9 INJECTION, SOLUTION INTRAVENOUS at 09:38

## 2023-06-08 ENCOUNTER — HOSPITAL ENCOUNTER (OUTPATIENT)
Dept: ONCOLOGY | Facility: HOSPITAL | Age: 68
Discharge: HOME | End: 2023-06-08
Payer: MEDICARE

## 2023-06-08 DIAGNOSIS — C20 RECTAL CANCER: ICD-10-CM

## 2023-06-08 DIAGNOSIS — Z45.2 ENCOUNTER FOR ADJUSTMENT OR MANAGEMENT OF VASCULAR ACCESS DEVICE: Primary | ICD-10-CM

## 2023-06-08 PROCEDURE — 25010000002 HEPARIN LOCK FLUSH PER 10 UNITS: Performed by: INTERNAL MEDICINE

## 2023-06-08 RX ORDER — HEPARIN SODIUM (PORCINE) LOCK FLUSH IV SOLN 100 UNIT/ML 100 UNIT/ML
500 SOLUTION INTRAVENOUS AS NEEDED
OUTPATIENT
Start: 2023-06-08

## 2023-06-08 RX ORDER — SODIUM CHLORIDE 0.9 % (FLUSH) 0.9 %
20 SYRINGE (ML) INJECTION AS NEEDED
Status: DISCONTINUED | OUTPATIENT
Start: 2023-06-08 | End: 2023-06-09 | Stop reason: HOSPADM

## 2023-06-08 RX ORDER — SODIUM CHLORIDE 0.9 % (FLUSH) 0.9 %
20 SYRINGE (ML) INJECTION AS NEEDED
OUTPATIENT
Start: 2023-06-08

## 2023-06-08 RX ORDER — HEPARIN SODIUM (PORCINE) LOCK FLUSH IV SOLN 100 UNIT/ML 100 UNIT/ML
500 SOLUTION INTRAVENOUS AS NEEDED
Status: DISCONTINUED | OUTPATIENT
Start: 2023-06-08 | End: 2023-06-09 | Stop reason: HOSPADM

## 2023-06-08 RX ADMIN — HEPARIN SODIUM (PORCINE) LOCK FLUSH IV SOLN 100 UNIT/ML 500 UNITS: 100 SOLUTION at 10:34

## 2023-06-08 RX ADMIN — Medication 20 ML: at 10:33

## 2023-06-19 ENCOUNTER — OFFICE VISIT (OUTPATIENT)
Dept: ONCOLOGY | Facility: HOSPITAL | Age: 68
End: 2023-06-19
Payer: MEDICARE

## 2023-06-19 ENCOUNTER — HOSPITAL ENCOUNTER (OUTPATIENT)
Dept: ONCOLOGY | Facility: HOSPITAL | Age: 68
Discharge: HOME | End: 2023-06-19
Admitting: INTERNAL MEDICINE
Payer: MEDICARE

## 2023-06-19 VITALS
SYSTOLIC BLOOD PRESSURE: 147 MMHG | BODY MASS INDEX: 35.44 KG/M2 | DIASTOLIC BLOOD PRESSURE: 94 MMHG | OXYGEN SATURATION: 98 % | TEMPERATURE: 97.1 F | RESPIRATION RATE: 16 BRPM | WEIGHT: 279.1 LBS | HEART RATE: 87 BPM

## 2023-06-19 DIAGNOSIS — I10 ESSENTIAL HYPERTENSION: ICD-10-CM

## 2023-06-19 DIAGNOSIS — C20 RECTAL CANCER: Primary | ICD-10-CM

## 2023-06-19 DIAGNOSIS — Z45.2 ENCOUNTER FOR ADJUSTMENT OR MANAGEMENT OF VASCULAR ACCESS DEVICE: ICD-10-CM

## 2023-06-19 LAB
ALBUMIN SERPL-MCNC: 3.7 G/DL (ref 3.5–5.2)
ALBUMIN/GLOB SERPL: 1.5 G/DL
ALP SERPL-CCNC: 73 U/L (ref 39–117)
ALT SERPL W P-5'-P-CCNC: 21 U/L (ref 1–41)
ANION GAP SERPL CALCULATED.3IONS-SCNC: 7.7 MMOL/L (ref 5–15)
AST SERPL-CCNC: 22 U/L (ref 1–40)
BASOPHILS # BLD AUTO: 0.01 10*3/MM3 (ref 0–0.2)
BASOPHILS NFR BLD AUTO: 0.2 % (ref 0–1.5)
BILIRUB SERPL-MCNC: 0.8 MG/DL (ref 0–1.2)
BILIRUB UR QL STRIP: NEGATIVE
BUN SERPL-MCNC: 9 MG/DL (ref 8–23)
BUN/CREAT SERPL: 10.1 (ref 7–25)
CALCIUM SPEC-SCNC: 8.9 MG/DL (ref 8.6–10.5)
CHLORIDE SERPL-SCNC: 104 MMOL/L (ref 98–107)
CLARITY UR: CLEAR
CO2 SERPL-SCNC: 25.3 MMOL/L (ref 22–29)
COLOR UR: YELLOW
CREAT SERPL-MCNC: 0.89 MG/DL (ref 0.76–1.27)
DEPRECATED RDW RBC AUTO: 56.7 FL (ref 37–54)
EGFRCR SERPLBLD CKD-EPI 2021: 93.9 ML/MIN/1.73
EOSINOPHIL # BLD AUTO: 0.14 10*3/MM3 (ref 0–0.4)
EOSINOPHIL NFR BLD AUTO: 3.1 % (ref 0.3–6.2)
ERYTHROCYTE [DISTWIDTH] IN BLOOD BY AUTOMATED COUNT: 15.9 % (ref 12.3–15.4)
GLOBULIN UR ELPH-MCNC: 2.4 GM/DL
GLUCOSE SERPL-MCNC: 103 MG/DL (ref 65–99)
GLUCOSE UR STRIP-MCNC: NEGATIVE MG/DL
HCT VFR BLD AUTO: 38.2 % (ref 37.5–51)
HGB BLD-MCNC: 12.2 G/DL (ref 13–17.7)
HGB UR QL STRIP.AUTO: NEGATIVE
IMM GRANULOCYTES # BLD AUTO: 0.01 10*3/MM3 (ref 0–0.05)
IMM GRANULOCYTES NFR BLD AUTO: 0.2 % (ref 0–0.5)
KETONES UR QL STRIP: NEGATIVE
LEUKOCYTE ESTERASE UR QL STRIP.AUTO: NEGATIVE
LYMPHOCYTES # BLD AUTO: 1.45 10*3/MM3 (ref 0.7–3.1)
LYMPHOCYTES NFR BLD AUTO: 31.7 % (ref 19.6–45.3)
MCH RBC QN AUTO: 32.4 PG (ref 26.6–33)
MCHC RBC AUTO-ENTMCNC: 31.9 G/DL (ref 31.5–35.7)
MCV RBC AUTO: 101.6 FL (ref 79–97)
MONOCYTES # BLD AUTO: 0.45 10*3/MM3 (ref 0.1–0.9)
MONOCYTES NFR BLD AUTO: 9.8 % (ref 5–12)
NEUTROPHILS NFR BLD AUTO: 2.51 10*3/MM3 (ref 1.7–7)
NEUTROPHILS NFR BLD AUTO: 55 % (ref 42.7–76)
NITRITE UR QL STRIP: NEGATIVE
PH UR STRIP.AUTO: 5.5 [PH] (ref 5–8)
PLATELET # BLD AUTO: 180 10*3/MM3 (ref 140–450)
PMV BLD AUTO: 9.5 FL (ref 6–12)
POTASSIUM SERPL-SCNC: 4.1 MMOL/L (ref 3.5–5.2)
PROT SERPL-MCNC: 6.1 G/DL (ref 6–8.5)
PROT UR QL STRIP: NEGATIVE
RBC # BLD AUTO: 3.76 10*6/MM3 (ref 4.14–5.8)
SODIUM SERPL-SCNC: 137 MMOL/L (ref 136–145)
SP GR UR STRIP: 1.01 (ref 1–1.03)
UROBILINOGEN UR QL STRIP: NORMAL
WBC NRBC COR # BLD: 4.57 10*3/MM3 (ref 3.4–10.8)

## 2023-06-19 PROCEDURE — 80053 COMPREHEN METABOLIC PANEL: CPT | Performed by: INTERNAL MEDICINE

## 2023-06-19 PROCEDURE — 1159F MED LIST DOCD IN RCRD: CPT | Performed by: INTERNAL MEDICINE

## 2023-06-19 PROCEDURE — 1126F AMNT PAIN NOTED NONE PRSNT: CPT | Performed by: INTERNAL MEDICINE

## 2023-06-19 PROCEDURE — 1160F RVW MEDS BY RX/DR IN RCRD: CPT | Performed by: INTERNAL MEDICINE

## 2023-06-19 PROCEDURE — 99214 OFFICE O/P EST MOD 30 MIN: CPT | Performed by: INTERNAL MEDICINE

## 2023-06-19 PROCEDURE — 36591 DRAW BLOOD OFF VENOUS DEVICE: CPT

## 2023-06-19 PROCEDURE — 81003 URINALYSIS AUTO W/O SCOPE: CPT | Performed by: INTERNAL MEDICINE

## 2023-06-19 PROCEDURE — 25010000002 HEPARIN LOCK FLUSH PER 10 UNITS: Performed by: INTERNAL MEDICINE

## 2023-06-19 PROCEDURE — 3080F DIAST BP >= 90 MM HG: CPT | Performed by: INTERNAL MEDICINE

## 2023-06-19 PROCEDURE — 85025 COMPLETE CBC W/AUTO DIFF WBC: CPT | Performed by: INTERNAL MEDICINE

## 2023-06-19 PROCEDURE — 3077F SYST BP >= 140 MM HG: CPT | Performed by: INTERNAL MEDICINE

## 2023-06-19 PROCEDURE — G0463 HOSPITAL OUTPT CLINIC VISIT: HCPCS | Performed by: INTERNAL MEDICINE

## 2023-06-19 RX ORDER — HEPARIN SODIUM (PORCINE) LOCK FLUSH IV SOLN 100 UNIT/ML 100 UNIT/ML
500 SOLUTION INTRAVENOUS AS NEEDED
Status: DISCONTINUED | OUTPATIENT
Start: 2023-06-19 | End: 2023-06-20 | Stop reason: HOSPADM

## 2023-06-19 RX ORDER — SODIUM CHLORIDE 9 MG/ML
250 INJECTION, SOLUTION INTRAVENOUS ONCE
Status: CANCELLED | OUTPATIENT
Start: 2023-06-20

## 2023-06-19 RX ORDER — ATROPINE SULFATE 1 MG/ML
0.25 INJECTION, SOLUTION INTRAMUSCULAR; INTRAVENOUS; SUBCUTANEOUS
Status: CANCELLED | OUTPATIENT
Start: 2023-06-20

## 2023-06-19 RX ORDER — FLUOROURACIL 50 MG/ML
400 INJECTION, SOLUTION INTRAVENOUS ONCE
Status: CANCELLED | OUTPATIENT
Start: 2023-06-20

## 2023-06-19 RX ORDER — HEPARIN SODIUM (PORCINE) LOCK FLUSH IV SOLN 100 UNIT/ML 100 UNIT/ML
500 SOLUTION INTRAVENOUS AS NEEDED
Status: CANCELLED | OUTPATIENT
Start: 2023-06-22

## 2023-06-19 RX ORDER — PALONOSETRON 0.05 MG/ML
0.25 INJECTION, SOLUTION INTRAVENOUS ONCE
Status: CANCELLED | OUTPATIENT
Start: 2023-06-20

## 2023-06-19 RX ORDER — SODIUM CHLORIDE 0.9 % (FLUSH) 0.9 %
20 SYRINGE (ML) INJECTION AS NEEDED
Status: DISCONTINUED | OUTPATIENT
Start: 2023-06-19 | End: 2023-06-20 | Stop reason: HOSPADM

## 2023-06-19 RX ORDER — SODIUM CHLORIDE 0.9 % (FLUSH) 0.9 %
20 SYRINGE (ML) INJECTION AS NEEDED
Status: CANCELLED | OUTPATIENT
Start: 2023-06-19

## 2023-06-19 RX ADMIN — Medication 20 ML: at 12:58

## 2023-06-19 RX ADMIN — HEPARIN SODIUM (PORCINE) LOCK FLUSH IV SOLN 100 UNIT/ML 500 UNITS: 100 SOLUTION at 12:58

## 2023-06-19 NOTE — PROGRESS NOTES
Chief Complaint  Rectal Cancer    Eri Moran APRN Reinberg, Emily, APRN Subjective          Robbi Kennedy presents to [facility] for ongoing treatment of his rectal cancer.  He is on palliative treatment FOLFIRI plus bevacizumab.  He notes fatigue and some diarrhea with his treatment but he is able to control with Imodium.  He feels that he is eating and drinking well.  He denies new masses or adenopathy.  No issues from his Port-A-Cath.  His energy level is low but adequate for his ADLs.    Oncology/Hematology History Overview Note   9/30/2019 High rectal pjxa-wdyvohugkw-weescapgtpvijd adenocarcinoma  associated with tubular adenoma.   4/27/21 Biopsy of liver revealed metastatic adenocarcinoma, consistent with colorectal primary.    Clinical Staging  Stage IIa ([id number])    Chemotherapy      neoadjuvant Xeloda with radiation. adjuvant xeloda        Radiation Therapy      7/8/19 thru 8/14/19 completed neoadjuvant 5040 cGy=28 fxs rectum     10/2021 XRT due to recurrence right middle lung 5 Fx's, 5000cGy  11/26/21 adjuvant FOLFOX -2/25/2022    Surgeries       9/30/2019 low anterior resection with ostomy        8/31/21 partial hepatectomy, cholecystectomy and MWA for segment 6 medical margin performed at     2/8/2023 CT Scans revealed Worsening pulmonary metastatic disease.    2/21/2023 orders written for FOLFIRI + AVASTIN       Rectal cancer   6/2/2021 - 8/15/2021 Chemotherapy    OP COLON mFOLFOX6 + Bevacizumab (OXALIplatin / Leucovorin / Fluorouracil / Bevacizumab)       6/18/2021 Initial Diagnosis    Rectal cancer (CMS/HCC)     6/21/2021 Cancer Staged    Staging form: Colon And Rectum, AJCC 8th Edition  - Clinical: Stage IIA (cT3, cN0, cM0) - Signed by Maurilio Campos MD on 6/21/2021 11/16/2021 - 2/25/2022 Chemotherapy    OP COLON mFOLFOX6 OXALIplatin / Leucovorin / Fluorouracil       2/28/2023 -  Chemotherapy    OP COLORECTAL FOLFIRI + Bevacizumab 5mg/kg  (Irinotecan / Leucovorin / Fluorouracil / Bevacizumab)       Secondary malignant neoplasm of left lung   9/14/2021 Initial Diagnosis    Secondary malignant neoplasm of left lung (HCC)     10/20/2021 -  Radiation    RADIATION THERAPY Treatment Details (Noted on 10/5/2021)  Site: Bilateral Lung  Technique: SBRT  Goal: No goal specified  Planned Treatment Start Date: 10/20/2021     11/16/2021 - 2/25/2022 Chemotherapy    OP COLON mFOLFOX6 OXALIplatin / Leucovorin / Fluorouracil       6/15/2022 -  Radiation    RADIATION THERAPY Treatment Details (Noted on 6/15/2022)  Site: Left Lung - Lower lobe  Technique: SBRT  Goal: No goal specified  Planned Treatment Start Date: No planned start date specified     10/25/2022 -  Radiation    RADIATION THERAPY Treatment Details (Noted on 10/25/2022)  Site: Left Lung  Technique: SBRT  Goal: No goal specified  Planned Treatment Start Date: No planned start date specified     Secondary malignancy of liver   9/14/2021 Initial Diagnosis    Secondary malignancy of liver (HCC)     11/16/2021 - 2/25/2022 Chemotherapy    OP COLON mFOLFOX6 OXALIplatin / Leucovorin / Fluorouracil       Secondary malignant neoplasm of right lung   10/5/2021 Initial Diagnosis    Secondary malignant neoplasm of right lung (HCC)     10/20/2021 -  Radiation    RADIATION THERAPY Treatment Details (Noted on 10/5/2021)  Site: Bilateral Lung  Technique: SBRT  Goal: No goal specified  Planned Treatment Start Date: 10/20/2021     11/16/2021 - 2/25/2022 Chemotherapy    OP COLON mFOLFOX6 OXALIplatin / Leucovorin / Fluorouracil           Review of Systems   Constitutional:  Negative for appetite change, diaphoresis, fatigue, fever, unexpected weight gain and unexpected weight loss.   HENT:  Negative for hearing loss, mouth sores, sore throat, swollen glands, trouble swallowing and voice change.    Eyes:  Negative for blurred vision.   Respiratory:  Negative for cough, shortness of breath and wheezing.    Cardiovascular:   Negative for chest pain and palpitations.   Gastrointestinal:  Negative for abdominal pain, blood in stool, constipation, diarrhea, nausea and vomiting.   Endocrine: Negative for cold intolerance and heat intolerance.   Genitourinary:  Negative for difficulty urinating, dysuria, frequency, hematuria and urinary incontinence.   Musculoskeletal:  Negative for arthralgias, back pain and myalgias.   Skin:  Negative for rash, skin lesions and wound.   Neurological:  Negative for dizziness, seizures, weakness, numbness and headache.   Hematological:  Does not bruise/bleed easily.   Psychiatric/Behavioral:  Negative for depressed mood. The patient is not nervous/anxious.    Current Outpatient Medications on File Prior to Visit   Medication Sig Dispense Refill    acetaminophen (TYLENOL) 500 MG tablet Take 1 tablet by mouth Every 6 (Six) Hours As Needed.      apixaban (ELIQUIS) 5 MG tablet tablet Take 1 tablet by mouth 2 (Two) Times a Day.      ascorbic acid (VITAMIN C) 500 MG tablet Take 1 tablet by mouth Daily.      atorvastatin (LIPITOR) 40 MG tablet atorvastatin 40 mg oral tablet take 1 tablet (40 mg) by oral route once daily   Active      cyanocobalamin (VITAMIN B-12) 1000 MCG tablet Take 1 tablet by mouth Daily.      diclofenac (VOLTAREN) 50 MG EC tablet       fluticasone (FLONASE) 50 MCG/ACT nasal spray       furosemide (LASIX) 20 MG tablet Take 1 tablet by mouth Daily. 90 tablet 3    loratadine (CLARITIN) 10 MG tablet loratadine 10 mg oral tablet take 1 tablet (10 mg) by oral route once daily   Active      metoprolol tartrate (LOPRESSOR) 100 MG tablet Take 1 tablet by mouth 2 (Two) Times a Day. 180 tablet 1    multivitamin (THERAGRAN) tablet tablet Take 1 tablet by mouth Daily.      ondansetron (ZOFRAN) 8 MG tablet Take 1 tablet by mouth 3 (Three) Times a Day As Needed for Nausea or Vomiting. 30 tablet 5    potassium chloride (K-DUR,KLOR-CON) 20 MEQ CR tablet Take 1 tablet by mouth Daily. 90 tablet 1    Synthroid 25  MCG tablet Take 1 tablet by mouth Daily.      verapamil SR (CALAN-SR) 180 MG CR tablet       vitamin E 1000 UNIT capsule Take 1 capsule by mouth Daily.       Current Facility-Administered Medications on File Prior to Visit   Medication Dose Route Frequency Provider Last Rate Last Admin    heparin injection 500 Units  500 Units Intravenous PRN Maurilio Campos MD   500 Units at 06/19/23 1258    sodium chloride 0.9 % flush 20 mL  20 mL Intravenous PRN Maurilio Campos MD   20 mL at 06/19/23 1258       No Known Allergies  Past Medical History:   Diagnosis Date    Asthma     Atrial fibrillation, persistent 2/26/2021    Chronic heart failure with preserved ejection fraction 7/18/2022    BNP 1590 on 7/22 Echocardiogram with EF 55% moderate to severe biatrial enlargement and mild right ventricular enlargement 7/22     Colorectal cancer     Essential hypertension     GI cancer     Hepatitis     History of DVT of lower extremity 8/27/2021    Hyperlipidemia LDL goal <100 12/31/2020    Rectal cancer 6/18/2021    Secondary malignancy of liver 9/14/2021    Secondary malignant neoplasm of left lung 9/14/2021    Secondary malignant neoplasm of right lung 10/5/2021    Thrombocytopenia 1/11/2022    Thyroid disease     Viral upper respiratory tract infection 4/24/2023     Past Surgical History:   Procedure Laterality Date    COLON SURGERY      HERNIA REPAIR      LIVER BIOPSY      4/27/21 Biopsy of liver revealed metastatic adenocarcinoma, consistent with colorectal primary    OTHER SURGICAL HISTORY      REMOVED CANCER FROM COLON     Social History     Socioeconomic History    Marital status:     Number of children: 2   Tobacco Use    Smoking status: Never    Smokeless tobacco: Never   Vaping Use    Vaping Use: Never used   Substance and Sexual Activity    Alcohol use: Yes     Comment: occasionally, no recent use    Drug use: Never    Sexual activity: Defer     Family History   Problem Relation Age of Onset    Prostate cancer  Father     Heart murmur Mother     Diabetes Mother     Colon cancer Maternal Uncle        Objective   Physical Exam  Vitals reviewed. Exam conducted with a chaperone present.   Constitutional:       General: He is not in acute distress.     Appearance: Normal appearance.   Cardiovascular:      Rate and Rhythm: Normal rate and regular rhythm.      Heart sounds: Normal heart sounds. No murmur heard.    No gallop.   Pulmonary:      Effort: Pulmonary effort is normal.      Breath sounds: Normal breath sounds.      Comments: Port-A-Cath  Abdominal:      General: Abdomen is flat. Bowel sounds are normal.      Palpations: Abdomen is soft.   Musculoskeletal:      Cervical back: Neck supple.      Right lower leg: No edema.      Left lower leg: No edema.   Lymphadenopathy:      Cervical: No cervical adenopathy.   Neurological:      Mental Status: He is alert and oriented to person, place, and time.   Psychiatric:         Mood and Affect: Mood normal.         Behavior: Behavior normal.       Vitals:    06/19/23 1306   BP: 147/94   Pulse: 87   Resp: 16   Temp: 97.1 °F (36.2 °C)   TempSrc: Temporal   SpO2: 98%   Weight: 127 kg (279 lb 1.6 oz)   PainSc: 0-No pain     ECOG score: 0         PHQ-9 Total Score:                    Result Review :   The following data was reviewed by: Maurilio Campos MD on 06/19/2023:  Lab Results   Component Value Date    HGB 12.2 (L) 06/19/2023    HCT 38.2 06/19/2023    .6 (H) 06/19/2023     06/19/2023    WBC 4.57 06/19/2023    NEUTROABS 2.51 06/19/2023    LYMPHSABS 1.45 06/19/2023    MONOSABS 0.45 06/19/2023    EOSABS 0.14 06/19/2023    BASOSABS 0.01 06/19/2023     Lab Results   Component Value Date    GLUCOSE 103 (H) 06/19/2023    BUN 9 06/19/2023    CREATININE 0.89 06/19/2023     06/19/2023    K 4.1 06/19/2023     06/19/2023    CO2 25.3 06/19/2023    CALCIUM 8.9 06/19/2023    PROTEINTOT 6.1 06/19/2023    ALBUMIN 3.7 06/19/2023    BILITOT 0.8 06/19/2023    ALKPHOS 73  06/19/2023    AST 22 06/19/2023    ALT 21 06/19/2023     Lab Results   Component Value Date    MG 1.8 (L) 09/07/2021    FREET4 0.84 06/29/2022    TSH 3.250 02/24/2021     Lab Results   Component Value Date    IRON 60 (L) 03/04/2020    LABIRON 20 03/04/2020    TRANSFERRIN 215.00 03/04/2020    TIBC 307 03/04/2020     Lab Results   Component Value Date    FERRITIN 140 03/04/2020    OEEHAXIW10 >2000 (H) 03/04/2020    FOLATE 19.4 03/04/2020     Lab Results   Component Value Date    PSA 0.91 02/24/2021    CEA 5.58 02/08/2023             Assessment and Plan    Diagnoses and all orders for this visit:    1. Rectal cancer (Primary)  Assessment & Plan:  Metastatic.  Patient is on FOLFIRI plus bevacizumab.  He is due for cycle 8.  Lab work is adequate for treatment.  Proceed with cycle 8 as planned.  RTC 2 weeks for OV, cycle 9 with lab work prior to monitor for toxicities and restaging scans to assess response to therapy.    Orders:  -     CT chest w contrast; Future  -     CT abdomen pelvis w contrast; Future  -     CEA; Future    2. Essential hypertension  Assessment & Plan:  Blood pressure mildly elevated but he states it is usually better at home.  He is taking his metoprolol and verapamil as scheduled.      Other orders  -     sodium chloride 0.9 % infusion 250 mL  -     palonosetron (ALOXI) injection 0.25 mg  -     dexamethasone (DECADRON) 12 mg in sodium chloride 0.9 % IVPB  -     atropine injection 0.25 mg  -     Bevacizumab-bvzr (ZIRABEV) 660 mg in sodium chloride 0.9 % 126.4 mL chemo IVPB  -     leucovorin 1,040 mg in dextrose (D5W) 5 % 354 mL IVPB  -     irinotecan (CAMPTOSAR) 465 mg in dextrose (D5W) 5 % 523.3 mL chemo IVPB  -     fluorouracil (ADRUCIL) chemo injection 1,040 mg  -     fluorouracil (ADRUCIL) 6,220 mg in sodium chloride 0.9 % 124.4 mL chemo infusion - FOR HOME USE  -     OK To Treat            Patient Follow Up: Cycle 9-day 1    Patient was given instructions and counseling regarding his condition  or for health maintenance advice. Please see specific information pulled into the AVS if appropriate.     Maurilio Campos MD    6/19/2023

## 2023-06-19 NOTE — ASSESSMENT & PLAN NOTE
Blood pressure mildly elevated but he states it is usually better at home.  He is taking his metoprolol and verapamil as scheduled.

## 2023-06-19 NOTE — ASSESSMENT & PLAN NOTE
Metastatic.  Patient is on FOLFIRI plus bevacizumab.  He is due for cycle 8.  Lab work is adequate for treatment.  Proceed with cycle 8 as planned.  RTC 2 weeks for OV, cycle 9 with lab work prior to monitor for toxicities and restaging scans to assess response to therapy.

## 2023-07-26 ENCOUNTER — TELEPHONE (OUTPATIENT)
Dept: ONCOLOGY | Facility: HOSPITAL | Age: 68
End: 2023-07-26
Payer: MEDICARE

## 2023-07-26 DIAGNOSIS — K12.30 MUCOSITIS: Primary | ICD-10-CM

## 2023-07-26 NOTE — TELEPHONE ENCOUNTER
Caller: ANUPAM    Relationship: [facility]    Best call back number: [phone]     What is the best time to reach you: ASAP WILL START COMPOUNDING AT 12    Who are you requesting to speak with (clinical staff, provider,  specific staff member): CLINICAL    What was the call regarding: PHARMACY IS NEEDING TO KNOW WHAT INGREDIENTS ARE NEEDED IN THE COMPOUNDING FOR THE MAGIC MOUTHWASH.     ALSO PLEASE BE ADVISED THAT LIDOCAINE IS NOT AVAILABLE ANY LONGER AND THEY WILL NOT BE ABLE TO ADD LIDOCAINE TO THE MOUTH WAS FOR NUMBING IT WILL JUST BE FOR THE TREATMENT ONLY.     PLEASE CALL BACK TO [facility]VISE.

## 2023-07-26 NOTE — TELEPHONE ENCOUNTER
Caller: ANA LEONARD    Relationship: Emergency Contact    Best call back number: [phone]    What is the best time to reach you: ANYTIME    Who are you requesting to speak with (clinical staff, provider, specific staff member): CLINICAL    What was the call regarding: PATIENT HAS BLISTERS IN HIS MOUTH AND THE MAGIC MOUTHWASH HELPS BUT DOESN'T LAST LONG ENOUGH. IS THERE ANYTHING ELSE THE PATIENT CAN TRY?    PLEASE CALL TO [facility].    PHARMACY: [facility] - [address] [phone] [place] [phone]  [phone]

## 2023-07-26 NOTE — TELEPHONE ENCOUNTER
Prema called and states that the pt has been using a home remedy mouthwash for 3 days for oral lesions and it is not helping. Anna states that the pt is having trouble sleeping at night due to oral pain. When questioned the pt denies fever. When questioned the pt has not been prescribed Magic Mouthwash from a pharm yet. When questioned the pt states that he uses [facility]. Does [facility] carry Magic Mouthwash and if so could we rx him a Magic Mouthwash?

## 2023-07-26 NOTE — TELEPHONE ENCOUNTER
This nurse called and informed the pt of the rx and that it was sent to [facility]. The pt voiced understanding.

## 2023-07-31 ENCOUNTER — HOSPITAL ENCOUNTER (OUTPATIENT)
Dept: ONCOLOGY | Facility: HOSPITAL | Age: 68
Discharge: HOME | End: 2023-07-31
Admitting: INTERNAL MEDICINE
Payer: MEDICARE

## 2023-07-31 ENCOUNTER — OFFICE VISIT (OUTPATIENT)
Dept: ONCOLOGY | Facility: HOSPITAL | Age: 68
End: 2023-07-31
Payer: MEDICARE

## 2023-07-31 VITALS
SYSTOLIC BLOOD PRESSURE: 119 MMHG | DIASTOLIC BLOOD PRESSURE: 79 MMHG | HEART RATE: 95 BPM | OXYGEN SATURATION: 96 % | TEMPERATURE: 96.8 F | WEIGHT: 272.49 LBS | BODY MASS INDEX: 34.6 KG/M2 | RESPIRATION RATE: 16 BRPM

## 2023-07-31 DIAGNOSIS — C78.7 SECONDARY MALIGNANCY OF LIVER: ICD-10-CM

## 2023-07-31 DIAGNOSIS — T45.1X5A CHEMOTHERAPY INDUCED NEUTROPENIA: ICD-10-CM

## 2023-07-31 DIAGNOSIS — C20 RECTAL CANCER: ICD-10-CM

## 2023-07-31 DIAGNOSIS — T45.1X5A MOUTH SORE SECONDARY TO CHEMOTHERAPY: ICD-10-CM

## 2023-07-31 DIAGNOSIS — Z45.2 ENCOUNTER FOR ADJUSTMENT OR MANAGEMENT OF VASCULAR ACCESS DEVICE: Primary | ICD-10-CM

## 2023-07-31 DIAGNOSIS — D70.1 CHEMOTHERAPY INDUCED NEUTROPENIA: ICD-10-CM

## 2023-07-31 DIAGNOSIS — K13.79 MOUTH SORE SECONDARY TO CHEMOTHERAPY: ICD-10-CM

## 2023-07-31 DIAGNOSIS — C20 RECTAL CANCER: Primary | ICD-10-CM

## 2023-07-31 LAB
ALBUMIN SERPL-MCNC: 3.6 G/DL (ref 3.5–5.2)
ALBUMIN/GLOB SERPL: 1.5 G/DL
ALP SERPL-CCNC: 92 U/L (ref 39–117)
ALT SERPL W P-5'-P-CCNC: 22 U/L (ref 1–41)
ANION GAP SERPL CALCULATED.3IONS-SCNC: 7.1 MMOL/L (ref 5–15)
AST SERPL-CCNC: 24 U/L (ref 1–40)
BASOPHILS # BLD AUTO: 0.04 10*3/MM3 (ref 0–0.2)
BASOPHILS NFR BLD AUTO: 1.4 % (ref 0–1.5)
BILIRUB SERPL-MCNC: 0.8 MG/DL (ref 0–1.2)
BILIRUB UR QL STRIP: NEGATIVE
BUN SERPL-MCNC: 12 MG/DL (ref 8–23)
BUN/CREAT SERPL: 10.9 (ref 7–25)
CALCIUM SPEC-SCNC: 9.2 MG/DL (ref 8.6–10.5)
CHLORIDE SERPL-SCNC: 104 MMOL/L (ref 98–107)
CLARITY UR: CLEAR
CO2 SERPL-SCNC: 24.9 MMOL/L (ref 22–29)
COLOR UR: YELLOW
CREAT SERPL-MCNC: 1.1 MG/DL (ref 0.76–1.27)
DEPRECATED RDW RBC AUTO: 62.2 FL (ref 37–54)
EGFRCR SERPLBLD CKD-EPI 2021: 73.6 ML/MIN/1.73
EOSINOPHIL # BLD AUTO: 0.12 10*3/MM3 (ref 0–0.4)
EOSINOPHIL NFR BLD AUTO: 4.2 % (ref 0.3–6.2)
ERYTHROCYTE [DISTWIDTH] IN BLOOD BY AUTOMATED COUNT: 16.6 % (ref 12.3–15.4)
GLOBULIN UR ELPH-MCNC: 2.4 GM/DL
GLUCOSE SERPL-MCNC: 141 MG/DL (ref 65–99)
GLUCOSE UR STRIP-MCNC: NEGATIVE MG/DL
HCT VFR BLD AUTO: 37.1 % (ref 37.5–51)
HGB BLD-MCNC: 11.6 G/DL (ref 13–17.7)
HGB UR QL STRIP.AUTO: NEGATIVE
IMM GRANULOCYTES # BLD AUTO: 0 10*3/MM3 (ref 0–0.05)
IMM GRANULOCYTES NFR BLD AUTO: 0 % (ref 0–0.5)
KETONES UR QL STRIP: NEGATIVE
LEUKOCYTE ESTERASE UR QL STRIP.AUTO: NEGATIVE
LYMPHOCYTES # BLD AUTO: 0.93 10*3/MM3 (ref 0.7–3.1)
LYMPHOCYTES NFR BLD AUTO: 32.6 % (ref 19.6–45.3)
MCH RBC QN AUTO: 32.7 PG (ref 26.6–33)
MCHC RBC AUTO-ENTMCNC: 31.3 G/DL (ref 31.5–35.7)
MCV RBC AUTO: 104.5 FL (ref 79–97)
MONOCYTES # BLD AUTO: 0.34 10*3/MM3 (ref 0.1–0.9)
MONOCYTES NFR BLD AUTO: 11.9 % (ref 5–12)
NEUTROPHILS NFR BLD AUTO: 1.42 10*3/MM3 (ref 1.7–7)
NEUTROPHILS NFR BLD AUTO: 49.9 % (ref 42.7–76)
NITRITE UR QL STRIP: NEGATIVE
PH UR STRIP.AUTO: 5.5 [PH] (ref 5–8)
PLATELET # BLD AUTO: 213 10*3/MM3 (ref 140–450)
PMV BLD AUTO: 9.9 FL (ref 6–12)
POTASSIUM SERPL-SCNC: 4 MMOL/L (ref 3.5–5.2)
PROT SERPL-MCNC: 6 G/DL (ref 6–8.5)
PROT UR QL STRIP: NEGATIVE
RBC # BLD AUTO: 3.55 10*6/MM3 (ref 4.14–5.8)
SODIUM SERPL-SCNC: 136 MMOL/L (ref 136–145)
SP GR UR STRIP: 1.01 (ref 1–1.03)
UROBILINOGEN UR QL STRIP: NORMAL
WBC NRBC COR # BLD: 2.85 10*3/MM3 (ref 3.4–10.8)

## 2023-07-31 PROCEDURE — 81003 URINALYSIS AUTO W/O SCOPE: CPT | Performed by: INTERNAL MEDICINE

## 2023-07-31 PROCEDURE — 36591 DRAW BLOOD OFF VENOUS DEVICE: CPT

## 2023-07-31 PROCEDURE — 80053 COMPREHEN METABOLIC PANEL: CPT | Performed by: INTERNAL MEDICINE

## 2023-07-31 PROCEDURE — 85025 COMPLETE CBC W/AUTO DIFF WBC: CPT | Performed by: INTERNAL MEDICINE

## 2023-07-31 PROCEDURE — 25010000002 HEPARIN LOCK FLUSH PER 10 UNITS: Performed by: INTERNAL MEDICINE

## 2023-07-31 RX ORDER — ATROPINE SULFATE 1 MG/ML
0.25 INJECTION, SOLUTION INTRAMUSCULAR; INTRAVENOUS; SUBCUTANEOUS
OUTPATIENT
Start: 2023-08-08

## 2023-07-31 RX ORDER — HYDROCODONE BITARTRATE AND ACETAMINOPHEN 5; 325 MG/1; MG/1
1 TABLET ORAL EVERY 6 HOURS PRN
Qty: 120 TABLET | Refills: 0 | Status: SHIPPED | OUTPATIENT
Start: 2023-07-31

## 2023-07-31 RX ORDER — HEPARIN SODIUM (PORCINE) LOCK FLUSH IV SOLN 100 UNIT/ML 100 UNIT/ML
500 SOLUTION INTRAVENOUS AS NEEDED
Status: DISCONTINUED | OUTPATIENT
Start: 2023-07-31 | End: 2023-08-01 | Stop reason: HOSPADM

## 2023-07-31 RX ORDER — SODIUM CHLORIDE 0.9 % (FLUSH) 0.9 %
20 SYRINGE (ML) INJECTION AS NEEDED
OUTPATIENT
Start: 2023-07-31

## 2023-07-31 RX ORDER — HEPARIN SODIUM (PORCINE) LOCK FLUSH IV SOLN 100 UNIT/ML 100 UNIT/ML
500 SOLUTION INTRAVENOUS AS NEEDED
OUTPATIENT
Start: 2023-07-31

## 2023-07-31 RX ORDER — SODIUM CHLORIDE 0.9 % (FLUSH) 0.9 %
20 SYRINGE (ML) INJECTION AS NEEDED
Status: DISCONTINUED | OUTPATIENT
Start: 2023-07-31 | End: 2023-08-01 | Stop reason: HOSPADM

## 2023-07-31 RX ORDER — PALONOSETRON 0.05 MG/ML
0.25 INJECTION, SOLUTION INTRAVENOUS ONCE
OUTPATIENT
Start: 2023-08-08

## 2023-07-31 RX ORDER — SODIUM CHLORIDE 9 MG/ML
250 INJECTION, SOLUTION INTRAVENOUS ONCE
OUTPATIENT
Start: 2023-08-08

## 2023-07-31 RX ADMIN — HEPARIN SODIUM (PORCINE) LOCK FLUSH IV SOLN 100 UNIT/ML 500 UNITS: 100 SOLUTION at 10:31

## 2023-07-31 RX ADMIN — Medication 20 ML: at 10:31

## 2023-08-07 ENCOUNTER — OFFICE VISIT (OUTPATIENT)
Dept: CARDIOLOGY | Facility: CLINIC | Age: 68
End: 2023-08-07
Payer: MEDICARE

## 2023-08-07 ENCOUNTER — LAB (OUTPATIENT)
Dept: LAB | Facility: HOSPITAL | Age: 68
End: 2023-08-07
Payer: MEDICARE

## 2023-08-07 VITALS
DIASTOLIC BLOOD PRESSURE: 67 MMHG | SYSTOLIC BLOOD PRESSURE: 98 MMHG | WEIGHT: 273 LBS | HEIGHT: 74 IN | BODY MASS INDEX: 35.04 KG/M2 | HEART RATE: 74 BPM

## 2023-08-07 DIAGNOSIS — I48.19 ATRIAL FIBRILLATION, PERSISTENT: Primary | ICD-10-CM

## 2023-08-07 DIAGNOSIS — R06.09 DOE (DYSPNEA ON EXERTION): ICD-10-CM

## 2023-08-07 DIAGNOSIS — I48.19 ATRIAL FIBRILLATION, PERSISTENT: ICD-10-CM

## 2023-08-07 DIAGNOSIS — I10 ESSENTIAL HYPERTENSION: ICD-10-CM

## 2023-08-07 DIAGNOSIS — E78.5 HYPERLIPIDEMIA LDL GOAL <100: ICD-10-CM

## 2023-08-07 LAB — NT-PROBNP SERPL-MCNC: 2294 PG/ML (ref 0–900)

## 2023-08-07 PROCEDURE — 3074F SYST BP LT 130 MM HG: CPT | Performed by: INTERNAL MEDICINE

## 2023-08-07 PROCEDURE — 99214 OFFICE O/P EST MOD 30 MIN: CPT | Performed by: INTERNAL MEDICINE

## 2023-08-07 PROCEDURE — 83880 ASSAY OF NATRIURETIC PEPTIDE: CPT

## 2023-08-07 PROCEDURE — 36415 COLL VENOUS BLD VENIPUNCTURE: CPT

## 2023-08-07 PROCEDURE — 3078F DIAST BP <80 MM HG: CPT | Performed by: INTERNAL MEDICINE

## 2023-08-07 RX ORDER — METOPROLOL TARTRATE 100 MG/1
100 TABLET ORAL 2 TIMES DAILY
Qty: 180 TABLET | Refills: 3 | Status: SHIPPED | OUTPATIENT
Start: 2023-08-07

## 2023-08-07 RX ORDER — FUROSEMIDE 20 MG/1
20 TABLET ORAL DAILY
Qty: 90 TABLET | Refills: 3 | Status: SHIPPED | OUTPATIENT
Start: 2023-08-07

## 2023-08-07 NOTE — ASSESSMENT & PLAN NOTE
Patient maintain normal rate control with his verapamil 180 and Lopressor 100 twice daily.  He is also on Eliquis 5 twice daily for CVA prevention

## 2023-08-07 NOTE — ASSESSMENT & PLAN NOTE
Patient with increased dyspnea on exertion and shortness of breath symptoms this could be multifactorial certainly could be from ongoing chemotherapy he does have lower extremity edema though recommended a trial of increasing his Lasix to 40 once a day from 20 a day the next 5 days to see if he symptomatically better.  We will also repeat echocardiogram and check proBNP level

## 2023-08-07 NOTE — PROGRESS NOTES
Chief Complaint  Follow-up, Atrial Fibrillation, Hypertension, and Hyperlipidemia    Subjective    Patient has history of persistent atrial fibrillation hypertension dyslipidemia and rectal cancer with 2 more rounds of chemotherapy.  He is reporting increased dyspnea on exertion issues along with some mild lower extremity edema.  He has not had any anginal chest discomfort problems  Past Medical History:   Diagnosis Date    Asthma     Atrial fibrillation, persistent 2/26/2021    Chronic heart failure with preserved ejection fraction 7/18/2022    BNP 1590 on 7/22 Echocardiogram with EF 55% moderate to severe biatrial enlargement and mild right ventricular enlargement 7/22     Colorectal cancer     Essential hypertension     GI cancer     Hepatitis     History of DVT of lower extremity 8/27/2021    Hyperlipidemia LDL goal <100 12/31/2020    Rectal cancer 6/18/2021    Secondary malignancy of liver 9/14/2021    Secondary malignant neoplasm of left lung 9/14/2021    Secondary malignant neoplasm of right lung 10/5/2021    Thrombocytopenia 1/11/2022    Thyroid disease     Viral upper respiratory tract infection 4/24/2023         Current Outpatient Medications:     acetaminophen (TYLENOL) 500 MG tablet, Take 1 tablet by mouth Every 6 (Six) Hours As Needed., Disp: , Rfl:     apixaban (ELIQUIS) 5 MG tablet tablet, Take 1 tablet by mouth 2 (Two) Times a Day., Disp: , Rfl:     ascorbic acid (VITAMIN C) 500 MG tablet, Take 1 tablet by mouth Daily., Disp: , Rfl:     atorvastatin (LIPITOR) 40 MG tablet, atorvastatin 40 mg oral tablet take 1 tablet (40 mg) by oral route once daily   Active, Disp: , Rfl:     cyanocobalamin (VITAMIN B-12) 1000 MCG tablet, Take 1 tablet by mouth Daily., Disp: , Rfl:     furosemide (LASIX) 20 MG tablet, Take 1 tablet by mouth Daily., Disp: 90 tablet, Rfl: 3    HYDROcodone-acetaminophen (NORCO) 5-325 MG per tablet, Take 1 tablet by mouth Every 6 (Six) Hours As Needed for Moderate Pain., Disp: 120  "tablet, Rfl: 0    loratadine (CLARITIN) 10 MG tablet, loratadine 10 mg oral tablet take 1 tablet (10 mg) by oral route once daily   Active, Disp: , Rfl:     metoprolol tartrate (LOPRESSOR) 100 MG tablet, Take 1 tablet by mouth 2 (Two) Times a Day., Disp: 180 tablet, Rfl: 3    montelukast (SINGULAIR) 10 MG tablet, 1 tablet., Disp: , Rfl:     multivitamin (THERAGRAN) tablet tablet, Take 1 tablet by mouth Daily., Disp: , Rfl:     ondansetron (ZOFRAN) 8 MG tablet, Take 1 tablet by mouth 3 (Three) Times a Day As Needed for Nausea or Vomiting., Disp: 30 tablet, Rfl: 5    potassium chloride (K-DUR,KLOR-CON) 20 MEQ CR tablet, Take 1 tablet by mouth Daily., Disp: 90 tablet, Rfl: 1    Synthroid 25 MCG tablet, Take 1 tablet by mouth Daily., Disp: , Rfl:     verapamil SR (CALAN-SR) 180 MG CR tablet, Take 1 tablet by mouth Every Night., Disp: 90 tablet, Rfl: 3    vitamin E 1000 UNIT capsule, Take 1 capsule by mouth Daily., Disp: , Rfl:     diclofenac (VOLTAREN) 50 MG EC tablet, , Disp: , Rfl:     fluticasone (FLONASE) 50 MCG/ACT nasal spray, , Disp: , Rfl:     MAGIC MOUTHWASH W/NYSTATIN 1/1/1/1, Swish and swallow 10 mL 4 (Four) Times a Day As Needed for Mouth Pain. (Patient not taking: Reported on 8/7/2023), Disp: 500 mL, Rfl: 1    Medications Discontinued During This Encounter   Medication Reason    furosemide (LASIX) 20 MG tablet Reorder    metoprolol tartrate (LOPRESSOR) 100 MG tablet Reorder    verapamil SR (CALAN-SR) 180 MG CR tablet Reorder     No Known Allergies     Social History     Tobacco Use    Smoking status: Never    Smokeless tobacco: Never   Vaping Use    Vaping Use: Never used   Substance Use Topics    Alcohol use: Yes     Comment: occasionally, no recent use    Drug use: Never       Family History   Problem Relation Age of Onset    Prostate cancer Father     Heart murmur Mother     Diabetes Mother     Colon cancer Maternal Uncle         Objective     BP 98/67   Pulse 74   Ht 189 cm (74.41\")   Wt 124 kg (273 " lb)   BMI 34.67 kg/mý       Physical Exam    General Appearance:   no acute distress  Alert and oriented x3  HENT:   lips not cyanotic  Atraumatic  Neck:  No jvd   supple  Respiratory:  no respiratory distress  normal breath sounds  no rales  Crackles in his left lung lower  Cardiovascular:  Irregularly irregular  no S3, no S4   no murmur  no rub  Extremities  No cyanosis  lower extremity edema: +1 warm, dry  No rashes      Result Review :     proBNP   Date Value Ref Range Status   08/25/2022 1,589.0 (H) 0.0 - 900.0 pg/mL Final     CMP          7/3/2023    09:52 7/17/2023    10:38 7/31/2023    10:28   CMP   Glucose 135  119  141    BUN 9  9  12    Creatinine 0.94  0.96  1.10    EGFR 88.9  86.6  73.6    Sodium 140  138  136    Potassium 3.7  3.9  4.0    Chloride 105  103  104    Calcium 9.0  8.9  9.2    Total Protein 6.0  6.1  6.0    Albumin 3.7  3.6  3.6    Globulin 2.3  2.5  2.4    Total Bilirubin 0.7  0.7  0.8    Alkaline Phosphatase 79  82  92    AST (SGOT) 25  23  24    ALT (SGPT) 24  22  22    Albumin/Globulin Ratio 1.6  1.4  1.5    BUN/Creatinine Ratio 9.6  9.4  10.9    Anion Gap 10.0  9.9  7.1      CBC w/diff          7/3/2023    09:52 7/17/2023    10:38 7/31/2023    10:28   CBC w/Diff   WBC 3.14  4.26  2.85    RBC 3.75  3.60  3.55    Hemoglobin 12.4  12.0  11.6    Hematocrit 37.8  37.5  37.1    .8  104.2  104.5    MCH 33.1  33.3  32.7    MCHC 32.8  32.0  31.3    RDW 15.8  16.3  16.6    Platelets 197  183  213    Neutrophil Rel % 56.1  63.3  49.9    Immature Granulocyte Rel % 0.0  0.2  0.0    Lymphocyte Rel % 29.3  26.1  32.6    Monocyte Rel % 9.2  6.6  11.9    Eosinophil Rel % 4.8  3.3  4.2    Basophil Rel % 0.6  0.5  1.4       Lab Results   Component Value Date    TSH 3.250 02/24/2021      Lab Results   Component Value Date    FREET4 0.98 06/27/2023      No results found for: DDIMERQUANT  Magnesium   Date Value Ref Range Status   09/07/2021 1.8 (L) 1.9 - 2.4 mg/dL Final      Digoxin   Date Value Ref  Range Status   01/18/2021 0.5 0.5 - 2.0 ng/mL Final      Lab Results   Component Value Date    TROPONINT 19 (H) 09/02/2021    TROPONINT -4 09/02/2021             No results found for: POCTROP    Results for orders placed in visit on 07/16/22    Adult Transthoracic Echo Complete W/ Cont if Necessary Per Protocol    Interpretation Summary  ú Estimated left ventricular EF = 55% Left ventricular systolic function is normal.  ú The left ventricular cavity is mildly dilated.  ú Left ventricular wall thickness is consistent with mild concentric hypertrophy.  ú The right ventricular cavity is mildly dilated.  ú The right atrial cavity is moderate to severely dilated.  ú Estimated right ventricular systolic pressure from tricuspid regurgitation is mildly elevated (35-45 mmHg).                 Diagnoses and all orders for this visit:    1. Atrial fibrillation, persistent (Primary)  Assessment & Plan:  Patient maintain normal rate control with his verapamil 180 and Lopressor 100 twice daily.  He is also on Eliquis 5 twice daily for CVA prevention    Orders:  -     verapamil SR (CALAN-SR) 180 MG CR tablet; Take 1 tablet by mouth Every Night.  Dispense: 90 tablet; Refill: 3  -     proBNP; Future  -     Adult Transthoracic Echo Complete W/ Cont if Necessary Per Protocol; Future    2. Essential hypertension  -     furosemide (LASIX) 20 MG tablet; Take 1 tablet by mouth Daily.  Dispense: 90 tablet; Refill: 3  -     metoprolol tartrate (LOPRESSOR) 100 MG tablet; Take 1 tablet by mouth 2 (Two) Times a Day.  Dispense: 180 tablet; Refill: 3    3. Hyperlipidemia LDL goal <100    4. SORTO (dyspnea on exertion)  Assessment & Plan:  Patient with increased dyspnea on exertion and shortness of breath symptoms this could be multifactorial certainly could be from ongoing chemotherapy he does have lower extremity edema though recommended a trial of increasing his Lasix to 40 once a day from 20 a day the next 5 days to see if he symptomatically  better.  We will also repeat echocardiogram and check proBNP level    Orders:  -     proBNP; Future            Follow Up     Return in about 6 months (around 2/7/2024) for Follow with Nyla Ramírez.          Patient was given instructions and counseling regarding his condition or for health maintenance advice. Please see specific information pulled into the AVS if appropriate.

## 2023-08-08 ENCOUNTER — HOSPITAL ENCOUNTER (OUTPATIENT)
Dept: ONCOLOGY | Facility: HOSPITAL | Age: 68
Discharge: HOME | End: 2023-08-08
Admitting: INTERNAL MEDICINE
Payer: MEDICARE

## 2023-08-08 ENCOUNTER — TELEPHONE (OUTPATIENT)
Dept: CARDIOLOGY | Facility: CLINIC | Age: 68
End: 2023-08-08
Payer: MEDICARE

## 2023-08-08 VITALS
OXYGEN SATURATION: 98 % | WEIGHT: 274.25 LBS | BODY MASS INDEX: 35.2 KG/M2 | HEART RATE: 91 BPM | DIASTOLIC BLOOD PRESSURE: 87 MMHG | SYSTOLIC BLOOD PRESSURE: 119 MMHG | TEMPERATURE: 97.1 F | RESPIRATION RATE: 20 BRPM | HEIGHT: 74 IN

## 2023-08-08 DIAGNOSIS — C20 RECTAL CANCER: Primary | ICD-10-CM

## 2023-08-08 DIAGNOSIS — Z45.2 ENCOUNTER FOR ADJUSTMENT OR MANAGEMENT OF VASCULAR ACCESS DEVICE: ICD-10-CM

## 2023-08-08 PROCEDURE — 25010000002 BEVACIZUMAB-BVZR 400 MG/16ML SOLUTION 16 ML VIAL: Performed by: INTERNAL MEDICINE

## 2023-08-08 PROCEDURE — 25010000002 IRINOTECAN PER 20 MG: Performed by: INTERNAL MEDICINE

## 2023-08-08 PROCEDURE — 25010000002 PALONOSETRON PER 25 MCG: Performed by: INTERNAL MEDICINE

## 2023-08-08 PROCEDURE — 96415 CHEMO IV INFUSION ADDL HR: CPT

## 2023-08-08 PROCEDURE — 25010000002 BEVACIZUMAB-BVZR 100 MG/4ML SOLUTION 4 ML VIAL: Performed by: INTERNAL MEDICINE

## 2023-08-08 PROCEDURE — 96417 CHEMO IV INFUS EACH ADDL SEQ: CPT

## 2023-08-08 PROCEDURE — 25010000002 FLUOROURACIL PER 500 MG: Performed by: INTERNAL MEDICINE

## 2023-08-08 PROCEDURE — 96416 CHEMO PROLONG INFUSE W/PUMP: CPT

## 2023-08-08 PROCEDURE — 96413 CHEMO IV INFUSION 1 HR: CPT

## 2023-08-08 PROCEDURE — 96374 THER/PROPH/DIAG INJ IV PUSH: CPT

## 2023-08-08 PROCEDURE — 96375 TX/PRO/DX INJ NEW DRUG ADDON: CPT

## 2023-08-08 PROCEDURE — G0498 CHEMO EXTEND IV INFUS W/PUMP: HCPCS

## 2023-08-08 PROCEDURE — 25010000002 DEXAMETHASONE SODIUM PHOSPHATE 120 MG/30ML SOLUTION: Performed by: INTERNAL MEDICINE

## 2023-08-08 RX ORDER — HEPARIN SODIUM (PORCINE) LOCK FLUSH IV SOLN 100 UNIT/ML 100 UNIT/ML
500 SOLUTION INTRAVENOUS AS NEEDED
Status: DISCONTINUED | OUTPATIENT
Start: 2023-08-08 | End: 2023-08-09 | Stop reason: HOSPADM

## 2023-08-08 RX ORDER — SODIUM CHLORIDE 0.9 % (FLUSH) 0.9 %
20 SYRINGE (ML) INJECTION AS NEEDED
Status: CANCELLED | OUTPATIENT
Start: 2023-08-08

## 2023-08-08 RX ORDER — SODIUM CHLORIDE 0.9 % (FLUSH) 0.9 %
20 SYRINGE (ML) INJECTION AS NEEDED
Status: DISCONTINUED | OUTPATIENT
Start: 2023-08-08 | End: 2023-08-09 | Stop reason: HOSPADM

## 2023-08-08 RX ORDER — HEPARIN SODIUM (PORCINE) LOCK FLUSH IV SOLN 100 UNIT/ML 100 UNIT/ML
500 SOLUTION INTRAVENOUS AS NEEDED
Status: CANCELLED | OUTPATIENT
Start: 2023-08-08

## 2023-08-08 RX ORDER — PALONOSETRON 0.05 MG/ML
0.25 INJECTION, SOLUTION INTRAVENOUS ONCE
Status: COMPLETED | OUTPATIENT
Start: 2023-08-08 | End: 2023-08-08

## 2023-08-08 RX ORDER — SODIUM CHLORIDE 9 MG/ML
250 INJECTION, SOLUTION INTRAVENOUS ONCE
Status: COMPLETED | OUTPATIENT
Start: 2023-08-08 | End: 2023-08-08

## 2023-08-08 RX ADMIN — IRINOTECAN HYDROCHLORIDE 460 MG: 20 INJECTION, SOLUTION INTRAVENOUS at 09:31

## 2023-08-08 RX ADMIN — DEXAMETHASONE SODIUM PHOSPHATE 12 MG: 4 INJECTION, SOLUTION INTRA-ARTICULAR; INTRALESIONAL; INTRAMUSCULAR; INTRAVENOUS; SOFT TISSUE at 08:28

## 2023-08-08 RX ADMIN — PALONOSETRON 0.25 MG: 0.05 INJECTION, SOLUTION INTRAVENOUS at 08:26

## 2023-08-08 RX ADMIN — FLUOROURACIL 6250 MG: 50 INJECTION, SOLUTION INTRAVENOUS at 11:14

## 2023-08-08 RX ADMIN — SODIUM CHLORIDE 660 MG: 9 INJECTION, SOLUTION INTRAVENOUS at 08:56

## 2023-08-08 RX ADMIN — ATROPINE SULFATE 0.25 MG: 0.4 INJECTION, SOLUTION INTRAVENOUS at 09:56

## 2023-08-08 RX ADMIN — SODIUM CHLORIDE 250 ML: 9 INJECTION, SOLUTION INTRAVENOUS at 08:24

## 2023-08-08 NOTE — TELEPHONE ENCOUNTER
----- Message from REGINALDO Soto sent at 8/8/2023 10:43 AM EDT -----  Notify pt BNP is elevated, find out if having any shortness of breath or edema

## 2023-08-08 NOTE — TELEPHONE ENCOUNTER
Take lasix 40 mg daily for the next 5 days, then go back to 20 mg daily. Repeat BMP and BNP in 1 week

## 2023-08-10 ENCOUNTER — HOSPITAL ENCOUNTER (OUTPATIENT)
Dept: ONCOLOGY | Facility: HOSPITAL | Age: 68
Discharge: HOME | End: 2023-08-10
Admitting: INTERNAL MEDICINE
Payer: MEDICARE

## 2023-08-10 DIAGNOSIS — C20 RECTAL CANCER: Primary | ICD-10-CM

## 2023-08-10 DIAGNOSIS — Z45.2 ENCOUNTER FOR ADJUSTMENT OR MANAGEMENT OF VASCULAR ACCESS DEVICE: ICD-10-CM

## 2023-08-10 PROCEDURE — 25010000002 HEPARIN LOCK FLUSH PER 10 UNITS: Performed by: INTERNAL MEDICINE

## 2023-08-10 RX ORDER — HEPARIN SODIUM (PORCINE) LOCK FLUSH IV SOLN 100 UNIT/ML 100 UNIT/ML
500 SOLUTION INTRAVENOUS AS NEEDED
Status: DISCONTINUED | OUTPATIENT
Start: 2023-08-10 | End: 2023-08-11 | Stop reason: HOSPADM

## 2023-08-10 RX ORDER — SODIUM CHLORIDE 0.9 % (FLUSH) 0.9 %
20 SYRINGE (ML) INJECTION AS NEEDED
Status: DISCONTINUED | OUTPATIENT
Start: 2023-08-10 | End: 2023-08-11 | Stop reason: HOSPADM

## 2023-08-10 RX ORDER — SODIUM CHLORIDE 0.9 % (FLUSH) 0.9 %
20 SYRINGE (ML) INJECTION AS NEEDED
OUTPATIENT
Start: 2023-08-10

## 2023-08-10 RX ORDER — HEPARIN SODIUM (PORCINE) LOCK FLUSH IV SOLN 100 UNIT/ML 100 UNIT/ML
500 SOLUTION INTRAVENOUS AS NEEDED
OUTPATIENT
Start: 2023-08-10

## 2023-08-10 RX ADMIN — Medication 20 ML: at 13:20

## 2023-08-10 RX ADMIN — HEPARIN SODIUM (PORCINE) LOCK FLUSH IV SOLN 100 UNIT/ML 500 UNITS: 100 SOLUTION at 13:20

## 2023-08-16 ENCOUNTER — LAB (OUTPATIENT)
Dept: LAB | Facility: HOSPITAL | Age: 68
End: 2023-08-16
Payer: MEDICARE

## 2023-08-16 DIAGNOSIS — I10 ESSENTIAL HYPERTENSION: ICD-10-CM

## 2023-08-16 DIAGNOSIS — R06.09 DOE (DYSPNEA ON EXERTION): ICD-10-CM

## 2023-08-16 LAB
ANION GAP SERPL CALCULATED.3IONS-SCNC: 12.2 MMOL/L (ref 5–15)
BUN SERPL-MCNC: 11 MG/DL (ref 8–23)
BUN/CREAT SERPL: 11.5 (ref 7–25)
CALCIUM SPEC-SCNC: 9.2 MG/DL (ref 8.6–10.5)
CHLORIDE SERPL-SCNC: 103 MMOL/L (ref 98–107)
CO2 SERPL-SCNC: 24.8 MMOL/L (ref 22–29)
CREAT SERPL-MCNC: 0.96 MG/DL (ref 0.76–1.27)
EGFRCR SERPLBLD CKD-EPI 2021: 86.6 ML/MIN/1.73
GLUCOSE SERPL-MCNC: 117 MG/DL (ref 65–99)
NT-PROBNP SERPL-MCNC: 1898 PG/ML (ref 0–900)
POTASSIUM SERPL-SCNC: 4.3 MMOL/L (ref 3.5–5.2)
SODIUM SERPL-SCNC: 140 MMOL/L (ref 136–145)

## 2023-08-16 PROCEDURE — 83880 ASSAY OF NATRIURETIC PEPTIDE: CPT

## 2023-08-16 PROCEDURE — 80048 BASIC METABOLIC PNL TOTAL CA: CPT

## 2023-08-16 PROCEDURE — 36415 COLL VENOUS BLD VENIPUNCTURE: CPT

## 2023-08-17 ENCOUNTER — TELEPHONE (OUTPATIENT)
Dept: CARDIOLOGY | Facility: CLINIC | Age: 68
End: 2023-08-17
Payer: MEDICARE

## 2023-08-17 NOTE — TELEPHONE ENCOUNTER
----- Message from REGINALDO Soto sent at 8/17/2023  8:13 AM EDT -----  BNP level is improved, renal function is good, electrolytes are good, continue current meds

## 2023-08-21 ENCOUNTER — HOSPITAL ENCOUNTER (OUTPATIENT)
Dept: ONCOLOGY | Facility: HOSPITAL | Age: 68
Discharge: HOME | End: 2023-08-21
Admitting: INTERNAL MEDICINE
Payer: MEDICARE

## 2023-08-21 DIAGNOSIS — C20 RECTAL CANCER: Primary | ICD-10-CM

## 2023-08-21 DIAGNOSIS — Z45.2 ENCOUNTER FOR ADJUSTMENT OR MANAGEMENT OF VASCULAR ACCESS DEVICE: ICD-10-CM

## 2023-08-21 LAB
ALBUMIN SERPL-MCNC: 3.5 G/DL (ref 3.5–5.2)
ALBUMIN/GLOB SERPL: 1.6 G/DL
ALP SERPL-CCNC: 76 U/L (ref 39–117)
ALT SERPL W P-5'-P-CCNC: 17 U/L (ref 1–41)
ANION GAP SERPL CALCULATED.3IONS-SCNC: 10 MMOL/L (ref 5–15)
AST SERPL-CCNC: 17 U/L (ref 1–40)
BASOPHILS # BLD AUTO: 0.02 10*3/MM3 (ref 0–0.2)
BASOPHILS NFR BLD AUTO: 0.5 % (ref 0–1.5)
BILIRUB SERPL-MCNC: 0.5 MG/DL (ref 0–1.2)
BILIRUB UR QL STRIP: NEGATIVE
BUN SERPL-MCNC: 8 MG/DL (ref 8–23)
BUN/CREAT SERPL: 8.7 (ref 7–25)
CALCIUM SPEC-SCNC: 9 MG/DL (ref 8.6–10.5)
CHLORIDE SERPL-SCNC: 104 MMOL/L (ref 98–107)
CLARITY UR: CLEAR
CO2 SERPL-SCNC: 24 MMOL/L (ref 22–29)
COLOR UR: YELLOW
CREAT SERPL-MCNC: 0.92 MG/DL (ref 0.76–1.27)
DEPRECATED RDW RBC AUTO: 63.4 FL (ref 37–54)
EGFRCR SERPLBLD CKD-EPI 2021: 91.2 ML/MIN/1.73
EOSINOPHIL # BLD AUTO: 0.34 10*3/MM3 (ref 0–0.4)
EOSINOPHIL NFR BLD AUTO: 7.7 % (ref 0.3–6.2)
ERYTHROCYTE [DISTWIDTH] IN BLOOD BY AUTOMATED COUNT: 16 % (ref 12.3–15.4)
GLOBULIN UR ELPH-MCNC: 2.2 GM/DL
GLUCOSE SERPL-MCNC: 118 MG/DL (ref 65–99)
GLUCOSE UR STRIP-MCNC: NEGATIVE MG/DL
HCT VFR BLD AUTO: 36.4 % (ref 37.5–51)
HGB BLD-MCNC: 11.2 G/DL (ref 13–17.7)
HGB UR QL STRIP.AUTO: NEGATIVE
IMM GRANULOCYTES # BLD AUTO: 0 10*3/MM3 (ref 0–0.05)
IMM GRANULOCYTES NFR BLD AUTO: 0 % (ref 0–0.5)
KETONES UR QL STRIP: NEGATIVE
LEUKOCYTE ESTERASE UR QL STRIP.AUTO: NEGATIVE
LYMPHOCYTES # BLD AUTO: 1.09 10*3/MM3 (ref 0.7–3.1)
LYMPHOCYTES NFR BLD AUTO: 24.8 % (ref 19.6–45.3)
MCH RBC QN AUTO: 32.7 PG (ref 26.6–33)
MCHC RBC AUTO-ENTMCNC: 30.8 G/DL (ref 31.5–35.7)
MCV RBC AUTO: 106.4 FL (ref 79–97)
MONOCYTES # BLD AUTO: 0.34 10*3/MM3 (ref 0.1–0.9)
MONOCYTES NFR BLD AUTO: 7.7 % (ref 5–12)
NEUTROPHILS NFR BLD AUTO: 2.6 10*3/MM3 (ref 1.7–7)
NEUTROPHILS NFR BLD AUTO: 59.3 % (ref 42.7–76)
NITRITE UR QL STRIP: NEGATIVE
PH UR STRIP.AUTO: 6 [PH] (ref 5–8)
PLATELET # BLD AUTO: 184 10*3/MM3 (ref 140–450)
PMV BLD AUTO: 9.1 FL (ref 6–12)
POTASSIUM SERPL-SCNC: 4 MMOL/L (ref 3.5–5.2)
PROT SERPL-MCNC: 5.7 G/DL (ref 6–8.5)
PROT UR QL STRIP: NEGATIVE
RBC # BLD AUTO: 3.42 10*6/MM3 (ref 4.14–5.8)
SODIUM SERPL-SCNC: 138 MMOL/L (ref 136–145)
SP GR UR STRIP: 1.01 (ref 1–1.03)
UROBILINOGEN UR QL STRIP: NORMAL
WBC NRBC COR # BLD: 4.39 10*3/MM3 (ref 3.4–10.8)

## 2023-08-21 PROCEDURE — 81003 URINALYSIS AUTO W/O SCOPE: CPT | Performed by: INTERNAL MEDICINE

## 2023-08-21 PROCEDURE — 36591 DRAW BLOOD OFF VENOUS DEVICE: CPT

## 2023-08-21 PROCEDURE — 85025 COMPLETE CBC W/AUTO DIFF WBC: CPT | Performed by: INTERNAL MEDICINE

## 2023-08-21 PROCEDURE — 25010000002 HEPARIN LOCK FLUSH PER 10 UNITS: Performed by: INTERNAL MEDICINE

## 2023-08-21 PROCEDURE — 80053 COMPREHEN METABOLIC PANEL: CPT | Performed by: INTERNAL MEDICINE

## 2023-08-21 RX ORDER — SODIUM CHLORIDE 0.9 % (FLUSH) 0.9 %
20 SYRINGE (ML) INJECTION AS NEEDED
Status: DISCONTINUED | OUTPATIENT
Start: 2023-08-21 | End: 2023-08-22 | Stop reason: HOSPADM

## 2023-08-21 RX ORDER — HEPARIN SODIUM (PORCINE) LOCK FLUSH IV SOLN 100 UNIT/ML 100 UNIT/ML
500 SOLUTION INTRAVENOUS AS NEEDED
Status: CANCELLED | OUTPATIENT
Start: 2023-08-21

## 2023-08-21 RX ORDER — HEPARIN SODIUM (PORCINE) LOCK FLUSH IV SOLN 100 UNIT/ML 100 UNIT/ML
500 SOLUTION INTRAVENOUS AS NEEDED
Status: DISCONTINUED | OUTPATIENT
Start: 2023-08-21 | End: 2023-08-22 | Stop reason: HOSPADM

## 2023-08-21 RX ORDER — SODIUM CHLORIDE 0.9 % (FLUSH) 0.9 %
20 SYRINGE (ML) INJECTION AS NEEDED
Status: CANCELLED | OUTPATIENT
Start: 2023-08-21

## 2023-08-21 RX ADMIN — HEPARIN SODIUM (PORCINE) LOCK FLUSH IV SOLN 100 UNIT/ML 500 UNITS: 100 SOLUTION at 13:36

## 2023-08-21 RX ADMIN — Medication 20 ML: at 13:36

## 2023-08-22 ENCOUNTER — HOSPITAL ENCOUNTER (OUTPATIENT)
Dept: ONCOLOGY | Facility: HOSPITAL | Age: 68
Discharge: HOME | End: 2023-08-22
Admitting: INTERNAL MEDICINE
Payer: MEDICARE

## 2023-08-22 ENCOUNTER — OFFICE VISIT (OUTPATIENT)
Dept: ONCOLOGY | Facility: HOSPITAL | Age: 68
End: 2023-08-22
Payer: MEDICARE

## 2023-08-22 VITALS
HEART RATE: 93 BPM | RESPIRATION RATE: 20 BRPM | TEMPERATURE: 97.7 F | DIASTOLIC BLOOD PRESSURE: 70 MMHG | BODY MASS INDEX: 34.94 KG/M2 | WEIGHT: 275.13 LBS | SYSTOLIC BLOOD PRESSURE: 114 MMHG | OXYGEN SATURATION: 98 %

## 2023-08-22 VITALS
SYSTOLIC BLOOD PRESSURE: 114 MMHG | HEART RATE: 93 BPM | RESPIRATION RATE: 20 BRPM | OXYGEN SATURATION: 98 % | TEMPERATURE: 97.7 F | DIASTOLIC BLOOD PRESSURE: 70 MMHG

## 2023-08-22 DIAGNOSIS — C20 RECTAL CANCER: Primary | ICD-10-CM

## 2023-08-22 PROCEDURE — 25010000002 PALONOSETRON PER 25 MCG: Performed by: INTERNAL MEDICINE

## 2023-08-22 PROCEDURE — 96413 CHEMO IV INFUSION 1 HR: CPT

## 2023-08-22 PROCEDURE — 96375 TX/PRO/DX INJ NEW DRUG ADDON: CPT

## 2023-08-22 PROCEDURE — 96417 CHEMO IV INFUS EACH ADDL SEQ: CPT

## 2023-08-22 PROCEDURE — 25010000002 FLUOROURACIL PER 500 MG: Performed by: INTERNAL MEDICINE

## 2023-08-22 PROCEDURE — 25010000002 DEXAMETHASONE SODIUM PHOSPHATE 120 MG/30ML SOLUTION: Performed by: INTERNAL MEDICINE

## 2023-08-22 PROCEDURE — 25010000002 BEVACIZUMAB-BVZR 400 MG/16ML SOLUTION 16 ML VIAL: Performed by: INTERNAL MEDICINE

## 2023-08-22 PROCEDURE — G0498 CHEMO EXTEND IV INFUS W/PUMP: HCPCS

## 2023-08-22 PROCEDURE — 25010000002 BEVACIZUMAB-BVZR 100 MG/4ML SOLUTION 4 ML VIAL: Performed by: INTERNAL MEDICINE

## 2023-08-22 PROCEDURE — 25010000002 IRINOTECAN PER 20 MG: Performed by: INTERNAL MEDICINE

## 2023-08-22 RX ORDER — SODIUM CHLORIDE 9 MG/ML
250 INJECTION, SOLUTION INTRAVENOUS ONCE
Status: COMPLETED | OUTPATIENT
Start: 2023-08-22 | End: 2023-08-22

## 2023-08-22 RX ORDER — FLUOROURACIL 50 MG/ML
1000 INJECTION, SOLUTION INTRAVENOUS ONCE
Status: DISCONTINUED | OUTPATIENT
Start: 2023-08-22 | End: 2023-08-23 | Stop reason: HOSPADM

## 2023-08-22 RX ORDER — FLUOROURACIL 50 MG/ML
400 INJECTION, SOLUTION INTRAVENOUS ONCE
Status: CANCELLED | OUTPATIENT
Start: 2023-08-22

## 2023-08-22 RX ORDER — SODIUM CHLORIDE 0.9 % (FLUSH) 0.9 %
20 SYRINGE (ML) INJECTION AS NEEDED
Status: CANCELLED | OUTPATIENT
Start: 2023-08-22

## 2023-08-22 RX ORDER — HEPARIN SODIUM (PORCINE) LOCK FLUSH IV SOLN 100 UNIT/ML 100 UNIT/ML
500 SOLUTION INTRAVENOUS AS NEEDED
Status: CANCELLED | OUTPATIENT
Start: 2023-08-22

## 2023-08-22 RX ORDER — PALONOSETRON 0.05 MG/ML
0.25 INJECTION, SOLUTION INTRAVENOUS ONCE
Status: CANCELLED | OUTPATIENT
Start: 2023-08-22

## 2023-08-22 RX ORDER — ATROPINE SULFATE 1 MG/ML
0.25 INJECTION, SOLUTION INTRAMUSCULAR; INTRAVENOUS; SUBCUTANEOUS
Status: CANCELLED | OUTPATIENT
Start: 2023-08-22

## 2023-08-22 RX ORDER — SODIUM CHLORIDE 9 MG/ML
250 INJECTION, SOLUTION INTRAVENOUS ONCE
Status: CANCELLED | OUTPATIENT
Start: 2023-08-22

## 2023-08-22 RX ORDER — PALONOSETRON 0.05 MG/ML
0.25 INJECTION, SOLUTION INTRAVENOUS ONCE
Status: COMPLETED | OUTPATIENT
Start: 2023-08-22 | End: 2023-08-22

## 2023-08-22 RX ADMIN — PALONOSETRON 0.25 MG: 0.05 INJECTION, SOLUTION INTRAVENOUS at 08:52

## 2023-08-22 RX ADMIN — FLUOROURACIL 6250 MG: 50 INJECTION, SOLUTION INTRAVENOUS at 11:50

## 2023-08-22 RX ADMIN — SODIUM CHLORIDE 660 MG: 9 INJECTION, SOLUTION INTRAVENOUS at 09:24

## 2023-08-22 RX ADMIN — SODIUM CHLORIDE 250 ML: 9 INJECTION, SOLUTION INTRAVENOUS at 08:53

## 2023-08-22 RX ADMIN — ATROPINE SULFATE 0.25 MG: 0.4 INJECTION, SOLUTION INTRAVENOUS at 10:46

## 2023-08-22 RX ADMIN — DEXAMETHASONE SODIUM PHOSPHATE 12 MG: 4 INJECTION, SOLUTION INTRA-ARTICULAR; INTRALESIONAL; INTRAMUSCULAR; INTRAVENOUS; SOFT TISSUE at 08:52

## 2023-08-22 RX ADMIN — IRINOTECAN HYDROCHLORIDE 465 MG: 20 INJECTION, SOLUTION INTRAVENOUS at 10:14

## 2023-08-22 NOTE — PROGRESS NOTES
Chief Complaint  Chemotherapy    Eri Moran APRN Reinberg, Emily, APRN Subjective          Robbi Kennedy presents to [facility] for ongoing treatment of his rectal cancer.  He is on palliative treatment for metastatic disease with FOLFIRI plus bevacizumab.  He notes continued diarrhea and nausea but he is able to mitigate the symptoms.  Last cycle he had more mouth sores.  He still feels like he is eating and drinking adequately.  He has lost a couple pounds.  His energy level is low but he is able to perform his ADLs.    Oncology/Hematology History Overview Note   9/30/2019 High rectal uvvh-sqhdjodprx-yzhcodsygsqwsv adenocarcinoma  associated with tubular adenoma.   4/27/21 Biopsy of liver revealed metastatic adenocarcinoma, consistent with colorectal primary.    Clinical Staging  Stage IIa ([id number])    Chemotherapy      neoadjuvant Xeloda with radiation. adjuvant xeloda        Radiation Therapy      7/8/19 thru 8/14/19 completed neoadjuvant 5040 cGy=28 fxs rectum     10/2021 XRT due to recurrence right middle lung 5 Fx's, 5000cGy  11/26/21 adjuvant FOLFOX -2/25/2022    Surgeries       9/30/2019 low anterior resection with ostomy        8/31/21 partial hepatectomy, cholecystectomy and MWA for segment 6 medical margin performed at     2/8/2023 CT Scans revealed Worsening pulmonary metastatic disease.    2/21/2023 orders written for FOLFIRI + AVASTIN       Rectal cancer   6/2/2021 - 8/15/2021 Chemotherapy    OP COLON mFOLFOX6 + Bevacizumab (OXALIplatin / Leucovorin / Fluorouracil / Bevacizumab)       6/18/2021 Initial Diagnosis    Rectal cancer (CMS/HCC)     6/21/2021 Cancer Staged    Staging form: Colon And Rectum, AJCC 8th Edition  - Clinical: Stage IIA (cT3, cN0, cM0) - Signed by Maurilio Campos MD on 6/21/2021 11/16/2021 - 2/25/2022 Chemotherapy    OP COLON mFOLFOX6 OXALIplatin / Leucovorin / Fluorouracil       2/28/2023 -  Chemotherapy    OP  COLORECTAL FOLFIRI + Bevacizumab 5mg/kg (Irinotecan / Leucovorin / Fluorouracil / Bevacizumab)       Secondary malignant neoplasm of left lung   9/14/2021 Initial Diagnosis    Secondary malignant neoplasm of left lung (HCC)     10/20/2021 -  Radiation    RADIATION THERAPY Treatment Details (Noted on 10/5/2021)  Site: Bilateral Lung  Technique: SBRT  Goal: No goal specified  Planned Treatment Start Date: 10/20/2021     11/16/2021 - 2/25/2022 Chemotherapy    OP COLON mFOLFOX6 OXALIplatin / Leucovorin / Fluorouracil       6/15/2022 -  Radiation    RADIATION THERAPY Treatment Details (Noted on 6/15/2022)  Site: Left Lung - Lower lobe  Technique: SBRT  Goal: No goal specified  Planned Treatment Start Date: No planned start date specified     10/25/2022 -  Radiation    RADIATION THERAPY Treatment Details (Noted on 10/25/2022)  Site: Left Lung  Technique: SBRT  Goal: No goal specified  Planned Treatment Start Date: No planned start date specified     Secondary malignancy of liver   9/14/2021 Initial Diagnosis    Secondary malignancy of liver (HCC)     11/16/2021 - 2/25/2022 Chemotherapy    OP COLON mFOLFOX6 OXALIplatin / Leucovorin / Fluorouracil       Secondary malignant neoplasm of right lung   10/5/2021 Initial Diagnosis    Secondary malignant neoplasm of right lung (HCC)     10/20/2021 -  Radiation    RADIATION THERAPY Treatment Details (Noted on 10/5/2021)  Site: Bilateral Lung  Technique: SBRT  Goal: No goal specified  Planned Treatment Start Date: 10/20/2021     11/16/2021 - 2/25/2022 Chemotherapy    OP COLON mFOLFOX6 OXALIplatin / Leucovorin / Fluorouracil           Review of Systems   Constitutional:  Negative for appetite change, diaphoresis, fatigue, fever, unexpected weight gain and unexpected weight loss.   HENT:  Negative for hearing loss, mouth sores, sore throat, swollen glands, trouble swallowing and voice change.    Eyes:  Negative for blurred vision.   Respiratory:  Negative for cough, shortness of  breath and wheezing.    Cardiovascular:  Negative for chest pain and palpitations.   Gastrointestinal:  Negative for abdominal pain, blood in stool, constipation, diarrhea, nausea and vomiting.   Endocrine: Negative for cold intolerance and heat intolerance.   Genitourinary:  Negative for difficulty urinating, dysuria, frequency, hematuria and urinary incontinence.   Musculoskeletal:  Negative for arthralgias, back pain and myalgias.   Skin:  Negative for rash, skin lesions and wound.   Neurological:  Negative for dizziness, seizures, weakness, numbness and headache.   Hematological:  Does not bruise/bleed easily.   Psychiatric/Behavioral:  Negative for depressed mood. The patient is not nervous/anxious.    Current Outpatient Medications on File Prior to Visit   Medication Sig Dispense Refill    acetaminophen (TYLENOL) 500 MG tablet Take 1 tablet by mouth Every 6 (Six) Hours As Needed.      apixaban (ELIQUIS) 5 MG tablet tablet Take 1 tablet by mouth 2 (Two) Times a Day.      ascorbic acid (VITAMIN C) 500 MG tablet Take 1 tablet by mouth Daily.      atorvastatin (LIPITOR) 40 MG tablet atorvastatin 40 mg oral tablet take 1 tablet (40 mg) by oral route once daily   Active      cyanocobalamin (VITAMIN B-12) 1000 MCG tablet Take 1 tablet by mouth Daily.      furosemide (LASIX) 20 MG tablet Take 1 tablet by mouth Daily. 90 tablet 3    HYDROcodone-acetaminophen (NORCO) 5-325 MG per tablet Take 1 tablet by mouth Every 6 (Six) Hours As Needed for Moderate Pain. 120 tablet 0    loratadine (CLARITIN) 10 MG tablet loratadine 10 mg oral tablet take 1 tablet (10 mg) by oral route once daily   Active      metoprolol tartrate (LOPRESSOR) 100 MG tablet Take 1 tablet by mouth 2 (Two) Times a Day. 180 tablet 3    montelukast (SINGULAIR) 10 MG tablet 1 tablet.      multivitamin (THERAGRAN) tablet tablet Take 1 tablet by mouth Daily.      ondansetron (ZOFRAN) 8 MG tablet Take 1 tablet by mouth 3 (Three) Times a Day As Needed for Nausea  or Vomiting. 30 tablet 5    potassium chloride (K-DUR,KLOR-CON) 20 MEQ CR tablet Take 1 tablet by mouth Daily. 90 tablet 1    Synthroid 25 MCG tablet Take 1 tablet by mouth Daily.      verapamil SR (CALAN-SR) 180 MG CR tablet Take 1 tablet by mouth Every Night. 90 tablet 3    vitamin E 1000 UNIT capsule Take 1 capsule by mouth Daily.      diclofenac (VOLTAREN) 50 MG EC tablet  (Patient not taking: Reported on 8/7/2023)      fluticasone (FLONASE) 50 MCG/ACT nasal spray  (Patient not taking: Reported on 8/7/2023)      MAGIC MOUTHWASH W/NYSTATIN 1/1/1/1 Swish and swallow 10 mL 4 (Four) Times a Day As Needed for Mouth Pain. (Patient not taking: Reported on 8/7/2023) 500 mL 1     Current Facility-Administered Medications on File Prior to Visit   Medication Dose Route Frequency Provider Last Rate Last Admin    atropine sulfate injection 0.25 mg  0.25 mg Intravenous Q15 Min PRN Maurilio Campos MD   0.25 mg at 08/22/23 1046    [COMPLETED] Bevacizumab-bvzr (ZIRABEV) 660 mg in sodium chloride 0.9 % 136.4 mL chemo IVPB  5 mg/kg (Treatment Plan Recorded) Intravenous Once Maurilio Campos MD   Stopped at 08/22/23 0954    [COMPLETED] dexAMETHasone (DECADRON) IVPB 12 mg  12 mg Intravenous Once Maurilio Campos MD   Stopped at 08/22/23 0905    fluorouracil (ADRUCIL) 6,250 mg in sodium chloride 0.9 % 138 mL chemo infusion - FOR HOME USE  6,250 mg Intravenous Once Maurilio Campos MD   6,250 mg at 08/22/23 1150    fluorouracil (ADRUCIL) chemo injection 1,000 mg (20 mL)  1,000 mg Intravenous Once Maurilio Campos MD        [COMPLETED] irinotecan (CAMPTOSAR) 465 mg in dextrose (D5W) 5 % 573.3 mL chemo IVPB  180 mg/m2 (Treatment Plan Recorded) Intravenous Once Maurilio Campos MD   Stopped at 08/22/23 1148    leucovorin 1,000 mg in dextrose (D5W) 5 % 325 mL IVPB  1,000 mg Intravenous Once Maurilio Campos MD        [COMPLETED] palonosetron (ALOXI) injection 0.25 mg  0.25 mg Intravenous Once Maurilio Campos MD   0.25 mg at  08/22/23 0852    [COMPLETED] sodium chloride 0.9 % infusion 250 mL  250 mL Intravenous Once Maurilio Campos MD   Stopped at 08/22/23 1150    [DISCONTINUED] heparin injection 500 Units  500 Units Intravenous PRN Maurilio Campos MD   500 Units at 08/21/23 1336    [DISCONTINUED] sodium chloride 0.9 % flush 20 mL  20 mL Intravenous PRN Maurilio Campos MD   20 mL at 08/21/23 1336       No Known Allergies  Past Medical History:   Diagnosis Date    Asthma     Atrial fibrillation, persistent 2/26/2021    Chronic heart failure with preserved ejection fraction 7/18/2022    BNP 1590 on 7/22 Echocardiogram with EF 55% moderate to severe biatrial enlargement and mild right ventricular enlargement 7/22     Colorectal cancer     Essential hypertension     GI cancer     Hepatitis     History of DVT of lower extremity 8/27/2021    Hyperlipidemia LDL goal <100 12/31/2020    Rectal cancer 6/18/2021    Secondary malignancy of liver 9/14/2021    Secondary malignant neoplasm of left lung 9/14/2021    Secondary malignant neoplasm of right lung 10/5/2021    Thrombocytopenia 1/11/2022    Thyroid disease     Viral upper respiratory tract infection 4/24/2023     Past Surgical History:   Procedure Laterality Date    COLON SURGERY      HERNIA REPAIR      LIVER BIOPSY      4/27/21 Biopsy of liver revealed metastatic adenocarcinoma, consistent with colorectal primary    OTHER SURGICAL HISTORY      REMOVED CANCER FROM COLON     Social History     Socioeconomic History    Marital status:     Number of children: 2   Tobacco Use    Smoking status: Never    Smokeless tobacco: Never   Vaping Use    Vaping Use: Never used   Substance and Sexual Activity    Alcohol use: Yes     Comment: occasionally, no recent use    Drug use: Never    Sexual activity: Defer     Family History   Problem Relation Age of Onset    Prostate cancer Father     Heart murmur Mother     Diabetes Mother     Colon cancer Maternal Uncle        Objective   Physical  Exam  Vitals reviewed. Exam conducted with a chaperone present.   Constitutional:       General: He is not in acute distress.     Appearance: Normal appearance.   Cardiovascular:      Rate and Rhythm: Normal rate and regular rhythm.      Heart sounds: Normal heart sounds. No murmur heard.    No gallop.   Pulmonary:      Effort: Pulmonary effort is normal.      Breath sounds: Normal breath sounds.   Abdominal:      General: Abdomen is flat. Bowel sounds are normal.      Palpations: Abdomen is soft.   Musculoskeletal:      Cervical back: Neck supple.      Right lower leg: No edema.      Left lower leg: No edema.   Lymphadenopathy:      Cervical: No cervical adenopathy.   Neurological:      Mental Status: He is alert and oriented to person, place, and time.   Psychiatric:         Mood and Affect: Mood normal.         Behavior: Behavior normal.       Vitals:    08/22/23 0816   BP: 114/70   Pulse: 93   Resp: 20   Temp: 97.7 øF (36.5 øC)   TempSrc: Temporal   SpO2: 98%   PainSc: 0-No pain     ECOG score: 0         PHQ-9 Total Score:                    Result Review :   The following data was reviewed by: Maurilio Campos MD on 08/22/2023:  Lab Results   Component Value Date    HGB 11.2 (L) 08/21/2023    HCT 36.4 (L) 08/21/2023    .4 (H) 08/21/2023     08/21/2023    WBC 4.39 08/21/2023    NEUTROABS 2.60 08/21/2023    LYMPHSABS 1.09 08/21/2023    MONOSABS 0.34 08/21/2023    EOSABS 0.34 08/21/2023    BASOSABS 0.02 08/21/2023     Lab Results   Component Value Date    GLUCOSE 118 (H) 08/21/2023    BUN 8 08/21/2023    CREATININE 0.92 08/21/2023     08/21/2023    K 4.0 08/21/2023     08/21/2023    CO2 24.0 08/21/2023    CALCIUM 9.0 08/21/2023    PROTEINTOT 5.7 (L) 08/21/2023    ALBUMIN 3.5 08/21/2023    BILITOT 0.5 08/21/2023    ALKPHOS 76 08/21/2023    AST 17 08/21/2023    ALT 17 08/21/2023     Lab Results   Component Value Date    MG 1.8 (L) 09/07/2021    FREET4 0.98 06/27/2023    TSH 3.250  02/24/2021     Lab Results   Component Value Date    IRON 60 (L) 03/04/2020    LABIRON 20 03/04/2020    TRANSFERRIN 215.00 03/04/2020    TIBC 307 03/04/2020     Lab Results   Component Value Date    FERRITIN 140 03/04/2020    BKLPGWMH55 >2000 (H) 03/04/2020    FOLATE 19.4 03/04/2020     Lab Results   Component Value Date    PSA 0.91 02/24/2021    CEA 4.20 06/20/2023             Assessment and Plan    Diagnoses and all orders for this visit:    1. Rectal cancer (Primary)  Assessment & Plan:  Metastatic.  Patient is on palliative treatment with FOLFIRI plus bevacizumab.  He is having side effects including nausea, diarrhea and fatigue.  Lab work is notable for mild anemia with a hemoglobin 11.2 g/dL.  No further dose adjustments are needed at this time.  Proceed with cycle 12 as planned.  I will see him back in 2 weeks for OV, consideration of cycle 13 with lab work prior to monitor for toxicities and restaging scans to assess response to therapy.  We discussed potential options moving forward which could include maintenance bevacizumab versus treatment break versus attenuated chemotherapy along with bevacizumab such as 5-fluorouracil and bevacizumab alone.  Final decisions will be made upon evaluation of his restaging scans and patient preferences.    Orders:  -     CT chest w contrast; Future  -     CT abdomen pelvis w contrast; Future    Other orders  -     Cancel: sodium chloride 0.9 % infusion 250 mL  -     Cancel: palonosetron (ALOXI) injection 0.25 mg  -     Cancel: dexAMETHasone (DECADRON) 12 mg in sodium chloride 0.9 % IVPB  -     Cancel: atropine injection 0.25 mg  -     Cancel: Bevacizumab-bvzr (ZIRABEV) 660 mg in sodium chloride 0.9 % 126.4 mL chemo IVPB  -     Cancel: leucovorin 1,040 mg in dextrose (D5W) 5 % 354 mL IVPB  -     Cancel: irinotecan (CAMPTOSAR) 465 mg in dextrose (D5W) 5 % 523.3 mL chemo IVPB  -     Cancel: fluorouracil (ADRUCIL) chemo injection 1,040 mg  -     Cancel: fluorouracil (ADRUCIL)  6,220 mg in sodium chloride 0.9 % 124.4 mL chemo infusion - FOR HOME USE            Patient Follow Up: 2 weeks    Patient was given instructions and counseling regarding his condition or for health maintenance advice. Please see specific information pulled into the AVS if appropriate.     Maurilio Campos MD    8/22/2023

## 2023-08-22 NOTE — ASSESSMENT & PLAN NOTE
Metastatic.  Patient is on palliative treatment with FOLFIRI plus bevacizumab.  He is having side effects including nausea, diarrhea and fatigue.  Lab work is notable for mild anemia with a hemoglobin 11.2 g/dL.  No further dose adjustments are needed at this time.  Proceed with cycle 12 as planned.  I will see him back in 2 weeks for OV, consideration of cycle 13 with lab work prior to monitor for toxicities and restaging scans to assess response to therapy.  We discussed potential options moving forward which could include maintenance bevacizumab versus treatment break versus attenuated chemotherapy along with bevacizumab such as 5-fluorouracil and bevacizumab alone.  Final decisions will be made upon evaluation of his restaging scans and patient preferences.

## 2023-08-24 ENCOUNTER — HOSPITAL ENCOUNTER (OUTPATIENT)
Dept: ONCOLOGY | Facility: HOSPITAL | Age: 68
Discharge: HOME | End: 2023-08-24
Admitting: INTERNAL MEDICINE
Payer: MEDICARE

## 2023-08-24 DIAGNOSIS — Z45.2 ENCOUNTER FOR ADJUSTMENT OR MANAGEMENT OF VASCULAR ACCESS DEVICE: ICD-10-CM

## 2023-08-24 DIAGNOSIS — C20 RECTAL CANCER: Primary | ICD-10-CM

## 2023-08-24 PROCEDURE — 25010000002 HEPARIN LOCK FLUSH PER 10 UNITS: Performed by: INTERNAL MEDICINE

## 2023-08-24 RX ORDER — HEPARIN SODIUM (PORCINE) LOCK FLUSH IV SOLN 100 UNIT/ML 100 UNIT/ML
500 SOLUTION INTRAVENOUS AS NEEDED
Status: DISCONTINUED | OUTPATIENT
Start: 2023-08-24 | End: 2023-08-25 | Stop reason: HOSPADM

## 2023-08-24 RX ORDER — HEPARIN SODIUM (PORCINE) LOCK FLUSH IV SOLN 100 UNIT/ML 100 UNIT/ML
500 SOLUTION INTRAVENOUS AS NEEDED
OUTPATIENT
Start: 2023-08-24

## 2023-08-24 RX ORDER — SODIUM CHLORIDE 0.9 % (FLUSH) 0.9 %
20 SYRINGE (ML) INJECTION AS NEEDED
Status: DISCONTINUED | OUTPATIENT
Start: 2023-08-24 | End: 2023-08-25 | Stop reason: HOSPADM

## 2023-08-24 RX ORDER — SODIUM CHLORIDE 0.9 % (FLUSH) 0.9 %
20 SYRINGE (ML) INJECTION AS NEEDED
OUTPATIENT
Start: 2023-08-24

## 2023-08-24 RX ADMIN — Medication 20 ML: at 11:40

## 2023-08-24 RX ADMIN — HEPARIN SODIUM (PORCINE) LOCK FLUSH IV SOLN 100 UNIT/ML 500 UNITS: 100 SOLUTION at 11:40

## 2023-08-29 ENCOUNTER — HOSPITAL ENCOUNTER (OUTPATIENT)
Dept: CT IMAGING | Facility: HOSPITAL | Age: 68
Discharge: HOME | End: 2023-08-29
Admitting: INTERNAL MEDICINE
Payer: MEDICARE

## 2023-08-29 DIAGNOSIS — C20 RECTAL CANCER: ICD-10-CM

## 2023-08-29 PROCEDURE — 25510000001 IOPAMIDOL PER 1 ML: Performed by: INTERNAL MEDICINE

## 2023-08-29 PROCEDURE — 74177 CT ABD & PELVIS W/CONTRAST: CPT

## 2023-08-29 PROCEDURE — 71260 CT THORAX DX C+: CPT

## 2023-08-29 RX ADMIN — IOPAMIDOL 100 ML: 755 INJECTION, SOLUTION INTRAVENOUS at 18:40

## 2023-08-30 ENCOUNTER — TELEPHONE (OUTPATIENT)
Dept: CARDIOLOGY | Facility: CLINIC | Age: 68
End: 2023-08-30
Payer: MEDICARE

## 2023-08-30 NOTE — TELEPHONE ENCOUNTER
----- Message from REGINALDO Soto sent at 8/29/2023  5:36 PM EDT -----  Notify pt echo result:  ú  Left ventricular systolic function is normal. Calculated left ventricular EF = 55.4%  ú  Left ventricular diastolic function was normal.  ú  The left and right atrial cavities are dilated.  ú  Estimated right ventricular systolic pressure from tricuspid regurgitation is normal  Continue current meds, follow up as scheduled

## 2023-09-06 ENCOUNTER — OFFICE VISIT (OUTPATIENT)
Dept: ONCOLOGY | Facility: HOSPITAL | Age: 68
End: 2023-09-06
Payer: MEDICARE

## 2023-09-06 ENCOUNTER — HOSPITAL ENCOUNTER (OUTPATIENT)
Dept: ONCOLOGY | Facility: HOSPITAL | Age: 68
Discharge: HOME | End: 2023-09-06
Admitting: INTERNAL MEDICINE
Payer: MEDICARE

## 2023-09-06 VITALS
SYSTOLIC BLOOD PRESSURE: 130 MMHG | BODY MASS INDEX: 35.44 KG/M2 | WEIGHT: 276.02 LBS | OXYGEN SATURATION: 100 % | TEMPERATURE: 97.4 F | RESPIRATION RATE: 18 BRPM | DIASTOLIC BLOOD PRESSURE: 82 MMHG | HEART RATE: 78 BPM

## 2023-09-06 VITALS
DIASTOLIC BLOOD PRESSURE: 82 MMHG | BODY MASS INDEX: 35.38 KG/M2 | WEIGHT: 275.57 LBS | HEART RATE: 78 BPM | RESPIRATION RATE: 18 BRPM | OXYGEN SATURATION: 100 % | TEMPERATURE: 97.4 F | SYSTOLIC BLOOD PRESSURE: 130 MMHG

## 2023-09-06 DIAGNOSIS — C20 RECTAL CANCER: Primary | ICD-10-CM

## 2023-09-06 DIAGNOSIS — Z45.2 ENCOUNTER FOR ADJUSTMENT OR MANAGEMENT OF VASCULAR ACCESS DEVICE: ICD-10-CM

## 2023-09-06 LAB
ALBUMIN SERPL-MCNC: 3.6 G/DL (ref 3.5–5.2)
ALBUMIN/GLOB SERPL: 1.7 G/DL
ALP SERPL-CCNC: 68 U/L (ref 39–117)
ALT SERPL W P-5'-P-CCNC: 19 U/L (ref 1–41)
ANION GAP SERPL CALCULATED.3IONS-SCNC: 9.3 MMOL/L (ref 5–15)
AST SERPL-CCNC: 20 U/L (ref 1–40)
BASOPHILS # BLD AUTO: 0.02 10*3/MM3 (ref 0–0.2)
BASOPHILS NFR BLD AUTO: 0.6 % (ref 0–1.5)
BILIRUB SERPL-MCNC: 0.6 MG/DL (ref 0–1.2)
BILIRUB UR QL STRIP: NEGATIVE
BUN SERPL-MCNC: 10 MG/DL (ref 8–23)
BUN/CREAT SERPL: 11.4 (ref 7–25)
CALCIUM SPEC-SCNC: 8.9 MG/DL (ref 8.6–10.5)
CHLORIDE SERPL-SCNC: 106 MMOL/L (ref 98–107)
CLARITY UR: CLEAR
CO2 SERPL-SCNC: 24.7 MMOL/L (ref 22–29)
COLOR UR: YELLOW
CREAT SERPL-MCNC: 0.88 MG/DL (ref 0.76–1.27)
DEPRECATED RDW RBC AUTO: 60.6 FL (ref 37–54)
EGFRCR SERPLBLD CKD-EPI 2021: 94.2 ML/MIN/1.73
EOSINOPHIL # BLD AUTO: 0.27 10*3/MM3 (ref 0–0.4)
EOSINOPHIL NFR BLD AUTO: 7.7 % (ref 0.3–6.2)
ERYTHROCYTE [DISTWIDTH] IN BLOOD BY AUTOMATED COUNT: 15.2 % (ref 12.3–15.4)
GLOBULIN UR ELPH-MCNC: 2.1 GM/DL
GLUCOSE SERPL-MCNC: 176 MG/DL (ref 65–99)
GLUCOSE UR STRIP-MCNC: NEGATIVE MG/DL
HCT VFR BLD AUTO: 37.3 % (ref 37.5–51)
HGB BLD-MCNC: 11.7 G/DL (ref 13–17.7)
HGB UR QL STRIP.AUTO: NEGATIVE
IMM GRANULOCYTES # BLD AUTO: 0.01 10*3/MM3 (ref 0–0.05)
IMM GRANULOCYTES NFR BLD AUTO: 0.3 % (ref 0–0.5)
KETONES UR QL STRIP: NEGATIVE
LEUKOCYTE ESTERASE UR QL STRIP.AUTO: NEGATIVE
LYMPHOCYTES # BLD AUTO: 0.98 10*3/MM3 (ref 0.7–3.1)
LYMPHOCYTES NFR BLD AUTO: 28.1 % (ref 19.6–45.3)
MCH RBC QN AUTO: 33.4 PG (ref 26.6–33)
MCHC RBC AUTO-ENTMCNC: 31.4 G/DL (ref 31.5–35.7)
MCV RBC AUTO: 106.6 FL (ref 79–97)
MONOCYTES # BLD AUTO: 0.37 10*3/MM3 (ref 0.1–0.9)
MONOCYTES NFR BLD AUTO: 10.6 % (ref 5–12)
NEUTROPHILS NFR BLD AUTO: 1.84 10*3/MM3 (ref 1.7–7)
NEUTROPHILS NFR BLD AUTO: 52.7 % (ref 42.7–76)
NITRITE UR QL STRIP: NEGATIVE
PH UR STRIP.AUTO: <=5 [PH] (ref 5–8)
PLATELET # BLD AUTO: 221 10*3/MM3 (ref 140–450)
PMV BLD AUTO: 9.5 FL (ref 6–12)
POTASSIUM SERPL-SCNC: 3.8 MMOL/L (ref 3.5–5.2)
PROT SERPL-MCNC: 5.7 G/DL (ref 6–8.5)
PROT UR QL STRIP: NEGATIVE
RBC # BLD AUTO: 3.5 10*6/MM3 (ref 4.14–5.8)
SODIUM SERPL-SCNC: 140 MMOL/L (ref 136–145)
SP GR UR STRIP: 1.01 (ref 1–1.03)
UROBILINOGEN UR QL STRIP: NORMAL
WBC NRBC COR # BLD: 3.49 10*3/MM3 (ref 3.4–10.8)

## 2023-09-06 PROCEDURE — 25010000002 HEPARIN LOCK FLUSH PER 10 UNITS: Performed by: INTERNAL MEDICINE

## 2023-09-06 PROCEDURE — 36591 DRAW BLOOD OFF VENOUS DEVICE: CPT

## 2023-09-06 PROCEDURE — 80053 COMPREHEN METABOLIC PANEL: CPT | Performed by: INTERNAL MEDICINE

## 2023-09-06 PROCEDURE — 81003 URINALYSIS AUTO W/O SCOPE: CPT | Performed by: INTERNAL MEDICINE

## 2023-09-06 PROCEDURE — 85025 COMPLETE CBC W/AUTO DIFF WBC: CPT | Performed by: INTERNAL MEDICINE

## 2023-09-06 RX ORDER — HEPARIN SODIUM (PORCINE) LOCK FLUSH IV SOLN 100 UNIT/ML 100 UNIT/ML
500 SOLUTION INTRAVENOUS AS NEEDED
Status: DISCONTINUED | OUTPATIENT
Start: 2023-09-06 | End: 2023-09-07 | Stop reason: HOSPADM

## 2023-09-06 RX ORDER — HEPARIN SODIUM (PORCINE) LOCK FLUSH IV SOLN 100 UNIT/ML 100 UNIT/ML
500 SOLUTION INTRAVENOUS AS NEEDED
OUTPATIENT
Start: 2023-09-06

## 2023-09-06 RX ORDER — SODIUM CHLORIDE 0.9 % (FLUSH) 0.9 %
20 SYRINGE (ML) INJECTION AS NEEDED
OUTPATIENT
Start: 2023-09-06

## 2023-09-06 RX ORDER — SODIUM CHLORIDE 0.9 % (FLUSH) 0.9 %
20 SYRINGE (ML) INJECTION AS NEEDED
Status: DISCONTINUED | OUTPATIENT
Start: 2023-09-06 | End: 2023-09-07 | Stop reason: HOSPADM

## 2023-09-06 RX ADMIN — HEPARIN SODIUM (PORCINE) LOCK FLUSH IV SOLN 100 UNIT/ML 500 UNITS: 100 SOLUTION at 08:54

## 2023-09-06 RX ADMIN — Medication 20 ML: at 08:53

## 2023-09-06 NOTE — PROGRESS NOTES
Chief Complaint  Chemotherapy    Eri Moran APRN Reinberg, Emily, APRN Subjective          Robbi Kennedy presents to [facility] HEMATOLOGY [facility] ONCOLOGY for consideration of ongoing therapy.  He has been on palliative treatment FOLFIRI plus bevacizumab.  He has a lot of nausea, diarrhea, fatigue with each treatment.  He denies new masses or adenopathy.  No unusual aches or pains.  He reports adequate appetite.    Oncology/Hematology History Overview Note   9/30/2019 High rectal xvlq-xqpnndqwzr-ejinndxmzrwnza adenocarcinoma  associated with tubular adenoma.   4/27/21 Biopsy of liver revealed metastatic adenocarcinoma, consistent with colorectal primary.    Clinical Staging  Stage IIa ([id number])    Chemotherapy      neoadjuvant Xeloda with radiation. adjuvant xeloda        Radiation Therapy      7/8/19 thru 8/14/19 completed neoadjuvant 5040 cGy=28 fxs rectum     10/2021 XRT due to recurrence right middle lung 5 Fx's, 5000cGy  11/26/21 adjuvant FOLFOX -2/25/2022    Surgeries       9/30/2019 low anterior resection with ostomy        8/31/21 partial hepatectomy, cholecystectomy and MWA for segment 6 medical margin performed at     2/8/2023 CT Scans revealed Worsening pulmonary metastatic disease.    2/21/2023 orders written for FOLFIRI + AVASTIN       Rectal cancer   6/2/2021 - 8/15/2021 Chemotherapy    OP COLON mFOLFOX6 + Bevacizumab (OXALIplatin / Leucovorin / Fluorouracil / Bevacizumab)       6/18/2021 Initial Diagnosis    Rectal cancer (CMS/HCC)     6/21/2021 Cancer Staged    Staging form: Colon And Rectum, AJCC 8th Edition  - Clinical: Stage IIA (cT3, cN0, cM0) - Signed by Maurilio Campos MD on 6/21/2021 11/16/2021 - 2/25/2022 Chemotherapy    OP COLON mFOLFOX6 OXALIplatin / Leucovorin / Fluorouracil       2/28/2023 -  Chemotherapy    OP COLORECTAL FOLFIRI + Bevacizumab 5mg/kg (Irinotecan / Leucovorin / Fluorouracil / Bevacizumab)       Secondary malignant neoplasm of left lung    9/14/2021 Initial Diagnosis    Secondary malignant neoplasm of left lung (HCC)     10/20/2021 -  Radiation    RADIATION THERAPY Treatment Details (Noted on 10/5/2021)  Site: Bilateral Lung  Technique: SBRT  Goal: No goal specified  Planned Treatment Start Date: 10/20/2021     11/16/2021 - 2/25/2022 Chemotherapy    OP COLON mFOLFOX6 OXALIplatin / Leucovorin / Fluorouracil       6/15/2022 -  Radiation    RADIATION THERAPY Treatment Details (Noted on 6/15/2022)  Site: Left Lung - Lower lobe  Technique: SBRT  Goal: No goal specified  Planned Treatment Start Date: No planned start date specified     10/25/2022 -  Radiation    RADIATION THERAPY Treatment Details (Noted on 10/25/2022)  Site: Left Lung  Technique: SBRT  Goal: No goal specified  Planned Treatment Start Date: No planned start date specified     Secondary malignancy of liver   9/14/2021 Initial Diagnosis    Secondary malignancy of liver (HCC)     11/16/2021 - 2/25/2022 Chemotherapy    OP COLON mFOLFOX6 OXALIplatin / Leucovorin / Fluorouracil       Secondary malignant neoplasm of right lung   10/5/2021 Initial Diagnosis    Secondary malignant neoplasm of right lung (HCC)     10/20/2021 -  Radiation    RADIATION THERAPY Treatment Details (Noted on 10/5/2021)  Site: Bilateral Lung  Technique: SBRT  Goal: No goal specified  Planned Treatment Start Date: 10/20/2021     11/16/2021 - 2/25/2022 Chemotherapy    OP COLON mFOLFOX6 OXALIplatin / Leucovorin / Fluorouracil           Review of Systems   Constitutional:  Positive for fatigue (5/10). Negative for appetite change, diaphoresis, fever, unexpected weight gain and unexpected weight loss.   HENT:  Negative for hearing loss, mouth sores, sore throat, swollen glands, trouble swallowing and voice change.    Eyes:  Negative for blurred vision.   Respiratory:  Negative for cough, shortness of breath and wheezing.    Cardiovascular:  Negative for chest pain and palpitations.   Gastrointestinal:  Negative for abdominal  pain, blood in stool, constipation, diarrhea, nausea and vomiting.   Endocrine: Negative for cold intolerance and heat intolerance.   Genitourinary:  Negative for difficulty urinating, dysuria, frequency, hematuria and urinary incontinence.   Musculoskeletal:  Negative for arthralgias, back pain and myalgias.   Skin:  Negative for rash, skin lesions and wound.   Neurological:  Negative for dizziness, seizures, weakness, numbness and headache.   Hematological:  Does not bruise/bleed easily.   Psychiatric/Behavioral:  Negative for depressed mood. The patient is not nervous/anxious.    Current Outpatient Medications on File Prior to Visit   Medication Sig Dispense Refill    acetaminophen (TYLENOL) 500 MG tablet Take 1 tablet by mouth Every 6 (Six) Hours As Needed.      apixaban (ELIQUIS) 5 MG tablet tablet Take 1 tablet by mouth 2 (Two) Times a Day.      ascorbic acid (VITAMIN C) 500 MG tablet Take 1 tablet by mouth Daily.      atorvastatin (LIPITOR) 40 MG tablet atorvastatin 40 mg oral tablet take 1 tablet (40 mg) by oral route once daily   Active      cyanocobalamin (VITAMIN B-12) 1000 MCG tablet Take 1 tablet by mouth Daily.      furosemide (LASIX) 20 MG tablet Take 1 tablet by mouth Daily. 90 tablet 3    HYDROcodone-acetaminophen (NORCO) 5-325 MG per tablet Take 1 tablet by mouth Every 6 (Six) Hours As Needed for Moderate Pain. 120 tablet 0    loratadine (CLARITIN) 10 MG tablet loratadine 10 mg oral tablet take 1 tablet (10 mg) by oral route once daily   Active      metoprolol tartrate (LOPRESSOR) 100 MG tablet Take 1 tablet by mouth 2 (Two) Times a Day. 180 tablet 3    montelukast (SINGULAIR) 10 MG tablet 1 tablet.      multivitamin (THERAGRAN) tablet tablet Take 1 tablet by mouth Daily.      ondansetron (ZOFRAN) 8 MG tablet Take 1 tablet by mouth 3 (Three) Times a Day As Needed for Nausea or Vomiting. 30 tablet 5    potassium chloride (K-DUR,KLOR-CON) 20 MEQ CR tablet Take 1 tablet by mouth Daily. 90 tablet 1     Synthroid 25 MCG tablet Take 1 tablet by mouth Daily.      verapamil SR (CALAN-SR) 180 MG CR tablet Take 1 tablet by mouth Every Night. 90 tablet 3    vitamin E 1000 UNIT capsule Take 1 capsule by mouth Daily.      diclofenac (VOLTAREN) 50 MG EC tablet  (Patient not taking: Reported on 8/7/2023)      fluticasone (FLONASE) 50 MCG/ACT nasal spray  (Patient not taking: Reported on 8/7/2023)      MAGIC MOUTHWASH W/NYSTATIN 1/1/1/1 Swish and swallow 10 mL 4 (Four) Times a Day As Needed for Mouth Pain. (Patient not taking: Reported on 8/7/2023) 500 mL 1     Current Facility-Administered Medications on File Prior to Visit   Medication Dose Route Frequency Provider Last Rate Last Admin    heparin injection 500 Units  500 Units Intravenous PRN Maurilio Campos MD   500 Units at 09/06/23 0854    sodium chloride 0.9 % flush 20 mL  20 mL Intravenous PRN Maurilio Campos MD   20 mL at 09/06/23 0853       No Known Allergies  Past Medical History:   Diagnosis Date    Asthma     Atrial fibrillation, persistent 2/26/2021    Chronic heart failure with preserved ejection fraction 7/18/2022    BNP 1590 on 7/22 Echocardiogram with EF 55% moderate to severe biatrial enlargement and mild right ventricular enlargement 7/22     Colorectal cancer     Essential hypertension     GI cancer     Hepatitis     History of DVT of lower extremity 8/27/2021    Hyperlipidemia LDL goal <100 12/31/2020    Rectal cancer 6/18/2021    Secondary malignancy of liver 9/14/2021    Secondary malignant neoplasm of left lung 9/14/2021    Secondary malignant neoplasm of right lung 10/5/2021    Thrombocytopenia 1/11/2022    Thyroid disease     Viral upper respiratory tract infection 4/24/2023     Past Surgical History:   Procedure Laterality Date    COLON SURGERY      HERNIA REPAIR      LIVER BIOPSY      4/27/21 Biopsy of liver revealed metastatic adenocarcinoma, consistent with colorectal primary    OTHER SURGICAL HISTORY      REMOVED CANCER FROM COLON     Social  History     Socioeconomic History    Marital status:     Number of children: 2   Tobacco Use    Smoking status: Never    Smokeless tobacco: Never   Vaping Use    Vaping Use: Never used   Substance and Sexual Activity    Alcohol use: Yes     Comment: occasionally, no recent use    Drug use: Never    Sexual activity: Defer     Family History   Problem Relation Age of Onset    Prostate cancer Father     Heart murmur Mother     Diabetes Mother     Colon cancer Maternal Uncle        Objective   Physical Exam  Vitals reviewed. Exam conducted with a chaperone present.   Constitutional:       General: He is not in acute distress.     Appearance: Normal appearance.   Cardiovascular:      Rate and Rhythm: Normal rate and regular rhythm.      Heart sounds: Normal heart sounds. No murmur heard.    No gallop.   Pulmonary:      Effort: Pulmonary effort is normal.      Breath sounds: Normal breath sounds.      Comments: Port-A-Cath  Abdominal:      General: Abdomen is flat. Bowel sounds are normal.      Palpations: Abdomen is soft.   Musculoskeletal:      Right lower leg: No edema.      Left lower leg: No edema.   Neurological:      Mental Status: He is alert and oriented to person, place, and time.   Psychiatric:         Mood and Affect: Mood normal.         Behavior: Behavior normal.       Vitals:    09/06/23 0816   BP: 130/82   Pulse: 78   Resp: 18   Temp: 97.4 °F (36.3 °C)   TempSrc: Temporal   SpO2: 100%   Weight: 125 kg (275 lb 9.2 oz)   PainSc: 0-No pain     ECOG score: 0         PHQ-9 Total Score:                    Result Review :   The following data was reviewed by: Maurilio Campos MD on 09/06/2023:  Lab Results   Component Value Date    HGB 11.7 (L) 09/06/2023    HCT 37.3 (L) 09/06/2023    .6 (H) 09/06/2023     09/06/2023    WBC 3.49 09/06/2023    NEUTROABS 1.84 09/06/2023    LYMPHSABS 0.98 09/06/2023    MONOSABS 0.37 09/06/2023    EOSABS 0.27 09/06/2023    BASOSABS 0.02 09/06/2023     Lab  Results   Component Value Date    GLUCOSE 176 (H) 09/06/2023    BUN 10 09/06/2023    CREATININE 0.88 09/06/2023     09/06/2023    K 3.8 09/06/2023     09/06/2023    CO2 24.7 09/06/2023    CALCIUM 8.9 09/06/2023    PROTEINTOT 5.7 (L) 09/06/2023    ALBUMIN 3.6 09/06/2023    BILITOT 0.6 09/06/2023    ALKPHOS 68 09/06/2023    AST 20 09/06/2023    ALT 19 09/06/2023     Lab Results   Component Value Date    MG 1.8 (L) 09/07/2021    FREET4 0.98 06/27/2023    TSH 3.250 02/24/2021     Lab Results   Component Value Date    IRON 60 (L) 03/04/2020    LABIRON 20 03/04/2020    TRANSFERRIN 215.00 03/04/2020    TIBC 307 03/04/2020     Lab Results   Component Value Date    FERRITIN 140 03/04/2020    [id number] >2000 (H) 03/04/2020    FOLATE 19.4 03/04/2020     Lab Results   Component Value Date    PSA 0.91 02/24/2021    CEA 4.20 06/20/2023       Data reviewed : Radiologic studies CT chest, abdomen, pelvis reviewed       Assessment and Plan    Diagnoses and all orders for this visit:    1. Rectal cancer (Primary)  Assessment & Plan:  Metastatic.  Patient has been on palliative treatment FOLFIRI plus bevacizumab.  He has completed 12 cycles.  He is having a lot of side effects from his chemotherapy.  Restaging CT scans showed some small nodularity in the mesentery and lungs but stable.  We discussed options which could include a treatment break versus continued therapy either as is or switching to maintenance bevacizumab alone.  Patient would really like a treatment break which I think is reasonable.  I will stop treatment for now and monitor closely.  I will see him back in 2 months for continued surveillance with labs and CT scans prior.    Orders:  -     CBC & Differential; Future  -     Comprehensive Metabolic Panel; Future  -     CT chest w contrast; Future  -     CT abdomen pelvis w contrast; Future            Patient Follow Up: 2 months    Patient was given instructions and counseling regarding his condition or for  health maintenance advice. Please see specific information pulled into the AVS if appropriate.     Maurilio Campos MD    9/6/2023

## 2023-09-06 NOTE — ASSESSMENT & PLAN NOTE
Metastatic.  Patient has been on palliative treatment FOLFIRI plus bevacizumab.  He has completed 12 cycles.  He is having a lot of side effects from his chemotherapy.  Restaging CT scans showed some small nodularity in the mesentery and lungs but stable.  We discussed options which could include a treatment break versus continued therapy either as is or switching to maintenance bevacizumab alone.  Patient would really like a treatment break which I think is reasonable.  I will stop treatment for now and monitor closely.  I will see him back in 2 months for continued surveillance with labs and CT scans prior.

## 2023-11-06 ENCOUNTER — HOSPITAL ENCOUNTER (OUTPATIENT)
Dept: CT IMAGING | Facility: HOSPITAL | Age: 68
Discharge: HOME | End: 2023-11-06
Admitting: INTERNAL MEDICINE
Payer: MEDICARE

## 2023-11-06 DIAGNOSIS — C20 RECTAL CANCER: ICD-10-CM

## 2023-11-06 LAB
ALBUMIN SERPL-MCNC: 4 G/DL (ref 3.5–5.2)
ALBUMIN/GLOB SERPL: 1.5 G/DL
ALP SERPL-CCNC: 65 U/L (ref 39–117)
ALT SERPL W P-5'-P-CCNC: 20 U/L (ref 1–41)
ANION GAP SERPL CALCULATED.3IONS-SCNC: 8.5 MMOL/L (ref 5–15)
AST SERPL-CCNC: 25 U/L (ref 1–40)
BASOPHILS # BLD AUTO: 0.03 10*3/MM3 (ref 0–0.2)
BASOPHILS NFR BLD AUTO: 0.5 % (ref 0–1.5)
BILIRUB SERPL-MCNC: 0.9 MG/DL (ref 0–1.2)
BUN SERPL-MCNC: 10 MG/DL (ref 8–23)
BUN/CREAT SERPL: 12.7 (ref 7–25)
CALCIUM SPEC-SCNC: 8.8 MG/DL (ref 8.6–10.5)
CHLORIDE SERPL-SCNC: 103 MMOL/L (ref 98–107)
CO2 SERPL-SCNC: 28.5 MMOL/L (ref 22–29)
CREAT BLDA-MCNC: 0.8 MG/DL
CREAT SERPL-MCNC: 0.79 MG/DL (ref 0.76–1.27)
DEPRECATED RDW RBC AUTO: 46.5 FL (ref 37–54)
EGFRCR SERPLBLD CKD-EPI 2021: 97 ML/MIN/1.73
EGFRCR SERPLBLD CKD-EPI 2021: 97.4 ML/MIN/1.73
EOSINOPHIL # BLD AUTO: 0.23 10*3/MM3 (ref 0–0.4)
EOSINOPHIL NFR BLD AUTO: 3.5 % (ref 0.3–6.2)
ERYTHROCYTE [DISTWIDTH] IN BLOOD BY AUTOMATED COUNT: 13 % (ref 12.3–15.4)
GLOBULIN UR ELPH-MCNC: 2.6 GM/DL
GLUCOSE SERPL-MCNC: 120 MG/DL (ref 65–99)
HCT VFR BLD AUTO: 42.2 % (ref 37.5–51)
HGB BLD-MCNC: 13.6 G/DL (ref 13–17.7)
IMM GRANULOCYTES # BLD AUTO: 0.03 10*3/MM3 (ref 0–0.05)
IMM GRANULOCYTES NFR BLD AUTO: 0.5 % (ref 0–0.5)
LYMPHOCYTES # BLD AUTO: 1.62 10*3/MM3 (ref 0.7–3.1)
LYMPHOCYTES NFR BLD AUTO: 24.9 % (ref 19.6–45.3)
MCH RBC QN AUTO: 31.8 PG (ref 26.6–33)
MCHC RBC AUTO-ENTMCNC: 32.2 G/DL (ref 31.5–35.7)
MCV RBC AUTO: 98.6 FL (ref 79–97)
MONOCYTES # BLD AUTO: 0.54 10*3/MM3 (ref 0.1–0.9)
MONOCYTES NFR BLD AUTO: 8.3 % (ref 5–12)
NEUTROPHILS NFR BLD AUTO: 4.06 10*3/MM3 (ref 1.7–7)
NEUTROPHILS NFR BLD AUTO: 62.3 % (ref 42.7–76)
NRBC BLD AUTO-RTO: 0 /100 WBC (ref 0–0.2)
PLATELET # BLD AUTO: 203 10*3/MM3 (ref 140–450)
PMV BLD AUTO: 9.4 FL (ref 6–12)
POTASSIUM SERPL-SCNC: 4 MMOL/L (ref 3.5–5.2)
PROT SERPL-MCNC: 6.6 G/DL (ref 6–8.5)
RBC # BLD AUTO: 4.28 10*6/MM3 (ref 4.14–5.8)
SODIUM SERPL-SCNC: 140 MMOL/L (ref 136–145)
WBC NRBC COR # BLD: 6.51 10*3/MM3 (ref 3.4–10.8)

## 2023-11-06 PROCEDURE — 80053 COMPREHEN METABOLIC PANEL: CPT | Performed by: INTERNAL MEDICINE

## 2023-11-06 PROCEDURE — 25510000001 IOPAMIDOL PER 1 ML: Performed by: INTERNAL MEDICINE

## 2023-11-06 PROCEDURE — 74177 CT ABD & PELVIS W/CONTRAST: CPT

## 2023-11-06 PROCEDURE — 82565 ASSAY OF CREATININE: CPT

## 2023-11-06 PROCEDURE — 85025 COMPLETE CBC W/AUTO DIFF WBC: CPT | Performed by: INTERNAL MEDICINE

## 2023-11-06 PROCEDURE — 71260 CT THORAX DX C+: CPT

## 2023-11-06 RX ORDER — HEPARIN SODIUM (PORCINE) LOCK FLUSH IV SOLN 100 UNIT/ML 100 UNIT/ML
SOLUTION INTRAVENOUS
Status: DISPENSED
Start: 2023-11-06 | End: 2023-11-06

## 2023-11-06 RX ADMIN — IOPAMIDOL 100 ML: 755 INJECTION, SOLUTION INTRAVENOUS at 11:04

## 2023-11-07 ENCOUNTER — OFFICE VISIT (OUTPATIENT)
Dept: ONCOLOGY | Facility: HOSPITAL | Age: 68
End: 2023-11-07
Payer: MEDICARE

## 2023-11-07 VITALS
WEIGHT: 282.85 LBS | DIASTOLIC BLOOD PRESSURE: 98 MMHG | RESPIRATION RATE: 16 BRPM | TEMPERATURE: 98 F | BODY MASS INDEX: 36.32 KG/M2 | HEART RATE: 85 BPM | SYSTOLIC BLOOD PRESSURE: 145 MMHG | OXYGEN SATURATION: 99 %

## 2023-11-07 DIAGNOSIS — C20 RECTAL CANCER: Primary | ICD-10-CM

## 2023-11-07 PROCEDURE — G0463 HOSPITAL OUTPT CLINIC VISIT: HCPCS

## 2023-11-07 NOTE — PROGRESS NOTES
Chief Complaint  Results (ct)    Eri Moran APRN Reinberg, Emily, APRN Subjective          Robbi JAMARI Brent presents to [facility] HEMATOLOGY & ONCOLOGY for ongoing follow-up of his rectal cancer.  He is status post treatments as outlined.  Currently on surveillance.  Patient states he is feeling well.  He notes good appetite and energy level.  He is gained several pounds.  Denies new masses or adenopathy.  No unusual aches or pains.  He denies any issues from his Port-A-Cath.  He reports normal bowel habits.    Oncology/Hematology History Overview Note   9/30/2019 High rectal sqau-nqxufotslm-zyhcosjhqelwqm adenocarcinoma  associated with tubular adenoma.   4/27/21 Biopsy of liver revealed metastatic adenocarcinoma, consistent with colorectal primary.    Clinical Staging  Stage IIa ([id number])    Chemotherapy      neoadjuvant Xeloda with radiation. adjuvant xeloda        Radiation Therapy      7/8/19 thru 8/14/19 completed neoadjuvant 5040 cGy=28 fxs rectum     10/2021 XRT due to recurrence right middle lung 5 Fx's, 5000cGy  11/26/21 adjuvant FOLFOX -2/25/2022    Surgeries       9/30/2019 low anterior resection with ostomy        8/31/21 partial hepatectomy, cholecystectomy and MWA for segment 6 medical margin performed at     2/8/2023 CT Scans revealed Worsening pulmonary metastatic disease.    2/21/2023 orders written for FOLFIRI + AVASTIN       Rectal cancer   6/2/2021 - 8/15/2021 Chemotherapy    OP COLON mFOLFOX6 + Bevacizumab (OXALIplatin / Leucovorin / Fluorouracil / Bevacizumab)     6/18/2021 Initial Diagnosis    Rectal cancer (CMS/HCC)     6/21/2021 Cancer Staged    Staging form: Colon And Rectum, AJCC 8th Edition  - Clinical: Stage IIA (cT3, cN0, cM0) - Signed by Maurilio Campos MD on 6/21/2021 11/16/2021 - 2/25/2022 Chemotherapy    OP COLON mFOLFOX6 OXALIplatin / Leucovorin / Fluorouracil     2/28/2023 -  Chemotherapy    OP COLORECTAL FOLFIRI + Bevacizumab 5mg/kg  (Irinotecan / Leucovorin / Fluorouracil / Bevacizumab)     Secondary malignant neoplasm of left lung   9/14/2021 Initial Diagnosis    Secondary malignant neoplasm of left lung (HCC)     10/20/2021 -  Radiation    RADIATION THERAPY Treatment Details (Noted on 10/5/2021)  Site: Bilateral Lung  Technique: SBRT  Goal: No goal specified  Planned Treatment Start Date: 10/20/2021     11/16/2021 - 2/25/2022 Chemotherapy    OP COLON mFOLFOX6 OXALIplatin / Leucovorin / Fluorouracil     6/15/2022 -  Radiation    RADIATION THERAPY Treatment Details (Noted on 6/15/2022)  Site: Left Lung - Lower lobe  Technique: SBRT  Goal: No goal specified  Planned Treatment Start Date: No planned start date specified     10/25/2022 -  Radiation    RADIATION THERAPY Treatment Details (Noted on 10/25/2022)  Site: Left Lung  Technique: SBRT  Goal: No goal specified  Planned Treatment Start Date: No planned start date specified     Secondary malignancy of liver   9/14/2021 Initial Diagnosis    Secondary malignancy of liver (HCC)     11/16/2021 - 2/25/2022 Chemotherapy    OP COLON mFOLFOX6 OXALIplatin / Leucovorin / Fluorouracil     Secondary malignant neoplasm of right lung   10/5/2021 Initial Diagnosis    Secondary malignant neoplasm of right lung (HCC)     10/20/2021 -  Radiation    RADIATION THERAPY Treatment Details (Noted on 10/5/2021)  Site: Bilateral Lung  Technique: SBRT  Goal: No goal specified  Planned Treatment Start Date: 10/20/2021     11/16/2021 - 2/25/2022 Chemotherapy    OP COLON mFOLFOX6 OXALIplatin / Leucovorin / Fluorouracil         Review of Systems   Constitutional:  Negative for appetite change, diaphoresis, fatigue, fever, unexpected weight gain and unexpected weight loss.   HENT:  Negative for hearing loss, mouth sores, sore throat, swollen glands, trouble swallowing and voice change.    Eyes:  Negative for blurred vision.   Respiratory:  Negative for cough, shortness of breath and wheezing.    Cardiovascular:  Negative for  chest pain and palpitations.   Gastrointestinal:  Negative for abdominal pain, blood in stool, constipation, diarrhea, nausea and vomiting.   Endocrine: Negative for cold intolerance and heat intolerance.   Genitourinary:  Negative for difficulty urinating, dysuria, frequency, hematuria and urinary incontinence.   Musculoskeletal:  Negative for arthralgias, back pain and myalgias.   Skin:  Negative for rash, skin lesions and wound.   Neurological:  Negative for dizziness, seizures, weakness, numbness and headache.   Hematological:  Does not bruise/bleed easily.   Psychiatric/Behavioral:  Negative for depressed mood. The patient is not nervous/anxious.      Current Outpatient Medications on File Prior to Visit   Medication Sig Dispense Refill    acetaminophen (TYLENOL) 500 MG tablet Take 1 tablet by mouth Every 6 (Six) Hours As Needed.      apixaban (ELIQUIS) 5 MG tablet tablet Take 1 tablet by mouth 2 (Two) Times a Day.      ascorbic acid (VITAMIN C) 500 MG tablet Take 1 tablet by mouth Daily.      atorvastatin (LIPITOR) 40 MG tablet atorvastatin 40 mg oral tablet take 1 tablet (40 mg) by oral route once daily   Active      cyanocobalamin (VITAMIN B-12) 1000 MCG tablet Take 1 tablet by mouth Daily.      furosemide (LASIX) 20 MG tablet Take 1 tablet by mouth Daily. 90 tablet 3    HYDROcodone-acetaminophen (NORCO) 5-325 MG per tablet Take 1 tablet by mouth Every 6 (Six) Hours As Needed for Moderate Pain. 120 tablet 0    loratadine (CLARITIN) 10 MG tablet loratadine 10 mg oral tablet take 1 tablet (10 mg) by oral route once daily   Active      metoprolol tartrate (LOPRESSOR) 100 MG tablet Take 1 tablet by mouth 2 (Two) Times a Day. 180 tablet 3    montelukast (SINGULAIR) 10 MG tablet 1 tablet.      multivitamin (THERAGRAN) tablet tablet Take 1 tablet by mouth Daily.      ondansetron (ZOFRAN) 8 MG tablet Take 1 tablet by mouth 3 (Three) Times a Day As Needed for Nausea or Vomiting. 30 tablet 5    Synthroid 25 MCG tablet  Take 1 tablet by mouth Daily.      verapamil SR (CALAN-SR) 180 MG CR tablet Take 1 tablet by mouth Every Night. 90 tablet 3    vitamin E 1000 UNIT capsule Take 1 capsule by mouth Daily.      diclofenac (VOLTAREN) 50 MG EC tablet  (Patient not taking: Reported on 2023)      fluticasone (FLONASE) 50 MCG/ACT nasal spray  (Patient not taking: Reported on 2023)      MAGIC MOUTHWASH W/NYSTATIN  Swish and swallow 10 mL 4 (Four) Times a Day As Needed for Mouth Pain. (Patient not taking: Reported on 2023) 500 mL 1    potassium chloride (K-DUR,KLOR-CON) 20 MEQ CR tablet Take 1 tablet by mouth Daily. (Patient not taking: Reported on 2023) 90 tablet 1     Current Facility-Administered Medications on File Prior to Visit   Medication Dose Route Frequency Provider Last Rate Last Admin    [] heparin 100 UNIT/ML injection  - ADS Override Pull                No Known Allergies  Past Medical History:   Diagnosis Date    Asthma     Atrial fibrillation, persistent 2021    Chronic heart failure with preserved ejection fraction 2022    BNP 1590 on  Echocardiogram with EF 55% moderate to severe biatrial enlargement and mild right ventricular enlargement      Colorectal cancer     Essential hypertension     GI cancer     Hepatitis     History of DVT of lower extremity 2021    Hyperlipidemia LDL goal <100 2020    Rectal cancer 2021    Secondary malignancy of liver 2021    Secondary malignant neoplasm of left lung 2021    Secondary malignant neoplasm of right lung 10/5/2021    Thrombocytopenia 2022    Thyroid disease     Viral upper respiratory tract infection 2023     Past Surgical History:   Procedure Laterality Date    COLON SURGERY      HERNIA REPAIR      LIVER BIOPSY      21 Biopsy of liver revealed metastatic adenocarcinoma, consistent with colorectal primary    OTHER SURGICAL HISTORY      REMOVED CANCER FROM COLON     Social History      Socioeconomic History    Marital status:     Number of children: 2   Tobacco Use    Smoking status: Never    Smokeless tobacco: Never   Vaping Use    Vaping Use: Never used   Substance and Sexual Activity    Alcohol use: Yes     Comment: occasionally, no recent use    Drug use: Never    Sexual activity: Defer     Family History   Problem Relation Age of Onset    Prostate cancer Father     Heart murmur Mother     Diabetes Mother     Colon cancer Maternal Uncle        Objective   Physical Exam  Vitals reviewed. Exam conducted with a chaperone present.   Constitutional:       General: He is not in acute distress.     Appearance: Normal appearance.   Cardiovascular:      Rate and Rhythm: Normal rate and regular rhythm.      Heart sounds: Normal heart sounds. No murmur heard.     No gallop.   Pulmonary:      Effort: Pulmonary effort is normal.      Breath sounds: Normal breath sounds.      Comments: Port-A-Cath  Abdominal:      General: Abdomen is flat. Bowel sounds are normal.      Palpations: Abdomen is soft.   Musculoskeletal:      Right lower leg: No edema.      Left lower leg: No edema.   Neurological:      Mental Status: He is alert and oriented to person, place, and time.   Psychiatric:         Mood and Affect: Mood normal.         Behavior: Behavior normal.         Vitals:    11/07/23 1350   BP: 145/98   Pulse: 85   Resp: 16   Temp: 98 °F (36.7 °C)   TempSrc: Temporal   SpO2: 99%   Weight: 128 kg (282 lb 13.6 oz)   PainSc: 0-No pain     ECOG score: 0         PHQ-9 Total Score:                    Result Review :   The following data was reviewed by: Maurilio Campos MD on 11/07/2023:  Lab Results   Component Value Date    HGB 13.6 11/06/2023    HCT 42.2 11/06/2023    MCV 98.6 (H) 11/06/2023     11/06/2023    WBC 6.51 11/06/2023    NEUTROABS 4.06 11/06/2023    LYMPHSABS 1.62 11/06/2023    MONOSABS 0.54 11/06/2023    EOSABS 0.23 11/06/2023    BASOSABS 0.03 11/06/2023     Lab Results   Component  Value Date    GLUCOSE 120 (H) 11/06/2023    BUN 10 11/06/2023    CREATININE 0.79 11/06/2023     11/06/2023    K 4.0 11/06/2023     11/06/2023    CO2 28.5 11/06/2023    CALCIUM 8.8 11/06/2023    PROTEINTOT 6.6 11/06/2023    ALBUMIN 4.0 11/06/2023    BILITOT 0.9 11/06/2023    ALKPHOS 65 11/06/2023    AST 25 11/06/2023    ALT 20 11/06/2023     Lab Results   Component Value Date    MG 1.8 (L) 09/07/2021    FREET4 0.98 06/27/2023    TSH 3.250 02/24/2021     Lab Results   Component Value Date    IRON 60 (L) 03/04/2020    LABIRON 20 03/04/2020    TRANSFERRIN 215.00 03/04/2020    TIBC 307 03/04/2020     Lab Results   Component Value Date    FERRITIN 140 03/04/2020    [id number] >2000 (H) 03/04/2020    FOLATE 19.4 03/04/2020     Lab Results   Component Value Date    PSA 0.91 02/24/2021    CEA 4.20 06/20/2023       Data reviewed : Radiologic studies CT chest, abdomen, pelvis reviewed     Assessment and Plan    Diagnoses and all orders for this visit:    1. Rectal cancer (Primary)  Assessment & Plan:  Metastatic.  Patient is currently on surveillance.  He is feeling well.  I see no evidence of disease recurrence by his history, physical examination, lab work or scans.  I will continue surveillance with consideration of additional therapy at the time of disease recrudescence.  I will see him back in 3 months for ongoing surveillance with lab work and scans prior.  Port flush routinely while not in active use.    Orders:  -     CBC & Differential; Future  -     Comprehensive Metabolic Panel; Future  -     CEA; Future  -     CT chest w contrast; Future  -     CT abdomen pelvis w contrast; Future            Patient Follow Up: 3 months    Patient was given instructions and counseling regarding his condition or for health maintenance advice. Please see specific information pulled into the AVS if appropriate.     Maurilio Campos MD    11/7/2023

## 2023-11-07 NOTE — ASSESSMENT & PLAN NOTE
Metastatic.  Patient is currently on surveillance.  He is feeling well.  I see no evidence of disease recurrence by his history, physical examination, lab work or scans.  I will continue surveillance with consideration of additional therapy at the time of disease recrudescence.  I will see him back in 3 months for ongoing surveillance with lab work and scans prior.  Port flush routinely while not in active use.

## 2023-12-22 ENCOUNTER — HOSPITAL ENCOUNTER (OUTPATIENT)
Dept: ONCOLOGY | Facility: HOSPITAL | Age: 68
Discharge: HOME | End: 2023-12-22
Payer: MEDICARE

## 2023-12-22 DIAGNOSIS — Z45.2 ENCOUNTER FOR ADJUSTMENT OR MANAGEMENT OF VASCULAR ACCESS DEVICE: Primary | ICD-10-CM

## 2023-12-22 PROCEDURE — 96523 IRRIG DRUG DELIVERY DEVICE: CPT

## 2023-12-22 PROCEDURE — 25010000002 HEPARIN LOCK FLUSH PER 10 UNITS: Performed by: INTERNAL MEDICINE

## 2023-12-22 RX ORDER — HEPARIN SODIUM (PORCINE) LOCK FLUSH IV SOLN 100 UNIT/ML 100 UNIT/ML
500 SOLUTION INTRAVENOUS AS NEEDED
Status: DISCONTINUED | OUTPATIENT
Start: 2023-12-22 | End: 2023-12-23 | Stop reason: HOSPADM

## 2023-12-22 RX ORDER — SODIUM CHLORIDE 0.9 % (FLUSH) 0.9 %
20 SYRINGE (ML) INJECTION AS NEEDED
OUTPATIENT
Start: 2023-12-22

## 2023-12-22 RX ORDER — HEPARIN SODIUM (PORCINE) LOCK FLUSH IV SOLN 100 UNIT/ML 100 UNIT/ML
500 SOLUTION INTRAVENOUS AS NEEDED
OUTPATIENT
Start: 2023-12-22

## 2023-12-22 RX ORDER — SODIUM CHLORIDE 0.9 % (FLUSH) 0.9 %
20 SYRINGE (ML) INJECTION AS NEEDED
Status: DISCONTINUED | OUTPATIENT
Start: 2023-12-22 | End: 2023-12-23 | Stop reason: HOSPADM

## 2023-12-22 RX ADMIN — Medication 20 ML: at 14:38

## 2023-12-22 RX ADMIN — HEPARIN SODIUM (PORCINE) LOCK FLUSH IV SOLN 100 UNIT/ML 500 UNITS: 100 SOLUTION at 14:38

## 2024-02-05 ENCOUNTER — HOSPITAL ENCOUNTER (OUTPATIENT)
Dept: CT IMAGING | Facility: HOSPITAL | Age: 69
Discharge: HOME | End: 2024-02-05
Admitting: INTERNAL MEDICINE
Payer: MEDICARE

## 2024-02-05 DIAGNOSIS — C20 RECTAL CANCER: ICD-10-CM

## 2024-02-05 LAB
ALBUMIN SERPL-MCNC: 3.8 G/DL (ref 3.5–5.2)
ALBUMIN/GLOB SERPL: 1.5 G/DL
ALP SERPL-CCNC: 75 U/L (ref 39–117)
ALT SERPL W P-5'-P-CCNC: 17 U/L (ref 1–41)
ANION GAP SERPL CALCULATED.3IONS-SCNC: 8.9 MMOL/L (ref 5–15)
AST SERPL-CCNC: 19 U/L (ref 1–40)
BASOPHILS # BLD AUTO: 0.03 10*3/MM3 (ref 0–0.2)
BASOPHILS NFR BLD AUTO: 0.5 % (ref 0–1.5)
BILIRUB SERPL-MCNC: 0.8 MG/DL (ref 0–1.2)
BUN SERPL-MCNC: 12 MG/DL (ref 8–23)
BUN/CREAT SERPL: 12.6 (ref 7–25)
CALCIUM SPEC-SCNC: 9 MG/DL (ref 8.6–10.5)
CEA SERPL-MCNC: 11.3 NG/ML
CHLORIDE SERPL-SCNC: 104 MMOL/L (ref 98–107)
CO2 SERPL-SCNC: 26.1 MMOL/L (ref 22–29)
CREAT BLDA-MCNC: 1 MG/DL (ref 0.6–1.3)
CREAT SERPL-MCNC: 0.95 MG/DL (ref 0.76–1.27)
DEPRECATED RDW RBC AUTO: 47 FL (ref 37–54)
EGFRCR SERPLBLD CKD-EPI 2021: 82.5 ML/MIN/1.73
EGFRCR SERPLBLD CKD-EPI 2021: 87.7 ML/MIN/1.73
EOSINOPHIL # BLD AUTO: 0.18 10*3/MM3 (ref 0–0.4)
EOSINOPHIL NFR BLD AUTO: 2.7 % (ref 0.3–6.2)
ERYTHROCYTE [DISTWIDTH] IN BLOOD BY AUTOMATED COUNT: 13.7 % (ref 12.3–15.4)
GLOBULIN UR ELPH-MCNC: 2.6 GM/DL
GLUCOSE SERPL-MCNC: 117 MG/DL (ref 65–99)
HCT VFR BLD AUTO: 42.2 % (ref 37.5–51)
HGB BLD-MCNC: 13.7 G/DL (ref 13–17.7)
IMM GRANULOCYTES # BLD AUTO: 0.03 10*3/MM3 (ref 0–0.05)
IMM GRANULOCYTES NFR BLD AUTO: 0.5 % (ref 0–0.5)
LYMPHOCYTES # BLD AUTO: 1.47 10*3/MM3 (ref 0.7–3.1)
LYMPHOCYTES NFR BLD AUTO: 22.4 % (ref 19.6–45.3)
MCH RBC QN AUTO: 30.3 PG (ref 26.6–33)
MCHC RBC AUTO-ENTMCNC: 32.5 G/DL (ref 31.5–35.7)
MCV RBC AUTO: 93.4 FL (ref 79–97)
MONOCYTES # BLD AUTO: 0.5 10*3/MM3 (ref 0.1–0.9)
MONOCYTES NFR BLD AUTO: 7.6 % (ref 5–12)
NEUTROPHILS NFR BLD AUTO: 4.36 10*3/MM3 (ref 1.7–7)
NEUTROPHILS NFR BLD AUTO: 66.3 % (ref 42.7–76)
NRBC BLD AUTO-RTO: 0 /100 WBC (ref 0–0.2)
PLATELET # BLD AUTO: 234 10*3/MM3 (ref 140–450)
PMV BLD AUTO: 9.1 FL (ref 6–12)
POTASSIUM SERPL-SCNC: 3.9 MMOL/L (ref 3.5–5.2)
PROT SERPL-MCNC: 6.4 G/DL (ref 6–8.5)
RBC # BLD AUTO: 4.52 10*6/MM3 (ref 4.14–5.8)
SODIUM SERPL-SCNC: 139 MMOL/L (ref 136–145)
WBC NRBC COR # BLD AUTO: 6.57 10*3/MM3 (ref 3.4–10.8)

## 2024-02-05 PROCEDURE — 80053 COMPREHEN METABOLIC PANEL: CPT | Performed by: INTERNAL MEDICINE

## 2024-02-05 PROCEDURE — 71260 CT THORAX DX C+: CPT

## 2024-02-05 PROCEDURE — 82378 CARCINOEMBRYONIC ANTIGEN: CPT | Performed by: INTERNAL MEDICINE

## 2024-02-05 PROCEDURE — 82565 ASSAY OF CREATININE: CPT

## 2024-02-05 PROCEDURE — 74177 CT ABD & PELVIS W/CONTRAST: CPT

## 2024-02-05 PROCEDURE — 25510000001 IOPAMIDOL PER 1 ML: Performed by: INTERNAL MEDICINE

## 2024-02-05 PROCEDURE — 85025 COMPLETE CBC W/AUTO DIFF WBC: CPT | Performed by: INTERNAL MEDICINE

## 2024-02-05 RX ORDER — HEPARIN SODIUM (PORCINE) LOCK FLUSH IV SOLN 100 UNIT/ML 100 UNIT/ML
SOLUTION INTRAVENOUS
Status: DISCONTINUED
Start: 2024-02-05 | End: 2024-02-06 | Stop reason: HOSPADM

## 2024-02-05 RX ADMIN — IOPAMIDOL 100 ML: 755 INJECTION, SOLUTION INTRAVENOUS at 13:39

## 2024-02-07 ENCOUNTER — OFFICE VISIT (OUTPATIENT)
Dept: ONCOLOGY | Facility: HOSPITAL | Age: 69
End: 2024-02-07
Payer: MEDICARE

## 2024-02-07 VITALS
OXYGEN SATURATION: 96 % | WEIGHT: 289.9 LBS | RESPIRATION RATE: 18 BRPM | HEIGHT: 76 IN | SYSTOLIC BLOOD PRESSURE: 138 MMHG | BODY MASS INDEX: 35.3 KG/M2 | DIASTOLIC BLOOD PRESSURE: 88 MMHG | TEMPERATURE: 97 F | HEART RATE: 80 BPM

## 2024-02-07 DIAGNOSIS — C20 RECTAL CANCER: Primary | ICD-10-CM

## 2024-02-07 PROBLEM — N52.9 MALE ERECTILE DISORDER: Status: ACTIVE | Noted: 2023-12-11

## 2024-02-07 PROBLEM — N52.9 MALE ERECTILE DISORDER: Status: RESOLVED | Noted: 2023-12-11 | Resolved: 2024-02-07

## 2024-02-07 PROBLEM — E87.6 HYPOKALEMIA: Status: RESOLVED | Noted: 2021-07-19 | Resolved: 2024-02-07

## 2024-02-07 PROBLEM — R06.09 DOE (DYSPNEA ON EXERTION): Status: RESOLVED | Noted: 2022-07-13 | Resolved: 2024-02-07

## 2024-02-07 PROBLEM — J06.9 VIRAL UPPER RESPIRATORY TRACT INFECTION: Status: RESOLVED | Noted: 2023-04-24 | Resolved: 2024-02-07

## 2024-02-07 PROCEDURE — G0463 HOSPITAL OUTPT CLINIC VISIT: HCPCS | Performed by: INTERNAL MEDICINE

## 2024-02-07 RX ORDER — NALOXONE HYDROCHLORIDE 4 MG/.1ML
SPRAY NASAL
COMMUNITY
Start: 2023-08-01 | End: 2024-07-31

## 2024-02-07 NOTE — PROGRESS NOTES
Chief Complaint  Rectal Cancer    Eri Moran APRN Reinberg, Emily, APRN Subjective          Robbi Kennedy presents to [facility]EM[facility] for follow-up of his rectal cancer.  He is status post treatments as outlined, currently on observation.  He continues to report fatigue which is unchanged from previous.  He also has significant neuropathy in his hands and feet which is stable.  He denies new masses or adenopathy.  No unusual aches or pains.  No issues from his Port-A-Cath.    Oncology/Hematology History Overview Note   9/30/2019 High rectal coat-fxlnrwztzg-wlvjrmbzszcjem adenocarcinoma  associated with tubular adenoma.   4/27/21 Biopsy of liver revealed metastatic adenocarcinoma, consistent with colorectal primary.    Clinical Staging  Stage IIa ([id number])    Chemotherapy      neoadjuvant Xeloda with radiation. adjuvant xeloda        Radiation Therapy      7/8/19 thru 8/14/19 completed neoadjuvant 5040 cGy=28 fxs rectum     10/2021 XRT due to recurrence right middle lung 5 Fx's, 5000cGy  11/26/21 adjuvant FOLFOX -2/25/2022    Surgeries       9/30/2019 low anterior resection with ostomy        8/31/21 partial hepatectomy, cholecystectomy and MWA for segment 6 medical margin performed at     2/8/2023 CT Scans revealed Worsening pulmonary metastatic disease.    2/21/2023 orders written for FOLFIRI + AVASTIN       Rectal cancer   6/2/2021 - 8/15/2021 Chemotherapy    OP COLON mFOLFOX6 + Bevacizumab (OXALIplatin / Leucovorin / Fluorouracil / Bevacizumab)     6/18/2021 Initial Diagnosis    Rectal cancer (CMS/HCC)     6/21/2021 Cancer Staged    Staging form: Colon And Rectum, AJCC 8th Edition  - Clinical: Stage IIA (cT3, cN0, cM0) - Signed by Maurilio Campos MD on 6/21/2021 11/16/2021 - 2/25/2022 Chemotherapy    OP COLON mFOLFOX6 OXALIplatin / Leucovorin / Fluorouracil     2/28/2023 -  Chemotherapy    OP COLORECTAL FOLFIRI + Bevacizumab 5mg/kg (Irinotecan / Leucovorin  / Fluorouracil / Bevacizumab)     Secondary malignant neoplasm of left lung   9/14/2021 Initial Diagnosis    Secondary malignant neoplasm of left lung (HCC)     10/20/2021 -  Radiation    RADIATION THERAPY Treatment Details (Noted on 10/5/2021)  Site: Bilateral Lung  Technique: SBRT  Goal: No goal specified  Planned Treatment Start Date: 10/20/2021     11/16/2021 - 2/25/2022 Chemotherapy    OP COLON mFOLFOX6 OXALIplatin / Leucovorin / Fluorouracil     6/15/2022 -  Radiation    RADIATION THERAPY Treatment Details (Noted on 6/15/2022)  Site: Left Lung - Lower lobe  Technique: SBRT  Goal: No goal specified  Planned Treatment Start Date: No planned start date specified     10/25/2022 -  Radiation    RADIATION THERAPY Treatment Details (Noted on 10/25/2022)  Site: Left Lung  Technique: SBRT  Goal: No goal specified  Planned Treatment Start Date: No planned start date specified     Secondary malignancy of liver   9/14/2021 Initial Diagnosis    Secondary malignancy of liver (HCC)     11/16/2021 - 2/25/2022 Chemotherapy    OP COLON mFOLFOX6 OXALIplatin / Leucovorin / Fluorouracil     Secondary malignant neoplasm of right lung   10/5/2021 Initial Diagnosis    Secondary malignant neoplasm of right lung (HCC)     10/20/2021 -  Radiation    RADIATION THERAPY Treatment Details (Noted on 10/5/2021)  Site: Bilateral Lung  Technique: SBRT  Goal: No goal specified  Planned Treatment Start Date: 10/20/2021     11/16/2021 - 2/25/2022 Chemotherapy    OP COLON mFOLFOX6 OXALIplatin / Leucovorin / Fluorouracil         Review of Systems   Constitutional:  Positive for fatigue. Negative for appetite change, diaphoresis, fever, unexpected weight gain and unexpected weight loss.   HENT:  Negative for hearing loss, mouth sores, sore throat, swollen glands, trouble swallowing and voice change.    Eyes:  Negative for blurred vision.   Respiratory:  Positive for shortness of breath. Negative for cough and wheezing.    Cardiovascular:  Negative  for chest pain and palpitations.   Gastrointestinal:  Negative for abdominal pain, blood in stool, constipation, diarrhea, nausea and vomiting.   Endocrine: Negative for cold intolerance and heat intolerance.   Genitourinary:  Negative for difficulty urinating, dysuria, frequency, hematuria and urinary incontinence.   Musculoskeletal:  Negative for arthralgias, back pain and myalgias.   Skin:  Negative for rash, skin lesions and wound.   Neurological:  Negative for dizziness, seizures, weakness, numbness and headache.   Hematological:  Does not bruise/bleed easily.   Psychiatric/Behavioral:  Negative for depressed mood. The patient is not nervous/anxious.      Current Outpatient Medications on File Prior to Visit   Medication Sig Dispense Refill    acetaminophen (TYLENOL) 500 MG tablet Take 1 tablet by mouth Every 6 (Six) Hours As Needed.      apixaban (ELIQUIS) 5 MG tablet tablet Take 1 tablet by mouth 2 (Two) Times a Day.      ascorbic acid (VITAMIN C) 500 MG tablet Take 1 tablet by mouth Daily.      atorvastatin (LIPITOR) 40 MG tablet atorvastatin 40 mg oral tablet take 1 tablet (40 mg) by oral route once daily   Active      cyanocobalamin (VITAMIN B-12) 1000 MCG tablet Take 1 tablet by mouth Daily.      furosemide (LASIX) 20 MG tablet Take 1 tablet by mouth Daily. 90 tablet 3    loratadine (CLARITIN) 10 MG tablet loratadine 10 mg oral tablet take 1 tablet (10 mg) by oral route once daily   Active      metoprolol tartrate (LOPRESSOR) 100 MG tablet Take 1 tablet by mouth 2 (Two) Times a Day. 180 tablet 3    multivitamin (THERAGRAN) tablet tablet Take 1 tablet by mouth Daily.      Synthroid 25 MCG tablet Take 1 tablet by mouth Daily.      verapamil SR (CALAN-SR) 180 MG CR tablet Take 1 tablet by mouth Every Night. 90 tablet 3    vitamin E 1000 UNIT capsule Take 1 capsule by mouth Daily.      diclofenac (VOLTAREN) 50 MG EC tablet       fluticasone (FLONASE) 50 MCG/ACT nasal spray       HYDROcodone-acetaminophen  (NORCO) 5-325 MG per tablet Take 1 tablet by mouth Every 6 (Six) Hours As Needed for Moderate Pain. 120 tablet 0    MAGIC MOUTHWASH W/NYSTATIN 1/1/1/1 Swish and swallow 10 mL 4 (Four) Times a Day As Needed for Mouth Pain. 500 mL 1    montelukast (SINGULAIR) 10 MG tablet 1 tablet.      naloxone (NARCAN) 4 MG/0.1ML nasal spray       ondansetron (ZOFRAN) 8 MG tablet Take 1 tablet by mouth 3 (Three) Times a Day As Needed for Nausea or Vomiting. 30 tablet 5    potassium chloride (K-DUR,KLOR-CON) 20 MEQ CR tablet Take 1 tablet by mouth Daily. 90 tablet 1     No current facility-administered medications on file prior to visit.       No Known Allergies  Past Medical History:   Diagnosis Date    Asthma     Atrial fibrillation, persistent 2/26/2021    Chronic heart failure with preserved ejection fraction 7/18/2022    BNP 1590 on 7/22 Echocardiogram with EF 55% moderate to severe biatrial enlargement and mild right ventricular enlargement 7/22     Colorectal cancer     Essential hypertension     GI cancer     Hepatitis     History of DVT of lower extremity 8/27/2021    Hyperlipidemia LDL goal <100 12/31/2020    Rectal cancer 6/18/2021    Secondary malignancy of liver 9/14/2021    Secondary malignant neoplasm of left lung 9/14/2021    Secondary malignant neoplasm of right lung 10/5/2021    Thrombocytopenia 1/11/2022    Thyroid disease     Viral upper respiratory tract infection 4/24/2023     Past Surgical History:   Procedure Laterality Date    COLON SURGERY      HERNIA REPAIR      LIVER BIOPSY      4/27/21 Biopsy of liver revealed metastatic adenocarcinoma, consistent with colorectal primary    OTHER SURGICAL HISTORY      REMOVED CANCER FROM COLON     Social History     Socioeconomic History    Marital status:     Number of children: 2   Tobacco Use    Smoking status: Never    Smokeless tobacco: Never   Vaping Use    Vaping Use: Never used   Substance and Sexual Activity    Alcohol use: Yes     Alcohol/week: 3.0  "standard drinks of alcohol     Types: 3 Cans of beer per week     Comment: 2-3 beers weekly    Drug use: Never    Sexual activity: Defer     Family History   Problem Relation Age of Onset    Prostate cancer Father     Heart murmur Mother     Diabetes Mother     Colon cancer Maternal Uncle        Objective   Physical Exam  Vitals reviewed. Exam conducted with a chaperone present.   Constitutional:       General: He is not in acute distress.     Appearance: Normal appearance.   Cardiovascular:      Rate and Rhythm: Normal rate and regular rhythm.      Heart sounds: Normal heart sounds. No murmur heard.     No gallop.   Pulmonary:      Effort: Pulmonary effort is normal.      Breath sounds: Normal breath sounds.      Comments: Port-A-Cath  Abdominal:      General: Abdomen is flat. Bowel sounds are normal.      Palpations: Abdomen is soft.   Musculoskeletal:      Right lower leg: No edema.      Left lower leg: No edema.   Neurological:      Mental Status: He is alert and oriented to person, place, and time.   Psychiatric:         Mood and Affect: Mood normal.         Behavior: Behavior normal.         Vitals:    02/07/24 0925   BP: 138/88   Pulse: 80   Resp: 18   Temp: 97 °F (36.1 °C)   SpO2: 96%   Weight: 132 kg (289 lb 14.5 oz)   Height: 193 cm (76\")   PainSc: 0-No pain     ECOG score: 0         PHQ-9 Total Score:                    Result Review :   The following data was reviewed by: Maurilio Campos MD on 02/07/2024:  Lab Results   Component Value Date    HGB 13.7 02/05/2024    HCT 42.2 02/05/2024    MCV 93.4 02/05/2024     02/05/2024    WBC 6.57 02/05/2024    NEUTROABS 4.36 02/05/2024    LYMPHSABS 1.47 02/05/2024    MONOSABS 0.50 02/05/2024    EOSABS 0.18 02/05/2024    BASOSABS 0.03 02/05/2024     Lab Results   Component Value Date    GLUCOSE 117 (H) 02/05/2024    BUN 12 02/05/2024    CREATININE 0.95 02/05/2024     02/05/2024    K 3.9 02/05/2024     02/05/2024    CO2 26.1 02/05/2024    CALCIUM " 9.0 02/05/2024    PROTEINTOT 6.4 02/05/2024    ALBUMIN 3.8 02/05/2024    BILITOT 0.8 02/05/2024    ALKPHOS 75 02/05/2024    AST 19 02/05/2024    ALT 17 02/05/2024     Lab Results   Component Value Date    MG 1.8 (L) 09/07/2021    FREET4 0.81 12/12/2023    TSH 3.250 02/24/2021     Lab Results   Component Value Date    IRON 60 (L) 03/04/2020    LABIRON 20 03/04/2020    TRANSFERRIN 215.00 03/04/2020    TIBC 307 03/04/2020     Lab Results   Component Value Date    FERRITIN 140 03/04/2020    QMPNUZUT98 >2000 (H) 03/04/2020    FOLATE 19.4 03/04/2020     Lab Results   Component Value Date    PSA 0.92 12/12/2023    CEA 11.30 02/05/2024       Data reviewed : Radiologic studies CT images of the chest, abdomen, pelvis reviewed .  CT chest demonstrates 5 new pulmonary nodules bilateral consistent with metastatic/progression of his disease.     Assessment and Plan    Diagnoses and all orders for this visit:    1. Rectal cancer [C20] (Primary)  Assessment & Plan:  Patient is currently on surveillance for his metastatic rectal cancer.  Unfortunately, restaging imaging demonstrates 5 new pulmonary nodules.  I reviewed the images.  He continues to have a lot of fatigue and residual neuropathy from prior treatments.  He has had prior radiation to isolated pulmonary nodules.  We discussed potential options for treatment which could include continued observation given his lack of symptoms, stereotactic radiation if felt appropriate by radiation oncology, resuming traditional chemotherapy as he had in the past, third line treatments as recommended by [facility] such as Stelara, Lonsurf, fruquintinib which is a newer agent, clinical trials.  Patient is very hesitant about considering additional chemotherapy given his continued residual side effects.  I will refer him back to Dr. Kelley to see if radiation would be appropriate.  He is willing to consider clinical trials and will be referred to [facility] for medical oncology opinion/evaluation  clinical trials.  I will see him back in the near future to review the above and finalize treatment planning.    Orders:  -     Ambulatory Referral to Radiation Oncology  -     Ambulatory Referral to Oncology            Patient Follow Up: 2 to 3 weeks    Patient was given instructions and counseling regarding his condition or for health maintenance advice. Please see specific information pulled into the AVS if appropriate.     Maurilio Campos MD    2/8/2024

## 2024-02-07 NOTE — PROGRESS NOTES
Chief Complaint  Atrial Fibrillation (Follow up )    Subjective            History of Present Illness  Robbi Kennedy is a 68-year-old male patient who presents to the office today for follow-up.  He has atrial fibrillation, chronic HFpEF, hypertension, and hyperlipidemia.  He is compliant with medication.  He reports occasional lightheadedness with standing too quickly which is typically brief in nature and resolves on its own. He has edema in bilateral lower extremities, has improved some with lasix but room for improvement. He wears compression socks regularly. He denies any chest pain, new or worsening shortness of breath, or palpitations.    [facility]  Past Medical History:   Diagnosis Date    Asthma     Atrial fibrillation, persistent 2/26/2021    Chronic heart failure with preserved ejection fraction 7/18/2022    BNP 1590 on 7/22 Echocardiogram with EF 55% moderate to severe biatrial enlargement and mild right ventricular enlargement 7/22     Colorectal cancer     Essential hypertension     GI cancer     Hepatitis     History of DVT of lower extremity 8/27/2021    Hyperlipidemia LDL goal <100 12/31/2020    Rectal cancer 6/18/2021    Secondary malignancy of liver 9/14/2021    Secondary malignant neoplasm of left lung 9/14/2021    Secondary malignant neoplasm of right lung 10/5/2021    Thrombocytopenia 1/11/2022    Thyroid disease     Viral upper respiratory tract infection 4/24/2023         ALLERGY  No Known Allergies       SURGICALHX  Past Surgical History:   Procedure Laterality Date    COLON SURGERY      HERNIA REPAIR      LIVER BIOPSY      4/27/21 Biopsy of liver revealed metastatic adenocarcinoma, consistent with colorectal primary    OTHER SURGICAL HISTORY      REMOVED CANCER FROM COLON          SOC  Social History     Socioeconomic History    Marital status:     Number of children: 2   Tobacco Use    Smoking status: Never    Smokeless tobacco: Never   Vaping Use    Vaping Use: Never used   Substance  and Sexual Activity    Alcohol use: Yes     Alcohol/week: 3.0 standard drinks of alcohol     Types: 3 Cans of beer per week     Comment: 2-3 beers weekly    Drug use: Never    Sexual activity: Defer         FAMHX  Family History   Problem Relation Age of Onset    Prostate cancer Father     Heart murmur Mother     Diabetes Mother     Colon cancer Maternal Uncle           MEDSIGONLPOWER  Current Outpatient Medications on File Prior to Visit   Medication Sig    acetaminophen (TYLENOL) 500 MG tablet Take 1 tablet by mouth Every 6 (Six) Hours As Needed.    apixaban (ELIQUIS) 5 MG tablet tablet Take 1 tablet by mouth 2 (Two) Times a Day.    ascorbic acid (VITAMIN C) 500 MG tablet Take 1 tablet by mouth Daily.    atorvastatin (LIPITOR) 40 MG tablet atorvastatin 40 mg oral tablet take 1 tablet (40 mg) by oral route once daily   Active    cyanocobalamin (VITAMIN B-12) 1000 MCG tablet Take 1 tablet by mouth Daily.    diclofenac (VOLTAREN) 50 MG EC tablet     fluticasone (FLONASE) 50 MCG/ACT nasal spray     furosemide (LASIX) 20 MG tablet Take 1 tablet by mouth Daily.    HYDROcodone-acetaminophen (NORCO) 5-325 MG per tablet Take 1 tablet by mouth Every 6 (Six) Hours As Needed for Moderate Pain.    loratadine (CLARITIN) 10 MG tablet loratadine 10 mg oral tablet take 1 tablet (10 mg) by oral route once daily   Active    MAGIC MOUTHWASH W/NYSTATIN 1/1/1/1 Swish and swallow 10 mL 4 (Four) Times a Day As Needed for Mouth Pain.    metoprolol tartrate (LOPRESSOR) 100 MG tablet Take 1 tablet by mouth 2 (Two) Times a Day.    montelukast (SINGULAIR) 10 MG tablet 1 tablet.    multivitamin (THERAGRAN) tablet tablet Take 1 tablet by mouth Daily.    naloxone (NARCAN) 4 MG/0.1ML nasal spray     ondansetron (ZOFRAN) 8 MG tablet Take 1 tablet by mouth 3 (Three) Times a Day As Needed for Nausea or Vomiting.    potassium chloride (K-DUR,KLOR-CON) 20 MEQ CR tablet Take 1 tablet by mouth Daily.    Synthroid 25 MCG tablet Take 1 tablet by mouth Daily.  "   verapamil SR (CALAN-SR) 180 MG CR tablet Take 1 tablet by mouth Every Night.    vitamin E 1000 UNIT capsule Take 1 capsule by mouth Daily.     No current facility-administered medications on file prior to visit.         Objective   /85 (BP Location: Left arm, Patient Position: Sitting, Cuff Size: Adult)   Pulse 69   Ht 193 cm (76\")   Wt 132 kg (290 lb)   BMI 35.30 kg/m²       Physical Exam  Constitutional:       Appearance: He is obese.   HENT:      Head: Normocephalic.   Neck:      Vascular: No carotid bruit.   Cardiovascular:      Rate and Rhythm: Normal rate and regular rhythm.      Pulses: Normal pulses.      Heart sounds: Normal heart sounds. No murmur heard.  Pulmonary:      Effort: Pulmonary effort is normal.      Breath sounds: Normal breath sounds.   Musculoskeletal:      Cervical back: Neck supple.      Right lower leg: No edema.      Left lower leg: No edema.   Skin:     General: Skin is dry.   Neurological:      Mental Status: He is alert and oriented to person, place, and time.   Psychiatric:         Behavior: Behavior normal.       Result Review :   The following data was reviewed by: REGINALDO Nails on 02/08/2024:  proBNP   Date Value Ref Range Status   08/16/2023 1,898.0 (H) 0.0 - 900.0 pg/mL Final     CMP          2/5/2024    13:41   CMP   Glucose 117    BUN 12    Creatinine 0.95    EGFR 87.7    Sodium 139    Potassium 3.9    Chloride 104    Calcium 9.0    Total Protein 6.4    Albumin 3.8    Globulin 2.6    Total Bilirubin 0.8    Alkaline Phosphatase 75    AST (SGOT) 19    ALT (SGPT) 17    Albumin/Globulin Ratio 1.5    BUN/Creatinine Ratio 12.6    Anion Gap 8.9      CBC w/diff          2/5/2024    13:41   CBC w/Diff   WBC 6.57    RBC 4.52    Hemoglobin 13.7    Hematocrit 42.2    MCV 93.4    MCH 30.3    MCHC 32.5    RDW 13.7    Platelets 234    Neutrophil Rel % 66.3    Immature Granulocyte Rel % 0.5    Lymphocyte Rel % 22.4    Monocyte Rel % 7.6    Eosinophil Rel % 2.7    Basophil " "Rel % 0.5       Lab Results   Component Value Date    TSH 3.250 02/24/2021      Lab Results   Component Value Date    FREET4 0.81 12/12/2023      No results found for: \"DDIMERQUANT\"  Magnesium   Date Value Ref Range Status   09/07/2021 1.8 (L) 1.9 - 2.4 mg/dL Final      Digoxin   Date Value Ref Range Status   01/18/2021 0.5 0.5 - 2.0 ng/mL Final      Lab Results   Component Value Date    TROPONINT 19 (H) 09/02/2021    TROPONINT -4 09/02/2021           Results for orders placed in visit on 08/29/23    Adult Transthoracic Echo Complete W/ Cont if Necessary Per Protocol    Interpretation Summary    Left ventricular systolic function is normal. Calculated left ventricular EF = 55.4%    Left ventricular diastolic function was normal.    The left atrial cavity is mildly dilated.    The right atrial cavity is mild to moderately  dilated.    Estimated right ventricular systolic pressure from tricuspid regurgitation is normal (<35 mmHg).      [id number] Score: 3          Assessment and Plan    Diagnoses and all orders for this visit:    1. Atrial fibrillation, persistent (Primary)  Symptomatically stable at this time, rate controlled, continue verapamil 180 mg nightly and metoprolol 100 mg twice daily.  Continue Eliquis for CVA prevention.    2. Chronic heart failure with preserved ejection fraction  Symptomatically stable and euvolemic on exam today, he reports worsening edema at the end of each day.  Increase Lasix dose to 40 mg daily and restart potassium at lower dose every other day.  Check BMP and proBNP in 2 weeks to assess renal function, electrolytes, and CHF.  We talked about fluid and sodium restriction.  -     Basic Metabolic Panel; Future  -     proBNP; Future    3. Essential hypertension  Currently controlled without adverse effects from medication, continue current doses of verapamil and metoprolol.    4. Hyperlipidemia LDL goal <100  Last lipid panel was 12/12/2023 with LDL 65 which is within goal range, " continue atorvastatin 40 mg daily.      Other orders  -     potassium chloride 10 MEQ CR tablet; Take 1 tablet by mouth Every Other Day.  Dispense: 45 tablet; Refill: 3            Follow Up   Return in about 6 months (around 8/8/2024) for Follow up with Dr Madden.    Patient was given instructions and counseling regarding his condition or for health maintenance advice. Please see specific information pulled into the AVS if appropriate.     Robbi JAMARI Kennedy  reports that he has never smoked. He has never used smokeless tobacco.           Nyla Ramírez, APRN  02/08/24  17:43 EST    Dictated Utilizing Dragon Dictation

## 2024-02-08 ENCOUNTER — OFFICE VISIT (OUTPATIENT)
Dept: RADIATION ONCOLOGY | Facility: HOSPITAL | Age: 69
End: 2024-02-08
Payer: MEDICARE

## 2024-02-08 ENCOUNTER — OFFICE VISIT (OUTPATIENT)
Dept: CARDIOLOGY | Facility: CLINIC | Age: 69
End: 2024-02-08
Payer: MEDICARE

## 2024-02-08 VITALS
DIASTOLIC BLOOD PRESSURE: 83 MMHG | OXYGEN SATURATION: 95 % | HEART RATE: 70 BPM | WEIGHT: 290.6 LBS | TEMPERATURE: 98.5 F | SYSTOLIC BLOOD PRESSURE: 148 MMHG | RESPIRATION RATE: 16 BRPM | BODY MASS INDEX: 35.37 KG/M2

## 2024-02-08 VITALS
WEIGHT: 290 LBS | BODY MASS INDEX: 35.31 KG/M2 | HEIGHT: 76 IN | SYSTOLIC BLOOD PRESSURE: 131 MMHG | HEART RATE: 69 BPM | DIASTOLIC BLOOD PRESSURE: 85 MMHG

## 2024-02-08 DIAGNOSIS — I50.32 CHRONIC HEART FAILURE WITH PRESERVED EJECTION FRACTION: ICD-10-CM

## 2024-02-08 DIAGNOSIS — C78.01 SECONDARY MALIGNANT NEOPLASM OF RIGHT LUNG: Primary | ICD-10-CM

## 2024-02-08 DIAGNOSIS — I48.19 ATRIAL FIBRILLATION, PERSISTENT: Primary | Chronic | ICD-10-CM

## 2024-02-08 DIAGNOSIS — I10 ESSENTIAL HYPERTENSION: ICD-10-CM

## 2024-02-08 DIAGNOSIS — C20 RECTAL CANCER: ICD-10-CM

## 2024-02-08 DIAGNOSIS — E78.5 HYPERLIPIDEMIA LDL GOAL <100: ICD-10-CM

## 2024-02-08 PROCEDURE — G0463 HOSPITAL OUTPT CLINIC VISIT: HCPCS | Performed by: RADIOLOGY

## 2024-02-08 RX ORDER — FUROSEMIDE 40 MG/1
40 TABLET ORAL DAILY
Qty: 90 TABLET | Refills: 1 | Status: SHIPPED | OUTPATIENT
Start: 2024-02-08

## 2024-02-08 RX ORDER — POTASSIUM CHLORIDE 750 MG/1
10 TABLET, FILM COATED, EXTENDED RELEASE ORAL EVERY OTHER DAY
Qty: 45 TABLET | Refills: 3 | Status: SHIPPED | OUTPATIENT
Start: 2024-02-08

## 2024-02-08 NOTE — ASSESSMENT & PLAN NOTE
Patient is currently on surveillance for his metastatic rectal cancer.  Unfortunately, restaging imaging demonstrates 5 new pulmonary nodules.  I reviewed the images.  He continues to have a lot of fatigue and residual neuropathy from prior treatments.  He has had prior radiation to isolated pulmonary nodules.  We discussed potential options for treatment which could include continued observation given his lack of symptoms, stereotactic radiation if felt appropriate by radiation oncology, resuming traditional chemotherapy as he had in the past, third line treatments as recommended by [facility] such as Stelara, Lonsurf, fruquintinib which is a newer agent, clinical trials.  Patient is very hesitant about considering additional chemotherapy given his continued residual side effects.  I will refer him back to Dr. Kelley to see if radiation would be appropriate.  He is willing to consider clinical trials and will be referred to [facility] for medical oncology opinion/evaluation clinical trials.  I will see him back in the near future to review the above and finalize treatment planning.

## 2024-02-08 NOTE — PROGRESS NOTES
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 Vasquez - 2019    Chief Complaint:    Chief Complaint   Patient presents with    Follow-up       History of Present Illness: Robbi Kennedy is seen in follow-up today regarding pulmonary metastatic disease.  He reports feeling well overall with no complaints.  He has no changes in breathing since last evaluated.  CT scan of the chest on 2/5/2024 revealed interval development of bilateral pulmonary metastatic disease with a new lobulated mass in the posterior basilar segment of the left lower lobe measuring 1.8 x 2.9 cm.  Additionally, there was a new 1.0 x 0.8 nodule in the lateral basilar segment of the right lower lobe and a 0.6 x 0.4 nodule in the superior segment of the right lower lobe.  A new nodule in the left upper lobe measuring 0.9 x 0.6 cm was appreciated and a left upper lobe lesion measuring 1.3 x 0.9 cm was appreciated.    Subjective      Review of Systems: Review of Systems   Constitutional:  Positive for fatigue. Negative for appetite change.   HENT:  Negative for sore throat and trouble swallowing.    Respiratory:  Positive for shortness of breath. Negative for cough.    Gastrointestinal:  Negative for blood in stool, constipation, diarrhea, nausea and rectal pain.   Genitourinary:  Negative for  difficulty urinating, dysuria, frequency and urgency.   Musculoskeletal:  Positive for arthralgias and back pain.   Skin:  Negative for rash.   Neurological:  Positive for dizziness (WITH POSITION CHANGES) and headaches (OCCASIONAL).   Psychiatric/Behavioral:  Negative for sleep disturbance.        The following portions of the patient's history were reviewed and updated as appropriate: allergies, current medications, past family history, past medical history, past social history, past surgical history and problem list.    Medications:     Current Outpatient Medications:     acetaminophen (TYLENOL) 500 MG tablet, Take 1 tablet by mouth Every 6 (Six) Hours As Needed., Disp: , Rfl:     apixaban (ELIQUIS) 5 MG tablet tablet, Take 1 tablet by mouth 2 (Two) Times a Day., Disp: , Rfl:     ascorbic acid (VITAMIN C) 500 MG tablet, Take 1 tablet by mouth Daily., Disp: , Rfl:     atorvastatin (LIPITOR) 40 MG tablet, atorvastatin 40 mg oral tablet take 1 tablet (40 mg) by oral route once daily   Active, Disp: , Rfl:     diclofenac (VOLTAREN) 50 MG EC tablet, , Disp: , Rfl:     fluticasone (FLONASE) 50 MCG/ACT nasal spray, , Disp: , Rfl:     HYDROcodone-acetaminophen (NORCO) 5-325 MG per tablet, Take 1 tablet by mouth Every 6 (Six) Hours As Needed for Moderate Pain., Disp: 120 tablet, Rfl: 0    loratadine (CLARITIN) 10 MG tablet, loratadine 10 mg oral tablet take 1 tablet (10 mg) by oral route once daily   Active, Disp: , Rfl:     MAGIC MOUTHWASH W/NYSTATIN 1/1/1/1, Swish and swallow 10 mL 4 (Four) Times a Day As Needed for Mouth Pain., Disp: 500 mL, Rfl: 1    metoprolol tartrate (LOPRESSOR) 100 MG tablet, Take 1 tablet by mouth 2 (Two) Times a Day., Disp: 180 tablet, Rfl: 3    montelukast (SINGULAIR) 10 MG tablet, 1 tablet., Disp: , Rfl:     multivitamin (THERAGRAN) tablet tablet, Take 1 tablet by mouth Daily., Disp: , Rfl:     naloxone (NARCAN) 4 MG/0.1ML nasal spray, , Disp: , Rfl:     ondansetron (ZOFRAN) 8 MG tablet, Take  1 tablet by mouth 3 (Three) Times a Day As Needed for Nausea or Vomiting., Disp: 30 tablet, Rfl: 5    potassium chloride 10 MEQ CR tablet, Take 1 tablet by mouth Every Other Day., Disp: 45 tablet, Rfl: 3    Synthroid 25 MCG tablet, Take 1 tablet by mouth Daily., Disp: , Rfl:     verapamil SR (CALAN-SR) 180 MG CR tablet, Take 1 tablet by mouth Every Night., Disp: 90 tablet, Rfl: 3    vitamin E 1000 UNIT capsule, Take 1 capsule by mouth Daily., Disp: , Rfl:     cyanocobalamin (VITAMIN B-12) 1000 MCG tablet, Take 1 tablet by mouth Daily., Disp: , Rfl:     furosemide (LASIX) 40 MG tablet, Take 1 tablet by mouth Daily., Disp: 90 tablet, Rfl: 1    Allergies:   No Known Allergies    ECOG: (0) Fully active, able to carry on all predisease performance without restriction  Quality of Life: 100 - Full Activity     Objective     Physical Exam:   Vital Signs:   Vitals:    02/08/24 1426   BP: 148/83   Pulse: 70   Resp: 16   Temp: 98.5 °F (36.9 °C)   TempSrc: Temporal   SpO2: 95%   Weight: 132 kg (290 lb 9.6 oz)   PainSc: 0-No pain     Body mass index is 35.37 kg/m².     Physical Exam  Constitutional:       General: He is not in acute distress.     Appearance: Normal appearance. He is not ill-appearing or toxic-appearing.   HENT:      Head: Normocephalic and atraumatic.   Pulmonary:      Effort: Pulmonary effort is normal. No respiratory distress.   Skin:     General: Skin is warm and dry.      Coloration: Skin is not jaundiced.   Neurological:      General: No focal deficit present.      Mental Status: He is alert and oriented to person, place, and time.      Cranial Nerves: No cranial nerve deficit.      Gait: Gait normal.   Psychiatric:         Mood and Affect: Mood normal.         Behavior: Behavior normal.         Judgment: Judgment normal.       Robbi Kennedy reports a pain score of 0.  Given his pain assessment as noted, treatment options were discussed and the following options were decided upon as a follow-up plan to  address the patient's pain: continuation of current treatment plan for pain.     Radiographs: CT Abdomen Pelvis With Contrast    Result Date: 2/5/2024    1. No evidence of metastatic disease within the abdomen or pelvis. 2. Area of decreased density in the anterior segment of the right lobe of the liver consistent with stable post treatment changes. 3. Previous postsurgical changes as described above.      JENNIFER WASHINGTON MD       Electronically Signed and Approved By: JENNIFER WASHINGTON MD on 2/05/2024 at 16:13             CT Chest With Contrast Diagnostic    Result Date: 2/5/2024    1. Interval development of bilateral pulmonary metastatic disease. 2. No enlarged hilar or mediastinal lymph nodes. 3. Chronic scarring.     JENNIFER WASHINGTON MD       Electronically Signed and Approved By: JENNIFER WASHINGTON MD on 2/05/2024 at 16:06             I personally reviewed the CT scan of the chest from 2/5/2024.  The pertinent findings are as above.      Assessment / Plan      Assessment/Plan:   Robbi Kennedy is a 68-year-old gentleman with history of colorectal cancer now metastatic to lung status post multiple courses of stereotactic body radiotherapy for lung metastases now with progression of disease.  ECOG 0    I discussed the findings of the recent CT scan of the chest with Mr. Kennedy.  I explained that the extent of disease exceeds what would be tolerable for repeat SBRT to all appreciated lesions.  This is underscored by his history of stereotactic body radiotherapy which limits additional radiotherapy to the lung to some extent.  I recommended consideration of systemic therapy options offered by Dr. Campos.  Mr. Kennedy voiced his understanding.  He was encouraged to contact me with any questions or concerns regarding his care.      Christiano Kelley MD  Radiation Oncology  [facility]    This document has been signed by Christiano Kelley MD on March 4, 2024 07:52 EST

## 2024-02-27 ENCOUNTER — LAB (OUTPATIENT)
Dept: LAB | Facility: HOSPITAL | Age: 69
End: 2024-02-27
Payer: MEDICARE

## 2024-02-27 DIAGNOSIS — I50.32 CHRONIC HEART FAILURE WITH PRESERVED EJECTION FRACTION: ICD-10-CM

## 2024-02-27 LAB
ANION GAP SERPL CALCULATED.3IONS-SCNC: 10 MMOL/L (ref 5–15)
BUN SERPL-MCNC: 14 MG/DL (ref 8–23)
BUN/CREAT SERPL: 14.4 (ref 7–25)
CALCIUM SPEC-SCNC: 8.8 MG/DL (ref 8.6–10.5)
CHLORIDE SERPL-SCNC: 105 MMOL/L (ref 98–107)
CO2 SERPL-SCNC: 25 MMOL/L (ref 22–29)
CREAT SERPL-MCNC: 0.97 MG/DL (ref 0.76–1.27)
EGFRCR SERPLBLD CKD-EPI 2021: 85.6 ML/MIN/1.73
GLUCOSE SERPL-MCNC: 137 MG/DL (ref 65–99)
NT-PROBNP SERPL-MCNC: 1272 PG/ML (ref 0–900)
POTASSIUM SERPL-SCNC: 3.9 MMOL/L (ref 3.5–5.2)
SODIUM SERPL-SCNC: 140 MMOL/L (ref 136–145)

## 2024-02-27 PROCEDURE — 83880 ASSAY OF NATRIURETIC PEPTIDE: CPT

## 2024-02-27 PROCEDURE — 36415 COLL VENOUS BLD VENIPUNCTURE: CPT

## 2024-02-27 PROCEDURE — 80048 BASIC METABOLIC PNL TOTAL CA: CPT

## 2024-02-28 ENCOUNTER — TELEPHONE (OUTPATIENT)
Dept: CARDIOLOGY | Facility: CLINIC | Age: 69
End: 2024-02-28
Payer: MEDICARE

## 2024-02-28 NOTE — TELEPHONE ENCOUNTER
SW patient. Patient states he still has some edema but it is better with the increased lasix dosage

## 2024-02-28 NOTE — TELEPHONE ENCOUNTER
Continue current lasix dose, encourage to restrict fluid and sodium intake, wear compression socks for edema

## 2024-02-28 NOTE — TELEPHONE ENCOUNTER
----- Message from REGINALDO Soto sent at 2/27/2024  5:53 PM EST -----  Electrolytes and renal function are good, BNP is slightly elevated, find out if having any shortness of breath or edema

## 2024-03-06 ENCOUNTER — SPECIALTY PHARMACY (OUTPATIENT)
Dept: PHARMACY | Facility: HOSPITAL | Age: 69
End: 2024-03-06
Payer: MEDICARE

## 2024-03-06 ENCOUNTER — LAB (OUTPATIENT)
Dept: ONCOLOGY | Facility: HOSPITAL | Age: 69
End: 2024-03-06
Payer: MEDICARE

## 2024-03-06 ENCOUNTER — OFFICE VISIT (OUTPATIENT)
Dept: ONCOLOGY | Facility: HOSPITAL | Age: 69
End: 2024-03-06
Payer: MEDICARE

## 2024-03-06 VITALS
BODY MASS INDEX: 34.42 KG/M2 | WEIGHT: 282.8 LBS | SYSTOLIC BLOOD PRESSURE: 146 MMHG | RESPIRATION RATE: 18 BRPM | TEMPERATURE: 98.2 F | HEART RATE: 86 BPM | DIASTOLIC BLOOD PRESSURE: 90 MMHG | OXYGEN SATURATION: 96 %

## 2024-03-06 DIAGNOSIS — Z79.899 ENCOUNTER FOR LONG-TERM (CURRENT) USE OF HIGH-RISK MEDICATION: ICD-10-CM

## 2024-03-06 DIAGNOSIS — C20 RECTAL CANCER: Primary | ICD-10-CM

## 2024-03-06 DIAGNOSIS — C20 RECTAL CANCER: ICD-10-CM

## 2024-03-06 LAB
BACTERIA UR QL AUTO: NORMAL /HPF
BILIRUB UR QL STRIP: NEGATIVE
CLARITY UR: CLEAR
COLOR UR: ABNORMAL
GLUCOSE UR STRIP-MCNC: NEGATIVE MG/DL
HGB UR QL STRIP.AUTO: NEGATIVE
HYALINE CASTS UR QL AUTO: NORMAL /LPF
KETONES UR QL STRIP: ABNORMAL
LEUKOCYTE ESTERASE UR QL STRIP.AUTO: NEGATIVE
NITRITE UR QL STRIP: NEGATIVE
PH UR STRIP.AUTO: 5.5 [PH] (ref 5–8)
PROT UR QL STRIP: NEGATIVE
RBC # UR STRIP: NORMAL /HPF
REF LAB TEST METHOD: NORMAL
SP GR UR STRIP: 1.02 (ref 1–1.03)
SQUAMOUS #/AREA URNS HPF: NORMAL /HPF
UROBILINOGEN UR QL STRIP: ABNORMAL
WBC # UR STRIP: NORMAL /HPF

## 2024-03-06 PROCEDURE — G0463 HOSPITAL OUTPT CLINIC VISIT: HCPCS | Performed by: INTERNAL MEDICINE

## 2024-03-06 PROCEDURE — 81001 URINALYSIS AUTO W/SCOPE: CPT

## 2024-03-06 RX ORDER — CLOBETASOL PROPIONATE 0.5 MG/G
1 CREAM TOPICAL 2 TIMES DAILY
Qty: 30 G | Refills: 5 | Status: SHIPPED | OUTPATIENT
Start: 2024-03-06

## 2024-03-06 RX ORDER — ONDANSETRON HYDROCHLORIDE 8 MG/1
TABLET, FILM COATED ORAL
Qty: 60 TABLET | Refills: 5 | Status: SHIPPED | OUTPATIENT
Start: 2024-03-06

## 2024-03-06 NOTE — PROGRESS NOTES
Chief Complaint  Rectal Cancer    Eri Moran APRN Reinberg, Emily, APRN Subjective          Robbi Kennedy presents to [facility] for discussion of treatment for his is recurrent/metastatic rectal cancer.  Recently found to have multiple new metastatic foci in the lungs.  Seen by Dr. Kelley with radiation oncology.  He did not feel that radiation could be employed given the number.  He was also evaluated at [facility].  Those records reviewed.  He is currently on a waiting list for clinical trial but they did not have any open at this time.  Patient states that he is feeling as good as he has for a long time.  He has ongoing neuropathy in his feet which is stable.  He denies new masses, adenopathy, unusual aches or pains.  He notes adequate appetite and energy level.    Oncology/Hematology History Overview Note   9/30/2019 High rectal uziz-rswdmrbvcq-wayoxbpygxildt adenocarcinoma  associated with tubular adenoma.   4/27/21 Biopsy of liver revealed metastatic adenocarcinoma, consistent with colorectal primary.    Clinical Staging  Stage IIa ([id number])    Chemotherapy      neoadjuvant Xeloda with radiation. adjuvant xeloda        Radiation Therapy      7/8/19 thru 8/14/19 completed neoadjuvant 5040 cGy=28 fxs rectum     10/2021 XRT due to recurrence right middle lung 5 Fx's, 5000cGy  11/26/21 adjuvant FOLFOX -2/25/2022    Surgeries       9/30/2019 low anterior resection with ostomy        8/31/21 partial hepatectomy, cholecystectomy and MWA for segment 6 medical margin performed at     2/8/2023 CT Scans revealed Worsening pulmonary metastatic disease.    2/21/2023 orders written for FOLFIRI + AVASTIN       Rectal cancer   6/2/2021 - 8/15/2021 Chemotherapy    OP COLON mFOLFOX6 + Bevacizumab (OXALIplatin / Leucovorin / Fluorouracil / Bevacizumab)     6/18/2021 Initial Diagnosis    Rectal cancer (CMS/HCC)     6/21/2021 Cancer Staged    Staging form:  Colon And Rectum, AJCC 8th Edition  - Clinical: Stage IIA (cT3, cN0, cM0) - Signed by Maurilio Campos MD on 6/21/2021 11/16/2021 - 2/25/2022 Chemotherapy    OP COLON mFOLFOX6 OXALIplatin / Leucovorin / Fluorouracil     2/28/2023 -  Chemotherapy    OP COLORECTAL FOLFIRI + Bevacizumab 5mg/kg (Irinotecan / Leucovorin / Fluorouracil / Bevacizumab)     3/13/2024 Biopsy    OP COLORECTAL Fruquintinib  Plan Provider: Maurilio Campos MD  Treatment goal: Palliative  Line of treatment: [No plan line of treatment]     Secondary malignant neoplasm of left lung   9/14/2021 Initial Diagnosis    Secondary malignant neoplasm of left lung (HCC)     10/20/2021 -  Radiation    RADIATION THERAPY Treatment Details (Noted on 10/5/2021)  Site: Bilateral Lung  Technique: SBRT  Goal: No goal specified  Planned Treatment Start Date: 10/20/2021     11/16/2021 - 2/25/2022 Chemotherapy    OP COLON mFOLFOX6 OXALIplatin / Leucovorin / Fluorouracil     6/15/2022 -  Radiation    RADIATION THERAPY Treatment Details (Noted on 6/15/2022)  Site: Left Lung - Lower lobe  Technique: SBRT  Goal: No goal specified  Planned Treatment Start Date: No planned start date specified     10/25/2022 -  Radiation    RADIATION THERAPY Treatment Details (Noted on 10/25/2022)  Site: Left Lung  Technique: SBRT  Goal: No goal specified  Planned Treatment Start Date: No planned start date specified     Secondary malignancy of liver   9/14/2021 Initial Diagnosis    Secondary malignancy of liver (HCC)     11/16/2021 - 2/25/2022 Chemotherapy    OP COLON mFOLFOX6 OXALIplatin / Leucovorin / Fluorouracil     Secondary malignant neoplasm of right lung   10/5/2021 Initial Diagnosis    Secondary malignant neoplasm of right lung (HCC)     10/20/2021 -  Radiation    RADIATION THERAPY Treatment Details (Noted on 10/5/2021)  Site: Bilateral Lung  Technique: SBRT  Goal: No goal specified  Planned Treatment Start Date: 10/20/2021     11/16/2021 - 2/25/2022 Chemotherapy    OP COLON  mFOLFOX6 OXALIplatin / Leucovorin / Fluorouracil         Review of Systems   Constitutional:  Negative for appetite change, diaphoresis, fatigue, fever, unexpected weight gain and unexpected weight loss.   HENT:  Negative for hearing loss, sore throat and voice change.    Eyes:  Negative for blurred vision, double vision, pain, redness and visual disturbance.   Respiratory:  Negative for cough, shortness of breath and wheezing.    Cardiovascular:  Negative for chest pain, palpitations and leg swelling.   Endocrine: Negative for cold intolerance, heat intolerance, polydipsia and polyuria.   Genitourinary:  Negative for decreased urine volume, difficulty urinating, frequency and urinary incontinence.   Musculoskeletal:  Negative for arthralgias, back pain, joint swelling and myalgias.   Skin:  Negative for color change, rash, skin lesions and wound.   Neurological:  Negative for dizziness, seizures, numbness and headache.   Hematological:  Negative for adenopathy. Does not bruise/bleed easily.   Psychiatric/Behavioral:  Negative for depressed mood. The patient is not nervous/anxious.      Current Outpatient Medications on File Prior to Visit   Medication Sig Dispense Refill    acetaminophen (TYLENOL) 500 MG tablet Take 1 tablet by mouth Every 6 (Six) Hours As Needed.      apixaban (ELIQUIS) 5 MG tablet tablet Take 1 tablet by mouth 2 (Two) Times a Day.      ascorbic acid (VITAMIN C) 500 MG tablet Take 1 tablet by mouth Daily.      atorvastatin (LIPITOR) 40 MG tablet atorvastatin 40 mg oral tablet take 1 tablet (40 mg) by oral route once daily   Active      cyanocobalamin (VITAMIN B-12) 1000 MCG tablet Take 1 tablet by mouth Daily.      diclofenac (VOLTAREN) 50 MG EC tablet       fluticasone (FLONASE) 50 MCG/ACT nasal spray       furosemide (LASIX) 40 MG tablet Take 1 tablet by mouth Daily. 90 tablet 1    HYDROcodone-acetaminophen (NORCO) 5-325 MG per tablet Take 1 tablet by mouth Every 6 (Six) Hours As Needed for  Moderate Pain. 120 tablet 0    loratadine (CLARITIN) 10 MG tablet loratadine 10 mg oral tablet take 1 tablet (10 mg) by oral route once daily   Active      MAGIC MOUTHWASH W/NYSTATIN 1/1/1/1 Swish and swallow 10 mL 4 (Four) Times a Day As Needed for Mouth Pain. 500 mL 1    metoprolol tartrate (LOPRESSOR) 100 MG tablet Take 1 tablet by mouth 2 (Two) Times a Day. 180 tablet 3    montelukast (SINGULAIR) 10 MG tablet 1 tablet.      multivitamin (THERAGRAN) tablet tablet Take 1 tablet by mouth Daily.      naloxone (NARCAN) 4 MG/0.1ML nasal spray       ondansetron (ZOFRAN) 8 MG tablet Take 1 tablet by mouth 3 (Three) Times a Day As Needed for Nausea or Vomiting. 30 tablet 5    potassium chloride 10 MEQ CR tablet Take 1 tablet by mouth Every Other Day. 45 tablet 3    Synthroid 25 MCG tablet Take 1 tablet by mouth Daily.      verapamil SR (CALAN-SR) 180 MG CR tablet Take 1 tablet by mouth Every Night. 90 tablet 3    vitamin E 1000 UNIT capsule Take 1 capsule by mouth Daily.       No current facility-administered medications on file prior to visit.       No Known Allergies  Past Medical History:   Diagnosis Date    Asthma     Atrial fibrillation, persistent 2/26/2021    Chronic heart failure with preserved ejection fraction 7/18/2022    BNP 1590 on 7/22 Echocardiogram with EF 55% moderate to severe biatrial enlargement and mild right ventricular enlargement 7/22     Colorectal cancer     Essential hypertension     GI cancer     Hepatitis     History of DVT of lower extremity 8/27/2021    Hyperlipidemia LDL goal <100 12/31/2020    Rectal cancer 6/18/2021    Secondary malignancy of liver 9/14/2021    Secondary malignant neoplasm of left lung 9/14/2021    Secondary malignant neoplasm of right lung 10/5/2021    Thrombocytopenia 1/11/2022    Thyroid disease     Viral upper respiratory tract infection 4/24/2023     Past Surgical History:   Procedure Laterality Date    COLON SURGERY      HERNIA REPAIR      LIVER BIOPSY      4/27/21  Biopsy of liver revealed metastatic adenocarcinoma, consistent with colorectal primary    OTHER SURGICAL HISTORY      REMOVED CANCER FROM COLON     Social History     Socioeconomic History    Marital status:     Number of children: 2   Tobacco Use    Smoking status: Never    Smokeless tobacco: Never   Vaping Use    Vaping status: Never Used   Substance and Sexual Activity    Alcohol use: Yes     Alcohol/week: 3.0 standard drinks of alcohol     Types: 3 Cans of beer per week     Comment: 2-3 beers weekly    Drug use: Never    Sexual activity: Defer     Family History   Problem Relation Age of Onset    Prostate cancer Father     Heart murmur Mother     Diabetes Mother     Colon cancer Maternal Uncle        Objective   Physical Exam  Vitals reviewed. Exam conducted with a chaperone present.   Constitutional:       General: He is not in acute distress.     Appearance: Normal appearance.   Cardiovascular:      Rate and Rhythm: Normal rate and regular rhythm.      Heart sounds: Normal heart sounds. No murmur heard.     No gallop.   Pulmonary:      Effort: Pulmonary effort is normal.      Breath sounds: Normal breath sounds.   Abdominal:      General: Abdomen is flat. Bowel sounds are normal.      Palpations: Abdomen is soft.   Musculoskeletal:      Right lower leg: No edema.      Left lower leg: No edema.   Neurological:      Mental Status: He is alert.   Psychiatric:         Mood and Affect: Mood normal.         Behavior: Behavior normal.         Vitals:    03/06/24 1458   BP: 146/90   Pulse: 86   Resp: 18   Temp: 98.2 °F (36.8 °C)   TempSrc: Temporal   SpO2: 96%   Weight: 128 kg (282 lb 12.8 oz)   PainSc: 0-No pain     ECOG score: 0         PHQ-9 Total Score:                    Result Review :   The following data was reviewed by: Maurilio Campos MD on 03/06/2024:  Lab Results   Component Value Date    HGB 13.7 02/05/2024    HCT 42.2 02/05/2024    MCV 93.4 02/05/2024     02/05/2024    WBC 6.57 02/05/2024     NEUTROABS 5.2 02/20/2024    LYMPHSABS 1.47 02/05/2024    MONOSABS 0.50 02/05/2024    EOSABS 0.2 02/20/2024    BASOSABS 0.1 02/20/2024     Lab Results   Component Value Date    GLUCOSE 137 (H) 02/27/2024    BUN 14 02/27/2024    CREATININE 0.97 02/27/2024     02/27/2024    K 3.9 02/27/2024     02/27/2024    CO2 25.0 02/27/2024    CALCIUM 8.8 02/27/2024    PROTEINTOT 6.4 02/05/2024    ALBUMIN 3.8 02/05/2024    BILITOT 0.8 02/05/2024    ALKPHOS 75 02/05/2024    AST 19 02/05/2024    ALT 17 02/05/2024     Lab Results   Component Value Date    MG 1.8 (L) 09/07/2021    FREET4 0.81 12/12/2023    TSH 3.250 02/24/2021     Lab Results   Component Value Date    IRON 60 (L) 03/04/2020    LABIRON 20 03/04/2020    TRANSFERRIN 215.00 03/04/2020    TIBC 307 03/04/2020     Lab Results   Component Value Date    FERRITIN 140 03/04/2020    OXIGLKEV39 >2000 (H) 03/04/2020    FOLATE 19.4 03/04/2020     Lab Results   Component Value Date    PSA 0.92 12/12/2023    CEA 11.4 (H) 02/20/2024             Assessment and Plan    Diagnoses and all orders for this visit:    1. Rectal cancer (Primary)  Assessment & Plan:  Metastatic/recurrent.  Patient not felt to be a candidate for additional radiation at this time.  Seen at [facility] but no clinical trials applicable at this point.  He is on a waiting list.  He would like to go ahead and resume therapy but is hesitant about chemotherapy as he had in the past.  I would recommend fruquintinib 5 mg daily on days 1-20 1 out of 28.  Side effects, risk and benefits discussed in detail.  Written teaching information provided.  Patient is agreeable to therapy as outlined.  Prescriptions for antiemetics and steroid cream for his skin provided.  Prescription for fruquintinib sent to pharmacy and I will plan for him to start in the next 1 to 2 weeks when the prescription is available.  I will see him back in 6 weeks which should be approximately a month into his therapy to  reassess with lab work prior to monitor for toxicities.    Orders:  -     Urinalysis With Microscopic - Urine, Clean Catch; Future  -     Provider communication  -     ondansetron (ZOFRAN) 8 MG tablet; Take 1 tablet PO 30 minutes prior to cancer treatment daily and every 8 hours as needed for breakthrough nausea  Dispense: 60 tablet; Refill: 5  -     clobetasol propionate (TEMOVATE) 0.05 % cream; Apply 1 Application topically to the appropriate area as directed 2 (Two) Times a Day. Apply twice daily to the palms of the hands and the soles of the feet.  Dispense: 30 g; Refill: 5    2. Encounter for long-term (current) use of high-risk medication  -     Urinalysis With Microscopic - Urine, Clean Catch; Future            Patient Follow Up: 6 weeks    Patient was given instructions and counseling regarding his condition or for health maintenance advice. Please see specific information pulled into the AVS if appropriate.     Maurilio Campos MD    3/6/2024

## 2024-03-06 NOTE — ASSESSMENT & PLAN NOTE
Metastatic/recurrent.  Patient not felt to be a candidate for additional radiation at this time.  Seen at [facility] but no clinical trials applicable at this point.  He is on a waiting list.  He would like to go ahead and resume therapy but is hesitant about chemotherapy as he had in the past.  I would recommend fruquintinib 5 mg daily on days 1-20 1 out of 28.  Side effects, risk and benefits discussed in detail.  Written teaching information provided.  Patient is agreeable to therapy as outlined.  Prescriptions for antiemetics and steroid cream for his skin provided.  Prescription for fruquintinib sent to pharmacy and I will plan for him to start in the next 1 to 2 weeks when the prescription is available.  I will see him back in 6 weeks which should be approximately a month into his therapy to reassess with lab work prior to monitor for toxicities.

## 2024-03-07 ENCOUNTER — SPECIALTY PHARMACY (OUTPATIENT)
Dept: PHARMACY | Facility: HOSPITAL | Age: 69
End: 2024-03-07
Payer: MEDICARE

## 2024-03-07 DIAGNOSIS — C20 RECTAL CANCER: Primary | ICD-10-CM

## 2024-03-07 NOTE — PROGRESS NOTES
Oral Chemotherapy - New Referral    Received a referral from Dr. Campos    Treatment Plan: Fruzaqla (fruquintinib)  Start date of treatment planned for: As soon as oral specialty medication is available.  Indication: recurrent/metastatic rectal cancer  Relevant past treatments: Neoadjuvant Xeloda + RT, surgery, adjuvnat FOLFOX, FOLFIRI/Bevacizumab  Is the therapy appropriate based on treatment guidelines and FDA labeling?: yes  Therapeutic Goals: Continue treatment until progression or intolerable toxicity  Patient can self-administer oral medications: Yes    Drug-Drug Interactions: The current medication list was reviewed and there are no relevant drug-drug interactions with the specialty medication.  Medication Allergies: The patient has NKDA  Review of Labs/Dose Adjustments: The patient's most recent labs were reviewed and are appropriate to start treatment at this dose    A prescription was released to [facility] pharmacy for   Drug:  Fruzaqla (fruquintinib)  Strength: 5 mg  Directions: Take 1 capsule by mouth daily ON days 1-21, OFF for 7 days of each 28 day cycle  Quantity: 21  Refills: 5    Pharmacy education is not yet scheduled., CCA and consent are signed.    Rhiannon Biggs, PharmD, [facility]  Oncology Clinical Pharmacist  3/7/2024  09:10 EST

## 2024-03-07 NOTE — PROGRESS NOTES
Oral Chemotherapy - Double Check    Drug: fruquintinib  Strength: 5mg capsule  Directions: Take 1 capsule PO daily on days 1-21 then 7 days off of a 28-day cycle  QTY: 21  RF:5    Released to pharmacy: [email or web address]    Completed independent double check on medication order/RX.    Kirti Gaston PharmD, [facility]  Clinical Oncology Pharmacy Specialist  Phone: [phone]    3/7/2024  09:29 EST

## 2024-03-12 ENCOUNTER — SPECIALTY PHARMACY (OUTPATIENT)
Dept: PHARMACY | Facility: HOSPITAL | Age: 69
End: 2024-03-12
Payer: MEDICARE

## 2024-03-12 NOTE — PROGRESS NOTES
Specialty Pharmacy Patient Management Program  Oncology Initial Assessment       A 68 y.o. male with recurrent metastatic Rectal Cacner was seen by an Oncology provider and enrolled in the Oncology Patient Management program offered by [facility].  An initial outreach was conducted in person, face-to-face, including assessment of therapy appropriateness and specialty medication education for Fruzaqla (fruquintinib). The patient was introduced to services offered by [facility], including: regular assessments, refill coordination, curbside pick-up or mail order delivery options, prior authorization maintenance, and financial assistance programs as applicable. The patient was also provided with contact information for the pharmacy team.     Regimen: Fruquintinib 5mg daily on days 1-21, then 7 days off, on a 28 day cycle    Start date of oral specialty medication: As soon as oral specialty medication is available.    Relevant Past Medical History, Comorbidities, and Vaccines  Relevant medical history and concomitant health conditions were discussed with the patient. The patient's chart has been reviewed for relevant past medical history and comorbid health conditions and updated as necessary.  Vaccines are coordinated by the patient's oncologist and primary care provider.  Past Medical History:   Diagnosis Date    Asthma     Atrial fibrillation, persistent 2/26/2021    Chronic heart failure with preserved ejection fraction 7/18/2022    BNP 1590 on 7/22 Echocardiogram with EF 55% moderate to severe biatrial enlargement and mild right ventricular enlargement 7/22     Colorectal cancer     Essential hypertension     GI cancer     Hepatitis     History of DVT of lower extremity 8/27/2021    Hyperlipidemia LDL goal <100 12/31/2020    Rectal cancer 6/18/2021    Secondary malignancy of liver 9/14/2021    Secondary malignant neoplasm of left lung 9/14/2021    Secondary malignant  neoplasm of right lung 10/5/2021    Thrombocytopenia 1/11/2022    Thyroid disease     Viral upper respiratory tract infection 4/24/2023     Social History     Socioeconomic History    Marital status:     Number of children: 2   Tobacco Use    Smoking status: Never    Smokeless tobacco: Never   Vaping Use    Vaping status: Never Used   Substance and Sexual Activity    Alcohol use: Yes     Alcohol/week: 3.0 standard drinks of alcohol     Types: 3 Cans of beer per week     Comment: 2-3 beers weekly    Drug use: Never    Sexual activity: Defer       Allergies  Known allergies and reactions were discussed with the patient. The patient's chart has been reviewed for allergy information and updated as necessary.   No Known Allergies     Current Medication List  This medication list has been reviewed with the patient and evaluated for any interactions or necessary modifications/recommendations, and updated to include all prescription medications, OTC medications, and supplements the patient is currently taking.  This list reflects what is contained in the patient's profile, which has also been marked as reviewed to communicate to other providers it is the most up to date version of the patient's current medication therapy.   Prior to Admission medications    Medication Sig Start Date End Date Taking? Authorizing Provider   acetaminophen (TYLENOL) 500 MG tablet Take 1 tablet by mouth Every 6 (Six) Hours As Needed. 9/7/21  Yes Monse Ramirez MD   apixaban (ELIQUIS) 5 MG tablet tablet Take 1 tablet by mouth 2 (Two) Times a Day. 12/31/20  Yes Monse Ramirez MD   ascorbic acid (VITAMIN C) 500 MG tablet Take 1 tablet by mouth Daily.   Yes Monse Ramirez MD   atorvastatin (LIPITOR) 40 MG tablet atorvastatin 40 mg oral tablet take 1 tablet (40 mg) by oral route once daily   Active   Yes Monse Ramirez MD   clobetasol propionate (TEMOVATE) 0.05 % cream Apply 1 Application topically to the  appropriate area as directed 2 (Two) Times a Day. Apply twice daily to the palms of the hands and the soles of the feet. 3/6/24  Yes Maurilio Campos MD   cyanocobalamin (VITAMIN B-12) 1000 MCG tablet Take 1 tablet by mouth Daily.   Yes ProviderMonse MD   Fruquintinib (FRUZAQLA) 5 MG capsule Take 1 capsule by mouth Daily. Take 1 tablet by mouth daily on days 1-21 then 7 days off of a 28-day cycle. 3/7/24  Yes Maurilio Campos MD   furosemide (LASIX) 40 MG tablet Take 1 tablet by mouth Daily. 2/8/24  Yes Nyla Ramírez APRN   loratadine (CLARITIN) 10 MG tablet loratadine 10 mg oral tablet take 1 tablet (10 mg) by oral route once daily   Active   Yes ProviderMonse MD   metoprolol tartrate (LOPRESSOR) 100 MG tablet Take 1 tablet by mouth 2 (Two) Times a Day. 8/7/23  Yes Aroldo Madden MD   montelukast (SINGULAIR) 10 MG tablet 1 tablet. 6/20/23  Yes ProviderMonse MD   multivitamin (THERAGRAN) tablet tablet Take 1 tablet by mouth Daily.   Yes Provider, MD Monse   naloxone (NARCAN) 4 MG/0.1ML nasal spray  8/1/23 7/31/24 Yes ProviderMonse MD   ondansetron (ZOFRAN) 8 MG tablet Take 1 tablet by mouth 3 (Three) Times a Day As Needed for Nausea or Vomiting. 2/28/23  Yes Maurilio Campos MD   potassium chloride 10 MEQ CR tablet Take 1 tablet by mouth Every Other Day. 2/8/24  Yes Nyla Ramírez APRN   psyllium (METAMUCIL) 58.6 % packet Take 1 packet by mouth Daily.   Yes ProviderMonse MD   Synthroid 25 MCG tablet Take 1 tablet by mouth Daily. 1/5/22  Yes Monse Ramirez MD   verapamil SR (CALAN-SR) 180 MG CR tablet Take 1 tablet by mouth Every Night. 8/7/23  Yes Aroldo Madden MD   vitamin E 1000 UNIT capsule Take 1 capsule by mouth Daily.   Yes ProviderMonse MD   diclofenac (VOLTAREN) 50 MG EC tablet  1/11/23 3/12/24 Yes Provider, Historical, MD   MAGIC MOUTHWASH W/NYSTATIN 1/1/1/1 Swish and swallow 10 mL 4 (Four) Times a Day As Needed for Mouth  Pain.  Patient not taking: Reported on 3/12/2024 7/26/23   Maurilio Campos MD   fluticasone (FLONASE) 50 MCG/ACT nasal spray  6/22/22 3/12/24  ProviderMonse MD   HYDROcodone-acetaminophen (NORCO) 5-325 MG per tablet Take 1 tablet by mouth Every 6 (Six) Hours As Needed for Moderate Pain.  Patient not taking: Reported on 3/12/2024 7/31/23 3/12/24  Monico Perry MD   ondansetron (ZOFRAN) 8 MG tablet Take 1 tablet PO 30 minutes prior to cancer treatment daily and every 8 hours as needed for breakthrough nausea 3/6/24 3/12/24  Maurilio Campos MD       Drug Interactions  Reviewed concomitant medications, allergies, labs, comorbidities/medical history, quality of life, and immunization history.   Drug-drug interactions noted and discussed during education:   Class D interaction:   Patient instructed to separate multivitamin and levothyroxine by at least 4 hours.  Atorvastatin may increase the concentration of verapamil and verapamil may increase concentration of atorvastatin.   Reminded the patient to let us know before making any changes or starting any new prescription or OTC medications so we can first assess drug interactions.    Recommended Medications Assessment  VTE prophylaxis - Currently Taking The patient is currently on DOAC: apixaban    Relevant Laboratory Values  Lab Results   Component Value Date    GLUCOSE 137 (H) 02/27/2024    CALCIUM 8.8 02/27/2024     02/27/2024    K 3.9 02/27/2024    CO2 25.0 02/27/2024     02/27/2024    BUN 14 02/27/2024    CREATININE 0.97 02/27/2024    EGFRIFAFRI >60 06/29/2022    EGFRIFNONA 77 02/22/2022    BCR 14.4 02/27/2024    ANIONGAP 10.0 02/27/2024     Lab Results   Component Value Date    WBC 6.57 02/05/2024    RBC 4.52 02/05/2024    HGB 13.7 02/05/2024    HCT 42.2 02/05/2024    MCV 93.4 02/05/2024    MCH 30.3 02/05/2024    MCHC 32.5 02/05/2024    RDW 13.7 02/05/2024    RDWSD 47.0 02/05/2024    MPV 9.1 02/05/2024     02/05/2024     NEUTRORELPCT 66.3 02/05/2024    LYMPHORELPCT 22.4 02/05/2024    MONORELPCT 7.6 02/05/2024    EOSRELPCT 2.7 02/05/2024    BASORELPCT 0.5 02/05/2024    AUTOIGPER 0.5 02/05/2024    NEUTROABS 5.2 02/20/2024    LYMPHSABS 1.47 02/05/2024    MONOSABS 0.50 02/05/2024    EOSABS 0.2 02/20/2024    BASOSABS 0.1 02/20/2024    AUTOIGNUM 0.03 02/05/2024    NRBC 0.0 02/05/2024       The above labs have been reviewed. The following labs are outside of normal limits: Glucose is elevated No dose adjustments are needed for the oral specialty medication(s) based on the labs.    Initial Education Provided for Specialty Medication  The patient has been provided with the following education. All questions and concerns have been addressed prior to the patient receiving the medication, and the patient has verbalized understanding of the education and any materials provided.  Additional patient education shall be provided and documented upon request by the patient, provider or payer.      Provided patient with:   Chemo calendar to help improve adherence., Education sheets about the medication, 24-hour clinic phone number and my contact information and instructions to call should additional questions arise.     Medication Education Sheets Provided: (select all that apply)  Oral Specialty Medication:  Fruzaqla (fruquintinib)    Other Education Sheets Provided: (select all that apply)  Blood Pressure Log, CINV, Diarrhea, and Hand-Foot Syndrome    TOPICS COMMENTS   Storage and Handling of Oral Specialty Medication Store in the original container, in a dry location out of direct sunlight, and out of reach of children or pets. and Store at room temperature.  Discussed safe handling and what to do with any unused medication.   Administration of Oral Specialty Medication Take with or without food at the same time(s) each day., Take with a full glass of water., and Do not open or dissolve capsules, unless otherwise instructed by the pharmacist.    Adherence to Oral Specialty Regimen and Handling Missed Doses Patient is likely to have good treatment adherence; reinforced the importance of adherence. Reviewed how to address missed doses and to let us know of any missed doses.   Anemia: role of RBC, cause, s/s, ways to manage, role of transfusion Reviewed the role of RBC and the use of transfusions if hemoglobin decreases too much.  Patient to notify us if they experience shortness of breath, dizziness, or palpitations.  Also let patient know they could feel more tired than usual and to try to stay active, but rest if they need to.    Thrombocytopenia: role of platelet, cause, s/s, ways to prevent bleeding, things to avoid, when to seek help Reviewed the role of platelets in blood clotting and when to call clinic (bloody nose that bleeds for 5 mins despite pressure, a cut that won't stop bleeding despite pressure, gums that bleed excessively with brushing or flossing). Recommended using an electric razor, soft bristle toothbrush, and blowing your nose gently.    Neutropenia: role of WBC, cause, infection precautions, s/s of infection, when to call MD Reviewed the role of WBC, good infection prevention practices, and when to call the clinic (temperature 100.4F, sore throat, burning urination, etc)  COVID Vaccines:  Received  Flu Vaccine: 2023 flu vaccine received   Diarrhea: causes, s/s of dehydration, ways to manage, dietary changes, when to call MD Chemotherapy : Discussed the risk of diarrhea. Instructed patient on use OTC loperamide with diarrhea onset, but emphasized the importance of calling the clinic if 4-6 episodes in 24 hours not relieved by OTC loperamide.   Constipation: causes, ways to manage, dietary changes, when to call MD Provided supplementary handout with instructions for use of docusate and other OTC therapies to manage constipation.  Instructed to call us if medications aren't working.   Nausea/Vomiting: cause, use of antiemetics, dietary  changes, when to call MD Emetic risk: Low to Minimal  PRN home meds: Ondansetron    Instructed the patient to take a dose of the PRN medication at the first onset of nausea and if it's not working to call us for additional medications.  Also provided non-drug measures to mitigate nausea.   Mouth Sores: causes, oral care, ways to manage Mouth sores can be prevented by making a mouth wash mixture of salt, baking soda, and water. The patient was instructed to swish and spit four times daily after meals and before bedtime.  Use of a soft bristle toothbrush was recommended.  The patient was instructed to avoid alcohol-containing OTC mouthwashes.    Nervous System Changes: causes, s/s, neuropathies, cognitive changes, ways to manage discussed the rare, but serious risk of PRES and the signs/symptoms and discussed the rare, but serious risk of seizures and the signs/symptoms   Skin/Nail Changes: cause, s/s, ways to manage Hand-foot syndrome was discussed, including the s/s, prevention measures, and treatment measures. A supplementary handout with this information was also given to the patient.  Patient educated to use clobetasol cream twice daily on palms of hands and soles of feet to decrease the risk of HFS.       Organ Toxicities: cause, s/s, need for diagnostic tests, labs, when to notify MD Discussed potential effects on organ systems, monitoring, diagnostic tests, labs, and when to notify their MD. Discussed the signs/symptoms of the following: cardiotoxicity, delayed wound healing, GI perforation, hepatotoxicity, hypertension - recommended close monitoring of blood pressure, provided BP log, and explained how to track values, lung changes, nephrotoxicity, and skin changes   Miscellaneous Financial Issues: none at this time  Miscellaneous: Blood Clots: Explained the rare, but possible risk of DVT or PE.  Reviewed the signs and symptoms of VTE and stressed the urgency to call 911 immediately.   Infertility and  Sexuality:  causes, fertility preservation options, sexuality changes, ways to manage, importance of birth control Oral Oncology Therapy: Reviewed safe sex practices and the importance of minimizing exposure to body fluids while on oral oncology therapy.   Home Care: how to manage bodily fluids Counseled on management of soiled linens and proper flush technique.  Discussed how to manage all the side effects at home and advised when to contact the MD office     Adherence and Self-Administration  Barriers to Patient Adherence and/or Self-Administration: none at this time  Methods for Supporting Patient Adherence and/or Self-Administration: dosing calendar  Expected duration of therapy: Until disease progression or intolerable toxicity    Goals of Therapy  Patient Goals of Therapy:   Consistently take medications as prescribed  Patient will adhere to medication regimen  Patient will report any medication side effects to healthcare provider  Clinical Goals:    Goals Addressed Today        Specialty Pharmacy General Goal      Clinical goal/therapeutic target: disease control, per the recent oncology clinic notes and labs.  Patient-identified goal of therapy: Patient will adhere to medication regimen by %              Support patient understanding of medication regimen  Ensure patient knows the pharmacy contact information  Schedule regular follow-up to monitor the treatment serious adverse events  Schedule regular follow-up to confirm medication adherence  Schedule regular follow-up to monitor disease progression or stability      Reassessment Plan & Follow-Up  Pharmacist to follow up in approximately 1 week for toxicity check.  Will follow clinic visits, labs, and scans as ordered by provider to evaluate efficacy.  Care Coordinator will follow until patient receives medication      Additional Plans, Therapy Recommendations or Therapy Problems to Be Addressed: None at this time     Attestation  I attest the patient  was actively involved in and has agreed to the above plan of care. If the prescribed therapy is at any point deemed not appropriate based on the current or future assessments, a consultation will be initiated with the patient's specialty care provider to determine the best course of action. The revised plan of therapy will be documented along with any additional patient education provided.       Rhiannon Biggs, Beau, [facility]  Oncology Clinical Pharmacist  3/12/2024  10:41 EDT

## 2024-03-14 NOTE — PROGRESS NOTES
Specialty Pharmacy Patient Management Program  Oncology Refill Outreach      [id number]  Fruzaqla rcvd: 03/13/2024  Fruzaqla started: 03/14/2024     Lori West  Care Coordinator, [facility]  3/14/2024  10:56 EDT

## 2024-03-21 ENCOUNTER — SPECIALTY PHARMACY (OUTPATIENT)
Dept: PHARMACY | Facility: HOSPITAL | Age: 69
End: 2024-03-21
Payer: MEDICARE

## 2024-03-21 NOTE — PROGRESS NOTES
Specialty Pharmacy Note: 1 Week Toxicity Check     Patient reports starting fruquintinib on 3/14/23. Thus far, patient denies side effects, aside from nausea (managed with ondansetron) and mild to moderate increase in blood pressure, which is manageable and not bothersome to patient. Advised patient to continue monitoring blood pressure and to call if blood pressure continues rising or if associated with concerning hypertension symptoms. Thus far, no missed doses and no medication changes. Advised pt to call office if any changes. Patient expressed understanding and had no additional questions at this time.       Rhiannon Biggs, PharmD, [facility]  Oncology Clinical Pharmacist  3/21/2024  11:18 EDT

## 2024-03-25 ENCOUNTER — HOSPITAL ENCOUNTER (OUTPATIENT)
Dept: ONCOLOGY | Facility: HOSPITAL | Age: 69
Discharge: HOME | End: 2024-03-25
Admitting: INTERNAL MEDICINE
Payer: MEDICARE

## 2024-03-25 DIAGNOSIS — Z45.2 ENCOUNTER FOR ADJUSTMENT OR MANAGEMENT OF VASCULAR ACCESS DEVICE: Primary | ICD-10-CM

## 2024-03-25 PROCEDURE — 96523 IRRIG DRUG DELIVERY DEVICE: CPT

## 2024-03-25 PROCEDURE — 25010000002 HEPARIN LOCK FLUSH PER 10 UNITS: Performed by: INTERNAL MEDICINE

## 2024-03-25 RX ORDER — HEPARIN SODIUM (PORCINE) LOCK FLUSH IV SOLN 100 UNIT/ML 100 UNIT/ML
500 SOLUTION INTRAVENOUS AS NEEDED
Status: DISCONTINUED | OUTPATIENT
Start: 2024-03-25 | End: 2024-03-26 | Stop reason: HOSPADM

## 2024-03-25 RX ORDER — HEPARIN SODIUM (PORCINE) LOCK FLUSH IV SOLN 100 UNIT/ML 100 UNIT/ML
500 SOLUTION INTRAVENOUS AS NEEDED
OUTPATIENT
Start: 2024-03-25

## 2024-03-25 RX ORDER — SODIUM CHLORIDE 0.9 % (FLUSH) 0.9 %
20 SYRINGE (ML) INJECTION AS NEEDED
Status: DISCONTINUED | OUTPATIENT
Start: 2024-03-25 | End: 2024-03-26 | Stop reason: HOSPADM

## 2024-03-25 RX ORDER — SODIUM CHLORIDE 0.9 % (FLUSH) 0.9 %
20 SYRINGE (ML) INJECTION AS NEEDED
OUTPATIENT
Start: 2024-03-25

## 2024-03-25 RX ADMIN — Medication 20 ML: at 14:19

## 2024-03-25 RX ADMIN — HEPARIN 500 UNITS: 100 SYRINGE at 14:20

## 2024-04-08 ENCOUNTER — TELEPHONE (OUTPATIENT)
Dept: ONCOLOGY | Facility: HOSPITAL | Age: 69
End: 2024-04-08
Payer: MEDICARE

## 2024-04-08 ENCOUNTER — TELEPHONE (OUTPATIENT)
Dept: CARDIOLOGY | Facility: CLINIC | Age: 69
End: 2024-04-08
Payer: MEDICARE

## 2024-04-08 RX ORDER — VALSARTAN 40 MG/1
40 TABLET ORAL DAILY
COMMUNITY
End: 2024-04-08 | Stop reason: SDUPTHER

## 2024-04-08 RX ORDER — VALSARTAN 40 MG/1
40 TABLET ORAL DAILY
Qty: 90 TABLET | Refills: 3 | Status: SHIPPED | OUTPATIENT
Start: 2024-04-08

## 2024-04-08 NOTE — TELEPHONE ENCOUNTER
Relayed message, patient vocalized understanding.     Sent RX to [facility] pharmacy

## 2024-04-08 NOTE — TELEPHONE ENCOUNTER
The pt called and states that his BP has been high for the last 4 weeks. The pt states that he started Fruzaqla & Temovate about one week be fore his BP yodit. When questioned the pt states that his SBP average is 140-150 and his DBP average is 95-97. When questioned the pt is taking his Verapamil and Metoprolol as prescribed by his PCP. The pt is asking which meds he needs to adjust to lower his BP.     Note: the pt is a Dr Campos pt.

## 2024-04-08 NOTE — TELEPHONE ENCOUNTER
Patient stated that his blood pressure has been elevated since his hem/onco doctor, Dr. Campos, put him on the cancer pill, Fruzagla 5 mg 1 tab qd (on 21 days, off for 7). Currently he is off the cancer med but will restart on Thurs, 11th.     BP on 4/2: 137/99, 159/102  4/3: 126/91  4/4: 142/96  4/5: 152/97  4/6: 149/99  4/7: 146/99    Currently taking Metoprolol Tart 100 mg 1 tab bid and Verapamil 180 mg 1 tab qd.    [facility] pharmacy

## 2024-04-19 ENCOUNTER — HOSPITAL ENCOUNTER (OUTPATIENT)
Dept: ONCOLOGY | Facility: HOSPITAL | Age: 69
Discharge: HOME | End: 2024-04-19
Payer: MEDICARE

## 2024-04-19 DIAGNOSIS — Z45.2 ENCOUNTER FOR ADJUSTMENT OR MANAGEMENT OF VASCULAR ACCESS DEVICE: Primary | ICD-10-CM

## 2024-04-19 DIAGNOSIS — C20 RECTAL CANCER: ICD-10-CM

## 2024-04-19 LAB
ALBUMIN SERPL-MCNC: 4 G/DL (ref 3.5–5.2)
ALBUMIN/GLOB SERPL: 1.5 G/DL
ALP SERPL-CCNC: 100 U/L (ref 39–117)
ALT SERPL W P-5'-P-CCNC: 22 U/L (ref 1–41)
ANION GAP SERPL CALCULATED.3IONS-SCNC: 4 MMOL/L (ref 5–15)
AST SERPL-CCNC: 22 U/L (ref 1–40)
BASOPHILS # BLD AUTO: 0.02 10*3/MM3 (ref 0–0.2)
BASOPHILS NFR BLD AUTO: 0.3 % (ref 0–1.5)
BILIRUB SERPL-MCNC: 0.7 MG/DL (ref 0–1.2)
BILIRUB UR QL STRIP: NEGATIVE
BUN SERPL-MCNC: 10 MG/DL (ref 8–23)
BUN/CREAT SERPL: 12.7 (ref 7–25)
CALCIUM SPEC-SCNC: 7.9 MG/DL (ref 8.6–10.5)
CHLORIDE SERPL-SCNC: 105 MMOL/L (ref 98–107)
CLARITY UR: CLEAR
CO2 SERPL-SCNC: 30 MMOL/L (ref 22–29)
COLOR UR: YELLOW
CREAT SERPL-MCNC: 0.79 MG/DL (ref 0.76–1.27)
DEPRECATED RDW RBC AUTO: 44.3 FL (ref 37–54)
EGFRCR SERPLBLD CKD-EPI 2021: 97.4 ML/MIN/1.73
EOSINOPHIL # BLD AUTO: 0.45 10*3/MM3 (ref 0–0.4)
EOSINOPHIL NFR BLD AUTO: 6.5 % (ref 0.3–6.2)
ERYTHROCYTE [DISTWIDTH] IN BLOOD BY AUTOMATED COUNT: 13.3 % (ref 12.3–15.4)
GLOBULIN UR ELPH-MCNC: 2.6 GM/DL
GLUCOSE SERPL-MCNC: 95 MG/DL (ref 65–99)
GLUCOSE UR STRIP-MCNC: NEGATIVE MG/DL
HCT VFR BLD AUTO: 47.3 % (ref 37.5–51)
HGB BLD-MCNC: 16.2 G/DL (ref 13–17.7)
HGB UR QL STRIP.AUTO: NEGATIVE
IMM GRANULOCYTES # BLD AUTO: 0.01 10*3/MM3 (ref 0–0.05)
IMM GRANULOCYTES NFR BLD AUTO: 0.1 % (ref 0–0.5)
KETONES UR QL STRIP: NEGATIVE
LEUKOCYTE ESTERASE UR QL STRIP.AUTO: NEGATIVE
LYMPHOCYTES # BLD AUTO: 1.5 10*3/MM3 (ref 0.7–3.1)
LYMPHOCYTES NFR BLD AUTO: 21.7 % (ref 19.6–45.3)
MCH RBC QN AUTO: 31 PG (ref 26.6–33)
MCHC RBC AUTO-ENTMCNC: 34.2 G/DL (ref 31.5–35.7)
MCV RBC AUTO: 90.4 FL (ref 79–97)
MONOCYTES # BLD AUTO: 0.54 10*3/MM3 (ref 0.1–0.9)
MONOCYTES NFR BLD AUTO: 7.8 % (ref 5–12)
NEUTROPHILS NFR BLD AUTO: 4.39 10*3/MM3 (ref 1.7–7)
NEUTROPHILS NFR BLD AUTO: 63.6 % (ref 42.7–76)
NITRITE UR QL STRIP: NEGATIVE
PH UR STRIP.AUTO: 6.5 [PH] (ref 5–8)
PLATELET # BLD AUTO: 233 10*3/MM3 (ref 140–450)
PMV BLD AUTO: 8.9 FL (ref 6–12)
POTASSIUM SERPL-SCNC: 3.8 MMOL/L (ref 3.5–5.2)
PROT SERPL-MCNC: 6.6 G/DL (ref 6–8.5)
PROT UR QL STRIP: NEGATIVE
RBC # BLD AUTO: 5.23 10*6/MM3 (ref 4.14–5.8)
SODIUM SERPL-SCNC: 139 MMOL/L (ref 136–145)
SP GR UR STRIP: 1.01 (ref 1–1.03)
UROBILINOGEN UR QL STRIP: NORMAL
WBC NRBC COR # BLD AUTO: 6.91 10*3/MM3 (ref 3.4–10.8)

## 2024-04-19 PROCEDURE — 36591 DRAW BLOOD OFF VENOUS DEVICE: CPT

## 2024-04-19 PROCEDURE — 81003 URINALYSIS AUTO W/O SCOPE: CPT | Performed by: INTERNAL MEDICINE

## 2024-04-19 PROCEDURE — 85025 COMPLETE CBC W/AUTO DIFF WBC: CPT | Performed by: INTERNAL MEDICINE

## 2024-04-19 PROCEDURE — 25010000002 HEPARIN LOCK FLUSH PER 10 UNITS: Performed by: INTERNAL MEDICINE

## 2024-04-19 PROCEDURE — 80053 COMPREHEN METABOLIC PANEL: CPT | Performed by: INTERNAL MEDICINE

## 2024-04-19 RX ORDER — HEPARIN SODIUM (PORCINE) LOCK FLUSH IV SOLN 100 UNIT/ML 100 UNIT/ML
500 SOLUTION INTRAVENOUS AS NEEDED
Status: DISCONTINUED | OUTPATIENT
Start: 2024-04-19 | End: 2024-04-20 | Stop reason: HOSPADM

## 2024-04-19 RX ORDER — SODIUM CHLORIDE 0.9 % (FLUSH) 0.9 %
20 SYRINGE (ML) INJECTION AS NEEDED
Status: DISCONTINUED | OUTPATIENT
Start: 2024-04-19 | End: 2024-04-20 | Stop reason: HOSPADM

## 2024-04-19 RX ORDER — HEPARIN SODIUM (PORCINE) LOCK FLUSH IV SOLN 100 UNIT/ML 100 UNIT/ML
500 SOLUTION INTRAVENOUS AS NEEDED
OUTPATIENT
Start: 2024-04-19

## 2024-04-19 RX ORDER — SODIUM CHLORIDE 0.9 % (FLUSH) 0.9 %
20 SYRINGE (ML) INJECTION AS NEEDED
OUTPATIENT
Start: 2024-04-19

## 2024-04-19 RX ADMIN — Medication 20 ML: at 11:16

## 2024-04-19 RX ADMIN — HEPARIN 500 UNITS: 100 SYRINGE at 11:16

## 2024-04-22 ENCOUNTER — OFFICE VISIT (OUTPATIENT)
Dept: ONCOLOGY | Facility: HOSPITAL | Age: 69
End: 2024-04-22
Payer: MEDICARE

## 2024-04-22 VITALS
RESPIRATION RATE: 18 BRPM | BODY MASS INDEX: 34.6 KG/M2 | WEIGHT: 284.17 LBS | OXYGEN SATURATION: 95 % | HEIGHT: 76 IN | DIASTOLIC BLOOD PRESSURE: 98 MMHG | HEART RATE: 78 BPM | SYSTOLIC BLOOD PRESSURE: 155 MMHG | TEMPERATURE: 97.5 F

## 2024-04-22 DIAGNOSIS — C20 RECTAL CANCER: Primary | ICD-10-CM

## 2024-04-22 PROCEDURE — 3077F SYST BP >= 140 MM HG: CPT | Performed by: INTERNAL MEDICINE

## 2024-04-22 PROCEDURE — 1126F AMNT PAIN NOTED NONE PRSNT: CPT | Performed by: INTERNAL MEDICINE

## 2024-04-22 PROCEDURE — 99214 OFFICE O/P EST MOD 30 MIN: CPT | Performed by: INTERNAL MEDICINE

## 2024-04-22 PROCEDURE — G0463 HOSPITAL OUTPT CLINIC VISIT: HCPCS | Performed by: INTERNAL MEDICINE

## 2024-04-22 PROCEDURE — G2211 COMPLEX E/M VISIT ADD ON: HCPCS | Performed by: INTERNAL MEDICINE

## 2024-04-22 PROCEDURE — 1160F RVW MEDS BY RX/DR IN RCRD: CPT | Performed by: INTERNAL MEDICINE

## 2024-04-22 PROCEDURE — 3080F DIAST BP >= 90 MM HG: CPT | Performed by: INTERNAL MEDICINE

## 2024-04-22 PROCEDURE — 1159F MED LIST DOCD IN RCRD: CPT | Performed by: INTERNAL MEDICINE

## 2024-04-22 NOTE — PROGRESS NOTES
Chief Complaint  Rectal Cancer    Eri Moran APRN Reinberg, Emily, APRN Subjective          Robbi Kennedy presents to [facility] for ongoing treatment of his metastatic rectal cancer.  He recently started oral fruquintinib.  Patient notes increased fatigue with the regimen but not as bad as he had with IV chemotherapy.  He denies nausea or vomiting.  No new masses or adenopathy.  No issues from his Port-A-Cath.  He has been working with his PCP and cardiology for his elevated blood pressure.    Oncology/Hematology History Overview Note   9/30/2019 High rectal jszn-onxythqgbq-zrgoetkoramesv adenocarcinoma  associated with tubular adenoma.   4/27/21 Biopsy of liver revealed metastatic adenocarcinoma, consistent with colorectal primary.    Clinical Staging  Stage IIa ([id number])    Chemotherapy      neoadjuvant Xeloda with radiation. adjuvant xeloda        Radiation Therapy      7/8/19 thru 8/14/19 completed neoadjuvant 5040 cGy=28 fxs rectum     10/2021 XRT due to recurrence right middle lung 5 Fx's, 5000cGy  11/26/21 adjuvant FOLFOX -2/25/2022    Surgeries       9/30/2019 low anterior resection with ostomy        8/31/21 partial hepatectomy, cholecystectomy and MWA for segment 6 medical margin performed at     2/8/2023 CT Scans revealed Worsening pulmonary metastatic disease.    2/21/2023 orders written for FOLFIRI + AVASTIN       Rectal cancer   6/2/2021 - 8/15/2021 Chemotherapy    OP COLON mFOLFOX6 + Bevacizumab (OXALIplatin / Leucovorin / Fluorouracil / Bevacizumab)     6/18/2021 Initial Diagnosis    Rectal cancer (CMS/HCC)     6/21/2021 Cancer Staged    Staging form: Colon And Rectum, AJCC 8th Edition  - Clinical: Stage IIA (cT3, cN0, cM0) - Signed by Maurilio Campos MD on 6/21/2021 11/16/2021 - 2/25/2022 Chemotherapy    OP COLON mFOLFOX6 OXALIplatin / Leucovorin / Fluorouracil     2/28/2023 -  Chemotherapy    OP COLORECTAL FOLFIRI + Bevacizumab 5mg/kg  (Irinotecan / Leucovorin / Fluorouracil / Bevacizumab)     3/13/2024 Biopsy    OP COLORECTAL Fruquintinib  Plan Provider: Maurilio Campos MD  Treatment goal: Palliative  Line of treatment: [No plan line of treatment]     Secondary malignant neoplasm of left lung   9/14/2021 Initial Diagnosis    Secondary malignant neoplasm of left lung (HCC)     10/20/2021 -  Radiation    RADIATION THERAPY Treatment Details (Noted on 10/5/2021)  Site: Bilateral Lung  Technique: SBRT  Goal: No goal specified  Planned Treatment Start Date: 10/20/2021     11/16/2021 - 2/25/2022 Chemotherapy    OP COLON mFOLFOX6 OXALIplatin / Leucovorin / Fluorouracil     6/15/2022 -  Radiation    RADIATION THERAPY Treatment Details (Noted on 6/15/2022)  Site: Left Lung - Lower lobe  Technique: SBRT  Goal: No goal specified  Planned Treatment Start Date: No planned start date specified     10/25/2022 -  Radiation    RADIATION THERAPY Treatment Details (Noted on 10/25/2022)  Site: Left Lung  Technique: SBRT  Goal: No goal specified  Planned Treatment Start Date: No planned start date specified     Secondary malignancy of liver   9/14/2021 Initial Diagnosis    Secondary malignancy of liver (HCC)     11/16/2021 - 2/25/2022 Chemotherapy    OP COLON mFOLFOX6 OXALIplatin / Leucovorin / Fluorouracil     Secondary malignant neoplasm of right lung   10/5/2021 Initial Diagnosis    Secondary malignant neoplasm of right lung (HCC)     10/20/2021 -  Radiation    RADIATION THERAPY Treatment Details (Noted on 10/5/2021)  Site: Bilateral Lung  Technique: SBRT  Goal: No goal specified  Planned Treatment Start Date: 10/20/2021     11/16/2021 - 2/25/2022 Chemotherapy    OP COLON mFOLFOX6 OXALIplatin / Leucovorin / Fluorouracil         Review of Systems   Constitutional:  Positive for fatigue. Negative for appetite change, diaphoresis, fever, unexpected weight gain and unexpected weight loss.   HENT:  Negative for hearing loss, mouth sores, sore throat, swollen glands,  trouble swallowing and voice change.    Eyes:  Negative for blurred vision.   Respiratory:  Negative for cough, shortness of breath and wheezing.    Cardiovascular:  Negative for chest pain and palpitations.   Gastrointestinal:  Negative for abdominal pain, blood in stool, constipation, diarrhea, nausea and vomiting.   Endocrine: Negative for cold intolerance and heat intolerance.   Genitourinary:  Negative for difficulty urinating, dysuria, frequency, hematuria and urinary incontinence.   Musculoskeletal:  Negative for arthralgias, back pain and myalgias.   Skin:  Negative for rash, skin lesions and wound.   Neurological:  Positive for headache (From high BP). Negative for dizziness, seizures, weakness and numbness.   Hematological:  Does not bruise/bleed easily.   Psychiatric/Behavioral:  Negative for depressed mood. The patient is not nervous/anxious.      Current Outpatient Medications on File Prior to Visit   Medication Sig Dispense Refill    acetaminophen (TYLENOL) 500 MG tablet Take 1 tablet by mouth Every 6 (Six) Hours As Needed.      apixaban (ELIQUIS) 5 MG tablet tablet Take 1 tablet by mouth 2 (Two) Times a Day.      ascorbic acid (VITAMIN C) 500 MG tablet Take 1 tablet by mouth Daily.      atorvastatin (LIPITOR) 40 MG tablet atorvastatin 40 mg oral tablet take 1 tablet (40 mg) by oral route once daily   Active      clobetasol propionate (TEMOVATE) 0.05 % cream Apply 1 Application topically to the appropriate area as directed 2 (Two) Times a Day. Apply twice daily to the palms of the hands and the soles of the feet. 30 g 5    cyanocobalamin (VITAMIN B-12) 1000 MCG tablet Take 1 tablet by mouth Daily.      Fruquintinib (FRUZAQLA) 5 MG capsule Take 1 capsule by mouth Daily. Take 1 tablet by mouth daily on days 1-21 then 7 days off of a 28-day cycle. 21 capsule 5    furosemide (LASIX) 40 MG tablet Take 1 tablet by mouth Daily. 90 tablet 1    loratadine (CLARITIN) 10 MG tablet loratadine 10 mg oral tablet  take 1 tablet (10 mg) by oral route once daily   Active      metoprolol tartrate (LOPRESSOR) 100 MG tablet Take 1 tablet by mouth 2 (Two) Times a Day. 180 tablet 3    montelukast (SINGULAIR) 10 MG tablet 1 tablet.      multivitamin (THERAGRAN) tablet tablet Take 1 tablet by mouth Daily.      naloxone (NARCAN) 4 MG/0.1ML nasal spray       ondansetron (ZOFRAN) 8 MG tablet Take 1 tablet by mouth 3 (Three) Times a Day As Needed for Nausea or Vomiting. 30 tablet 5    potassium chloride 10 MEQ CR tablet Take 1 tablet by mouth Every Other Day. 45 tablet 3    psyllium (METAMUCIL) 58.6 % packet Take 1 packet by mouth Daily.      Synthroid 25 MCG tablet Take 1 tablet by mouth Daily.      valsartan (DIOVAN) 40 MG tablet Take 1 tablet by mouth Daily. 90 tablet 3    verapamil SR (CALAN-SR) 180 MG CR tablet Take 1 tablet by mouth Every Night. 90 tablet 3    vitamin E 1000 UNIT capsule Take 1 capsule by mouth Daily.      MAGIC MOUTHWASH W/NYSTATIN 1/1/1/1 Swish and swallow 10 mL 4 (Four) Times a Day As Needed for Mouth Pain. (Patient not taking: Reported on 3/12/2024) 500 mL 1     No current facility-administered medications on file prior to visit.       No Known Allergies  Past Medical History:   Diagnosis Date    Asthma     Atrial fibrillation, persistent 2/26/2021    Chronic heart failure with preserved ejection fraction 7/18/2022    BNP 1590 on 7/22 Echocardiogram with EF 55% moderate to severe biatrial enlargement and mild right ventricular enlargement 7/22     Colorectal cancer     Essential hypertension     GI cancer     Hepatitis     History of DVT of lower extremity 8/27/2021    Hyperlipidemia LDL goal <100 12/31/2020    Rectal cancer 6/18/2021    Secondary malignancy of liver 9/14/2021    Secondary malignant neoplasm of left lung 9/14/2021    Secondary malignant neoplasm of right lung 10/5/2021    Thrombocytopenia 1/11/2022    Thyroid disease     Viral upper respiratory tract infection 4/24/2023     Past Surgical  "History:   Procedure Laterality Date    COLON SURGERY      HERNIA REPAIR      LIVER BIOPSY      4/27/21 Biopsy of liver revealed metastatic adenocarcinoma, consistent with colorectal primary    OTHER SURGICAL HISTORY      REMOVED CANCER FROM COLON     Social History     Socioeconomic History    Marital status:     Number of children: 2   Tobacco Use    Smoking status: Never    Smokeless tobacco: Never   Vaping Use    Vaping status: Never Used   Substance and Sexual Activity    Alcohol use: Not Currently     Alcohol/week: 3.0 standard drinks of alcohol     Types: 3 Cans of beer per week     Comment: 2-3 beers weekly    Drug use: Never    Sexual activity: Defer     Family History   Problem Relation Age of Onset    Prostate cancer Father     Heart murmur Mother     Diabetes Mother     Colon cancer Maternal Uncle        Objective   Physical Exam  Vitals reviewed. Exam conducted with a chaperone present.   Constitutional:       General: He is not in acute distress.     Appearance: Normal appearance.   Cardiovascular:      Rate and Rhythm: Normal rate and regular rhythm.      Heart sounds: Normal heart sounds. No murmur heard.     No gallop.   Pulmonary:      Effort: Pulmonary effort is normal.      Breath sounds: Normal breath sounds.      Comments: Port-A-Cath  Abdominal:      General: Abdomen is flat. Bowel sounds are normal.      Palpations: Abdomen is soft.   Neurological:      Mental Status: He is alert.   Psychiatric:         Mood and Affect: Mood normal.         Behavior: Behavior normal.         Vitals:    04/22/24 0811   BP: 155/98   Pulse: 78   Resp: 18   Temp: 97.5 °F (36.4 °C)   SpO2: 95%   Weight: 129 kg (284 lb 2.8 oz)   Height: 193 cm (75.98\")   PainSc: 0-No pain     ECOG score: 0         PHQ-9 Total Score:                    Result Review :   The following data was reviewed by: Maurilio Campos MD on 04/22/2024:  Lab Results   Component Value Date    HGB 16.2 04/19/2024    HCT 47.3 04/19/2024    " MCV 90.4 04/19/2024     04/19/2024    WBC 6.91 04/19/2024    NEUTROABS 4.39 04/19/2024    LYMPHSABS 1.50 04/19/2024    MONOSABS 0.54 04/19/2024    EOSABS 0.45 (H) 04/19/2024    BASOSABS 0.02 04/19/2024     Lab Results   Component Value Date    GLUCOSE 95 04/19/2024    BUN 10 04/19/2024    CREATININE 0.79 04/19/2024     04/19/2024    K 3.8 04/19/2024     04/19/2024    CO2 30.0 (H) 04/19/2024    CALCIUM 7.9 (L) 04/19/2024    PROTEINTOT 6.6 04/19/2024    ALBUMIN 4.0 04/19/2024    BILITOT 0.7 04/19/2024    ALKPHOS 100 04/19/2024    AST 22 04/19/2024    ALT 22 04/19/2024     Lab Results   Component Value Date    MG 1.8 (L) 09/07/2021    FREET4 0.81 12/12/2023    TSH 3.250 02/24/2021     Lab Results   Component Value Date    IRON 60 (L) 03/04/2020    LABIRON 20 03/04/2020    TRANSFERRIN 215.00 03/04/2020    TIBC 307 03/04/2020     Lab Results   Component Value Date    FERRITIN 140 03/04/2020    SRJOCZAQ98 >2000 (H) 03/04/2020    FOLATE 19.4 03/04/2020     Lab Results   Component Value Date    PSA 0.92 12/12/2023    CEA 11.4 (H) 02/20/2024             Assessment and Plan    Diagnoses and all orders for this visit:    1. Rectal cancer [C20] (Primary)  Assessment & Plan:  Metastatic/recurrent.  Patient is on palliative treatment with fruquintinib.  He is having increased fatigue from the regimen but otherwise tolerating well.  We discussed good nutrition, fluid intake, adequate rest at night and light exercise to help with fatigue symptoms.  Continue fruquintinib as ordered.  I will see back in 1 month for ongoing treatment with lab work prior.  He will continue to follow-up with PCP and cardiology for his blood pressure.    Orders:  -     CBC & Differential; Future  -     Comprehensive Metabolic Panel; Future  -     Urinalysis With Microscopic - Urine, Clean Catch; Future            Patient Follow Up: 1 month    Patient was given instructions and counseling regarding his condition or for health  maintenance advice. Please see specific information pulled into the AVS if appropriate.     Maurilio Campos MD    4/22/2024

## 2024-04-22 NOTE — ASSESSMENT & PLAN NOTE
Metastatic/recurrent.  Patient is on palliative treatment with fruquintinib.  He is having increased fatigue from the regimen but otherwise tolerating well.  We discussed good nutrition, fluid intake, adequate rest at night and light exercise to help with fatigue symptoms.  Continue fruquintinib as ordered.  I will see back in 1 month for ongoing treatment with lab work prior.  He will continue to follow-up with PCP and cardiology for his blood pressure.

## 2024-04-23 ENCOUNTER — SPECIALTY PHARMACY (OUTPATIENT)
Dept: PHARMACY | Facility: HOSPITAL | Age: 69
End: 2024-04-23
Payer: MEDICARE

## 2024-05-07 ENCOUNTER — TELEPHONE (OUTPATIENT)
Dept: ONCOLOGY | Facility: HOSPITAL | Age: 69
End: 2024-05-07
Payer: MEDICARE

## 2024-05-16 ENCOUNTER — HOSPITAL ENCOUNTER (OUTPATIENT)
Dept: ONCOLOGY | Facility: HOSPITAL | Age: 69
Discharge: HOME | End: 2024-05-16
Admitting: INTERNAL MEDICINE
Payer: MEDICARE

## 2024-05-16 DIAGNOSIS — Z45.2 ENCOUNTER FOR ADJUSTMENT OR MANAGEMENT OF VASCULAR ACCESS DEVICE: Primary | ICD-10-CM

## 2024-05-16 DIAGNOSIS — C20 RECTAL CANCER: ICD-10-CM

## 2024-05-16 LAB
ALBUMIN SERPL-MCNC: 3.9 G/DL (ref 3.5–5.2)
ALBUMIN/GLOB SERPL: 1.3 G/DL
ALP SERPL-CCNC: 92 U/L (ref 39–117)
ALT SERPL W P-5'-P-CCNC: 16 U/L (ref 1–41)
ANION GAP SERPL CALCULATED.3IONS-SCNC: 9.7 MMOL/L (ref 5–15)
AST SERPL-CCNC: 22 U/L (ref 1–40)
BACTERIA UR QL AUTO: NORMAL /HPF
BASOPHILS # BLD AUTO: 0.03 10*3/MM3 (ref 0–0.2)
BASOPHILS NFR BLD AUTO: 0.5 % (ref 0–1.5)
BILIRUB SERPL-MCNC: 0.8 MG/DL (ref 0–1.2)
BILIRUB UR QL STRIP: NEGATIVE
BUN SERPL-MCNC: 11 MG/DL (ref 8–23)
BUN/CREAT SERPL: 11.2 (ref 7–25)
CALCIUM SPEC-SCNC: 9 MG/DL (ref 8.6–10.5)
CHLORIDE SERPL-SCNC: 102 MMOL/L (ref 98–107)
CLARITY UR: CLEAR
CO2 SERPL-SCNC: 28.3 MMOL/L (ref 22–29)
COLOR UR: YELLOW
CREAT SERPL-MCNC: 0.98 MG/DL (ref 0.76–1.27)
DEPRECATED RDW RBC AUTO: 47.3 FL (ref 37–54)
EGFRCR SERPLBLD CKD-EPI 2021: 84.5 ML/MIN/1.73
EOSINOPHIL # BLD AUTO: 0.51 10*3/MM3 (ref 0–0.4)
EOSINOPHIL NFR BLD AUTO: 8.2 % (ref 0.3–6.2)
ERYTHROCYTE [DISTWIDTH] IN BLOOD BY AUTOMATED COUNT: 13.8 % (ref 12.3–15.4)
GLOBULIN UR ELPH-MCNC: 3 GM/DL
GLUCOSE SERPL-MCNC: 85 MG/DL (ref 65–99)
GLUCOSE UR STRIP-MCNC: NEGATIVE MG/DL
HCT VFR BLD AUTO: 46.9 % (ref 37.5–51)
HGB BLD-MCNC: 15.7 G/DL (ref 13–17.7)
HGB UR QL STRIP.AUTO: NEGATIVE
HYALINE CASTS UR QL AUTO: NORMAL /LPF
IMM GRANULOCYTES # BLD AUTO: 0.01 10*3/MM3 (ref 0–0.05)
IMM GRANULOCYTES NFR BLD AUTO: 0.2 % (ref 0–0.5)
KETONES UR QL STRIP: NEGATIVE
LEUKOCYTE ESTERASE UR QL STRIP.AUTO: NEGATIVE
LYMPHOCYTES # BLD AUTO: 1.66 10*3/MM3 (ref 0.7–3.1)
LYMPHOCYTES NFR BLD AUTO: 26.8 % (ref 19.6–45.3)
MCH RBC QN AUTO: 31 PG (ref 26.6–33)
MCHC RBC AUTO-ENTMCNC: 33.5 G/DL (ref 31.5–35.7)
MCV RBC AUTO: 92.7 FL (ref 79–97)
MONOCYTES # BLD AUTO: 0.57 10*3/MM3 (ref 0.1–0.9)
MONOCYTES NFR BLD AUTO: 9.2 % (ref 5–12)
NEUTROPHILS NFR BLD AUTO: 3.42 10*3/MM3 (ref 1.7–7)
NEUTROPHILS NFR BLD AUTO: 55.1 % (ref 42.7–76)
NITRITE UR QL STRIP: NEGATIVE
PH UR STRIP.AUTO: 6 [PH] (ref 5–8)
PLATELET # BLD AUTO: 249 10*3/MM3 (ref 140–450)
PMV BLD AUTO: 9 FL (ref 6–12)
POTASSIUM SERPL-SCNC: 3.8 MMOL/L (ref 3.5–5.2)
PROT SERPL-MCNC: 6.9 G/DL (ref 6–8.5)
PROT UR QL STRIP: NEGATIVE
RBC # BLD AUTO: 5.06 10*6/MM3 (ref 4.14–5.8)
RBC # UR STRIP: NORMAL /HPF
REF LAB TEST METHOD: NORMAL
SODIUM SERPL-SCNC: 140 MMOL/L (ref 136–145)
SP GR UR STRIP: <=1.005 (ref 1–1.03)
SQUAMOUS #/AREA URNS HPF: NORMAL /HPF
UROBILINOGEN UR QL STRIP: NORMAL
WBC # UR STRIP: NORMAL /HPF
WBC NRBC COR # BLD AUTO: 6.2 10*3/MM3 (ref 3.4–10.8)

## 2024-05-16 PROCEDURE — 85025 COMPLETE CBC W/AUTO DIFF WBC: CPT | Performed by: INTERNAL MEDICINE

## 2024-05-16 PROCEDURE — 80053 COMPREHEN METABOLIC PANEL: CPT | Performed by: INTERNAL MEDICINE

## 2024-05-16 PROCEDURE — 25010000002 HEPARIN LOCK FLUSH PER 10 UNITS: Performed by: INTERNAL MEDICINE

## 2024-05-16 PROCEDURE — 81001 URINALYSIS AUTO W/SCOPE: CPT | Performed by: INTERNAL MEDICINE

## 2024-05-16 PROCEDURE — 36591 DRAW BLOOD OFF VENOUS DEVICE: CPT

## 2024-05-16 RX ORDER — HEPARIN SODIUM (PORCINE) LOCK FLUSH IV SOLN 100 UNIT/ML 100 UNIT/ML
500 SOLUTION INTRAVENOUS AS NEEDED
OUTPATIENT
Start: 2024-05-16

## 2024-05-16 RX ORDER — SODIUM CHLORIDE 0.9 % (FLUSH) 0.9 %
20 SYRINGE (ML) INJECTION AS NEEDED
Status: DISCONTINUED | OUTPATIENT
Start: 2024-05-16 | End: 2024-05-17 | Stop reason: HOSPADM

## 2024-05-16 RX ORDER — HEPARIN SODIUM (PORCINE) LOCK FLUSH IV SOLN 100 UNIT/ML 100 UNIT/ML
500 SOLUTION INTRAVENOUS AS NEEDED
Status: DISCONTINUED | OUTPATIENT
Start: 2024-05-16 | End: 2024-05-17 | Stop reason: HOSPADM

## 2024-05-16 RX ORDER — SODIUM CHLORIDE 0.9 % (FLUSH) 0.9 %
20 SYRINGE (ML) INJECTION AS NEEDED
OUTPATIENT
Start: 2024-05-16

## 2024-05-16 RX ADMIN — Medication 20 ML: at 10:43

## 2024-05-16 RX ADMIN — HEPARIN 500 UNITS: 100 SYRINGE at 10:43

## 2024-05-21 ENCOUNTER — OFFICE VISIT (OUTPATIENT)
Dept: ONCOLOGY | Facility: HOSPITAL | Age: 69
End: 2024-05-21
Payer: MEDICARE

## 2024-05-21 VITALS
TEMPERATURE: 97.5 F | WEIGHT: 280.6 LBS | SYSTOLIC BLOOD PRESSURE: 149 MMHG | OXYGEN SATURATION: 97 % | DIASTOLIC BLOOD PRESSURE: 94 MMHG | HEART RATE: 84 BPM | RESPIRATION RATE: 18 BRPM | BODY MASS INDEX: 34.17 KG/M2

## 2024-05-21 DIAGNOSIS — C20 RECTAL CANCER: Primary | ICD-10-CM

## 2024-05-21 PROCEDURE — 1159F MED LIST DOCD IN RCRD: CPT | Performed by: INTERNAL MEDICINE

## 2024-05-21 PROCEDURE — 3077F SYST BP >= 140 MM HG: CPT | Performed by: INTERNAL MEDICINE

## 2024-05-21 PROCEDURE — 99214 OFFICE O/P EST MOD 30 MIN: CPT | Performed by: INTERNAL MEDICINE

## 2024-05-21 PROCEDURE — 1126F AMNT PAIN NOTED NONE PRSNT: CPT | Performed by: INTERNAL MEDICINE

## 2024-05-21 PROCEDURE — 3080F DIAST BP >= 90 MM HG: CPT | Performed by: INTERNAL MEDICINE

## 2024-05-21 PROCEDURE — 1160F RVW MEDS BY RX/DR IN RCRD: CPT | Performed by: INTERNAL MEDICINE

## 2024-05-21 NOTE — PROGRESS NOTES
Chief Complaint  Rectal Cancer    Eri Moran APRN Reinberg, Emily, APRN Subjective          Robbi Kennedy presents to [facility]EMATOLOG[facility] ONCOLOGY for ongoing treatment of his metastatic rectal cancer.  He is on fruquintinib.  He is compliant with the regimen.  He notes occasional nausea but antiemetics are helpful.  He notes good appetite and energy level.  He has a little constipation but generally the bowels working normally.  He denies blood in the bowels or melena.    Oncology/Hematology History Overview Note   9/30/2019 High rectal qkmi-jzvjexanyu-fewyywrenrcncl adenocarcinoma  associated with tubular adenoma.   4/27/21 Biopsy of liver revealed metastatic adenocarcinoma, consistent with colorectal primary.    Clinical Staging  Stage IIa ([id number])    Chemotherapy      neoadjuvant Xeloda with radiation. adjuvant xeloda        Radiation Therapy      7/8/19 thru 8/14/19 completed neoadjuvant 5040 cGy=28 fxs rectum     10/2021 XRT due to recurrence right middle lung 5 Fx's, 5000cGy  11/26/21 adjuvant FOLFOX -2/25/2022    Surgeries       9/30/2019 low anterior resection with ostomy        8/31/21 partial hepatectomy, cholecystectomy and MWA for segment 6 medical margin performed at     2/8/2023 CT Scans revealed Worsening pulmonary metastatic disease.    2/21/2023 orders written for FOLFIRI + AVASTIN       Rectal cancer   6/2/2021 - 8/15/2021 Chemotherapy    OP COLON mFOLFOX6 + Bevacizumab (OXALIplatin / Leucovorin / Fluorouracil / Bevacizumab)     6/18/2021 Initial Diagnosis    Rectal cancer (CMS/HCC)     6/21/2021 Cancer Staged    Staging form: Colon And Rectum, AJCC 8th Edition  - Clinical: Stage IIA (cT3, cN0, cM0) - Signed by Maurilio Campos MD on 6/21/2021 11/16/2021 - 2/25/2022 Chemotherapy    OP COLON mFOLFOX6 OXALIplatin / Leucovorin / Fluorouracil     2/28/2023 - 8/24/2023 Chemotherapy    OP COLORECTAL FOLFIRI + Bevacizumab 5mg/kg (Irinotecan / Leucovorin /  Fluorouracil / Bevacizumab)     3/13/2024 Biopsy    OP COLORECTAL Fruquintinib  Plan Provider: Maurilio Campos MD  Treatment goal: Palliative  Line of treatment: [No plan line of treatment]     Secondary malignant neoplasm of left lung   9/14/2021 Initial Diagnosis    Secondary malignant neoplasm of left lung (HCC)     10/20/2021 -  Radiation    RADIATION THERAPY Treatment Details (Noted on 10/5/2021)  Site: Bilateral Lung  Technique: SBRT  Goal: No goal specified  Planned Treatment Start Date: 10/20/2021     11/16/2021 - 2/25/2022 Chemotherapy    OP COLON mFOLFOX6 OXALIplatin / Leucovorin / Fluorouracil     6/15/2022 -  Radiation    RADIATION THERAPY Treatment Details (Noted on 6/15/2022)  Site: Left Lung - Lower lobe  Technique: SBRT  Goal: No goal specified  Planned Treatment Start Date: No planned start date specified     10/25/2022 -  Radiation    RADIATION THERAPY Treatment Details (Noted on 10/25/2022)  Site: Left Lung  Technique: SBRT  Goal: No goal specified  Planned Treatment Start Date: No planned start date specified     Secondary malignancy of liver   9/14/2021 Initial Diagnosis    Secondary malignancy of liver (HCC)     11/16/2021 - 2/25/2022 Chemotherapy    OP COLON mFOLFOX6 OXALIplatin / Leucovorin / Fluorouracil     Secondary malignant neoplasm of right lung   10/5/2021 Initial Diagnosis    Secondary malignant neoplasm of right lung (HCC)     10/20/2021 -  Radiation    RADIATION THERAPY Treatment Details (Noted on 10/5/2021)  Site: Bilateral Lung  Technique: SBRT  Goal: No goal specified  Planned Treatment Start Date: 10/20/2021     11/16/2021 - 2/25/2022 Chemotherapy    OP COLON mFOLFOX6 OXALIplatin / Leucovorin / Fluorouracil         Review of Systems  Current Outpatient Medications on File Prior to Visit   Medication Sig Dispense Refill    acetaminophen (TYLENOL) 500 MG tablet Take 1 tablet by mouth Every 6 (Six) Hours As Needed.      apixaban (ELIQUIS) 5 MG tablet tablet Take 1 tablet by mouth  2 (Two) Times a Day.      ascorbic acid (VITAMIN C) 500 MG tablet Take 1 tablet by mouth Daily.      atorvastatin (LIPITOR) 40 MG tablet atorvastatin 40 mg oral tablet take 1 tablet (40 mg) by oral route once daily   Active      clobetasol propionate (TEMOVATE) 0.05 % cream Apply 1 Application topically to the appropriate area as directed 2 (Two) Times a Day. Apply twice daily to the palms of the hands and the soles of the feet. 30 g 5    cyanocobalamin (VITAMIN B-12) 1000 MCG tablet Take 1 tablet by mouth Daily.      Fruquintinib (FRUZAQLA) 5 MG capsule Take 1 capsule by mouth Daily. Take 1 tablet by mouth daily on days 1-21 then 7 days off of a 28-day cycle. 21 capsule 5    furosemide (LASIX) 40 MG tablet Take 1 tablet by mouth Daily. 90 tablet 1    loratadine (CLARITIN) 10 MG tablet loratadine 10 mg oral tablet take 1 tablet (10 mg) by oral route once daily   Active      MAGIC MOUTHWASH W/NYSTATIN 1/1/1/1 Swish and swallow 10 mL 4 (Four) Times a Day As Needed for Mouth Pain. (Patient not taking: Reported on 3/12/2024) 500 mL 1    metoprolol tartrate (LOPRESSOR) 100 MG tablet Take 1 tablet by mouth 2 (Two) Times a Day. 180 tablet 3    montelukast (SINGULAIR) 10 MG tablet 1 tablet.      multivitamin (THERAGRAN) tablet tablet Take 1 tablet by mouth Daily.      naloxone (NARCAN) 4 MG/0.1ML nasal spray       ondansetron (ZOFRAN) 8 MG tablet Take 1 tablet by mouth 3 (Three) Times a Day As Needed for Nausea or Vomiting. 30 tablet 5    potassium chloride 10 MEQ CR tablet Take 1 tablet by mouth Every Other Day. 45 tablet 3    psyllium (METAMUCIL) 58.6 % packet Take 1 packet by mouth Daily.      Synthroid 25 MCG tablet Take 1 tablet by mouth Daily.      valsartan (DIOVAN) 80 MG tablet Take 1 tablet by mouth Daily. 90 tablet 3    verapamil SR (CALAN-SR) 180 MG CR tablet Take 1 tablet by mouth Every Night. 90 tablet 3    vitamin E 1000 UNIT capsule Take 1 capsule by mouth Daily.       No current facility-administered  medications on file prior to visit.       No Known Allergies  Past Medical History:   Diagnosis Date    Asthma     Atrial fibrillation, persistent 2/26/2021    Chronic heart failure with preserved ejection fraction 7/18/2022    BNP 1590 on 7/22 Echocardiogram with EF 55% moderate to severe biatrial enlargement and mild right ventricular enlargement 7/22     Colorectal cancer     Essential hypertension     GI cancer     Hepatitis     History of DVT of lower extremity 8/27/2021    Hyperlipidemia LDL goal <100 12/31/2020    Rectal cancer 6/18/2021    Secondary malignancy of liver 9/14/2021    Secondary malignant neoplasm of left lung 9/14/2021    Secondary malignant neoplasm of right lung 10/5/2021    Thrombocytopenia 1/11/2022    Thyroid disease     Viral upper respiratory tract infection 4/24/2023     Past Surgical History:   Procedure Laterality Date    COLON SURGERY      HERNIA REPAIR      LIVER BIOPSY      4/27/21 Biopsy of liver revealed metastatic adenocarcinoma, consistent with colorectal primary    OTHER SURGICAL HISTORY      REMOVED CANCER FROM COLON     Social History     Socioeconomic History    Marital status:     Number of children: 2   Tobacco Use    Smoking status: Never    Smokeless tobacco: Never   Vaping Use    Vaping status: Never Used   Substance and Sexual Activity    Alcohol use: Not Currently     Alcohol/week: 3.0 standard drinks of alcohol     Types: 3 Cans of beer per week     Comment: 2-3 beers weekly    Drug use: Never    Sexual activity: Defer     Family History   Problem Relation Age of Onset    Prostate cancer Father     Heart murmur Mother     Diabetes Mother     Colon cancer Maternal Uncle        Objective   Physical Exam  Vitals reviewed. Exam conducted with a chaperone present.   Cardiovascular:      Rate and Rhythm: Normal rate and regular rhythm.      Heart sounds: Normal heart sounds. No murmur heard.     No gallop.   Pulmonary:      Effort: Pulmonary effort is normal.       Breath sounds: Normal breath sounds.      Comments: Port-A-Cath  Abdominal:      General: Abdomen is flat. Bowel sounds are normal.      Palpations: Abdomen is soft.   Lymphadenopathy:      Cervical: No cervical adenopathy.   Psychiatric:         Mood and Affect: Mood normal.         Behavior: Behavior normal.         Vitals:    05/21/24 0831   BP: 149/94   Pulse: 84   Resp: 18   Temp: 97.5 °F (36.4 °C)   TempSrc: Temporal   SpO2: 97%   Weight: 127 kg (280 lb 9.6 oz)   PainSc: 0-No pain     ECOG score: 0         PHQ-9 Total Score:                    Result Review :   The following data was reviewed by: Maurilio Campos MD on 05/21/2024:  Lab Results   Component Value Date    HGB 15.7 05/16/2024    HCT 46.9 05/16/2024    MCV 92.7 05/16/2024     05/16/2024    WBC 6.20 05/16/2024    NEUTROABS 3.42 05/16/2024    LYMPHSABS 1.66 05/16/2024    MONOSABS 0.57 05/16/2024    EOSABS 0.51 (H) 05/16/2024    BASOSABS 0.03 05/16/2024     Lab Results   Component Value Date    GLUCOSE 85 05/16/2024    BUN 11 05/16/2024    CREATININE 0.98 05/16/2024     05/16/2024    K 3.8 05/16/2024     05/16/2024    CO2 28.3 05/16/2024    CALCIUM 9.0 05/16/2024    PROTEINTOT 6.9 05/16/2024    ALBUMIN 3.9 05/16/2024    BILITOT 0.8 05/16/2024    ALKPHOS 92 05/16/2024    AST 22 05/16/2024    ALT 16 05/16/2024     Lab Results   Component Value Date    MG 1.8 (L) 09/07/2021    FREET4 0.81 12/12/2023    TSH 3.250 02/24/2021     Lab Results   Component Value Date    IRON 60 (L) 03/04/2020    LABIRON 20 03/04/2020    TRANSFERRIN 215.00 03/04/2020    TIBC 307 03/04/2020     Lab Results   Component Value Date    FERRITIN 140 03/04/2020    [id number] >2000 (H) 03/04/2020    FOLATE 19.4 03/04/2020     Lab Results   Component Value Date    PSA 0.92 12/12/2023    CEA 11.4 (H) 02/20/2024     UA negative for protein        Assessment and Plan    Diagnoses and all orders for this visit:    1. Rectal cancer (Primary)  Assessment &  Plan:  Metastatic.  Status post multiple lines of therapy as outlined above.  He is currently on single agent fruquintinib.  Overall tolerating well with only occasional nausea.  Lab work today looks good including urinalysis which has no protein.  Blood pressure slightly elevated here in the office today but at home he states his diastolic is usually in the 80s.  He does take blood pressure medicine.  Continue fruquintinib.  I will see him back in 1 month for ongoing treatment with lab work prior to monitor for toxicities, CEA and CT scans to assess response to therapy.    Orders:  -     CBC & Differential; Future  -     Comprehensive Metabolic Panel; Future  -     Urinalysis With Microscopic - Urine, Clean Catch; Future  -     CT chest w contrast; Future  -     CT abdomen pelvis w contrast; Future  -     CEA; Future            Patient Follow Up: 1 month    Patient was given instructions and counseling regarding his condition or for health maintenance advice. Please see specific information pulled into the AVS if appropriate.     Maurilio Campos MD    5/21/2024

## 2024-05-21 NOTE — ASSESSMENT & PLAN NOTE
Metastatic.  Status post multiple lines of therapy as outlined above.  He is currently on single agent fruquintinib.  Overall tolerating well with only occasional nausea.  Lab work today looks good including urinalysis which has no protein.  Blood pressure slightly elevated here in the office today but at home he states his diastolic is usually in the 80s.  He does take blood pressure medicine.  Continue fruquintinib.  I will see him back in 1 month for ongoing treatment with lab work prior to monitor for toxicities, CEA and CT scans to assess response to therapy.

## 2024-05-29 ENCOUNTER — SPECIALTY PHARMACY (OUTPATIENT)
Dept: PHARMACY | Facility: HOSPITAL | Age: 69
End: 2024-05-29
Payer: MEDICARE

## 2024-06-14 ENCOUNTER — TELEPHONE (OUTPATIENT)
Dept: ONCOLOGY | Facility: HOSPITAL | Age: 69
End: 2024-06-14
Payer: MEDICARE

## 2024-06-14 NOTE — TELEPHONE ENCOUNTER
Left VM reminder CT 6/17 along with labs. Left our call back number for questions, concerns or needing to r/s.

## 2024-06-17 ENCOUNTER — HOSPITAL ENCOUNTER (OUTPATIENT)
Dept: CT IMAGING | Facility: HOSPITAL | Age: 69
Discharge: HOME | End: 2024-06-17
Admitting: INTERNAL MEDICINE
Payer: MEDICARE

## 2024-06-17 DIAGNOSIS — C20 RECTAL CANCER: ICD-10-CM

## 2024-06-17 LAB
CREAT BLDA-MCNC: 1 MG/DL (ref 0.6–1.3)
EGFRCR SERPLBLD CKD-EPI 2021: 82 ML/MIN/1.73

## 2024-06-17 PROCEDURE — 82565 ASSAY OF CREATININE: CPT

## 2024-06-17 PROCEDURE — 25510000001 IOPAMIDOL PER 1 ML: Performed by: INTERNAL MEDICINE

## 2024-06-17 PROCEDURE — 74177 CT ABD & PELVIS W/CONTRAST: CPT

## 2024-06-17 PROCEDURE — 71260 CT THORAX DX C+: CPT

## 2024-06-17 RX ADMIN — IOPAMIDOL 100 ML: 755 INJECTION, SOLUTION INTRAVENOUS at 15:03

## 2024-06-18 ENCOUNTER — HOSPITAL ENCOUNTER (OUTPATIENT)
Dept: ONCOLOGY | Facility: HOSPITAL | Age: 69
Discharge: HOME | End: 2024-06-18
Admitting: INTERNAL MEDICINE
Payer: MEDICARE

## 2024-06-18 DIAGNOSIS — C20 RECTAL CANCER: ICD-10-CM

## 2024-06-18 DIAGNOSIS — Z45.2 ENCOUNTER FOR ADJUSTMENT OR MANAGEMENT OF VASCULAR ACCESS DEVICE: Primary | ICD-10-CM

## 2024-06-18 LAB
ALBUMIN SERPL-MCNC: 4 G/DL (ref 3.5–5.2)
ALBUMIN/GLOB SERPL: 1.7 G/DL
ALP SERPL-CCNC: 85 U/L (ref 39–117)
ALT SERPL W P-5'-P-CCNC: 18 U/L (ref 1–41)
ANION GAP SERPL CALCULATED.3IONS-SCNC: 7.1 MMOL/L (ref 5–15)
AST SERPL-CCNC: 23 U/L (ref 1–40)
BACTERIA UR QL AUTO: NORMAL /HPF
BASOPHILS # BLD AUTO: 0.02 10*3/MM3 (ref 0–0.2)
BASOPHILS NFR BLD AUTO: 0.3 % (ref 0–1.5)
BILIRUB SERPL-MCNC: 0.9 MG/DL (ref 0–1.2)
BILIRUB UR QL STRIP: NEGATIVE
BUN SERPL-MCNC: 9 MG/DL (ref 8–23)
BUN/CREAT SERPL: 10.3 (ref 7–25)
CALCIUM SPEC-SCNC: 9.1 MG/DL (ref 8.6–10.5)
CEA SERPL-MCNC: 11.5 NG/ML
CHLORIDE SERPL-SCNC: 102 MMOL/L (ref 98–107)
CLARITY UR: CLEAR
CO2 SERPL-SCNC: 28.9 MMOL/L (ref 22–29)
COLOR UR: ABNORMAL
CREAT SERPL-MCNC: 0.87 MG/DL (ref 0.76–1.27)
DEPRECATED RDW RBC AUTO: 46.3 FL (ref 37–54)
EGFRCR SERPLBLD CKD-EPI 2021: 94 ML/MIN/1.73
EOSINOPHIL # BLD AUTO: 0.22 10*3/MM3 (ref 0–0.4)
EOSINOPHIL NFR BLD AUTO: 3.2 % (ref 0.3–6.2)
ERYTHROCYTE [DISTWIDTH] IN BLOOD BY AUTOMATED COUNT: 13.4 % (ref 12.3–15.4)
GLOBULIN UR ELPH-MCNC: 2.3 GM/DL
GLUCOSE SERPL-MCNC: 97 MG/DL (ref 65–99)
GLUCOSE UR STRIP-MCNC: NEGATIVE MG/DL
HCT VFR BLD AUTO: 46.6 % (ref 37.5–51)
HGB BLD-MCNC: 15.9 G/DL (ref 13–17.7)
HGB UR QL STRIP.AUTO: NEGATIVE
HYALINE CASTS UR QL AUTO: NORMAL /LPF
IMM GRANULOCYTES # BLD AUTO: 0.02 10*3/MM3 (ref 0–0.05)
IMM GRANULOCYTES NFR BLD AUTO: 0.3 % (ref 0–0.5)
KETONES UR QL STRIP: NEGATIVE
LEUKOCYTE ESTERASE UR QL STRIP.AUTO: NEGATIVE
LYMPHOCYTES # BLD AUTO: 1.6 10*3/MM3 (ref 0.7–3.1)
LYMPHOCYTES NFR BLD AUTO: 23.4 % (ref 19.6–45.3)
MCH RBC QN AUTO: 31.7 PG (ref 26.6–33)
MCHC RBC AUTO-ENTMCNC: 34.1 G/DL (ref 31.5–35.7)
MCV RBC AUTO: 93 FL (ref 79–97)
MONOCYTES # BLD AUTO: 0.52 10*3/MM3 (ref 0.1–0.9)
MONOCYTES NFR BLD AUTO: 7.6 % (ref 5–12)
NEUTROPHILS NFR BLD AUTO: 4.46 10*3/MM3 (ref 1.7–7)
NEUTROPHILS NFR BLD AUTO: 65.2 % (ref 42.7–76)
NITRITE UR QL STRIP: NEGATIVE
PH UR STRIP.AUTO: 6 [PH] (ref 5–8)
PLATELET # BLD AUTO: 207 10*3/MM3 (ref 140–450)
PMV BLD AUTO: 9.3 FL (ref 6–12)
POTASSIUM SERPL-SCNC: 3.7 MMOL/L (ref 3.5–5.2)
PROT SERPL-MCNC: 6.3 G/DL (ref 6–8.5)
PROT UR QL STRIP: NEGATIVE
RBC # BLD AUTO: 5.01 10*6/MM3 (ref 4.14–5.8)
RBC # UR STRIP: NORMAL /HPF
REF LAB TEST METHOD: NORMAL
SODIUM SERPL-SCNC: 138 MMOL/L (ref 136–145)
SP GR UR STRIP: 1.01 (ref 1–1.03)
SQUAMOUS #/AREA URNS HPF: NORMAL /HPF
UROBILINOGEN UR QL STRIP: ABNORMAL
WBC # UR STRIP: NORMAL /HPF
WBC NRBC COR # BLD AUTO: 6.84 10*3/MM3 (ref 3.4–10.8)

## 2024-06-18 PROCEDURE — 81001 URINALYSIS AUTO W/SCOPE: CPT | Performed by: INTERNAL MEDICINE

## 2024-06-18 PROCEDURE — 85025 COMPLETE CBC W/AUTO DIFF WBC: CPT | Performed by: INTERNAL MEDICINE

## 2024-06-18 PROCEDURE — 80053 COMPREHEN METABOLIC PANEL: CPT | Performed by: INTERNAL MEDICINE

## 2024-06-18 PROCEDURE — 82378 CARCINOEMBRYONIC ANTIGEN: CPT | Performed by: INTERNAL MEDICINE

## 2024-06-18 PROCEDURE — 36591 DRAW BLOOD OFF VENOUS DEVICE: CPT

## 2024-06-18 PROCEDURE — 25010000002 HEPARIN LOCK FLUSH PER 10 UNITS: Performed by: INTERNAL MEDICINE

## 2024-06-18 RX ORDER — SODIUM CHLORIDE 0.9 % (FLUSH) 0.9 %
20 SYRINGE (ML) INJECTION AS NEEDED
OUTPATIENT
Start: 2024-06-18

## 2024-06-18 RX ORDER — HEPARIN SODIUM (PORCINE) LOCK FLUSH IV SOLN 100 UNIT/ML 100 UNIT/ML
500 SOLUTION INTRAVENOUS AS NEEDED
Status: DISCONTINUED | OUTPATIENT
Start: 2024-06-18 | End: 2024-06-19 | Stop reason: HOSPADM

## 2024-06-18 RX ORDER — HEPARIN SODIUM (PORCINE) LOCK FLUSH IV SOLN 100 UNIT/ML 100 UNIT/ML
500 SOLUTION INTRAVENOUS AS NEEDED
OUTPATIENT
Start: 2024-06-18

## 2024-06-18 RX ORDER — SODIUM CHLORIDE 0.9 % (FLUSH) 0.9 %
20 SYRINGE (ML) INJECTION AS NEEDED
Status: DISCONTINUED | OUTPATIENT
Start: 2024-06-18 | End: 2024-06-19 | Stop reason: HOSPADM

## 2024-06-18 RX ADMIN — HEPARIN 500 UNITS: 100 SYRINGE at 11:00

## 2024-06-18 RX ADMIN — Medication 20 ML: at 11:00

## 2024-06-19 ENCOUNTER — OFFICE VISIT (OUTPATIENT)
Dept: ONCOLOGY | Facility: HOSPITAL | Age: 69
End: 2024-06-19
Payer: MEDICARE

## 2024-06-19 VITALS
BODY MASS INDEX: 33.61 KG/M2 | TEMPERATURE: 98.6 F | DIASTOLIC BLOOD PRESSURE: 113 MMHG | RESPIRATION RATE: 17 BRPM | HEART RATE: 79 BPM | WEIGHT: 276 LBS | OXYGEN SATURATION: 99 % | SYSTOLIC BLOOD PRESSURE: 165 MMHG

## 2024-06-19 DIAGNOSIS — C20 RECTAL CANCER: Primary | ICD-10-CM

## 2024-06-19 DIAGNOSIS — Z79.899 ENCOUNTER FOR LONG-TERM (CURRENT) USE OF HIGH-RISK MEDICATION: ICD-10-CM

## 2024-06-19 PROCEDURE — G0463 HOSPITAL OUTPT CLINIC VISIT: HCPCS | Performed by: INTERNAL MEDICINE

## 2024-06-19 NOTE — PROGRESS NOTES
Chief Complaint  Rectal Cancer    Eri Moran APRN Reinberg, Emily, APRN    Subjective          Robbi Kennedy presents to [facility]EMATOLOGY [facility] ONCOLOGY for ongoing treatment of his metastatic rectal cancer.  He is on fruquintinib.  Tolerating his treatment well.  He denies issues or side effects.  No new masses or adenopathy.  No unusual aches or pains.  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    Oncology/Hematology History Overview Note   9/30/2019 High rectal dziw-kknkgbegso-eyhftxsccptjlw adenocarcinoma  associated with tubular adenoma.   4/27/21 Biopsy of liver revealed metastatic adenocarcinoma, consistent with colorectal primary.    Clinical Staging  Stage IIa ([id number])    Chemotherapy      neoadjuvant Xeloda with radiation. adjuvant xeloda        Radiation Therapy      7/8/19 thru 8/14/19 completed neoadjuvant 5040 cGy=28 fxs rectum     10/2021 XRT due to recurrence right middle lung 5 Fx's, 5000cGy  11/26/21 adjuvant FOLFOX -2/25/2022    Surgeries       9/30/2019 low anterior resection with ostomy        8/31/21 partial hepatectomy, cholecystectomy and MWA for segment 6 medical margin performed at     2/8/2023 CT Scans revealed Worsening pulmonary metastatic disease.    2/21/2023 orders written for FOLFIRI + AVASTIN       Rectal cancer   6/2/2021 - 8/15/2021 Chemotherapy    OP COLON mFOLFOX6 + Bevacizumab (OXALIplatin / Leucovorin / Fluorouracil / Bevacizumab)     6/18/2021 Initial Diagnosis    Rectal cancer (CMS/HCC)     6/21/2021 Cancer Staged    Staging form: Colon And Rectum, AJCC 8th Edition  - Clinical: Stage IIA (cT3, cN0, cM0) - Signed by Maurilio Campos MD on 6/21/2021 11/16/2021 - 2/25/2022 Chemotherapy    OP COLON mFOLFOX6 OXALIplatin / Leucovorin / Fluorouracil     2/28/2023 - 8/24/2023 Chemotherapy    OP COLORECTAL FOLFIRI + Bevacizumab 5mg/kg (Irinotecan / Leucovorin / Fluorouracil / Bevacizumab)     3/13/2024  Biopsy    OP COLORECTAL Fruquintinib  Plan Provider: Maurilio aCmpos MD  Treatment goal: Palliative  Line of treatment: [No plan line of treatment]     Secondary malignant neoplasm of left lung   9/14/2021 Initial Diagnosis    Secondary malignant neoplasm of left lung (HCC)     10/20/2021 -  Radiation    RADIATION THERAPY Treatment Details (Noted on 10/5/2021)  Site: Bilateral Lung  Technique: SBRT  Goal: No goal specified  Planned Treatment Start Date: 10/20/2021     11/16/2021 - 2/25/2022 Chemotherapy    OP COLON mFOLFOX6 OXALIplatin / Leucovorin / Fluorouracil     6/15/2022 -  Radiation    RADIATION THERAPY Treatment Details (Noted on 6/15/2022)  Site: Left Lung - Lower lobe  Technique: SBRT  Goal: No goal specified  Planned Treatment Start Date: No planned start date specified     10/25/2022 -  Radiation    RADIATION THERAPY Treatment Details (Noted on 10/25/2022)  Site: Left Lung  Technique: SBRT  Goal: No goal specified  Planned Treatment Start Date: No planned start date specified     Secondary malignancy of liver   9/14/2021 Initial Diagnosis    Secondary malignancy of liver (HCC)     11/16/2021 - 2/25/2022 Chemotherapy    OP COLON mFOLFOX6 OXALIplatin / Leucovorin / Fluorouracil     Secondary malignant neoplasm of right lung   10/5/2021 Initial Diagnosis    Secondary malignant neoplasm of right lung (HCC)     10/20/2021 -  Radiation    RADIATION THERAPY Treatment Details (Noted on 10/5/2021)  Site: Bilateral Lung  Technique: SBRT  Goal: No goal specified  Planned Treatment Start Date: 10/20/2021     11/16/2021 - 2/25/2022 Chemotherapy    OP COLON mFOLFOX6 OXALIplatin / Leucovorin / Fluorouracil         Review of Systems  Current Outpatient Medications on File Prior to Visit   Medication Sig Dispense Refill    acetaminophen (TYLENOL) 500 MG tablet Take 1 tablet by mouth Every 6 (Six) Hours As Needed.      apixaban (ELIQUIS) 5 MG tablet tablet Take 1 tablet by mouth 2 (Two) Times a Day.      ascorbic acid  (VITAMIN C) 500 MG tablet Take 1 tablet by mouth Daily.      atorvastatin (LIPITOR) 40 MG tablet atorvastatin 40 mg oral tablet take 1 tablet (40 mg) by oral route once daily   Active      clobetasol propionate (TEMOVATE) 0.05 % cream Apply 1 Application topically to the appropriate area as directed 2 (Two) Times a Day. Apply twice daily to the palms of the hands and the soles of the feet. 30 g 5    cyanocobalamin (VITAMIN B-12) 1000 MCG tablet Take 1 tablet by mouth Daily.      Fruquintinib (FRUZAQLA) 5 MG capsule Take 1 capsule by mouth Daily. Take 1 tablet by mouth daily on days 1-21 then 7 days off of a 28-day cycle. 21 capsule 5    furosemide (LASIX) 40 MG tablet Take 1 tablet by mouth Daily. 90 tablet 1    loratadine (CLARITIN) 10 MG tablet loratadine 10 mg oral tablet take 1 tablet (10 mg) by oral route once daily   Active      MAGIC MOUTHWASH W/NYSTATIN 1/1/1/1 Swish and swallow 10 mL 4 (Four) Times a Day As Needed for Mouth Pain. 500 mL 1    metoprolol tartrate (LOPRESSOR) 100 MG tablet Take 1 tablet by mouth 2 (Two) Times a Day. 180 tablet 3    montelukast (SINGULAIR) 10 MG tablet 1 tablet.      multivitamin (THERAGRAN) tablet tablet Take 1 tablet by mouth Daily.      naloxone (NARCAN) 4 MG/0.1ML nasal spray       ondansetron (ZOFRAN) 8 MG tablet Take 1 tablet by mouth 3 (Three) Times a Day As Needed for Nausea or Vomiting. 30 tablet 5    potassium chloride 10 MEQ CR tablet Take 1 tablet by mouth Every Other Day. 45 tablet 3    psyllium (METAMUCIL) 58.6 % packet Take 1 packet by mouth Daily.      Synthroid 25 MCG tablet Take 1 tablet by mouth Daily.      valsartan (DIOVAN) 80 MG tablet Take 1 tablet by mouth Daily. 90 tablet 3    verapamil SR (CALAN-SR) 180 MG CR tablet Take 1 tablet by mouth Every Night. 90 tablet 3    vitamin E 1000 UNIT capsule Take 1 capsule by mouth Daily.       No current facility-administered medications on file prior to visit.       No Known Allergies  Past Medical History:    Diagnosis Date    Asthma     Atrial fibrillation, persistent 2/26/2021    Chronic heart failure with preserved ejection fraction 7/18/2022    BNP 1590 on 7/22 Echocardiogram with EF 55% moderate to severe biatrial enlargement and mild right ventricular enlargement 7/22     Colorectal cancer     Essential hypertension     GI cancer     Hepatitis     History of DVT of lower extremity 8/27/2021    Hyperlipidemia LDL goal <100 12/31/2020    Rectal cancer 6/18/2021    Secondary malignancy of liver 9/14/2021    Secondary malignant neoplasm of left lung 9/14/2021    Secondary malignant neoplasm of right lung 10/5/2021    Thrombocytopenia 1/11/2022    Thyroid disease     Viral upper respiratory tract infection 4/24/2023     Past Surgical History:   Procedure Laterality Date    COLON SURGERY      HERNIA REPAIR      LIVER BIOPSY      4/27/21 Biopsy of liver revealed metastatic adenocarcinoma, consistent with colorectal primary    OTHER SURGICAL HISTORY      REMOVED CANCER FROM COLON     Social History     Socioeconomic History    Marital status:     Number of children: 2   Tobacco Use    Smoking status: Never    Smokeless tobacco: Never   Vaping Use    Vaping status: Never Used   Substance and Sexual Activity    Alcohol use: Not Currently     Alcohol/week: 3.0 standard drinks of alcohol     Types: 3 Cans of beer per week     Comment: 2-3 beers weekly    Drug use: Never    Sexual activity: Defer     Family History   Problem Relation Age of Onset    Prostate cancer Father     Heart murmur Mother     Diabetes Mother     Colon cancer Maternal Uncle        Objective   Physical Exam  Vitals reviewed. Exam conducted with a chaperone present.   Cardiovascular:      Rate and Rhythm: Normal rate and regular rhythm.      Heart sounds: Normal heart sounds. No murmur heard.     No gallop.   Pulmonary:      Effort: Pulmonary effort is normal.      Breath sounds: Normal breath sounds.   Abdominal:      General: Bowel sounds are  normal.   Musculoskeletal:      Right lower leg: No edema.      Left lower leg: No edema.   Lymphadenopathy:      Cervical: No cervical adenopathy.   Psychiatric:         Mood and Affect: Mood normal.         Behavior: Behavior normal.         Vitals:    06/19/24 1439   BP: (!) 165/113   Pulse: 79   Resp: 17   Temp: 98.6 °F (37 °C)   SpO2: 99%   Weight: 125 kg (276 lb)     ECOG score: 0         PHQ-9 Total Score:                    Result Review :   The following data was reviewed by: Maurilio Campos MD on 06/19/2024:  Lab Results   Component Value Date    HGB 15.9 06/18/2024    HCT 46.6 06/18/2024    MCV 93.0 06/18/2024     06/18/2024    WBC 6.84 06/18/2024    NEUTROABS 4.46 06/18/2024    LYMPHSABS 1.60 06/18/2024    MONOSABS 0.52 06/18/2024    EOSABS 0.22 06/18/2024    BASOSABS 0.02 06/18/2024     Lab Results   Component Value Date    GLUCOSE 97 06/18/2024    BUN 9 06/18/2024    CREATININE 0.87 06/18/2024     06/18/2024    K 3.7 06/18/2024     06/18/2024    CO2 28.9 06/18/2024    CALCIUM 9.1 06/18/2024    PROTEINTOT 6.3 06/18/2024    ALBUMIN 4.0 06/18/2024    BILITOT 0.9 06/18/2024    ALKPHOS 85 06/18/2024    AST 23 06/18/2024    ALT 18 06/18/2024     Lab Results   Component Value Date    MG 1.8 (L) 09/07/2021    FREET4 0.81 12/12/2023    TSH 3.250 02/24/2021     Lab Results   Component Value Date    IRON 60 (L) 03/04/2020    LABIRON 20 03/04/2020    TRANSFERRIN 215.00 03/04/2020    TIBC 307 03/04/2020     Lab Results   Component Value Date    FERRITIN 140 03/04/2020    RZBSXDIV84 >2000 (H) 03/04/2020    FOLATE 19.4 03/04/2020     Lab Results   Component Value Date    PSA 0.92 12/12/2023    CEA 11.50 06/18/2024       Data reviewed : Radiologic studies CT chest, abdomen, pelvis images reviewed.  He has metastatic disease in the liver which is stable.  1 small focus in the lower right liver lobe is of uncertain significance.       Assessment and Plan    Diagnoses and all orders for this  visit:    1. Rectal cancer (Primary)  Assessment & Plan:  Metastatic.  Patient is on palliative treatment with fruquintinib.  Tolerating well.  Restaging scans essentially appear stable.  There is 1 new questionable lesion in the right lower lobe of the liver but the findings are subtle to my examination of his scans.  I will continue with current therapy and will plan short interval scan in 2 months to reassess.    Orders:  -     CBC & Differential; Future  -     Comprehensive Metabolic Panel; Future    2. Encounter for long-term (current) use of high-risk medication  -     Urinalysis With Microscopic - Urine, Clean Catch; Future            Patient Follow Up: 1 month    Patient was given instructions and counseling regarding his condition or for health maintenance advice. Please see specific information pulled into the AVS if appropriate.     Maurilio Campos MD    6/20/2024

## 2024-06-20 NOTE — ASSESSMENT & PLAN NOTE
Metastatic.  Patient is on palliative treatment with fruquintinib.  Tolerating well.  Restaging scans essentially appear stable.  There is 1 new questionable lesion in the right lower lobe of the liver but the findings are subtle to my examination of his scans.  I will continue with current therapy and will plan short interval scan in 2 months to reassess.

## 2024-06-21 ENCOUNTER — SPECIALTY PHARMACY (OUTPATIENT)
Facility: HOSPITAL | Age: 69
End: 2024-06-21
Payer: MEDICARE

## 2024-06-27 ENCOUNTER — SPECIALTY PHARMACY (OUTPATIENT)
Dept: PHARMACY | Facility: HOSPITAL | Age: 69
End: 2024-06-27
Payer: MEDICARE

## 2024-07-22 ENCOUNTER — HOSPITAL ENCOUNTER (OUTPATIENT)
Dept: ONCOLOGY | Facility: HOSPITAL | Age: 69
Discharge: HOME | End: 2024-07-22
Admitting: INTERNAL MEDICINE
Payer: MEDICARE

## 2024-07-22 DIAGNOSIS — Z79.899 ENCOUNTER FOR LONG-TERM (CURRENT) USE OF HIGH-RISK MEDICATION: ICD-10-CM

## 2024-07-22 DIAGNOSIS — Z45.2 ENCOUNTER FOR ADJUSTMENT OR MANAGEMENT OF VASCULAR ACCESS DEVICE: Primary | ICD-10-CM

## 2024-07-22 DIAGNOSIS — C20 RECTAL CANCER: ICD-10-CM

## 2024-07-22 LAB
ALBUMIN SERPL-MCNC: 3.8 G/DL (ref 3.5–5.2)
ALBUMIN/GLOB SERPL: 1.7 G/DL
ALP SERPL-CCNC: 82 U/L (ref 39–117)
ALT SERPL W P-5'-P-CCNC: 16 U/L (ref 1–41)
ANION GAP SERPL CALCULATED.3IONS-SCNC: 6.1 MMOL/L (ref 5–15)
AST SERPL-CCNC: 24 U/L (ref 1–40)
BACTERIA UR QL AUTO: ABNORMAL /HPF
BASOPHILS # BLD AUTO: 0.02 10*3/MM3 (ref 0–0.2)
BASOPHILS NFR BLD AUTO: 0.3 % (ref 0–1.5)
BILIRUB SERPL-MCNC: 0.8 MG/DL (ref 0–1.2)
BILIRUB UR QL STRIP: NEGATIVE
BUN SERPL-MCNC: 9 MG/DL (ref 8–23)
BUN/CREAT SERPL: 9.5 (ref 7–25)
CALCIUM SPEC-SCNC: 9.1 MG/DL (ref 8.6–10.5)
CHLORIDE SERPL-SCNC: 105 MMOL/L (ref 98–107)
CLARITY UR: CLEAR
CO2 SERPL-SCNC: 28.9 MMOL/L (ref 22–29)
COLOR UR: ABNORMAL
CREAT SERPL-MCNC: 0.95 MG/DL (ref 0.76–1.27)
DEPRECATED RDW RBC AUTO: 45.3 FL (ref 37–54)
EGFRCR SERPLBLD CKD-EPI 2021: 87.2 ML/MIN/1.73
EOSINOPHIL # BLD AUTO: 0.16 10*3/MM3 (ref 0–0.4)
EOSINOPHIL NFR BLD AUTO: 2.6 % (ref 0.3–6.2)
ERYTHROCYTE [DISTWIDTH] IN BLOOD BY AUTOMATED COUNT: 13.3 % (ref 12.3–15.4)
GLOBULIN UR ELPH-MCNC: 2.2 GM/DL
GLUCOSE SERPL-MCNC: 93 MG/DL (ref 65–99)
GLUCOSE UR STRIP-MCNC: NEGATIVE MG/DL
HCT VFR BLD AUTO: 45.7 % (ref 37.5–51)
HGB BLD-MCNC: 15.7 G/DL (ref 13–17.7)
HGB UR QL STRIP.AUTO: NEGATIVE
HYALINE CASTS UR QL AUTO: ABNORMAL /LPF
IMM GRANULOCYTES # BLD AUTO: 0.01 10*3/MM3 (ref 0–0.05)
IMM GRANULOCYTES NFR BLD AUTO: 0.2 % (ref 0–0.5)
KETONES UR QL STRIP: NEGATIVE
LEUKOCYTE ESTERASE UR QL STRIP.AUTO: NEGATIVE
LYMPHOCYTES # BLD AUTO: 1.73 10*3/MM3 (ref 0.7–3.1)
LYMPHOCYTES NFR BLD AUTO: 28.4 % (ref 19.6–45.3)
MCH RBC QN AUTO: 31.8 PG (ref 26.6–33)
MCHC RBC AUTO-ENTMCNC: 34.4 G/DL (ref 31.5–35.7)
MCV RBC AUTO: 92.7 FL (ref 79–97)
MONOCYTES # BLD AUTO: 0.48 10*3/MM3 (ref 0.1–0.9)
MONOCYTES NFR BLD AUTO: 7.9 % (ref 5–12)
NEUTROPHILS NFR BLD AUTO: 3.69 10*3/MM3 (ref 1.7–7)
NEUTROPHILS NFR BLD AUTO: 60.6 % (ref 42.7–76)
NITRITE UR QL STRIP: NEGATIVE
PH UR STRIP.AUTO: 6 [PH] (ref 5–8)
PLATELET # BLD AUTO: 202 10*3/MM3 (ref 140–450)
PMV BLD AUTO: 9.3 FL (ref 6–12)
POTASSIUM SERPL-SCNC: 3.6 MMOL/L (ref 3.5–5.2)
PROT SERPL-MCNC: 6 G/DL (ref 6–8.5)
PROT UR QL STRIP: NEGATIVE
RBC # BLD AUTO: 4.93 10*6/MM3 (ref 4.14–5.8)
RBC # UR STRIP: ABNORMAL /HPF
REF LAB TEST METHOD: ABNORMAL
SODIUM SERPL-SCNC: 140 MMOL/L (ref 136–145)
SP GR UR STRIP: 1.01 (ref 1–1.03)
SQUAMOUS #/AREA URNS HPF: ABNORMAL /HPF
UROBILINOGEN UR QL STRIP: ABNORMAL
WBC # UR STRIP: ABNORMAL /HPF
WBC NRBC COR # BLD AUTO: 6.09 10*3/MM3 (ref 3.4–10.8)

## 2024-07-22 PROCEDURE — 81001 URINALYSIS AUTO W/SCOPE: CPT | Performed by: INTERNAL MEDICINE

## 2024-07-22 PROCEDURE — 85025 COMPLETE CBC W/AUTO DIFF WBC: CPT | Performed by: INTERNAL MEDICINE

## 2024-07-22 PROCEDURE — 80053 COMPREHEN METABOLIC PANEL: CPT | Performed by: INTERNAL MEDICINE

## 2024-07-22 PROCEDURE — 25010000002 HEPARIN LOCK FLUSH PER 10 UNITS: Performed by: INTERNAL MEDICINE

## 2024-07-22 PROCEDURE — 36591 DRAW BLOOD OFF VENOUS DEVICE: CPT

## 2024-07-22 RX ORDER — HEPARIN SODIUM (PORCINE) LOCK FLUSH IV SOLN 100 UNIT/ML 100 UNIT/ML
500 SOLUTION INTRAVENOUS AS NEEDED
Status: DISCONTINUED | OUTPATIENT
Start: 2024-07-22 | End: 2024-07-23 | Stop reason: HOSPADM

## 2024-07-22 RX ORDER — SODIUM CHLORIDE 0.9 % (FLUSH) 0.9 %
20 SYRINGE (ML) INJECTION AS NEEDED
Status: DISCONTINUED | OUTPATIENT
Start: 2024-07-22 | End: 2024-07-23 | Stop reason: HOSPADM

## 2024-07-22 RX ORDER — SODIUM CHLORIDE 0.9 % (FLUSH) 0.9 %
20 SYRINGE (ML) INJECTION AS NEEDED
OUTPATIENT
Start: 2024-07-22

## 2024-07-22 RX ORDER — HEPARIN SODIUM (PORCINE) LOCK FLUSH IV SOLN 100 UNIT/ML 100 UNIT/ML
500 SOLUTION INTRAVENOUS AS NEEDED
OUTPATIENT
Start: 2024-07-22

## 2024-07-22 RX ADMIN — HEPARIN 500 UNITS: 100 SYRINGE at 11:49

## 2024-07-22 RX ADMIN — Medication 20 ML: at 11:49

## 2024-07-23 ENCOUNTER — OFFICE VISIT (OUTPATIENT)
Dept: ONCOLOGY | Facility: HOSPITAL | Age: 69
End: 2024-07-23
Payer: MEDICARE

## 2024-07-23 VITALS
TEMPERATURE: 98.8 F | HEART RATE: 90 BPM | SYSTOLIC BLOOD PRESSURE: 145 MMHG | HEIGHT: 76 IN | RESPIRATION RATE: 18 BRPM | WEIGHT: 274 LBS | BODY MASS INDEX: 33.36 KG/M2 | DIASTOLIC BLOOD PRESSURE: 94 MMHG | OXYGEN SATURATION: 95 %

## 2024-07-23 DIAGNOSIS — C20 RECTAL CANCER: Primary | ICD-10-CM

## 2024-07-23 PROCEDURE — 1160F RVW MEDS BY RX/DR IN RCRD: CPT | Performed by: INTERNAL MEDICINE

## 2024-07-23 PROCEDURE — 99214 OFFICE O/P EST MOD 30 MIN: CPT | Performed by: INTERNAL MEDICINE

## 2024-07-23 PROCEDURE — G0463 HOSPITAL OUTPT CLINIC VISIT: HCPCS | Performed by: INTERNAL MEDICINE

## 2024-07-23 PROCEDURE — 1159F MED LIST DOCD IN RCRD: CPT | Performed by: INTERNAL MEDICINE

## 2024-07-23 PROCEDURE — G2211 COMPLEX E/M VISIT ADD ON: HCPCS | Performed by: INTERNAL MEDICINE

## 2024-07-23 PROCEDURE — 3080F DIAST BP >= 90 MM HG: CPT | Performed by: INTERNAL MEDICINE

## 2024-07-23 PROCEDURE — 1126F AMNT PAIN NOTED NONE PRSNT: CPT | Performed by: INTERNAL MEDICINE

## 2024-07-23 PROCEDURE — 3077F SYST BP >= 140 MM HG: CPT | Performed by: INTERNAL MEDICINE

## 2024-07-23 NOTE — PROGRESS NOTES
Chief Complaint  Rectal Cancer    Eri Moran, Eri Engel, REGINALDO    Subjective          Robbi Kennedy presents to [facility] HEMATOLOGY [facility] ONCOLOGY for ongoing treatment of his metastatic rectal cancer.  He is on fruquintinib.  He notes he is tolerating the medication well.  He notes fatigue but is able to perform his ADLs.  He feels like his energy level is perhaps slightly better than before.  He continues to have neuropathy in his feet from prior treatment.  This is particular bothersome to him at night.  He denies new masses, adenopathy, unusual aches or pains.  Notes good appetite.    Oncology/Hematology History Overview Note   9/30/2019 High rectal xfnm-inqcegsjjh-ijegfbiohsrgjs adenocarcinoma  associated with tubular adenoma.   4/27/21 Biopsy of liver revealed metastatic adenocarcinoma, consistent with colorectal primary.    Clinical Staging  Stage IIa ([id number])    Chemotherapy      neoadjuvant Xeloda with radiation. adjuvant xeloda        Radiation Therapy      7/8/19 thru 8/14/19 completed neoadjuvant 5040 cGy=28 fxs rectum     10/2021 XRT due to recurrence right middle lung 5 Fx's, 5000cGy  11/26/21 adjuvant FOLFOX -2/25/2022    Surgeries       9/30/2019 low anterior resection with ostomy        8/31/21 partial hepatectomy, cholecystectomy and MWA for segment 6 medical margin performed at     2/8/2023 CT Scans revealed Worsening pulmonary metastatic disease.    2/21/2023 orders written for FOLFIRI + AVASTIN       Rectal cancer   6/2/2021 - 8/15/2021 Chemotherapy    OP COLON mFOLFOX6 + Bevacizumab (OXALIplatin / Leucovorin / Fluorouracil / Bevacizumab)     6/18/2021 Initial Diagnosis    Rectal cancer (CMS/HCC)     6/21/2021 Cancer Staged    Staging form: Colon And Rectum, AJCC 8th Edition  - Clinical: Stage IIA (cT3, cN0, cM0) - Signed by Maurilio Campos MD on 6/21/2021 11/16/2021 - 2/25/2022 Chemotherapy    OP COLON mFOLFOX6 OXALIplatin / Leucovorin /  Fluorouracil     2/28/2023 - 8/24/2023 Chemotherapy    OP COLORECTAL FOLFIRI + Bevacizumab 5mg/kg (Irinotecan / Leucovorin / Fluorouracil / Bevacizumab)     3/13/2024 Biopsy    OP COLORECTAL Fruquintinib  Plan Provider: Maurilio Campos MD  Treatment goal: Palliative  Line of treatment: [No plan line of treatment]     Secondary malignant neoplasm of left lung   9/14/2021 Initial Diagnosis    Secondary malignant neoplasm of left lung (HCC)     10/20/2021 -  Radiation    RADIATION THERAPY Treatment Details (Noted on 10/5/2021)  Site: Bilateral Lung  Technique: SBRT  Goal: No goal specified  Planned Treatment Start Date: 10/20/2021     11/16/2021 - 2/25/2022 Chemotherapy    OP COLON mFOLFOX6 OXALIplatin / Leucovorin / Fluorouracil     6/15/2022 -  Radiation    RADIATION THERAPY Treatment Details (Noted on 6/15/2022)  Site: Left Lung - Lower lobe  Technique: SBRT  Goal: No goal specified  Planned Treatment Start Date: No planned start date specified     10/25/2022 -  Radiation    RADIATION THERAPY Treatment Details (Noted on 10/25/2022)  Site: Left Lung  Technique: SBRT  Goal: No goal specified  Planned Treatment Start Date: No planned start date specified     Secondary malignancy of liver   9/14/2021 Initial Diagnosis    Secondary malignancy of liver (HCC)     11/16/2021 - 2/25/2022 Chemotherapy    OP COLON mFOLFOX6 OXALIplatin / Leucovorin / Fluorouracil     Secondary malignant neoplasm of right lung   10/5/2021 Initial Diagnosis    Secondary malignant neoplasm of right lung (HCC)     10/20/2021 -  Radiation    RADIATION THERAPY Treatment Details (Noted on 10/5/2021)  Site: Bilateral Lung  Technique: SBRT  Goal: No goal specified  Planned Treatment Start Date: 10/20/2021     11/16/2021 - 2/25/2022 Chemotherapy    OP COLON mFOLFOX6 OXALIplatin / Leucovorin / Fluorouracil           Current Outpatient Medications on File Prior to Visit   Medication Sig Dispense Refill    acetaminophen (TYLENOL) 500 MG tablet Take 1  tablet by mouth Every 6 (Six) Hours As Needed.      apixaban (ELIQUIS) 5 MG tablet tablet Take 1 tablet by mouth 2 (Two) Times a Day.      ascorbic acid (VITAMIN C) 500 MG tablet Take 1 tablet by mouth Daily.      atorvastatin (LIPITOR) 40 MG tablet atorvastatin 40 mg oral tablet take 1 tablet (40 mg) by oral route once daily   Active      clobetasol propionate (TEMOVATE) 0.05 % cream Apply 1 Application topically to the appropriate area as directed 2 (Two) Times a Day. Apply twice daily to the palms of the hands and the soles of the feet. 30 g 5    cyanocobalamin (VITAMIN B-12) 1000 MCG tablet Take 1 tablet by mouth Daily.      Fruquintinib (FRUZAQLA) 5 MG capsule Take 1 capsule by mouth Daily. Take 1 tablet by mouth daily on days 1-21 then 7 days off of a 28-day cycle. 21 capsule 5    furosemide (LASIX) 40 MG tablet Take 1 tablet by mouth Daily. 90 tablet 1    loratadine (CLARITIN) 10 MG tablet loratadine 10 mg oral tablet take 1 tablet (10 mg) by oral route once daily   Active      MAGIC MOUTHWASH W/NYSTATIN 1/1/1/1 Swish and swallow 10 mL 4 (Four) Times a Day As Needed for Mouth Pain. 500 mL 1    metoprolol tartrate (LOPRESSOR) 100 MG tablet Take 1 tablet by mouth 2 (Two) Times a Day. 180 tablet 3    montelukast (SINGULAIR) 10 MG tablet 1 tablet.      multivitamin (THERAGRAN) tablet tablet Take 1 tablet by mouth Daily.      naloxone (NARCAN) 4 MG/0.1ML nasal spray       ondansetron (ZOFRAN) 8 MG tablet Take 1 tablet by mouth 3 (Three) Times a Day As Needed for Nausea or Vomiting. 30 tablet 5    potassium chloride 10 MEQ CR tablet Take 1 tablet by mouth Every Other Day. 45 tablet 3    psyllium (METAMUCIL) 58.6 % packet Take 1 packet by mouth Daily.      Synthroid 25 MCG tablet Take 1 tablet by mouth Daily.      valsartan (DIOVAN) 80 MG tablet Take 1 tablet by mouth Daily. 90 tablet 3    verapamil SR (CALAN-SR) 180 MG CR tablet Take 1 tablet by mouth Every Night. 90 tablet 3    vitamin E 1000 UNIT capsule Take 1  capsule by mouth Daily.       Current Facility-Administered Medications on File Prior to Visit   Medication Dose Route Frequency Provider Last Rate Last Admin    [DISCONTINUED] heparin injection 500 Units  500 Units Intravenous PRN Maurilio Campos MD   500 Units at 07/22/24 1149    [DISCONTINUED] sodium chloride 0.9 % flush 20 mL  20 mL Intravenous PRN Maurilio Campos MD   20 mL at 07/22/24 1149       No Known Allergies  Past Medical History:   Diagnosis Date    Asthma     Atrial fibrillation, persistent 2/26/2021    Chronic heart failure with preserved ejection fraction 7/18/2022    BNP 1590 on 7/22 Echocardiogram with EF 55% moderate to severe biatrial enlargement and mild right ventricular enlargement 7/22     Colorectal cancer     Essential hypertension     GI cancer     Hepatitis     History of DVT of lower extremity 8/27/2021    Hyperlipidemia LDL goal <100 12/31/2020    Rectal cancer 6/18/2021    Secondary malignancy of liver 9/14/2021    Secondary malignant neoplasm of left lung 9/14/2021    Secondary malignant neoplasm of right lung 10/5/2021    Thrombocytopenia 1/11/2022    Thyroid disease     Viral upper respiratory tract infection 4/24/2023     Past Surgical History:   Procedure Laterality Date    COLON SURGERY      HERNIA REPAIR      LIVER BIOPSY      4/27/21 Biopsy of liver revealed metastatic adenocarcinoma, consistent with colorectal primary    OTHER SURGICAL HISTORY      REMOVED CANCER FROM COLON     Social History     Socioeconomic History    Marital status:     Number of children: 2   Tobacco Use    Smoking status: Never    Smokeless tobacco: Never   Vaping Use    Vaping status: Never Used   Substance and Sexual Activity    Alcohol use: Not Currently     Alcohol/week: 3.0 standard drinks of alcohol     Types: 3 Cans of beer per week     Comment: 2-3 beers weekly    Drug use: Never    Sexual activity: Defer     Family History   Problem Relation Age of Onset    Prostate cancer Father      "Heart murmur Mother     Diabetes Mother     Colon cancer Maternal Uncle        Objective   Physical Exam  Vitals reviewed. Exam conducted with a chaperone present.   Cardiovascular:      Rate and Rhythm: Normal rate and regular rhythm.      Heart sounds: Normal heart sounds. No murmur heard.     No gallop.   Pulmonary:      Effort: Pulmonary effort is normal.      Breath sounds: Normal breath sounds.   Abdominal:      General: Bowel sounds are normal.   Lymphadenopathy:      Cervical: No cervical adenopathy.   Psychiatric:         Mood and Affect: Mood normal.         Behavior: Behavior normal.         Vitals:    07/23/24 0845   BP: 145/94   Pulse: 90   Resp: 18   Temp: 98.8 °F (37.1 °C)   TempSrc: Temporal   SpO2: 95%   Weight: 124 kg (274 lb)   Height: 193 cm (76\")   PainSc: 0-No pain     ECOG score: 0         PHQ-9 Total Score:                    Result Review :   The following data was reviewed by: Maurilio Campos MD on 07/23/2024:  Lab Results   Component Value Date    HGB 15.7 07/22/2024    HCT 45.7 07/22/2024    MCV 92.7 07/22/2024     07/22/2024    WBC 6.09 07/22/2024    NEUTROABS 3.69 07/22/2024    LYMPHSABS 1.73 07/22/2024    MONOSABS 0.48 07/22/2024    EOSABS 0.16 07/22/2024    BASOSABS 0.02 07/22/2024     Lab Results   Component Value Date    GLUCOSE 93 07/22/2024    BUN 9 07/22/2024    CREATININE 0.95 07/22/2024     07/22/2024    K 3.6 07/22/2024     07/22/2024    CO2 28.9 07/22/2024    CALCIUM 9.1 07/22/2024    PROTEINTOT 6.0 07/22/2024    ALBUMIN 3.8 07/22/2024    BILITOT 0.8 07/22/2024    ALKPHOS 82 07/22/2024    AST 24 07/22/2024    ALT 16 07/22/2024     Lab Results   Component Value Date    MG 1.8 (L) 09/07/2021    FREET4 0.74 07/17/2024    TSH 3.250 02/24/2021     Lab Results   Component Value Date    IRON 60 (L) 03/04/2020    LABIRON 20 03/04/2020    TRANSFERRIN 215.00 03/04/2020    TIBC 307 03/04/2020     Lab Results   Component Value Date    FERRITIN 140 03/04/2020    " [id number] >2000 (H) 03/04/2020    FOLATE 19.4 03/04/2020     Lab Results   Component Value Date    PSA 0.92 12/12/2023    CEA 11.50 06/18/2024             Assessment and Plan    Diagnoses and all orders for this visit:    1. Rectal cancer (Primary)  Assessment & Plan:  Metastatic.  Patient is on palliative treatment with fruquintinib.  He is status post multiple lines of therapy.  Tolerating fruquintinib well other than fatigue.  Lab work today looks good.  He does note continued neuropathy from prior treatment including oxaliplatin.  This is more bothersome to him at night.  We discussed capsaicin cream at bedtime to the feet with light socks to maintain contact.  Proceed with fruquintinib as planned.  I will see him back in 1 month for continued treatment with lab work prior including CEA along with restaging CT scans.    Orders:  -     CBC & Differential; Future  -     Comprehensive Metabolic Panel; Future  -     Urinalysis With Microscopic - Urine, Clean Catch; Future  -     CEA; Future  -     CT chest w contrast; Future  -     CT abdomen pelvis w contrast; Future            Patient Follow Up:  1m    Patient was given instructions and counseling regarding his condition or for health maintenance advice. Please see specific information pulled into the AVS if appropriate.     Maurilio Campos MD    7/23/2024

## 2024-07-23 NOTE — ASSESSMENT & PLAN NOTE
Metastatic.  Patient is on palliative treatment with fruquintinib.  He is status post multiple lines of therapy.  Tolerating fruquintinib well other than fatigue.  Lab work today looks good.  He does note continued neuropathy from prior treatment including oxaliplatin.  This is more bothersome to him at night.  We discussed capsaicin cream at bedtime to the feet with light socks to maintain contact.  Proceed with fruquintinib as planned.  I will see him back in 1 month for continued treatment with lab work prior including CEA along with restaging CT scans.

## 2024-07-24 ENCOUNTER — SPECIALTY PHARMACY (OUTPATIENT)
Facility: HOSPITAL | Age: 69
End: 2024-07-24
Payer: MEDICARE

## 2024-08-19 DIAGNOSIS — C20 RECTAL CANCER: ICD-10-CM

## 2024-08-20 RX ORDER — FRUQUINTINIB 5 MG/1
CAPSULE ORAL
Qty: 21 CAPSULE | Refills: 5 | Status: SHIPPED | OUTPATIENT
Start: 2024-08-20

## 2024-08-21 ENCOUNTER — TELEPHONE (OUTPATIENT)
Dept: ONCOLOGY | Facility: HOSPITAL | Age: 69
End: 2024-08-21
Payer: MEDICARE

## 2024-08-21 NOTE — TELEPHONE ENCOUNTER
Left Vm reminding of upcoming CT scan 8/23.Can get labs same day as scan. Details with location, time etc was added in VM. Left our call back number to office for questions/concerns etc.

## 2024-08-22 ENCOUNTER — OFFICE VISIT (OUTPATIENT)
Dept: CARDIOLOGY | Facility: CLINIC | Age: 69
End: 2024-08-22
Payer: MEDICARE

## 2024-08-22 VITALS
HEART RATE: 63 BPM | HEIGHT: 76 IN | DIASTOLIC BLOOD PRESSURE: 109 MMHG | BODY MASS INDEX: 31.98 KG/M2 | WEIGHT: 262.6 LBS | SYSTOLIC BLOOD PRESSURE: 146 MMHG

## 2024-08-22 DIAGNOSIS — I50.32 CHRONIC HEART FAILURE WITH PRESERVED EJECTION FRACTION: ICD-10-CM

## 2024-08-22 DIAGNOSIS — I10 ESSENTIAL HYPERTENSION: Primary | ICD-10-CM

## 2024-08-22 DIAGNOSIS — I48.19 ATRIAL FIBRILLATION, PERSISTENT: ICD-10-CM

## 2024-08-22 DIAGNOSIS — E78.5 HYPERLIPIDEMIA LDL GOAL <100: ICD-10-CM

## 2024-08-22 RX ORDER — FUROSEMIDE 40 MG/1
40 TABLET ORAL DAILY
Qty: 90 TABLET | Refills: 1 | Status: SHIPPED | OUTPATIENT
Start: 2024-08-22

## 2024-08-22 RX ORDER — VALSARTAN 80 MG/1
80 TABLET ORAL DAILY
Qty: 90 TABLET | Refills: 3 | Status: CANCELLED | OUTPATIENT
Start: 2024-08-22

## 2024-08-22 RX ORDER — METOPROLOL TARTRATE 100 MG/1
100 TABLET ORAL 2 TIMES DAILY
Qty: 180 TABLET | Refills: 3 | Status: SHIPPED | OUTPATIENT
Start: 2024-08-22

## 2024-08-22 RX ORDER — POTASSIUM CHLORIDE 20 MEQ/1
20 TABLET, EXTENDED RELEASE ORAL DAILY
COMMUNITY
Start: 2024-07-08

## 2024-08-22 RX ORDER — VALSARTAN AND HYDROCHLOROTHIAZIDE 80; 12.5 MG/1; MG/1
1 TABLET, FILM COATED ORAL DAILY
Qty: 90 TABLET | Refills: 3 | Status: SHIPPED | OUTPATIENT
Start: 2024-08-22

## 2024-08-22 NOTE — ASSESSMENT & PLAN NOTE
Stable on volume and rate continue with his Lasix 40 a day counseled on following his weight as a way to manage his volume status

## 2024-08-22 NOTE — PROGRESS NOTES
Chief Complaint  Atrial fibrillation, persisten and Fatigue    Subjective    Patient has had some issues with increased blood pressure since being started on his chemotherapeutic agent denies any chest discomfort or shortness of breath changes.  He does report some dizziness at times no symptomatic tachycardic problems  Past Medical History:   Diagnosis Date    Asthma     Atrial fibrillation, persistent 2/26/2021    Chronic heart failure with preserved ejection fraction 7/18/2022    BNP 1590 on 7/22 Echocardiogram with EF 55% moderate to severe biatrial enlargement and mild right ventricular enlargement 7/22     Colorectal cancer     Essential hypertension     GI cancer     Hepatitis     History of DVT of lower extremity 8/27/2021    Hyperlipidemia LDL goal <100 12/31/2020    Rectal cancer 6/18/2021    Secondary malignancy of liver 9/14/2021    Secondary malignant neoplasm of left lung 9/14/2021    Secondary malignant neoplasm of right lung 10/5/2021    Thrombocytopenia 1/11/2022    Thyroid disease     Viral upper respiratory tract infection 4/24/2023         Current Outpatient Medications:     acetaminophen (TYLENOL) 500 MG tablet, Take 1 tablet by mouth Every 6 (Six) Hours As Needed., Disp: , Rfl:     apixaban (ELIQUIS) 5 MG tablet tablet, Take 1 tablet by mouth 2 (Two) Times a Day., Disp: , Rfl:     ascorbic acid (VITAMIN C) 500 MG tablet, Take 1 tablet by mouth Daily., Disp: , Rfl:     atorvastatin (LIPITOR) 40 MG tablet, atorvastatin 40 mg oral tablet take 1 tablet (40 mg) by oral route once daily   Active, Disp: , Rfl:     clobetasol propionate (TEMOVATE) 0.05 % cream, Apply 1 Application topically to the appropriate area as directed 2 (Two) Times a Day. Apply twice daily to the palms of the hands and the soles of the feet., Disp: 30 g, Rfl: 5    cyanocobalamin (VITAMIN B-12) 1000 MCG tablet, Take 1 tablet by mouth Daily., Disp: , Rfl:     Fruzaqla 5 MG capsule, TAKE 1 CAPSULE BY MOUTH DAILY ON DAYS 1-21, THEN  7 DAYS OFF OF A 28 DAY CYCLE., Disp: 21 capsule, Rfl: 5    furosemide (LASIX) 40 MG tablet, Take 1 tablet by mouth Daily., Disp: 90 tablet, Rfl: 1    loratadine (CLARITIN) 10 MG tablet, loratadine 10 mg oral tablet take 1 tablet (10 mg) by oral route once daily   Active, Disp: , Rfl:     MAGIC MOUTHWASH W/NYSTATIN 1/1/1/1, Swish and swallow 10 mL 4 (Four) Times a Day As Needed for Mouth Pain., Disp: 500 mL, Rfl: 1    metoprolol tartrate (LOPRESSOR) 100 MG tablet, Take 1 tablet by mouth 2 (Two) Times a Day., Disp: 180 tablet, Rfl: 3    montelukast (SINGULAIR) 10 MG tablet, 1 tablet., Disp: , Rfl:     multivitamin (THERAGRAN) tablet tablet, Take 1 tablet by mouth Daily., Disp: , Rfl:     ondansetron (ZOFRAN) 8 MG tablet, Take 1 tablet by mouth 3 (Three) Times a Day As Needed for Nausea or Vomiting., Disp: 30 tablet, Rfl: 5    potassium chloride (KLOR-CON M20) 20 MEQ CR tablet, Take 1 tablet by mouth Daily., Disp: , Rfl:     potassium chloride 10 MEQ CR tablet, Take 1 tablet by mouth Every Other Day., Disp: 45 tablet, Rfl: 3    psyllium (METAMUCIL) 58.6 % packet, Take 1 packet by mouth Daily., Disp: , Rfl:     Synthroid 25 MCG tablet, Take 2 tablets by mouth Daily., Disp: , Rfl:     verapamil SR (CALAN-SR) 180 MG CR tablet, Take 1 tablet by mouth Every Night., Disp: 90 tablet, Rfl: 3    vitamin E 1000 UNIT capsule, Take 1 capsule by mouth Daily., Disp: , Rfl:     valsartan-hydrochlorothiazide (Diovan HCT) 80-12.5 MG per tablet, Take 1 tablet by mouth Daily., Disp: 90 tablet, Rfl: 3    Medications Discontinued During This Encounter   Medication Reason    valsartan (DIOVAN) 80 MG tablet     verapamil SR (CALAN-SR) 180 MG CR tablet Reorder    metoprolol tartrate (LOPRESSOR) 100 MG tablet Reorder    furosemide (LASIX) 40 MG tablet Reorder     No Known Allergies     Social History     Tobacco Use    Smoking status: Never    Smokeless tobacco: Never   Vaping Use    Vaping status: Never Used   Substance Use Topics    Alcohol  "use: Not Currently     Alcohol/week: 3.0 standard drinks of alcohol     Types: 3 Cans of beer per week     Comment: 2-3 beers weekly    Drug use: Never       Family History   Problem Relation Age of Onset    Prostate cancer Father     Heart murmur Mother     Diabetes Mother     Colon cancer Maternal Uncle         Objective     BP (!) 146/109   Pulse 63   Ht 193 cm (75.98\")   Wt 119 kg (262 lb 9.6 oz)   BMI 31.98 kg/m²       Physical Exam    General Appearance:   no acute distress  Alert and oriented x3  HENT:   lips not cyanotic  Atraumatic  Neck:  No jvd   supple  Respiratory:  no respiratory distress  normal breath sounds  no rales  Cardiovascular:  Irregularly irregular  no S3, no S4   no murmur  no rub  Extremities  No cyanosis  lower extremity edema: none    Skin:   warm, dry  No rashes      Result Review :     proBNP   Date Value Ref Range Status   02/27/2024 1,272.0 (H) 0.0 - 900.0 pg/mL Final     CMP          6/17/2024    14:42 6/18/2024    10:55 7/22/2024    11:37   CMP   Glucose  97  93    BUN  9  9    Creatinine 1.00  0.87  0.95    EGFR 82.0  94.0  87.2    Sodium  138  140    Potassium  3.7  3.6    Chloride  102  105    Calcium  9.1  9.1    Total Protein  6.3  6.0    Albumin  4.0  3.8    Globulin  2.3  2.2    Total Bilirubin  0.9  0.8    Alkaline Phosphatase  85  82    AST (SGOT)  23  24    ALT (SGPT)  18  16    Albumin/Globulin Ratio  1.7  1.7    BUN/Creatinine Ratio  10.3  9.5    Anion Gap  7.1  6.1      CBC w/diff          6/18/2024    10:55 7/5/2024    07:44 7/22/2024    11:37   CBC w/Diff   WBC 6.84  6.61     6.09    RBC 5.01  4.73     4.93    Hemoglobin 15.9  15.2     15.7    Hematocrit 46.6  44.6     45.7    MCV 93.0  94.3     92.7    MCH 31.7  32.1     31.8    MCHC 34.1  34.1     34.4    RDW 13.4  13.6     13.3    Platelets 207  214     202    Neutrophil Rel % 65.2  54.0     60.6    Immature Granulocyte Rel % 0.3  0.2     0.2    Lymphocyte Rel % 23.4  32.8     28.4    Monocyte Rel % 7.6  6.8   " "  7.9    Eosinophil Rel % 3.2  5.9     2.6    Basophil Rel % 0.3  0.3     0.3       Details          This result is from an external source.              Lab Results   Component Value Date    TSH 3.250 02/24/2021      Lab Results   Component Value Date    FREET4 0.74 07/17/2024      No results found for: \"DDIMERQUANT\"  Magnesium   Date Value Ref Range Status   09/07/2021 1.8 (L) 1.9 - 2.4 mg/dL Final      Digoxin   Date Value Ref Range Status   01/18/2021 0.5 0.5 - 2.0 ng/mL Final      Lab Results   Component Value Date    TROPONINT 19 (H) 09/02/2021    TROPONINT -4 09/02/2021             No results found for: \"POCTROP\"    Results for orders placed in visit on 08/29/23    Adult Transthoracic Echo Complete W/ Cont if Necessary Per Protocol    Interpretation Summary    Left ventricular systolic function is normal. Calculated left ventricular EF = 55.4%    Left ventricular diastolic function was normal.    The left atrial cavity is mildly dilated.    The right atrial cavity is mild to moderately  dilated.    Estimated right ventricular systolic pressure from tricuspid regurgitation is normal (<35 mmHg).                 Diagnoses and all orders for this visit:    1. Essential hypertension (Primary)  Assessment & Plan:  Patient with elevations and started on chemotherapy recommended to addition of HCTZ to his valsartan 12.5 daily we will repeat BMP in 2 weeks     Orders:  -     furosemide (LASIX) 40 MG tablet; Take 1 tablet by mouth Daily.  Dispense: 90 tablet; Refill: 1  -     metoprolol tartrate (LOPRESSOR) 100 MG tablet; Take 1 tablet by mouth 2 (Two) Times a Day.  Dispense: 180 tablet; Refill: 3  -     Basic Metabolic Panel; Future    2. Atrial fibrillation, persistent  Assessment & Plan:  Rate controlled on verapamil 180 CD daily and Lopressor 100 twice daily dosing.  Continue on his Eliquis 5 twice daily for CVA prevention    Orders:  -     verapamil SR (CALAN-SR) 180 MG CR tablet; Take 1 tablet by mouth Every " Night.  Dispense: 90 tablet; Refill: 3    3. Hyperlipidemia LDL goal <100    4. Chronic heart failure with preserved ejection fraction  Assessment & Plan:  Stable on volume and rate continue with his Lasix 40 a day counseled on following his weight as a way to manage his volume status    Orders:  -     proBNP; Future    Other orders  -     valsartan-hydrochlorothiazide (Diovan HCT) 80-12.5 MG per tablet; Take 1 tablet by mouth Daily.  Dispense: 90 tablet; Refill: 3            Follow Up     Return in about 6 months (around 2/22/2025) for Follow with Nyla Ramírez, EKG with F/U.          Patient was given instructions and counseling regarding his condition or for health maintenance advice. Please see specific information pulled into the AVS if appropriate.

## 2024-08-22 NOTE — ASSESSMENT & PLAN NOTE
Patient with elevations and started on chemotherapy recommended to addition of HCTZ to his valsartan 12.5 daily we will repeat BMP in 2 weeks

## 2024-08-22 NOTE — ASSESSMENT & PLAN NOTE
Rate controlled on verapamil 180 CD daily and Lopressor 100 twice daily dosing.  Continue on his Eliquis 5 twice daily for CVA prevention

## 2024-08-23 ENCOUNTER — HOSPITAL ENCOUNTER (OUTPATIENT)
Dept: ONCOLOGY | Facility: HOSPITAL | Age: 69
Discharge: HOME | End: 2024-08-23
Payer: MEDICARE

## 2024-08-23 ENCOUNTER — HOSPITAL ENCOUNTER (OUTPATIENT)
Dept: CT IMAGING | Facility: HOSPITAL | Age: 69
Discharge: HOME | End: 2024-08-23
Payer: MEDICARE

## 2024-08-23 DIAGNOSIS — Z45.2 ENCOUNTER FOR ADJUSTMENT OR MANAGEMENT OF VASCULAR ACCESS DEVICE: Primary | ICD-10-CM

## 2024-08-23 DIAGNOSIS — C20 RECTAL CANCER: ICD-10-CM

## 2024-08-23 LAB
ALBUMIN SERPL-MCNC: 3.9 G/DL (ref 3.5–5.2)
ALBUMIN/GLOB SERPL: 1.5 G/DL
ALP SERPL-CCNC: 84 U/L (ref 39–117)
ALT SERPL W P-5'-P-CCNC: 18 U/L (ref 1–41)
ANION GAP SERPL CALCULATED.3IONS-SCNC: 6.9 MMOL/L (ref 5–15)
AST SERPL-CCNC: 22 U/L (ref 1–40)
BACTERIA UR QL AUTO: NORMAL /HPF
BASOPHILS # BLD AUTO: 0.01 10*3/MM3 (ref 0–0.2)
BASOPHILS NFR BLD AUTO: 0.1 % (ref 0–1.5)
BILIRUB SERPL-MCNC: 1 MG/DL (ref 0–1.2)
BILIRUB UR QL STRIP: NEGATIVE
BUN SERPL-MCNC: 12 MG/DL (ref 8–23)
BUN/CREAT SERPL: 13 (ref 7–25)
CALCIUM SPEC-SCNC: 8.8 MG/DL (ref 8.6–10.5)
CEA SERPL-MCNC: 8.79 NG/ML
CHLORIDE SERPL-SCNC: 103 MMOL/L (ref 98–107)
CLARITY UR: CLEAR
CO2 SERPL-SCNC: 27.1 MMOL/L (ref 22–29)
COLOR UR: YELLOW
CREAT BLDA-MCNC: 1.2 MG/DL (ref 0.6–1.3)
CREAT SERPL-MCNC: 0.92 MG/DL (ref 0.76–1.27)
DEPRECATED RDW RBC AUTO: 45.7 FL (ref 37–54)
EGFRCR SERPLBLD CKD-EPI 2021: 65.9 ML/MIN/1.73
EGFRCR SERPLBLD CKD-EPI 2021: 90.6 ML/MIN/1.73
EOSINOPHIL # BLD AUTO: 0.18 10*3/MM3 (ref 0–0.4)
EOSINOPHIL NFR BLD AUTO: 2.2 % (ref 0.3–6.2)
ERYTHROCYTE [DISTWIDTH] IN BLOOD BY AUTOMATED COUNT: 13.3 % (ref 12.3–15.4)
GLOBULIN UR ELPH-MCNC: 2.6 GM/DL
GLUCOSE SERPL-MCNC: 110 MG/DL (ref 65–99)
GLUCOSE UR STRIP-MCNC: NEGATIVE MG/DL
HCT VFR BLD AUTO: 49.1 % (ref 37.5–51)
HGB BLD-MCNC: 17 G/DL (ref 13–17.7)
HGB UR QL STRIP.AUTO: ABNORMAL
HYALINE CASTS UR QL AUTO: NORMAL /LPF
IMM GRANULOCYTES # BLD AUTO: 0.01 10*3/MM3 (ref 0–0.05)
IMM GRANULOCYTES NFR BLD AUTO: 0.1 % (ref 0–0.5)
KETONES UR QL STRIP: NEGATIVE
LEUKOCYTE ESTERASE UR QL STRIP.AUTO: NEGATIVE
LYMPHOCYTES # BLD AUTO: 1.84 10*3/MM3 (ref 0.7–3.1)
LYMPHOCYTES NFR BLD AUTO: 22.4 % (ref 19.6–45.3)
MCH RBC QN AUTO: 32.4 PG (ref 26.6–33)
MCHC RBC AUTO-ENTMCNC: 34.6 G/DL (ref 31.5–35.7)
MCV RBC AUTO: 93.5 FL (ref 79–97)
MONOCYTES # BLD AUTO: 0.59 10*3/MM3 (ref 0.1–0.9)
MONOCYTES NFR BLD AUTO: 7.2 % (ref 5–12)
NEUTROPHILS NFR BLD AUTO: 5.57 10*3/MM3 (ref 1.7–7)
NEUTROPHILS NFR BLD AUTO: 68 % (ref 42.7–76)
NITRITE UR QL STRIP: NEGATIVE
PH UR STRIP.AUTO: 5.5 [PH] (ref 5–8)
PLATELET # BLD AUTO: 206 10*3/MM3 (ref 140–450)
PMV BLD AUTO: 9.5 FL (ref 6–12)
POTASSIUM SERPL-SCNC: 3.8 MMOL/L (ref 3.5–5.2)
PROT SERPL-MCNC: 6.5 G/DL (ref 6–8.5)
PROT UR QL STRIP: NEGATIVE
RBC # BLD AUTO: 5.25 10*6/MM3 (ref 4.14–5.8)
RBC # UR STRIP: NORMAL /HPF
REF LAB TEST METHOD: NORMAL
SODIUM SERPL-SCNC: 137 MMOL/L (ref 136–145)
SP GR UR STRIP: >1.03 (ref 1–1.03)
SQUAMOUS #/AREA URNS HPF: NORMAL /HPF
UROBILINOGEN UR QL STRIP: ABNORMAL
WBC # UR STRIP: NORMAL /HPF
WBC NRBC COR # BLD AUTO: 8.2 10*3/MM3 (ref 3.4–10.8)

## 2024-08-23 PROCEDURE — 82565 ASSAY OF CREATININE: CPT

## 2024-08-23 PROCEDURE — 85025 COMPLETE CBC W/AUTO DIFF WBC: CPT | Performed by: INTERNAL MEDICINE

## 2024-08-23 PROCEDURE — 25510000001 IOPAMIDOL PER 1 ML: Performed by: INTERNAL MEDICINE

## 2024-08-23 PROCEDURE — 82378 CARCINOEMBRYONIC ANTIGEN: CPT | Performed by: INTERNAL MEDICINE

## 2024-08-23 PROCEDURE — 25010000002 HEPARIN LOCK FLUSH PER 10 UNITS: Performed by: INTERNAL MEDICINE

## 2024-08-23 PROCEDURE — 71260 CT THORAX DX C+: CPT

## 2024-08-23 PROCEDURE — 74177 CT ABD & PELVIS W/CONTRAST: CPT

## 2024-08-23 PROCEDURE — 81001 URINALYSIS AUTO W/SCOPE: CPT | Performed by: INTERNAL MEDICINE

## 2024-08-23 PROCEDURE — 80053 COMPREHEN METABOLIC PANEL: CPT | Performed by: INTERNAL MEDICINE

## 2024-08-23 PROCEDURE — 36591 DRAW BLOOD OFF VENOUS DEVICE: CPT

## 2024-08-23 RX ORDER — SODIUM CHLORIDE 0.9 % (FLUSH) 0.9 %
20 SYRINGE (ML) INJECTION AS NEEDED
Status: DISCONTINUED | OUTPATIENT
Start: 2024-08-23 | End: 2024-08-24 | Stop reason: HOSPADM

## 2024-08-23 RX ORDER — HEPARIN SODIUM (PORCINE) LOCK FLUSH IV SOLN 100 UNIT/ML 100 UNIT/ML
500 SOLUTION INTRAVENOUS AS NEEDED
OUTPATIENT
Start: 2024-08-23

## 2024-08-23 RX ORDER — HEPARIN SODIUM (PORCINE) LOCK FLUSH IV SOLN 100 UNIT/ML 100 UNIT/ML
500 SOLUTION INTRAVENOUS AS NEEDED
Status: DISCONTINUED | OUTPATIENT
Start: 2024-08-23 | End: 2024-08-24 | Stop reason: HOSPADM

## 2024-08-23 RX ORDER — SODIUM CHLORIDE 0.9 % (FLUSH) 0.9 %
20 SYRINGE (ML) INJECTION AS NEEDED
OUTPATIENT
Start: 2024-08-23

## 2024-08-23 RX ADMIN — Medication 20 ML: at 10:59

## 2024-08-23 RX ADMIN — IOPAMIDOL 100 ML: 755 INJECTION, SOLUTION INTRAVENOUS at 09:57

## 2024-08-23 RX ADMIN — HEPARIN 500 UNITS: 100 SYRINGE at 10:59

## 2024-08-26 ENCOUNTER — TELEPHONE (OUTPATIENT)
Dept: CARDIOLOGY | Facility: CLINIC | Age: 69
End: 2024-08-26
Payer: MEDICARE

## 2024-08-26 NOTE — TELEPHONE ENCOUNTER
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 This is a stable finding that would just need repeat CT Chest every other year. This is not new, was noted on echocardiogram in August 2023 as borderline dilation which appears to be unchanged.

## 2024-08-26 NOTE — TELEPHONE ENCOUNTER
"  Caller: Robbi Kennedy \"ALPESH\"    Relationship: Self    Best call back number: [phone]    Caller requesting test results: PATIENT    What test was performed: CANCER SCAN    When was the test performed: LAST WEEK    Additional notes: IT CAME BACK WITH SOMETHING ABOUT HIS AORTA AND HE WOULD LIKE TO DISCUSS THAT WITH DR. CARTER TO SEE WHAT HE WANTS TO DO           "

## 2024-08-27 ENCOUNTER — OFFICE VISIT (OUTPATIENT)
Dept: ONCOLOGY | Facility: HOSPITAL | Age: 69
End: 2024-08-27
Payer: MEDICARE

## 2024-08-27 DIAGNOSIS — C20 RECTAL CANCER: Primary | ICD-10-CM

## 2024-08-27 PROCEDURE — 99214 OFFICE O/P EST MOD 30 MIN: CPT | Performed by: INTERNAL MEDICINE

## 2024-08-27 PROCEDURE — 1160F RVW MEDS BY RX/DR IN RCRD: CPT | Performed by: INTERNAL MEDICINE

## 2024-08-27 PROCEDURE — 1159F MED LIST DOCD IN RCRD: CPT | Performed by: INTERNAL MEDICINE

## 2024-08-27 PROCEDURE — G2211 COMPLEX E/M VISIT ADD ON: HCPCS | Performed by: INTERNAL MEDICINE

## 2024-08-27 PROCEDURE — 1126F AMNT PAIN NOTED NONE PRSNT: CPT | Performed by: INTERNAL MEDICINE

## 2024-08-27 PROCEDURE — G0463 HOSPITAL OUTPT CLINIC VISIT: HCPCS | Performed by: INTERNAL MEDICINE

## 2024-08-27 NOTE — ASSESSMENT & PLAN NOTE
Metastatic.  Patient is on fruquintinib.  Tolerating well.  He has good performance status, ECOG PS 0.  Restaging CT scans demonstrate slight enlargement of the known metastatic foci, however clinically he is feeling well.  Given the clinical benefit with how he is feeling and only marginal change in his scans, I would continue on with current therapy.  We did discuss other therapies based on his prior mutation testing-he is K-ama wild-type.  Panitumumab could be employed if he shows overt progression or intolerance to their fruquintinib medicine.  Patient is happy to continue the pill for now.  I will see him back in 3 months for continued treatment with lab work and CT scans prior.

## 2024-08-27 NOTE — PROGRESS NOTES
Chief Complaint  Rectal Cancer    Eri Moran APRN Reinberg, Emily, APRN Subjective          Robbi Kennedy presents to [facility] for ongoing treatment of his metastatic rectal cancer.  He is on fruquintinib.  He is tolerating the medication well.  Denies any issues or side effects.  He denies new masses, adenopathy.  He notes good appetite and adequate energy level.    Oncology/Hematology History Overview Note   9/30/2019 High rectal yzps-cbktzwvlcu-fvoypzpzkwrtsk adenocarcinoma  associated with tubular adenoma.   4/27/21 Biopsy of liver revealed metastatic adenocarcinoma, consistent with colorectal primary.    Clinical Staging  Stage IIa ([id number])    Chemotherapy      neoadjuvant Xeloda with radiation. adjuvant xeloda        Radiation Therapy      7/8/19 thru 8/14/19 completed neoadjuvant 5040 cGy=28 fxs rectum     10/2021 XRT due to recurrence right middle lung 5 Fx's, 5000cGy  11/26/21 adjuvant FOLFOX -2/25/2022    Surgeries       9/30/2019 low anterior resection with ostomy        8/31/21 partial hepatectomy, cholecystectomy and MWA for segment 6 medical margin performed at     2/8/2023 CT Scans revealed Worsening pulmonary metastatic disease.    2/21/2023 orders written for FOLFIRI + AVASTIN       Rectal cancer   6/2/2021 - 8/15/2021 Chemotherapy    OP COLON mFOLFOX6 + Bevacizumab (OXALIplatin / Leucovorin / Fluorouracil / Bevacizumab)     6/18/2021 Initial Diagnosis    Rectal cancer (CMS/HCC)     6/21/2021 Cancer Staged    Staging form: Colon And Rectum, AJCC 8th Edition  - Clinical: Stage IIA (cT3, cN0, cM0) - Signed by Maurilio Campos MD on 6/21/2021 11/16/2021 - 2/25/2022 Chemotherapy    OP COLON mFOLFOX6 OXALIplatin / Leucovorin / Fluorouracil     2/28/2023 - 8/24/2023 Chemotherapy    OP COLORECTAL FOLFIRI + Bevacizumab 5mg/kg (Irinotecan / Leucovorin / Fluorouracil / Bevacizumab)     3/13/2024 Biopsy    OP COLORECTAL Fruquintinib  Plan  Provider: Maurilio Campos MD  Treatment goal: Palliative  Line of treatment: [No plan line of treatment]     Secondary malignant neoplasm of left lung   9/14/2021 Initial Diagnosis    Secondary malignant neoplasm of left lung (HCC)     10/20/2021 -  Radiation    RADIATION THERAPY Treatment Details (Noted on 10/5/2021)  Site: Bilateral Lung  Technique: SBRT  Goal: No goal specified  Planned Treatment Start Date: 10/20/2021     11/16/2021 - 2/25/2022 Chemotherapy    OP COLON mFOLFOX6 OXALIplatin / Leucovorin / Fluorouracil     6/15/2022 -  Radiation    RADIATION THERAPY Treatment Details (Noted on 6/15/2022)  Site: Left Lung - Lower lobe  Technique: SBRT  Goal: No goal specified  Planned Treatment Start Date: No planned start date specified     10/25/2022 -  Radiation    RADIATION THERAPY Treatment Details (Noted on 10/25/2022)  Site: Left Lung  Technique: SBRT  Goal: No goal specified  Planned Treatment Start Date: No planned start date specified     Secondary malignancy of liver   9/14/2021 Initial Diagnosis    Secondary malignancy of liver (HCC)     11/16/2021 - 2/25/2022 Chemotherapy    OP COLON mFOLFOX6 OXALIplatin / Leucovorin / Fluorouracil     Secondary malignant neoplasm of right lung   10/5/2021 Initial Diagnosis    Secondary malignant neoplasm of right lung (HCC)     10/20/2021 -  Radiation    RADIATION THERAPY Treatment Details (Noted on 10/5/2021)  Site: Bilateral Lung  Technique: SBRT  Goal: No goal specified  Planned Treatment Start Date: 10/20/2021     11/16/2021 - 2/25/2022 Chemotherapy    OP COLON mFOLFOX6 OXALIplatin / Leucovorin / Fluorouracil           Current Outpatient Medications on File Prior to Visit   Medication Sig Dispense Refill    acetaminophen (TYLENOL) 500 MG tablet Take 1 tablet by mouth Every 6 (Six) Hours As Needed.      apixaban (ELIQUIS) 5 MG tablet tablet Take 1 tablet by mouth 2 (Two) Times a Day.      ascorbic acid (VITAMIN C) 500 MG tablet Take 1 tablet by mouth Daily.       atorvastatin (LIPITOR) 40 MG tablet atorvastatin 40 mg oral tablet take 1 tablet (40 mg) by oral route once daily   Active      cyanocobalamin (VITAMIN B-12) 1000 MCG tablet Take 1 tablet by mouth Daily.      Fruzaqla 5 MG capsule TAKE 1 CAPSULE BY MOUTH DAILY ON DAYS 1-21, THEN 7 DAYS OFF OF A 28 DAY CYCLE. 21 capsule 5    furosemide (LASIX) 40 MG tablet Take 1 tablet by mouth Daily. 90 tablet 1    loratadine (CLARITIN) 10 MG tablet loratadine 10 mg oral tablet take 1 tablet (10 mg) by oral route once daily   Active      metoprolol tartrate (LOPRESSOR) 100 MG tablet Take 1 tablet by mouth 2 (Two) Times a Day. 180 tablet 3    montelukast (SINGULAIR) 10 MG tablet 1 tablet.      multivitamin (THERAGRAN) tablet tablet Take 1 tablet by mouth Daily.      ondansetron (ZOFRAN) 8 MG tablet Take 1 tablet by mouth 3 (Three) Times a Day As Needed for Nausea or Vomiting. 30 tablet 5    potassium chloride (KLOR-CON M20) 20 MEQ CR tablet Take 1 tablet by mouth Daily.      psyllium (METAMUCIL) 58.6 % packet Take 1 packet by mouth Daily.      Synthroid 25 MCG tablet Take 2 tablets by mouth Daily.      valsartan-hydrochlorothiazide (Diovan HCT) 80-12.5 MG per tablet Take 1 tablet by mouth Daily. 90 tablet 3    verapamil SR (CALAN-SR) 180 MG CR tablet Take 1 tablet by mouth Every Night. 90 tablet 3    vitamin E 1000 UNIT capsule Take 1 capsule by mouth Daily.      clobetasol propionate (TEMOVATE) 0.05 % cream Apply 1 Application topically to the appropriate area as directed 2 (Two) Times a Day. Apply twice daily to the palms of the hands and the soles of the feet. (Patient not taking: Reported on 8/27/2024) 30 g 5    MAGIC MOUTHWASH W/NYSTATIN 1/1/1/1 Swish and swallow 10 mL 4 (Four) Times a Day As Needed for Mouth Pain. (Patient not taking: Reported on 8/27/2024) 500 mL 1    potassium chloride 10 MEQ CR tablet Take 1 tablet by mouth Every Other Day. (Patient not taking: Reported on 8/27/2024) 45 tablet 3     No current  facility-administered medications on file prior to visit.       No Known Allergies  Past Medical History:   Diagnosis Date    Asthma     Atrial fibrillation, persistent 2/26/2021    Chronic heart failure with preserved ejection fraction 7/18/2022    BNP 1590 on 7/22 Echocardiogram with EF 55% moderate to severe biatrial enlargement and mild right ventricular enlargement 7/22     Colorectal cancer     Essential hypertension     GI cancer     Hepatitis     History of DVT of lower extremity 8/27/2021    Hyperlipidemia LDL goal <100 12/31/2020    Rectal cancer 6/18/2021    Secondary malignancy of liver 9/14/2021    Secondary malignant neoplasm of left lung 9/14/2021    Secondary malignant neoplasm of right lung 10/5/2021    Thrombocytopenia 1/11/2022    Thyroid disease     Viral upper respiratory tract infection 4/24/2023     Past Surgical History:   Procedure Laterality Date    COLON SURGERY      HERNIA REPAIR      LIVER BIOPSY      4/27/21 Biopsy of liver revealed metastatic adenocarcinoma, consistent with colorectal primary    OTHER SURGICAL HISTORY      REMOVED CANCER FROM COLON     Social History     Socioeconomic History    Marital status:     Number of children: 2   Tobacco Use    Smoking status: Never    Smokeless tobacco: Never   Vaping Use    Vaping status: Never Used   Substance and Sexual Activity    Alcohol use: Not Currently     Alcohol/week: 3.0 standard drinks of alcohol     Types: 3 Cans of beer per week     Comment: 2-3 beers weekly    Drug use: Never    Sexual activity: Defer     Family History   Problem Relation Age of Onset    Prostate cancer Father     Heart murmur Mother     Diabetes Mother     Colon cancer Maternal Uncle        Objective   Physical Exam  Vitals reviewed. Exam conducted with a chaperone present.   Cardiovascular:      Rate and Rhythm: Normal rate and regular rhythm.      Heart sounds: Normal heart sounds. No murmur heard.     No gallop.   Pulmonary:      Effort: Pulmonary  effort is normal.      Breath sounds: Normal breath sounds.   Abdominal:      General: Bowel sounds are normal. There is no distension.      Tenderness: There is no abdominal tenderness.   Lymphadenopathy:      Cervical: No cervical adenopathy.   Psychiatric:         Mood and Affect: Mood normal.         Behavior: Behavior normal.         There were no vitals filed for this visit.  ECOG score: 0         PHQ-9 Total Score:                    Result Review :   The following data was reviewed by: Maurilio Campos MD on 08/27/2024:  Lab Results   Component Value Date    HGB 17.0 08/23/2024    HCT 49.1 08/23/2024    MCV 93.5 08/23/2024     08/23/2024    WBC 8.20 08/23/2024    NEUTROABS 5.57 08/23/2024    LYMPHSABS 1.84 08/23/2024    MONOSABS 0.59 08/23/2024    EOSABS 0.18 08/23/2024    BASOSABS 0.01 08/23/2024     Lab Results   Component Value Date    GLUCOSE 110 (H) 08/23/2024    BUN 12 08/23/2024    CREATININE 0.92 08/23/2024     08/23/2024    K 3.8 08/23/2024     08/23/2024    CO2 27.1 08/23/2024    CALCIUM 8.8 08/23/2024    PROTEINTOT 6.5 08/23/2024    ALBUMIN 3.9 08/23/2024    BILITOT 1.0 08/23/2024    ALKPHOS 84 08/23/2024    AST 22 08/23/2024    ALT 18 08/23/2024     Lab Results   Component Value Date    MG 1.8 (L) 09/07/2021    FREET4 0.74 07/17/2024    TSH 3.250 02/24/2021     Lab Results   Component Value Date    IRON 60 (L) 03/04/2020    LABIRON 20 03/04/2020    TRANSFERRIN 215.00 03/04/2020    TIBC 307 03/04/2020     Lab Results   Component Value Date    FERRITIN 140 03/04/2020    [id number] >2000 (H) 03/04/2020    FOLATE 19.4 03/04/2020     Lab Results   Component Value Date    PSA 0.92 12/12/2023    CEA 8.79 08/23/2024     K-ama wild-type    Data reviewed : Radiologic studies CT chest, abdomen, pelvis reviewed       Assessment and Plan    Diagnoses and all orders for this visit:    1. Rectal cancer (Primary)  Assessment & Plan:  Metastatic.  Patient is on fruquintinib.  Tolerating well.   He has good performance status, ECOG PS 0.  Restaging CT scans demonstrate slight enlargement of the known metastatic foci, however clinically he is feeling well.  Given the clinical benefit with how he is feeling and only marginal change in his scans, I would continue on with current therapy.  We did discuss other therapies based on his prior mutation testing-he is K-ama wild-type.  Panitumumab could be employed if he shows overt progression or intolerance to their fruquintinib medicine.  Patient is happy to continue the pill for now.  I will see him back in 3 months for continued treatment with lab work and CT scans prior.    Orders:  -     CEA; Future  -     CBC & Differential; Future  -     Comprehensive Metabolic Panel; Future  -     CT chest w contrast; Future  -     CT abdomen pelvis w contrast; Future  -     Urinalysis With Microscopic - Urine, Clean Catch; Future            Patient Follow Up: 3 months    Patient was given instructions and counseling regarding his condition or for health maintenance advice. Please see specific information pulled into the AVS if appropriate.     Maurilio Campos MD    8/27/2024

## 2024-08-28 ENCOUNTER — SPECIALTY PHARMACY (OUTPATIENT)
Facility: HOSPITAL | Age: 69
End: 2024-08-28
Payer: MEDICARE

## 2024-09-11 ENCOUNTER — LAB (OUTPATIENT)
Dept: LAB | Facility: HOSPITAL | Age: 69
End: 2024-09-11
Payer: MEDICARE

## 2024-09-11 DIAGNOSIS — I10 ESSENTIAL HYPERTENSION: ICD-10-CM

## 2024-09-11 DIAGNOSIS — I50.32 CHRONIC HEART FAILURE WITH PRESERVED EJECTION FRACTION: ICD-10-CM

## 2024-09-11 LAB
ANION GAP SERPL CALCULATED.3IONS-SCNC: 11.3 MMOL/L (ref 5–15)
BUN SERPL-MCNC: 12 MG/DL (ref 8–23)
BUN/CREAT SERPL: 9.8 (ref 7–25)
CALCIUM SPEC-SCNC: 9.6 MG/DL (ref 8.6–10.5)
CHLORIDE SERPL-SCNC: 100 MMOL/L (ref 98–107)
CO2 SERPL-SCNC: 26.7 MMOL/L (ref 22–29)
CREAT SERPL-MCNC: 1.23 MG/DL (ref 0.76–1.27)
EGFRCR SERPLBLD CKD-EPI 2021: 63.9 ML/MIN/1.73
GLUCOSE SERPL-MCNC: 102 MG/DL (ref 65–99)
NT-PROBNP SERPL-MCNC: 759 PG/ML (ref 0–900)
POTASSIUM SERPL-SCNC: 4 MMOL/L (ref 3.5–5.2)
SODIUM SERPL-SCNC: 138 MMOL/L (ref 136–145)

## 2024-09-11 PROCEDURE — 83880 ASSAY OF NATRIURETIC PEPTIDE: CPT

## 2024-09-11 PROCEDURE — 80048 BASIC METABOLIC PNL TOTAL CA: CPT

## 2024-09-11 PROCEDURE — 36415 COLL VENOUS BLD VENIPUNCTURE: CPT

## 2024-09-12 ENCOUNTER — TELEPHONE (OUTPATIENT)
Dept: CARDIOLOGY | Facility: CLINIC | Age: 69
End: 2024-09-12
Payer: MEDICARE

## 2024-09-12 NOTE — TELEPHONE ENCOUNTER
----- Message from Nyla Ramírez sent at 9/12/2024 12:58 PM EDT -----  Labs are stable, continue current meds

## 2024-09-16 ENCOUNTER — TELEPHONE (OUTPATIENT)
Dept: CARDIOLOGY | Facility: CLINIC | Age: 69
End: 2024-09-16
Payer: MEDICARE

## 2024-09-23 ENCOUNTER — TELEPHONE (OUTPATIENT)
Dept: CARDIOLOGY | Facility: CLINIC | Age: 69
End: 2024-09-23
Payer: MEDICARE

## 2024-10-07 ENCOUNTER — TELEPHONE (OUTPATIENT)
Dept: ONCOLOGY | Facility: HOSPITAL | Age: 69
End: 2024-10-07

## 2024-10-07 NOTE — TELEPHONE ENCOUNTER
Caller: ANA LEONARD    Relationship: Emergency Contact    Best call back number: [phone]  MAY LEAVE VM    What is the best time to reach you: ANYTIME    Who are you requesting to speak with (clinical staff, provider,  specific staff member): CLINICAL    What was the call regarding: PT IS NEEDING A COPY OF HIS PATHOLOGY REPORT FOR DISABILITY CAN HE COME BY THE OFFICE TOMORROW TO PICK IT UP.     PLEASE ADVISE

## 2024-10-08 ENCOUNTER — HOSPITAL ENCOUNTER (OUTPATIENT)
Dept: ONCOLOGY | Facility: HOSPITAL | Age: 69
Discharge: HOME | End: 2024-10-08
Payer: MEDICARE

## 2024-10-08 DIAGNOSIS — Z45.2 ENCOUNTER FOR ADJUSTMENT OR MANAGEMENT OF VASCULAR ACCESS DEVICE: Primary | ICD-10-CM

## 2024-10-08 PROCEDURE — 25010000002 HEPARIN LOCK FLUSH PER 10 UNITS: Performed by: INTERNAL MEDICINE

## 2024-10-08 PROCEDURE — 96523 IRRIG DRUG DELIVERY DEVICE: CPT

## 2024-10-08 RX ORDER — HEPARIN SODIUM (PORCINE) LOCK FLUSH IV SOLN 100 UNIT/ML 100 UNIT/ML
500 SOLUTION INTRAVENOUS AS NEEDED
OUTPATIENT
Start: 2024-10-08

## 2024-10-08 RX ORDER — HEPARIN SODIUM (PORCINE) LOCK FLUSH IV SOLN 100 UNIT/ML 100 UNIT/ML
500 SOLUTION INTRAVENOUS AS NEEDED
Status: DISCONTINUED | OUTPATIENT
Start: 2024-10-08 | End: 2024-10-09 | Stop reason: HOSPADM

## 2024-10-08 RX ORDER — SODIUM CHLORIDE 0.9 % (FLUSH) 0.9 %
20 SYRINGE (ML) INJECTION AS NEEDED
OUTPATIENT
Start: 2024-10-08

## 2024-10-08 RX ORDER — SODIUM CHLORIDE 0.9 % (FLUSH) 0.9 %
20 SYRINGE (ML) INJECTION AS NEEDED
Status: DISCONTINUED | OUTPATIENT
Start: 2024-10-08 | End: 2024-10-09 | Stop reason: HOSPADM

## 2024-10-08 RX ADMIN — HEPARIN 500 UNITS: 100 SYRINGE at 08:10

## 2024-10-08 RX ADMIN — Medication 20 ML: at 08:10

## 2024-11-14 ENCOUNTER — TELEPHONE (OUTPATIENT)
Dept: ONCOLOGY | Facility: HOSPITAL | Age: 69
End: 2024-11-14

## 2024-11-14 NOTE — TELEPHONE ENCOUNTER
Caller: ANA LEONARD    Relationship to patient: Emergency Contact    Best call back number: [phone]     Chief complaint: PT THOUGHT THAT HE WOULD GET HIS LAB AND PORT FLUSH WHEN HE GOT HIS CT. THE CT IS SCHEDULED FOR 11/20.     Type of visit: PORT FLUSH AND (LAB ?)    Requested date: MOVE TO 11/20     If rescheduling, when is the original appointment: 11/19

## 2024-11-19 ENCOUNTER — HOSPITAL ENCOUNTER (OUTPATIENT)
Dept: ONCOLOGY | Facility: HOSPITAL | Age: 69
Discharge: HOME | End: 2024-11-19
Admitting: INTERNAL MEDICINE
Payer: MEDICARE

## 2024-11-19 DIAGNOSIS — Z45.2 ENCOUNTER FOR ADJUSTMENT OR MANAGEMENT OF VASCULAR ACCESS DEVICE: Primary | ICD-10-CM

## 2024-11-19 DIAGNOSIS — C20 RECTAL CANCER: ICD-10-CM

## 2024-11-19 LAB
ALBUMIN SERPL-MCNC: 3.4 G/DL (ref 3.5–5.2)
ALBUMIN/GLOB SERPL: 1.2 G/DL
ALP SERPL-CCNC: 90 U/L (ref 39–117)
ALT SERPL W P-5'-P-CCNC: 17 U/L (ref 1–41)
ANION GAP SERPL CALCULATED.3IONS-SCNC: 10.4 MMOL/L (ref 5–15)
AST SERPL-CCNC: 21 U/L (ref 1–40)
BACTERIA UR QL AUTO: NORMAL /HPF
BASOPHILS # BLD AUTO: 0.03 10*3/MM3 (ref 0–0.2)
BASOPHILS NFR BLD AUTO: 0.3 % (ref 0–1.5)
BILIRUB SERPL-MCNC: 1.2 MG/DL (ref 0–1.2)
BILIRUB UR QL STRIP: NEGATIVE
BUN SERPL-MCNC: 17 MG/DL (ref 8–23)
BUN/CREAT SERPL: 17.7 (ref 7–25)
CALCIUM SPEC-SCNC: 8.4 MG/DL (ref 8.6–10.5)
CEA SERPL-MCNC: 9.25 NG/ML
CHLORIDE SERPL-SCNC: 97 MMOL/L (ref 98–107)
CLARITY UR: CLEAR
CO2 SERPL-SCNC: 27.6 MMOL/L (ref 22–29)
COLOR UR: YELLOW
CREAT SERPL-MCNC: 0.96 MG/DL (ref 0.76–1.27)
DEPRECATED RDW RBC AUTO: 46.1 FL (ref 37–54)
EGFRCR SERPLBLD CKD-EPI 2021: 86.1 ML/MIN/1.73
EOSINOPHIL # BLD AUTO: 0.35 10*3/MM3 (ref 0–0.4)
EOSINOPHIL NFR BLD AUTO: 3.9 % (ref 0.3–6.2)
ERYTHROCYTE [DISTWIDTH] IN BLOOD BY AUTOMATED COUNT: 13.5 % (ref 12.3–15.4)
GLOBULIN UR ELPH-MCNC: 2.9 GM/DL
GLUCOSE SERPL-MCNC: 112 MG/DL (ref 65–99)
GLUCOSE UR STRIP-MCNC: NEGATIVE MG/DL
HCT VFR BLD AUTO: 46 % (ref 37.5–51)
HGB BLD-MCNC: 15.6 G/DL (ref 13–17.7)
HGB UR QL STRIP.AUTO: NEGATIVE
HYALINE CASTS UR QL AUTO: NORMAL /LPF
IMM GRANULOCYTES # BLD AUTO: 0.03 10*3/MM3 (ref 0–0.05)
IMM GRANULOCYTES NFR BLD AUTO: 0.3 % (ref 0–0.5)
KETONES UR QL STRIP: NEGATIVE
LEUKOCYTE ESTERASE UR QL STRIP.AUTO: NEGATIVE
LYMPHOCYTES # BLD AUTO: 2.07 10*3/MM3 (ref 0.7–3.1)
LYMPHOCYTES NFR BLD AUTO: 22.9 % (ref 19.6–45.3)
MCH RBC QN AUTO: 31.6 PG (ref 26.6–33)
MCHC RBC AUTO-ENTMCNC: 33.9 G/DL (ref 31.5–35.7)
MCV RBC AUTO: 93.3 FL (ref 79–97)
MONOCYTES # BLD AUTO: 0.59 10*3/MM3 (ref 0.1–0.9)
MONOCYTES NFR BLD AUTO: 6.5 % (ref 5–12)
NEUTROPHILS NFR BLD AUTO: 5.98 10*3/MM3 (ref 1.7–7)
NEUTROPHILS NFR BLD AUTO: 66.1 % (ref 42.7–76)
NITRITE UR QL STRIP: NEGATIVE
NRBC BLD AUTO-RTO: 0 /100 WBC (ref 0–0.2)
PH UR STRIP.AUTO: 7 [PH] (ref 5–8)
PLATELET # BLD AUTO: 226 10*3/MM3 (ref 140–450)
PMV BLD AUTO: 9.2 FL (ref 6–12)
POTASSIUM SERPL-SCNC: 3.7 MMOL/L (ref 3.5–5.2)
PROT SERPL-MCNC: 6.3 G/DL (ref 6–8.5)
PROT UR QL STRIP: NEGATIVE
RBC # BLD AUTO: 4.93 10*6/MM3 (ref 4.14–5.8)
RBC # UR STRIP: NORMAL /HPF
REF LAB TEST METHOD: NORMAL
SODIUM SERPL-SCNC: 135 MMOL/L (ref 136–145)
SP GR UR STRIP: 1.01 (ref 1–1.03)
SQUAMOUS #/AREA URNS HPF: NORMAL /HPF
UROBILINOGEN UR QL STRIP: NORMAL
WBC # UR STRIP: NORMAL /HPF
WBC NRBC COR # BLD AUTO: 9.05 10*3/MM3 (ref 3.4–10.8)

## 2024-11-19 PROCEDURE — 25010000002 HEPARIN LOCK FLUSH PER 10 UNITS: Performed by: INTERNAL MEDICINE

## 2024-11-19 PROCEDURE — 82378 CARCINOEMBRYONIC ANTIGEN: CPT | Performed by: INTERNAL MEDICINE

## 2024-11-19 PROCEDURE — 85025 COMPLETE CBC W/AUTO DIFF WBC: CPT | Performed by: INTERNAL MEDICINE

## 2024-11-19 PROCEDURE — 81001 URINALYSIS AUTO W/SCOPE: CPT | Performed by: INTERNAL MEDICINE

## 2024-11-19 PROCEDURE — 36591 DRAW BLOOD OFF VENOUS DEVICE: CPT

## 2024-11-19 PROCEDURE — 80053 COMPREHEN METABOLIC PANEL: CPT | Performed by: INTERNAL MEDICINE

## 2024-11-19 RX ORDER — SODIUM CHLORIDE 0.9 % (FLUSH) 0.9 %
20 SYRINGE (ML) INJECTION AS NEEDED
OUTPATIENT
Start: 2024-11-19

## 2024-11-19 RX ORDER — HEPARIN SODIUM (PORCINE) LOCK FLUSH IV SOLN 100 UNIT/ML 100 UNIT/ML
500 SOLUTION INTRAVENOUS AS NEEDED
Status: DISCONTINUED | OUTPATIENT
Start: 2024-11-19 | End: 2024-11-20 | Stop reason: HOSPADM

## 2024-11-19 RX ORDER — SODIUM CHLORIDE 0.9 % (FLUSH) 0.9 %
20 SYRINGE (ML) INJECTION AS NEEDED
Status: DISCONTINUED | OUTPATIENT
Start: 2024-11-19 | End: 2024-11-20 | Stop reason: HOSPADM

## 2024-11-19 RX ORDER — HEPARIN SODIUM (PORCINE) LOCK FLUSH IV SOLN 100 UNIT/ML 100 UNIT/ML
500 SOLUTION INTRAVENOUS AS NEEDED
OUTPATIENT
Start: 2024-11-19

## 2024-11-19 RX ADMIN — Medication 20 ML: at 08:12

## 2024-11-19 RX ADMIN — HEPARIN 500 UNITS: 100 SYRINGE at 08:12

## 2024-11-20 ENCOUNTER — HOSPITAL ENCOUNTER (OUTPATIENT)
Dept: CT IMAGING | Facility: HOSPITAL | Age: 69
Discharge: HOME | End: 2024-11-20
Admitting: INTERNAL MEDICINE
Payer: MEDICARE

## 2024-11-20 DIAGNOSIS — C20 RECTAL CANCER: ICD-10-CM

## 2024-11-20 PROCEDURE — 25510000001 IOPAMIDOL PER 1 ML: Performed by: INTERNAL MEDICINE

## 2024-11-20 PROCEDURE — 74177 CT ABD & PELVIS W/CONTRAST: CPT

## 2024-11-20 PROCEDURE — 71260 CT THORAX DX C+: CPT

## 2024-11-20 RX ORDER — IOPAMIDOL 755 MG/ML
100 INJECTION, SOLUTION INTRAVASCULAR
Status: COMPLETED | OUTPATIENT
Start: 2024-11-20 | End: 2024-11-20

## 2024-11-20 RX ADMIN — IOPAMIDOL 100 ML: 755 INJECTION, SOLUTION INTRAVENOUS at 14:01

## 2024-11-26 ENCOUNTER — TELEPHONE (OUTPATIENT)
Dept: ONCOLOGY | Facility: HOSPITAL | Age: 69
End: 2024-11-26

## 2024-11-26 ENCOUNTER — OFFICE VISIT (OUTPATIENT)
Dept: ONCOLOGY | Facility: HOSPITAL | Age: 69
End: 2024-11-26
Payer: MEDICARE

## 2024-11-26 VITALS
RESPIRATION RATE: 20 BRPM | SYSTOLIC BLOOD PRESSURE: 120 MMHG | BODY MASS INDEX: 29.83 KG/M2 | HEART RATE: 90 BPM | TEMPERATURE: 98 F | OXYGEN SATURATION: 99 % | HEIGHT: 76 IN | DIASTOLIC BLOOD PRESSURE: 84 MMHG | WEIGHT: 245 LBS

## 2024-11-26 DIAGNOSIS — C20 RECTAL CANCER: Primary | ICD-10-CM

## 2024-11-26 PROCEDURE — G0463 HOSPITAL OUTPT CLINIC VISIT: HCPCS | Performed by: INTERNAL MEDICINE

## 2024-11-26 RX ORDER — DIPHENOXYLATE HYDROCHLORIDE AND ATROPINE SULFATE 2.5; .025 MG/1; MG/1
1 TABLET ORAL 4 TIMES DAILY PRN
Qty: 120 TABLET | Refills: 0 | Status: SHIPPED | OUTPATIENT
Start: 2024-11-26

## 2024-11-26 NOTE — TELEPHONE ENCOUNTER
Caller: SAKINA [facility] - [facility] [address] - [phone]  - [phone]    Relationship: Pharmacy    Best call back number: [phone]      What was the call regarding: PHARMACY NEEDS CLARIFICATION ON MEDICATION FOR LOMOTIL

## 2024-11-26 NOTE — ASSESSMENT & PLAN NOTE
Metastatic.  Patient is on fruquintinib.  He reports diarrhea not relieved with Imodium and Lomotil will be prescribed.  He also reports increased dizziness and faint symptoms.  This could be related to his blood pressure medications but temporally seems to coincide with his cycle.  I will decrease his fruquintinib to 4 mg days 1-20 1 out of 28.  Restaging scans overall appear stable.  There are some small new areas in the pancreas of uncertain significance.  The other areas look okay.  Proceed with fruquintinib with dose reduction as outlined.  I will see him back in 3 months for continued treatment with lab work and scans prior.

## 2024-11-26 NOTE — PROGRESS NOTES
Chief Complaint  Rectal Cancer    Eri Moran APRN Reinberg, Emily, REGINALDO    Subjective          Robbi Kennedy presents to [facility] for ongoing treatment of his metastatic rectal cancer.  He is on oral fruquintinib.  He is compliant with the regimen.  He reports some lightheadedness and faint symptoms while he is taking his medication.  Not as severe on the off week.  He is on multiple blood pressure medications as well.  He denies nausea or vomiting.  He is eating and drinking adequately.  He does report increased diarrhea on the regimen.  He is taking Imodium with some improvement but incomplete relief.  He denies blood per rectum, pain or melena.    Oncology/Hematology History Overview Note   9/30/2019 High rectal shoi-kbujrpyxue-nyacnwnoigbbrm adenocarcinoma  associated with tubular adenoma.   4/27/21 Biopsy of liver revealed metastatic adenocarcinoma, consistent with colorectal primary.    Clinical Staging  Stage IIa ([id number])    Chemotherapy      neoadjuvant Xeloda with radiation. adjuvant xeloda        Radiation Therapy      7/8/19 thru 8/14/19 completed neoadjuvant 5040 cGy=28 fxs rectum     10/2021 XRT due to recurrence right middle lung 5 Fx's, 5000cGy  11/26/21 adjuvant FOLFOX -2/25/2022    Surgeries       9/30/2019 low anterior resection with ostomy        8/31/21 partial hepatectomy, cholecystectomy and MWA for segment 6 medical margin performed at     2/8/2023 CT Scans revealed Worsening pulmonary metastatic disease.    2/21/2023 orders written for FOLFIRI + AVASTIN       Rectal cancer   6/2/2021 - 8/15/2021 Chemotherapy    OP COLON mFOLFOX6 + Bevacizumab (OXALIplatin / Leucovorin / Fluorouracil / Bevacizumab)     6/18/2021 Initial Diagnosis    Rectal cancer (CMS/HCC)     6/21/2021 Cancer Staged    Staging form: Colon And Rectum, AJCC 8th Edition  - Clinical: Stage IIA (cT3, cN0, cM0) - Signed by Maurilio Campos MD on 6/21/2021 11/16/2021 -  2/25/2022 Chemotherapy    OP COLON mFOLFOX6 OXALIplatin / Leucovorin / Fluorouracil     2/28/2023 - 8/24/2023 Chemotherapy    OP COLORECTAL FOLFIRI + Bevacizumab 5mg/kg (Irinotecan / Leucovorin / Fluorouracil / Bevacizumab)     3/13/2024 Biopsy    OP COLORECTAL Fruquintinib  Plan Provider: Maurilio Campos MD  Treatment goal: Palliative  Line of treatment: [No plan line of treatment]     Secondary malignant neoplasm of left lung   9/14/2021 Initial Diagnosis    Secondary malignant neoplasm of left lung (HCC)     10/20/2021 -  Radiation    RADIATION THERAPY Treatment Details (Noted on 10/5/2021)  Site: Bilateral Lung  Technique: SBRT  Goal: No goal specified  Planned Treatment Start Date: 10/20/2021     11/16/2021 - 2/25/2022 Chemotherapy    OP COLON mFOLFOX6 OXALIplatin / Leucovorin / Fluorouracil     6/15/2022 -  Radiation    RADIATION THERAPY Treatment Details (Noted on 6/15/2022)  Site: Left Lung - Lower lobe  Technique: SBRT  Goal: No goal specified  Planned Treatment Start Date: No planned start date specified     10/25/2022 -  Radiation    RADIATION THERAPY Treatment Details (Noted on 10/25/2022)  Site: Left Lung  Technique: SBRT  Goal: No goal specified  Planned Treatment Start Date: No planned start date specified     Secondary malignancy of liver   9/14/2021 Initial Diagnosis    Secondary malignancy of liver (HCC)     11/16/2021 - 2/25/2022 Chemotherapy    OP COLON mFOLFOX6 OXALIplatin / Leucovorin / Fluorouracil     Secondary malignant neoplasm of right lung   10/5/2021 Initial Diagnosis    Secondary malignant neoplasm of right lung (HCC)     10/20/2021 -  Radiation    RADIATION THERAPY Treatment Details (Noted on 10/5/2021)  Site: Bilateral Lung  Technique: SBRT  Goal: No goal specified  Planned Treatment Start Date: 10/20/2021     11/16/2021 - 2/25/2022 Chemotherapy    OP COLON mFOLFOX6 OXALIplatin / Leucovorin / Fluorouracil         Review of Systems   Gastrointestinal:  Positive for diarrhea.    Neurological:  Positive for dizziness.     Current Outpatient Medications on File Prior to Visit   Medication Sig Dispense Refill    acetaminophen (TYLENOL) 500 MG tablet Take 1 tablet by mouth Every 6 (Six) Hours As Needed.      apixaban (ELIQUIS) 5 MG tablet tablet Take 1 tablet by mouth 2 (Two) Times a Day.      ascorbic acid (VITAMIN C) 500 MG tablet Take 1 tablet by mouth Daily.      atorvastatin (LIPITOR) 40 MG tablet atorvastatin 40 mg oral tablet take 1 tablet (40 mg) by oral route once daily   Active      clobetasol propionate (TEMOVATE) 0.05 % cream Apply 1 Application topically to the appropriate area as directed 2 (Two) Times a Day. Apply twice daily to the palms of the hands and the soles of the feet. (Patient not taking: Reported on 8/27/2024) 30 g 5    cyanocobalamin (VITAMIN B-12) 1000 MCG tablet Take 1 tablet by mouth Daily.      Fruzaqla 5 MG capsule TAKE 1 CAPSULE BY MOUTH DAILY ON DAYS 1-21, THEN 7 DAYS OFF OF A 28 DAY CYCLE. 21 capsule 5    furosemide (LASIX) 40 MG tablet Take 1 tablet by mouth Daily. 90 tablet 1    loratadine (CLARITIN) 10 MG tablet loratadine 10 mg oral tablet take 1 tablet (10 mg) by oral route once daily   Active      MAGIC MOUTHWASH W/NYSTATIN 1/1/1/1 Swish and swallow 10 mL 4 (Four) Times a Day As Needed for Mouth Pain. (Patient not taking: Reported on 8/27/2024) 500 mL 1    metoprolol tartrate (LOPRESSOR) 100 MG tablet Take 1 tablet by mouth 2 (Two) Times a Day. 180 tablet 3    montelukast (SINGULAIR) 10 MG tablet 1 tablet.      multivitamin (THERAGRAN) tablet tablet Take 1 tablet by mouth Daily.      ondansetron (ZOFRAN) 8 MG tablet Take 1 tablet by mouth 3 (Three) Times a Day As Needed for Nausea or Vomiting. 30 tablet 5    potassium chloride (KLOR-CON M20) 20 MEQ CR tablet Take 1 tablet by mouth Daily.      potassium chloride 10 MEQ CR tablet Take 1 tablet by mouth Every Other Day. (Patient not taking: Reported on 8/27/2024) 45 tablet 3    psyllium (METAMUCIL) 58.6 %  packet Take 1 packet by mouth Daily.      Synthroid 25 MCG tablet Take 2 tablets by mouth Daily.      valsartan-hydrochlorothiazide (Diovan HCT) 80-12.5 MG per tablet Take 1 tablet by mouth Daily. 90 tablet 3    verapamil SR (CALAN-SR) 180 MG CR tablet Take 1 tablet by mouth Every Night. 90 tablet 3    vitamin E 1000 UNIT capsule Take 1 capsule by mouth Daily.       No current facility-administered medications on file prior to visit.       No Known Allergies  Past Medical History:   Diagnosis Date    Asthma     Atrial fibrillation, persistent 2/26/2021    Chronic heart failure with preserved ejection fraction 7/18/2022    BNP 1590 on 7/22 Echocardiogram with EF 55% moderate to severe biatrial enlargement and mild right ventricular enlargement 7/22     Colorectal cancer     Essential hypertension     GI cancer     Hepatitis     History of DVT of lower extremity 8/27/2021    Hyperlipidemia LDL goal <100 12/31/2020    Rectal cancer 6/18/2021    Secondary malignancy of liver 9/14/2021    Secondary malignant neoplasm of left lung 9/14/2021    Secondary malignant neoplasm of right lung 10/5/2021    Thrombocytopenia 1/11/2022    Thyroid disease     Viral upper respiratory tract infection 4/24/2023     Past Surgical History:   Procedure Laterality Date    COLON SURGERY      HERNIA REPAIR      LIVER BIOPSY      4/27/21 Biopsy of liver revealed metastatic adenocarcinoma, consistent with colorectal primary    OTHER SURGICAL HISTORY      REMOVED CANCER FROM COLON     Social History     Socioeconomic History    Marital status:     Number of children: 2   Tobacco Use    Smoking status: Never    Smokeless tobacco: Never   Vaping Use    Vaping status: Never Used   Substance and Sexual Activity    Alcohol use: Not Currently     Alcohol/week: 3.0 standard drinks of alcohol     Types: 3 Cans of beer per week     Comment: 2-3 beers weekly    Drug use: Never    Sexual activity: Defer     Family History   Problem Relation Age of  "Onset    Prostate cancer Father     Heart murmur Mother     Diabetes Mother     Colon cancer Maternal Uncle        Objective   Physical Exam  Vitals reviewed. Exam conducted with a chaperone present.   Cardiovascular:      Rate and Rhythm: Normal rate and regular rhythm.      Heart sounds: Normal heart sounds. No murmur heard.     No gallop.   Pulmonary:      Effort: Pulmonary effort is normal.      Breath sounds: Normal breath sounds.   Abdominal:      General: Bowel sounds are normal.   Lymphadenopathy:      Cervical: No cervical adenopathy.   Psychiatric:         Mood and Affect: Mood normal.         Behavior: Behavior normal.         Vitals:    11/26/24 1104   BP: 120/84   Pulse: 90   Resp: 20   Temp: 98 °F (36.7 °C)   TempSrc: Temporal   SpO2: 99%   Weight: 111 kg (245 lb)   Height: 193 cm (76\")   PainSc: 0-No pain     ECOG score: 0         PHQ-9 Total Score:                    Result Review :   The following data was reviewed by: Maurilio Campos MD on 11/26/2024:  Lab Results   Component Value Date    HGB 15.6 11/19/2024    HCT 46.0 11/19/2024    MCV 93.3 11/19/2024     11/19/2024    WBC 9.05 11/19/2024    NEUTROABS 5.98 11/19/2024    LYMPHSABS 2.07 11/19/2024    MONOSABS 0.59 11/19/2024    EOSABS 0.35 11/19/2024    BASOSABS 0.03 11/19/2024     Lab Results   Component Value Date    GLUCOSE 112 (H) 11/19/2024    BUN 17 11/19/2024    CREATININE 0.96 11/19/2024     (L) 11/19/2024    K 3.7 11/19/2024    CL 97 (L) 11/19/2024    CO2 27.6 11/19/2024    CALCIUM 8.4 (L) 11/19/2024    PROTEINTOT 6.3 11/19/2024    ALBUMIN 3.4 (L) 11/19/2024    BILITOT 1.2 11/19/2024    ALKPHOS 90 11/19/2024    AST 21 11/19/2024    ALT 17 11/19/2024     Lab Results   Component Value Date    MG 1.8 (L) 09/07/2021    FREET4 0.74 07/17/2024    TSH 37.788 (H) 10/14/2024     Lab Results   Component Value Date    IRON 60 (L) 03/04/2020    LABIRON 20 03/04/2020    TRANSFERRIN 215.00 03/04/2020    TIBC 307 03/04/2020     Lab " Results   Component Value Date    FERRITIN 140 03/04/2020    AQSPYUFK24 >2000 (H) 03/04/2020    FOLATE 19.4 03/04/2020     Lab Results   Component Value Date    PSA 0.92 12/12/2023    CEA 9.25 11/19/2024       Data reviewed : Radiologic studies CT chest, abdomen, pelvis reviewed       Assessment and Plan    Diagnoses and all orders for this visit:    1. Rectal cancer (Primary)  Assessment & Plan:  Metastatic.  Patient is on fruquintinib.  He reports diarrhea not relieved with Imodium and Lomotil will be prescribed.  He also reports increased dizziness and faint symptoms.  This could be related to his blood pressure medications but temporally seems to coincide with his cycle.  I will decrease his fruquintinib to 4 mg days 1-20 1 out of 28.  Restaging scans overall appear stable.  There are some small new areas in the pancreas of uncertain significance.  The other areas look okay.  Proceed with fruquintinib with dose reduction as outlined.  I will see him back in 3 months for continued treatment with lab work and scans prior.    Orders:  -     CBC & Differential; Future  -     Comprehensive Metabolic Panel; Future  -     CEA; Future  -     Urinalysis With Microscopic - Urine, Clean Catch; Future  -     CT chest w contrast; Future  -     CT abdomen pelvis w contrast; Future  -     diphenoxylate-atropine (LOMOTIL) 2.5-0.025 MG per tablet; Take 1 tablet by mouth 4 (Four) Times a Day As Needed for Diarrhea.  Dispense: 120 tablet; Refill: 0            Patient Follow Up: 3 months    Patient was given instructions and counseling regarding his condition or for health maintenance advice. Please see specific information pulled into the AVS if appropriate.     Maurilio Campos MD    11/26/2024

## 2024-11-27 ENCOUNTER — SPECIALTY PHARMACY (OUTPATIENT)
Dept: PHARMACY | Facility: HOSPITAL | Age: 69
End: 2024-11-27
Payer: MEDICARE

## 2024-11-27 DIAGNOSIS — C20 RECTAL CANCER: Primary | ICD-10-CM

## 2024-11-27 RX ORDER — FRUQUINTINIB 1 MG/1
4 CAPSULE ORAL TAKE AS DIRECTED
Qty: 84 CAPSULE | Refills: 11 | Status: SHIPPED | OUTPATIENT
Start: 2024-11-27

## 2024-11-27 NOTE — PROGRESS NOTES
Re: Refills of Oral Specialty Medication - Fruzaqla (fruquintinib)    Drug-Drug Interactions: The current medication list was reviewed and there are no relevant drug-drug interactions with the specialty medication.  Medication Allergies: The patient has NKDA  Review of Labs/Dose Adjustments: DOSE CHANGE - I reviewed the most recent note and labs. Due to side effects including dizziness and feeling faint the dose is being decreased. I sent refills as described below.    Drug:  Fruzaqla (fruquintinib)  Strength: 1 mg  Directions: Take 4 capsules by mouth daily ON days 1-21, OFF for 7 days of each 28 day cycle  Quantity: 84  Refills: 11  Pharmacy prescription sent to: [id number]      Tru KrausD, [facility]  Oncology Clinical Pharmacist  11/27/2024  16:29 EST

## 2024-11-30 ENCOUNTER — SPECIALTY PHARMACY (OUTPATIENT)
Dept: PHARMACY | Facility: HOSPITAL | Age: 69
End: 2024-11-30
Payer: MEDICARE

## 2024-12-26 ENCOUNTER — TELEPHONE (OUTPATIENT)
Dept: ONCOLOGY | Facility: HOSPITAL | Age: 69
End: 2024-12-26
Payer: MEDICARE

## 2024-12-26 DIAGNOSIS — K12.30 MUCOSITIS: ICD-10-CM

## 2024-12-27 DIAGNOSIS — K12.30 MUCOSITIS: ICD-10-CM

## 2024-12-27 NOTE — TELEPHONE ENCOUNTER
Caller: DANTE LEONARD - WIFE    Relationship: Emergency Contact    Best call back number: [phone]    Requested Prescriptions:   Requested Prescriptions     Pending Prescriptions Disp Refills    MAGIC MOUTHWASH W/NYSTATIN 1/1/1/1 (lidocaine - diphenhydrAMINE HCl - aluminum & magnesium hydroxide - nystatin) 500 mL 1     Sig: Swish and swallow 10 mL 4 (Four) Times a Day As Needed for Mouth Pain.        [facility] [address] - [phone] [facility] [phone]      Last office visit with prescribing clinician: 11/26/2024   Last telemedicine visit with prescribing clinician: Visit date not found   Next office visit with prescribing clinician: 3/4/2025     Additional details provided by patient: PRESP. WAS SENT TO WRONG PHARMACY YESTERDAY(12/26/24).    Does the patient have less than a 3 day supply:  [x] Yes  [] No    Would you like a call back once the refill request has been completed: [] Yes [x] No    If the office needs to give you a call back, can they leave a voicemail: [] Yes [x] No

## 2025-01-03 ENCOUNTER — HOSPITAL ENCOUNTER (OUTPATIENT)
Dept: ONCOLOGY | Facility: HOSPITAL | Age: 70
Discharge: HOME | End: 2025-01-03
Payer: MEDICARE

## 2025-01-03 DIAGNOSIS — Z45.2 ENCOUNTER FOR ADJUSTMENT OR MANAGEMENT OF VASCULAR ACCESS DEVICE: Primary | ICD-10-CM

## 2025-01-03 PROCEDURE — 96523 IRRIG DRUG DELIVERY DEVICE: CPT

## 2025-01-03 PROCEDURE — 25010000002 HEPARIN LOCK FLUSH PER 10 UNITS: Performed by: INTERNAL MEDICINE

## 2025-01-03 RX ORDER — HEPARIN SODIUM (PORCINE) LOCK FLUSH IV SOLN 100 UNIT/ML 100 UNIT/ML
500 SOLUTION INTRAVENOUS AS NEEDED
Status: DISCONTINUED | OUTPATIENT
Start: 2025-01-03 | End: 2025-01-04 | Stop reason: HOSPADM

## 2025-01-03 RX ORDER — HEPARIN SODIUM (PORCINE) LOCK FLUSH IV SOLN 100 UNIT/ML 100 UNIT/ML
500 SOLUTION INTRAVENOUS AS NEEDED
OUTPATIENT
Start: 2025-01-03

## 2025-01-03 RX ORDER — SODIUM CHLORIDE 0.9 % (FLUSH) 0.9 %
20 SYRINGE (ML) INJECTION AS NEEDED
Status: DISCONTINUED | OUTPATIENT
Start: 2025-01-03 | End: 2025-01-04 | Stop reason: HOSPADM

## 2025-01-03 RX ORDER — SODIUM CHLORIDE 0.9 % (FLUSH) 0.9 %
20 SYRINGE (ML) INJECTION AS NEEDED
OUTPATIENT
Start: 2025-01-03

## 2025-01-03 RX ADMIN — Medication 20 ML: at 14:37

## 2025-01-03 RX ADMIN — HEPARIN 500 UNITS: 100 SYRINGE at 14:38

## 2025-01-09 ENCOUNTER — OFFICE VISIT (OUTPATIENT)
Dept: ORTHOPEDIC SURGERY | Facility: CLINIC | Age: 70
End: 2025-01-09
Payer: MEDICARE

## 2025-01-09 VITALS
BODY MASS INDEX: 29.83 KG/M2 | WEIGHT: 245 LBS | SYSTOLIC BLOOD PRESSURE: 135 MMHG | HEART RATE: 97 BPM | DIASTOLIC BLOOD PRESSURE: 93 MMHG | OXYGEN SATURATION: 92 % | HEIGHT: 76 IN

## 2025-01-09 DIAGNOSIS — M19.032 OSTEOARTHRITIS OF LEFT WRIST, UNSPECIFIED OSTEOARTHRITIS TYPE: ICD-10-CM

## 2025-01-09 DIAGNOSIS — M25.532 LEFT WRIST PAIN: Primary | ICD-10-CM

## 2025-01-09 RX ORDER — TRIAMCINOLONE ACETONIDE 40 MG/ML
40 INJECTION, SUSPENSION INTRA-ARTICULAR; INTRAMUSCULAR
Status: COMPLETED | OUTPATIENT
Start: 2025-01-09 | End: 2025-01-09

## 2025-01-09 RX ORDER — LIDOCAINE HYDROCHLORIDE 10 MG/ML
1 INJECTION, SOLUTION INFILTRATION; PERINEURAL
Status: COMPLETED | OUTPATIENT
Start: 2025-01-09 | End: 2025-01-09

## 2025-01-09 RX ADMIN — LIDOCAINE HYDROCHLORIDE 1 ML: 10 INJECTION, SOLUTION INFILTRATION; PERINEURAL at 11:14

## 2025-01-09 RX ADMIN — TRIAMCINOLONE ACETONIDE 40 MG: 40 INJECTION, SUSPENSION INTRA-ARTICULAR; INTRAMUSCULAR at 11:14

## 2025-01-09 NOTE — PROGRESS NOTES
"Chief Complaint  Initial Evaluation of the Left Wrist     Subjective      Robbi Kennedy presents to [facility] for initial evaluation of the left wrist. He is having pain in the left wrist.  He has had pain for a few months.  He just started having pain across the wrist but no specific injury.  He took some pills that cleared up the swelling and then it came up again and then he had an injection and that decreased the swelling.  He is on a blood thinner.  He wears a brace at times.        No Known Allergies     Social History     Socioeconomic History    Marital status:     Number of children: 2   Tobacco Use    Smoking status: Never    Smokeless tobacco: Never   Vaping Use    Vaping status: Never Used   Substance and Sexual Activity    Alcohol use: Not Currently     Alcohol/week: 3.0 standard drinks of alcohol     Types: 3 Cans of beer per week     Comment: 2-3 beers weekly    Drug use: Never    Sexual activity: Defer        I reviewed the patient's chief complaint, history of present illness, review of systems, past medical history, surgical history, family history, social history, medications, and allergy list.     Review of Systems     Constitutional: Denies fevers, chills, weight loss  Cardiovascular: Denies chest pain, shortness of breath  Skin: Denies rashes, acute skin changes  Neurologic: Denies headache, loss of consciousness        Vital Signs:   /93   Pulse 97   Ht 193 cm (76\")   Wt 111 kg (245 lb)   SpO2 92%   BMI 29.82 kg/m²          Physical Exam  General: Alert. No acute distress    Ortho Exam        LEFT WRIST Negative Compression testing/ Negative Tinels. NegativeFinkelsteins. Negative Bernal's testing. Negative [facility]MC grind testing. Negative Phalens. Full ROM of the hand, fingers, elbow and wrist. Negative Triggering of the digit. Sensation grossly intact to light touch, median, radial and ulnar nerve. Positive AIN, PIN and ulnar nerve motor function " intact. Axillary nerve intact. Positive pulses.  Pain with wrist flexion and extension.         Medium Joint  Date/Time: 1/9/2025 11:14 AM  Consent given by: patient  Site marked: site marked  Timeout: Immediately prior to procedure a time out was called to verify the correct patient, procedure, equipment, support staff and site/side marked as required   Procedure Details  Location: wrist (left) -   Preparation: Patient was prepped and draped in the usual sterile fashion  Needle size: 23 G  Medications administered: 1 mL lidocaine 1 %; 40 mg triamcinolone acetonide 40 MG/ML  Patient tolerance: patient tolerated the procedure well with no immediate complications    This injection documentation was Scribed for Roseline Sanders MD by Shu Biggs MA.  01/09/25   11:15 EST      Imaging Results (Most Recent)       Procedure Component Value Units Date/Time    XR Wrist 2 View Left [[id number]] Resulted: 01/09/25 1054     Updated: 01/09/25 1104             Result Review :     X-Ray Report:  Left wrist  X-Ray  Indication: Evaluation of the left wrist   AP/Lateral view(s)  Findings: Moderate to severe arthritis of the left wrist.    Prior studies available for comparison: no             Assessment and Plan     Diagnoses and all orders for this visit:    1. Left wrist pain (Primary)  -     XR Wrist 2 View Left    2. Osteoarthritis of left wrist, unspecified osteoarthritis type        Discussed the treatment plan with the patient. I reviewed the X-rays that were obtained today with the patient.     Discussed MRI of the left wrist.   Discussed referral to hand specialist pending results of the MRI.      Prescribed topical anti inflammatory.  Prescribed physical therapy.      Discussed the risks and benefits of conservative measures. The patient expressed understanding and wished to proceed with a left wrist steroid injection.  He tolerated the injection well.       Call or return if worsening symptoms.    Follow Up     PRN.  If  therapy and anti inflammatory cream isn't helpful he can call back for MRI of the left wrist.        Patient was given instructions and counseling regarding his condition or for health maintenance advice. Please see specific information pulled into the AVS if appropriate.     Scribed for Roseline Sanders MD by Keila Sweeney MA.  01/09/25   10:41 EST    I have personally performed the services described in this document as scribed by the above individual and it is both accurate and complete. Roseline Sanders MD 01/09/25

## 2025-01-14 ENCOUNTER — TREATMENT (OUTPATIENT)
Dept: PHYSICAL THERAPY | Facility: CLINIC | Age: 70
End: 2025-01-14
Payer: MEDICARE

## 2025-01-14 DIAGNOSIS — M25.532 LEFT WRIST PAIN: ICD-10-CM

## 2025-01-14 DIAGNOSIS — M19.032 PRIMARY OSTEOARTHRITIS OF LEFT WRIST: Primary | ICD-10-CM

## 2025-01-14 PROCEDURE — 97112 NEUROMUSCULAR REEDUCATION: CPT | Performed by: OCCUPATIONAL THERAPIST

## 2025-01-14 PROCEDURE — 97166 OT EVAL MOD COMPLEX 45 MIN: CPT | Performed by: OCCUPATIONAL THERAPIST

## 2025-01-14 PROCEDURE — 97110 THERAPEUTIC EXERCISES: CPT | Performed by: OCCUPATIONAL THERAPIST

## 2025-01-14 NOTE — PROGRESS NOTES
Outpatient Occupational Therapy Ortho Initial Evaluation  [address] [place] [id number]    Patient: Robbi Kennedy   : 1955  Referring practitioner: Roseline Sanders MD  Date of Initial Visit: 2025  Today's Date: 2025.   Patient seen for 1 sessions  Diagnosis/ICD-10 Code:      ICD-10-CM ICD-9-CM   1. Primary osteoarthritis of left wrist  M19.032 715.13   2. Left wrist pain  M25.532 719.43                Subjective Questionnaire: QuickDASH: 31      Subjective Evaluation    History of Present Illness  Mechanism of injury: 3-4 months ago started getting L wrist pain and it progressively got worse.  Took steroid then pills for inflammation, helped reduce pain.  Got a Cortisone injection  last week and helped with pain. Sleeping with brace on, off during day.       Patient Occupation: retired Quality of life: excellent    Pain  Current pain ratin  At worst pain ratin  Location: dorsum of L wrist from ular to radial side  Quality: throbbing, burning, sharp and tight  Relieving factors: medications (tylenol)  Aggravating factors: repetitive movement (twisting motion, and flex/ext motion)  Progression: improved    Social Support  Lives in: one-story house  Lives with: spouse    Hand dominance: right    Diagnostic Tests  X-ray: abnormal (arthritis)    Treatments  Previous treatment: immobilization, injection treatment and medication  Current treatment: injection treatment  Patient Goals  Patient goals for therapy: decreased edema, decreased pain, increased motion, increased strength, independence with ADLs/IADLs and return to sport/leisure activities  Patient goal: get rid of pain, golfing         Past Medical hx: colon cancer     Objective          Active Range of Motion     Right Wrist   Wrist flexion: 50 degrees   Wrist extension: 35 degrees   Radial deviation: 15 degrees   Ulnar deviation: 20 degrees     Additional Active Range of Motion Details  Pro 85 degrees  Sup 70 degrees  Full  fist      Strength/Myotome Testing     Left Wrist/Hand      (2nd hand position)   Left  strength (2nd hand position) 70 lbs    Right Wrist/Hand      (2nd hand position)   Right  strength (2nd hand position) 30 lbs    Swelling     Left Wrist/Hand   Circumference MCP: 21 cm  Circumference wrist: 18.8 cm          Assessment & Plan       Assessment  Impairments: abnormal coordination, abnormal muscle firing, abnormal or restricted ROM, activity intolerance, impaired physical strength, lacks appropriate home exercise program, pain with function, safety issue and weight-bearing intolerance   Functional limitations: carrying objects, lifting, pulling, pushing, reaching behind back, reaching overhead and unable to perform repetitive tasks   Assessment details: Pt presents with stiffness, swelling, pain in L wrist.  Pt having increased difficulty with lifting and gripping.  Pt having decreased strength.      Goals  Plan Goals: 1. The patient complains of pain in the L wrist                  LTG 1: 12 weeks:  The patient will report a pain rating of 0/10 or better in order to improve sleep quality and tolerance to performance of activities of daily living.                                  STATUS:  New                  STG 1a: 4 weeks:  The patient will report a pain rating of 1/10 or better.                                   STATUS:  New  2. The patient has limited ROM of the L wrist                  LTG 2: 12 weeks:  The patient will demonstrate 120 degrees of L wrist BASHIR to allow the patient to Play gol.                                  STATUS:  New                   STG 2a: 4 weeks:  The patient will demonstrate 100 degrees of L wrist BASHIR.                                  STATUS:  New                             3. The patient has limited strength of the L UE.                  LTG 3: 12 weeks:  The patient will demonstrate 60# in order to return to household tasks.                                  STATUS:   New                  STG 3a: 4 weeks:  The patient will demonstrate tolerance to light strengthening without adverse reaction.                                  STATUS:  New  4. Carrying, Moving, and Handling Objects Functional Limitation                                    LTG 4: 12 weeks:  The patient will demonstrate 0% limitation by achieving a score of 11 on the Quick DASH.                                  STATUS:  New                  STG 4a: 4 weeks:  The patient will demonstrate 20-39% limitation by achieving a score of 25 on the Quick DASH.                                    STATUS:  New                    TREATMENT: Orthotic fabrication/fitting/management and training, Manual therapy, therapeutic exercise, home exercise instruction, and modalities as needed to include: electrical stimulation, ultrasound, moist heat, paraffin, fluidotherapy, dry needling, and ice.       Plan  Planned modality interventions: TENS, thermotherapy (hydrocollator packs), thermotherapy (paraffin bath), ultrasound and electrical stimulation/Russian stimulation  Other planned modality interventions: fluidotherapy, dry needling  Planned therapy interventions: manual therapy, ADL retraining, motor coordination training, neuromuscular re-education, soft tissue mobilization, fine motor coordination training, body mechanics training, balance/weight-bearing training, functional ROM exercises, flexibility, spinal/joint mobilization, strengthening, stretching, therapeutic activities, IADL retraining, joint mobilization and home exercise program  Frequency: 2x week  Duration in weeks: 12  Treatment plan discussed with: patient        Patient is indicated for skilled occupational therapy services.    History # of Personal Factors and/or Comorbidities: MODERATE (1-2)  Examination of Body System(s): # of elements: MODERATE (3)  Clinical Presentation: EVOLVING  Clinical Decision Making: MODERATE     Evaluation:  Low Complexity:    0     mins   66926;  Mod Complexity:    20     mins  23331;  High Complexity:    0     mins  00526;    Timed:  Manual Therapy:    5     mins  53081;  Therapeutic Exercise:    15     mins  23099;  Therapeutic Activity:    0     mins  97720;     Neuromuscular Willie:    10    mins  92554;    Ultrasound:     0     mins  47992;    Electrical Stimulation:    0     mins  41605;    Untimed:  Electrical Stimulation:    0     mins  35251 ( );  Fluidotherapy:        0    mins  30249  Dry Needlin    mins      Timed Treatment:   30   mins   Total Treatment:     50  mins      OT SIGNATURE: PAULA Osman, OTR/L, CHT     Electronically signed    [place] LICENSE: [id number]   DATE TREATMENT INITIATED: 2025    Initial Certification  Certification Period: 2025 thru 2025  I certify that the therapy services are furnished while this patient is under my care.  The services outlined above are required by this patient, and will be reviewed every 90 days.     Signature:______________________________________________ PHYSICIAN:  Roseline Sanders MD   NPI: [id number]                                      DATE:    Please sign and return via fax to [phone]   Thank you, [facility].

## 2025-01-20 ENCOUNTER — TREATMENT (OUTPATIENT)
Dept: PHYSICAL THERAPY | Facility: CLINIC | Age: 70
End: 2025-01-20
Payer: MEDICARE

## 2025-01-20 DIAGNOSIS — M19.032 PRIMARY OSTEOARTHRITIS OF LEFT WRIST: Primary | ICD-10-CM

## 2025-01-20 DIAGNOSIS — M25.532 LEFT WRIST PAIN: ICD-10-CM

## 2025-01-20 PROCEDURE — 97112 NEUROMUSCULAR REEDUCATION: CPT | Performed by: OCCUPATIONAL THERAPIST

## 2025-01-20 PROCEDURE — 97110 THERAPEUTIC EXERCISES: CPT | Performed by: OCCUPATIONAL THERAPIST

## 2025-01-20 NOTE — PROGRESS NOTES
Occupational Therapy Daily Treatment Note   [address] [place] [id number]    Patient: Robbi Kennedy   : 1955  Referring practitioner: Roseline Sanders MD  Date of Initial Visit: Type: THERAPY  Noted: 2025  Today's Date: 2025.   Patient seen for 2 sessions    ICD-10-CM ICD-9-CM   1. Primary osteoarthritis of left wrist  M19.032 715.13   2. Left wrist pain  M25.532 719.43          Robbi Kennedy reports I am feeling better now.  No pain today.       Functional status using my arm has gotten easier with less pain.     Objective   See Exercise, Manual, and Modality Logs for complete treatment.   Progressed strengthening with L wrist to increase to #1    Assessment/Plan    Added #1 to wrist 3 planes and putty to HEP    Cont per POC           Timed:  Manual Therapy:    0     mins  50797;  Therapeutic Exercise:    10     mins  28316;  Therapeutic Activity:    10     mins  87887;     Neuromuscular Willie:    10    mins  58677;    Ultrasound:     0     mins  92896;    Electrical Stimulation:    0     mins  64589;    Untimed:  Electrical Stimulation:    0     mins  29221 ( );  Fluidotherapy:        0    mins  21207  Dry Needlin    mins  96208    Timed Treatment:   30   mins   Total Treatment:     30   mins    OT SIGNATURE: PAULA Osman, ALEX/L, CHT     Electronically signed    [place] LICENSE: [id number]

## 2025-01-27 ENCOUNTER — TREATMENT (OUTPATIENT)
Dept: PHYSICAL THERAPY | Facility: CLINIC | Age: 70
End: 2025-01-27
Payer: MEDICARE

## 2025-01-27 DIAGNOSIS — M19.032 PRIMARY OSTEOARTHRITIS OF LEFT WRIST: Primary | ICD-10-CM

## 2025-01-27 DIAGNOSIS — M25.532 LEFT WRIST PAIN: ICD-10-CM

## 2025-01-27 PROCEDURE — 97112 NEUROMUSCULAR REEDUCATION: CPT | Performed by: OCCUPATIONAL THERAPIST

## 2025-01-27 PROCEDURE — 97110 THERAPEUTIC EXERCISES: CPT | Performed by: OCCUPATIONAL THERAPIST

## 2025-01-27 NOTE — PROGRESS NOTES
Occupational Therapy Daily Treatment Note   [address] [place] [id number]    Patient: Robbi Kenndey   : 1955  Referring practitioner: Roseline Sanders MD  Date of Initial Visit: Type: THERAPY  Noted: 2025  Today's Date: 2025.   Patient seen for 3 sessions    ICD-10-CM ICD-9-CM   1. Primary osteoarthritis of left wrist  M19.032 715.13   2. Left wrist pain  M25.532 719.43          Robbi Kennedy reports No pain today.      Functional status I am doing more with my wrist    Objective   See Exercise, Manual, and Modality Logs for complete treatment.   Educated to use a hammer, wrench, or something between #1-#2 for wrist 3 planes for HEP.     Assessment/Plan    Cont skilled OT for AROM, PROM, strengthening.    Cont per POC           Timed:  Manual Therapy:    0     mins  18788;  Therapeutic Exercise:    10     mins  00029;  Therapeutic Activity:    10     mins  82054;     Neuromuscular Willie:    10    mins  44766;    Ultrasound:     0     mins  26292;    Electrical Stimulation:    0     mins  94297;    Untimed:  Electrical Stimulation:    0     mins  18230 ( );  Fluidotherapy:        0    mins  31444  Dry Needlin    mins  97065    Timed Treatment:   30   mins   Total Treatment:     30   mins    OT SIGNATURE: PAULA Osman, OTR/L, CHT     Electronically signed    [place] LICENSE: [id number]

## 2025-02-03 ENCOUNTER — TELEPHONE (OUTPATIENT)
Dept: ORTHOPEDICS | Facility: OTHER | Age: 70
End: 2025-02-03
Payer: MEDICARE

## 2025-02-11 ENCOUNTER — TREATMENT (OUTPATIENT)
Dept: PHYSICAL THERAPY | Facility: CLINIC | Age: 70
End: 2025-02-11
Payer: MEDICARE

## 2025-02-11 DIAGNOSIS — M25.532 LEFT WRIST PAIN: ICD-10-CM

## 2025-02-11 DIAGNOSIS — M19.032 PRIMARY OSTEOARTHRITIS OF LEFT WRIST: Primary | ICD-10-CM

## 2025-02-11 PROCEDURE — 97112 NEUROMUSCULAR REEDUCATION: CPT | Performed by: OCCUPATIONAL THERAPIST

## 2025-02-11 PROCEDURE — 97110 THERAPEUTIC EXERCISES: CPT | Performed by: OCCUPATIONAL THERAPIST

## 2025-02-11 NOTE — PROGRESS NOTES
Re-Assessment / Re-Certification  Occupational Therapy  [address] [place] [id number]      Patient: Robbi Kennedy   : 1955  Diagnosis/ICD-10 Code:  Primary osteoarthritis of left wrist [M19.032]  Referring practitioner: Roseline Sanders MD  Date of Initial Visit: Type: THERAPY  Noted: 2025  Today's Date: 2025.   Patient seen for 4 sessions      Subjective:   Robbi Kennedy reports: I am feeling much better.   Subjective Questionnaire: QuickDASH: 11  Clinical Progress: improved  Home Program Compliance: Yes  Treatment has included: therapeutic exercise, neuromuscular re-education, manual therapy, and therapeutic activity    Subjective   Objective   Active Range of Motion      Left Wrist   Wrist flexion: 70 degrees   Wrist extension: 55 degrees   Radial deviation: 15 degrees   Ulnar deviation: 30 degrees      Additional Active Range of Motion Details  Pro 85 degrees  Sup 75 degrees  Full fist        Strength/Myotome Testing      Left Wrist/Hand       (2nd hand position)   Left  strength (2nd hand position) 70 lbs     Swelling      Left Wrist/Hand   Circumference MCP: 21 cm  Circumference wrist: 18.8 cm   Assessment/Plan    Visit Diagnoses:    ICD-10-CM ICD-9-CM   1. Primary osteoarthritis of left wrist  M19.032 715.13   2. Left wrist pain  M25.532 719.43       Progress toward previous goals: All Met    Plan Goals: 1. The patient complains of pain in the L wrist                  LTG 1: 12 weeks:  The patient will report a pain rating of 0/10 or better in order to improve sleep quality and tolerance to performance of activities of daily living.                                  STATUS:  MET                  STG 1a: 4 weeks:  The patient will report a pain rating of 1/10 or better.                                   STATUS:  MET  2. The patient has limited ROM of the L wrist                  LTG 2: 12 weeks:  The patient will demonstrate 120 degrees of L wrist BASHIR to allow the patient to  Play gol.                                  STATUS:  met                  STG 2a: 4 weeks:  The patient will demonstrate 100 degrees of L wrist BASHIR.                                  STATUS:  MET                           3. The patient has limited strength of the L UE.                  LTG 3: 12 weeks:  The patient will demonstrate 60# in order to return to household tasks.                                  STATUS:  MET                  STG 3a: 4 weeks:  The patient will demonstrate tolerance to light strengthening without adverse reaction.                                  STATUS: MET  4. Carrying, Moving, and Handling Objects Functional Limitation                                    LTG 4: 12 weeks:  The patient will demonstrate 0% limitation by achieving a score of 11 on the Quick DASH.                                  STATUS:  MET                  STG 4a: 4 weeks:  The patient will demonstrate 20-39% limitation by achieving a score of 25 on the Quick DASH.                                    STATUS:  MET                        Recommendations: Discharge to [facility].  Discussed with patient able to complete all tasks without pain, independently      OT SIGNATURE: PAULA Osman, OTR/L, CHT     Electronically signed    [place] LICENSE: [id number]   DATE TREATMENT INITIATED: 2025        Timed:  Manual Therapy:    0     mins  59458;  Therapeutic Exercise:    15     mins  60947;  Therapeutic Activity:    0     mins  55366;     Neuromuscular Willie:    10    mins  80052;    Ultrasound:     0     mins  29266;    Electrical Stimulation:    0     mins  37651;    Untimed:  Electrical Stimulation:    0     mins  41523 ( );  Fluidotherapy:        0    mins  18941  Dry Needlin    mins      Timed Treatment:   25   mins   Total Treatment:     25   mins

## 2025-02-25 ENCOUNTER — HOSPITAL ENCOUNTER (OUTPATIENT)
Dept: CT IMAGING | Facility: HOSPITAL | Age: 70
Discharge: HOME | End: 2025-02-25
Admitting: INTERNAL MEDICINE
Payer: MEDICARE

## 2025-02-25 DIAGNOSIS — C20 RECTAL CANCER: ICD-10-CM

## 2025-02-25 LAB
CREAT BLDA-MCNC: 1.1 MG/DL (ref 0.6–1.3)
EGFRCR SERPLBLD CKD-EPI 2021: 73.1 ML/MIN/1.73

## 2025-02-25 PROCEDURE — 74177 CT ABD & PELVIS W/CONTRAST: CPT

## 2025-02-25 PROCEDURE — 25510000001 IOPAMIDOL PER 1 ML: Performed by: INTERNAL MEDICINE

## 2025-02-25 PROCEDURE — 82565 ASSAY OF CREATININE: CPT

## 2025-02-25 PROCEDURE — 71260 CT THORAX DX C+: CPT

## 2025-02-25 RX ORDER — IOPAMIDOL 755 MG/ML
100 INJECTION, SOLUTION INTRAVASCULAR
Status: COMPLETED | OUTPATIENT
Start: 2025-02-25 | End: 2025-02-25

## 2025-02-25 RX ADMIN — IOPAMIDOL 100 ML: 755 INJECTION, SOLUTION INTRAVENOUS at 10:22

## 2025-02-27 ENCOUNTER — OFFICE VISIT (OUTPATIENT)
Dept: CARDIOLOGY | Facility: CLINIC | Age: 70
End: 2025-02-27
Payer: MEDICARE

## 2025-02-27 VITALS
BODY MASS INDEX: 27.8 KG/M2 | HEART RATE: 100 BPM | DIASTOLIC BLOOD PRESSURE: 97 MMHG | SYSTOLIC BLOOD PRESSURE: 124 MMHG | HEIGHT: 76 IN | WEIGHT: 228.3 LBS

## 2025-02-27 DIAGNOSIS — I10 ESSENTIAL HYPERTENSION: ICD-10-CM

## 2025-02-27 DIAGNOSIS — I50.32 CHRONIC HEART FAILURE WITH PRESERVED EJECTION FRACTION: ICD-10-CM

## 2025-02-27 DIAGNOSIS — I48.19 ATRIAL FIBRILLATION, PERSISTENT: Primary | Chronic | ICD-10-CM

## 2025-02-27 RX ORDER — LEVOTHYROXINE SODIUM 75 UG/1
75 TABLET ORAL DAILY
COMMUNITY
Start: 2025-01-30

## 2025-02-27 RX ORDER — VERAPAMIL HYDROCHLORIDE 180 MG/1
180 TABLET, FILM COATED, EXTENDED RELEASE ORAL NIGHTLY
Qty: 90 TABLET | Refills: 3 | Status: SHIPPED | OUTPATIENT
Start: 2025-02-27

## 2025-02-27 RX ORDER — FUROSEMIDE 40 MG/1
40 TABLET ORAL DAILY
Qty: 90 TABLET | Refills: 3 | Status: SHIPPED | OUTPATIENT
Start: 2025-02-27

## 2025-02-27 RX ORDER — LEVOCETIRIZINE DIHYDROCHLORIDE 5 MG/1
5 TABLET, FILM COATED ORAL DAILY
COMMUNITY
Start: 2025-01-21

## 2025-02-27 NOTE — PROGRESS NOTES
Chief Complaint  Follow-up, Atrial Fibrillation, Hyperlipidemia, and Hypertension    Subjective            History of Present Illness  Robbi Kennedy is a 69-year-old male patient who presents to the office today for follow-up.  History of Present Illness  The patient presents for a follow-up of atrial fibrillation, heart failure, and high blood pressure.    He reports experiencing a low heart rate and dizziness, which have been persistent for several weeks. His home readings indicate a heart rate typically in the low 70s, with a notable decrease to 50 this morning. These readings are taken while he is in a seated position. He has been wearing compression stockings daily. His current medication regimen includes Eliquis twice daily, Lasix 40 mg daily, metoprolol 50 mg twice daily, and verapamil 180 mg nightly.    Supplemental Information  He has a history of a blood clot in his right leg.    MEDICATIONS  Eliquis, Lasix, metoprolol, verapamil, atorvastatin.        PMH  Past Medical History:   Diagnosis Date    Arrhythmia     Asthma     Atrial fibrillation, persistent 02/26/2021    Chronic heart failure with preserved ejection fraction 07/18/2022    BNP 1590 on 7/22 Echocardiogram with EF 55% moderate to severe biatrial enlargement and mild right ventricular enlargement 7/22     Colorectal cancer     Coronary artery disease     Deep vein thrombosis     Essential hypertension     GI cancer     Hepatitis     History of DVT of lower extremity 08/27/2021    Hyperlipidemia LDL goal <100 12/31/2020    Rectal cancer 06/18/2021    Secondary malignancy of liver 09/14/2021    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    Secondary malignant neoplasm of right lung 10/05/2021    Thrombocytopenia 01/11/2022    Thyroid disease     Viral upper respiratory tract infection 04/24/2023         ALLERGY  No Known Allergies       SURGICALHX  Past Surgical History:   Procedure Laterality Date    COLON SURGERY      HERNIA REPAIR       LIVER BIOPSY      4/27/21 Biopsy of liver revealed metastatic adenocarcinoma, consistent with colorectal primary    OTHER SURGICAL HISTORY      REMOVED CANCER FROM COLON          SOC  Social History     Socioeconomic History    Marital status:     Number of children: 2   Tobacco Use    Smoking status: Never    Smokeless tobacco: Never   Vaping Use    Vaping status: Never Used   Substance and Sexual Activity    Alcohol use: Not Currently     Alcohol/week: 3.0 standard drinks of alcohol     Types: 3 Cans of beer per week     Comment: 2-3 beers weekly    Drug use: Never    Sexual activity: Not Currently     Partners: Female     Birth control/protection: Vasectomy         FAMHX  Family History   Problem Relation Age of Onset    Prostate cancer Father     Heart murmur Mother     Diabetes Mother     Hypertension Mother     Colon cancer Maternal Uncle     Hypertension Sister     Hypertension Sister           MEDSIGONLY  Current Outpatient Medications on File Prior to Visit   Medication Sig    acetaminophen (TYLENOL) 500 MG tablet Take 1 tablet by mouth Every 6 (Six) Hours As Needed.    apixaban (ELIQUIS) 5 MG tablet tablet Take 1 tablet by mouth 2 (Two) Times a Day.    ascorbic acid (VITAMIN C) 500 MG tablet Take 1 tablet by mouth Daily.    atorvastatin (LIPITOR) 40 MG tablet atorvastatin 40 mg oral tablet take 1 tablet (40 mg) by oral route once daily   Active    cyanocobalamin (VITAMIN B-12) 1000 MCG tablet Take 1 tablet by mouth Daily.    Diclofenac Sodium (VOLTAREN) 1 % gel gel Apply 4 g topically to the appropriate area as directed 4 (Four) Times a Day As Needed (as needed).    diphenoxylate-atropine (LOMOTIL) 2.5-0.025 MG per tablet Take 1 tablet by mouth 4 (Four) Times a Day As Needed for Diarrhea.    Fruquintinib (Fruzaqla) 1 MG capsule Take 4 capsules by mouth Take As Directed. TAKE 4 CAPSULES BY MOUTH DAILY ON DAYS 1-21, THEN 7 DAYS OFF OF A 28 DAY CYCLE    furosemide (LASIX) 40 MG tablet Take 1 tablet  "by mouth Daily.    levocetirizine (XYZAL) 5 MG tablet Take 1 tablet by mouth Daily.    levothyroxine (SYNTHROID, LEVOTHROID) 75 MCG tablet Take 1 tablet by mouth Daily.    MAGIC MOUTHWASH W/NYSTATIN 1/1/1/1 (lidocaine - diphenhydrAMINE HCl - aluminum & magnesium hydroxide - nystatin) Swish and swallow 10 mL 4 (Four) Times a Day As Needed for Mouth Pain.    metoprolol tartrate (LOPRESSOR) 100 MG tablet Take 1 tablet by mouth 2 (Two) Times a Day.    multivitamin (THERAGRAN) tablet tablet Take 1 tablet by mouth Daily.    ondansetron (ZOFRAN) 8 MG tablet Take 1 tablet by mouth 3 (Three) Times a Day As Needed for Nausea or Vomiting.    psyllium (METAMUCIL) 58.6 % packet Take 1 packet by mouth Daily.    verapamil SR (CALAN-SR) 180 MG CR tablet Take 1 tablet by mouth Every Night.    vitamin E 1000 UNIT capsule Take 1 capsule by mouth Daily.    montelukast (SINGULAIR) 10 MG tablet 1 tablet. (Patient not taking: Reported on 2/27/2025)    [DISCONTINUED] clobetasol propionate (TEMOVATE) 0.05 % cream Apply 1 Application topically to the appropriate area as directed 2 (Two) Times a Day. Apply twice daily to the palms of the hands and the soles of the feet. (Patient not taking: Reported on 8/27/2024)    [DISCONTINUED] loratadine (CLARITIN) 10 MG tablet loratadine 10 mg oral tablet take 1 tablet (10 mg) by oral route once daily   Active    [DISCONTINUED] potassium chloride (KLOR-CON M20) 20 MEQ CR tablet Take 1 tablet by mouth Daily.    [DISCONTINUED] potassium chloride 10 MEQ CR tablet Take 1 tablet by mouth Every Other Day. (Patient not taking: Reported on 8/27/2024)    [DISCONTINUED] Synthroid 25 MCG tablet Take 2 tablets by mouth Daily.    [DISCONTINUED] valsartan-hydrochlorothiazide (Diovan HCT) 80-12.5 MG per tablet Take 1 tablet by mouth Daily.     No current facility-administered medications on file prior to visit.         Objective   /97   Pulse 100   Ht 193 cm (75.98\")   Wt 104 kg (228 lb 4.8 oz)   BMI 27.80 " kg/m²       Physical Exam  Constitutional:       Appearance: He is normal weight.   HENT:      Head: Normocephalic.   Neck:      Vascular: No carotid bruit.   Cardiovascular:      Rate and Rhythm: Normal rate. Tachycardia present. Rhythm irregular.      Pulses: Normal pulses.      Heart sounds: Normal heart sounds. No murmur heard.  Pulmonary:      Effort: Pulmonary effort is normal.      Breath sounds: Normal breath sounds.   Musculoskeletal:      Cervical back: Neck supple.      Right lower leg: No edema.      Left lower leg: No edema.   Skin:     General: Skin is dry.   Neurological:      Mental Status: He is alert and oriented to person, place, and time.   Psychiatric:         Behavior: Behavior normal.       ECG 12 Lead    Date/Time: 2/27/2025 9:56 AM  Performed by: Nyla Ramírez APRN    Authorized by: Nyla Ramírez APRN  Comparison: compared with previous ECG from 1/13/2023  Similar to previous ECG  Rhythm: atrial fibrillation  Rate: tachycardic  BPM: 103  Conduction: left anterior fascicular block  ST Segments: ST segments normal  T Waves: T waves normal  QRS axis: normal  Other findings: low voltage and poor R wave progression    Clinical impression: abnormal EKG      Result Review :   The following data was reviewed by: REGINALDO Nails on 02/27/2025:  proBNP   Date Value Ref Range Status   09/11/2024 759.0 0.0 - 900.0 pg/mL Final     CMP          11/19/2024    08:11 2/25/2025    10:08   CMP   Glucose 112     BUN 17     Creatinine 0.96  1.10    EGFR 86.1  73.1    Sodium 135     Potassium 3.7     Chloride 97     Calcium 8.4     Total Protein 6.3     Albumin 3.4     Globulin 2.9     Total Bilirubin 1.2     Alkaline Phosphatase 90     AST (SGOT) 21     ALT (SGPT) 17     Albumin/Globulin Ratio 1.2     BUN/Creatinine Ratio 17.7     Anion Gap 10.4       CBC w/diff          8/23/2024    10:52 11/19/2024    08:11   CBC w/Diff   WBC 8.20  9.05    RBC 5.25  4.93    Hemoglobin 17.0  15.6   "  Hematocrit 49.1  46.0    MCV 93.5  93.3    MCH 32.4  31.6    MCHC 34.6  33.9    RDW 13.3  13.5    Platelets 206  226    Neutrophil Rel % 68.0  66.1    Immature Granulocyte Rel % 0.1  0.3    Lymphocyte Rel % 22.4  22.9    Monocyte Rel % 7.2  6.5    Eosinophil Rel % 2.2  3.9    Basophil Rel % 0.1  0.3       Lab Results   Component Value Date    TSH 37.788 (H) 10/14/2024      Lab Results   Component Value Date    FREET4 0.74 07/17/2024      No results found for: \"DDIMERQUANT\"  Magnesium   Date Value Ref Range Status   09/07/2021 1.8 (L) 1.9 - 2.4 mg/dL Final      Digoxin   Date Value Ref Range Status   01/18/2021 0.5 0.5 - 2.0 ng/mL Final      Lab Results   Component Value Date    TROPONINT 19 (H) 09/02/2021    TROPONINT -4 09/02/2021           Results for orders placed in visit on 08/29/23    Adult Transthoracic Echo Complete W/ Cont if Necessary Per Protocol    Interpretation Summary    Left ventricular systolic function is normal. Calculated left ventricular EF = 55.4%    Left ventricular diastolic function was normal.    The left atrial cavity is mildly dilated.    The right atrial cavity is mild to moderately  dilated.    Estimated right ventricular systolic pressure from tricuspid regurgitation is normal (<35 mmHg).      [id number] Score: 4        Assessment and Plan    Diagnoses and all orders for this visit:    1. Atrial fibrillation, persistent (Primary)    2. Chronic heart failure with preserved ejection fraction    3. Essential hypertension      Assessment & Plan  1. Atrial Fibrillation.  Orthostatic measurements indicate a positional influence on heart rate, potentially causing lightheadedness and dizziness. The patient's heart rate has been fluctuating between 48 and 100 beats per minute. Previous echocardiogram results from August 2023 showed normal cardiac structure and function. He is currently on Eliquis twice a day, metoprolol 50 mg twice daily, and verapamil 180 mg nightly. A heart monitor will " be applied today for a duration of 5 days to assess the correlation between symptoms and heart rate variability. He will be contacted with the results once they are available.    2. Heart Failure.  The patient is advised to limit fluid intake to 68-70 ounces per day due to heart failure. Sodium intake should not exceed 2000 mg per day. He is currently on Lasix 40 mg daily. He is advised to continue wearing compression stockings to promote circulation and reduce swelling.    3. Hypertension.  The patient's blood pressure readings have been variable, with previous readings of 146/109 in August and 131/85 in February. He is currently on metoprolol 50 mg twice daily and verapamil 180 mg nightly. He is advised to continue his current medication regimen and monitor his blood pressure regularly.    4. Hyperlipidemia.  Cholesterol levels obtained on January 28 showed normal total cholesterol and LDL levels, but elevated triglycerides. He is currently on atorvastatin for cholesterol control. He is advised to continue this medication and be mindful of dietary sugar intake.      Follow Up   Return in about 6 months (around 8/27/2025) for Follow up with Dr Madden.    Patient was given instructions and counseling regarding his condition or for health maintenance advice. Please see specific information pulled into the AVS if appropriate.     Robbi Kennedy  reports that he has never smoked. He has never used smokeless tobacco.          Patient or patient representative verbalized consent for the use of Ambient Listening during the visit with  REGINALDO Nails for chart documentation. 2/27/2025  11:26 EST    REGINALDO Nails  02/27/25  09:43 EST    Dictated Utilizing Dragon Dictation

## 2025-02-28 ENCOUNTER — HOSPITAL ENCOUNTER (OUTPATIENT)
Dept: ONCOLOGY | Facility: HOSPITAL | Age: 70
Discharge: HOME | End: 2025-02-28
Payer: MEDICARE

## 2025-02-28 DIAGNOSIS — C20 RECTAL CANCER: ICD-10-CM

## 2025-02-28 DIAGNOSIS — Z45.2 ENCOUNTER FOR ADJUSTMENT OR MANAGEMENT OF VASCULAR ACCESS DEVICE: Primary | ICD-10-CM

## 2025-02-28 LAB
ALBUMIN SERPL-MCNC: 3.6 G/DL (ref 3.5–5.2)
ALBUMIN/GLOB SERPL: 1.2 G/DL
ALP SERPL-CCNC: 99 U/L (ref 39–117)
ALT SERPL W P-5'-P-CCNC: 27 U/L (ref 1–41)
ANION GAP SERPL CALCULATED.3IONS-SCNC: 11.4 MMOL/L (ref 5–15)
AST SERPL-CCNC: 27 U/L (ref 1–40)
BACTERIA UR QL AUTO: NORMAL /HPF
BASOPHILS # BLD AUTO: 0.02 10*3/MM3 (ref 0–0.2)
BASOPHILS NFR BLD AUTO: 0.2 % (ref 0–1.5)
BILIRUB SERPL-MCNC: 1 MG/DL (ref 0–1.2)
BILIRUB UR QL STRIP: NEGATIVE
BUN SERPL-MCNC: 15 MG/DL (ref 8–23)
BUN/CREAT SERPL: 17 (ref 7–25)
CALCIUM SPEC-SCNC: 9.1 MG/DL (ref 8.6–10.5)
CEA SERPL-MCNC: 22.7 NG/ML
CHLORIDE SERPL-SCNC: 100 MMOL/L (ref 98–107)
CLARITY UR: CLEAR
CO2 SERPL-SCNC: 26.6 MMOL/L (ref 22–29)
COLOR UR: YELLOW
CREAT SERPL-MCNC: 0.88 MG/DL (ref 0.76–1.27)
DEPRECATED RDW RBC AUTO: 47.8 FL (ref 37–54)
EGFRCR SERPLBLD CKD-EPI 2021: 93.7 ML/MIN/1.73
EOSINOPHIL # BLD AUTO: 0.1 10*3/MM3 (ref 0–0.4)
EOSINOPHIL NFR BLD AUTO: 1.2 % (ref 0.3–6.2)
ERYTHROCYTE [DISTWIDTH] IN BLOOD BY AUTOMATED COUNT: 13.5 % (ref 12.3–15.4)
GLOBULIN UR ELPH-MCNC: 3.1 GM/DL
GLUCOSE SERPL-MCNC: 163 MG/DL (ref 65–99)
GLUCOSE UR STRIP-MCNC: NEGATIVE MG/DL
HCT VFR BLD AUTO: 49.1 % (ref 37.5–51)
HGB BLD-MCNC: 16.6 G/DL (ref 13–17.7)
HGB UR QL STRIP.AUTO: NEGATIVE
HYALINE CASTS UR QL AUTO: NORMAL /LPF
IMM GRANULOCYTES # BLD AUTO: 0.03 10*3/MM3 (ref 0–0.05)
IMM GRANULOCYTES NFR BLD AUTO: 0.4 % (ref 0–0.5)
KETONES UR QL STRIP: NEGATIVE
LEUKOCYTE ESTERASE UR QL STRIP.AUTO: NEGATIVE
LYMPHOCYTES # BLD AUTO: 1.76 10*3/MM3 (ref 0.7–3.1)
LYMPHOCYTES NFR BLD AUTO: 20.9 % (ref 19.6–45.3)
MCH RBC QN AUTO: 32.5 PG (ref 26.6–33)
MCHC RBC AUTO-ENTMCNC: 33.8 G/DL (ref 31.5–35.7)
MCV RBC AUTO: 96.1 FL (ref 79–97)
MONOCYTES # BLD AUTO: 0.46 10*3/MM3 (ref 0.1–0.9)
MONOCYTES NFR BLD AUTO: 5.5 % (ref 5–12)
NEUTROPHILS NFR BLD AUTO: 6.07 10*3/MM3 (ref 1.7–7)
NEUTROPHILS NFR BLD AUTO: 71.8 % (ref 42.7–76)
NITRITE UR QL STRIP: NEGATIVE
NRBC BLD AUTO-RTO: 0 /100 WBC (ref 0–0.2)
PH UR STRIP.AUTO: 7 [PH] (ref 5–8)
PLATELET # BLD AUTO: 324 10*3/MM3 (ref 140–450)
PMV BLD AUTO: 8.9 FL (ref 6–12)
POTASSIUM SERPL-SCNC: 3.4 MMOL/L (ref 3.5–5.2)
PROT SERPL-MCNC: 6.7 G/DL (ref 6–8.5)
PROT UR QL STRIP: NEGATIVE
RBC # BLD AUTO: 5.11 10*6/MM3 (ref 4.14–5.8)
RBC # UR STRIP: NORMAL /HPF
REF LAB TEST METHOD: NORMAL
SODIUM SERPL-SCNC: 138 MMOL/L (ref 136–145)
SP GR UR STRIP: 1.01 (ref 1–1.03)
SQUAMOUS #/AREA URNS HPF: NORMAL /HPF
UROBILINOGEN UR QL STRIP: NORMAL
WBC # UR STRIP: NORMAL /HPF
WBC NRBC COR # BLD AUTO: 8.44 10*3/MM3 (ref 3.4–10.8)

## 2025-02-28 PROCEDURE — 81001 URINALYSIS AUTO W/SCOPE: CPT | Performed by: INTERNAL MEDICINE

## 2025-02-28 PROCEDURE — 36591 DRAW BLOOD OFF VENOUS DEVICE: CPT

## 2025-02-28 PROCEDURE — 25010000002 HEPARIN LOCK FLUSH PER 10 UNITS: Performed by: INTERNAL MEDICINE

## 2025-02-28 PROCEDURE — 80053 COMPREHEN METABOLIC PANEL: CPT | Performed by: INTERNAL MEDICINE

## 2025-02-28 PROCEDURE — 85025 COMPLETE CBC W/AUTO DIFF WBC: CPT | Performed by: INTERNAL MEDICINE

## 2025-02-28 PROCEDURE — 82378 CARCINOEMBRYONIC ANTIGEN: CPT | Performed by: INTERNAL MEDICINE

## 2025-02-28 RX ORDER — HEPARIN SODIUM (PORCINE) LOCK FLUSH IV SOLN 100 UNIT/ML 100 UNIT/ML
500 SOLUTION INTRAVENOUS AS NEEDED
Status: DISCONTINUED | OUTPATIENT
Start: 2025-02-28 | End: 2025-03-01 | Stop reason: HOSPADM

## 2025-02-28 RX ORDER — HEPARIN SODIUM (PORCINE) LOCK FLUSH IV SOLN 100 UNIT/ML 100 UNIT/ML
500 SOLUTION INTRAVENOUS AS NEEDED
OUTPATIENT
Start: 2025-02-28

## 2025-02-28 RX ORDER — SODIUM CHLORIDE 0.9 % (FLUSH) 0.9 %
20 SYRINGE (ML) INJECTION AS NEEDED
OUTPATIENT
Start: 2025-02-28

## 2025-02-28 RX ORDER — SODIUM CHLORIDE 0.9 % (FLUSH) 0.9 %
20 SYRINGE (ML) INJECTION AS NEEDED
Status: DISCONTINUED | OUTPATIENT
Start: 2025-02-28 | End: 2025-03-01 | Stop reason: HOSPADM

## 2025-02-28 RX ADMIN — HEPARIN 500 UNITS: 100 SYRINGE at 10:25

## 2025-02-28 RX ADMIN — Medication 20 ML: at 10:25

## 2025-03-04 ENCOUNTER — OFFICE VISIT (OUTPATIENT)
Dept: ONCOLOGY | Facility: HOSPITAL | Age: 70
End: 2025-03-04
Payer: MEDICARE

## 2025-03-04 ENCOUNTER — SPECIALTY PHARMACY (OUTPATIENT)
Dept: PHARMACY | Facility: HOSPITAL | Age: 70
End: 2025-03-04
Payer: MEDICARE

## 2025-03-04 VITALS
RESPIRATION RATE: 20 BRPM | DIASTOLIC BLOOD PRESSURE: 99 MMHG | SYSTOLIC BLOOD PRESSURE: 149 MMHG | TEMPERATURE: 97.8 F | HEART RATE: 69 BPM | WEIGHT: 228 LBS | BODY MASS INDEX: 27.76 KG/M2 | OXYGEN SATURATION: 99 %

## 2025-03-04 DIAGNOSIS — F32.A DEPRESSION, UNSPECIFIED DEPRESSION TYPE: ICD-10-CM

## 2025-03-04 DIAGNOSIS — M54.9 BACK PAIN, UNSPECIFIED BACK LOCATION, UNSPECIFIED BACK PAIN LATERALITY, UNSPECIFIED CHRONICITY: ICD-10-CM

## 2025-03-04 DIAGNOSIS — C20 RECTAL CANCER: Primary | ICD-10-CM

## 2025-03-04 PROCEDURE — G0463 HOSPITAL OUTPT CLINIC VISIT: HCPCS | Performed by: INTERNAL MEDICINE

## 2025-03-04 RX ORDER — DULOXETIN HYDROCHLORIDE 20 MG/1
20 CAPSULE, DELAYED RELEASE ORAL DAILY
Qty: 30 CAPSULE | Refills: 2 | Status: SHIPPED | OUTPATIENT
Start: 2025-03-04

## 2025-03-04 NOTE — PROGRESS NOTES
Chief Complaint  Rectal Cancer    Eri Moran APRN Reinberg, Emily, REGINALDO    Subjective          Robbi Kennedy presents to [facility] for ongoing treatment of his metastatic rectal cancer.  He is on fruquintinib.  He is compliant with the regimen.  He is tolerating well.  He does report increased depressive symptoms with low mood, fatigue and sleep disturbances.  He notes his appetite has been off and he is lost several pounds since his last visit.  His energy level is low but he is able to perform his ADLs.  He denies new masses, adenopathy.  He notes occasional sciatica pain down the right leg.  It comes and goes based on activity.    Oncology/Hematology History Overview Note   9/30/2019 High rectal [id number] adenocarcinoma  associated with tubular adenoma.   4/27/21 Biopsy of liver revealed metastatic adenocarcinoma, consistent with colorectal primary.    Clinical Staging  Stage IIa ([id number])    Chemotherapy      neoadjuvant Xeloda with radiation. adjuvant xeloda        Radiation Therapy      7/8/19 thru 8/14/19 completed neoadjuvant 5040 cGy=28 fxs rectum     10/2021 XRT due to recurrence right middle lung 5 Fx's, 5000cGy  11/26/21 adjuvant FOLFOX -2/25/2022    Surgeries       9/30/2019 low anterior resection with ostomy        8/31/21 partial hepatectomy, cholecystectomy and MWA for segment 6 medical margin performed at     2/8/2023 CT Scans revealed Worsening pulmonary metastatic disease.    2/21/2023 orders written for FOLFIRI + AVASTIN       Rectal cancer   6/2/2021 - 8/15/2021 Chemotherapy    OP COLON mFOLFOX6 + Bevacizumab (OXALIplatin / Leucovorin / Fluorouracil / Bevacizumab)     6/18/2021 Initial Diagnosis    Rectal cancer (CMS/HCC)     6/21/2021 Cancer Staged    Staging form: Colon And Rectum, AJCC 8th Edition  - Clinical: Stage IIA (cT3, cN0, cM0) - Signed by Maruilio Campos MD on 6/21/2021 11/16/2021 - 2/25/2022  Chemotherapy    OP COLON mFOLFOX6 OXALIplatin / Leucovorin / Fluorouracil     2/28/2023 - 8/24/2023 Chemotherapy    OP COLORECTAL FOLFIRI + Bevacizumab 5mg/kg (Irinotecan / Leucovorin / Fluorouracil / Bevacizumab)     3/13/2024 - 3/13/2024 Biopsy    OP COLORECTAL Fruquintinib  Plan Provider: Maurilio Campos MD  Treatment goal: Palliative  Line of treatment: [No plan line of treatment]     3/10/2025 Biopsy    OP COLON Trifluidine / Tipiracil  Plan Provider: Maurilio Campos MD  Treatment goal: Palliative  Line of treatment: [No plan line of treatment]     Secondary malignant neoplasm of left lung   9/14/2021 Initial Diagnosis    Secondary malignant neoplasm of left lung (HCC)     10/20/2021 -  Radiation    RADIATION THERAPY Treatment Details (Noted on 10/5/2021)  Site: Bilateral Lung  Technique: SBRT  Goal: No goal specified  Planned Treatment Start Date: 10/20/2021     11/16/2021 - 2/25/2022 Chemotherapy    OP COLON mFOLFOX6 OXALIplatin / Leucovorin / Fluorouracil     6/15/2022 -  Radiation    RADIATION THERAPY Treatment Details (Noted on 6/15/2022)  Site: Left Lung - Lower lobe  Technique: SBRT  Goal: No goal specified  Planned Treatment Start Date: No planned start date specified     10/25/2022 -  Radiation    RADIATION THERAPY Treatment Details (Noted on 10/25/2022)  Site: Left Lung  Technique: SBRT  Goal: No goal specified  Planned Treatment Start Date: No planned start date specified     Secondary malignancy of liver   9/14/2021 Initial Diagnosis    Secondary malignancy of liver (HCC)     11/16/2021 - 2/25/2022 Chemotherapy    OP COLON mFOLFOX6 OXALIplatin / Leucovorin / Fluorouracil     Secondary malignant neoplasm of right lung   10/5/2021 Initial Diagnosis    Secondary malignant neoplasm of right lung (HCC)     10/20/2021 -  Radiation    RADIATION THERAPY Treatment Details (Noted on 10/5/2021)  Site: Bilateral Lung  Technique: SBRT  Goal: No goal specified  Planned Treatment Start Date: 10/20/2021      11/16/2021 - 2/25/2022 Chemotherapy    OP COLON mFOLFOX6 OXALIplatin / Leucovorin / Fluorouracil           Current Outpatient Medications on File Prior to Visit   Medication Sig Dispense Refill    acetaminophen (TYLENOL) 500 MG tablet Take 1 tablet by mouth Every 6 (Six) Hours As Needed.      apixaban (ELIQUIS) 5 MG tablet tablet Take 1 tablet by mouth 2 (Two) Times a Day.      ascorbic acid (VITAMIN C) 500 MG tablet Take 1 tablet by mouth Daily.      atorvastatin (LIPITOR) 40 MG tablet atorvastatin 40 mg oral tablet take 1 tablet (40 mg) by oral route once daily   Active      cyanocobalamin (VITAMIN B-12) 1000 MCG tablet Take 1 tablet by mouth Daily.      Diclofenac Sodium (VOLTAREN) 1 % gel gel Apply 4 g topically to the appropriate area as directed 4 (Four) Times a Day As Needed (as needed). 100 g 1    diphenoxylate-atropine (LOMOTIL) 2.5-0.025 MG per tablet Take 1 tablet by mouth 4 (Four) Times a Day As Needed for Diarrhea. 120 tablet 0    furosemide (LASIX) 40 MG tablet Take 1 tablet by mouth Daily. 90 tablet 3    levocetirizine (XYZAL) 5 MG tablet Take 1 tablet by mouth Daily.      levothyroxine (SYNTHROID, LEVOTHROID) 75 MCG tablet Take 1 tablet by mouth Daily.      MAGIC MOUTHWASH W/NYSTATIN 1/1/1/1 (lidocaine - diphenhydrAMINE HCl - aluminum & magnesium hydroxide - nystatin) Swish and swallow 10 mL 4 (Four) Times a Day As Needed for Mouth Pain. 500 mL 1    metoprolol tartrate (LOPRESSOR) 100 MG tablet Take 1 tablet by mouth 2 (Two) Times a Day. 180 tablet 3    montelukast (SINGULAIR) 10 MG tablet 1 tablet.      multivitamin (THERAGRAN) tablet tablet Take 1 tablet by mouth Daily.      ondansetron (ZOFRAN) 8 MG tablet Take 1 tablet by mouth 3 (Three) Times a Day As Needed for Nausea or Vomiting. 30 tablet 5    psyllium (METAMUCIL) 58.6 % packet Take 1 packet by mouth Daily.      verapamil SR (CALAN-SR) 180 MG CR tablet Take 1 tablet by mouth Every Night. 90 tablet 3    vitamin E 1000 UNIT capsule Take 1  capsule by mouth Daily.       No current facility-administered medications on file prior to visit.       No Known Allergies  Past Medical History:   Diagnosis Date    Arrhythmia     Asthma     Atrial fibrillation, persistent 02/26/2021    Chronic heart failure with preserved ejection fraction 07/18/2022    BNP 1590 on 7/22 Echocardiogram with EF 55% moderate to severe biatrial enlargement and mild right ventricular enlargement 7/22     Colorectal cancer     Coronary artery disease     Deep vein thrombosis     Essential hypertension     GI cancer     Hepatitis     History of DVT of lower extremity 08/27/2021    Hyperlipidemia LDL goal <100 12/31/2020    Rectal cancer 06/18/2021    Secondary malignancy of liver 09/14/2021    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    Secondary malignant neoplasm of right lung 10/05/2021    Thrombocytopenia 01/11/2022    Thyroid disease     Viral upper respiratory tract infection 04/24/2023     Past Surgical History:   Procedure Laterality Date    COLON SURGERY      HERNIA REPAIR      LIVER BIOPSY      4/27/21 Biopsy of liver revealed metastatic adenocarcinoma, consistent with colorectal primary    OTHER SURGICAL HISTORY      REMOVED CANCER FROM COLON     Social History     Socioeconomic History    Marital status:     Number of children: 2   Tobacco Use    Smoking status: Never    Smokeless tobacco: Never   Vaping Use    Vaping status: Never Used   Substance and Sexual Activity    Alcohol use: Not Currently     Alcohol/week: 3.0 standard drinks of alcohol     Types: 3 Cans of beer per week     Comment: 2-3 beers weekly    Drug use: Never    Sexual activity: Not Currently     Partners: Female     Birth control/protection: Vasectomy     Family History   Problem Relation Age of Onset    Prostate cancer Father     Heart murmur Mother     Diabetes Mother     Hypertension Mother     Colon cancer Maternal Uncle     Hypertension Sister     Hypertension Sister         Objective   Physical Exam  Vitals reviewed. Exam conducted with a chaperone present.   Cardiovascular:      Rate and Rhythm: Normal rate and regular rhythm.      Heart sounds: Normal heart sounds. No murmur heard.     No gallop.   Pulmonary:      Effort: Pulmonary effort is normal.      Breath sounds: Normal breath sounds.   Abdominal:      General: Bowel sounds are normal.   Musculoskeletal:      Right lower leg: No edema.      Left lower leg: No edema.   Lymphadenopathy:      Cervical: No cervical adenopathy.   Psychiatric:         Behavior: Behavior normal.         Vitals:    03/04/25 1105   BP: 149/99   Pulse: 69   Resp: 20   Temp: 97.8 °F (36.6 °C)   TempSrc: Temporal   SpO2: 99%   Weight: 103 kg (228 lb)   PainSc: 8    PainLoc: Leg     ECOG score: 1         PHQ-9 Total Score:                    Result Review :   The following data was reviewed by: Maurilio Campos MD on 03/04/2025:  Lab Results   Component Value Date    HGB 16.6 02/28/2025    HCT 49.1 02/28/2025    MCV 96.1 02/28/2025     02/28/2025    WBC 8.44 02/28/2025    NEUTROABS 6.07 02/28/2025    LYMPHSABS 1.76 02/28/2025    MONOSABS 0.46 02/28/2025    EOSABS 0.10 02/28/2025    BASOSABS 0.02 02/28/2025     Lab Results   Component Value Date    GLUCOSE 163 (H) 02/28/2025    BUN 15 02/28/2025    CREATININE 0.88 02/28/2025     02/28/2025    K 3.4 (L) 02/28/2025     02/28/2025    CO2 26.6 02/28/2025    CALCIUM 9.1 02/28/2025    PROTEINTOT 6.7 02/28/2025    ALBUMIN 3.6 02/28/2025    BILITOT 1.0 02/28/2025    ALKPHOS 99 02/28/2025    AST 27 02/28/2025    ALT 27 02/28/2025     Lab Results   Component Value Date    MG 1.8 (L) 09/07/2021    FREET4 0.74 07/17/2024    TSH 37.788 (H) 10/14/2024     Lab Results   Component Value Date    IRON 60 (L) 03/04/2020    LABIRON 20 03/04/2020    TRANSFERRIN 215.00 03/04/2020    TIBC 307 03/04/2020     Lab Results   Component Value Date    FERRITIN 140 03/04/2020    BAPFGRSH81 >2000 (H) 03/04/2020     FOLATE 19.4 03/04/2020     Lab Results   Component Value Date    PSA 0.92 12/12/2023    CEA 22.70 02/28/2025             Assessment and Plan    Diagnoses and all orders for this visit:    1. Rectal cancer (Primary)  Assessment & Plan:  Metastatic.  Patient is on fruquintinib.  He is about a year into that treatment.  Tolerating well.  Unfortunately, his restaging scans show worsening disease including multiple new pulmonary nodules, adenopathy.  We discussed options which could include going back to traditional chemotherapy but he had a lot of trouble with that.  There is also other oral medications such as Lonsurf.  We discussed clinical trials-she does have mutations on testing but he is not interested in travel at this time.  We do not have any here locally.  He would like to continue with oral therapy if possible.  I will plan to initiate Lonsurf dosed on days 1-5, 8-12 of a 28-day cycle.  Side effects, risk and benefits discussed in detail.  Written teaching information provided.  Patient is agreeable with therapy as outlined.  Prescription will be sent to pharmacy and I will plan for him to start next Monday if possible.  He will have a 2-week check for lab work and I will plan to see him back for cycle 2-day 1 November prior to monitor for toxicities.    Orders:  -     CBC and Differential; Future  -     Comprehensive metabolic panel; Future  -     Lab Appointment Request; Future  -     Clinic Appointment Request; Future  -     CBC and Differential; Future  -     Lab Appointment Request; Future    2. Depression, unspecified depression type  Assessment & Plan:  Begin Cymbalta 20 mg daily.  Titrate to efficacy.  If no improvement, he will be referred to behavioral health.    Orders:  -     DULoxetine (CYMBALTA) 20 MG capsule; Take 1 capsule by mouth Daily.  Dispense: 30 capsule; Refill: 2    3. Back pain, unspecified back location, unspecified back pain laterality, unspecified chronicity  Assessment &  Plan:  Patient's symptoms are suggestive of sciatica.  No obvious bony abnormalities on recent CT scans.  We discussed MRI but patient defers at this time.  He will be starting Cymbalta for depression but this is also approved for neuropathic pain which may help his back some.    Orders:  -     DULoxetine (CYMBALTA) 20 MG capsule; Take 1 capsule by mouth Daily.  Dispense: 30 capsule; Refill: 2            Patient Follow Up: Cycle 2-day 1    Patient was given instructions and counseling regarding his condition or for health maintenance advice. Please see specific information pulled into the AVS if appropriate.     Maurilio Campos MD    3/5/2025

## 2025-03-04 NOTE — PROGRESS NOTES
Specialty Pharmacy Patient Management Program  Per Protocol Prescription Order or Refill       Requested Prescriptions     Signed Prescriptions Disp Refills    trifluridine-tipiracil (LONSURF) 20-8.19 MG per tablet 80 tablet 11     Sig: Take 4 tablets by mouth Daily With Breakfast & Dinner. Take 4 tabs with Breakfast and 4 with Dinner Day 1-5 and 8-12 Q28D.     Prescription orders above were sent to [facility] per Collaborative Care Agreement Protocol.     Completed independent double check on medication order/RX.    Jesus Sol, PharmD, BCOP  Clinical Specialty Pharmacist, Oncology  3/4/2025  12:06 EST

## 2025-03-04 NOTE — PROGRESS NOTES
Specialty Pharmacy Patient Management Program  Oral Chemotherapy - New Referral     Received a referral from Dr. Campos    Treatment Plan: Lonsurf (trifluridine-tipiracil)  Start date of treatment planned for: As soon as oral specialty medication is available.  Indication: Metastatic CRC (Colorectal Cancer)  Relevant past treatments:   FOLFOX/Bevacizumab (6/2/21-8/15/21)  FOLFOX (11/26/21-2/25/22)  FOLFIRI/Bevacizumab (2/28/23-8/24/23)   Is the therapy appropriate based on treatment guidelines and FDA labeling?: YES  Therapeutic Goals: Continue treatment until progression or intolerable toxicity  Patient can self-administer oral medications: Yes    Drug-Drug Interactions: The current medication list was reviewed and there are no relevant drug-drug interactions with the specialty medication.  Medication Allergies: The patient has NKDA  Review of Labs/Dose Adjustments: The patient's most recent labs were reviewed and are appropriate to start treatment at this dose    A prescription was released to [facility] for   Drug: Lonsurf (trifluridine-tipiracil)  Strength: 20-8.19 mg  Directions: Take 4 tablets by mouth twice a day on days 1-5 and 8-12 of each 28 day cycle  Quantity: 80  Refills: 11    Pharmacy education to be scheduled. Consent will be signed at that time. CCA signed previously.    Joyce Garnica PharmD  Oncology Clinical Specialty Pharmacist   3/4/2025 11:59 EST

## 2025-03-05 PROBLEM — F32.A DEPRESSION: Status: ACTIVE | Noted: 2025-03-05

## 2025-03-05 PROBLEM — M54.9 BACK PAIN: Status: ACTIVE | Noted: 2025-03-05

## 2025-03-05 NOTE — ASSESSMENT & PLAN NOTE
Begin Cymbalta 20 mg daily.  Titrate to efficacy.  If no improvement, he will be referred to behavioral health.

## 2025-03-05 NOTE — ASSESSMENT & PLAN NOTE
Patient's symptoms are suggestive of sciatica.  No obvious bony abnormalities on recent CT scans.  We discussed MRI but patient defers at this time.  He will be starting Cymbalta for depression but this is also approved for neuropathic pain which may help his back some.

## 2025-03-05 NOTE — ASSESSMENT & PLAN NOTE
Metastatic.  Patient is on fruquintinib.  He is about a year into that treatment.  Tolerating well.  Unfortunately, his restaging scans show worsening disease including multiple new pulmonary nodules, adenopathy.  We discussed options which could include going back to traditional chemotherapy but he had a lot of trouble with that.  There is also other oral medications such as Lonsurf.  We discussed clinical trials-she does have mutations on testing but he is not interested in travel at this time.  We do not have any here locally.  He would like to continue with oral therapy if possible.  I will plan to initiate Lonsurf dosed on days 1-5, 8-12 of a 28-day cycle.  Side effects, risk and benefits discussed in detail.  Written teaching information provided.  Patient is agreeable with therapy as outlined.  Prescription will be sent to pharmacy and I will plan for him to start next Monday if possible.  He will have a 2-week check for lab work and I will plan to see him back for cycle 2-day 1 November prior to monitor for toxicities.

## 2025-03-06 ENCOUNTER — SPECIALTY PHARMACY (OUTPATIENT)
Dept: PHARMACY | Facility: HOSPITAL | Age: 70
End: 2025-03-06
Payer: MEDICARE

## 2025-03-06 ENCOUNTER — TELEPHONE (OUTPATIENT)
Dept: CARDIOLOGY | Facility: CLINIC | Age: 70
End: 2025-03-06
Payer: MEDICARE

## 2025-03-06 RX ORDER — CARVEDILOL 12.5 MG/1
12.5 TABLET ORAL 2 TIMES DAILY
Qty: 180 TABLET | Refills: 3 | Status: SHIPPED | OUTPATIENT
Start: 2025-03-06

## 2025-03-06 NOTE — PROGRESS NOTES
Specialty Pharmacy Patient Management Program  Oncology Initial Assessment     Robbi Kennedy was referred by their provider to the Oncology Patient Management program offered by [facility] for CRC (Colorectal Cancer) on 03/06/25.  An initial outreach was conducted in-person, including assessment of therapy appropriateness and specialty medication education for Lonsurf (trifluridine-tipiracil). The patient was introduced to services offered by [facility], including: regular assessments, refill coordination, curbside pick-up or mail order delivery options, prior authorization maintenance, and financial assistance programs as applicable. The patient was also provided with contact information for the pharmacy team.     Regimen: Lonsurf 20-8.19mg - Take 4 tablets by mouth daily with breakfast and dinner on Days 1-5 and 8-12 every 28 days    Start date of oral specialty medication: As soon as oral specialty medication is available.    Relevant Past Medical History, Comorbidities, and Vaccines  Relevant medical history and concomitant health conditions were discussed with the patient. The patient's chart has been reviewed for relevant past medical history and comorbid conditions and updated as necessary.    Past Medical History:   Diagnosis Date    Arrhythmia     Asthma     Atrial fibrillation, persistent 02/26/2021    Chronic heart failure with preserved ejection fraction 07/18/2022    BNP 1590 on 7/22 Echocardiogram with EF 55% moderate to severe biatrial enlargement and mild right ventricular enlargement 7/22     Colorectal cancer     Coronary artery disease     Deep vein thrombosis     Essential hypertension     GI cancer     Hepatitis     History of DVT of lower extremity 08/27/2021    Hyperlipidemia LDL goal <100 12/31/2020    Rectal cancer 06/18/2021    Secondary malignancy of liver 09/14/2021    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    Secondary  malignant neoplasm of right lung 10/05/2021    Thrombocytopenia 01/11/2022    Thyroid disease     Viral upper respiratory tract infection 04/24/2023     Social History     Socioeconomic History    Marital status:     Number of children: 2   Tobacco Use    Smoking status: Never    Smokeless tobacco: Never   Vaping Use    Vaping status: Never Used   Substance and Sexual Activity    Alcohol use: Not Currently     Alcohol/week: 3.0 standard drinks of alcohol     Types: 3 Cans of beer per week     Comment: 2-3 beers weekly    Drug use: Never    Sexual activity: Not Currently     Partners: Female     Birth control/protection: Vasectomy     Problem list reviewed by Joyce Garnica PharmD on 3/6/2025 at 10:45 AM    Allergies  Known allergies and reactions were discussed with the patient. The patient's chart has been reviewed for  allergy information and updated as necessary.   No Known Allergies  Allergies reviewed by Joyce Garnica PharmD on 3/6/2025 at 10:05 AM    Relevant Laboratory Values  Lab Results   Component Value Date    GLUCOSE 163 (H) 02/28/2025    CALCIUM 9.1 02/28/2025     02/28/2025    K 3.4 (L) 02/28/2025    CO2 26.6 02/28/2025     02/28/2025    BUN 15 02/28/2025    CREATININE 0.88 02/28/2025    EGFRIFAFRI >60 06/29/2022    EGFRIFNONA 77 02/22/2022    BCR 17.0 02/28/2025    ANIONGAP 11.4 02/28/2025     Lab Results   Component Value Date    WBC 8.44 02/28/2025    RBC 5.11 02/28/2025    HGB 16.6 02/28/2025    HCT 49.1 02/28/2025    MCV 96.1 02/28/2025    MCH 32.5 02/28/2025    MCHC 33.8 02/28/2025    RDW 13.5 02/28/2025    RDWSD 47.8 02/28/2025    MPV 8.9 02/28/2025     02/28/2025    NEUTRORELPCT 71.8 02/28/2025    LYMPHORELPCT 20.9 02/28/2025    MONORELPCT 5.5 02/28/2025    EOSRELPCT 1.2 02/28/2025    BASORELPCT 0.2 02/28/2025    AUTOIGPER 0.4 02/28/2025    NEUTROABS 6.07 02/28/2025    LYMPHSABS 1.76 02/28/2025    MONOSABS 0.46 02/28/2025    EOSABS 0.10 02/28/2025    BASOSABS  0.02 02/28/2025    AUTOIGNUM 0.03 02/28/2025    NRBC 0.0 02/28/2025      The above labs have been reviewed. All labs are within normal limits.    Current Medication List  This medication list has been reviewed with the patient and evaluated for any interactions or necessary modifications/recommendations, and updated to include all prescription medications, OTC medications, and supplements the patient is currently taking.  This list reflects what is contained in the patient's profile, which has also been marked as reviewed to communicate to other providers it is the most up to date version of the patient's current medication therapy.     Current Outpatient Medications:     acetaminophen (TYLENOL) 500 MG tablet, Take 1 tablet by mouth Every 6 (Six) Hours As Needed., Disp: , Rfl:     apixaban (ELIQUIS) 5 MG tablet tablet, Take 1 tablet by mouth 2 (Two) Times a Day., Disp: , Rfl:     ascorbic acid (VITAMIN C) 500 MG tablet, Take 1 tablet by mouth Daily., Disp: , Rfl:     atorvastatin (LIPITOR) 40 MG tablet, atorvastatin 40 mg oral tablet take 1 tablet (40 mg) by oral route once daily   Active, Disp: , Rfl:     carvedilol (COREG) 12.5 MG tablet, Take 1 tablet by mouth 2 (Two) Times a Day., Disp: 180 tablet, Rfl: 3    cyanocobalamin (VITAMIN B-12) 1000 MCG tablet, Take 1 tablet by mouth Daily., Disp: , Rfl:     Diclofenac Sodium (VOLTAREN) 1 % gel gel, Apply 4 g topically to the appropriate area as directed 4 (Four) Times a Day As Needed (as needed)., Disp: 100 g, Rfl: 1    diphenoxylate-atropine (LOMOTIL) 2.5-0.025 MG per tablet, Take 1 tablet by mouth 4 (Four) Times a Day As Needed for Diarrhea., Disp: 120 tablet, Rfl: 0    DULoxetine (CYMBALTA) 20 MG capsule, Take 1 capsule by mouth Daily., Disp: 30 capsule, Rfl: 2    furosemide (LASIX) 40 MG tablet, Take 1 tablet by mouth Daily., Disp: 90 tablet, Rfl: 3    levocetirizine (XYZAL) 5 MG tablet, Take 1 tablet by mouth Daily., Disp: , Rfl:     levothyroxine (SYNTHROID,  LEVOTHROID) 75 MCG tablet, Take 1 tablet by mouth Daily., Disp: , Rfl:     MAGIC MOUTHWASH W/NYSTATIN 1/1/1/1 (lidocaine - diphenhydrAMINE HCl - aluminum & magnesium hydroxide - nystatin), Swish and swallow 10 mL 4 (Four) Times a Day As Needed for Mouth Pain., Disp: 500 mL, Rfl: 1    multivitamin (THERAGRAN) tablet tablet, Take 1 tablet by mouth Daily., Disp: , Rfl:     ondansetron (ZOFRAN) 8 MG tablet, Take 1 tablet by mouth 3 (Three) Times a Day As Needed for Nausea or Vomiting., Disp: 30 tablet, Rfl: 5    psyllium (METAMUCIL) 58.6 % packet, Take 1 packet by mouth Daily., Disp: , Rfl:     [START ON 3/9/2025] trifluridine-tipiracil (LONSURF) 20-8.19 MG per tablet, Take 4 tablets by mouth Daily With Breakfast & Dinner. Take 4 tabs with Breakfast and 4 with Dinner Day 1-5 and 8-12 Q28D., Disp: 80 tablet, Rfl: 11    verapamil SR (CALAN-SR) 180 MG CR tablet, Take 1 tablet by mouth Every Night., Disp: 90 tablet, Rfl: 3    vitamin E 1000 UNIT capsule, Take 1 capsule by mouth Daily., Disp: , Rfl:   No current facility-administered medications for this visit.  Medicines reviewed by Joyce Garnica, PharmD on 3/6/2025 at 10:07 AM    Drug Interactions  Reviewed concomitant medications, allergies, labs, comorbidities/medical history, quality of life, and immunization history.   Drug-drug interactions noted and discussed during education: no significant drug interactions noted. . Reminded the patient to let us know before making any changes or starting any new prescription or OTC medications so we can first assess drug interactions.    Recommended Medications Assessment  Bone Health (such as calcium/vitamin D, bisphosphonate, RANKL inhibitor) - Not Indicated   VTE prophylaxis - Currently Taking Eliquis for history of atrial fibrillation and clots  Prophylactic antimicrobials - Not Indicated   Tumor lysis syndrome prophylaxis - Risk for TLS Low    Initial Education Provided for Specialty Medication  The patient has been  provided with the following education. All questions and concerns have been addressed prior to the patient receiving the medication, and the patient has verbalized understanding of the education and any materials provided.  Additional patient education shall be provided and documented upon request by the patient, provider or payer.      Provided patient with:   Chemo calendar to help improve adherence., Education sheets about the medication, 24-hour clinic phone number and my contact information and instructions to call should additional questions arise.     Medication Education Sheets Provided: (select all that apply)  Oral Specialty Medication: Lonsurf (trifluridine-tipiracil)    Other Education Sheets Provided: (select all that apply)  CINV and Diarrhea    TOPICS COMMENTS   Storage and Handling of Oral Specialty Medication Store in the original container, in a dry location out of direct sunlight, and out of reach of children or pets. Store at room temperature.  Discussed safe handling and what to do with any unused medication.   Administration of Oral Specialty Medication Take with food at the same time(s) each day. Within 60 minutes of breakfast and dinner.   Adherence to Oral Specialty Regimen and Handling Missed Doses Patient is likely to have good treatment adherence; reinforced the importance of adherence. Reviewed how to address missed doses and to let us know of any missed doses.   Anemia: role of RBC, cause, s/s, ways to manage, role of transfusion Reviewed the role of RBC and the use of transfusions if hemoglobin decreases too much.  Patient to notify us if they experience shortness of breath, dizziness, or palpitations.  Also let patient know they could feel more tired than usual and to try to stay active, but rest if they need to.    Thrombocytopenia: role of platelet, cause, s/s, ways to prevent bleeding, things to avoid, when to seek help Reviewed the role of platelets in blood clotting and when to  call clinic (bloody nose that bleeds for 5 mins despite pressure, a cut that won't stop bleeding despite pressure, gums that bleed excessively with brushing or flossing). Recommended using an electric razor, soft bristle toothbrush, and blowing your nose gently.    Neutropenia: role of WBC, cause, infection precautions, s/s of infection, when to call MD Reviewed the role of WBC, good infection prevention practices, and when to call the clinic (temperature 100.4F, sore throat, burning urination, etc)  COVID Vaccines: Declined COVID-19 vaccination since 2021  Flu Vaccine: 7297-8682 flu vaccine received   Nutrition and Appetite Changes:  importance of maintaining healthy diet & weight, ways to manage to improve intake, dietary consult, exercise regimen, electrolyte and/or blood glucose abnormalities Decreased Appetite: Discussed the risk of decreased appetite. Recommended eating smaller, more frequent meals. Instructed the patient to contact the clinic if losing weight or having difficulty eating enough to maintain energy level.   Diarrhea: causes, s/s of dehydration, ways to manage, dietary changes, when to call MD Discussed the risk of diarrhea. Instructed patient on use OTC loperamide with diarrhea onset, but emphasized the importance of calling the clinic if 4-6 episodes in 24 hours not relieved by OTC loperamide.   Nausea/Vomiting: cause, use of antiemetics, dietary changes, when to call MD Emetic risk: Low to Minimal  PRN home meds: Ondansetron 8mg PO every 8 hours PRN nausea / vomiting  Pharmacy home meds sent to: Has from prior treatment    Instructed the patient to take a dose of the PRN medication at the first onset of nausea and if it's not working to call us for additional medications.  Also provided non-drug measures to mitigate nausea.   Miscellaneous Financial Issues: Copay $43, patient aware  Lab Draws:  Every 2 weeks  Miscellaneous:  None   Infertility and Sexuality:  causes, fertility preservation  options, sexuality changes, ways to manage, importance of birth control Oral Oncology Therapy: Reviewed safe sex practices and the importance of minimizing exposure to body fluids while on oral oncology therapy.   Home Care: how to manage bodily fluids Counseled on management of soiled linens and proper flush technique.  Discussed how to manage all the side effects at home and advised when to contact the MD office     Adherence and Self-Administration  Adherence related to the patient's specialty therapy was discussed with the patient. The Adherence segment of this outreach has been reviewed and updated.     Is there a concern with patient's ability to self administer the medication correctly and without issue?: No  Were any potential barriers to adherence identified during the initial assessment or patient education?: No  Are there any concerns regarding the patient's understanding of the importance of medication adherence?: No  Methods for Supporting Patient Adherence and/or Self-Administration: Dosing calendar  Expected duration of therapy: Until disease progression or intolerable toxicity  Open Medication Therapy Problems  No medication therapy recommendations to display    Goals of Therapy  Goals related to the patient's specialty therapy were discussed with the patient. The Patient Goals segment of this outreach has been reviewed and updated.   Goals Addressed Today        Specialty Pharmacy General Goal      Clinical goal/therapeutic target: disease control, per the recent oncology clinic notes and labs.  Patient-identified goal of therapy: Patient will adhere to medication regimen by %    3/4/25: Most recent imaging with progression of disease. Patient not interested in clinical trials at this time. Will transition from Fruzaqla to Lonsurf.                Reassessment Plan & Follow-Up  Pharmacist to perform regular reassessments no more than (6) months from the previous assessment.  Care Coordinator to  set up future refill outreaches, coordinate prescription delivery, and escalate clinical questions to pharmacist.  Care Coordinator to follow until patient receives oral specialty medication.   Welcome information and patient satisfaction survey to be sent by specialty pharmacy team with patient's initial fill.    Additional Plans, Therapy Recommendations or Therapy Problems to Be Addressed: none at this time     Attestation  Therapeutic appropriateness: Appropriate   I attest the patient was actively involved in and has agreed to the above plan of care. If the prescribed therapy is at any point deemed not appropriate based on the current or future assessments, a consultation will be initiated with the patient's specialty care provider to determine the best course of action. The revised plan of therapy will be documented along with any required assessments and/or additional patient education provided.     Joyce Garnica PharmD  Oncology Clinical Specialty Pharmacist   3/6/2025  10:50 EST

## 2025-03-06 NOTE — TELEPHONE ENCOUNTER
----- Message from Nyla Ramírez sent at 3/6/2025  8:46 AM EST -----  Holter monitor shows persistent atrial fibrillation with some RVR episodes but reasonably controlled average heart rate. Average heart rate was 99. There were 10 patient triggered events all coinciding with atrial fibrillation with elevated heart rates. Would recommend switching patient metoprolol to carvedilol 12.5 mg twice daily for better heart rate and blood pressure control. Monitor BP and HR twice daily for the next week, keep a log, and send in for review

## 2025-03-10 ENCOUNTER — SPECIALTY PHARMACY (OUTPATIENT)
Dept: PHARMACY | Facility: HOSPITAL | Age: 70
End: 2025-03-10
Payer: MEDICARE

## 2025-03-10 ENCOUNTER — TELEPHONE (OUTPATIENT)
Dept: ONCOLOGY | Facility: HOSPITAL | Age: 70
End: 2025-03-10
Payer: MEDICARE

## 2025-03-10 NOTE — TELEPHONE ENCOUNTER
Outpatient Nutrition Oncology Assessment    Patient Name: Robbi Kennedy  YOB: 1955  MRN: [id number]  Assessment Date: 3/10/2025    CLINICAL NUTRITION ASSESSMENT    Dx:  rectal Ca, secondary neoplasm of the lung and liver      Type of Cancer Treatment To begin taking Lonsurf (Trifluridine-Tipiracil)          Reason for Assessment  MST score 2+, Malnutrition Severity Assessment, Assessment       Current Problems:   Patient Active Problem List   Diagnosis Code    Allergic rhinitis J30.9    Arthritis M19.90    Asthma J45.909    Rectal cancer C20    Encounter for adjustment or management of vascular access device Z45.2    Atrial fibrillation, persistent I48.19    Benign prostatic hyperplasia N40.0    Adenomatosis D12.6    Glossodynia K14.6    Psychosexual dysfunction with inhibited sexual excitement F52.8    Varicocele I86.1    Essential hypertension I10    Hyperlipidemia LDL goal <100 E78.5    Other obesity due to excess calories E66.09    Cancer related pain G89.3    Secondary malignant neoplasm of left lung C78.02    Secondary malignancy of liver C78.7    Hypothyroidism E03.9    Secondary malignant neoplasm of right lung C78.01    Thrombocytopenia D69.6    Chronic heart failure with preserved ejection fraction I50.32    Vision changes H53.9    Back pain M54.9    Depression F32.A                Weight Hx  Wt Readings from Last 30 Encounters:   03/04/25 1105 103 kg (228 lb)   02/27/25 0931 104 kg (228 lb 4.8 oz)   01/09/25 1040 111 kg (245 lb)   11/26/24 1104 111 kg (245 lb)   08/22/24 0845 119 kg (262 lb 9.6 oz)   07/23/24 0845 124 kg (274 lb)   06/19/24 1439 125 kg (276 lb)   05/21/24 0831 127 kg (280 lb 9.6 oz)   04/22/24 0811 129 kg (284 lb 2.8 oz)   03/06/24 1458 128 kg (282 lb 12.8 oz)   02/08/24 1426 132 kg (290 lb 9.6 oz)   02/08/24 0759 132 kg (290 lb)   02/07/24 0925 132 kg (289 lb 14.5 oz)   11/07/23 1350 128 kg (282 lb 13.6 oz)   09/06/23 0813 125 kg (276 lb 0.3 oz)   09/06/23 0816 125 kg (275  lb 9.2 oz)   08/29/23 0953 125 kg (275 lb)   08/22/23 0847 125 kg (275 lb 2.2 oz)   08/08/23 0812 124 kg (274 lb 4 oz)   08/07/23 0948 124 kg (273 lb)   07/31/23 1046 124 kg (272 lb 7.8 oz)   07/18/23 0802 126 kg (276 lb 14.4 oz)   07/17/23 1118 125 kg (276 lb 7.3 oz)   07/05/23 0839 126 kg (278 lb 7.1 oz)   07/03/23 1035 126 kg (276 lb 14.4 oz)   06/20/23 0816 127 kg (280 lb 6.8 oz)   06/19/23 1306 127 kg (279 lb 1.6 oz)   06/06/23 0838 128 kg (281 lb 4.9 oz)   06/05/23 1011 127 kg (279 lb 15.8 oz)   05/23/23 0809 128 kg (281 lb 12 oz)        Labs/Medications        Pertinent Labs Reviewed.     Lab Results   Component Value Date    HGBA1C 5.9 (H) 12/14/2023       Pertinent Medications DULoxetine, Diclofenac Sodium, MAGIC MOUTHWASH W/NYSTATIN 1/1/1/1, acetaminophen, apixaban, ascorbic acid, atorvastatin, carvedilol, cyanocobalamin, diphenoxylate-atropine, furosemide, levocetirizine, levothyroxine, multivitamin, ondansetron, psyllium, trifluridine-tipiracil, verapamil SR, and vitamin E     Current Nutrition Orders & Evaluation of Intake       Oral Nutrition     Current PO Diet Snacks throughout the day- high kcal, high protein foods   Supplement Boost high protein or Boost Plus     Nutrition Diagnosis        Nutrition Dx Problem 1 Unintentional weight loss related to decreased ability to consume sufficient energy as evidenced by physiological causes increasing nutrient needs., hypermetabolic state., decreased appetite., and patient report.       Nutrition Intervention       RD Action Nutrition assessment by review of medical record + pt interview (reviewed & discussed):  Hx of weight changes  Current nutrition-related concerns  Current PO intake, food tolerances, ONS    Reviewed:   Ways to maximize pt's nutritional intake (foods & ONS)     Monitor/Evaluation       Monitor Per oncology nutrition protocol.     Comments:    Nutrition referral due to MST of 3.  Records indicate pt has lost 13% body wt in 6 months  (significant wt decline).  Pt previously on Fruquintinib- changing to Lonsurf next week.    Spoke to pt over the phone.  He reports having minimal appetite while on prior oral chemotherapy.  He also reports experiencing alternating constipation and diarrhea.  Pt reports 10-12 days out of the month he would eat fairly well, but on days he had minimal appetite he would try to snack all day.  Pt has received a variety of treatments since September 2019.  In speaking with him, he is aware of the importance of consuming high kcal foods and good protein sources.  He drinks some Boost (either Plus or high protein) during the day.  He is hoping this next oral chemotherapy will produce less side effects and not impact his appetite.  Offered to provide multiple forms of nutrition tips (recipes, food lists, etc), but pt declined.  Encouraged pt to reach out if experiencing nutritional concerns with this next round of oral chemotherapy.    Electronically signed by:  Althea Duarte RD  03/10/25 13:24 EDT

## 2025-03-10 NOTE — PROGRESS NOTES
Specialty Pharmacy Patient Management Program  Phone Call     Robbi Kennedy is a 69 y.o. male with CRC (Colorectal Cancer). Spoke with the patient's wife via the telephone regarding patient new fill of Lonsurf. Incorrect bottle size ordered so delay in shipping. They were understanding. Will follow up with them on Wednesday to confirm delivery received. Patient did not have any further questions or concerns at this time.     Joyce Garnica PharmD  Oncology Clinical Specialty Pharmacist   3/10/2025  09:41 EDT

## 2025-03-12 ENCOUNTER — SPECIALTY PHARMACY (OUTPATIENT)
Dept: PHARMACY | Facility: HOSPITAL | Age: 70
End: 2025-03-12
Payer: MEDICARE

## 2025-03-12 ENCOUNTER — TELEPHONE (OUTPATIENT)
Dept: ONCOLOGY | Facility: HOSPITAL | Age: 70
End: 2025-03-12
Payer: MEDICARE

## 2025-03-12 DIAGNOSIS — C20 RECTAL CANCER: Primary | ICD-10-CM

## 2025-03-12 DIAGNOSIS — Z79.899 ENCOUNTER FOR LONG-TERM (CURRENT) USE OF HIGH-RISK MEDICATION: ICD-10-CM

## 2025-03-12 NOTE — PROGRESS NOTES
Specialty Pharmacy Patient Management Program  Oncology Refill Outreach      Lonsurf (trifluridine-tipiracil) - [facility]  Rcvd: 3/11/25  Start: Planning for 3/17/25     Lori Landeros  Care Coordinator, [facility]  3/12/2025  10:53 EDT

## 2025-03-12 NOTE — PROGRESS NOTES
Lonsurf (trifluridine-tipiracil) - [facility]  Rcvd: 3/11/25  Start: Planning for 3/17/25 - patient will call if he starts sooner than this but likely will wait due to the regimen details (days 1-5, 8-12)    Joyce Garnica, PharmD  Oncology Clinical Specialty Pharmacist

## 2025-03-12 NOTE — TELEPHONE ENCOUNTER
----- Message from Radha CARLSON sent at 3/12/2025 11:40 AM EDT -----  Hilda can you help with this please :)Tanisha Haines  ----- Message -----  From: Joyce Garnica PharmD  Sent: 3/12/2025  11:22 AM EDT  To: Radha De Leon RN    We can move his lab appt to 3/31 from 4/4 and then from there he technically wouldn't need to come back with Dr. Campos until 4/14 with labs!Joyce  ----- Message -----  From: Radha De Leon, RN  Sent: 3/12/2025  10:59 AM EDT  To: Joyce Garnica PharmD    Ok great, anything I need to adjust or lab appointments I need to make?  ----- Message -----  From: Joyce Garnica PharmD  Sent: 3/12/2025  10:18 AM EDT  To: Lori Landeros; Radha De Leon RN    Patient received his medication and is planning to start on Monday, 3/17, so it is easier to track for him given the dosing schedule. Thanks!Joyce  ----- Message -----  From: Radha De Leon, RN  Sent: 3/4/2025  11:29 AM EDT  To: Joyce Garnica PharmD; Lori Landeros    This patient is switching to lonsurf due to progression. Please work on auth. Thanks!

## 2025-03-12 NOTE — TELEPHONE ENCOUNTER
LEFT PATIENT DETAILED VM LETTING HIM KNOW THAT I HAVE RESCHEDULED HIS LAB AND FOLLOW UP APPT WITH DR. ESPINOSA AS HE IS NOW STARTING HIS ORAL CHEMO MEDICATION ON MONDAY 3/17/25. WE WILL NEED TO DRAW HIS LABS 2 WEEKS AFTER STARTING MEDICATION, SO I HAVE HIS LAB APPT SCHEDULED FOR 3/31/25 AT 10:45 AM. HE WILL NEED TO FOLLOW UP WITH LABS  ONE MONTH AFTER STARTING MEDICATION, SO I HAVE HIS LAB/FOLLOW UP WITH DR. ESPINOSA SCHEDULED  FOR 4/14/25 AT 1:45 PM. I HAVE REQUESTED PATIENT CALL ME BACK. I TRIED CALLING THE PRIMARY PHONE # FIRST, [phone], BUT THE PERSON STATED I HAD THE WRONG NUMBER SO I WOULD LIKE TO VERIFY THAT AS WELL. PLEASE WARM TRANSFER.

## 2025-03-24 ENCOUNTER — SPECIALTY PHARMACY (OUTPATIENT)
Dept: PHARMACY | Facility: HOSPITAL | Age: 70
End: 2025-03-24
Payer: MEDICARE

## 2025-03-24 NOTE — PROGRESS NOTES
Specialty Pharmacy Patient Management Program  1 Week Toxicity Check     Patient is seen by their provider for CRC (Colorectal Cancer). Patient was previously enrolled in the Oncology Patient Management program offered by [facility].      Patient reports starting Lonsurf (trifluridine-tipiracil) on 3/17/25. Thus far, patient denies side effects, aside from some nausea, diarrhea, and fatigue, which is manageable, but somewhat bothersome to patient. Advised patient to alert office if this changes. Thus far, no missed doses and no medication changes. Advised pt to call office if any changes. Patient expressed understanding and had no additional questions at this time.       Joyce Garnica, PharmD  Oncology Clinical Specialty Pharmacist   3/24/2025  09:36 EDT

## 2025-03-31 ENCOUNTER — HOSPITAL ENCOUNTER (OUTPATIENT)
Dept: ONCOLOGY | Facility: HOSPITAL | Age: 70
Discharge: HOME | End: 2025-03-31
Admitting: INTERNAL MEDICINE
Payer: MEDICARE

## 2025-03-31 ENCOUNTER — TELEPHONE (OUTPATIENT)
Dept: ONCOLOGY | Facility: HOSPITAL | Age: 70
End: 2025-03-31
Payer: MEDICARE

## 2025-03-31 DIAGNOSIS — Z79.899 ENCOUNTER FOR LONG-TERM (CURRENT) USE OF HIGH-RISK MEDICATION: ICD-10-CM

## 2025-03-31 DIAGNOSIS — Z45.2 ENCOUNTER FOR ADJUSTMENT OR MANAGEMENT OF VASCULAR ACCESS DEVICE: Primary | ICD-10-CM

## 2025-03-31 DIAGNOSIS — C20 RECTAL CANCER: ICD-10-CM

## 2025-03-31 LAB
ALBUMIN SERPL-MCNC: 3.5 G/DL (ref 3.5–5.2)
ALBUMIN/GLOB SERPL: 1.5 G/DL
ALP SERPL-CCNC: 80 U/L (ref 39–117)
ALT SERPL W P-5'-P-CCNC: 20 U/L (ref 1–41)
ANION GAP SERPL CALCULATED.3IONS-SCNC: 11 MMOL/L (ref 5–15)
AST SERPL-CCNC: 22 U/L (ref 1–40)
BASOPHILS # BLD AUTO: 0 10*3/MM3 (ref 0–0.2)
BASOPHILS NFR BLD AUTO: 0 % (ref 0–1.5)
BILIRUB SERPL-MCNC: 1.3 MG/DL (ref 0–1.2)
BUN SERPL-MCNC: 15 MG/DL (ref 8–23)
BUN/CREAT SERPL: 22.1 (ref 7–25)
CALCIUM SPEC-SCNC: 8.4 MG/DL (ref 8.6–10.5)
CHLORIDE SERPL-SCNC: 102 MMOL/L (ref 98–107)
CO2 SERPL-SCNC: 26 MMOL/L (ref 22–29)
CREAT SERPL-MCNC: 0.68 MG/DL (ref 0.76–1.27)
DEPRECATED RDW RBC AUTO: 50.4 FL (ref 37–54)
EGFRCR SERPLBLD CKD-EPI 2021: 101.2 ML/MIN/1.73
EOSINOPHIL # BLD AUTO: 0.03 10*3/MM3 (ref 0–0.4)
EOSINOPHIL NFR BLD AUTO: 0.8 % (ref 0.3–6.2)
ERYTHROCYTE [DISTWIDTH] IN BLOOD BY AUTOMATED COUNT: 13.9 % (ref 12.3–15.4)
GLOBULIN UR ELPH-MCNC: 2.4 GM/DL
GLUCOSE SERPL-MCNC: 187 MG/DL (ref 65–99)
HCT VFR BLD AUTO: 36.9 % (ref 37.5–51)
HGB BLD-MCNC: 12.2 G/DL (ref 13–17.7)
IMM GRANULOCYTES # BLD AUTO: 0.02 10*3/MM3 (ref 0–0.05)
IMM GRANULOCYTES NFR BLD AUTO: 0.5 % (ref 0–0.5)
LYMPHOCYTES # BLD AUTO: 0.6 10*3/MM3 (ref 0.7–3.1)
LYMPHOCYTES NFR BLD AUTO: 15.6 % (ref 19.6–45.3)
MCH RBC QN AUTO: 32.6 PG (ref 26.6–33)
MCHC RBC AUTO-ENTMCNC: 33.1 G/DL (ref 31.5–35.7)
MCV RBC AUTO: 98.7 FL (ref 79–97)
MONOCYTES # BLD AUTO: 0.08 10*3/MM3 (ref 0.1–0.9)
MONOCYTES NFR BLD AUTO: 2.1 % (ref 5–12)
NEUTROPHILS NFR BLD AUTO: 3.12 10*3/MM3 (ref 1.7–7)
NEUTROPHILS NFR BLD AUTO: 81 % (ref 42.7–76)
NRBC BLD AUTO-RTO: 1.3 /100 WBC (ref 0–0.2)
PLATELET # BLD AUTO: 194 10*3/MM3 (ref 140–450)
PMV BLD AUTO: 9 FL (ref 6–12)
POTASSIUM SERPL-SCNC: 3.5 MMOL/L (ref 3.5–5.2)
PROT SERPL-MCNC: 5.9 G/DL (ref 6–8.5)
RBC # BLD AUTO: 3.74 10*6/MM3 (ref 4.14–5.8)
SODIUM SERPL-SCNC: 139 MMOL/L (ref 136–145)
WBC NRBC COR # BLD AUTO: 3.85 10*3/MM3 (ref 3.4–10.8)

## 2025-03-31 PROCEDURE — 36591 DRAW BLOOD OFF VENOUS DEVICE: CPT

## 2025-03-31 PROCEDURE — 80053 COMPREHEN METABOLIC PANEL: CPT | Performed by: INTERNAL MEDICINE

## 2025-03-31 PROCEDURE — 25010000002 HEPARIN LOCK FLUSH PER 10 UNITS: Performed by: INTERNAL MEDICINE

## 2025-03-31 PROCEDURE — 85025 COMPLETE CBC W/AUTO DIFF WBC: CPT | Performed by: INTERNAL MEDICINE

## 2025-03-31 RX ORDER — SODIUM CHLORIDE 0.9 % (FLUSH) 0.9 %
20 SYRINGE (ML) INJECTION AS NEEDED
Status: DISCONTINUED | OUTPATIENT
Start: 2025-03-31 | End: 2025-04-01 | Stop reason: HOSPADM

## 2025-03-31 RX ORDER — HEPARIN SODIUM (PORCINE) LOCK FLUSH IV SOLN 100 UNIT/ML 100 UNIT/ML
500 SOLUTION INTRAVENOUS AS NEEDED
OUTPATIENT
Start: 2025-03-31

## 2025-03-31 RX ORDER — HEPARIN SODIUM (PORCINE) LOCK FLUSH IV SOLN 100 UNIT/ML 100 UNIT/ML
500 SOLUTION INTRAVENOUS AS NEEDED
Status: DISCONTINUED | OUTPATIENT
Start: 2025-03-31 | End: 2025-04-01 | Stop reason: HOSPADM

## 2025-03-31 RX ORDER — SODIUM CHLORIDE 0.9 % (FLUSH) 0.9 %
20 SYRINGE (ML) INJECTION AS NEEDED
OUTPATIENT
Start: 2025-03-31

## 2025-03-31 RX ADMIN — HEPARIN 500 UNITS: 100 SYRINGE at 10:40

## 2025-03-31 RX ADMIN — Medication 20 ML: at 10:40

## 2025-04-04 ENCOUNTER — SPECIALTY PHARMACY (OUTPATIENT)
Dept: PHARMACY | Facility: HOSPITAL | Age: 70
End: 2025-04-04
Payer: MEDICARE

## 2025-04-04 NOTE — PROGRESS NOTES
" Specialty Pharmacy Patient Management Program  Oncology Refill Outreach      Robbi \"ALPESH\" is a 69 y.o. male contacted today regarding refills of his medication(s). [facility] will ship medications when ready to be dispensed.     Specialty medication(s) and dose(s) confirmed: Y  Other medications being refilled: N    Refill Questions      Flowsheet Row Most Recent Value   Changes to allergies? No   Changes to medications? No   New conditions or infections since last clinic visit No   Unplanned office visit, urgent care, ED, or hospital admission in the last 4 weeks  No   How does patient/caregiver feel medication is working? Very good   Financial problems or insurance changes  No   Since the previous refill, were any specialty medication doses or scheduled injections missed or delayed?  No   Does this patient require a clinical escalation to a pharmacist? No            Delivery Questions      Flowsheet Row Most Recent Value   Delivery method UPS   Delivery address verified with patient/caregiver? Yes   Delivery address Home   Number of medications in delivery 1   Medication(s) being filled and delivered Trifluridine-Tipiracil (LONSURF)   Doses left of specialty medications 1 week   Copay verified? Yes   Copay amount $43.00   Copay form of payment Credit/debit on file   Delivery Date Selection 04/08/25   Signature Required No   Do you consent to receive electronic handouts?  Yes                   Follow-up: 21 DAYS      Shobha Álvarez  Oncology Care Coordinator  4/4/2025  09:23 EDT         "

## 2025-04-14 ENCOUNTER — HOSPITAL ENCOUNTER (OUTPATIENT)
Dept: ONCOLOGY | Facility: HOSPITAL | Age: 70
Discharge: HOME | End: 2025-04-14
Payer: MEDICARE

## 2025-04-14 ENCOUNTER — OFFICE VISIT (OUTPATIENT)
Dept: ONCOLOGY | Facility: HOSPITAL | Age: 70
End: 2025-04-14
Payer: MEDICARE

## 2025-04-14 VITALS
DIASTOLIC BLOOD PRESSURE: 92 MMHG | TEMPERATURE: 97.9 F | SYSTOLIC BLOOD PRESSURE: 131 MMHG | RESPIRATION RATE: 18 BRPM | HEART RATE: 77 BPM | HEIGHT: 76 IN | WEIGHT: 223.99 LBS | OXYGEN SATURATION: 97 % | BODY MASS INDEX: 27.28 KG/M2

## 2025-04-14 DIAGNOSIS — T45.1X5A CHEMOTHERAPY-INDUCED NEUTROPENIA: ICD-10-CM

## 2025-04-14 DIAGNOSIS — Z45.2 ENCOUNTER FOR ADJUSTMENT OR MANAGEMENT OF VASCULAR ACCESS DEVICE: Primary | ICD-10-CM

## 2025-04-14 DIAGNOSIS — C20 RECTAL CANCER: ICD-10-CM

## 2025-04-14 DIAGNOSIS — C20 RECTAL CANCER: Primary | ICD-10-CM

## 2025-04-14 DIAGNOSIS — D70.1 CHEMOTHERAPY-INDUCED NEUTROPENIA: ICD-10-CM

## 2025-04-14 LAB
ALBUMIN SERPL-MCNC: 3.3 G/DL (ref 3.5–5.2)
ALBUMIN/GLOB SERPL: 1.1 G/DL
ALP SERPL-CCNC: 88 U/L (ref 39–117)
ALT SERPL W P-5'-P-CCNC: 14 U/L (ref 1–41)
ANION GAP SERPL CALCULATED.3IONS-SCNC: 7.9 MMOL/L (ref 5–15)
AST SERPL-CCNC: 16 U/L (ref 1–40)
BASOPHILS # BLD AUTO: 0.01 10*3/MM3 (ref 0–0.2)
BASOPHILS NFR BLD AUTO: 0.4 % (ref 0–1.5)
BILIRUB SERPL-MCNC: 0.7 MG/DL (ref 0–1.2)
BUN SERPL-MCNC: 11 MG/DL (ref 8–23)
BUN/CREAT SERPL: 15.9 (ref 7–25)
CALCIUM SPEC-SCNC: 9 MG/DL (ref 8.6–10.5)
CHLORIDE SERPL-SCNC: 102 MMOL/L (ref 98–107)
CO2 SERPL-SCNC: 27.1 MMOL/L (ref 22–29)
CREAT SERPL-MCNC: 0.69 MG/DL (ref 0.76–1.27)
DEPRECATED RDW RBC AUTO: 64.2 FL (ref 37–54)
EGFRCR SERPLBLD CKD-EPI 2021: 100.8 ML/MIN/1.73
EOSINOPHIL # BLD AUTO: 0.02 10*3/MM3 (ref 0–0.4)
EOSINOPHIL NFR BLD AUTO: 0.7 % (ref 0.3–6.2)
ERYTHROCYTE [DISTWIDTH] IN BLOOD BY AUTOMATED COUNT: 17.2 % (ref 12.3–15.4)
GLOBULIN UR ELPH-MCNC: 3 GM/DL
GLUCOSE SERPL-MCNC: 95 MG/DL (ref 65–99)
HCT VFR BLD AUTO: 36.7 % (ref 37.5–51)
HGB BLD-MCNC: 12 G/DL (ref 13–17.7)
IMM GRANULOCYTES # BLD AUTO: 0.03 10*3/MM3 (ref 0–0.05)
IMM GRANULOCYTES NFR BLD AUTO: 1.1 % (ref 0–0.5)
LYMPHOCYTES # BLD AUTO: 0.87 10*3/MM3 (ref 0.7–3.1)
LYMPHOCYTES NFR BLD AUTO: 30.5 % (ref 19.6–45.3)
MCH RBC QN AUTO: 33.8 PG (ref 26.6–33)
MCHC RBC AUTO-ENTMCNC: 32.7 G/DL (ref 31.5–35.7)
MCV RBC AUTO: 103.4 FL (ref 79–97)
MONOCYTES # BLD AUTO: 0.54 10*3/MM3 (ref 0.1–0.9)
MONOCYTES NFR BLD AUTO: 18.9 % (ref 5–12)
NEUTROPHILS NFR BLD AUTO: 1.38 10*3/MM3 (ref 1.7–7)
NEUTROPHILS NFR BLD AUTO: 48.4 % (ref 42.7–76)
NRBC BLD AUTO-RTO: 0 /100 WBC (ref 0–0.2)
PLATELET # BLD AUTO: 285 10*3/MM3 (ref 140–450)
PMV BLD AUTO: 8.9 FL (ref 6–12)
POTASSIUM SERPL-SCNC: 3.6 MMOL/L (ref 3.5–5.2)
PROT SERPL-MCNC: 6.3 G/DL (ref 6–8.5)
RBC # BLD AUTO: 3.55 10*6/MM3 (ref 4.14–5.8)
SODIUM SERPL-SCNC: 137 MMOL/L (ref 136–145)
WBC NRBC COR # BLD AUTO: 2.85 10*3/MM3 (ref 3.4–10.8)

## 2025-04-14 PROCEDURE — G2211 COMPLEX E/M VISIT ADD ON: HCPCS | Performed by: INTERNAL MEDICINE

## 2025-04-14 PROCEDURE — 36591 DRAW BLOOD OFF VENOUS DEVICE: CPT

## 2025-04-14 PROCEDURE — 3080F DIAST BP >= 90 MM HG: CPT | Performed by: INTERNAL MEDICINE

## 2025-04-14 PROCEDURE — 1159F MED LIST DOCD IN RCRD: CPT | Performed by: INTERNAL MEDICINE

## 2025-04-14 PROCEDURE — 1125F AMNT PAIN NOTED PAIN PRSNT: CPT | Performed by: INTERNAL MEDICINE

## 2025-04-14 PROCEDURE — 80053 COMPREHEN METABOLIC PANEL: CPT | Performed by: INTERNAL MEDICINE

## 2025-04-14 PROCEDURE — 1160F RVW MEDS BY RX/DR IN RCRD: CPT | Performed by: INTERNAL MEDICINE

## 2025-04-14 PROCEDURE — 85025 COMPLETE CBC W/AUTO DIFF WBC: CPT | Performed by: INTERNAL MEDICINE

## 2025-04-14 PROCEDURE — 99214 OFFICE O/P EST MOD 30 MIN: CPT | Performed by: INTERNAL MEDICINE

## 2025-04-14 PROCEDURE — 3075F SYST BP GE 130 - 139MM HG: CPT | Performed by: INTERNAL MEDICINE

## 2025-04-14 PROCEDURE — 25010000002 HEPARIN LOCK FLUSH PER 10 UNITS: Performed by: INTERNAL MEDICINE

## 2025-04-14 RX ORDER — SODIUM CHLORIDE 0.9 % (FLUSH) 0.9 %
20 SYRINGE (ML) INJECTION AS NEEDED
Status: DISCONTINUED | OUTPATIENT
Start: 2025-04-14 | End: 2025-04-15 | Stop reason: HOSPADM

## 2025-04-14 RX ORDER — HEPARIN SODIUM (PORCINE) LOCK FLUSH IV SOLN 100 UNIT/ML 100 UNIT/ML
500 SOLUTION INTRAVENOUS AS NEEDED
OUTPATIENT
Start: 2025-04-14

## 2025-04-14 RX ORDER — SODIUM CHLORIDE 0.9 % (FLUSH) 0.9 %
20 SYRINGE (ML) INJECTION AS NEEDED
OUTPATIENT
Start: 2025-04-14

## 2025-04-14 RX ORDER — HEPARIN SODIUM (PORCINE) LOCK FLUSH IV SOLN 100 UNIT/ML 100 UNIT/ML
500 SOLUTION INTRAVENOUS AS NEEDED
Status: DISCONTINUED | OUTPATIENT
Start: 2025-04-14 | End: 2025-04-15 | Stop reason: HOSPADM

## 2025-04-14 RX ADMIN — HEPARIN 500 UNITS: 100 SYRINGE at 13:00

## 2025-04-14 RX ADMIN — Medication 20 ML: at 13:00

## 2025-04-14 NOTE — PROGRESS NOTES
Chief Complaint  Rectal Cancer    Eri Moran APRN Reinberg, Emily, APRN Subjective          Robbi Kennedy presents to [facility] for treatment of his metastatic rectal cancer.  He recently started single agent Lonsurf.  He notes some mild nausea but able to tolerate and still eating and drinking normally.  He notes his energy was low for the 2 weeks that he was taking the treatment but better afterward.  Denies new masses, adenopathy, usual aches or pains.  No issues from his Port-A-Cath.    Oncology/Hematology History Overview Note   9/30/2019 High rectal ddyr-pxsfrmpndu-uaixagackfkqxm adenocarcinoma  associated with tubular adenoma.   4/27/21 Biopsy of liver revealed metastatic adenocarcinoma, consistent with colorectal primary.    Clinical Staging  Stage IIa ([id number])    Chemotherapy      neoadjuvant Xeloda with radiation. adjuvant xeloda        Radiation Therapy      7/8/19 thru 8/14/19 completed neoadjuvant 5040 cGy=28 fxs rectum     10/2021 XRT due to recurrence right middle lung 5 Fx's, 5000cGy  11/26/21 adjuvant FOLFOX -2/25/2022    Surgeries       9/30/2019 low anterior resection with ostomy        8/31/21 partial hepatectomy, cholecystectomy and MWA for segment 6 medical margin performed at     2/8/2023 CT Scans revealed Worsening pulmonary metastatic disease.    2/21/2023 orders written for FOLFIRI + AVASTIN       Rectal cancer   6/2/2021 - 8/15/2021 Chemotherapy    OP COLON mFOLFOX6 + Bevacizumab (OXALIplatin / Leucovorin / Fluorouracil / Bevacizumab)     6/18/2021 Initial Diagnosis    Rectal cancer (CMS/HCC)     6/21/2021 Cancer Staged    Staging form: Colon And Rectum, AJCC 8th Edition  - Clinical: Stage IIA (cT3, cN0, cM0) - Signed by Maurilio Campos MD on 6/21/2021 11/16/2021 - 2/25/2022 Chemotherapy    OP COLON mFOLFOX6 OXALIplatin / Leucovorin / Fluorouracil     2/28/2023 - 8/24/2023 Chemotherapy    OP COLORECTAL FOLFIRI + Bevacizumab  5mg/kg (Irinotecan / Leucovorin / Fluorouracil / Bevacizumab)     3/13/2024 - 3/13/2024 Biopsy    OP COLORECTAL Fruquintinib  Plan Provider: Maurilio Campos MD  Treatment goal: Palliative  Line of treatment: [No plan line of treatment]     3/17/2025 Biopsy    OP COLON Trifluidine / Tipiracil  Plan Provider: Maurilio Campos MD  Treatment goal: Palliative  Line of treatment: [No plan line of treatment]     Secondary malignant neoplasm of left lung   9/14/2021 Initial Diagnosis    Secondary malignant neoplasm of left lung (HCC)     10/20/2021 -  Radiation    RADIATION THERAPY Treatment Details (Noted on 10/5/2021)  Site: Bilateral Lung  Technique: SBRT  Goal: No goal specified  Planned Treatment Start Date: 10/20/2021     11/16/2021 - 2/25/2022 Chemotherapy    OP COLON mFOLFOX6 OXALIplatin / Leucovorin / Fluorouracil     6/15/2022 -  Radiation    RADIATION THERAPY Treatment Details (Noted on 6/15/2022)  Site: Left Lung - Lower lobe  Technique: SBRT  Goal: No goal specified  Planned Treatment Start Date: No planned start date specified     10/25/2022 -  Radiation    RADIATION THERAPY Treatment Details (Noted on 10/25/2022)  Site: Left Lung  Technique: SBRT  Goal: No goal specified  Planned Treatment Start Date: No planned start date specified     Secondary malignancy of liver   9/14/2021 Initial Diagnosis    Secondary malignancy of liver (HCC)     11/16/2021 - 2/25/2022 Chemotherapy    OP COLON mFOLFOX6 OXALIplatin / Leucovorin / Fluorouracil     Secondary malignant neoplasm of right lung   10/5/2021 Initial Diagnosis    Secondary malignant neoplasm of right lung (HCC)     10/20/2021 -  Radiation    RADIATION THERAPY Treatment Details (Noted on 10/5/2021)  Site: Bilateral Lung  Technique: SBRT  Goal: No goal specified  Planned Treatment Start Date: 10/20/2021     11/16/2021 - 2/25/2022 Chemotherapy    OP COLON mFOLFOX6 OXALIplatin / Leucovorin / Fluorouracil           Current Outpatient Medications on File Prior to  Visit   Medication Sig Dispense Refill    acetaminophen (TYLENOL) 500 MG tablet Take 1 tablet by mouth Every 6 (Six) Hours As Needed.      apixaban (ELIQUIS) 5 MG tablet tablet Take 1 tablet by mouth 2 (Two) Times a Day.      ascorbic acid (VITAMIN C) 500 MG tablet Take 1 tablet by mouth Daily.      atorvastatin (LIPITOR) 40 MG tablet atorvastatin 40 mg oral tablet take 1 tablet (40 mg) by oral route once daily   Active      carvedilol (COREG) 12.5 MG tablet Take 1 tablet by mouth 2 (Two) Times a Day. 180 tablet 3    cyanocobalamin (VITAMIN B-12) 1000 MCG tablet Take 1 tablet by mouth Daily.      Diclofenac Sodium (VOLTAREN) 1 % gel gel Apply 4 g topically to the appropriate area as directed 4 (Four) Times a Day As Needed (as needed). 100 g 1    diphenoxylate-atropine (LOMOTIL) 2.5-0.025 MG per tablet Take 1 tablet by mouth 4 (Four) Times a Day As Needed for Diarrhea. 120 tablet 0    DULoxetine (CYMBALTA) 20 MG capsule Take 1 capsule by mouth Daily. 30 capsule 2    furosemide (LASIX) 40 MG tablet Take 1 tablet by mouth Daily. 90 tablet 3    levocetirizine (XYZAL) 5 MG tablet Take 1 tablet by mouth Daily.      levothyroxine (SYNTHROID, LEVOTHROID) 75 MCG tablet Take 1 tablet by mouth Daily.      MAGIC MOUTHWASH W/NYSTATIN 1/1/1/1 (lidocaine - diphenhydrAMINE HCl - aluminum & magnesium hydroxide - nystatin) Swish and swallow 10 mL 4 (Four) Times a Day As Needed for Mouth Pain. 500 mL 1    multivitamin (THERAGRAN) tablet tablet Take 1 tablet by mouth Daily.      ondansetron (ZOFRAN) 8 MG tablet Take 1 tablet by mouth 3 (Three) Times a Day As Needed for Nausea or Vomiting. 30 tablet 5    psyllium (METAMUCIL) 58.6 % packet Take 1 packet by mouth Daily.      trifluridine-tipiracil (LONSURF) 20-8.19 MG per tablet Take 4 tablets by mouth Daily With Breakfast & Dinner. Take 4 tabs with Breakfast and 4 with Dinner Day 1-5 and 8-12 Q28D. 80 tablet 11    verapamil SR (CALAN-SR) 180 MG CR tablet Take 1 tablet by mouth Every  Night. 90 tablet 3    vitamin E 1000 UNIT capsule Take 1 capsule by mouth Daily.       No current facility-administered medications on file prior to visit.       No Known Allergies  Past Medical History:   Diagnosis Date    Arrhythmia     Asthma     Atrial fibrillation, persistent 02/26/2021    Chronic heart failure with preserved ejection fraction 07/18/2022    BNP 1590 on 7/22 Echocardiogram with EF 55% moderate to severe biatrial enlargement and mild right ventricular enlargement 7/22     Colorectal cancer     Coronary artery disease     Deep vein thrombosis     Essential hypertension     GI cancer     Hepatitis     History of DVT of lower extremity 08/27/2021    Hyperlipidemia LDL goal <100 12/31/2020    Rectal cancer 06/18/2021    Secondary malignancy of liver 09/14/2021    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    Secondary malignant neoplasm of right lung 10/05/2021    Thrombocytopenia 01/11/2022    Thyroid disease     Viral upper respiratory tract infection 04/24/2023     Past Surgical History:   Procedure Laterality Date    COLON SURGERY      HERNIA REPAIR      LIVER BIOPSY      4/27/21 Biopsy of liver revealed metastatic adenocarcinoma, consistent with colorectal primary    OTHER SURGICAL HISTORY      REMOVED CANCER FROM COLON     Social History     Socioeconomic History    Marital status:     Number of children: 2   Tobacco Use    Smoking status: Never    Smokeless tobacco: Never   Vaping Use    Vaping status: Never Used   Substance and Sexual Activity    Alcohol use: Not Currently     Alcohol/week: 3.0 standard drinks of alcohol     Types: 3 Cans of beer per week     Comment: 2-3 beers weekly    Drug use: Never    Sexual activity: Not Currently     Partners: Female     Birth control/protection: Vasectomy     Family History   Problem Relation Age of Onset    Prostate cancer Father     Heart murmur Mother     Diabetes Mother     Hypertension Mother     Colon cancer Maternal Uncle      "Hypertension Sister     Hypertension Sister        Objective   Physical Exam  Vitals reviewed. Exam conducted with a chaperone present.   Cardiovascular:      Rate and Rhythm: Normal rate and regular rhythm.      Heart sounds: Normal heart sounds. No murmur heard.     No gallop.   Pulmonary:      Effort: Pulmonary effort is normal.      Breath sounds: Normal breath sounds.   Abdominal:      General: Bowel sounds are normal.   Lymphadenopathy:      Cervical: No cervical adenopathy.   Psychiatric:         Mood and Affect: Mood normal.         Behavior: Behavior normal.         Vitals:    04/14/25 1322   BP: 131/92   Pulse: 77   Resp: 18   Temp: 97.9 °F (36.6 °C)   TempSrc: Temporal   SpO2: 97%   Weight: 102 kg (223 lb 15.8 oz)   Height: 193 cm (75.98\")   PainSc: 6    PainLoc: Wrist  Comment: left     ECOG score: 0         PHQ-9 Total Score:                    Result Review :   The following data was reviewed by: Maurilio Campos MD on 04/14/2025:  Lab Results   Component Value Date    HGB 12.0 (L) 04/14/2025    HCT 36.7 (L) 04/14/2025    .4 (H) 04/14/2025     04/14/2025    WBC 2.85 (L) 04/14/2025    NEUTROABS 1.38 (L) 04/14/2025    LYMPHSABS 0.87 04/14/2025    MONOSABS 0.54 04/14/2025    EOSABS 0.02 04/14/2025    BASOSABS 0.01 04/14/2025     Lab Results   Component Value Date    GLUCOSE 95 04/14/2025    BUN 11 04/14/2025    CREATININE 0.69 (L) 04/14/2025     04/14/2025    K 3.6 04/14/2025     04/14/2025    CO2 27.1 04/14/2025    CALCIUM 9.0 04/14/2025    PROTEINTOT 6.3 04/14/2025    ALBUMIN 3.3 (L) 04/14/2025    BILITOT 0.7 04/14/2025    ALKPHOS 88 04/14/2025    AST 16 04/14/2025    ALT 14 04/14/2025     Lab Results   Component Value Date    MG 1.8 (L) 09/07/2021    FREET4 0.74 07/17/2024    TSH 37.788 (H) 10/14/2024     Lab Results   Component Value Date    IRON 60 (L) 03/04/2020    LABIRON 20 03/04/2020    TRANSFERRIN 215.00 03/04/2020    TIBC 307 03/04/2020     Lab Results   Component " Value Date    FERRITIN 140 03/04/2020    TVUWMOXM18 >2000 (H) 03/04/2020    FOLATE 19.4 03/04/2020     Lab Results   Component Value Date    PSA 0.92 12/12/2023    CEA 22.70 02/28/2025             Assessment and Plan    Diagnoses and all orders for this visit:    1. Rectal cancer (Primary)  Assessment & Plan:  Patient recently started on single agent Lonsurf.  He notes some increased fatigue while on the treatment as well as mild nausea but tolerable.  Lab work is notable for mild neutropenia but not enough to require dose adjustment.  Proceed with cycle 2 as planned.  I will see him back for cycle 3-day 1 with lab work prior to monitor for toxicities.  He will have a 2-week lab check in between.    Orders:  -     CBC & Differential; Future  -     Comprehensive Metabolic Panel; Future  -     CBC & Differential; Future  -     Comprehensive Metabolic Panel; Future  -     CBC and Differential; Future  -     Comprehensive metabolic panel; Future  -     Lab Appointment Request; Future  -     Clinic Appointment Request; Future  -     CBC and Differential; Future  -     Lab Appointment Request; Future    2. Chemotherapy-induced neutropenia  Assessment & Plan:  ANC mildly decreased but no febrile or infectious symptoms.  Not enough to require dose adjustment.  Repeat CBC with differential next visit.              Patient Follow Up: 1 month    Patient was given instructions and counseling regarding his condition or for health maintenance advice. Please see specific information pulled into the AVS if appropriate.     Maurilio Campos MD    4/14/2025

## 2025-04-14 NOTE — ASSESSMENT & PLAN NOTE
ANC mildly decreased but no febrile or infectious symptoms.  Not enough to require dose adjustment.  Repeat CBC with differential next visit.

## 2025-04-14 NOTE — ASSESSMENT & PLAN NOTE
Patient recently started on single agent Lonsurf.  He notes some increased fatigue while on the treatment as well as mild nausea but tolerable.  Lab work is notable for mild neutropenia but not enough to require dose adjustment.  Proceed with cycle 2 as planned.  I will see him back for cycle 3-day 1 with lab work prior to monitor for toxicities.  He will have a 2-week lab check in between.

## 2025-04-15 ENCOUNTER — SPECIALTY PHARMACY (OUTPATIENT)
Dept: PHARMACY | Facility: HOSPITAL | Age: 70
End: 2025-04-15
Payer: MEDICARE

## 2025-04-15 NOTE — PROGRESS NOTES
Specialty Pharmacy Patient Management Program  Chart Review/Lab Monitoring     Robbi Kennedy is a 69 y.o. male with CRC (Colorectal Cancer) who presented for lab check and Oncology provider appointment. [facility] reviewed labs and providers note to assess continued therapy appropriateness of Lonsurf (trifluridine-tipiracil)    Oncology Interval History:  Lita Campos  Diagnosis: Stage IIA > IV ( > )  Biomarkers: BRIANNE mutation, NRAS mutation G12D    Current Tx: Lonsurf (trifluridine-tipiracil) 80mg by mouth twice daily on days 1-5 and 8-12 of each 28 day cycle (3/17/25-now)  Most Recent Imagin25 CT CAP POD  Previous Tx: s/p XRT(-19)  s/p lower anterior resection w/ ostomy (19) FOLFOX/Bevacizumab (21-8/15/21)  s/p partial hepatectomy, cholecystectomy, MWA (21)  s/p XRT RML (10/2021)  FOLFOX (21-22)  FOLFIRI/Bevacizumab (23-23)     Fills at: [facility]  Last seen in person by Specialty Pharmacy: 3/6/25    4/14/25: Patient reported some intermittent nausea and fatigue but is otherwise tolerating well.     Labs:  Lab Results   Component Value Date     2025    K 3.6 2025    CO2 27.1 2025     2025    BUN 11 2025    GLUCOSE 95 2025    CALCIUM 9.0 2025    CREATININE 0.69 (L) 2025    EGFRIFAFRI >60 2022    EGFRIFNONA 77 2022    BCR 15.9 2025    ANIONGAP 7.9 2025    AST 16 2025    ALT 14 2025    BILITOT 0.7 2025    ALKPHOS 88 2025     Lab Results   Component Value Date    WBC 2.85 (L) 2025    RBC 3.55 (L) 2025    HGB 12.0 (L) 2025    HCT 36.7 (L) 2025     2025    NEUTROABS 1.38 (L) 2025    LYMPHSABS 0.87 2025    MONOSABS 0.54 2025    EOSABS 0.02 2025    BASOSABS 0.01 2025     PLAN  Specialty pharmacy will continue to follow  patient. Continue with current regimen as planned. Next Specialty Assessment due 9/2/25.    Joyce Garnica PharmD  Oncology Clinical Specialty Pharmacist   4/15/2025 13:08 EDT

## 2025-04-28 ENCOUNTER — HOSPITAL ENCOUNTER (OUTPATIENT)
Dept: ONCOLOGY | Facility: HOSPITAL | Age: 70
Discharge: HOME | End: 2025-04-28
Admitting: INTERNAL MEDICINE
Payer: MEDICARE

## 2025-04-28 ENCOUNTER — PATIENT MESSAGE (OUTPATIENT)
Dept: ONCOLOGY | Facility: HOSPITAL | Age: 70
End: 2025-04-28
Payer: MEDICARE

## 2025-04-28 DIAGNOSIS — Z45.2 ENCOUNTER FOR ADJUSTMENT OR MANAGEMENT OF VASCULAR ACCESS DEVICE: Primary | ICD-10-CM

## 2025-04-28 DIAGNOSIS — C20 RECTAL CANCER: ICD-10-CM

## 2025-04-28 LAB
ALBUMIN SERPL-MCNC: 2.8 G/DL (ref 3.5–5.2)
ALBUMIN/GLOB SERPL: 1.6 G/DL
ALP SERPL-CCNC: 61 U/L (ref 39–117)
ALT SERPL W P-5'-P-CCNC: 9 U/L (ref 1–41)
ANION GAP SERPL CALCULATED.3IONS-SCNC: 9.6 MMOL/L (ref 5–15)
AST SERPL-CCNC: 12 U/L (ref 1–40)
BASOPHILS # BLD AUTO: 0.01 10*3/MM3 (ref 0–0.2)
BASOPHILS NFR BLD AUTO: 0.4 % (ref 0–1.5)
BILIRUB SERPL-MCNC: 0.6 MG/DL (ref 0–1.2)
BUN SERPL-MCNC: 11 MG/DL (ref 8–23)
BUN/CREAT SERPL: 24.4 (ref 7–25)
CALCIUM SPEC-SCNC: 6.4 MG/DL (ref 8.6–10.5)
CHLORIDE SERPL-SCNC: 111 MMOL/L (ref 98–107)
CO2 SERPL-SCNC: 22.4 MMOL/L (ref 22–29)
CREAT SERPL-MCNC: 0.45 MG/DL (ref 0.76–1.27)
DEPRECATED RDW RBC AUTO: 57.8 FL (ref 37–54)
EGFRCR SERPLBLD CKD-EPI 2021: 114.7 ML/MIN/1.73
EOSINOPHIL # BLD AUTO: 0.02 10*3/MM3 (ref 0–0.4)
EOSINOPHIL NFR BLD AUTO: 0.8 % (ref 0.3–6.2)
ERYTHROCYTE [DISTWIDTH] IN BLOOD BY AUTOMATED COUNT: 15.8 % (ref 12.3–15.4)
GLOBULIN UR ELPH-MCNC: 1.7 GM/DL
GLUCOSE SERPL-MCNC: 93 MG/DL (ref 65–99)
HCT VFR BLD AUTO: 31.3 % (ref 37.5–51)
HGB BLD-MCNC: 10.2 G/DL (ref 13–17.7)
IMM GRANULOCYTES # BLD AUTO: 0.02 10*3/MM3 (ref 0–0.05)
IMM GRANULOCYTES NFR BLD AUTO: 0.8 % (ref 0–0.5)
LYMPHOCYTES # BLD AUTO: 0.52 10*3/MM3 (ref 0.7–3.1)
LYMPHOCYTES NFR BLD AUTO: 20.7 % (ref 19.6–45.3)
MCH RBC QN AUTO: 32.9 PG (ref 26.6–33)
MCHC RBC AUTO-ENTMCNC: 32.6 G/DL (ref 31.5–35.7)
MCV RBC AUTO: 101 FL (ref 79–97)
MONOCYTES # BLD AUTO: 0.07 10*3/MM3 (ref 0.1–0.9)
MONOCYTES NFR BLD AUTO: 2.8 % (ref 5–12)
NEUTROPHILS NFR BLD AUTO: 1.87 10*3/MM3 (ref 1.7–7)
NEUTROPHILS NFR BLD AUTO: 74.5 % (ref 42.7–76)
NRBC BLD AUTO-RTO: 0 /100 WBC (ref 0–0.2)
PLATELET # BLD AUTO: 139 10*3/MM3 (ref 140–450)
PMV BLD AUTO: 9.4 FL (ref 6–12)
POTASSIUM SERPL-SCNC: 2.9 MMOL/L (ref 3.5–5.2)
PROT SERPL-MCNC: 4.5 G/DL (ref 6–8.5)
RBC # BLD AUTO: 3.1 10*6/MM3 (ref 4.14–5.8)
SODIUM SERPL-SCNC: 143 MMOL/L (ref 136–145)
WBC NRBC COR # BLD AUTO: 2.51 10*3/MM3 (ref 3.4–10.8)

## 2025-04-28 PROCEDURE — 25010000002 HEPARIN LOCK FLUSH PER 10 UNITS: Performed by: INTERNAL MEDICINE

## 2025-04-28 PROCEDURE — 80053 COMPREHEN METABOLIC PANEL: CPT | Performed by: INTERNAL MEDICINE

## 2025-04-28 PROCEDURE — 85025 COMPLETE CBC W/AUTO DIFF WBC: CPT | Performed by: INTERNAL MEDICINE

## 2025-04-28 PROCEDURE — 36591 DRAW BLOOD OFF VENOUS DEVICE: CPT

## 2025-04-28 RX ORDER — SODIUM CHLORIDE 0.9 % (FLUSH) 0.9 %
20 SYRINGE (ML) INJECTION AS NEEDED
OUTPATIENT
Start: 2025-04-28

## 2025-04-28 RX ORDER — HEPARIN SODIUM (PORCINE) LOCK FLUSH IV SOLN 100 UNIT/ML 100 UNIT/ML
500 SOLUTION INTRAVENOUS AS NEEDED
Status: DISCONTINUED | OUTPATIENT
Start: 2025-04-28 | End: 2025-04-29 | Stop reason: HOSPADM

## 2025-04-28 RX ORDER — SODIUM CHLORIDE 0.9 % (FLUSH) 0.9 %
20 SYRINGE (ML) INJECTION AS NEEDED
Status: DISCONTINUED | OUTPATIENT
Start: 2025-04-28 | End: 2025-04-29 | Stop reason: HOSPADM

## 2025-04-28 RX ORDER — HEPARIN SODIUM (PORCINE) LOCK FLUSH IV SOLN 100 UNIT/ML 100 UNIT/ML
500 SOLUTION INTRAVENOUS AS NEEDED
OUTPATIENT
Start: 2025-04-28

## 2025-04-28 RX ADMIN — HEPARIN 500 UNITS: 100 SYRINGE at 13:35

## 2025-04-28 RX ADMIN — Medication 20 ML: at 13:34

## 2025-04-28 NOTE — ADDENDUM NOTE
Encounter addended by: Alexandrea Alcazar RN on: 4/28/2025 3:30 PM   Actions taken: Flowsheet accepted

## 2025-05-01 ENCOUNTER — SPECIALTY PHARMACY (OUTPATIENT)
Dept: PHARMACY | Facility: HOSPITAL | Age: 70
End: 2025-05-01
Payer: MEDICARE

## 2025-05-01 DIAGNOSIS — C20 RECTAL CANCER: ICD-10-CM

## 2025-05-01 NOTE — PROGRESS NOTES
" Specialty Pharmacy Patient Management Program  Oncology Refill Outreach      Robbi \"ALPESH\" is a 69 y.o. male contacted today regarding refills of his medication(s). [facility] will ship medications when ready to be dispensed. [facility] will fill for this pt after this fill    Specialty medication(s) and dose(s) confirmed: Y  Other medications being refilled: N    Refill Questions      Flowsheet Row Most Recent Value   Changes to allergies? No   Changes to medications? No   New conditions or infections since last clinic visit No   Unplanned office visit, urgent care, ED, or hospital admission in the last 4 weeks  No   How does patient/caregiver feel medication is working? Very good   Financial problems or insurance changes  No   Since the previous refill, were any specialty medication doses or scheduled injections missed or delayed?  No   Does this patient require a clinical escalation to a pharmacist? No            Delivery Questions      Flowsheet Row Most Recent Value   Delivery method UPS   Delivery address verified with patient/caregiver? Yes   Delivery address Home   Number of medications in delivery 1   Medication(s) being filled and delivered Trifluridine-Tipiracil (LONSURF)   Doses left of specialty medications on his off week   Copay verified? Yes   Copay amount $43.00   Copay form of payment Credit/debit on file   Delivery Date Selection 05/02/25   Signature Required No   Do you consent to receive electronic handouts?  No                   Follow-up: 21 DAYS      Shobha Álvarez  Oncology Care Coordinator  5/1/2025  11:22 EDT        "

## 2025-05-05 ENCOUNTER — SPECIALTY PHARMACY (OUTPATIENT)
Dept: PHARMACY | Facility: HOSPITAL | Age: 70
End: 2025-05-05
Payer: MEDICARE

## 2025-05-05 DIAGNOSIS — C20 RECTAL CANCER: ICD-10-CM

## 2025-05-05 NOTE — PROGRESS NOTES
Specialty Pharmacy Patient Management Program  Per Protocol Prescription Order or Refill     Patient will be filling or currently fills medications at [facility] and is enrolled in the Patient Management Program.    Requested Prescriptions     Pending Prescriptions Disp Refills    trifluridine-tipiracil (LONSURF) 20-8.19 MG per tablet 80 tablet 11     Sig: Take 4 tablets by mouth Daily With Breakfast & Dinner. Take 4 tabs with Breakfast and 4 with Dinner Day 1-5 and 8-12 every 28 days.     Prescription orders above were sent to the pharmacy per Collaborative Care Agreement Protocol.     Last Office Visit: 4/14/25  Next Office Visit: 5/20/25    Drug-Drug Interactions: The current medication list was reviewed and there are no relevant drug-drug interactions with the specialty medication.  Medication Allergies: The patient has NKDA  Review of Labs/Dose Adjustments: NO DOSE CHANGE - I reviewed the most recent note and labs and the patient will continue without any dose changes.  I sent refills as described below.    Of note, patient insurance restricting patient to [facility] at this time. Despite us filling this medication since it was prescribed.     Joyce Garnica, PharmD  Oncology Clinical Specialty Pharmacist   5/5/2025  07:54 EDT

## 2025-05-16 ENCOUNTER — APPOINTMENT (OUTPATIENT)
Dept: ONCOLOGY | Facility: HOSPITAL | Age: 70
End: 2025-05-16
Payer: MEDICARE

## 2025-05-16 ENCOUNTER — HOSPITAL ENCOUNTER (OUTPATIENT)
Dept: ONCOLOGY | Facility: HOSPITAL | Age: 70
Discharge: HOME | End: 2025-05-16
Payer: MEDICARE

## 2025-05-16 DIAGNOSIS — C20 RECTAL CANCER: ICD-10-CM

## 2025-05-16 DIAGNOSIS — Z45.2 ENCOUNTER FOR ADJUSTMENT OR MANAGEMENT OF VASCULAR ACCESS DEVICE: Primary | ICD-10-CM

## 2025-05-16 LAB
ALBUMIN SERPL-MCNC: 3.6 G/DL (ref 3.5–5.2)
ALBUMIN/GLOB SERPL: 1.5 G/DL
ALP SERPL-CCNC: 78 U/L (ref 39–117)
ALT SERPL W P-5'-P-CCNC: 10 U/L (ref 1–41)
ANION GAP SERPL CALCULATED.3IONS-SCNC: 9.1 MMOL/L (ref 5–15)
ANISOCYTOSIS BLD QL: NORMAL
AST SERPL-CCNC: 15 U/L (ref 1–40)
BASOPHILS # BLD AUTO: 0.01 10*3/MM3 (ref 0–0.2)
BASOPHILS NFR BLD AUTO: 0.3 % (ref 0–1.5)
BILIRUB SERPL-MCNC: 0.8 MG/DL (ref 0–1.2)
BUN SERPL-MCNC: 10 MG/DL (ref 8–23)
BUN/CREAT SERPL: 14.7 (ref 7–25)
CALCIUM SPEC-SCNC: 8.5 MG/DL (ref 8.6–10.5)
CHLORIDE SERPL-SCNC: 104 MMOL/L (ref 98–107)
CO2 SERPL-SCNC: 25.9 MMOL/L (ref 22–29)
CREAT SERPL-MCNC: 0.68 MG/DL (ref 0.76–1.27)
DEPRECATED RDW RBC AUTO: 71.9 FL (ref 37–54)
EGFRCR SERPLBLD CKD-EPI 2021: 101.2 ML/MIN/1.73
EOSINOPHIL # BLD AUTO: 0.01 10*3/MM3 (ref 0–0.4)
EOSINOPHIL NFR BLD AUTO: 0.3 % (ref 0.3–6.2)
ERYTHROCYTE [DISTWIDTH] IN BLOOD BY AUTOMATED COUNT: 18.8 % (ref 12.3–15.4)
GLOBULIN UR ELPH-MCNC: 2.4 GM/DL
GLUCOSE SERPL-MCNC: 115 MG/DL (ref 65–99)
HCT VFR BLD AUTO: 33.5 % (ref 37.5–51)
HGB BLD-MCNC: 10.8 G/DL (ref 13–17.7)
HYPOCHROMIA BLD QL: NORMAL
IMM GRANULOCYTES # BLD AUTO: 0.02 10*3/MM3 (ref 0–0.05)
IMM GRANULOCYTES NFR BLD AUTO: 0.7 % (ref 0–0.5)
LARGE PLATELETS: NORMAL
LYMPHOCYTES # BLD AUTO: 0.74 10*3/MM3 (ref 0.7–3.1)
LYMPHOCYTES NFR BLD AUTO: 24.2 % (ref 19.6–45.3)
MCH RBC QN AUTO: 34 PG (ref 26.6–33)
MCHC RBC AUTO-ENTMCNC: 32.2 G/DL (ref 31.5–35.7)
MCV RBC AUTO: 105.3 FL (ref 79–97)
MONOCYTES # BLD AUTO: 0.27 10*3/MM3 (ref 0.1–0.9)
MONOCYTES NFR BLD AUTO: 8.8 % (ref 5–12)
NEUTROPHILS NFR BLD AUTO: 2.01 10*3/MM3 (ref 1.7–7)
NEUTROPHILS NFR BLD AUTO: 65.7 % (ref 42.7–76)
NRBC BLD AUTO-RTO: 0.7 /100 WBC (ref 0–0.2)
OVALOCYTES BLD QL SMEAR: NORMAL
PLATELET # BLD AUTO: 210 10*3/MM3 (ref 140–450)
PMV BLD AUTO: 8.3 FL (ref 6–12)
POIKILOCYTOSIS BLD QL SMEAR: NORMAL
POTASSIUM SERPL-SCNC: 3.7 MMOL/L (ref 3.5–5.2)
PROT SERPL-MCNC: 6 G/DL (ref 6–8.5)
RBC # BLD AUTO: 3.18 10*6/MM3 (ref 4.14–5.8)
SMALL PLATELETS BLD QL SMEAR: ADEQUATE
SODIUM SERPL-SCNC: 139 MMOL/L (ref 136–145)
WBC MORPH BLD: NORMAL
WBC NRBC COR # BLD AUTO: 3.06 10*3/MM3 (ref 3.4–10.8)

## 2025-05-16 PROCEDURE — 85025 COMPLETE CBC W/AUTO DIFF WBC: CPT | Performed by: INTERNAL MEDICINE

## 2025-05-16 PROCEDURE — 85007 BL SMEAR W/DIFF WBC COUNT: CPT | Performed by: INTERNAL MEDICINE

## 2025-05-16 PROCEDURE — 25010000002 HEPARIN LOCK FLUSH PER 10 UNITS: Performed by: INTERNAL MEDICINE

## 2025-05-16 PROCEDURE — 36591 DRAW BLOOD OFF VENOUS DEVICE: CPT

## 2025-05-16 PROCEDURE — 80053 COMPREHEN METABOLIC PANEL: CPT | Performed by: INTERNAL MEDICINE

## 2025-05-16 RX ORDER — SODIUM CHLORIDE 0.9 % (FLUSH) 0.9 %
20 SYRINGE (ML) INJECTION AS NEEDED
Status: DISCONTINUED | OUTPATIENT
Start: 2025-05-16 | End: 2025-05-17 | Stop reason: HOSPADM

## 2025-05-16 RX ORDER — HEPARIN SODIUM (PORCINE) LOCK FLUSH IV SOLN 100 UNIT/ML 100 UNIT/ML
500 SOLUTION INTRAVENOUS AS NEEDED
Status: DISCONTINUED | OUTPATIENT
Start: 2025-05-16 | End: 2025-05-17 | Stop reason: HOSPADM

## 2025-05-16 RX ORDER — HEPARIN SODIUM (PORCINE) LOCK FLUSH IV SOLN 100 UNIT/ML 100 UNIT/ML
500 SOLUTION INTRAVENOUS AS NEEDED
OUTPATIENT
Start: 2025-05-16

## 2025-05-16 RX ORDER — SODIUM CHLORIDE 0.9 % (FLUSH) 0.9 %
20 SYRINGE (ML) INJECTION AS NEEDED
OUTPATIENT
Start: 2025-05-16

## 2025-05-16 RX ADMIN — HEPARIN 500 UNITS: 100 SYRINGE at 13:29

## 2025-05-16 RX ADMIN — Medication 20 ML: at 13:29

## 2025-05-20 ENCOUNTER — OFFICE VISIT (OUTPATIENT)
Dept: ONCOLOGY | Facility: HOSPITAL | Age: 70
End: 2025-05-20
Payer: MEDICARE

## 2025-05-20 VITALS
HEIGHT: 76 IN | TEMPERATURE: 97.8 F | BODY MASS INDEX: 27.91 KG/M2 | HEART RATE: 86 BPM | SYSTOLIC BLOOD PRESSURE: 121 MMHG | RESPIRATION RATE: 18 BRPM | OXYGEN SATURATION: 98 % | WEIGHT: 229.2 LBS | DIASTOLIC BLOOD PRESSURE: 76 MMHG

## 2025-05-20 DIAGNOSIS — C20 RECTAL CANCER: Primary | ICD-10-CM

## 2025-05-20 DIAGNOSIS — M54.9 BACK PAIN, UNSPECIFIED BACK LOCATION, UNSPECIFIED BACK PAIN LATERALITY, UNSPECIFIED CHRONICITY: ICD-10-CM

## 2025-05-20 DIAGNOSIS — F32.A DEPRESSION, UNSPECIFIED DEPRESSION TYPE: ICD-10-CM

## 2025-05-20 PROCEDURE — G0463 HOSPITAL OUTPT CLINIC VISIT: HCPCS | Performed by: INTERNAL MEDICINE

## 2025-05-20 RX ORDER — DULOXETIN HYDROCHLORIDE 30 MG/1
30 CAPSULE, DELAYED RELEASE ORAL DAILY
Qty: 30 CAPSULE | Refills: 2 | Status: SHIPPED | OUTPATIENT
Start: 2025-05-20

## 2025-05-20 NOTE — PROGRESS NOTES
Chief Complaint  Rectal Cancer    Eri Moran APRN Reinberg, Emily, APRN Subjective          Robbi Kennedy presents to [facility] for treatment of his metastatic rectal cancer.  He status post multiple lines of therapy as outlined below.  He is currently on Lonsurf.  Tolerating well.  He denies nausea, vomiting or diarrhea.  He continues to have back pain and neuropathy in his feet.  Cymbalta does help some but incompletely.  He notes adequate appetite.    Oncology/Hematology History Overview Note   9/30/2019 High rectal gfny-rwtueileeh-rmnrrfahmsymdc adenocarcinoma  associated with tubular adenoma.   4/27/21 Biopsy of liver revealed metastatic adenocarcinoma, consistent with colorectal primary.    Clinical Staging  Stage IIa ([id number])    Chemotherapy      neoadjuvant Xeloda with radiation. adjuvant xeloda        Radiation Therapy      7/8/19 thru 8/14/19 completed neoadjuvant 5040 cGy=28 fxs rectum     10/2021 XRT due to recurrence right middle lung 5 Fx's, 5000cGy  11/26/21 adjuvant FOLFOX -2/25/2022    Surgeries       9/30/2019 low anterior resection with ostomy        8/31/21 partial hepatectomy, cholecystectomy and MWA for segment 6 medical margin performed at     2/8/2023 CT Scans revealed Worsening pulmonary metastatic disease.    2/21/2023 orders written for FOLFIRI + AVASTIN       Rectal cancer   6/2/2021 - 8/15/2021 Chemotherapy    OP COLON mFOLFOX6 + Bevacizumab (OXALIplatin / Leucovorin / Fluorouracil / Bevacizumab)     6/18/2021 Initial Diagnosis    Rectal cancer (CMS/HCC)     6/21/2021 Cancer Staged    Staging form: Colon And Rectum, AJCC 8th Edition  - Clinical: Stage IIA (cT3, cN0, cM0) - Signed by Maurilio Campos MD on 6/21/2021 11/16/2021 - 2/25/2022 Chemotherapy    OP COLON mFOLFOX6 OXALIplatin / Leucovorin / Fluorouracil     2/28/2023 - 8/24/2023 Chemotherapy    OP COLORECTAL FOLFIRI + Bevacizumab 5mg/kg (Irinotecan / Leucovorin /  Fluorouracil / Bevacizumab)     3/13/2024 - 3/13/2024 Biopsy    OP COLORECTAL Fruquintinib  Plan Provider: Maurilio Campos MD  Treatment goal: Palliative  Line of treatment: [No plan line of treatment]     3/17/2025 Biopsy    OP COLON Trifluidine / Tipiracil  Plan Provider: Maurilio Campos MD  Treatment goal: Palliative  Line of treatment: [No plan line of treatment]     Secondary malignant neoplasm of left lung   9/14/2021 Initial Diagnosis    Secondary malignant neoplasm of left lung (HCC)     10/20/2021 -  Radiation    RADIATION THERAPY Treatment Details (Noted on 10/5/2021)  Site: Bilateral Lung  Technique: SBRT  Goal: No goal specified  Planned Treatment Start Date: 10/20/2021     11/16/2021 - 2/25/2022 Chemotherapy    OP COLON mFOLFOX6 OXALIplatin / Leucovorin / Fluorouracil     6/15/2022 -  Radiation    RADIATION THERAPY Treatment Details (Noted on 6/15/2022)  Site: Left Lung - Lower lobe  Technique: SBRT  Goal: No goal specified  Planned Treatment Start Date: No planned start date specified     10/25/2022 -  Radiation    RADIATION THERAPY Treatment Details (Noted on 10/25/2022)  Site: Left Lung  Technique: SBRT  Goal: No goal specified  Planned Treatment Start Date: No planned start date specified     Secondary malignancy of liver   9/14/2021 Initial Diagnosis    Secondary malignancy of liver (HCC)     11/16/2021 - 2/25/2022 Chemotherapy    OP COLON mFOLFOX6 OXALIplatin / Leucovorin / Fluorouracil     Secondary malignant neoplasm of right lung   10/5/2021 Initial Diagnosis    Secondary malignant neoplasm of right lung (HCC)     10/20/2021 -  Radiation    RADIATION THERAPY Treatment Details (Noted on 10/5/2021)  Site: Bilateral Lung  Technique: SBRT  Goal: No goal specified  Planned Treatment Start Date: 10/20/2021     11/16/2021 - 2/25/2022 Chemotherapy    OP COLON mFOLFOX6 OXALIplatin / Leucovorin / Fluorouracil           Current Outpatient Medications on File Prior to Visit   Medication Sig Dispense  Refill    acetaminophen (TYLENOL) 500 MG tablet Take 1 tablet by mouth Every 6 (Six) Hours As Needed.      apixaban (ELIQUIS) 5 MG tablet tablet Take 1 tablet by mouth 2 (Two) Times a Day.      ascorbic acid (VITAMIN C) 500 MG tablet Take 1 tablet by mouth Daily.      atorvastatin (LIPITOR) 40 MG tablet atorvastatin 40 mg oral tablet take 1 tablet (40 mg) by oral route once daily   Active      carvedilol (COREG) 12.5 MG tablet Take 1 tablet by mouth 2 (Two) Times a Day. 180 tablet 3    cyanocobalamin (VITAMIN B-12) 1000 MCG tablet Take 1 tablet by mouth Daily.      Diclofenac Sodium (VOLTAREN) 1 % gel gel Apply 4 g topically to the appropriate area as directed 4 (Four) Times a Day As Needed (as needed). 100 g 1    diphenoxylate-atropine (LOMOTIL) 2.5-0.025 MG per tablet Take 1 tablet by mouth 4 (Four) Times a Day As Needed for Diarrhea. 120 tablet 0    furosemide (LASIX) 40 MG tablet Take 1 tablet by mouth Daily. 90 tablet 3    levocetirizine (XYZAL) 5 MG tablet Take 1 tablet by mouth Daily.      levothyroxine (SYNTHROID, LEVOTHROID) 75 MCG tablet Take 1 tablet by mouth Daily.      MAGIC MOUTHWASH W/NYSTATIN 1/1/1/1 (lidocaine - diphenhydrAMINE HCl - aluminum & magnesium hydroxide - nystatin) Swish and swallow 10 mL 4 (Four) Times a Day As Needed for Mouth Pain. 500 mL 1    multivitamin (THERAGRAN) tablet tablet Take 1 tablet by mouth Daily.      ondansetron (ZOFRAN) 8 MG tablet Take 1 tablet by mouth 3 (Three) Times a Day As Needed for Nausea or Vomiting. 30 tablet 5    psyllium (METAMUCIL) 58.6 % packet Take 1 packet by mouth Daily.      trifluridine-tipiracil (LONSURF) 20-8.19 MG per tablet Take 4 tablets by mouth Daily With Breakfast & Dinner. Take 4 tabs with Breakfast and 4 with Dinner Day 1-5 and 8-12 every 28 days. 80 tablet 11    verapamil SR (CALAN-SR) 180 MG CR tablet Take 1 tablet by mouth Every Night. 90 tablet 3    vitamin E 1000 UNIT capsule Take 1 capsule by mouth Daily.      [DISCONTINUED]  DULoxetine (CYMBALTA) 20 MG capsule Take 1 capsule by mouth Daily. 30 capsule 2     No current facility-administered medications on file prior to visit.       No Known Allergies  Past Medical History:   Diagnosis Date    Arrhythmia     Asthma     Atrial fibrillation, persistent 02/26/2021    Chronic heart failure with preserved ejection fraction 07/18/2022    BNP 1590 on 7/22 Echocardiogram with EF 55% moderate to severe biatrial enlargement and mild right ventricular enlargement 7/22     Colorectal cancer     Coronary artery disease     Deep vein thrombosis     Essential hypertension     GI cancer     Hepatitis     History of DVT of lower extremity 08/27/2021    Hyperlipidemia LDL goal <100 12/31/2020    Rectal cancer 06/18/2021    Secondary malignancy of liver 09/14/2021    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    Secondary malignant neoplasm of right lung 10/05/2021    Thrombocytopenia 01/11/2022    Thyroid disease     Viral upper respiratory tract infection 04/24/2023     Past Surgical History:   Procedure Laterality Date    COLON SURGERY      HERNIA REPAIR      LIVER BIOPSY      4/27/21 Biopsy of liver revealed metastatic adenocarcinoma, consistent with colorectal primary    OTHER SURGICAL HISTORY      REMOVED CANCER FROM COLON     Social History     Socioeconomic History    Marital status:     Number of children: 2   Tobacco Use    Smoking status: Never    Smokeless tobacco: Never   Vaping Use    Vaping status: Never Used   Substance and Sexual Activity    Alcohol use: Not Currently     Alcohol/week: 3.0 standard drinks of alcohol     Types: 3 Cans of beer per week     Comment: 2-3 beers weekly    Drug use: Never    Sexual activity: Not Currently     Partners: Female     Birth control/protection: Vasectomy     Family History   Problem Relation Age of Onset    Prostate cancer Father     Heart murmur Mother     Diabetes Mother     Hypertension Mother     Colon cancer Maternal Uncle      "Hypertension Sister     Hypertension Sister        Objective   Physical Exam  Vitals reviewed. Exam conducted with a chaperone present.   Cardiovascular:      Rate and Rhythm: Normal rate and regular rhythm.      Heart sounds: Normal heart sounds. No murmur heard.     No gallop.   Pulmonary:      Breath sounds: Normal breath sounds.   Abdominal:      General: Bowel sounds are normal.   Lymphadenopathy:      Cervical: No cervical adenopathy.   Psychiatric:         Mood and Affect: Mood normal.         Behavior: Behavior normal.         Vitals:    05/20/25 1313   BP: 121/76   Pulse: 86   Resp: 18   Temp: 97.8 °F (36.6 °C)   TempSrc: Temporal   SpO2: 98%   Weight: 104 kg (229 lb 3.2 oz)   Height: 193 cm (75.98\")   PainSc: 6    PainLoc: Leg  Comment: right     ECOG score: 0         PHQ-9 Total Score:                    Result Review :   The following data was reviewed by: Maurilio Campos MD on 05/20/2025:  Lab Results   Component Value Date    HGB 10.8 (L) 05/16/2025    HCT 33.5 (L) 05/16/2025    .3 (H) 05/16/2025     05/16/2025    WBC 3.06 (L) 05/16/2025    NEUTROABS 2.01 05/16/2025    LYMPHSABS 0.74 05/16/2025    MONOSABS 0.27 05/16/2025    EOSABS 0.01 05/16/2025    BASOSABS 0.01 05/16/2025     Lab Results   Component Value Date    GLUCOSE 115 (H) 05/16/2025    BUN 10 05/16/2025    CREATININE 0.68 (L) 05/16/2025     05/16/2025    K 3.7 05/16/2025     05/16/2025    CO2 25.9 05/16/2025    CALCIUM 8.5 (L) 05/16/2025    PROTEINTOT 6.0 05/16/2025    ALBUMIN 3.6 05/16/2025    BILITOT 0.8 05/16/2025    ALKPHOS 78 05/16/2025    AST 15 05/16/2025    ALT 10 05/16/2025     Lab Results   Component Value Date    MG 1.8 (L) 09/07/2021    FREET4 0.74 07/17/2024    TSH 37.788 (H) 10/14/2024     Lab Results   Component Value Date    IRON 60 (L) 03/04/2020    LABIRON 20 03/04/2020    TRANSFERRIN 215.00 03/04/2020    TIBC 307 03/04/2020     Lab Results   Component Value Date    FERRITIN 140 03/04/2020    " [id number] >2000 (H) 03/04/2020    FOLATE 19.4 03/04/2020     Lab Results   Component Value Date    PSA 0.92 12/12/2023    CEA 22.70 02/28/2025             Assessment and Plan    Diagnoses and all orders for this visit:    1. Rectal cancer (Primary)  Assessment & Plan:  Patient is on Lonsurf.  Tolerating well.  Lab work today demonstrates mild cytopenias from the regimen but not enough for dose adjustment.  Proceed with cycle 3 as planned.  I will see him back in 1 month for ongoing treatment lab work prior to monitor for toxicities and restaging scans to assess response to therapy.    Orders:  -     CBC & Differential; Future  -     Comprehensive Metabolic Panel; Future  -     CEA; Future  -     CT chest w contrast; Future  -     CT abdomen pelvis w contrast; Future    2. Depression, unspecified depression type  -     DULoxetine (CYMBALTA) 30 MG capsule; Take 1 capsule by mouth Daily.  Dispense: 30 capsule; Refill: 2    3. Back pain, unspecified back location, unspecified back pain laterality, unspecified chronicity  Assessment & Plan:  Increase Cymbalta to 30 mg daily.    Orders:  -     DULoxetine (CYMBALTA) 30 MG capsule; Take 1 capsule by mouth Daily.  Dispense: 30 capsule; Refill: 2            Patient Follow Up: 1 month    Patient was given instructions and counseling regarding his condition or for health maintenance advice. Please see specific information pulled into the AVS if appropriate.     Maurilio Campos MD    5/20/2025

## 2025-05-20 NOTE — ASSESSMENT & PLAN NOTE
Patient is on Lonsurf.  Tolerating well.  Lab work today demonstrates mild cytopenias from the regimen but not enough for dose adjustment.  Proceed with cycle 3 as planned.  I will see him back in 1 month for ongoing treatment lab work prior to monitor for toxicities and restaging scans to assess response to therapy.

## 2025-05-21 ENCOUNTER — SPECIALTY PHARMACY (OUTPATIENT)
Dept: PHARMACY | Facility: HOSPITAL | Age: 70
End: 2025-05-21
Payer: MEDICARE

## 2025-05-21 NOTE — PROGRESS NOTES
Specialty Pharmacy Patient Management Program  Chart Review/Lab Monitoring     Robbi Kennedy is a 69 y.o. male with CRC (Colorectal Cancer) who presented for lab check and Oncology provider appointment. [facility] reviewed labs and providers note to assess continued therapy appropriateness of Lonsurf (trifluridine-tipiracil)    Oncology Interval History:  Lita Campos  Diagnosis: Stage IIA > IV ( > )  Biomarkers: BRIANNE mutation, NRAS mutation [id number]    Current Tx: Lonsurf (trifluridine-tipiracil) 80mg by mouth twice daily on days 1-5 and 8-12 of each 28 day cycle (3/17/25-now)  Most Recent Imagin25 CT CAP POD  Previous Tx: s/p XRT(-19)  s/p lower anterior resection w/ ostomy (19) FOLFOX/Bevacizumab (21-8/15/21)  s/p partial hepatectomy, cholecystectomy, MWA (21)  s/p XRT RML (10/2021)  FOLFOX (21-22)  FOLFIRI/Bevacizumab (23-23)     Fills at: [facility]  Last seen in person by Specialty Pharmacy: 3/6/25    5/21/25: Robbi Kennedy is tolerating Lonsurf well. Patient insurance requires patient to fill with [facility] now versus with [facility] as he had been. No changes to current regimen - labs reviewed and appropriate.    Labs:  Lab Results   Component Value Date     2025    K 3.7 2025    CO2 25.9 2025     2025    BUN 10 2025    GLUCOSE 115 (H) 2025    CALCIUM 8.5 (L) 2025    CREATININE 0.68 (L) 2025    EGFRIFAFRI >60 2022    EGFRIFNONA 77 2022    BCR 14.7 2025    ANIONGAP 9.1 2025    AST 15 2025    ALT 10 2025    BILITOT 0.8 2025    ALKPHOS 78 2025     Lab Results   Component Value Date    WBC 3.06 (L) 2025    RBC 3.18 (L) 2025    HGB 10.8 (L) 2025    HCT 33.5 (L) 2025     2025    NEUTROABS 2.01 2025    LYMPHSABS 0.74 2025    MONOSABS 0.27 2025    EOSABS 0.01 2025    BASOSABS  0.01 05/16/2025     PLAN  Specialty pharmacy will continue to follow patient. Continue with current regimen as planned.     Joyce Garnica PharmD  Oncology Clinical Specialty Pharmacist   5/21/2025 08:41 EDT

## 2025-06-13 ENCOUNTER — HOSPITAL ENCOUNTER (OUTPATIENT)
Dept: CT IMAGING | Facility: HOSPITAL | Age: 70
Discharge: HOME | End: 2025-06-13
Payer: MEDICARE

## 2025-06-13 DIAGNOSIS — C20 RECTAL CANCER: ICD-10-CM

## 2025-06-13 PROCEDURE — 25510000001 IOPAMIDOL PER 1 ML: Performed by: INTERNAL MEDICINE

## 2025-06-13 PROCEDURE — 74177 CT ABD & PELVIS W/CONTRAST: CPT

## 2025-06-13 PROCEDURE — 71260 CT THORAX DX C+: CPT

## 2025-06-13 RX ORDER — IOPAMIDOL 755 MG/ML
100 INJECTION, SOLUTION INTRAVASCULAR
Status: COMPLETED | OUTPATIENT
Start: 2025-06-13 | End: 2025-06-13

## 2025-06-13 RX ADMIN — IOPAMIDOL 100 ML: 755 INJECTION, SOLUTION INTRAVENOUS at 10:58

## 2025-06-16 ENCOUNTER — HOSPITAL ENCOUNTER (OUTPATIENT)
Dept: ONCOLOGY | Facility: HOSPITAL | Age: 70
Discharge: HOME | End: 2025-06-16
Admitting: INTERNAL MEDICINE
Payer: MEDICARE

## 2025-06-16 DIAGNOSIS — Z45.2 ENCOUNTER FOR ADJUSTMENT OR MANAGEMENT OF VASCULAR ACCESS DEVICE: Primary | ICD-10-CM

## 2025-06-16 DIAGNOSIS — C20 RECTAL CANCER: ICD-10-CM

## 2025-06-16 LAB
ALBUMIN SERPL-MCNC: 3.9 G/DL (ref 3.5–5.2)
ALBUMIN/GLOB SERPL: 1.7 G/DL
ALP SERPL-CCNC: 80 U/L (ref 39–117)
ALT SERPL W P-5'-P-CCNC: 12 U/L (ref 1–41)
ANION GAP SERPL CALCULATED.3IONS-SCNC: 10.4 MMOL/L (ref 5–15)
ANISOCYTOSIS BLD QL: NORMAL
AST SERPL-CCNC: 16 U/L (ref 1–40)
BASOPHILS # BLD AUTO: 0.01 10*3/MM3 (ref 0–0.2)
BASOPHILS NFR BLD AUTO: 0.3 % (ref 0–1.5)
BILIRUB SERPL-MCNC: 0.9 MG/DL (ref 0–1.2)
BUN SERPL-MCNC: 10.3 MG/DL (ref 8–23)
BUN/CREAT SERPL: 16.3 (ref 7–25)
CALCIUM SPEC-SCNC: 8.5 MG/DL (ref 8.6–10.5)
CEA SERPL-MCNC: 25.7 NG/ML
CHLORIDE SERPL-SCNC: 102 MMOL/L (ref 98–107)
CO2 SERPL-SCNC: 25.6 MMOL/L (ref 22–29)
CREAT SERPL-MCNC: 0.63 MG/DL (ref 0.76–1.27)
DEPRECATED RDW RBC AUTO: 76.9 FL (ref 37–54)
EGFRCR SERPLBLD CKD-EPI 2021: 103 ML/MIN/1.73
EOSINOPHIL # BLD AUTO: 0.01 10*3/MM3 (ref 0–0.4)
EOSINOPHIL NFR BLD AUTO: 0.3 % (ref 0.3–6.2)
ERYTHROCYTE [DISTWIDTH] IN BLOOD BY AUTOMATED COUNT: 19.1 % (ref 12.3–15.4)
GLOBULIN UR ELPH-MCNC: 2.3 GM/DL
GLUCOSE SERPL-MCNC: 73 MG/DL (ref 65–99)
HCT VFR BLD AUTO: 31.9 % (ref 37.5–51)
HGB BLD-MCNC: 10.3 G/DL (ref 13–17.7)
IMM GRANULOCYTES # BLD AUTO: 0.01 10*3/MM3 (ref 0–0.05)
IMM GRANULOCYTES NFR BLD AUTO: 0.3 % (ref 0–0.5)
LYMPHOCYTES # BLD AUTO: 0.92 10*3/MM3 (ref 0.7–3.1)
LYMPHOCYTES NFR BLD AUTO: 30.1 % (ref 19.6–45.3)
MACROCYTES BLD QL SMEAR: NORMAL
MCH RBC QN AUTO: 35 PG (ref 26.6–33)
MCHC RBC AUTO-ENTMCNC: 32.3 G/DL (ref 31.5–35.7)
MCV RBC AUTO: 108.5 FL (ref 79–97)
MONOCYTES # BLD AUTO: 0.17 10*3/MM3 (ref 0.1–0.9)
MONOCYTES NFR BLD AUTO: 5.6 % (ref 5–12)
NEUTROPHILS NFR BLD AUTO: 1.94 10*3/MM3 (ref 1.7–7)
NEUTROPHILS NFR BLD AUTO: 63.4 % (ref 42.7–76)
NRBC BLD AUTO-RTO: 0 /100 WBC (ref 0–0.2)
PLATELET # BLD AUTO: 181 10*3/MM3 (ref 140–450)
PMV BLD AUTO: 9 FL (ref 6–12)
POTASSIUM SERPL-SCNC: 3.9 MMOL/L (ref 3.5–5.2)
PROT SERPL-MCNC: 6.2 G/DL (ref 6–8.5)
RBC # BLD AUTO: 2.94 10*6/MM3 (ref 4.14–5.8)
SMALL PLATELETS BLD QL SMEAR: ADEQUATE
SODIUM SERPL-SCNC: 138 MMOL/L (ref 136–145)
WBC MORPH BLD: NORMAL
WBC NRBC COR # BLD AUTO: 3.06 10*3/MM3 (ref 3.4–10.8)

## 2025-06-16 PROCEDURE — 80053 COMPREHEN METABOLIC PANEL: CPT | Performed by: INTERNAL MEDICINE

## 2025-06-16 PROCEDURE — 36591 DRAW BLOOD OFF VENOUS DEVICE: CPT

## 2025-06-16 PROCEDURE — 25010000002 HEPARIN LOCK FLUSH PER 10 UNITS: Performed by: INTERNAL MEDICINE

## 2025-06-16 PROCEDURE — 85025 COMPLETE CBC W/AUTO DIFF WBC: CPT | Performed by: INTERNAL MEDICINE

## 2025-06-16 PROCEDURE — 82378 CARCINOEMBRYONIC ANTIGEN: CPT | Performed by: INTERNAL MEDICINE

## 2025-06-16 PROCEDURE — 85007 BL SMEAR W/DIFF WBC COUNT: CPT | Performed by: INTERNAL MEDICINE

## 2025-06-16 RX ORDER — HEPARIN SODIUM (PORCINE) LOCK FLUSH IV SOLN 100 UNIT/ML 100 UNIT/ML
500 SOLUTION INTRAVENOUS AS NEEDED
Status: DISCONTINUED | OUTPATIENT
Start: 2025-06-16 | End: 2025-06-17 | Stop reason: HOSPADM

## 2025-06-16 RX ORDER — SODIUM CHLORIDE 0.9 % (FLUSH) 0.9 %
20 SYRINGE (ML) INJECTION AS NEEDED
OUTPATIENT
Start: 2025-06-16

## 2025-06-16 RX ORDER — SODIUM CHLORIDE 0.9 % (FLUSH) 0.9 %
20 SYRINGE (ML) INJECTION AS NEEDED
Status: DISCONTINUED | OUTPATIENT
Start: 2025-06-16 | End: 2025-06-17 | Stop reason: HOSPADM

## 2025-06-16 RX ORDER — HEPARIN SODIUM (PORCINE) LOCK FLUSH IV SOLN 100 UNIT/ML 100 UNIT/ML
500 SOLUTION INTRAVENOUS AS NEEDED
OUTPATIENT
Start: 2025-06-16

## 2025-06-16 RX ADMIN — HEPARIN 500 UNITS: 100 SYRINGE at 13:09

## 2025-06-16 RX ADMIN — Medication 20 ML: at 13:09

## 2025-06-18 ENCOUNTER — OFFICE VISIT (OUTPATIENT)
Dept: ONCOLOGY | Facility: HOSPITAL | Age: 70
End: 2025-06-18
Payer: MEDICARE

## 2025-06-18 VITALS
HEART RATE: 71 BPM | HEIGHT: 76 IN | DIASTOLIC BLOOD PRESSURE: 76 MMHG | OXYGEN SATURATION: 98 % | WEIGHT: 236.11 LBS | TEMPERATURE: 97.1 F | RESPIRATION RATE: 18 BRPM | SYSTOLIC BLOOD PRESSURE: 114 MMHG | BODY MASS INDEX: 28.75 KG/M2

## 2025-06-18 DIAGNOSIS — C20 RECTAL CANCER: Primary | ICD-10-CM

## 2025-06-18 PROCEDURE — G0463 HOSPITAL OUTPT CLINIC VISIT: HCPCS | Performed by: INTERNAL MEDICINE

## 2025-06-18 NOTE — ASSESSMENT & PLAN NOTE
Metastatic.  Patient is on single agent Lonsurf.  Tolerating well.  Lab work is notable for mild cytopenias related to the regimen but not enough to require dose adjustment.  Restaging CT scans overall show improvement in the lesions in the abdomen pelvis.  One spot in the chest is slightly larger but does not meet RECIST criteria for progression.  CEA tumor marker is up my couple of points but relatively stable compared to before.  This will be monitored.  Clinically he feels as well as he has for a long time per his report.  I will continue with current treatment.  I will see him back in 2 months for ongoing therapy with lab work prior.  
I performed a history and physical exam of the patient and discussed their management with the resident. I reviewed the resident's note and agree with the documented findings and plan of care. My medical decison making and observations are found above.

## 2025-06-18 NOTE — PROGRESS NOTES
Chief Complaint  2019 dx RECTAL CA; 2021 metastatic-    Eri Moran APRN Reinberg, Emily, APRN Subjective          Robbi Kennedy presents to [facility] for ongoing treatment of his metastatic rectal cancer.  He is on single agent Lonsurf.  Compliant with the regimen.  He notes mild diarrhea but he is able to control his symptoms.  He notes excellent appetite.  He states he is feeling better than he has for some time.  He still has some neuropathy in his feet from prior treatment but he feels like that is actually improved as well.  No issues from his Port-A-Cath.    Oncology/Hematology History Overview Note   9/30/2019 High rectal rkig-pfntcnqmoy-osdwhaausbmqsr adenocarcinoma  associated with tubular adenoma.   4/27/21 Biopsy of liver revealed metastatic adenocarcinoma, consistent with colorectal primary.    Clinical Staging  Stage IIa ([id number])    Chemotherapy      neoadjuvant Xeloda with radiation. adjuvant xeloda        Radiation Therapy      7/8/19 thru 8/14/19 completed neoadjuvant 5040 cGy=28 fxs rectum     10/2021 XRT due to recurrence right middle lung 5 Fx's, 5000cGy  11/26/21 adjuvant FOLFOX -2/25/2022    Surgeries       9/30/2019 low anterior resection with ostomy        8/31/21 partial hepatectomy, cholecystectomy and MWA for segment 6 medical margin performed at     2/8/2023 CT Scans revealed Worsening pulmonary metastatic disease.    2/21/2023 orders written for FOLFIRI + AVASTIN       Rectal cancer   6/2/2021 - 8/15/2021 Chemotherapy    OP COLON mFOLFOX6 + Bevacizumab (OXALIplatin / Leucovorin / Fluorouracil / Bevacizumab)     6/18/2021 Initial Diagnosis    Rectal cancer (CMS/HCC)     6/21/2021 Cancer Staged    Staging form: Colon And Rectum, AJCC 8th Edition  - Clinical: Stage IIA (cT3, cN0, cM0) - Signed by Maurilio Campos MD on 6/21/2021 11/16/2021 - 2/25/2022 Chemotherapy    OP COLON mFOLFOX6 OXALIplatin / Leucovorin / Fluorouracil      2/28/2023 - 8/24/2023 Chemotherapy    OP COLORECTAL FOLFIRI + Bevacizumab 5mg/kg (Irinotecan / Leucovorin / Fluorouracil / Bevacizumab)     3/13/2024 - 3/13/2024 Biopsy    OP COLORECTAL Fruquintinib  Plan Provider: Maurilio Campos MD  Treatment goal: Palliative  Line of treatment: [No plan line of treatment]     3/17/2025 Biopsy    OP COLON Trifluidine / Tipiracil  Plan Provider: Maurilio Campos MD  Treatment goal: Palliative  Line of treatment: [No plan line of treatment]     Secondary malignant neoplasm of left lung   9/14/2021 Initial Diagnosis    Secondary malignant neoplasm of left lung (HCC)     10/20/2021 -  Radiation    RADIATION THERAPY Treatment Details (Noted on 10/5/2021)  Site: Bilateral Lung  Technique: SBRT  Goal: No goal specified  Planned Treatment Start Date: 10/20/2021     11/16/2021 - 2/25/2022 Chemotherapy    OP COLON mFOLFOX6 OXALIplatin / Leucovorin / Fluorouracil     6/15/2022 -  Radiation    RADIATION THERAPY Treatment Details (Noted on 6/15/2022)  Site: Left Lung - Lower lobe  Technique: SBRT  Goal: No goal specified  Planned Treatment Start Date: No planned start date specified     10/25/2022 -  Radiation    RADIATION THERAPY Treatment Details (Noted on 10/25/2022)  Site: Left Lung  Technique: SBRT  Goal: No goal specified  Planned Treatment Start Date: No planned start date specified     Secondary malignancy of liver   9/14/2021 Initial Diagnosis    Secondary malignancy of liver (HCC)     11/16/2021 - 2/25/2022 Chemotherapy    OP COLON mFOLFOX6 OXALIplatin / Leucovorin / Fluorouracil     Secondary malignant neoplasm of right lung   10/5/2021 Initial Diagnosis    Secondary malignant neoplasm of right lung (HCC)     10/20/2021 -  Radiation    RADIATION THERAPY Treatment Details (Noted on 10/5/2021)  Site: Bilateral Lung  Technique: SBRT  Goal: No goal specified  Planned Treatment Start Date: 10/20/2021     11/16/2021 - 2/25/2022 Chemotherapy    OP COLON mFOLFOX6 OXALIplatin / Leucovorin  / Fluorouracil           Current Outpatient Medications on File Prior to Visit   Medication Sig Dispense Refill    acetaminophen (TYLENOL) 500 MG tablet Take 1 tablet by mouth Every 6 (Six) Hours As Needed.      apixaban (ELIQUIS) 5 MG tablet tablet Take 1 tablet by mouth 2 (Two) Times a Day.      ascorbic acid (VITAMIN C) 500 MG tablet Take 1 tablet by mouth Daily.      atorvastatin (LIPITOR) 40 MG tablet atorvastatin 40 mg oral tablet take 1 tablet (40 mg) by oral route once daily   Active      carvedilol (COREG) 12.5 MG tablet Take 1 tablet by mouth 2 (Two) Times a Day. 180 tablet 3    cyanocobalamin (VITAMIN B-12) 1000 MCG tablet Take 1 tablet by mouth Daily.      Diclofenac Sodium (VOLTAREN) 1 % gel gel Apply 4 g topically to the appropriate area as directed 4 (Four) Times a Day As Needed (as needed). 100 g 1    diphenoxylate-atropine (LOMOTIL) 2.5-0.025 MG per tablet Take 1 tablet by mouth 4 (Four) Times a Day As Needed for Diarrhea. 120 tablet 0    DULoxetine (CYMBALTA) 30 MG capsule Take 1 capsule by mouth Daily. 30 capsule 2    furosemide (LASIX) 40 MG tablet Take 1 tablet by mouth Daily. 90 tablet 3    levocetirizine (XYZAL) 5 MG tablet Take 1 tablet by mouth Daily.      levothyroxine (SYNTHROID, LEVOTHROID) 75 MCG tablet Take 1 tablet by mouth Daily.      MAGIC MOUTHWASH W/NYSTATIN 1/1/1/1 (lidocaine - diphenhydrAMINE HCl - aluminum & magnesium hydroxide - nystatin) Swish and swallow 10 mL 4 (Four) Times a Day As Needed for Mouth Pain. 500 mL 1    multivitamin (THERAGRAN) tablet tablet Take 1 tablet by mouth Daily.      ondansetron (ZOFRAN) 8 MG tablet Take 1 tablet by mouth 3 (Three) Times a Day As Needed for Nausea or Vomiting. 30 tablet 5    psyllium (METAMUCIL) 58.6 % packet Take 1 packet by mouth Daily.      trifluridine-tipiracil (LONSURF) 20-8.19 MG per tablet Take 4 tablets by mouth Daily With Breakfast & Dinner. Take 4 tabs with Breakfast and 4 with Dinner Day 1-5 and 8-12 every 28 days. 80  tablet 11    verapamil SR (CALAN-SR) 180 MG CR tablet Take 1 tablet by mouth Every Night. 90 tablet 3    vitamin E 1000 UNIT capsule Take 1 capsule by mouth Daily.       No current facility-administered medications on file prior to visit.       No Known Allergies  Past Medical History:   Diagnosis Date    Arrhythmia     Asthma     Atrial fibrillation, persistent 02/26/2021    Chronic heart failure with preserved ejection fraction 07/18/2022    BNP 1590 on 7/22 Echocardiogram with EF 55% moderate to severe biatrial enlargement and mild right ventricular enlargement 7/22     Colorectal cancer     Coronary artery disease     Deep vein thrombosis     Essential hypertension     GI cancer     Hepatitis     History of DVT of lower extremity 08/27/2021    Hyperlipidemia LDL goal <100 12/31/2020    Rectal cancer 06/18/2021    Secondary malignancy of liver 09/14/2021    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    Secondary malignant neoplasm of right lung 10/05/2021    Thrombocytopenia 01/11/2022    Thyroid disease     Viral upper respiratory tract infection 04/24/2023     Past Surgical History:   Procedure Laterality Date    COLON SURGERY      HERNIA REPAIR      LIVER BIOPSY      4/27/21 Biopsy of liver revealed metastatic adenocarcinoma, consistent with colorectal primary    OTHER SURGICAL HISTORY      REMOVED CANCER FROM COLON     Social History     Socioeconomic History    Marital status:     Number of children: 2   Tobacco Use    Smoking status: Never    Smokeless tobacco: Never   Vaping Use    Vaping status: Never Used   Substance and Sexual Activity    Alcohol use: Not Currently     Alcohol/week: 3.0 standard drinks of alcohol     Types: 3 Cans of beer per week     Comment: 2-3 beers weekly    Drug use: Never    Sexual activity: Not Currently     Partners: Female     Birth control/protection: Vasectomy     Family History   Problem Relation Age of Onset    Prostate cancer Father     Heart murmur  "Mother     Diabetes Mother     Hypertension Mother     Colon cancer Maternal Uncle     Hypertension Sister     Hypertension Sister        Objective   Physical Exam  Vitals reviewed. Exam conducted with a chaperone present.   Cardiovascular:      Rate and Rhythm: Normal rate and regular rhythm.      Heart sounds: Normal heart sounds. No murmur heard.     No gallop.   Pulmonary:      Effort: Pulmonary effort is normal.      Breath sounds: Normal breath sounds.      Comments: Port-A-Cath  Abdominal:      General: Bowel sounds are normal.   Lymphadenopathy:      Cervical: No cervical adenopathy.   Psychiatric:         Mood and Affect: Mood normal.         Behavior: Behavior normal.         Vitals:    06/18/25 1312   BP: 114/76   Pulse: 71   Resp: 18   Temp: 97.1 °F (36.2 °C)   TempSrc: Temporal   SpO2: 98%   Weight: 107 kg (236 lb 1.8 oz)   Height: 193 cm (75.98\")   PainSc: 0-No pain     ECOG score: 0         PHQ-9 Total Score:                    Result Review :   The following data was reviewed by: Maurilio Campos MD on 06/18/2025:  Lab Results   Component Value Date    HGB 10.3 (L) 06/16/2025    HCT 31.9 (L) 06/16/2025    .5 (H) 06/16/2025     06/16/2025    WBC 3.06 (L) 06/16/2025    NEUTROABS 1.94 06/16/2025    LYMPHSABS 0.92 06/16/2025    MONOSABS 0.17 06/16/2025    EOSABS 0.01 06/16/2025    BASOSABS 0.01 06/16/2025     Lab Results   Component Value Date    GLUCOSE 73 06/16/2025    BUN 10.3 06/16/2025    CREATININE 0.63 (L) 06/16/2025     06/16/2025    K 3.9 06/16/2025     06/16/2025    CO2 25.6 06/16/2025    CALCIUM 8.5 (L) 06/16/2025    PROTEINTOT 6.2 06/16/2025    ALBUMIN 3.9 06/16/2025    BILITOT 0.9 06/16/2025    ALKPHOS 80 06/16/2025    AST 16 06/16/2025    ALT 12 06/16/2025     Lab Results   Component Value Date    MG 1.8 (L) 09/07/2021    FREET4 0.74 07/17/2024    TSH 37.788 (H) 10/14/2024     Lab Results   Component Value Date    IRON 60 (L) 03/04/2020    LABIRON 20 03/04/2020    " TRANSFERRIN 215.00 03/04/2020    TIBC 307 03/04/2020     Lab Results   Component Value Date    FERRITIN 140 03/04/2020    [id number] >2000 (H) 03/04/2020    FOLATE 19.4 03/04/2020     Lab Results   Component Value Date    PSA 0.92 12/12/2023    CEA 25.70 06/16/2025       Data reviewed : Radiologic studies CT chest, abdomen, pelvis reviewed      Assessment and Plan    Diagnoses and all orders for this visit:    1. Rectal cancer (Primary)  Assessment & Plan:  Metastatic.  Patient is on single agent Lonsurf.  Tolerating well.  Lab work is notable for mild cytopenias related to the regimen but not enough to require dose adjustment.  Restaging CT scans overall show improvement in the lesions in the abdomen pelvis.  One spot in the chest is slightly larger but does not meet RECIST criteria for progression.  CEA tumor marker is up my couple of points but relatively stable compared to before.  This will be monitored.  Clinically he feels as well as he has for a long time per his report.  I will continue with current treatment.  I will see him back in 2 months for ongoing therapy with lab work prior.              Patient Follow Up: 2 months    Patient was given instructions and counseling regarding his condition or for health maintenance advice. Please see specific information pulled into the AVS if appropriate.     Maurilio Campos MD    6/18/2025

## 2025-06-19 ENCOUNTER — SPECIALTY PHARMACY (OUTPATIENT)
Dept: PHARMACY | Facility: HOSPITAL | Age: 70
End: 2025-06-19
Payer: MEDICARE

## 2025-06-19 NOTE — PROGRESS NOTES
Specialty Pharmacy Patient Management Program  Chart Review/Lab Monitoring     Robbi Kennedy is a 70 y.o. male with CRC (Colorectal Cancer) who presented for lab check and Oncology provider appointment. 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 Patient insurance requires patient to fill with [facility] now versus with [facility] [place] as he had been. No changes to current regimen - labs reviewed and appropriate.  25: Robbi Kenendy is doing well overall. He does have some diarrhea and neuropathy. Labs reviewed and appropriate - no changes.     Labs:  Lab Results   Component Value Date     2025    K 3.9 2025    CO2 25.6 2025     2025    BUN 10.3 2025    GLUCOSE 73 2025    CALCIUM 8.5 (L) 2025    CREATININE 0.63 (L) 2025    EGFRIFAFRI >60 2022    EGFRIFNONA 77 2022    BCR 16.3 2025    ANIONGAP 10.4 2025    AST 16 2025    ALT 12 2025    BILITOT 0.9 2025    ALKPHOS 80 2025     Lab Results   Component Value Date    WBC 3.06 (L) 2025    RBC 2.94 (L) 2025    HGB 10.3 (L) 2025    HCT 31.9 (L) 2025      06/16/2025    NEUTROABS 1.94 06/16/2025    LYMPHSABS 0.92 06/16/2025    MONOSABS 0.17 06/16/2025    EOSABS 0.01 06/16/2025    BASOSABS 0.01 06/16/2025     PLAN  Specialty pharmacy will continue to follow patient. Continue with current regimen as planned.     Joyce Garnica, PharmD  Oncology Clinical Specialty Pharmacist   6/19/2025 08:47 EDT

## 2025-08-15 ENCOUNTER — HOSPITAL ENCOUNTER (OUTPATIENT)
Dept: ONCOLOGY | Facility: HOSPITAL | Age: 70
Discharge: HOME | End: 2025-08-15
Payer: MEDICARE

## 2025-08-15 DIAGNOSIS — C20 RECTAL CANCER: ICD-10-CM

## 2025-08-15 DIAGNOSIS — Z45.2 ENCOUNTER FOR ADJUSTMENT OR MANAGEMENT OF VASCULAR ACCESS DEVICE: Primary | ICD-10-CM

## 2025-08-15 DIAGNOSIS — D64.9 LOW HEMOGLOBIN: Primary | ICD-10-CM

## 2025-08-15 LAB
ALBUMIN SERPL-MCNC: 3.8 G/DL (ref 3.5–5.2)
ALBUMIN/GLOB SERPL: 1.7 G/DL
ALP SERPL-CCNC: 90 U/L (ref 39–117)
ALT SERPL W P-5'-P-CCNC: 13 U/L (ref 1–41)
ANION GAP SERPL CALCULATED.3IONS-SCNC: 8.5 MMOL/L (ref 5–15)
ANISOCYTOSIS BLD QL: NORMAL
AST SERPL-CCNC: 15 U/L (ref 1–40)
BASOPHILS # BLD AUTO: 0 10*3/MM3 (ref 0–0.2)
BASOPHILS NFR BLD AUTO: 0 % (ref 0–1.5)
BILIRUB SERPL-MCNC: 0.9 MG/DL (ref 0–1.2)
BUN SERPL-MCNC: 15.3 MG/DL (ref 8–23)
BUN/CREAT SERPL: 21.3 (ref 7–25)
CALCIUM SPEC-SCNC: 8.5 MG/DL (ref 8.6–10.5)
CEA SERPL-MCNC: 44.1 NG/ML
CHLORIDE SERPL-SCNC: 102 MMOL/L (ref 98–107)
CO2 SERPL-SCNC: 27.5 MMOL/L (ref 22–29)
CREAT SERPL-MCNC: 0.72 MG/DL (ref 0.76–1.27)
DEPRECATED RDW RBC AUTO: 69.3 FL (ref 37–54)
EGFRCR SERPLBLD CKD-EPI 2021: 98.9 ML/MIN/1.73
EOSINOPHIL # BLD AUTO: 0.02 10*3/MM3 (ref 0–0.4)
EOSINOPHIL NFR BLD AUTO: 0.9 % (ref 0.3–6.2)
ERYTHROCYTE [DISTWIDTH] IN BLOOD BY AUTOMATED COUNT: 17 % (ref 12.3–15.4)
GLOBULIN UR ELPH-MCNC: 2.3 GM/DL
GLUCOSE SERPL-MCNC: 91 MG/DL (ref 65–99)
HCT VFR BLD AUTO: 22.2 % (ref 37.5–51)
HGB BLD-MCNC: 7.4 G/DL (ref 13–17.7)
IMM GRANULOCYTES # BLD AUTO: 0.02 10*3/MM3 (ref 0–0.05)
IMM GRANULOCYTES NFR BLD AUTO: 0.9 % (ref 0–0.5)
LYMPHOCYTES # BLD AUTO: 0.61 10*3/MM3 (ref 0.7–3.1)
LYMPHOCYTES NFR BLD AUTO: 27 % (ref 19.6–45.3)
MACROCYTES BLD QL SMEAR: NORMAL
MCH RBC QN AUTO: 37 PG (ref 26.6–33)
MCHC RBC AUTO-ENTMCNC: 33.3 G/DL (ref 31.5–35.7)
MCV RBC AUTO: 111 FL (ref 79–97)
MONOCYTES # BLD AUTO: 0.11 10*3/MM3 (ref 0.1–0.9)
MONOCYTES NFR BLD AUTO: 4.9 % (ref 5–12)
NEUTROPHILS NFR BLD AUTO: 1.5 10*3/MM3 (ref 1.7–7)
NEUTROPHILS NFR BLD AUTO: 66.3 % (ref 42.7–76)
NRBC BLD AUTO-RTO: 0 /100 WBC (ref 0–0.2)
PLATELET # BLD AUTO: 97 10*3/MM3 (ref 140–450)
PMV BLD AUTO: 10.2 FL (ref 6–12)
POIKILOCYTOSIS BLD QL SMEAR: NORMAL
POTASSIUM SERPL-SCNC: 3.3 MMOL/L (ref 3.5–5.2)
PROT SERPL-MCNC: 6.1 G/DL (ref 6–8.5)
RBC # BLD AUTO: 2 10*6/MM3 (ref 4.14–5.8)
SMALL PLATELETS BLD QL SMEAR: NORMAL
SODIUM SERPL-SCNC: 138 MMOL/L (ref 136–145)
WBC MORPH BLD: NORMAL
WBC NRBC COR # BLD AUTO: 2.26 10*3/MM3 (ref 3.4–10.8)

## 2025-08-15 PROCEDURE — 85025 COMPLETE CBC W/AUTO DIFF WBC: CPT | Performed by: INTERNAL MEDICINE

## 2025-08-15 PROCEDURE — 85007 BL SMEAR W/DIFF WBC COUNT: CPT | Performed by: INTERNAL MEDICINE

## 2025-08-15 PROCEDURE — 25010000002 HEPARIN LOCK FLUSH PER 10 UNITS: Performed by: INTERNAL MEDICINE

## 2025-08-15 PROCEDURE — 82378 CARCINOEMBRYONIC ANTIGEN: CPT | Performed by: INTERNAL MEDICINE

## 2025-08-15 PROCEDURE — 80053 COMPREHEN METABOLIC PANEL: CPT | Performed by: INTERNAL MEDICINE

## 2025-08-15 PROCEDURE — 36591 DRAW BLOOD OFF VENOUS DEVICE: CPT

## 2025-08-15 RX ORDER — SODIUM CHLORIDE 9 MG/ML
250 INJECTION, SOLUTION INTRAVENOUS AS NEEDED
Status: CANCELLED | OUTPATIENT
Start: 2025-08-15 | End: 2025-08-15

## 2025-08-15 RX ORDER — SODIUM CHLORIDE 0.9 % (FLUSH) 0.9 %
20 SYRINGE (ML) INJECTION AS NEEDED
Status: CANCELLED | OUTPATIENT
Start: 2025-08-15

## 2025-08-15 RX ORDER — HEPARIN SODIUM (PORCINE) LOCK FLUSH IV SOLN 100 UNIT/ML 100 UNIT/ML
500 SOLUTION INTRAVENOUS AS NEEDED
Status: CANCELLED | OUTPATIENT
Start: 2025-08-15

## 2025-08-15 RX ORDER — HEPARIN SODIUM (PORCINE) LOCK FLUSH IV SOLN 100 UNIT/ML 100 UNIT/ML
500 SOLUTION INTRAVENOUS AS NEEDED
Status: DISCONTINUED | OUTPATIENT
Start: 2025-08-15 | End: 2025-08-16 | Stop reason: HOSPADM

## 2025-08-15 RX ORDER — SODIUM CHLORIDE 0.9 % (FLUSH) 0.9 %
20 SYRINGE (ML) INJECTION AS NEEDED
Status: DISCONTINUED | OUTPATIENT
Start: 2025-08-15 | End: 2025-08-16 | Stop reason: HOSPADM

## 2025-08-15 RX ADMIN — HEPARIN 500 UNITS: 100 SYRINGE at 12:56

## 2025-08-15 RX ADMIN — Medication 20 ML: at 12:56

## 2025-08-16 ENCOUNTER — HOSPITAL ENCOUNTER (OUTPATIENT)
Dept: INFUSION THERAPY | Facility: HOSPITAL | Age: 70
Discharge: HOME | End: 2025-08-16
Payer: MEDICARE

## 2025-08-16 VITALS
TEMPERATURE: 98.1 F | HEART RATE: 84 BPM | SYSTOLIC BLOOD PRESSURE: 128 MMHG | RESPIRATION RATE: 20 BRPM | OXYGEN SATURATION: 97 % | DIASTOLIC BLOOD PRESSURE: 82 MMHG

## 2025-08-16 DIAGNOSIS — D64.9 LOW HEMOGLOBIN: ICD-10-CM

## 2025-08-16 LAB
ABO GROUP BLD: NORMAL
ABO GROUP BLD: NORMAL
BLD GP AB SCN SERPL QL: NEGATIVE
RH BLD: POSITIVE
RH BLD: POSITIVE
T&S EXPIRATION DATE: NORMAL

## 2025-08-16 PROCEDURE — 86900 BLOOD TYPING SEROLOGIC ABO: CPT | Performed by: INTERNAL MEDICINE

## 2025-08-16 PROCEDURE — 25010000002 HEPARIN LOCK FLUSH PER 10 UNITS: Performed by: INTERNAL MEDICINE

## 2025-08-16 PROCEDURE — 86850 RBC ANTIBODY SCREEN: CPT | Performed by: INTERNAL MEDICINE

## 2025-08-16 PROCEDURE — 86901 BLOOD TYPING SEROLOGIC RH(D): CPT | Performed by: INTERNAL MEDICINE

## 2025-08-16 RX ORDER — SODIUM CHLORIDE 0.9 % (FLUSH) 0.9 %
10 SYRINGE (ML) INJECTION EVERY 12 HOURS SCHEDULED
Status: DISCONTINUED | OUTPATIENT
Start: 2025-08-16 | End: 2025-08-17 | Stop reason: HOSPADM

## 2025-08-16 RX ORDER — HEPARIN SODIUM (PORCINE) LOCK FLUSH IV SOLN 100 UNIT/ML 100 UNIT/ML
5 SOLUTION INTRAVENOUS AS NEEDED
Status: DISCONTINUED | OUTPATIENT
Start: 2025-08-16 | End: 2025-08-17 | Stop reason: HOSPADM

## 2025-08-16 RX ORDER — SODIUM CHLORIDE 0.9 % (FLUSH) 0.9 %
10 SYRINGE (ML) INJECTION AS NEEDED
Status: DISCONTINUED | OUTPATIENT
Start: 2025-08-16 | End: 2025-08-17 | Stop reason: HOSPADM

## 2025-08-16 RX ORDER — SODIUM CHLORIDE 9 MG/ML
250 INJECTION, SOLUTION INTRAVENOUS AS NEEDED
Status: ACTIVE | OUTPATIENT
Start: 2025-08-16 | End: 2025-08-16

## 2025-08-16 RX ADMIN — HEPARIN 500 UNITS: 100 SYRINGE at 17:48

## 2025-08-16 RX ADMIN — Medication 10 ML: at 17:48

## 2025-08-17 LAB
BH BB BLOOD EXPIRATION DATE: NORMAL
BH BB BLOOD EXPIRATION DATE: NORMAL
BH BB BLOOD TYPE BARCODE: 6200
BH BB BLOOD TYPE BARCODE: 6200
BH BB DISPENSE STATUS: NORMAL
BH BB DISPENSE STATUS: NORMAL
BH BB PRODUCT CODE: NORMAL
BH BB PRODUCT CODE: NORMAL
BH BB UNIT NUMBER: NORMAL
BH BB UNIT NUMBER: NORMAL
CROSSMATCH INTERPRETATION: NORMAL
CROSSMATCH INTERPRETATION: NORMAL
UNIT  ABO: NORMAL
UNIT  ABO: NORMAL
UNIT  RH: NORMAL
UNIT  RH: NORMAL

## 2025-08-18 ENCOUNTER — OFFICE VISIT (OUTPATIENT)
Dept: ONCOLOGY | Facility: HOSPITAL | Age: 70
End: 2025-08-18
Payer: MEDICARE

## 2025-08-18 ENCOUNTER — HOSPITAL ENCOUNTER (OUTPATIENT)
Dept: ONCOLOGY | Facility: HOSPITAL | Age: 70
Discharge: HOME | End: 2025-08-18
Payer: MEDICARE

## 2025-08-18 VITALS
TEMPERATURE: 97.8 F | OXYGEN SATURATION: 97 % | DIASTOLIC BLOOD PRESSURE: 81 MMHG | HEART RATE: 76 BPM | HEIGHT: 76 IN | WEIGHT: 247.8 LBS | RESPIRATION RATE: 18 BRPM | SYSTOLIC BLOOD PRESSURE: 122 MMHG | BODY MASS INDEX: 30.18 KG/M2

## 2025-08-18 DIAGNOSIS — D64.9 LOW HEMOGLOBIN: ICD-10-CM

## 2025-08-18 DIAGNOSIS — F32.A DEPRESSION, UNSPECIFIED DEPRESSION TYPE: ICD-10-CM

## 2025-08-18 DIAGNOSIS — Z45.2 ENCOUNTER FOR ADJUSTMENT OR MANAGEMENT OF VASCULAR ACCESS DEVICE: Primary | ICD-10-CM

## 2025-08-18 DIAGNOSIS — C20 RECTAL CANCER: Primary | ICD-10-CM

## 2025-08-18 LAB
ABO GROUP BLD: NORMAL
ANISOCYTOSIS BLD QL: NORMAL
BASOPHILS # BLD AUTO: 0.01 10*3/MM3 (ref 0–0.2)
BASOPHILS NFR BLD AUTO: 0.4 % (ref 0–1.5)
BLD GP AB SCN SERPL QL: NEGATIVE
DEPRECATED RDW RBC AUTO: 72 FL (ref 37–54)
EOSINOPHIL # BLD AUTO: 0.03 10*3/MM3 (ref 0–0.4)
EOSINOPHIL NFR BLD AUTO: 1.3 % (ref 0.3–6.2)
ERYTHROCYTE [DISTWIDTH] IN BLOOD BY AUTOMATED COUNT: 18.6 % (ref 12.3–15.4)
HCT VFR BLD AUTO: 25.9 % (ref 37.5–51)
HGB BLD-MCNC: 8.7 G/DL (ref 13–17.7)
IMM GRANULOCYTES # BLD AUTO: 0.01 10*3/MM3 (ref 0–0.05)
IMM GRANULOCYTES NFR BLD AUTO: 0.4 % (ref 0–0.5)
LYMPHOCYTES # BLD AUTO: 0.67 10*3/MM3 (ref 0.7–3.1)
LYMPHOCYTES NFR BLD AUTO: 29.8 % (ref 19.6–45.3)
MACROCYTES BLD QL SMEAR: NORMAL
MCH RBC QN AUTO: 35.5 PG (ref 26.6–33)
MCHC RBC AUTO-ENTMCNC: 33.6 G/DL (ref 31.5–35.7)
MCV RBC AUTO: 105.7 FL (ref 79–97)
MONOCYTES # BLD AUTO: 0.06 10*3/MM3 (ref 0.1–0.9)
MONOCYTES NFR BLD AUTO: 2.7 % (ref 5–12)
NEUTROPHILS NFR BLD AUTO: 1.47 10*3/MM3 (ref 1.7–7)
NEUTROPHILS NFR BLD AUTO: 65.4 % (ref 42.7–76)
NRBC BLD AUTO-RTO: 0 /100 WBC (ref 0–0.2)
PLATELET # BLD AUTO: 54 10*3/MM3 (ref 140–450)
PMV BLD AUTO: 10.5 FL (ref 6–12)
RBC # BLD AUTO: 2.45 10*6/MM3 (ref 4.14–5.8)
RH BLD: POSITIVE
SMALL PLATELETS BLD QL SMEAR: NORMAL
T&S EXPIRATION DATE: NORMAL
WBC MORPH BLD: NORMAL
WBC NRBC COR # BLD AUTO: 2.25 10*3/MM3 (ref 3.4–10.8)

## 2025-08-18 PROCEDURE — 86901 BLOOD TYPING SEROLOGIC RH(D): CPT | Performed by: INTERNAL MEDICINE

## 2025-08-18 PROCEDURE — 85025 COMPLETE CBC W/AUTO DIFF WBC: CPT | Performed by: INTERNAL MEDICINE

## 2025-08-18 PROCEDURE — 85007 BL SMEAR W/DIFF WBC COUNT: CPT | Performed by: INTERNAL MEDICINE

## 2025-08-18 PROCEDURE — 86900 BLOOD TYPING SEROLOGIC ABO: CPT | Performed by: INTERNAL MEDICINE

## 2025-08-18 PROCEDURE — 25010000002 HEPARIN LOCK FLUSH PER 10 UNITS: Performed by: INTERNAL MEDICINE

## 2025-08-18 PROCEDURE — 86850 RBC ANTIBODY SCREEN: CPT | Performed by: INTERNAL MEDICINE

## 2025-08-18 PROCEDURE — 36591 DRAW BLOOD OFF VENOUS DEVICE: CPT

## 2025-08-18 RX ORDER — SODIUM CHLORIDE 0.9 % (FLUSH) 0.9 %
20 SYRINGE (ML) INJECTION AS NEEDED
Status: DISCONTINUED | OUTPATIENT
Start: 2025-08-18 | End: 2025-08-19 | Stop reason: HOSPADM

## 2025-08-18 RX ORDER — DULOXETIN HYDROCHLORIDE 60 MG/1
60 CAPSULE, DELAYED RELEASE ORAL DAILY
Qty: 30 CAPSULE | Refills: 2 | Status: SHIPPED | OUTPATIENT
Start: 2025-08-18

## 2025-08-18 RX ORDER — HEPARIN SODIUM (PORCINE) LOCK FLUSH IV SOLN 100 UNIT/ML 100 UNIT/ML
500 SOLUTION INTRAVENOUS AS NEEDED
OUTPATIENT
Start: 2025-08-18

## 2025-08-18 RX ORDER — HEPARIN SODIUM (PORCINE) LOCK FLUSH IV SOLN 100 UNIT/ML 100 UNIT/ML
500 SOLUTION INTRAVENOUS AS NEEDED
Status: DISCONTINUED | OUTPATIENT
Start: 2025-08-18 | End: 2025-08-19 | Stop reason: HOSPADM

## 2025-08-18 RX ORDER — METOPROLOL TARTRATE 100 MG/1
TABLET ORAL EVERY 12 HOURS SCHEDULED
COMMUNITY

## 2025-08-18 RX ORDER — LORATADINE 10 MG/1
TABLET ORAL EVERY 24 HOURS
COMMUNITY

## 2025-08-18 RX ORDER — SODIUM CHLORIDE 0.9 % (FLUSH) 0.9 %
20 SYRINGE (ML) INJECTION AS NEEDED
OUTPATIENT
Start: 2025-08-18

## 2025-08-18 RX ADMIN — HEPARIN 500 UNITS: 100 SYRINGE at 13:58

## 2025-08-18 RX ADMIN — Medication 20 ML: at 13:58

## 2025-08-19 ENCOUNTER — SPECIALTY PHARMACY (OUTPATIENT)
Dept: PHARMACY | Facility: HOSPITAL | Age: 70
End: 2025-08-19
Payer: MEDICARE